# Patient Record
Sex: MALE | Race: WHITE | Employment: OTHER
[De-identification: names, ages, dates, MRNs, and addresses within clinical notes are randomized per-mention and may not be internally consistent; named-entity substitution may affect disease eponyms.]

---

## 2017-01-03 DIAGNOSIS — M10.9 GOUT, UNSPECIFIED CAUSE, UNSPECIFIED CHRONICITY, UNSPECIFIED SITE: ICD-10-CM

## 2017-01-03 RX ORDER — ALLOPURINOL 100 MG/1
100 TABLET ORAL DAILY
Qty: 30 TABLET | Refills: 11 | Status: SHIPPED | OUTPATIENT
Start: 2017-01-03 | End: 2018-01-02 | Stop reason: SDUPTHER

## 2017-01-13 DIAGNOSIS — E13.8 OTHER SPECIFIED DIABETES MELLITUS WITH UNSPECIFIED COMPLICATIONS (HCC): ICD-10-CM

## 2017-01-13 RX ORDER — GLIMEPIRIDE 4 MG/1
TABLET ORAL
Qty: 30 TABLET | Refills: 11 | Status: SHIPPED | OUTPATIENT
Start: 2017-01-13 | End: 2017-07-19 | Stop reason: SDUPTHER

## 2017-01-30 ENCOUNTER — TELEPHONE (OUTPATIENT)
Dept: INTERNAL MEDICINE | Facility: CLINIC | Age: 52
End: 2017-01-30

## 2017-02-25 DIAGNOSIS — E11.65 TYPE 2 DIABETES MELLITUS WITH HYPERGLYCEMIA, WITHOUT LONG-TERM CURRENT USE OF INSULIN (HCC): ICD-10-CM

## 2017-02-27 RX ORDER — SITAGLIPTIN 100 MG/1
TABLET, FILM COATED ORAL
Qty: 30 TABLET | Refills: 5 | Status: SHIPPED | OUTPATIENT
Start: 2017-02-27 | End: 2017-10-18 | Stop reason: SDUPTHER

## 2017-06-27 ENCOUNTER — TELEPHONE (OUTPATIENT)
Dept: INTERNAL MEDICINE CLINIC | Age: 52
End: 2017-06-27

## 2017-07-19 ENCOUNTER — OFFICE VISIT (OUTPATIENT)
Dept: INTERNAL MEDICINE CLINIC | Age: 52
End: 2017-07-19
Payer: MEDICARE

## 2017-07-19 VITALS
WEIGHT: 242 LBS | BODY MASS INDEX: 32.78 KG/M2 | HEIGHT: 72 IN | SYSTOLIC BLOOD PRESSURE: 126 MMHG | DIASTOLIC BLOOD PRESSURE: 80 MMHG

## 2017-07-19 DIAGNOSIS — G47.9 SLEEP DISORDER: ICD-10-CM

## 2017-07-19 DIAGNOSIS — E11.65 TYPE 2 DIABETES MELLITUS WITH HYPERGLYCEMIA, WITHOUT LONG-TERM CURRENT USE OF INSULIN (HCC): Primary | ICD-10-CM

## 2017-07-19 DIAGNOSIS — E13.8 OTHER SPECIFIED DIABETES MELLITUS WITH UNSPECIFIED COMPLICATIONS (HCC): ICD-10-CM

## 2017-07-19 DIAGNOSIS — E78.3 HYPERCHYLOMICRONEMIA: ICD-10-CM

## 2017-07-19 LAB — HBA1C MFR BLD: 10.1 %

## 2017-07-19 PROCEDURE — 99214 OFFICE O/P EST MOD 30 MIN: CPT | Performed by: INTERNAL MEDICINE

## 2017-07-19 PROCEDURE — 83036 HEMOGLOBIN GLYCOSYLATED A1C: CPT | Performed by: INTERNAL MEDICINE

## 2017-07-19 RX ORDER — GLIMEPIRIDE 4 MG/1
TABLET ORAL
Qty: 60 TABLET | Refills: 11 | Status: SHIPPED | OUTPATIENT
Start: 2017-07-19 | End: 2018-08-21 | Stop reason: SDUPTHER

## 2017-07-19 ASSESSMENT — PATIENT HEALTH QUESTIONNAIRE - PHQ9
2. FEELING DOWN, DEPRESSED OR HOPELESS: 0
SUM OF ALL RESPONSES TO PHQ QUESTIONS 1-9: 0
SUM OF ALL RESPONSES TO PHQ9 QUESTIONS 1 & 2: 0
1. LITTLE INTEREST OR PLEASURE IN DOING THINGS: 0

## 2017-07-21 ENCOUNTER — HOSPITAL ENCOUNTER (OUTPATIENT)
Dept: SLEEP CENTER | Age: 52
Discharge: HOME | End: 2017-07-21
Payer: MEDICARE

## 2017-07-21 DIAGNOSIS — G47.33 OSA (OBSTRUCTIVE SLEEP APNEA): Primary | ICD-10-CM

## 2017-07-21 LAB
ALBUMIN SERPL-MCNC: 3.8 G/DL
ALP BLD-CCNC: 84 U/L
ALT SERPL-CCNC: 36 U/L
ANION GAP SERPL CALCULATED.3IONS-SCNC: 8 MMOL/L
AST SERPL-CCNC: 30 U/L
BASOPHILS ABSOLUTE: NORMAL /ΜL
BASOPHILS RELATIVE PERCENT: NORMAL %
BILIRUB SERPL-MCNC: 2.2 MG/DL (ref 0.1–1.4)
BUN BLDV-MCNC: 14 MG/DL
CALCIUM SERPL-MCNC: 9.4 MG/DL
CHLORIDE BLD-SCNC: 104 MMOL/L
CHOLESTEROL, TOTAL: 105 MG/DL
CHOLESTEROL/HDL RATIO: 2.9
CO2: 27 MMOL/L
CREAT SERPL-MCNC: 0.78 MG/DL
CREATININE URINE: 367.63 MG/DL
EOSINOPHILS ABSOLUTE: NORMAL /ΜL
EOSINOPHILS RELATIVE PERCENT: NORMAL %
GFR CALCULATED: ABNORMAL
GLUCOSE BLD-MCNC: 183 MG/DL
HCT VFR BLD CALC: 44.5 % (ref 41–53)
HDLC SERPL-MCNC: 36 MG/DL (ref 35–70)
HEMOGLOBIN: 15 G/DL (ref 13.5–17.5)
LDL CHOLESTEROL CALCULATED: 46 MG/DL (ref 0–160)
LYMPHOCYTES ABSOLUTE: NORMAL /ΜL
LYMPHOCYTES RELATIVE PERCENT: NORMAL %
MCH RBC QN AUTO: NORMAL PG
MCHC RBC AUTO-ENTMCNC: NORMAL G/DL
MCV RBC AUTO: NORMAL FL
MICROALBUMIN/CREAT 24H UR: 5.8 MG/G{CREAT}
MONOCYTES ABSOLUTE: NORMAL /ΜL
MONOCYTES RELATIVE PERCENT: NORMAL %
NEUTROPHILS ABSOLUTE: NORMAL /ΜL
NEUTROPHILS RELATIVE PERCENT: NORMAL %
PDW BLD-RTO: NORMAL %
PLATELET # BLD: 107 K/ΜL
PMV BLD AUTO: NORMAL FL
POTASSIUM SERPL-SCNC: 4.3 MMOL/L
RBC # BLD: 4.96 10^6/ΜL
SODIUM BLD-SCNC: 139 MMOL/L
TOTAL PROTEIN: 7.3
TRIGL SERPL-MCNC: 116 MG/DL
VLDLC SERPL CALC-MCNC: 23 MG/DL
WBC # BLD: 6.1 10^3/ML

## 2017-07-21 PROCEDURE — 95810 POLYSOM 6/> YRS 4/> PARAM: CPT

## 2017-07-22 VITALS
DIASTOLIC BLOOD PRESSURE: 80 MMHG | RESPIRATION RATE: 18 BRPM | HEART RATE: 75 BPM | SYSTOLIC BLOOD PRESSURE: 126 MMHG | BODY MASS INDEX: 32.78 KG/M2 | WEIGHT: 242 LBS | HEIGHT: 72 IN

## 2017-07-22 ASSESSMENT — SLEEP AND FATIGUE QUESTIONNAIRES
HOW LIKELY ARE YOU TO NOD OFF OR FALL ASLEEP IN A CAR, WHILE STOPPED FOR A FEW MINUTES IN TRAFFIC: 2
HOW LIKELY ARE YOU TO NOD OFF OR FALL ASLEEP WHILE WATCHING TV: 3
HOW LIKELY ARE YOU TO NOD OFF OR FALL ASLEEP WHILE SITTING QUIETLY AFTER LUNCH WITHOUT ALCOHOL: 3
ESS TOTAL SCORE: 21
HOW LIKELY ARE YOU TO NOD OFF OR FALL ASLEEP WHILE LYING DOWN TO REST IN THE AFTERNOON WHEN CIRCUMSTANCES PERMIT: 3
HOW LIKELY ARE YOU TO NOD OFF OR FALL ASLEEP WHEN YOU ARE A PASSENGER IN A CAR FOR AN HOUR WITHOUT A BREAK: 3
HOW LIKELY ARE YOU TO NOD OFF OR FALL ASLEEP WHILE SITTING AND TALKING TO SOMEONE: 2
HOW LIKELY ARE YOU TO NOD OFF OR FALL ASLEEP WHILE SITTING AND READING: 3
NECK CIRCUMFERENCE (INCHES): 47
HOW LIKELY ARE YOU TO NOD OFF OR FALL ASLEEP WHILE SITTING INACTIVE IN A PUBLIC PLACE: 2

## 2017-07-25 DIAGNOSIS — E11.65 TYPE 2 DIABETES MELLITUS WITH HYPERGLYCEMIA, WITHOUT LONG-TERM CURRENT USE OF INSULIN (HCC): ICD-10-CM

## 2017-07-25 DIAGNOSIS — E78.3 HYPERCHYLOMICRONEMIA: ICD-10-CM

## 2017-08-09 ENCOUNTER — HOSPITAL ENCOUNTER (OUTPATIENT)
Dept: SLEEP CENTER | Age: 52
Discharge: HOME | End: 2017-08-09
Payer: MEDICARE

## 2017-08-09 VITALS
SYSTOLIC BLOOD PRESSURE: 126 MMHG | WEIGHT: 242 LBS | BODY MASS INDEX: 32.78 KG/M2 | HEIGHT: 72 IN | DIASTOLIC BLOOD PRESSURE: 80 MMHG

## 2017-08-09 DIAGNOSIS — G47.33 OSA (OBSTRUCTIVE SLEEP APNEA): Primary | ICD-10-CM

## 2017-08-09 PROCEDURE — 95811 POLYSOM 6/>YRS CPAP 4/> PARM: CPT

## 2017-08-18 ENCOUNTER — TELEPHONE (OUTPATIENT)
Dept: INTERNAL MEDICINE CLINIC | Age: 52
End: 2017-08-18

## 2017-08-20 DIAGNOSIS — N40.0 BENIGN NON-NODULAR PROSTATIC HYPERPLASIA WITHOUT LOWER URINARY TRACT SYMPTOMS: ICD-10-CM

## 2017-08-21 RX ORDER — TAMSULOSIN HYDROCHLORIDE 0.4 MG/1
CAPSULE ORAL
Qty: 30 CAPSULE | Refills: 6 | Status: SHIPPED | OUTPATIENT
Start: 2017-08-21 | End: 2018-08-21 | Stop reason: SDUPTHER

## 2017-08-23 ENCOUNTER — OFFICE VISIT (OUTPATIENT)
Dept: INTERNAL MEDICINE CLINIC | Age: 52
End: 2017-08-23
Payer: MEDICARE

## 2017-08-23 VITALS
WEIGHT: 232 LBS | SYSTOLIC BLOOD PRESSURE: 130 MMHG | BODY MASS INDEX: 31.42 KG/M2 | HEIGHT: 72 IN | DIASTOLIC BLOOD PRESSURE: 70 MMHG

## 2017-08-23 DIAGNOSIS — E11.69 HYPERLIPIDEMIA ASSOCIATED WITH TYPE 2 DIABETES MELLITUS (HCC): Primary | ICD-10-CM

## 2017-08-23 DIAGNOSIS — E11.65 TYPE 2 DIABETES MELLITUS WITH HYPERGLYCEMIA, WITHOUT LONG-TERM CURRENT USE OF INSULIN (HCC): ICD-10-CM

## 2017-08-23 DIAGNOSIS — E78.5 HYPERLIPIDEMIA ASSOCIATED WITH TYPE 2 DIABETES MELLITUS (HCC): Primary | ICD-10-CM

## 2017-08-23 DIAGNOSIS — I10 ESSENTIAL HYPERTENSION, BENIGN: ICD-10-CM

## 2017-08-23 PROCEDURE — 99213 OFFICE O/P EST LOW 20 MIN: CPT | Performed by: INTERNAL MEDICINE

## 2017-08-23 RX ORDER — TRAZODONE HYDROCHLORIDE 50 MG/1
50 TABLET ORAL NIGHTLY
Qty: 30 TABLET | Refills: 2 | Status: SHIPPED | OUTPATIENT
Start: 2017-08-23 | End: 2017-11-14 | Stop reason: SDUPTHER

## 2017-08-23 RX ORDER — FLUCONAZOLE 100 MG/1
100 TABLET ORAL DAILY
Qty: 3 TABLET | Refills: 0 | Status: SHIPPED | OUTPATIENT
Start: 2017-08-23 | End: 2017-08-26

## 2017-08-30 DIAGNOSIS — G47.9 SLEEP DISORDER: ICD-10-CM

## 2017-09-04 ENCOUNTER — APPOINTMENT (OUTPATIENT)
Dept: GENERAL RADIOLOGY | Age: 52
End: 2017-09-04
Payer: MEDICARE

## 2017-09-04 ENCOUNTER — HOSPITAL ENCOUNTER (EMERGENCY)
Age: 52
Discharge: HOME | End: 2017-09-05
Attending: EMERGENCY MEDICINE
Payer: MEDICARE

## 2017-09-04 ENCOUNTER — APPOINTMENT (OUTPATIENT)
Dept: CT IMAGING | Age: 52
End: 2017-09-04
Payer: MEDICARE

## 2017-09-04 DIAGNOSIS — J40 BRONCHITIS: Primary | ICD-10-CM

## 2017-09-04 DIAGNOSIS — R04.2 HEMOPTYSIS: ICD-10-CM

## 2017-09-04 LAB
ABSOLUTE EOS #: 0.1 K/UL (ref 0–0.4)
ABSOLUTE LYMPH #: 2.3 K/UL (ref 1–4.8)
ABSOLUTE MONO #: 0.5 K/UL (ref 0.1–1.3)
ANION GAP SERPL CALCULATED.3IONS-SCNC: 13 MMOL/L (ref 9–17)
BASOPHILS # BLD: 0 %
BASOPHILS ABSOLUTE: 0 K/UL (ref 0–0.2)
BUN BLDV-MCNC: 16 MG/DL (ref 6–20)
BUN/CREAT BLD: ABNORMAL (ref 9–20)
CALCIUM SERPL-MCNC: 9.3 MG/DL (ref 8.6–10.4)
CHLORIDE BLD-SCNC: 105 MMOL/L (ref 98–107)
CO2: 24 MMOL/L (ref 20–31)
CREAT SERPL-MCNC: 0.67 MG/DL (ref 0.7–1.2)
DIFFERENTIAL TYPE: ABNORMAL
EOSINOPHILS RELATIVE PERCENT: 2 %
GFR AFRICAN AMERICAN: >60 ML/MIN
GFR NON-AFRICAN AMERICAN: >60 ML/MIN
GFR SERPL CREATININE-BSD FRML MDRD: ABNORMAL ML/MIN/{1.73_M2}
GFR SERPL CREATININE-BSD FRML MDRD: ABNORMAL ML/MIN/{1.73_M2}
GLUCOSE BLD-MCNC: 182 MG/DL (ref 70–99)
HCT VFR BLD CALC: 42.3 % (ref 41–53)
HEMOGLOBIN: 14.4 G/DL (ref 13.5–17.5)
INR BLD: 1
LYMPHOCYTES # BLD: 43 %
MCH RBC QN AUTO: 30.9 PG (ref 26–34)
MCHC RBC AUTO-ENTMCNC: 34.1 G/DL (ref 31–37)
MCV RBC AUTO: 90.5 FL (ref 80–100)
MONOCYTES # BLD: 9 %
PDW BLD-RTO: 14 % (ref 11.5–14.9)
PLATELET # BLD: 107 K/UL (ref 150–450)
PLATELET ESTIMATE: ABNORMAL
PMV BLD AUTO: 8.2 FL (ref 6–12)
POTASSIUM SERPL-SCNC: 3.9 MMOL/L (ref 3.7–5.3)
PROTHROMBIN TIME: 10.7 SEC (ref 9.7–12)
RBC # BLD: 4.68 M/UL (ref 4.5–5.9)
RBC # BLD: ABNORMAL 10*6/UL
SEG NEUTROPHILS: 46 %
SEGMENTED NEUTROPHILS ABSOLUTE COUNT: 2.4 K/UL (ref 1.3–9.1)
SODIUM BLD-SCNC: 142 MMOL/L (ref 135–144)
TROPONIN INTERP: NORMAL
TROPONIN T: <0.03 NG/ML
WBC # BLD: 5.3 K/UL (ref 3.5–11)
WBC # BLD: ABNORMAL 10*3/UL

## 2017-09-04 PROCEDURE — 6370000000 HC RX 637 (ALT 250 FOR IP): Performed by: EMERGENCY MEDICINE

## 2017-09-04 PROCEDURE — 2580000003 HC RX 258: Performed by: EMERGENCY MEDICINE

## 2017-09-04 PROCEDURE — 94640 AIRWAY INHALATION TREATMENT: CPT

## 2017-09-04 PROCEDURE — 6360000004 HC RX CONTRAST MEDICATION: Performed by: EMERGENCY MEDICINE

## 2017-09-04 PROCEDURE — 93005 ELECTROCARDIOGRAM TRACING: CPT

## 2017-09-04 PROCEDURE — 99284 EMERGENCY DEPT VISIT MOD MDM: CPT

## 2017-09-04 PROCEDURE — 71260 CT THORAX DX C+: CPT

## 2017-09-04 PROCEDURE — 85610 PROTHROMBIN TIME: CPT

## 2017-09-04 PROCEDURE — 85025 COMPLETE CBC W/AUTO DIFF WBC: CPT

## 2017-09-04 PROCEDURE — 36415 COLL VENOUS BLD VENIPUNCTURE: CPT

## 2017-09-04 PROCEDURE — 71010 XR CHEST PORTABLE: CPT

## 2017-09-04 PROCEDURE — 87040 BLOOD CULTURE FOR BACTERIA: CPT

## 2017-09-04 PROCEDURE — 94664 DEMO&/EVAL PT USE INHALER: CPT

## 2017-09-04 PROCEDURE — 94760 N-INVAS EAR/PLS OXIMETRY 1: CPT

## 2017-09-04 PROCEDURE — 80048 BASIC METABOLIC PNL TOTAL CA: CPT

## 2017-09-04 PROCEDURE — 84484 ASSAY OF TROPONIN QUANT: CPT

## 2017-09-04 PROCEDURE — 87205 SMEAR GRAM STAIN: CPT

## 2017-09-04 RX ORDER — IPRATROPIUM BROMIDE AND ALBUTEROL SULFATE 2.5; .5 MG/3ML; MG/3ML
1 SOLUTION RESPIRATORY (INHALATION)
Status: DISCONTINUED | OUTPATIENT
Start: 2017-09-04 | End: 2017-09-05 | Stop reason: HOSPADM

## 2017-09-04 RX ORDER — AZITHROMYCIN 250 MG/1
TABLET, FILM COATED ORAL
Qty: 1 PACKET | Refills: 0 | Status: SHIPPED | OUTPATIENT
Start: 2017-09-04 | End: 2017-09-14

## 2017-09-04 RX ORDER — ASPIRIN 81 MG/1
324 TABLET, CHEWABLE ORAL ONCE
Status: COMPLETED | OUTPATIENT
Start: 2017-09-04 | End: 2017-09-04

## 2017-09-04 RX ORDER — ALBUTEROL SULFATE 90 UG/1
2 AEROSOL, METERED RESPIRATORY (INHALATION)
Status: DISCONTINUED | OUTPATIENT
Start: 2017-09-04 | End: 2017-09-04

## 2017-09-04 RX ORDER — ALBUTEROL SULFATE 2.5 MG/3ML
5 SOLUTION RESPIRATORY (INHALATION)
Status: DISCONTINUED | OUTPATIENT
Start: 2017-09-04 | End: 2017-09-04

## 2017-09-04 RX ORDER — PREDNISONE 20 MG/1
60 TABLET ORAL ONCE
Status: COMPLETED | OUTPATIENT
Start: 2017-09-05 | End: 2017-09-05

## 2017-09-04 RX ORDER — SODIUM CHLORIDE 0.9 % (FLUSH) 0.9 %
10 SYRINGE (ML) INJECTION PRN
Status: DISCONTINUED | OUTPATIENT
Start: 2017-09-04 | End: 2017-09-05 | Stop reason: HOSPADM

## 2017-09-04 RX ORDER — ALBUTEROL SULFATE 90 UG/1
2 AEROSOL, METERED RESPIRATORY (INHALATION) EVERY 4 HOURS PRN
Qty: 1 INHALER | Refills: 0 | Status: SHIPPED | OUTPATIENT
Start: 2017-09-04 | End: 2018-01-31 | Stop reason: SDUPTHER

## 2017-09-04 RX ORDER — PREDNISONE 50 MG/1
50 TABLET ORAL DAILY
Qty: 5 TABLET | Refills: 0 | Status: SHIPPED | OUTPATIENT
Start: 2017-09-04 | End: 2017-09-09

## 2017-09-04 RX ORDER — BENZONATATE 100 MG/1
100 CAPSULE ORAL 3 TIMES DAILY PRN
Qty: 30 CAPSULE | Refills: 0 | Status: SHIPPED | OUTPATIENT
Start: 2017-09-04 | End: 2017-09-11

## 2017-09-04 RX ORDER — 0.9 % SODIUM CHLORIDE 0.9 %
100 INTRAVENOUS SOLUTION INTRAVENOUS ONCE
Status: COMPLETED | OUTPATIENT
Start: 2017-09-04 | End: 2017-09-04

## 2017-09-04 RX ADMIN — Medication 10 ML: at 22:47

## 2017-09-04 RX ADMIN — IOVERSOL 100 ML: 741 INJECTION INTRA-ARTERIAL; INTRAVENOUS at 22:47

## 2017-09-04 RX ADMIN — IPRATROPIUM BROMIDE AND ALBUTEROL SULFATE 1 AMPULE: .5; 3 SOLUTION RESPIRATORY (INHALATION) at 22:51

## 2017-09-04 RX ADMIN — SODIUM CHLORIDE 100 ML: 9 INJECTION, SOLUTION INTRAVENOUS at 22:47

## 2017-09-04 RX ADMIN — ASPIRIN 81 MG 324 MG: 81 TABLET ORAL at 21:53

## 2017-09-04 ASSESSMENT — PAIN DESCRIPTION - PROGRESSION: CLINICAL_PROGRESSION: NOT CHANGED

## 2017-09-04 ASSESSMENT — PAIN DESCRIPTION - PAIN TYPE: TYPE: ACUTE PAIN

## 2017-09-04 ASSESSMENT — ENCOUNTER SYMPTOMS
EYE REDNESS: 0
VOMITING: 0
SORE THROAT: 0
COUGH: 1
SHORTNESS OF BREATH: 0
EYE DISCHARGE: 0
NAUSEA: 0
COLOR CHANGE: 0
DIARRHEA: 0
RHINORRHEA: 0
CHEST TIGHTNESS: 1

## 2017-09-04 ASSESSMENT — PAIN DESCRIPTION - DESCRIPTORS: DESCRIPTORS: DULL

## 2017-09-04 ASSESSMENT — PAIN SCALES - GENERAL: PAINLEVEL_OUTOF10: 4

## 2017-09-04 ASSESSMENT — PAIN DESCRIPTION - FREQUENCY: FREQUENCY: INTERMITTENT

## 2017-09-04 ASSESSMENT — PAIN DESCRIPTION - ONSET: ONSET: ON-GOING

## 2017-09-05 VITALS
WEIGHT: 234 LBS | SYSTOLIC BLOOD PRESSURE: 123 MMHG | DIASTOLIC BLOOD PRESSURE: 68 MMHG | RESPIRATION RATE: 16 BRPM | HEIGHT: 72 IN | HEART RATE: 92 BPM | OXYGEN SATURATION: 96 % | TEMPERATURE: 98.1 F | BODY MASS INDEX: 31.69 KG/M2

## 2017-09-05 PROCEDURE — 6370000000 HC RX 637 (ALT 250 FOR IP): Performed by: EMERGENCY MEDICINE

## 2017-09-05 RX ADMIN — PREDNISONE 60 MG: 20 TABLET ORAL at 00:30

## 2017-09-08 LAB
CULTURE: ABNORMAL
Lab: ABNORMAL
SPECIMEN DESCRIPTION: ABNORMAL
SPECIMEN DESCRIPTION: ABNORMAL
STATUS: ABNORMAL

## 2017-09-09 DIAGNOSIS — I10 ESSENTIAL HYPERTENSION: ICD-10-CM

## 2017-09-11 LAB
CULTURE: NORMAL
CULTURE: NORMAL
EKG ATRIAL RATE: 80 BPM
EKG P AXIS: 38 DEGREES
EKG P-R INTERVAL: 152 MS
EKG Q-T INTERVAL: 382 MS
EKG QRS DURATION: 102 MS
EKG QTC CALCULATION (BAZETT): 440 MS
EKG R AXIS: -19 DEGREES
EKG T AXIS: 6 DEGREES
EKG VENTRICULAR RATE: 80 BPM
Lab: NORMAL
SPECIMEN DESCRIPTION: NORMAL
SPECIMEN DESCRIPTION: NORMAL
STATUS: NORMAL

## 2017-09-11 RX ORDER — LISINOPRIL 10 MG/1
TABLET ORAL
Qty: 30 TABLET | Refills: 11 | Status: SHIPPED | OUTPATIENT
Start: 2017-09-11 | End: 2018-09-12 | Stop reason: SDUPTHER

## 2017-10-03 ENCOUNTER — TELEPHONE (OUTPATIENT)
Dept: INTERNAL MEDICINE CLINIC | Age: 52
End: 2017-10-03

## 2017-10-03 RX ORDER — AZITHROMYCIN 250 MG/1
TABLET, FILM COATED ORAL
Qty: 1 PACKET | Refills: 0 | Status: SHIPPED | OUTPATIENT
Start: 2017-10-03 | End: 2017-10-13

## 2017-10-09 ENCOUNTER — TELEPHONE (OUTPATIENT)
Dept: INTERNAL MEDICINE CLINIC | Age: 52
End: 2017-10-09

## 2017-10-09 NOTE — TELEPHONE ENCOUNTER
LM on pts phone to janet appt however writer discovered pt had already janet'd no show appt for 10/10/17 w/ Dr. Laina Hatch.     No Show ltr mailed

## 2017-10-10 ENCOUNTER — OFFICE VISIT (OUTPATIENT)
Dept: INTERNAL MEDICINE CLINIC | Age: 52
End: 2017-10-10
Payer: MEDICARE

## 2017-10-10 VITALS
SYSTOLIC BLOOD PRESSURE: 122 MMHG | DIASTOLIC BLOOD PRESSURE: 76 MMHG | HEIGHT: 72 IN | BODY MASS INDEX: 31.56 KG/M2 | WEIGHT: 233 LBS

## 2017-10-10 DIAGNOSIS — Z23 NEED FOR VACCINATION: ICD-10-CM

## 2017-10-10 DIAGNOSIS — I10 ESSENTIAL HYPERTENSION, BENIGN: ICD-10-CM

## 2017-10-10 DIAGNOSIS — E13.8 OTHER SPECIFIED DIABETES MELLITUS WITH UNSPECIFIED COMPLICATIONS (HCC): ICD-10-CM

## 2017-10-10 DIAGNOSIS — R05.9 COUGH: Primary | ICD-10-CM

## 2017-10-10 LAB — HBA1C MFR BLD: 9.4 %

## 2017-10-10 PROCEDURE — 99213 OFFICE O/P EST LOW 20 MIN: CPT | Performed by: INTERNAL MEDICINE

## 2017-10-10 PROCEDURE — 1036F TOBACCO NON-USER: CPT | Performed by: INTERNAL MEDICINE

## 2017-10-10 PROCEDURE — 90686 IIV4 VACC NO PRSV 0.5 ML IM: CPT | Performed by: INTERNAL MEDICINE

## 2017-10-10 PROCEDURE — G8417 CALC BMI ABV UP PARAM F/U: HCPCS | Performed by: INTERNAL MEDICINE

## 2017-10-10 PROCEDURE — G8484 FLU IMMUNIZE NO ADMIN: HCPCS | Performed by: INTERNAL MEDICINE

## 2017-10-10 PROCEDURE — 3046F HEMOGLOBIN A1C LEVEL >9.0%: CPT | Performed by: INTERNAL MEDICINE

## 2017-10-10 PROCEDURE — 90471 IMMUNIZATION ADMIN: CPT | Performed by: INTERNAL MEDICINE

## 2017-10-10 PROCEDURE — 83036 HEMOGLOBIN GLYCOSYLATED A1C: CPT | Performed by: INTERNAL MEDICINE

## 2017-10-10 PROCEDURE — G8427 DOCREV CUR MEDS BY ELIG CLIN: HCPCS | Performed by: INTERNAL MEDICINE

## 2017-10-10 PROCEDURE — 3017F COLORECTAL CA SCREEN DOC REV: CPT | Performed by: INTERNAL MEDICINE

## 2017-10-11 DIAGNOSIS — E13.8 OTHER SPECIFIED DIABETES MELLITUS WITH UNSPECIFIED COMPLICATIONS (HCC): ICD-10-CM

## 2017-10-11 NOTE — TELEPHONE ENCOUNTER
When patient was here for appt yesterday, he asked to have his test strips refilled but nothing was sent in.     Needs one touch ultra test strips, testing QD

## 2017-10-18 DIAGNOSIS — E11.65 TYPE 2 DIABETES MELLITUS WITH HYPERGLYCEMIA, WITHOUT LONG-TERM CURRENT USE OF INSULIN (HCC): ICD-10-CM

## 2017-10-18 RX ORDER — SITAGLIPTIN 100 MG/1
TABLET, FILM COATED ORAL
Qty: 30 TABLET | Refills: 11 | Status: SHIPPED | OUTPATIENT
Start: 2017-10-18 | End: 2018-10-15

## 2017-11-07 DIAGNOSIS — I25.119 CORONARY ARTERY DISEASE WITH ANGINA PECTORIS, UNSPECIFIED VESSEL OR LESION TYPE, UNSPECIFIED WHETHER NATIVE OR TRANSPLANTED HEART (HCC): ICD-10-CM

## 2017-11-07 RX ORDER — CLOPIDOGREL BISULFATE 75 MG/1
TABLET ORAL
Qty: 30 TABLET | Refills: 6 | Status: ON HOLD | OUTPATIENT
Start: 2017-11-07 | End: 2018-05-24 | Stop reason: SDUPTHER

## 2017-11-14 ENCOUNTER — OFFICE VISIT (OUTPATIENT)
Dept: INTERNAL MEDICINE CLINIC | Age: 52
End: 2017-11-14
Payer: MEDICARE

## 2017-11-14 VITALS
DIASTOLIC BLOOD PRESSURE: 80 MMHG | BODY MASS INDEX: 31.56 KG/M2 | SYSTOLIC BLOOD PRESSURE: 126 MMHG | HEIGHT: 72 IN | WEIGHT: 233.03 LBS

## 2017-11-14 DIAGNOSIS — I10 ESSENTIAL HYPERTENSION, BENIGN: Primary | ICD-10-CM

## 2017-11-14 DIAGNOSIS — E11.65 TYPE 2 DIABETES MELLITUS WITH HYPERGLYCEMIA, WITHOUT LONG-TERM CURRENT USE OF INSULIN (HCC): ICD-10-CM

## 2017-11-14 DIAGNOSIS — G47.00 INSOMNIA, UNSPECIFIED TYPE: ICD-10-CM

## 2017-11-14 LAB — HBA1C MFR BLD: 8 %

## 2017-11-14 PROCEDURE — 1036F TOBACCO NON-USER: CPT | Performed by: INTERNAL MEDICINE

## 2017-11-14 PROCEDURE — 3045F PR MOST RECENT HEMOGLOBIN A1C LEVEL 7.0-9.0%: CPT | Performed by: INTERNAL MEDICINE

## 2017-11-14 PROCEDURE — G8598 ASA/ANTIPLAT THER USED: HCPCS | Performed by: INTERNAL MEDICINE

## 2017-11-14 PROCEDURE — 3017F COLORECTAL CA SCREEN DOC REV: CPT | Performed by: INTERNAL MEDICINE

## 2017-11-14 PROCEDURE — G8484 FLU IMMUNIZE NO ADMIN: HCPCS | Performed by: INTERNAL MEDICINE

## 2017-11-14 PROCEDURE — 99213 OFFICE O/P EST LOW 20 MIN: CPT | Performed by: INTERNAL MEDICINE

## 2017-11-14 PROCEDURE — G8417 CALC BMI ABV UP PARAM F/U: HCPCS | Performed by: INTERNAL MEDICINE

## 2017-11-14 PROCEDURE — 83036 HEMOGLOBIN GLYCOSYLATED A1C: CPT | Performed by: INTERNAL MEDICINE

## 2017-11-14 PROCEDURE — G8427 DOCREV CUR MEDS BY ELIG CLIN: HCPCS | Performed by: INTERNAL MEDICINE

## 2017-11-14 RX ORDER — TRAZODONE HYDROCHLORIDE 100 MG/1
100 TABLET ORAL NIGHTLY
Qty: 30 TABLET | Refills: 3 | Status: SHIPPED | OUTPATIENT
Start: 2017-11-14 | End: 2018-03-07 | Stop reason: SDUPTHER

## 2017-11-14 NOTE — PROGRESS NOTES
Subjective:      Patient ID: Phu Person is a 47 y.o. male. Chronic Disease Visit Information    BP Readings from Last 3 Encounters:   11/14/17 126/80   10/10/17 122/76   09/05/17 123/68          Hemoglobin A1C (%)   Date Value   11/14/2017 8.0   10/10/2017 9.4   07/19/2017 10.1     Microalb/Crt. Ratio (mcg/mg creat)   Date Value   04/11/2016 8     LDL Cholesterol (mg/dL)   Date Value   10/23/2014 47     LDL Calculated (mg/dL)   Date Value   07/21/2017 46     HDL (mg/dL)   Date Value   07/21/2017 36     BUN (mg/dL)   Date Value   09/04/2017 16     CREATININE (mg/dL)   Date Value   09/04/2017 0.67 (L)     Glucose (mg/dL)   Date Value   09/04/2017 182 (H)            Have you changed or started any medications since your last visit including any over-the-counter medicines, vitamins, or herbal medicines? no   Are you having any side effects from any of your medications? -  no  Have you stopped taking any of your medications? Is so, why? -  no    Have you seen any other physician or provider since your last visit? No  Have you had any other diagnostic tests since your last visit? No  Have you been seen in the emergency room and/or had an admission to a hospital since we last saw you? No  Have you had your annual diabetic retinal (eye) exam? No  Have you had your routine dental cleaning in the past 6 months? no    Have you activated your [employer] account? If not, what are your barriers?  No:      Patient Care Team:  Tierney Alexis MD as PCP - General         Medical History Review  Past Medical, Family, and Social History reviewed and does contribute to the patient presenting condition    Health Maintenance   Topic Date Due    HIV screen  03/03/1981    Pneumococcal med risk (1 of 1 - PPSV23) 03/03/1985    Diabetic retinal exam  08/15/2016    Diabetic foot exam  04/11/2017    DTaP/Tdap/Td vaccine (1 - Tdap) 07/12/2018 (Originally 3/3/1985)    Diabetic microalbuminuria test  07/21/2018    Lipid screen 07/21/2018    Diabetic hemoglobin A1C test  11/14/2018    Colon cancer screen colonoscopy  02/28/2026    Flu vaccine  Completed       HPI    Chief Complaint   Patient presents with    Diabetes     DM   Duration more than 3 years   Medications oral agents januvia metformin amaryl   Monitoring does not check FS   Modifying factors good physical activity Compliance good   Associated sympts no chest pain no leg pain no new visual complaints  HBA1c 8  LDL 46  microalbumin neg   Not smoker  bp controlled on lisinopril metoprolol     Review of Systems     Past Medical History:   Diagnosis Date    Calculus of kidney     Essential hypertension, benign     Gout, unspecified     H/O colonoscopy 4/11/2016 2016    Mitral valve disorders(424.0)     Other and unspecified hyperlipidemia     Type II or unspecified type diabetes mellitus without mention of complication, not stated as uncontrolled     Unspecified chronic liver disease without mention of alcohol      Social History   Substance Use Topics    Smoking status: Former Smoker     Quit date: 3/26/2012    Smokeless tobacco: Former User    Alcohol use 0.0 oz/week      Comment: very little/ special events       ROS  CVS no chest pain no sob no orthopnea  Resp no cough   General no weight loss no fatigue no fever        Objective:   Physical Exam    Blood pressure 126/80, height 6' 0.01\" (1.829 m), weight 233 lb 0.4 oz (105.7 kg). General Appearance NAD conversant  Neck FROM supple no JVD  Lungs normal effort Clear to auscultation  Cardio regular rhythm no murmur  Abdomen Soft nontender no HSM  Ext no edema no cyanosis no clubbing   Skin no rashes no ulcers  Neuro no focal deficits   Musculoskeletal no spinal tenderness no kyphosis     Assessment:      1. Essential hypertension, benign     2. Type 2 diabetes mellitus with hyperglycemia, without long-term current use of insulin (HCC)  POCT glycosylated hemoglobin (Hb A1C)   3.  Insomnia, unspecified type traZODone (DESYREL) 100 MG tablet           Plan:    HTN well controlled DM improved control insomnia use trazadone

## 2017-11-24 PROBLEM — G47.00 INSOMNIA: Status: ACTIVE | Noted: 2017-11-24

## 2017-11-28 ENCOUNTER — TELEPHONE (OUTPATIENT)
Dept: INTERNAL MEDICINE CLINIC | Age: 52
End: 2017-11-28

## 2017-11-28 RX ORDER — AZITHROMYCIN 250 MG/1
TABLET, FILM COATED ORAL
Qty: 1 PACKET | Refills: 0 | Status: SHIPPED | OUTPATIENT
Start: 2017-11-28 | End: 2017-12-08

## 2017-12-03 ENCOUNTER — HOSPITAL ENCOUNTER (EMERGENCY)
Age: 52
Discharge: HOME | End: 2017-12-03
Attending: EMERGENCY MEDICINE
Payer: MEDICARE

## 2017-12-03 ENCOUNTER — APPOINTMENT (OUTPATIENT)
Dept: GENERAL RADIOLOGY | Age: 52
End: 2017-12-03
Payer: MEDICARE

## 2017-12-03 VITALS
BODY MASS INDEX: 31.15 KG/M2 | RESPIRATION RATE: 16 BRPM | DIASTOLIC BLOOD PRESSURE: 64 MMHG | SYSTOLIC BLOOD PRESSURE: 105 MMHG | OXYGEN SATURATION: 95 % | WEIGHT: 230 LBS | HEIGHT: 72 IN | HEART RATE: 94 BPM | TEMPERATURE: 98.4 F

## 2017-12-03 DIAGNOSIS — J06.9 UPPER RESPIRATORY TRACT INFECTION, UNSPECIFIED TYPE: Primary | ICD-10-CM

## 2017-12-03 DIAGNOSIS — R55 SYNCOPE AND COLLAPSE: ICD-10-CM

## 2017-12-03 LAB
ABSOLUTE EOS #: 0.2 K/UL (ref 0–0.4)
ABSOLUTE IMMATURE GRANULOCYTE: ABNORMAL K/UL (ref 0–0.3)
ABSOLUTE LYMPH #: 2.7 K/UL (ref 1–4.8)
ABSOLUTE MONO #: 0.9 K/UL (ref 0.1–1.3)
ANION GAP SERPL CALCULATED.3IONS-SCNC: 15 MMOL/L (ref 9–17)
BASOPHILS # BLD: 0 % (ref 0–2)
BASOPHILS ABSOLUTE: 0 K/UL (ref 0–0.2)
BNP INTERPRETATION: NORMAL
BUN BLDV-MCNC: 16 MG/DL (ref 6–20)
BUN/CREAT BLD: ABNORMAL (ref 9–20)
CALCIUM SERPL-MCNC: 9.7 MG/DL (ref 8.6–10.4)
CHLORIDE BLD-SCNC: 103 MMOL/L (ref 98–107)
CO2: 23 MMOL/L (ref 20–31)
CREAT SERPL-MCNC: 0.83 MG/DL (ref 0.7–1.2)
DIFFERENTIAL TYPE: ABNORMAL
EKG ATRIAL RATE: 86 BPM
EKG P AXIS: 43 DEGREES
EKG P-R INTERVAL: 160 MS
EKG Q-T INTERVAL: 380 MS
EKG QRS DURATION: 92 MS
EKG QTC CALCULATION (BAZETT): 454 MS
EKG R AXIS: -21 DEGREES
EKG T AXIS: 17 DEGREES
EKG VENTRICULAR RATE: 86 BPM
EOSINOPHILS RELATIVE PERCENT: 2 % (ref 0–4)
GFR AFRICAN AMERICAN: >60 ML/MIN
GFR NON-AFRICAN AMERICAN: >60 ML/MIN
GFR SERPL CREATININE-BSD FRML MDRD: ABNORMAL ML/MIN/{1.73_M2}
GFR SERPL CREATININE-BSD FRML MDRD: ABNORMAL ML/MIN/{1.73_M2}
GLUCOSE BLD-MCNC: 270 MG/DL (ref 70–99)
HCT VFR BLD CALC: 43.7 % (ref 41–53)
HEMOGLOBIN: 14.8 G/DL (ref 13.5–17.5)
IMMATURE GRANULOCYTES: ABNORMAL %
LYMPHOCYTES # BLD: 28 % (ref 24–44)
MCH RBC QN AUTO: 30.7 PG (ref 26–34)
MCHC RBC AUTO-ENTMCNC: 33.8 G/DL (ref 31–37)
MCV RBC AUTO: 90.9 FL (ref 80–100)
MONOCYTES # BLD: 9 % (ref 1–7)
PDW BLD-RTO: 13.4 % (ref 11.5–14.9)
PLATELET # BLD: 131 K/UL (ref 150–450)
PLATELET ESTIMATE: ABNORMAL
PMV BLD AUTO: 7.9 FL (ref 6–12)
POTASSIUM SERPL-SCNC: 4 MMOL/L (ref 3.7–5.3)
PRO-BNP: 41 PG/ML
RBC # BLD: 4.81 M/UL (ref 4.5–5.9)
RBC # BLD: ABNORMAL 10*6/UL
SEG NEUTROPHILS: 61 % (ref 36–66)
SEGMENTED NEUTROPHILS ABSOLUTE COUNT: 5.9 K/UL (ref 1.3–9.1)
SODIUM BLD-SCNC: 141 MMOL/L (ref 135–144)
TROPONIN INTERP: NORMAL
TROPONIN T: <0.03 NG/ML
WBC # BLD: 9.6 K/UL (ref 3.5–11)
WBC # BLD: ABNORMAL 10*3/UL

## 2017-12-03 PROCEDURE — 94640 AIRWAY INHALATION TREATMENT: CPT

## 2017-12-03 PROCEDURE — 99284 EMERGENCY DEPT VISIT MOD MDM: CPT

## 2017-12-03 PROCEDURE — 84484 ASSAY OF TROPONIN QUANT: CPT

## 2017-12-03 PROCEDURE — 36415 COLL VENOUS BLD VENIPUNCTURE: CPT

## 2017-12-03 PROCEDURE — 71010 XR CHEST LIMITED: CPT

## 2017-12-03 PROCEDURE — 85025 COMPLETE CBC W/AUTO DIFF WBC: CPT

## 2017-12-03 PROCEDURE — 83880 ASSAY OF NATRIURETIC PEPTIDE: CPT

## 2017-12-03 PROCEDURE — 93005 ELECTROCARDIOGRAM TRACING: CPT

## 2017-12-03 PROCEDURE — 2580000003 HC RX 258: Performed by: EMERGENCY MEDICINE

## 2017-12-03 PROCEDURE — 94664 DEMO&/EVAL PT USE INHALER: CPT

## 2017-12-03 PROCEDURE — 6370000000 HC RX 637 (ALT 250 FOR IP): Performed by: EMERGENCY MEDICINE

## 2017-12-03 PROCEDURE — 80048 BASIC METABOLIC PNL TOTAL CA: CPT

## 2017-12-03 RX ORDER — 0.9 % SODIUM CHLORIDE 0.9 %
1000 INTRAVENOUS SOLUTION INTRAVENOUS ONCE
Status: COMPLETED | OUTPATIENT
Start: 2017-12-03 | End: 2017-12-03

## 2017-12-03 RX ORDER — IPRATROPIUM BROMIDE AND ALBUTEROL SULFATE 2.5; .5 MG/3ML; MG/3ML
1 SOLUTION RESPIRATORY (INHALATION) ONCE
Status: COMPLETED | OUTPATIENT
Start: 2017-12-03 | End: 2017-12-03

## 2017-12-03 RX ADMIN — SODIUM CHLORIDE 1000 ML: 9 INJECTION, SOLUTION INTRAVENOUS at 02:54

## 2017-12-03 RX ADMIN — IPRATROPIUM BROMIDE AND ALBUTEROL SULFATE 1 AMPULE: .5; 3 SOLUTION RESPIRATORY (INHALATION) at 01:23

## 2017-12-03 ASSESSMENT — PAIN SCALES - GENERAL: PAINLEVEL_OUTOF10: 8

## 2017-12-03 ASSESSMENT — PAIN DESCRIPTION - LOCATION: LOCATION: CHEST

## 2017-12-03 NOTE — ED PROVIDER NOTES
[address]  eMERGENCY dEPARTMENT eNCOUnter    Pt Name: Tiarra Garces  MRN: [id number]  [place] 1965  Date of evaluation: 12/3/17  CHIEF COMPLAINT       Chief Complaint   Patient presents with    Fever    Fall     tonight @2330    Chest Congestion    Emesis     earlier yesterday    Cough     HISTORY OF PRESENT ILLNESS   HPI  60-year-old male With a past history as listed below presents to the ER status post syncopal event. Per patient and family who were present, the patient passed out, fell to the ground, awoke and had a coughing spell. Patient denies shortness of breath, chest pain, preceding lightheadedness prior to event. Patient states the last week he has had subjective fevers, productive cough and 1 episode of emesis. Patient offers no complaints at this time. REVIEW OF SYSTEMS     Review of Systems   All other systems reviewed and are negative. PAST MEDICAL HISTORY     Past Medical History:   Diagnosis Date    Calculus of kidney     Essential hypertension, benign     Gout, unspecified     H/O colonoscopy 4/11/2016 2016    Mitral valve disorders(424.0)     Other and unspecified hyperlipidemia     Type II or unspecified type diabetes mellitus without mention of complication, not stated as uncontrolled     Unspecified chronic liver disease without mention of alcohol      SURGICAL HISTORY     History reviewed. No pertinent surgical history. CURRENT MEDICATIONS       Previous Medications    ADVAIR DISKUS 100-50 MCG/DOSE DISKUS INHALER    INHALE 1 PUFF BY MOUTH EVERY 12 HOURS    ALBUTEROL SULFATE HFA (PROVENTIL HFA) 108 (90 BASE) MCG/ACT INHALER    Inhale 2 puffs into the lungs every 4 hours as needed for Wheezing or Shortness of Breath (Space out to every 6 hours as symptoms improve) Space out to every 6 hours as symptoms improve.     ALLOPURINOL (ZYLOPRIM) 100 MG TABLET    Take 1 tablet by mouth daily    ASPIRIN 81 MG TABLET    Take 81 mg by mouth    ATORVASTATIN (LIPITOR) 10 MG TABLET    Take 1 tablet by mouth daily    AZITHROMYCIN (ZITHROMAX) 250 MG TABLET    2 tablets now then 1 daily until gone. BLOOD GLUCOSE MONITORING SUPPL (TRUETRACK BLOOD GLUCOSE) W/DEVICE KIT    1 Device by Does not apply route daily    CLOPIDOGREL (PLAVIX) 75 MG TABLET    TAKE 1 TABLET BY MOUTH DAILY    GLIMEPIRIDE (AMARYL) 4 MG TABLET    bid    GLUCOSE BLOOD VI TEST STRIPS (ASCENSIA AUTODISC VI;ONE TOUCH ULTRA TEST VI) STRIP    Use with associated glucose meter. Testing once daily Dx: E11.9    JANUVIA 100 MG TABLET    TAKE 1 TABLET BY MOUTH DAILY    [employer] LANCETS ULTRATHIN 30G MISC    USE AS DIRECTED TP TEST BLOOD SUGAR THREE TIMES A DAY    LISINOPRIL (PRINIVIL;ZESTRIL) 10 MG TABLET    TAKE 1 TABLET BY MOUTH DAILY    METFORMIN (GLUCOPHAGE) 500 MG TABLET    TAKE 1 TABLET BY MOUTH TWICE DAILY    METOPROLOL TARTRATE (LOPRESSOR) 25 MG TABLET    TAKE 1 TABLET BY MOUTH TWICE DAILY    SERTRALINE (ZOLOFT) 50 MG TABLET    TAKE 1 TABLET BY MOUTH DAILY    TAMSULOSIN (FLOMAX) 0.4 MG CAPSULE    TAKE 1 CAPSULE BY MOUTH DAILY    TRAZODONE (DESYREL) 100 MG TABLET    Take 1 tablet by mouth nightly     ALLERGIES     is allergic to codeine and simvastatin. FAMILY HISTORY     indicated that the status of his other is unknown. SOCIAL HISTORY      reports that he quit smoking about 5 years ago. He has quit using smokeless tobacco. He reports that he drinks alcohol. He reports that he does not use drugs. PHYSICAL EXAM     INITIAL VITALS: /64   Pulse 94   Temp 98.4 °F (36.9 °C) (Oral)   Resp 16   Ht 6' (1.829 m)   Wt 230 lb (104.3 kg)   SpO2 95%   BMI 31.19 kg/m²    Physical Exam   Constitutional: He is oriented to person, place, and time. He appears well-developed and well-nourished. No distress. Region noted on nose   HENT:   Head: Normocephalic and atraumatic.    Nose: Nose normal.   Mouth/Throat: Oropharynx is clear and moist.   Eyes: Conjunctivae and EOM are normal. Pupils are equal, round, and reactive within normal limits   BASIC METABOLIC PANEL - Abnormal; Notable for the following:     Glucose 270 (*)     All other components within normal limits   TROPONIN   BRAIN NATRIURETIC PEPTIDE     EMERGENCY DEPARTMENT COURSE:     Vitals:    Vitals:    12/03/17 0055 12/03/17 0256   BP: 105/64    Pulse: 94    Resp: 16    Temp: 98.4 °F (36.9 °C)    TempSrc: Oral    SpO2: 96% 95%   Weight: 230 lb (104.3 kg)    Height: 6' (1.829 m)      The patient was given the following medications while in the emergency department:  Orders Placed This Encounter   Medications    ipratropium-albuterol (DUONEB) nebulizer solution 1 ampule    0.9 % sodium chloride bolus     CONSULTS:  None    FINAL IMPRESSION      1. Upper respiratory tract infection, unspecified type    2.  Syncope and collapse          DISPOSITION/PLAN   DISPOSITION Decision to Discharge    PATIENT REFERRED TO:  Catracho Silva MD  [facility] [address]  [address] [facility] [address]  [phone]    Schedule an appointment as soon as possible for a visit       DISCHARGE MEDICATIONS:  New Prescriptions    No medications on file     Jose Martin Muñoz MD  Attending Emergency Physician                      Jose Martin Muñoz MD  12/03/17 [id number]

## 2017-12-05 RX ORDER — ATORVASTATIN CALCIUM 10 MG/1
10 TABLET, FILM COATED ORAL DAILY
Qty: 30 TABLET | Refills: 6 | Status: SHIPPED | OUTPATIENT
Start: 2017-12-05 | End: 2018-06-23 | Stop reason: SDUPTHER

## 2017-12-19 ENCOUNTER — OFFICE VISIT (OUTPATIENT)
Dept: INTERNAL MEDICINE CLINIC | Age: 52
End: 2017-12-19
Payer: MEDICARE

## 2017-12-19 VITALS
BODY MASS INDEX: 32.91 KG/M2 | WEIGHT: 243 LBS | DIASTOLIC BLOOD PRESSURE: 72 MMHG | SYSTOLIC BLOOD PRESSURE: 124 MMHG | HEIGHT: 72 IN

## 2017-12-19 DIAGNOSIS — N20.0 RENAL CALCULI: ICD-10-CM

## 2017-12-19 DIAGNOSIS — J01.00 ACUTE NON-RECURRENT MAXILLARY SINUSITIS: Primary | ICD-10-CM

## 2017-12-19 PROCEDURE — G8598 ASA/ANTIPLAT THER USED: HCPCS | Performed by: INTERNAL MEDICINE

## 2017-12-19 PROCEDURE — G8417 CALC BMI ABV UP PARAM F/U: HCPCS | Performed by: INTERNAL MEDICINE

## 2017-12-19 PROCEDURE — G8427 DOCREV CUR MEDS BY ELIG CLIN: HCPCS | Performed by: INTERNAL MEDICINE

## 2017-12-19 PROCEDURE — G8484 FLU IMMUNIZE NO ADMIN: HCPCS | Performed by: INTERNAL MEDICINE

## 2017-12-19 PROCEDURE — 1036F TOBACCO NON-USER: CPT | Performed by: INTERNAL MEDICINE

## 2017-12-19 PROCEDURE — 3017F COLORECTAL CA SCREEN DOC REV: CPT | Performed by: INTERNAL MEDICINE

## 2017-12-19 PROCEDURE — 99213 OFFICE O/P EST LOW 20 MIN: CPT | Performed by: INTERNAL MEDICINE

## 2017-12-19 RX ORDER — DOXYCYCLINE HYCLATE 100 MG
100 TABLET ORAL 2 TIMES DAILY
Qty: 10 TABLET | Refills: 0 | Status: SHIPPED | OUTPATIENT
Start: 2017-12-19 | End: 2017-12-24

## 2017-12-27 ENCOUNTER — TELEPHONE (OUTPATIENT)
Dept: INTERNAL MEDICINE CLINIC | Age: 52
End: 2017-12-27

## 2017-12-27 RX ORDER — FLUCONAZOLE 100 MG/1
100 TABLET ORAL DAILY
Qty: 3 TABLET | Refills: 0 | Status: ON HOLD | OUTPATIENT
Start: 2017-12-27 | End: 2018-05-27 | Stop reason: HOSPADM

## 2017-12-27 NOTE — TELEPHONE ENCOUNTER
Sick Call    Niru Payne   1965   [id number]   William Castillo MD   Allergies   Allergen Reactions    Codeine Nausea Only and Other (See Comments)    Simvastatin Other (See Comments)     Leg cramping        Last Visit: 12/19/17  Reason for No Same Day Appt: none avail    Complaint:per patients daughter, they are wanting to know if another antibiotic can be prescribed for his yeast infection. She said he was just here and seen for this, but the office not has not been completed yet.       Pharmacy: [facility]

## 2018-01-02 DIAGNOSIS — M10.9 GOUT, UNSPECIFIED CAUSE, UNSPECIFIED CHRONICITY, UNSPECIFIED SITE: ICD-10-CM

## 2018-01-04 RX ORDER — ALLOPURINOL 100 MG/1
100 TABLET ORAL DAILY
Qty: 30 TABLET | Refills: 0 | Status: SHIPPED | OUTPATIENT
Start: 2018-01-04 | End: 2018-01-28 | Stop reason: SDUPTHER

## 2018-01-28 DIAGNOSIS — M10.9 GOUT, UNSPECIFIED CAUSE, UNSPECIFIED CHRONICITY, UNSPECIFIED SITE: ICD-10-CM

## 2018-01-29 RX ORDER — ALLOPURINOL 100 MG/1
100 TABLET ORAL DAILY
Qty: 30 TABLET | Refills: 11 | Status: SHIPPED | OUTPATIENT
Start: 2018-01-29 | End: 2019-02-19 | Stop reason: SDUPTHER

## 2018-02-08 ENCOUNTER — TELEPHONE (OUTPATIENT)
Dept: INTERNAL MEDICINE CLINIC | Age: 53
End: 2018-02-08

## 2018-02-08 NOTE — TELEPHONE ENCOUNTER
Sick Call    Bijan Greenwood   1965   [id number]   Nahomy Wheeler MD   Allergies   Allergen Reactions    Codeine Nausea Only and Other (See Comments)    Simvastatin Other (See Comments)     Leg cramping        Last Visit: 12/19/17  Reason for No Same Day Appt: no reason    Complaint: rash and \"white stuff\" in groin. Daughter couldn't really describe it but stated we gave him diflucan the end of December for this.       Pharmacy: [address]

## 2018-02-23 ENCOUNTER — TELEPHONE (OUTPATIENT)
Dept: INTERNAL MEDICINE CLINIC | Age: 53
End: 2018-02-23

## 2018-02-26 ENCOUNTER — OFFICE VISIT (OUTPATIENT)
Dept: INTERNAL MEDICINE CLINIC | Age: 53
End: 2018-02-26
Payer: MEDICARE

## 2018-02-26 VITALS
SYSTOLIC BLOOD PRESSURE: 124 MMHG | DIASTOLIC BLOOD PRESSURE: 78 MMHG | BODY MASS INDEX: 32.91 KG/M2 | HEIGHT: 72 IN | WEIGHT: 242.95 LBS

## 2018-02-26 DIAGNOSIS — I10 ESSENTIAL HYPERTENSION, BENIGN: ICD-10-CM

## 2018-02-26 DIAGNOSIS — E11.69 HYPERLIPIDEMIA ASSOCIATED WITH TYPE 2 DIABETES MELLITUS (HCC): Primary | ICD-10-CM

## 2018-02-26 DIAGNOSIS — E78.5 HYPERLIPIDEMIA ASSOCIATED WITH TYPE 2 DIABETES MELLITUS (HCC): Primary | ICD-10-CM

## 2018-02-26 PROCEDURE — 3046F HEMOGLOBIN A1C LEVEL >9.0%: CPT | Performed by: INTERNAL MEDICINE

## 2018-02-26 PROCEDURE — 99213 OFFICE O/P EST LOW 20 MIN: CPT | Performed by: INTERNAL MEDICINE

## 2018-02-26 PROCEDURE — G8427 DOCREV CUR MEDS BY ELIG CLIN: HCPCS | Performed by: INTERNAL MEDICINE

## 2018-02-26 PROCEDURE — G8482 FLU IMMUNIZE ORDER/ADMIN: HCPCS | Performed by: INTERNAL MEDICINE

## 2018-02-26 PROCEDURE — G8417 CALC BMI ABV UP PARAM F/U: HCPCS | Performed by: INTERNAL MEDICINE

## 2018-02-26 PROCEDURE — 1036F TOBACCO NON-USER: CPT | Performed by: INTERNAL MEDICINE

## 2018-02-26 PROCEDURE — 3017F COLORECTAL CA SCREEN DOC REV: CPT | Performed by: INTERNAL MEDICINE

## 2018-03-07 DIAGNOSIS — G47.00 INSOMNIA, UNSPECIFIED TYPE: ICD-10-CM

## 2018-03-08 RX ORDER — TRAZODONE HYDROCHLORIDE 100 MG/1
TABLET ORAL
Qty: 30 TABLET | Refills: 1 | Status: SHIPPED | OUTPATIENT
Start: 2018-03-08 | End: 2018-05-06 | Stop reason: SDUPTHER

## 2018-05-06 DIAGNOSIS — G47.00 INSOMNIA, UNSPECIFIED TYPE: ICD-10-CM

## 2018-05-09 RX ORDER — TRAZODONE HYDROCHLORIDE 100 MG/1
TABLET ORAL
Qty: 30 TABLET | Refills: 1 | Status: SHIPPED | OUTPATIENT
Start: 2018-05-09 | End: 2018-10-14

## 2018-05-23 ENCOUNTER — HOSPITAL ENCOUNTER (INPATIENT)
Age: 53
LOS: 3 days | Discharge: HOME | DRG: 241 | End: 2018-05-27
Attending: EMERGENCY MEDICINE | Admitting: INTERNAL MEDICINE
Payer: MEDICARE

## 2018-05-23 ENCOUNTER — APPOINTMENT (OUTPATIENT)
Dept: CT IMAGING | Age: 53
DRG: 241 | End: 2018-05-23
Payer: MEDICARE

## 2018-05-23 DIAGNOSIS — K92.2 GASTROINTESTINAL HEMORRHAGE, UNSPECIFIED GASTROINTESTINAL HEMORRHAGE TYPE: Primary | ICD-10-CM

## 2018-05-23 LAB
ABSOLUTE EOS #: 0 K/UL (ref 0–0.4)
ABSOLUTE IMMATURE GRANULOCYTE: ABNORMAL K/UL (ref 0–0.3)
ABSOLUTE LYMPH #: 2.3 K/UL (ref 1–4.8)
ABSOLUTE MONO #: 0.7 K/UL (ref 0.1–1.3)
ALBUMIN SERPL-MCNC: 3.6 G/DL (ref 3.5–5.2)
ALBUMIN/GLOBULIN RATIO: ABNORMAL (ref 1–2.5)
ALP BLD-CCNC: 71 U/L (ref 40–129)
ALT SERPL-CCNC: 33 U/L (ref 5–41)
ANION GAP SERPL CALCULATED.3IONS-SCNC: 14 MMOL/L (ref 9–17)
AST SERPL-CCNC: 32 U/L
BASOPHILS # BLD: 0 % (ref 0–2)
BASOPHILS ABSOLUTE: 0 K/UL (ref 0–0.2)
BILIRUB SERPL-MCNC: 2.53 MG/DL (ref 0.3–1.2)
BUN BLDV-MCNC: 23 MG/DL (ref 6–20)
BUN/CREAT BLD: ABNORMAL (ref 9–20)
CALCIUM SERPL-MCNC: 9.3 MG/DL (ref 8.6–10.4)
CHLORIDE BLD-SCNC: 102 MMOL/L (ref 98–107)
CO2: 23 MMOL/L (ref 20–31)
CREAT SERPL-MCNC: 0.67 MG/DL (ref 0.7–1.2)
DIFFERENTIAL TYPE: ABNORMAL
EOSINOPHILS RELATIVE PERCENT: 0 % (ref 0–4)
GFR AFRICAN AMERICAN: >60 ML/MIN
GFR NON-AFRICAN AMERICAN: >60 ML/MIN
GFR SERPL CREATININE-BSD FRML MDRD: ABNORMAL ML/MIN/{1.73_M2}
GFR SERPL CREATININE-BSD FRML MDRD: ABNORMAL ML/MIN/{1.73_M2}
GLUCOSE BLD-MCNC: 237 MG/DL (ref 70–99)
HCT VFR BLD CALC: 41.8 % (ref 41–53)
HEMOGLOBIN: 14.6 G/DL (ref 13.5–17.5)
IMMATURE GRANULOCYTES: ABNORMAL %
LACTIC ACID: 2 MMOL/L (ref 0.5–2.2)
LIPASE: 61 U/L (ref 13–60)
LYMPHOCYTES # BLD: 23 % (ref 24–44)
MCH RBC QN AUTO: 31.8 PG (ref 26–34)
MCHC RBC AUTO-ENTMCNC: 34.9 G/DL (ref 31–37)
MCV RBC AUTO: 90.9 FL (ref 80–100)
MONOCYTES # BLD: 7 % (ref 1–7)
NRBC AUTOMATED: ABNORMAL PER 100 WBC
PDW BLD-RTO: 13.5 % (ref 11.5–14.9)
PLATELET # BLD: 136 K/UL (ref 150–450)
PLATELET ESTIMATE: ABNORMAL
PMV BLD AUTO: 8.4 FL (ref 6–12)
POTASSIUM SERPL-SCNC: 4.6 MMOL/L (ref 3.7–5.3)
RBC # BLD: 4.59 M/UL (ref 4.5–5.9)
RBC # BLD: ABNORMAL 10*6/UL
SEG NEUTROPHILS: 70 % (ref 36–66)
SEGMENTED NEUTROPHILS ABSOLUTE COUNT: 6.9 K/UL (ref 1.3–9.1)
SODIUM BLD-SCNC: 139 MMOL/L (ref 135–144)
TOTAL PROTEIN: 6.9 G/DL (ref 6.4–8.3)
WBC # BLD: 9.9 K/UL (ref 3.5–11)
WBC # BLD: ABNORMAL 10*3/UL

## 2018-05-23 PROCEDURE — 99285 EMERGENCY DEPT VISIT HI MDM: CPT

## 2018-05-23 PROCEDURE — 83605 ASSAY OF LACTIC ACID: CPT

## 2018-05-23 PROCEDURE — 96374 THER/PROPH/DIAG INJ IV PUSH: CPT

## 2018-05-23 PROCEDURE — 6360000002 HC RX W HCPCS: Performed by: EMERGENCY MEDICINE

## 2018-05-23 PROCEDURE — 85025 COMPLETE CBC W/AUTO DIFF WBC: CPT

## 2018-05-23 PROCEDURE — 85730 THROMBOPLASTIN TIME PARTIAL: CPT

## 2018-05-23 PROCEDURE — 36415 COLL VENOUS BLD VENIPUNCTURE: CPT

## 2018-05-23 PROCEDURE — 74177 CT ABD & PELVIS W/CONTRAST: CPT

## 2018-05-23 PROCEDURE — 86900 BLOOD TYPING SEROLOGIC ABO: CPT

## 2018-05-23 PROCEDURE — 83690 ASSAY OF LIPASE: CPT

## 2018-05-23 PROCEDURE — 86850 RBC ANTIBODY SCREEN: CPT

## 2018-05-23 PROCEDURE — 86901 BLOOD TYPING SEROLOGIC RH(D): CPT

## 2018-05-23 PROCEDURE — 2580000003 HC RX 258: Performed by: EMERGENCY MEDICINE

## 2018-05-23 PROCEDURE — 85610 PROTHROMBIN TIME: CPT

## 2018-05-23 PROCEDURE — 6360000004 HC RX CONTRAST MEDICATION: Performed by: EMERGENCY MEDICINE

## 2018-05-23 PROCEDURE — 80053 COMPREHEN METABOLIC PANEL: CPT

## 2018-05-23 RX ORDER — 0.9 % SODIUM CHLORIDE 0.9 %
100 INTRAVENOUS SOLUTION INTRAVENOUS ONCE
Status: COMPLETED | OUTPATIENT
Start: 2018-05-23 | End: 2018-05-23

## 2018-05-23 RX ORDER — FENTANYL CITRATE 50 UG/ML
25 INJECTION, SOLUTION INTRAMUSCULAR; INTRAVENOUS ONCE
Status: COMPLETED | OUTPATIENT
Start: 2018-05-23 | End: 2018-05-23

## 2018-05-23 RX ORDER — 0.9 % SODIUM CHLORIDE 0.9 %
1000 INTRAVENOUS SOLUTION INTRAVENOUS ONCE
Status: COMPLETED | OUTPATIENT
Start: 2018-05-23 | End: 2018-05-23

## 2018-05-23 RX ORDER — SODIUM CHLORIDE 0.9 % (FLUSH) 0.9 %
10 SYRINGE (ML) INJECTION PRN
Status: DISCONTINUED | OUTPATIENT
Start: 2018-05-23 | End: 2018-05-27

## 2018-05-23 RX ADMIN — Medication 10 ML: at 23:10

## 2018-05-23 RX ADMIN — IOPAMIDOL 75 ML: 755 INJECTION, SOLUTION INTRAVENOUS at 23:10

## 2018-05-23 RX ADMIN — FENTANYL CITRATE 25 MCG: 50 INJECTION, SOLUTION INTRAMUSCULAR; INTRAVENOUS at 21:50

## 2018-05-23 RX ADMIN — SODIUM CHLORIDE 100 ML: 9 INJECTION, SOLUTION INTRAVENOUS at 23:10

## 2018-05-23 RX ADMIN — SODIUM CHLORIDE 1000 ML: 9 INJECTION, SOLUTION INTRAVENOUS at 21:50

## 2018-05-23 ASSESSMENT — PAIN DESCRIPTION - PAIN TYPE: TYPE: ACUTE PAIN

## 2018-05-23 ASSESSMENT — PAIN SCALES - GENERAL
PAINLEVEL_OUTOF10: 9
PAINLEVEL_OUTOF10: 9

## 2018-05-23 ASSESSMENT — PAIN DESCRIPTION - LOCATION: LOCATION: ABDOMEN

## 2018-05-23 ASSESSMENT — PAIN DESCRIPTION - FREQUENCY: FREQUENCY: CONTINUOUS

## 2018-05-24 ENCOUNTER — ANESTHESIA EVENT (OUTPATIENT)
Dept: OPERATING ROOM | Age: 53
DRG: 241 | End: 2018-05-24
Payer: MEDICARE

## 2018-05-24 DIAGNOSIS — I25.119 CORONARY ARTERY DISEASE WITH ANGINA PECTORIS, UNSPECIFIED VESSEL OR LESION TYPE, UNSPECIFIED WHETHER NATIVE OR TRANSPLANTED HEART (HCC): ICD-10-CM

## 2018-05-24 PROBLEM — E86.0 DEHYDRATION: Status: ACTIVE | Noted: 2018-05-24

## 2018-05-24 PROBLEM — N39.0 UTI (URINARY TRACT INFECTION): Status: ACTIVE | Noted: 2018-05-24

## 2018-05-24 PROBLEM — K92.2 GI BLEED: Status: ACTIVE | Noted: 2018-05-24

## 2018-05-24 LAB
-: ABNORMAL
AFP: 4.1 UG/L
AMORPHOUS: ABNORMAL
ANION GAP SERPL CALCULATED.3IONS-SCNC: 10 MMOL/L (ref 9–17)
BACTERIA: ABNORMAL
BILIRUBIN URINE: NEGATIVE
BUN BLDV-MCNC: 22 MG/DL (ref 6–20)
BUN/CREAT BLD: ABNORMAL (ref 9–20)
C DIFFICILE TOXINS, PCR: NORMAL
CALCIUM SERPL-MCNC: 8.3 MG/DL (ref 8.6–10.4)
CASTS UA: ABNORMAL /LPF
CERULOPLASMIN: 19 MG/DL (ref 15–30)
CHLORIDE BLD-SCNC: 106 MMOL/L (ref 98–107)
CO2: 23 MMOL/L (ref 20–31)
COLOR: ABNORMAL
COMMENT UA: ABNORMAL
CREAT SERPL-MCNC: 0.52 MG/DL (ref 0.7–1.2)
CRYSTALS, UA: ABNORMAL /HPF
EKG ATRIAL RATE: 92 BPM
EKG P AXIS: 39 DEGREES
EKG P-R INTERVAL: 158 MS
EKG Q-T INTERVAL: 382 MS
EKG QRS DURATION: 98 MS
EKG QTC CALCULATION (BAZETT): 472 MS
EKG R AXIS: -27 DEGREES
EKG T AXIS: 24 DEGREES
EKG VENTRICULAR RATE: 92 BPM
EPITHELIAL CELLS UA: ABNORMAL /HPF
GFR AFRICAN AMERICAN: >60 ML/MIN
GFR NON-AFRICAN AMERICAN: >60 ML/MIN
GFR SERPL CREATININE-BSD FRML MDRD: ABNORMAL ML/MIN/{1.73_M2}
GFR SERPL CREATININE-BSD FRML MDRD: ABNORMAL ML/MIN/{1.73_M2}
GGT: 117 U/L (ref 8–61)
GLUCOSE BLD-MCNC: 119 MG/DL (ref 75–110)
GLUCOSE BLD-MCNC: 136 MG/DL (ref 75–110)
GLUCOSE BLD-MCNC: 140 MG/DL (ref 75–110)
GLUCOSE BLD-MCNC: 149 MG/DL (ref 75–110)
GLUCOSE BLD-MCNC: 170 MG/DL (ref 70–99)
GLUCOSE BLD-MCNC: 224 MG/DL (ref 75–110)
GLUCOSE URINE: ABNORMAL
HAV IGM SER IA-ACNC: NONREACTIVE
HCT VFR BLD CALC: 35.6 % (ref 41–53)
HEMOGLOBIN: 12.3 G/DL (ref 13.5–17.5)
HEMOGLOBIN: 12.5 G/DL (ref 13.5–17.5)
HEMOGLOBIN: 12.6 G/DL (ref 13.5–17.5)
HEPATITIS B CORE IGM ANTIBODY: NONREACTIVE
HEPATITIS B SURFACE ANTIGEN: NONREACTIVE
HEPATITIS C ANTIBODY: NONREACTIVE
INR BLD: 1.1
KETONES, URINE: ABNORMAL
LACTOFERRIN, QUAL: ABNORMAL
LEUKOCYTE ESTERASE, URINE: ABNORMAL
MCH RBC QN AUTO: 31.4 PG (ref 26–34)
MCHC RBC AUTO-ENTMCNC: 34.7 G/DL (ref 31–37)
MCV RBC AUTO: 90.6 FL (ref 80–100)
MUCUS: ABNORMAL
NITRITE, URINE: POSITIVE
NRBC AUTOMATED: ABNORMAL PER 100 WBC
OTHER OBSERVATIONS UA: ABNORMAL
PARTIAL THROMBOPLASTIN TIME: 25.4 SEC (ref 23–31)
PDW BLD-RTO: 13.5 % (ref 11.5–14.9)
PH UA: 6 (ref 5–8)
PLATELET # BLD: 100 K/UL (ref 150–450)
PMV BLD AUTO: 8.1 FL (ref 6–12)
POTASSIUM SERPL-SCNC: 3.7 MMOL/L (ref 3.7–5.3)
PROTEIN UA: ABNORMAL
PROTHROMBIN TIME: 11.7 SEC (ref 9.7–12)
RBC # BLD: 3.92 M/UL (ref 4.5–5.9)
RBC UA: ABNORMAL /HPF
RENAL EPITHELIAL, UA: ABNORMAL /HPF
SODIUM BLD-SCNC: 139 MMOL/L (ref 135–144)
SPECIFIC GRAVITY UA: 1.04 (ref 1–1.03)
SPECIMEN DESCRIPTION: NORMAL
TRICHOMONAS: ABNORMAL
TURBIDITY: CLEAR
URINE HGB: ABNORMAL
UROBILINOGEN, URINE: NORMAL
WBC # BLD: 6.4 K/UL (ref 3.5–11)
WBC UA: ABNORMAL /HPF
YEAST: ABNORMAL

## 2018-05-24 PROCEDURE — 6360000002 HC RX W HCPCS: Performed by: NURSE PRACTITIONER

## 2018-05-24 PROCEDURE — 86665 EPSTEIN-BARR CAPSID VCA: CPT

## 2018-05-24 PROCEDURE — 2580000003 HC RX 258: Performed by: EMERGENCY MEDICINE

## 2018-05-24 PROCEDURE — 80048 BASIC METABOLIC PNL TOTAL CA: CPT

## 2018-05-24 PROCEDURE — 86645 CMV ANTIBODY IGM: CPT

## 2018-05-24 PROCEDURE — 83630 LACTOFERRIN FECAL (QUAL): CPT

## 2018-05-24 PROCEDURE — 87328 CRYPTOSPORIDIUM AG IA: CPT

## 2018-05-24 PROCEDURE — 87505 NFCT AGENT DETECTION GI: CPT

## 2018-05-24 PROCEDURE — 82105 ALPHA-FETOPROTEIN SERUM: CPT

## 2018-05-24 PROCEDURE — 99223 1ST HOSP IP/OBS HIGH 75: CPT | Performed by: INTERNAL MEDICINE

## 2018-05-24 PROCEDURE — 82947 ASSAY GLUCOSE BLOOD QUANT: CPT

## 2018-05-24 PROCEDURE — 87086 URINE CULTURE/COLONY COUNT: CPT

## 2018-05-24 PROCEDURE — 83516 IMMUNOASSAY NONANTIBODY: CPT

## 2018-05-24 PROCEDURE — 6360000002 HC RX W HCPCS: Performed by: EMERGENCY MEDICINE

## 2018-05-24 PROCEDURE — 82977 ASSAY OF GGT: CPT

## 2018-05-24 PROCEDURE — 1200000000 HC SEMI PRIVATE

## 2018-05-24 PROCEDURE — 99255 IP/OBS CONSLTJ NEW/EST HI 80: CPT | Performed by: INTERNAL MEDICINE

## 2018-05-24 PROCEDURE — 82390 ASSAY OF CERULOPLASMIN: CPT

## 2018-05-24 PROCEDURE — C9113 INJ PANTOPRAZOLE SODIUM, VIA: HCPCS | Performed by: EMERGENCY MEDICINE

## 2018-05-24 PROCEDURE — 6370000000 HC RX 637 (ALT 250 FOR IP): Performed by: NURSE PRACTITIONER

## 2018-05-24 PROCEDURE — 85018 HEMOGLOBIN: CPT

## 2018-05-24 PROCEDURE — 87493 C DIFF AMPLIFIED PROBE: CPT

## 2018-05-24 PROCEDURE — 86663 EPSTEIN-BARR ANTIBODY: CPT

## 2018-05-24 PROCEDURE — 87329 GIARDIA AG IA: CPT

## 2018-05-24 PROCEDURE — 86038 ANTINUCLEAR ANTIBODIES: CPT

## 2018-05-24 PROCEDURE — 2580000003 HC RX 258: Performed by: NURSE PRACTITIONER

## 2018-05-24 PROCEDURE — 81001 URINALYSIS AUTO W/SCOPE: CPT

## 2018-05-24 PROCEDURE — 36415 COLL VENOUS BLD VENIPUNCTURE: CPT

## 2018-05-24 PROCEDURE — 86644 CMV ANTIBODY: CPT

## 2018-05-24 PROCEDURE — 80074 ACUTE HEPATITIS PANEL: CPT

## 2018-05-24 PROCEDURE — 85027 COMPLETE CBC AUTOMATED: CPT

## 2018-05-24 PROCEDURE — 86664 EPSTEIN-BARR NUCLEAR ANTIGEN: CPT

## 2018-05-24 RX ORDER — DEXTROSE MONOHYDRATE 50 MG/ML
100 INJECTION, SOLUTION INTRAVENOUS PRN
Status: DISCONTINUED | OUTPATIENT
Start: 2018-05-24 | End: 2018-05-27 | Stop reason: HOSPADM

## 2018-05-24 RX ORDER — NICOTINE POLACRILEX 4 MG
15 LOZENGE BUCCAL PRN
Status: DISCONTINUED | OUTPATIENT
Start: 2018-05-24 | End: 2018-05-27 | Stop reason: HOSPADM

## 2018-05-24 RX ORDER — SODIUM CHLORIDE 9 MG/ML
INJECTION, SOLUTION INTRAVENOUS CONTINUOUS
Status: DISCONTINUED | OUTPATIENT
Start: 2018-05-24 | End: 2018-05-27 | Stop reason: HOSPADM

## 2018-05-24 RX ORDER — ALBUTEROL SULFATE 90 UG/1
2 AEROSOL, METERED RESPIRATORY (INHALATION) EVERY 4 HOURS PRN
Status: DISCONTINUED | OUTPATIENT
Start: 2018-05-24 | End: 2018-05-27 | Stop reason: HOSPADM

## 2018-05-24 RX ORDER — SODIUM CHLORIDE 0.9 % (FLUSH) 0.9 %
10 SYRINGE (ML) INJECTION EVERY 12 HOURS SCHEDULED
Status: DISCONTINUED | OUTPATIENT
Start: 2018-05-24 | End: 2018-05-27 | Stop reason: HOSPADM

## 2018-05-24 RX ORDER — DEXTROSE MONOHYDRATE 25 G/50ML
12.5 INJECTION, SOLUTION INTRAVENOUS PRN
Status: DISCONTINUED | OUTPATIENT
Start: 2018-05-24 | End: 2018-05-27 | Stop reason: HOSPADM

## 2018-05-24 RX ORDER — SODIUM CHLORIDE 0.9 % (FLUSH) 0.9 %
10 SYRINGE (ML) INJECTION PRN
Status: DISCONTINUED | OUTPATIENT
Start: 2018-05-24 | End: 2018-05-27 | Stop reason: HOSPADM

## 2018-05-24 RX ORDER — ONDANSETRON 2 MG/ML
4 INJECTION INTRAMUSCULAR; INTRAVENOUS EVERY 6 HOURS PRN
Status: DISCONTINUED | OUTPATIENT
Start: 2018-05-24 | End: 2018-05-27 | Stop reason: HOSPADM

## 2018-05-24 RX ORDER — ACETAMINOPHEN 325 MG/1
650 TABLET ORAL EVERY 4 HOURS PRN
Status: DISCONTINUED | OUTPATIENT
Start: 2018-05-24 | End: 2018-05-27 | Stop reason: HOSPADM

## 2018-05-24 RX ADMIN — SODIUM CHLORIDE 8 MG/HR: 9 INJECTION, SOLUTION INTRAVENOUS at 01:16

## 2018-05-24 RX ADMIN — MOMETASONE FUROATE AND FORMOTEROL FUMARATE DIHYDRATE 2 PUFF: 100; 5 AEROSOL RESPIRATORY (INHALATION) at 22:54

## 2018-05-24 RX ADMIN — MOMETASONE FUROATE AND FORMOTEROL FUMARATE DIHYDRATE 2 PUFF: 100; 5 AEROSOL RESPIRATORY (INHALATION) at 09:58

## 2018-05-24 RX ADMIN — OCTREOTIDE ACETATE 50 MCG/HR: 500 INJECTION, SOLUTION INTRAVENOUS; SUBCUTANEOUS at 18:35

## 2018-05-24 RX ADMIN — SODIUM CHLORIDE 8 MG/HR: 9 INJECTION, SOLUTION INTRAVENOUS at 22:54

## 2018-05-24 RX ADMIN — SODIUM CHLORIDE 8 MG/HR: 9 INJECTION, SOLUTION INTRAVENOUS at 13:25

## 2018-05-24 RX ADMIN — SODIUM CHLORIDE: 9 INJECTION, SOLUTION INTRAVENOUS at 22:56

## 2018-05-24 RX ADMIN — SODIUM CHLORIDE: 9 INJECTION, SOLUTION INTRAVENOUS at 01:16

## 2018-05-24 RX ADMIN — CEFTRIAXONE SODIUM 1 G: 1 INJECTION, POWDER, FOR SOLUTION INTRAMUSCULAR; INTRAVENOUS at 02:28

## 2018-05-24 RX ADMIN — SODIUM CHLORIDE 80 MG: 9 INJECTION, SOLUTION INTRAVENOUS at 00:31

## 2018-05-24 ASSESSMENT — ENCOUNTER SYMPTOMS
BACK PAIN: 0
DIARRHEA: 1
SORE THROAT: 0
NAUSEA: 1
WHEEZING: 0
ORTHOPNEA: 0
SHORTNESS OF BREATH: 0
ABDOMINAL PAIN: 1
BLURRED VISION: 0
CONSTIPATION: 0
COUGH: 0
VOMITING: 1
DOUBLE VISION: 0
SPUTUM PRODUCTION: 0

## 2018-05-24 ASSESSMENT — PAIN DESCRIPTION - PROGRESSION

## 2018-05-24 ASSESSMENT — PAIN DESCRIPTION - LOCATION
LOCATION: ABDOMEN

## 2018-05-24 ASSESSMENT — PAIN SCALES - GENERAL
PAINLEVEL_OUTOF10: 7
PAINLEVEL_OUTOF10: 1
PAINLEVEL_OUTOF10: 2

## 2018-05-24 ASSESSMENT — PAIN DESCRIPTION - FREQUENCY: FREQUENCY: INTERMITTENT

## 2018-05-24 ASSESSMENT — PAIN DESCRIPTION - ONSET: ONSET: SUDDEN

## 2018-05-24 ASSESSMENT — PAIN DESCRIPTION - PAIN TYPE
TYPE: ACUTE PAIN

## 2018-05-24 ASSESSMENT — PAIN DESCRIPTION - DESCRIPTORS: DESCRIPTORS: CRAMPING

## 2018-05-25 ENCOUNTER — ANESTHESIA (OUTPATIENT)
Dept: OPERATING ROOM | Age: 53
DRG: 241 | End: 2018-05-25
Payer: MEDICARE

## 2018-05-25 VITALS
SYSTOLIC BLOOD PRESSURE: 127 MMHG | DIASTOLIC BLOOD PRESSURE: 72 MMHG | OXYGEN SATURATION: 98 % | RESPIRATION RATE: 18 BRPM

## 2018-05-25 LAB
ABO/RH: NORMAL
ANION GAP SERPL CALCULATED.3IONS-SCNC: 12 MMOL/L (ref 9–17)
ANTI-NUCLEAR ANTIBODY (ANA): NEGATIVE
ANTIBODY SCREEN: NEGATIVE
ARM BAND NUMBER: NORMAL
BUN BLDV-MCNC: 14 MG/DL (ref 6–20)
BUN/CREAT BLD: ABNORMAL (ref 9–20)
CALCIUM SERPL-MCNC: 8.6 MG/DL (ref 8.6–10.4)
CAMPYLOBACTER PCR: NORMAL
CHLORIDE BLD-SCNC: 103 MMOL/L (ref 98–107)
CO2: 23 MMOL/L (ref 20–31)
CREAT SERPL-MCNC: 0.58 MG/DL (ref 0.7–1.2)
CULTURE: NORMAL
CULTURE: NORMAL
DIRECT EXAM: NORMAL
EXPIRATION DATE: NORMAL
GFR AFRICAN AMERICAN: >60 ML/MIN
GFR NON-AFRICAN AMERICAN: >60 ML/MIN
GFR SERPL CREATININE-BSD FRML MDRD: ABNORMAL ML/MIN/{1.73_M2}
GFR SERPL CREATININE-BSD FRML MDRD: ABNORMAL ML/MIN/{1.73_M2}
GLUCOSE BLD-MCNC: 146 MG/DL (ref 75–110)
GLUCOSE BLD-MCNC: 165 MG/DL (ref 70–99)
GLUCOSE BLD-MCNC: 191 MG/DL (ref 75–110)
GLUCOSE BLD-MCNC: 201 MG/DL (ref 75–110)
GLUCOSE BLD-MCNC: 259 MG/DL (ref 75–110)
HCT VFR BLD CALC: 34.9 % (ref 41–53)
HEMOGLOBIN: 11.6 G/DL (ref 13.5–17.5)
HEMOGLOBIN: 12 G/DL (ref 13.5–17.5)
HEMOGLOBIN: 12 G/DL (ref 13.5–17.5)
HEMOGLOBIN: 14.4 G/DL (ref 13.5–17.5)
INR BLD: 1.1
Lab: NORMAL
Lab: NORMAL
MCH RBC QN AUTO: 31.7 PG (ref 26–34)
MCHC RBC AUTO-ENTMCNC: 34.4 G/DL (ref 31–37)
MCV RBC AUTO: 92 FL (ref 80–100)
NRBC AUTOMATED: ABNORMAL PER 100 WBC
PDW BLD-RTO: 13.6 % (ref 11.5–14.9)
PLATELET # BLD: 82 K/UL (ref 150–450)
PMV BLD AUTO: 8.3 FL (ref 6–12)
POTASSIUM SERPL-SCNC: 4.2 MMOL/L (ref 3.7–5.3)
PROTHROMBIN TIME: 11.5 SEC (ref 9.7–12)
RBC # BLD: 3.79 M/UL (ref 4.5–5.9)
SALMONELLA PCR: NORMAL
SHIGATOXIN GENE PCR: NORMAL
SHIGELLA SP PCR: NORMAL
SODIUM BLD-SCNC: 138 MMOL/L (ref 135–144)
SPECIMEN DESCRIPTION: NORMAL
SPECIMEN: NORMAL
STATUS: NORMAL
STATUS: NORMAL
WBC # BLD: 3.9 K/UL (ref 3.5–11)

## 2018-05-25 PROCEDURE — 36415 COLL VENOUS BLD VENIPUNCTURE: CPT

## 2018-05-25 PROCEDURE — 7100000000 HC PACU RECOVERY - FIRST 15 MIN: Performed by: INTERNAL MEDICINE

## 2018-05-25 PROCEDURE — 1200000000 HC SEMI PRIVATE

## 2018-05-25 PROCEDURE — 6370000000 HC RX 637 (ALT 250 FOR IP): Performed by: NURSE PRACTITIONER

## 2018-05-25 PROCEDURE — 80048 BASIC METABOLIC PNL TOTAL CA: CPT

## 2018-05-25 PROCEDURE — 6360000002 HC RX W HCPCS: Performed by: EMERGENCY MEDICINE

## 2018-05-25 PROCEDURE — 99232 SBSQ HOSP IP/OBS MODERATE 35: CPT | Performed by: INTERNAL MEDICINE

## 2018-05-25 PROCEDURE — 2500000003 HC RX 250 WO HCPCS: Performed by: ANESTHESIOLOGY

## 2018-05-25 PROCEDURE — 85018 HEMOGLOBIN: CPT

## 2018-05-25 PROCEDURE — 7100000001 HC PACU RECOVERY - ADDTL 15 MIN: Performed by: INTERNAL MEDICINE

## 2018-05-25 PROCEDURE — 43239 EGD BIOPSY SINGLE/MULTIPLE: CPT | Performed by: INTERNAL MEDICINE

## 2018-05-25 PROCEDURE — 85027 COMPLETE CBC AUTOMATED: CPT

## 2018-05-25 PROCEDURE — 6360000002 HC RX W HCPCS: Performed by: NURSE PRACTITIONER

## 2018-05-25 PROCEDURE — 93005 ELECTROCARDIOGRAM TRACING: CPT

## 2018-05-25 PROCEDURE — C9113 INJ PANTOPRAZOLE SODIUM, VIA: HCPCS | Performed by: EMERGENCY MEDICINE

## 2018-05-25 PROCEDURE — 2580000003 HC RX 258: Performed by: NURSE PRACTITIONER

## 2018-05-25 PROCEDURE — 43255 EGD CONTROL BLEEDING ANY: CPT | Performed by: INTERNAL MEDICINE

## 2018-05-25 PROCEDURE — 3700000000 HC ANESTHESIA ATTENDED CARE: Performed by: INTERNAL MEDICINE

## 2018-05-25 PROCEDURE — 2580000003 HC RX 258: Performed by: EMERGENCY MEDICINE

## 2018-05-25 PROCEDURE — 82947 ASSAY GLUCOSE BLOOD QUANT: CPT

## 2018-05-25 PROCEDURE — 0W3P8ZZ CONTROL BLEEDING IN GASTROINTESTINAL TRACT, VIA NATURAL OR ARTIFICIAL OPENING ENDOSCOPIC: ICD-10-PCS | Performed by: INTERNAL MEDICINE

## 2018-05-25 PROCEDURE — 6360000002 HC RX W HCPCS: Performed by: ANESTHESIOLOGY

## 2018-05-25 PROCEDURE — 2580000003 HC RX 258: Performed by: ANESTHESIOLOGY

## 2018-05-25 PROCEDURE — 88305 TISSUE EXAM BY PATHOLOGIST: CPT

## 2018-05-25 PROCEDURE — 3609012400 HC EGD TRANSORAL BIOPSY SINGLE/MULTIPLE: Performed by: INTERNAL MEDICINE

## 2018-05-25 PROCEDURE — 3700000001 HC ADD 15 MINUTES (ANESTHESIA): Performed by: INTERNAL MEDICINE

## 2018-05-25 PROCEDURE — 85610 PROTHROMBIN TIME: CPT

## 2018-05-25 RX ORDER — CLOPIDOGREL BISULFATE 75 MG/1
TABLET ORAL
Qty: 30 TABLET | Refills: 5 | Status: SHIPPED | OUTPATIENT
Start: 2018-05-25 | End: 2018-05-27 | Stop reason: HOSPADM

## 2018-05-25 RX ORDER — LIDOCAINE HYDROCHLORIDE 20 MG/ML
INJECTION, SOLUTION INFILTRATION; PERINEURAL PRN
Status: DISCONTINUED | OUTPATIENT
Start: 2018-05-25 | End: 2018-05-25 | Stop reason: SDUPTHER

## 2018-05-25 RX ORDER — ONDANSETRON 2 MG/ML
4 INJECTION INTRAMUSCULAR; INTRAVENOUS ONCE
Status: COMPLETED | OUTPATIENT
Start: 2018-05-25 | End: 2018-05-25

## 2018-05-25 RX ORDER — SODIUM CHLORIDE 9 MG/ML
INJECTION, SOLUTION INTRAVENOUS CONTINUOUS PRN
Status: DISCONTINUED | OUTPATIENT
Start: 2018-05-25 | End: 2018-05-25 | Stop reason: SDUPTHER

## 2018-05-25 RX ORDER — PROPOFOL 10 MG/ML
INJECTION, EMULSION INTRAVENOUS PRN
Status: DISCONTINUED | OUTPATIENT
Start: 2018-05-25 | End: 2018-05-25 | Stop reason: SDUPTHER

## 2018-05-25 RX ADMIN — SODIUM CHLORIDE: 9 INJECTION, SOLUTION INTRAVENOUS at 18:09

## 2018-05-25 RX ADMIN — MOMETASONE FUROATE AND FORMOTEROL FUMARATE DIHYDRATE 2 PUFF: 100; 5 AEROSOL RESPIRATORY (INHALATION) at 08:52

## 2018-05-25 RX ADMIN — OCTREOTIDE ACETATE 50 MCG/HR: 500 INJECTION, SOLUTION INTRAVENOUS; SUBCUTANEOUS at 06:51

## 2018-05-25 RX ADMIN — SODIUM CHLORIDE 8 MG/HR: 9 INJECTION, SOLUTION INTRAVENOUS at 19:29

## 2018-05-25 RX ADMIN — LIDOCAINE HYDROCHLORIDE 50 MG: 20 INJECTION, SOLUTION INFILTRATION; PERINEURAL at 11:26

## 2018-05-25 RX ADMIN — ONDANSETRON 4 MG: 2 INJECTION INTRAMUSCULAR; INTRAVENOUS at 09:33

## 2018-05-25 RX ADMIN — INSULIN LISPRO 3 UNITS: 100 INJECTION, SOLUTION INTRAVENOUS; SUBCUTANEOUS at 21:55

## 2018-05-25 RX ADMIN — PROPOFOL 100 MG: 10 INJECTION, EMULSION INTRAVENOUS at 11:26

## 2018-05-25 RX ADMIN — SODIUM CHLORIDE 8 MG/HR: 9 INJECTION, SOLUTION INTRAVENOUS at 06:51

## 2018-05-25 RX ADMIN — ACETAMINOPHEN 650 MG: 325 TABLET, FILM COATED ORAL at 19:37

## 2018-05-25 RX ADMIN — MOMETASONE FUROATE AND FORMOTEROL FUMARATE DIHYDRATE 2 PUFF: 100; 5 AEROSOL RESPIRATORY (INHALATION) at 19:36

## 2018-05-25 RX ADMIN — SODIUM CHLORIDE: 9 INJECTION, SOLUTION INTRAVENOUS at 11:24

## 2018-05-25 RX ADMIN — PROPOFOL 50 MG: 10 INJECTION, EMULSION INTRAVENOUS at 11:35

## 2018-05-25 RX ADMIN — CEFTRIAXONE SODIUM 1 G: 1 INJECTION, POWDER, FOR SOLUTION INTRAMUSCULAR; INTRAVENOUS at 02:13

## 2018-05-25 RX ADMIN — SODIUM CHLORIDE: 9 INJECTION, SOLUTION INTRAVENOUS at 11:26

## 2018-05-25 RX ADMIN — INSULIN LISPRO 2 UNITS: 100 INJECTION, SOLUTION INTRAVENOUS; SUBCUTANEOUS at 18:10

## 2018-05-25 RX ADMIN — PROPOFOL 50 MG: 10 INJECTION, EMULSION INTRAVENOUS at 11:30

## 2018-05-25 RX ADMIN — SODIUM CHLORIDE: 9 INJECTION, SOLUTION INTRAVENOUS at 04:40

## 2018-05-25 ASSESSMENT — PAIN DESCRIPTION - LOCATION
LOCATION: HEAD
LOCATION: ABDOMEN

## 2018-05-25 ASSESSMENT — PAIN DESCRIPTION - DESCRIPTORS
DESCRIPTORS: HEADACHE
DESCRIPTORS: CRAMPING

## 2018-05-25 ASSESSMENT — PAIN - FUNCTIONAL ASSESSMENT: PAIN_FUNCTIONAL_ASSESSMENT: 0-10

## 2018-05-25 ASSESSMENT — PAIN DESCRIPTION - PROGRESSION

## 2018-05-25 ASSESSMENT — PULMONARY FUNCTION TESTS
PIF_VALUE: 0

## 2018-05-25 ASSESSMENT — PAIN DESCRIPTION - PAIN TYPE
TYPE: ACUTE PAIN
TYPE: ACUTE PAIN

## 2018-05-25 ASSESSMENT — PAIN SCALES - GENERAL
PAINLEVEL_OUTOF10: 9
PAINLEVEL_OUTOF10: 3
PAINLEVEL_OUTOF10: 0
PAINLEVEL_OUTOF10: 0

## 2018-05-26 LAB
ANION GAP SERPL CALCULATED.3IONS-SCNC: 11 MMOL/L (ref 9–17)
BUN BLDV-MCNC: 8 MG/DL (ref 6–20)
BUN/CREAT BLD: ABNORMAL (ref 9–20)
CALCIUM SERPL-MCNC: 8.6 MG/DL (ref 8.6–10.4)
CHLORIDE BLD-SCNC: 105 MMOL/L (ref 98–107)
CO2: 22 MMOL/L (ref 20–31)
CREAT SERPL-MCNC: 0.66 MG/DL (ref 0.7–1.2)
GFR AFRICAN AMERICAN: >60 ML/MIN
GFR NON-AFRICAN AMERICAN: >60 ML/MIN
GFR SERPL CREATININE-BSD FRML MDRD: ABNORMAL ML/MIN/{1.73_M2}
GFR SERPL CREATININE-BSD FRML MDRD: ABNORMAL ML/MIN/{1.73_M2}
GLUCOSE BLD-MCNC: 111 MG/DL (ref 75–110)
GLUCOSE BLD-MCNC: 133 MG/DL (ref 75–110)
GLUCOSE BLD-MCNC: 145 MG/DL (ref 75–110)
GLUCOSE BLD-MCNC: 165 MG/DL (ref 75–110)
GLUCOSE BLD-MCNC: 247 MG/DL (ref 70–99)
HCT VFR BLD CALC: 33.7 % (ref 41–53)
HEMOGLOBIN: 11.5 G/DL (ref 13.5–17.5)
HEMOGLOBIN: 11.5 G/DL (ref 13.5–17.5)
HEMOGLOBIN: 11.6 G/DL (ref 13.5–17.5)
HEMOGLOBIN: 11.8 G/DL (ref 13.5–17.5)
MCH RBC QN AUTO: 31.3 PG (ref 26–34)
MCHC RBC AUTO-ENTMCNC: 34.2 G/DL (ref 31–37)
MCV RBC AUTO: 91.4 FL (ref 80–100)
NRBC AUTOMATED: ABNORMAL PER 100 WBC
PDW BLD-RTO: 13.6 % (ref 11.5–14.9)
PLATELET # BLD: 83 K/UL (ref 150–450)
PMV BLD AUTO: 7.8 FL (ref 6–12)
POTASSIUM SERPL-SCNC: 3.7 MMOL/L (ref 3.7–5.3)
RBC # BLD: 3.68 M/UL (ref 4.5–5.9)
SODIUM BLD-SCNC: 138 MMOL/L (ref 135–144)
WBC # BLD: 3.6 K/UL (ref 3.5–11)

## 2018-05-26 PROCEDURE — 6370000000 HC RX 637 (ALT 250 FOR IP): Performed by: NURSE PRACTITIONER

## 2018-05-26 PROCEDURE — 85018 HEMOGLOBIN: CPT

## 2018-05-26 PROCEDURE — 82947 ASSAY GLUCOSE BLOOD QUANT: CPT

## 2018-05-26 PROCEDURE — 2580000003 HC RX 258: Performed by: INTERNAL MEDICINE

## 2018-05-26 PROCEDURE — C9113 INJ PANTOPRAZOLE SODIUM, VIA: HCPCS | Performed by: EMERGENCY MEDICINE

## 2018-05-26 PROCEDURE — 80048 BASIC METABOLIC PNL TOTAL CA: CPT

## 2018-05-26 PROCEDURE — 2580000003 HC RX 258: Performed by: NURSE PRACTITIONER

## 2018-05-26 PROCEDURE — 85027 COMPLETE CBC AUTOMATED: CPT

## 2018-05-26 PROCEDURE — 6370000000 HC RX 637 (ALT 250 FOR IP): Performed by: INTERNAL MEDICINE

## 2018-05-26 PROCEDURE — 6360000002 HC RX W HCPCS: Performed by: NURSE PRACTITIONER

## 2018-05-26 PROCEDURE — 2580000003 HC RX 258: Performed by: EMERGENCY MEDICINE

## 2018-05-26 PROCEDURE — 99232 SBSQ HOSP IP/OBS MODERATE 35: CPT | Performed by: INTERNAL MEDICINE

## 2018-05-26 PROCEDURE — 1200000000 HC SEMI PRIVATE

## 2018-05-26 PROCEDURE — 99233 SBSQ HOSP IP/OBS HIGH 50: CPT | Performed by: INTERNAL MEDICINE

## 2018-05-26 PROCEDURE — 6360000002 HC RX W HCPCS: Performed by: EMERGENCY MEDICINE

## 2018-05-26 PROCEDURE — 36415 COLL VENOUS BLD VENIPUNCTURE: CPT

## 2018-05-26 RX ORDER — PANTOPRAZOLE SODIUM 40 MG/1
40 TABLET, DELAYED RELEASE ORAL 2 TIMES DAILY
Status: DISCONTINUED | OUTPATIENT
Start: 2018-05-26 | End: 2018-05-27 | Stop reason: HOSPADM

## 2018-05-26 RX ORDER — SUCRALFATE 1 G/1
1 TABLET ORAL
Status: DISCONTINUED | OUTPATIENT
Start: 2018-05-26 | End: 2018-05-27 | Stop reason: HOSPADM

## 2018-05-26 RX ADMIN — MOMETASONE FUROATE AND FORMOTEROL FUMARATE DIHYDRATE 2 PUFF: 100; 5 AEROSOL RESPIRATORY (INHALATION) at 21:08

## 2018-05-26 RX ADMIN — INSULIN LISPRO 2 UNITS: 100 INJECTION, SOLUTION INTRAVENOUS; SUBCUTANEOUS at 12:15

## 2018-05-26 RX ADMIN — SUCRALFATE 1 G: 1 TABLET ORAL at 12:14

## 2018-05-26 RX ADMIN — Medication 10 ML: at 21:11

## 2018-05-26 RX ADMIN — SUCRALFATE 1 G: 1 TABLET ORAL at 21:08

## 2018-05-26 RX ADMIN — MOMETASONE FUROATE AND FORMOTEROL FUMARATE DIHYDRATE 2 PUFF: 100; 5 AEROSOL RESPIRATORY (INHALATION) at 08:32

## 2018-05-26 RX ADMIN — SODIUM CHLORIDE: 9 INJECTION, SOLUTION INTRAVENOUS at 00:36

## 2018-05-26 RX ADMIN — ACETAMINOPHEN 650 MG: 325 TABLET, FILM COATED ORAL at 02:08

## 2018-05-26 RX ADMIN — SODIUM CHLORIDE: 9 INJECTION, SOLUTION INTRAVENOUS at 17:13

## 2018-05-26 RX ADMIN — PANTOPRAZOLE SODIUM 40 MG: 40 TABLET, DELAYED RELEASE ORAL at 12:14

## 2018-05-26 RX ADMIN — SUCRALFATE 1 G: 1 TABLET ORAL at 17:12

## 2018-05-26 RX ADMIN — PANTOPRAZOLE SODIUM 40 MG: 40 TABLET, DELAYED RELEASE ORAL at 21:08

## 2018-05-26 RX ADMIN — CEFTRIAXONE SODIUM 1 G: 1 INJECTION, POWDER, FOR SOLUTION INTRAMUSCULAR; INTRAVENOUS at 01:58

## 2018-05-26 RX ADMIN — SODIUM CHLORIDE: 9 INJECTION, SOLUTION INTRAVENOUS at 08:31

## 2018-05-26 RX ADMIN — SODIUM CHLORIDE 8 MG/HR: 9 INJECTION, SOLUTION INTRAVENOUS at 04:52

## 2018-05-26 RX ADMIN — ACETAMINOPHEN 650 MG: 325 TABLET, FILM COATED ORAL at 08:40

## 2018-05-26 ASSESSMENT — PAIN SCALES - GENERAL
PAINLEVEL_OUTOF10: 7
PAINLEVEL_OUTOF10: 0
PAINLEVEL_OUTOF10: 6

## 2018-05-27 VITALS
SYSTOLIC BLOOD PRESSURE: 156 MMHG | RESPIRATION RATE: 12 BRPM | DIASTOLIC BLOOD PRESSURE: 83 MMHG | TEMPERATURE: 98.4 F | HEART RATE: 83 BPM | BODY MASS INDEX: 32.55 KG/M2 | WEIGHT: 240.3 LBS | HEIGHT: 72 IN | OXYGEN SATURATION: 94 %

## 2018-05-27 LAB
ABSOLUTE EOS #: 0 K/UL (ref 0–0.4)
ABSOLUTE IMMATURE GRANULOCYTE: ABNORMAL K/UL (ref 0–0.3)
ABSOLUTE LYMPH #: 1.3 K/UL (ref 1–4.8)
ABSOLUTE MONO #: 0.4 K/UL (ref 0.1–1.3)
ANION GAP SERPL CALCULATED.3IONS-SCNC: 13 MMOL/L (ref 9–17)
BASOPHILS # BLD: 1 % (ref 0–2)
BASOPHILS ABSOLUTE: 0 K/UL (ref 0–0.2)
BUN BLDV-MCNC: 6 MG/DL (ref 6–20)
BUN/CREAT BLD: ABNORMAL (ref 9–20)
CALCIUM SERPL-MCNC: 8.9 MG/DL (ref 8.6–10.4)
CHLORIDE BLD-SCNC: 105 MMOL/L (ref 98–107)
CO2: 22 MMOL/L (ref 20–31)
CREAT SERPL-MCNC: 0.63 MG/DL (ref 0.7–1.2)
DIFFERENTIAL TYPE: ABNORMAL
EOSINOPHILS RELATIVE PERCENT: 1 % (ref 0–4)
GFR AFRICAN AMERICAN: >60 ML/MIN
GFR NON-AFRICAN AMERICAN: >60 ML/MIN
GFR SERPL CREATININE-BSD FRML MDRD: ABNORMAL ML/MIN/{1.73_M2}
GFR SERPL CREATININE-BSD FRML MDRD: ABNORMAL ML/MIN/{1.73_M2}
GLUCOSE BLD-MCNC: 130 MG/DL (ref 75–110)
GLUCOSE BLD-MCNC: 140 MG/DL (ref 70–99)
GLUCOSE BLD-MCNC: 149 MG/DL (ref 75–110)
GLUCOSE BLD-MCNC: 161 MG/DL (ref 75–110)
HCT VFR BLD CALC: 32.9 % (ref 41–53)
HEMOGLOBIN: 11.2 G/DL (ref 13.5–17.5)
HEMOGLOBIN: 11.4 G/DL (ref 13.5–17.5)
IMMATURE GRANULOCYTES: ABNORMAL %
LYMPHOCYTES # BLD: 23 % (ref 24–44)
MAGNESIUM: 1.7 MG/DL (ref 1.6–2.6)
MCH RBC QN AUTO: 31.7 PG (ref 26–34)
MCHC RBC AUTO-ENTMCNC: 34.6 G/DL (ref 31–37)
MCV RBC AUTO: 91.7 FL (ref 80–100)
MITOCHONDRIAL ANTIBODY: 2.6 UNITS (ref 0–20)
MONOCYTES # BLD: 8 % (ref 1–7)
NRBC AUTOMATED: ABNORMAL PER 100 WBC
PDW BLD-RTO: 13.5 % (ref 11.5–14.9)
PLATELET # BLD: 85 K/UL (ref 150–450)
PLATELET ESTIMATE: ABNORMAL
PMV BLD AUTO: 8 FL (ref 6–12)
POTASSIUM SERPL-SCNC: 3.4 MMOL/L (ref 3.7–5.3)
RBC # BLD: 3.59 M/UL (ref 4.5–5.9)
RBC # BLD: ABNORMAL 10*6/UL
SEG NEUTROPHILS: 67 % (ref 36–66)
SEGMENTED NEUTROPHILS ABSOLUTE COUNT: 3.7 K/UL (ref 1.3–9.1)
SMOOTH MUSCLE ANTIBODY: 8 UNITS (ref 0–19)
SODIUM BLD-SCNC: 140 MMOL/L (ref 135–144)
WBC # BLD: 5.5 K/UL (ref 3.5–11)
WBC # BLD: ABNORMAL 10*3/UL

## 2018-05-27 PROCEDURE — 80048 BASIC METABOLIC PNL TOTAL CA: CPT

## 2018-05-27 PROCEDURE — 85025 COMPLETE CBC W/AUTO DIFF WBC: CPT

## 2018-05-27 PROCEDURE — 6370000000 HC RX 637 (ALT 250 FOR IP): Performed by: NURSE PRACTITIONER

## 2018-05-27 PROCEDURE — 2580000003 HC RX 258: Performed by: NURSE PRACTITIONER

## 2018-05-27 PROCEDURE — 6370000000 HC RX 637 (ALT 250 FOR IP): Performed by: INTERNAL MEDICINE

## 2018-05-27 PROCEDURE — 83735 ASSAY OF MAGNESIUM: CPT

## 2018-05-27 PROCEDURE — 2580000003 HC RX 258: Performed by: INTERNAL MEDICINE

## 2018-05-27 PROCEDURE — 6360000002 HC RX W HCPCS: Performed by: NURSE PRACTITIONER

## 2018-05-27 PROCEDURE — 82947 ASSAY GLUCOSE BLOOD QUANT: CPT

## 2018-05-27 PROCEDURE — 85018 HEMOGLOBIN: CPT

## 2018-05-27 PROCEDURE — 36415 COLL VENOUS BLD VENIPUNCTURE: CPT

## 2018-05-27 PROCEDURE — 99239 HOSP IP/OBS DSCHRG MGMT >30: CPT | Performed by: INTERNAL MEDICINE

## 2018-05-27 PROCEDURE — 99232 SBSQ HOSP IP/OBS MODERATE 35: CPT | Performed by: INTERNAL MEDICINE

## 2018-05-27 RX ORDER — SUCRALFATE 1 G/1
1 TABLET ORAL 4 TIMES DAILY
Qty: 120 TABLET | Refills: 3 | Status: SHIPPED | OUTPATIENT
Start: 2018-05-27 | End: 2018-10-14

## 2018-05-27 RX ORDER — OMEPRAZOLE 20 MG/1
20 CAPSULE, DELAYED RELEASE ORAL 2 TIMES DAILY
Qty: 30 CAPSULE | Refills: 3 | Status: SHIPPED | OUTPATIENT
Start: 2018-05-27 | End: 2018-09-12 | Stop reason: SDUPTHER

## 2018-05-27 RX ADMIN — PANTOPRAZOLE SODIUM 40 MG: 40 TABLET, DELAYED RELEASE ORAL at 07:44

## 2018-05-27 RX ADMIN — SUCRALFATE 1 G: 1 TABLET ORAL at 11:46

## 2018-05-27 RX ADMIN — SUCRALFATE 1 G: 1 TABLET ORAL at 06:16

## 2018-05-27 RX ADMIN — MOMETASONE FUROATE AND FORMOTEROL FUMARATE DIHYDRATE 2 PUFF: 100; 5 AEROSOL RESPIRATORY (INHALATION) at 07:44

## 2018-05-27 RX ADMIN — Medication 10 ML: at 07:45

## 2018-05-27 RX ADMIN — CEFTRIAXONE SODIUM 1 G: 1 INJECTION, POWDER, FOR SOLUTION INTRAMUSCULAR; INTRAVENOUS at 01:24

## 2018-05-27 RX ADMIN — SODIUM CHLORIDE: 9 INJECTION, SOLUTION INTRAVENOUS at 01:35

## 2018-05-27 RX ADMIN — ACETAMINOPHEN 650 MG: 325 TABLET, FILM COATED ORAL at 06:16

## 2018-05-27 ASSESSMENT — PAIN SCALES - GENERAL
PAINLEVEL_OUTOF10: 6
PAINLEVEL_OUTOF10: 4

## 2018-05-29 ENCOUNTER — CARE COORDINATION (OUTPATIENT)
Dept: CASE MANAGEMENT | Age: 53
End: 2018-05-29

## 2018-05-29 DIAGNOSIS — K92.2 GASTROINTESTINAL HEMORRHAGE, UNSPECIFIED GASTROINTESTINAL HEMORRHAGE TYPE: Primary | ICD-10-CM

## 2018-05-29 LAB
EBV EARLY ANTIGEN IGG: 19 U/ML
EBV INTERPRETATION: ABNORMAL
EBV NUCLEAR AG AB: 464 U/ML
EPSTEIN-BARR VCA IGG: 1227 U/ML
EPSTEIN-BARR VCA IGM: 271 U/ML
SURGICAL PATHOLOGY REPORT: NORMAL

## 2018-05-29 PROCEDURE — 1111F DSCHRG MED/CURRENT MED MERGE: CPT

## 2018-05-30 ENCOUNTER — OFFICE VISIT (OUTPATIENT)
Dept: INTERNAL MEDICINE CLINIC | Age: 53
End: 2018-05-30
Payer: MEDICARE

## 2018-05-30 VITALS
DIASTOLIC BLOOD PRESSURE: 72 MMHG | SYSTOLIC BLOOD PRESSURE: 118 MMHG | BODY MASS INDEX: 31.15 KG/M2 | WEIGHT: 230 LBS | HEIGHT: 72 IN

## 2018-05-30 DIAGNOSIS — I10 ESSENTIAL HYPERTENSION, BENIGN: ICD-10-CM

## 2018-05-30 DIAGNOSIS — K70.30 ALCOHOLIC CIRRHOSIS OF LIVER WITHOUT ASCITES (HCC): ICD-10-CM

## 2018-05-30 DIAGNOSIS — E78.5 HYPERLIPIDEMIA ASSOCIATED WITH TYPE 2 DIABETES MELLITUS (HCC): ICD-10-CM

## 2018-05-30 DIAGNOSIS — Z23 NEED FOR PROPHYLACTIC VACCINATION AGAINST STREPTOCOCCUS PNEUMONIAE (PNEUMOCOCCUS): Primary | ICD-10-CM

## 2018-05-30 DIAGNOSIS — G45.9 TRANSIENT CEREBRAL ISCHEMIA, UNSPECIFIED TYPE: ICD-10-CM

## 2018-05-30 DIAGNOSIS — E11.69 HYPERLIPIDEMIA ASSOCIATED WITH TYPE 2 DIABETES MELLITUS (HCC): ICD-10-CM

## 2018-05-30 DIAGNOSIS — K76.9 CHRONIC LIVER DISEASE: ICD-10-CM

## 2018-05-30 LAB
CMV IGM: 0.4
CYTOMEGALOVIRUS IGG ANTIBODY: 13.4
HBA1C MFR BLD: 8.4 %

## 2018-05-30 PROCEDURE — G8417 CALC BMI ABV UP PARAM F/U: HCPCS | Performed by: INTERNAL MEDICINE

## 2018-05-30 PROCEDURE — G8427 DOCREV CUR MEDS BY ELIG CLIN: HCPCS | Performed by: INTERNAL MEDICINE

## 2018-05-30 PROCEDURE — 1036F TOBACCO NON-USER: CPT | Performed by: INTERNAL MEDICINE

## 2018-05-30 PROCEDURE — 99214 OFFICE O/P EST MOD 30 MIN: CPT | Performed by: INTERNAL MEDICINE

## 2018-05-30 PROCEDURE — 1111F DSCHRG MED/CURRENT MED MERGE: CPT | Performed by: INTERNAL MEDICINE

## 2018-05-30 PROCEDURE — 3017F COLORECTAL CA SCREEN DOC REV: CPT | Performed by: INTERNAL MEDICINE

## 2018-05-30 PROCEDURE — 3045F PR MOST RECENT HEMOGLOBIN A1C LEVEL 7.0-9.0%: CPT | Performed by: INTERNAL MEDICINE

## 2018-05-30 PROCEDURE — 83036 HEMOGLOBIN GLYCOSYLATED A1C: CPT | Performed by: INTERNAL MEDICINE

## 2018-05-30 PROCEDURE — 2022F DILAT RTA XM EVC RTNOPTHY: CPT | Performed by: INTERNAL MEDICINE

## 2018-05-30 PROCEDURE — G8598 ASA/ANTIPLAT THER USED: HCPCS | Performed by: INTERNAL MEDICINE

## 2018-05-30 ASSESSMENT — ENCOUNTER SYMPTOMS
ABDOMINAL DISTENTION: 0
BACK PAIN: 0
DIARRHEA: 0
CONSTIPATION: 0
WHEEZING: 0
SHORTNESS OF BREATH: 0
ABDOMINAL PAIN: 1
CHEST TIGHTNESS: 0
FACIAL SWELLING: 0
COLOR CHANGE: 0
COUGH: 0
APNEA: 0

## 2018-06-23 PROBLEM — N39.0 UTI (URINARY TRACT INFECTION): Status: RESOLVED | Noted: 2018-05-24 | Resolved: 2018-06-23

## 2018-06-23 PROBLEM — E86.0 DEHYDRATION: Status: RESOLVED | Noted: 2018-05-24 | Resolved: 2018-06-23

## 2018-06-25 RX ORDER — ATORVASTATIN CALCIUM 10 MG/1
10 TABLET, FILM COATED ORAL DAILY
Qty: 30 TABLET | Refills: 0 | Status: SHIPPED | OUTPATIENT
Start: 2018-06-25 | End: 2018-09-20 | Stop reason: SDUPTHER

## 2018-06-30 ENCOUNTER — HOSPITAL ENCOUNTER (EMERGENCY)
Age: 53
Discharge: HOME | End: 2018-06-30
Attending: EMERGENCY MEDICINE

## 2018-06-30 ENCOUNTER — APPOINTMENT (OUTPATIENT)
Dept: CT IMAGING | Age: 53
End: 2018-06-30

## 2018-06-30 VITALS
TEMPERATURE: 98 F | HEIGHT: 72 IN | BODY MASS INDEX: 31.15 KG/M2 | WEIGHT: 230 LBS | DIASTOLIC BLOOD PRESSURE: 57 MMHG | HEART RATE: 78 BPM | SYSTOLIC BLOOD PRESSURE: 103 MMHG | OXYGEN SATURATION: 91 % | RESPIRATION RATE: 20 BRPM

## 2018-06-30 DIAGNOSIS — N39.0 URINARY TRACT INFECTION IN MALE: ICD-10-CM

## 2018-06-30 DIAGNOSIS — K85.00 IDIOPATHIC ACUTE PANCREATITIS WITHOUT INFECTION OR NECROSIS: Primary | ICD-10-CM

## 2018-06-30 LAB
-: ABNORMAL
ABSOLUTE EOS #: 0.1 K/UL (ref 0–0.4)
ABSOLUTE IMMATURE GRANULOCYTE: ABNORMAL K/UL (ref 0–0.3)
ABSOLUTE LYMPH #: 1.4 K/UL (ref 1–4.8)
ABSOLUTE MONO #: 0.6 K/UL (ref 0.1–1.3)
ALBUMIN SERPL-MCNC: 4.1 G/DL (ref 3.5–5.2)
ALBUMIN/GLOBULIN RATIO: ABNORMAL (ref 1–2.5)
ALP BLD-CCNC: 91 U/L (ref 40–129)
ALT SERPL-CCNC: 25 U/L (ref 5–41)
AMORPHOUS: ABNORMAL
ANION GAP SERPL CALCULATED.3IONS-SCNC: 11 MMOL/L (ref 9–17)
AST SERPL-CCNC: 26 U/L
BACTERIA: ABNORMAL
BASOPHILS # BLD: 0 % (ref 0–2)
BASOPHILS ABSOLUTE: 0 K/UL (ref 0–0.2)
BILIRUB SERPL-MCNC: 1.26 MG/DL (ref 0.3–1.2)
BILIRUBIN URINE: NEGATIVE
BUN BLDV-MCNC: 14 MG/DL (ref 6–20)
BUN/CREAT BLD: ABNORMAL (ref 9–20)
CALCIUM SERPL-MCNC: 9.8 MG/DL (ref 8.6–10.4)
CASTS UA: ABNORMAL /LPF
CHLORIDE BLD-SCNC: 104 MMOL/L (ref 98–107)
CO2: 24 MMOL/L (ref 20–31)
COLOR: YELLOW
COMMENT UA: ABNORMAL
CREAT SERPL-MCNC: 0.63 MG/DL (ref 0.7–1.2)
CRYSTALS, UA: ABNORMAL /HPF
DIFFERENTIAL TYPE: ABNORMAL
EOSINOPHILS RELATIVE PERCENT: 1 % (ref 0–4)
EPITHELIAL CELLS UA: ABNORMAL /HPF
GFR AFRICAN AMERICAN: >60 ML/MIN
GFR NON-AFRICAN AMERICAN: >60 ML/MIN
GFR SERPL CREATININE-BSD FRML MDRD: ABNORMAL ML/MIN/{1.73_M2}
GFR SERPL CREATININE-BSD FRML MDRD: ABNORMAL ML/MIN/{1.73_M2}
GLUCOSE BLD-MCNC: 350 MG/DL (ref 70–99)
GLUCOSE URINE: ABNORMAL
HCT VFR BLD CALC: 42.6 % (ref 41–53)
HEMOGLOBIN: 14.3 G/DL (ref 13.5–17.5)
IMMATURE GRANULOCYTES: ABNORMAL %
KETONES, URINE: NEGATIVE
LEUKOCYTE ESTERASE, URINE: NEGATIVE
LIPASE: 280 U/L (ref 13–60)
LYMPHOCYTES # BLD: 27 % (ref 24–44)
MCH RBC QN AUTO: 30.8 PG (ref 26–34)
MCHC RBC AUTO-ENTMCNC: 33.5 G/DL (ref 31–37)
MCV RBC AUTO: 91.9 FL (ref 80–100)
MONOCYTES # BLD: 11 % (ref 1–7)
MUCUS: ABNORMAL
NITRITE, URINE: POSITIVE
NRBC AUTOMATED: ABNORMAL PER 100 WBC
OTHER OBSERVATIONS UA: ABNORMAL
PDW BLD-RTO: 14.1 % (ref 11.5–14.9)
PH UA: 5.5 (ref 5–8)
PLATELET # BLD: 112 K/UL (ref 150–450)
PLATELET ESTIMATE: ABNORMAL
PMV BLD AUTO: 8.6 FL (ref 6–12)
POTASSIUM SERPL-SCNC: 4.3 MMOL/L (ref 3.7–5.3)
PROTEIN UA: NEGATIVE
RBC # BLD: 4.64 M/UL (ref 4.5–5.9)
RBC # BLD: ABNORMAL 10*6/UL
RBC UA: ABNORMAL /HPF
RENAL EPITHELIAL, UA: ABNORMAL /HPF
SEG NEUTROPHILS: 61 % (ref 36–66)
SEGMENTED NEUTROPHILS ABSOLUTE COUNT: 3 K/UL (ref 1.3–9.1)
SODIUM BLD-SCNC: 139 MMOL/L (ref 135–144)
SPECIFIC GRAVITY UA: 1.04 (ref 1–1.03)
TOTAL PROTEIN: 7.4 G/DL (ref 6.4–8.3)
TRICHOMONAS: ABNORMAL
TURBIDITY: CLEAR
URINE HGB: ABNORMAL
UROBILINOGEN, URINE: NORMAL
WBC # BLD: 5 K/UL (ref 3.5–11)
WBC # BLD: ABNORMAL 10*3/UL
WBC UA: ABNORMAL /HPF
YEAST: ABNORMAL

## 2018-06-30 PROCEDURE — 96374 THER/PROPH/DIAG INJ IV PUSH: CPT

## 2018-06-30 PROCEDURE — 2580000003 HC RX 258: Performed by: EMERGENCY MEDICINE

## 2018-06-30 PROCEDURE — 6370000000 HC RX 637 (ALT 250 FOR IP): Performed by: EMERGENCY MEDICINE

## 2018-06-30 PROCEDURE — 99284 EMERGENCY DEPT VISIT MOD MDM: CPT

## 2018-06-30 PROCEDURE — 85025 COMPLETE CBC W/AUTO DIFF WBC: CPT

## 2018-06-30 PROCEDURE — 74176 CT ABD & PELVIS W/O CONTRAST: CPT

## 2018-06-30 PROCEDURE — 81001 URINALYSIS AUTO W/SCOPE: CPT

## 2018-06-30 PROCEDURE — 87086 URINE CULTURE/COLONY COUNT: CPT

## 2018-06-30 PROCEDURE — 80053 COMPREHEN METABOLIC PANEL: CPT

## 2018-06-30 PROCEDURE — 36415 COLL VENOUS BLD VENIPUNCTURE: CPT

## 2018-06-30 PROCEDURE — 83690 ASSAY OF LIPASE: CPT

## 2018-06-30 PROCEDURE — 6360000002 HC RX W HCPCS: Performed by: EMERGENCY MEDICINE

## 2018-06-30 PROCEDURE — 96375 TX/PRO/DX INJ NEW DRUG ADDON: CPT

## 2018-06-30 RX ORDER — CIPROFLOXACIN 500 MG/1
500 TABLET, FILM COATED ORAL 2 TIMES DAILY
Qty: 20 TABLET | Refills: 0 | Status: SHIPPED | OUTPATIENT
Start: 2018-06-30 | End: 2018-07-10

## 2018-06-30 RX ORDER — HYDROCODONE BITARTRATE AND ACETAMINOPHEN 5; 325 MG/1; MG/1
1 TABLET ORAL EVERY 6 HOURS PRN
Qty: 10 TABLET | Refills: 0 | Status: SHIPPED | OUTPATIENT
Start: 2018-06-30 | End: 2018-07-07

## 2018-06-30 RX ORDER — CIPROFLOXACIN 500 MG/1
500 TABLET, FILM COATED ORAL ONCE
Status: COMPLETED | OUTPATIENT
Start: 2018-06-30 | End: 2018-06-30

## 2018-06-30 RX ORDER — ONDANSETRON 2 MG/ML
4 INJECTION INTRAMUSCULAR; INTRAVENOUS ONCE
Status: COMPLETED | OUTPATIENT
Start: 2018-06-30 | End: 2018-06-30

## 2018-06-30 RX ORDER — 0.9 % SODIUM CHLORIDE 0.9 %
1000 INTRAVENOUS SOLUTION INTRAVENOUS ONCE
Status: COMPLETED | OUTPATIENT
Start: 2018-06-30 | End: 2018-06-30

## 2018-06-30 RX ORDER — FENTANYL CITRATE 50 UG/ML
100 INJECTION, SOLUTION INTRAMUSCULAR; INTRAVENOUS ONCE
Status: COMPLETED | OUTPATIENT
Start: 2018-06-30 | End: 2018-06-30

## 2018-06-30 RX ADMIN — SODIUM CHLORIDE 1000 ML: 9 INJECTION, SOLUTION INTRAVENOUS at 19:38

## 2018-06-30 RX ADMIN — CIPROFLOXACIN 500 MG: 500 TABLET, FILM COATED ORAL at 22:36

## 2018-06-30 RX ADMIN — ONDANSETRON 4 MG: 2 INJECTION, SOLUTION INTRAMUSCULAR; INTRAVENOUS at 19:38

## 2018-06-30 RX ADMIN — FENTANYL CITRATE 100 MCG: 50 INJECTION, SOLUTION INTRAMUSCULAR; INTRAVENOUS at 19:38

## 2018-06-30 ASSESSMENT — PAIN DESCRIPTION - PAIN TYPE
TYPE: ACUTE PAIN
TYPE: ACUTE PAIN

## 2018-06-30 ASSESSMENT — ENCOUNTER SYMPTOMS
EYE DISCHARGE: 0
DIARRHEA: 0
WHEEZING: 0
SHORTNESS OF BREATH: 0
SORE THROAT: 0
VOMITING: 0
NAUSEA: 0
CONSTIPATION: 0
ABDOMINAL PAIN: 0
BLURRED VISION: 0
COUGH: 0

## 2018-06-30 ASSESSMENT — PAIN SCALES - GENERAL
PAINLEVEL_OUTOF10: 6
PAINLEVEL_OUTOF10: 5
PAINLEVEL_OUTOF10: 7

## 2018-06-30 ASSESSMENT — PAIN DESCRIPTION - LOCATION: LOCATION: FLANK

## 2018-06-30 NOTE — ED PROVIDER NOTES
hyperlipidemia; Type II or unspecified type diabetes mellitus without mention of complication, not stated as uncontrolled; and Unspecified chronic liver disease without mention of alcohol. SURGICAL HISTORY      has a past surgical history that includes Lithotripsy and Upper gastrointestinal endoscopy (N/A, 5/25/2018). CURRENT MEDICATIONS       Previous Medications    ADVAIR DISKUS 100-50 MCG/DOSE DISKUS INHALER    INHALE 1 PUFF BY MOUTH EVERY 12 HOURS    ALLOPURINOL (ZYLOPRIM) 100 MG TABLET    TAKE 1 TABLET BY MOUTH DAILY    ASPIRIN 81 MG TABLET    Take 81 mg by mouth    ATORVASTATIN (LIPITOR) 10 MG TABLET    TAKE 1 TABLET BY MOUTH DAILY    GLIMEPIRIDE (AMARYL) 4 MG TABLET    bid    JANUVIA 100 MG TABLET    TAKE 1 TABLET BY MOUTH DAILY    [employer] LANCETS ULTRATHIN 30G MISC    USE AS DIRECTED TP TEST BLOOD SUGAR THREE TIMES A DAY    LISINOPRIL (PRINIVIL;ZESTRIL) 10 MG TABLET    TAKE 1 TABLET BY MOUTH DAILY    METFORMIN (GLUCOPHAGE) 500 MG TABLET    TAKE 1 TABLET BY MOUTH TWICE DAILY    METOPROLOL TARTRATE (LOPRESSOR) 25 MG TABLET    TAKE 1 TABLET BY MOUTH TWICE DAILY    OMEPRAZOLE (PRILOSEC) 20 MG DELAYED RELEASE CAPSULE    Take 1 capsule by mouth 2 times daily    SUCRALFATE (CARAFATE) 1 GM TABLET    Take 1 tablet by mouth 4 times daily    TAMSULOSIN (FLOMAX) 0.4 MG CAPSULE    TAKE 1 CAPSULE BY MOUTH DAILY    TRAZODONE (DESYREL) 100 MG TABLET    TAKE 1 TABLET BY MOUTH EVERY NIGHT    VENTOLIN  (90 BASE) MCG/ACT INHALER    INHALE 2 PUFFS BY MOUTH INTO THE LUNGS EVERY 4 HOURS AS NEEDED FOR WHEEZING OR SHORTNESS OF BREATH       ALLERGIES     is allergic to codeine and simvastatin. FAMILY HISTORY     indicated that the status of his other is unknown.      family history includes Cancer in an other family member; Diabetes in an other family member; Heart Disease in an other family member; High Blood Pressure in an other family member. SOCIAL HISTORY      reports that he quit smoking about 6 years ago. He has quit using smokeless tobacco. He reports that he drinks alcohol. He reports that he does not use drugs. PHYSICAL EXAM     INITIAL VITALS:  height is 6' (1.829 m) and weight is 230 lb (104.3 kg). His oral temperature is 98 °F (36.7 °C). His blood pressure is 114/78 and his pulse is 78. His respiration is 20 and oxygen saturation is 94%. Physical Exam   Constitutional: He is oriented to person, place, and time and well-developed, well-nourished, and in no distress. No distress. HENT:   Head: Normocephalic and atraumatic. Mouth/Throat: Oropharynx is clear and moist.   Eyes: Conjunctivae are normal. Pupils are equal, round, and reactive to light. Neck: Neck supple. Cardiovascular: Normal rate, regular rhythm, normal heart sounds and intact distal pulses. No murmur heard. Pulmonary/Chest: Effort normal and breath sounds normal. No respiratory distress. Abdominal: Soft. Bowel sounds are normal. He exhibits no distension. There is no tenderness. There is CVA tenderness (Right). There is no rigidity, no rebound and no guarding. Musculoskeletal: He exhibits no edema or tenderness. Lymphadenopathy:     He has no cervical adenopathy. Neurological: He is alert and oriented to person, place, and time. GCS score is 15. Skin: Skin is warm and dry. No rash noted. Psychiatric: Affect and judgment normal.   Nursing note and vitals reviewed. DIFFERENTIAL DIAGNOSIS/MDM:   Nephrolithiasis  Urinary tract infection  Unlikely appendicitis    61-year-old male presents with right flank pain for the past several days. He is afebrile and nontoxic in appearance. Vital signs within normal limits. He appears very well. He doesn't some right CVA tenderness on exam.  No abdominal tenderness on my exam.  He appears very well. We'll get lab work, urinalysis, CT abdomen and pelvis. Strongly suspect kidney stone.     DIAGNOSTIC RESULTS     EKG: All EKG's are interpreted by the Emergency Department He is not an alcohol drinker. Patient has received 1 L of IV fluids at this time. He also has evidence of urinary tract infection. I offered the patient admission for IV fluids and antibiotics however he is refusing this. I believe this would be appropriate. He has no evidence of pancreatitis on CT scan. He is not even actually having any abdominal pain. His pain is in his right lower flank which is not consistent with pancreatitis. I did strongly advise him to keep himself well-hydrated over the next several days. Encouraged him to drink plenty of water. We'll give first dose of antibiotics for urinary tract infection in the department. Advised follow-up with his PCP and to return if any symptoms worsen. Patient is agreeable to this plan and will be discharged home today. CRITICAL CARE:      CONSULTS:  None      PROCEDURES:      FINAL IMPRESSION      1. Idiopathic acute pancreatitis without infection or necrosis    2. Urinary tract infection in male            DISPOSITION/PLAN   DISPOSITION Decision To Discharge 06/30/2018 10:01:45 PM          PATIENT REFERRED TO:  Ej Menchaca, [address] [place] [id number]    Schedule an appointment as soon as possible for a visit       [facility]  [facility] [address]  [phone]    As needed, If symptoms worsen      DISCHARGE MEDICATIONS:  New Prescriptions    CIPROFLOXACIN (CIPRO) 500 MG TABLET    Take 1 tablet by mouth 2 times daily for 10 days    HYDROCODONE-ACETAMINOPHEN (NORCO) 5-325 MG PER TABLET    Take 1 tablet by mouth every 6 hours as needed for Pain for up to 7 days. .       (Please note that portions of this note were completed with a voice recognition program.  Efforts were made to edit the dictations but occasionally words are mis-transcribed.)    Joan Brooks DO  Attending Emergency Physician          Joan Brooks DO  06/30/18 [id number]

## 2018-07-02 LAB
CULTURE: NORMAL
Lab: NORMAL
SPECIMEN DESCRIPTION: NORMAL
STATUS: NORMAL

## 2018-07-30 ENCOUNTER — TELEPHONE (OUTPATIENT)
Dept: INTERNAL MEDICINE CLINIC | Age: 53
End: 2018-07-30

## 2018-07-30 NOTE — LETTER
FRANDY GLEZ [facility]  [address] [place] [address]  Phone: [phone]  Fax: [phone]      July 30, 2018     [address] [place] [address] [address]      Dear Chuyita Villagran:    I am writing you because I have been informed of your missed appointment(s). We ask that you please call 24 hours in advance if you are unable to make your appointment so that we can give that time to another patient in need. We care about you and the management of your healthcare and want to make sure that you follow up as recommended. I have to also mention, that in an effort to provide quality care and timely appointments to all my patients, it is our policy that patients be discharged from the practice if they do not show for three scheduled appointments. You would be informed of this termination by mail, and would have 30 days from the date of discharge to locate another physician. We're sorry you were unable to keep your appointment and hope that you are doing well. We would like to continue treating your healthcare needs. Please call the office to let us know your plans for treatment and to reschedule your appointment.      Sincerely,        Paco Argueta MD

## 2018-08-21 DIAGNOSIS — N40.0 BENIGN NON-NODULAR PROSTATIC HYPERPLASIA WITHOUT LOWER URINARY TRACT SYMPTOMS: ICD-10-CM

## 2018-08-21 DIAGNOSIS — E13.8 OTHER SPECIFIED DIABETES MELLITUS WITH UNSPECIFIED COMPLICATIONS (HCC): ICD-10-CM

## 2018-08-22 RX ORDER — GLIMEPIRIDE 4 MG/1
TABLET ORAL
Qty: 60 TABLET | Refills: 11 | Status: SHIPPED | OUTPATIENT
Start: 2018-08-22 | End: 2019-10-01 | Stop reason: SDUPTHER

## 2018-08-22 RX ORDER — TAMSULOSIN HYDROCHLORIDE 0.4 MG/1
0.4 CAPSULE ORAL DAILY
Qty: 30 CAPSULE | Refills: 6 | Status: SHIPPED | OUTPATIENT
Start: 2018-08-22 | End: 2019-04-01 | Stop reason: SDUPTHER

## 2018-09-12 DIAGNOSIS — I10 ESSENTIAL HYPERTENSION: ICD-10-CM

## 2018-09-13 RX ORDER — OMEPRAZOLE 20 MG/1
CAPSULE, DELAYED RELEASE ORAL
Qty: 30 CAPSULE | Refills: 5 | Status: SHIPPED | OUTPATIENT
Start: 2018-09-13 | End: 2019-01-24 | Stop reason: SDUPTHER

## 2018-09-13 RX ORDER — LISINOPRIL 10 MG/1
TABLET ORAL
Qty: 30 TABLET | Refills: 5 | Status: SHIPPED | OUTPATIENT
Start: 2018-09-13 | End: 2018-10-19 | Stop reason: SDUPTHER

## 2018-09-16 ENCOUNTER — APPOINTMENT (OUTPATIENT)
Dept: CT IMAGING | Age: 53
End: 2018-09-16

## 2018-09-16 ENCOUNTER — HOSPITAL ENCOUNTER (EMERGENCY)
Age: 53
Discharge: HOME | End: 2018-09-16
Attending: EMERGENCY MEDICINE

## 2018-09-16 VITALS
WEIGHT: 235 LBS | BODY MASS INDEX: 31.83 KG/M2 | OXYGEN SATURATION: 95 % | SYSTOLIC BLOOD PRESSURE: 131 MMHG | HEART RATE: 90 BPM | HEIGHT: 72 IN | TEMPERATURE: 97.8 F | RESPIRATION RATE: 16 BRPM | DIASTOLIC BLOOD PRESSURE: 85 MMHG

## 2018-09-16 DIAGNOSIS — N30.01 ACUTE CYSTITIS WITH HEMATURIA: Primary | ICD-10-CM

## 2018-09-16 LAB
-: ABNORMAL
ABSOLUTE EOS #: 0.1 K/UL (ref 0–0.4)
ABSOLUTE IMMATURE GRANULOCYTE: ABNORMAL K/UL (ref 0–0.3)
ABSOLUTE LYMPH #: 1.6 K/UL (ref 1–4.8)
ABSOLUTE MONO #: 0.5 K/UL (ref 0.1–1.3)
ALBUMIN SERPL-MCNC: 4 G/DL (ref 3.5–5.2)
ALBUMIN/GLOBULIN RATIO: ABNORMAL (ref 1–2.5)
ALP BLD-CCNC: 93 U/L (ref 40–129)
ALT SERPL-CCNC: 43 U/L (ref 5–41)
AMORPHOUS: ABNORMAL
ANION GAP SERPL CALCULATED.3IONS-SCNC: 13 MMOL/L (ref 9–17)
AST SERPL-CCNC: 34 U/L
BACTERIA: ABNORMAL
BASOPHILS # BLD: 1 % (ref 0–2)
BASOPHILS ABSOLUTE: 0 K/UL (ref 0–0.2)
BILIRUB SERPL-MCNC: 2.5 MG/DL (ref 0.3–1.2)
BILIRUBIN DIRECT: 0.42 MG/DL
BILIRUBIN URINE: ABNORMAL
BILIRUBIN, INDIRECT: 2.08 MG/DL (ref 0–1)
BUN BLDV-MCNC: 18 MG/DL (ref 6–20)
BUN/CREAT BLD: ABNORMAL (ref 9–20)
CALCIUM SERPL-MCNC: 9.8 MG/DL (ref 8.6–10.4)
CASTS UA: ABNORMAL /LPF
CHLORIDE BLD-SCNC: 102 MMOL/L (ref 98–107)
CO2: 22 MMOL/L (ref 20–31)
COLOR: ABNORMAL
COMMENT UA: ABNORMAL
CREAT SERPL-MCNC: 0.57 MG/DL (ref 0.7–1.2)
CRYSTALS, UA: ABNORMAL /HPF
DIFFERENTIAL TYPE: ABNORMAL
EKG ATRIAL RATE: 98 BPM
EKG P AXIS: 44 DEGREES
EKG P-R INTERVAL: 154 MS
EKG Q-T INTERVAL: 338 MS
EKG QRS DURATION: 90 MS
EKG QTC CALCULATION (BAZETT): 431 MS
EKG R AXIS: -14 DEGREES
EKG T AXIS: 20 DEGREES
EKG VENTRICULAR RATE: 98 BPM
EOSINOPHILS RELATIVE PERCENT: 2 % (ref 0–4)
EPITHELIAL CELLS UA: ABNORMAL /HPF
GFR AFRICAN AMERICAN: >60 ML/MIN
GFR NON-AFRICAN AMERICAN: >60 ML/MIN
GFR SERPL CREATININE-BSD FRML MDRD: ABNORMAL ML/MIN/{1.73_M2}
GFR SERPL CREATININE-BSD FRML MDRD: ABNORMAL ML/MIN/{1.73_M2}
GLOBULIN: ABNORMAL G/DL (ref 1.5–3.8)
GLUCOSE BLD-MCNC: 296 MG/DL (ref 70–99)
GLUCOSE URINE: ABNORMAL
HCT VFR BLD CALC: 43.4 % (ref 41–53)
HEMOGLOBIN: 14.6 G/DL (ref 13.5–17.5)
IMMATURE GRANULOCYTES: ABNORMAL %
INR BLD: 1.1
KETONES, URINE: ABNORMAL
LEUKOCYTE ESTERASE, URINE: ABNORMAL
LIPASE: 45 U/L (ref 13–60)
LYMPHOCYTES # BLD: 31 % (ref 24–44)
MCH RBC QN AUTO: 29.7 PG (ref 26–34)
MCHC RBC AUTO-ENTMCNC: 33.7 G/DL (ref 31–37)
MCV RBC AUTO: 88.2 FL (ref 80–100)
MONOCYTES # BLD: 10 % (ref 1–7)
MUCUS: ABNORMAL
NITRITE, URINE: POSITIVE
NRBC AUTOMATED: ABNORMAL PER 100 WBC
OTHER OBSERVATIONS UA: ABNORMAL
PARTIAL THROMBOPLASTIN TIME: 26.8 SEC (ref 23–31)
PDW BLD-RTO: 14.6 % (ref 11.5–14.9)
PH UA: 5 (ref 5–8)
PLATELET # BLD: 104 K/UL (ref 150–450)
PLATELET ESTIMATE: ABNORMAL
PMV BLD AUTO: 8.1 FL (ref 6–12)
POTASSIUM SERPL-SCNC: 3.9 MMOL/L (ref 3.7–5.3)
PROTEIN UA: NEGATIVE
PROTHROMBIN TIME: 11.2 SEC (ref 9.7–12)
RBC # BLD: 4.92 M/UL (ref 4.5–5.9)
RBC # BLD: ABNORMAL 10*6/UL
RBC UA: ABNORMAL /HPF
RENAL EPITHELIAL, UA: ABNORMAL /HPF
SEG NEUTROPHILS: 56 % (ref 36–66)
SEGMENTED NEUTROPHILS ABSOLUTE COUNT: 2.9 K/UL (ref 1.3–9.1)
SODIUM BLD-SCNC: 137 MMOL/L (ref 135–144)
SPECIFIC GRAVITY UA: 1.04 (ref 1–1.03)
TOTAL PROTEIN: 7.5 G/DL (ref 6.4–8.3)
TRICHOMONAS: ABNORMAL
TURBIDITY: CLEAR
URINE HGB: NEGATIVE
UROBILINOGEN, URINE: NORMAL
WBC # BLD: 5.2 K/UL (ref 3.5–11)
WBC # BLD: ABNORMAL 10*3/UL
WBC UA: ABNORMAL /HPF
YEAST: ABNORMAL

## 2018-09-16 PROCEDURE — 36415 COLL VENOUS BLD VENIPUNCTURE: CPT

## 2018-09-16 PROCEDURE — 80076 HEPATIC FUNCTION PANEL: CPT

## 2018-09-16 PROCEDURE — 85025 COMPLETE CBC W/AUTO DIFF WBC: CPT

## 2018-09-16 PROCEDURE — 74177 CT ABD & PELVIS W/CONTRAST: CPT

## 2018-09-16 PROCEDURE — 85730 THROMBOPLASTIN TIME PARTIAL: CPT

## 2018-09-16 PROCEDURE — 6360000002 HC RX W HCPCS: Performed by: STUDENT IN AN ORGANIZED HEALTH CARE EDUCATION/TRAINING PROGRAM

## 2018-09-16 PROCEDURE — 87086 URINE CULTURE/COLONY COUNT: CPT

## 2018-09-16 PROCEDURE — C9113 INJ PANTOPRAZOLE SODIUM, VIA: HCPCS | Performed by: STUDENT IN AN ORGANIZED HEALTH CARE EDUCATION/TRAINING PROGRAM

## 2018-09-16 PROCEDURE — 6360000004 HC RX CONTRAST MEDICATION: Performed by: STUDENT IN AN ORGANIZED HEALTH CARE EDUCATION/TRAINING PROGRAM

## 2018-09-16 PROCEDURE — 93005 ELECTROCARDIOGRAM TRACING: CPT

## 2018-09-16 PROCEDURE — 85610 PROTHROMBIN TIME: CPT

## 2018-09-16 PROCEDURE — 96375 TX/PRO/DX INJ NEW DRUG ADDON: CPT

## 2018-09-16 PROCEDURE — 2580000003 HC RX 258: Performed by: STUDENT IN AN ORGANIZED HEALTH CARE EDUCATION/TRAINING PROGRAM

## 2018-09-16 PROCEDURE — 96365 THER/PROPH/DIAG IV INF INIT: CPT

## 2018-09-16 PROCEDURE — 99284 EMERGENCY DEPT VISIT MOD MDM: CPT

## 2018-09-16 PROCEDURE — 81001 URINALYSIS AUTO W/SCOPE: CPT

## 2018-09-16 PROCEDURE — 83690 ASSAY OF LIPASE: CPT

## 2018-09-16 PROCEDURE — 80048 BASIC METABOLIC PNL TOTAL CA: CPT

## 2018-09-16 RX ORDER — MAGNESIUM HYDROXIDE/ALUMINUM HYDROXICE/SIMETHICONE 120; 1200; 1200 MG/30ML; MG/30ML; MG/30ML
30 SUSPENSION ORAL
Status: DISCONTINUED | OUTPATIENT
Start: 2018-09-16 | End: 2018-09-16 | Stop reason: HOSPADM

## 2018-09-16 RX ORDER — ONDANSETRON 2 MG/ML
4 INJECTION INTRAMUSCULAR; INTRAVENOUS ONCE
Status: COMPLETED | OUTPATIENT
Start: 2018-09-16 | End: 2018-09-16

## 2018-09-16 RX ORDER — 0.9 % SODIUM CHLORIDE 0.9 %
1000 INTRAVENOUS SOLUTION INTRAVENOUS ONCE
Status: COMPLETED | OUTPATIENT
Start: 2018-09-16 | End: 2018-09-16

## 2018-09-16 RX ORDER — SULFAMETHOXAZOLE AND TRIMETHOPRIM 800; 160 MG/1; MG/1
1 TABLET ORAL 2 TIMES DAILY
Qty: 14 TABLET | Refills: 0 | Status: SHIPPED | OUTPATIENT
Start: 2018-09-16 | End: 2018-09-23

## 2018-09-16 RX ORDER — 0.9 % SODIUM CHLORIDE 0.9 %
80 INTRAVENOUS SOLUTION INTRAVENOUS ONCE
Status: COMPLETED | OUTPATIENT
Start: 2018-09-16 | End: 2018-09-16

## 2018-09-16 RX ORDER — SODIUM CHLORIDE 0.9 % (FLUSH) 0.9 %
10 SYRINGE (ML) INJECTION PRN
Status: DISCONTINUED | OUTPATIENT
Start: 2018-09-16 | End: 2018-09-16 | Stop reason: HOSPADM

## 2018-09-16 RX ADMIN — ONDANSETRON 4 MG: 2 INJECTION INTRAMUSCULAR; INTRAVENOUS at 16:46

## 2018-09-16 RX ADMIN — Medication 10 ML: at 17:17

## 2018-09-16 RX ADMIN — SODIUM CHLORIDE 80 ML: 9 INJECTION, SOLUTION INTRAVENOUS at 17:17

## 2018-09-16 RX ADMIN — SODIUM CHLORIDE 80 MG: 9 INJECTION, SOLUTION INTRAVENOUS at 16:51

## 2018-09-16 RX ADMIN — SODIUM CHLORIDE 1000 ML: 9 INJECTION, SOLUTION INTRAVENOUS at 16:45

## 2018-09-16 RX ADMIN — IOPAMIDOL 75 ML: 755 INJECTION, SOLUTION INTRAVENOUS at 17:17

## 2018-09-16 ASSESSMENT — PAIN SCALES - GENERAL
PAINLEVEL_OUTOF10: 8
PAINLEVEL_OUTOF10: 4

## 2018-09-16 ASSESSMENT — ENCOUNTER SYMPTOMS
ABDOMINAL PAIN: 1
VOMITING: 0
BLOOD IN STOOL: 1
SHORTNESS OF BREATH: 0
DIARRHEA: 1
RHINORRHEA: 0
NAUSEA: 1
RECTAL PAIN: 0
BACK PAIN: 0

## 2018-09-16 ASSESSMENT — PAIN DESCRIPTION - ORIENTATION: ORIENTATION: LOWER

## 2018-09-16 ASSESSMENT — PAIN DESCRIPTION - PAIN TYPE: TYPE: ACUTE PAIN

## 2018-09-16 ASSESSMENT — PAIN DESCRIPTION - LOCATION: LOCATION: ABDOMEN

## 2018-09-16 NOTE — ED NOTES

## 2018-09-16 NOTE — ED PROVIDER NOTES
[address]  eMERGENCY dEPARTMENT eNCOUnter   Attending Attestation     Pt Name: Lotus Wynne  MRN: [id number]  [place] 1965  Date of evaluation: 9/16/18       Lotus Wynne is a 47 y.o. male who presents with Abdominal Pain (lower )  PMH of renal stones and recent admission for GI bleed 2/2 duodenal and gastric ulcers on omeprazole. Presenting with several days of constant lower abdominal pain. Also noting intermittent bloody stools \"for awhile\" - sees blood on toilet tissue with wiping. No fevers or vomiting. Very well appearing with a benign abdominal exam. Negative guiac on rectal.     MDM:   Labs unremarkable, bilirubin elevated close to recent baseline. Hemoglobin stable. CT redemonstrates known cirrhosis but no acute findings. Patient feeling well and asking to eat. Plan to DC home with PCP f/up to recheck labs. Given strict return precautions for more rectal bleeding, fever, inability to tolerate PO. Vitals:   Vitals:    09/16/18 1549   BP: 132/80   Pulse: 109   Resp: 16   Temp: 97.8 °F (36.6 °C)   TempSrc: Oral   SpO2: 98%   Weight: 235 lb (106.6 kg)   Height: 6' (1.829 m)         I personally evaluated and examined the patient in conjunction with the resident and agree with the assessment, treatment plan, and disposition of the patient as recorded by the resident. I performed a history and physical examination of the patient and discussed management with the resident. I reviewed the residents note and agree with the documented findings and plan of care. Any areas of disagreement are noted on the chart. I was personally present for the key portions of any procedures. I have documented in the chart those procedures where I was not present during the key portions. I have personally reviewed all images and agree with the resident's interpretation. I have reviewed the emergency nurses triage note.  I agree with the chief complaint, past medical history, past surgical history,

## 2018-09-16 NOTE — ED NOTES
Pt c/o ongoing abdominal pain since June, but worse the last couple of weeks. Pt c/o diffuse abdominal pain but worse in his rt abdomen. Pt also c/o nausea without vomiting, diarrhea x 6 episodes over the last 2 days, and rt flank pain. Pt arrives A+O x 4, GCS = 15, PMS x 4 intact, eupneic, and PWD. Abdomen is soft et nondistended. Pt denies penile discharge, dysuria, or hematuria. Pt also denies rectal bleeding. Pt denies fever, chills, or sweats.      Roma Adame RN  09/16/18 4718

## 2018-09-16 NOTE — ED PROVIDER NOTES
on file. Social History Main Topics    Smoking status: Former Smoker     Quit date: 3/26/2012    Smokeless tobacco: Former User    Alcohol use 0.0 oz/week      Comment: very little/ special events    Drug use: No    Sexual activity: Not on file     Other Topics Concern    Not on file     Social History Narrative    No narrative on file       Family History   Problem Relation Age of Onset    Cancer Other     Diabetes Other     High Blood Pressure Other     Heart Disease Other        Allergies:  Codeine and Simvastatin    Home Medications:  Prior to Admission medications    Medication Sig Start Date End Date Taking?  Authorizing Provider   sulfamethoxazole-trimethoprim (BACTRIM DS) 800-160 MG per tablet Take 1 tablet by mouth 2 times daily for 7 days 9/16/18 9/23/18 Yes Dolores Squires MD   omeprazole (PRILOSEC) 20 MG delayed release capsule TAKE 1 CAPSULE BY MOUTH TWICE DAILY 9/13/18  Yes Ej Menchaca MD   lisinopril (PRINIVIL;ZESTRIL) 10 MG tablet TAKE 1 TABLET BY MOUTH ONCE DAILY 9/13/18  Yes Ej Menchaca MD   glimepiride (AMARYL) 4 MG tablet bid 8/22/18  Yes Ej Menchaca MD   tamsulosin (FLOMAX) 0.4 MG capsule Take 1 capsule by mouth daily 8/22/18  Yes Ej Menchaca MD   metoprolol tartrate (LOPRESSOR) 25 MG tablet TAKE 1 TABLET BY MOUTH TWICE DAILY 8/20/18  Yes Ej Menchaca MD   atorvastatin (LIPITOR) 10 MG tablet TAKE 1 TABLET BY MOUTH DAILY 6/25/18  Yes Ej Menchaca MD   sucralfate (CARAFATE) 1 GM tablet Take 1 tablet by mouth 4 times daily 5/27/18  Yes Chava Levine MD   metFORMIN (GLUCOPHAGE) 500 MG tablet TAKE 1 TABLET BY MOUTH TWICE DAILY 5/23/18  Yes Chava Levine MD   traZODone (DESYREL) 100 MG tablet TAKE 1 TABLET BY MOUTH EVERY NIGHT 5/9/18  Yes Chava Levine MD   VENTOLIN  (90 Base) MCG/ACT inhaler INHALE 2 PUFFS BY MOUTH INTO THE LUNGS EVERY 4 HOURS AS NEEDED FOR WHEEZING OR SHORTNESS OF BREATH 4/3/18  Yes Chava Levine MD   allopurinol (Lashawn Jose) 33.7 31 - 37 g/dL    RDW 14.6 11.5 - 14.9 %    Platelets 283 (L) 521 - 450 k/uL    MPV 8.1 6.0 - 12.0 fL    NRBC Automated NOT REPORTED per 100 WBC    Differential Type NOT REPORTED     Seg Neutrophils 56 36 - 66 %    Lymphocytes 31 24 - 44 %    Monocytes 10 (H) 1 - 7 %    Eosinophils % 2 0 - 4 %    Basophils 1 0 - 2 %    Immature Granulocytes NOT REPORTED 0 %    Segs Absolute 2.90 1.3 - 9.1 k/uL    Absolute Lymph # 1.60 1.0 - 4.8 k/uL    Absolute Mono # 0.50 0.1 - 1.3 k/uL    Absolute Eos # 0.10 0.0 - 0.4 k/uL    Basophils # 0.00 0.0 - 0.2 k/uL    Absolute Immature Granulocyte NOT REPORTED 0.00 - 0.30 k/uL    WBC Morphology NOT REPORTED     RBC Morphology NOT REPORTED     Platelet Estimate NOT REPORTED    Basic Metabolic Panel   Result Value Ref Range    Glucose 296 (H) 70 - 99 mg/dL    BUN 18 6 - 20 mg/dL    CREATININE 0.57 (L) 0.70 - 1.20 mg/dL    Bun/Cre Ratio NOT REPORTED 9 - 20    Calcium 9.8 8.6 - 10.4 mg/dL    Sodium 137 135 - 144 mmol/L    Potassium 3.9 3.7 - 5.3 mmol/L    Chloride 102 98 - 107 mmol/L    CO2 22 20 - 31 mmol/L    Anion Gap 13 9 - 17 mmol/L    GFR Non-African American >60 >60 mL/min    GFR African American >60 >60 mL/min    GFR Comment          GFR Staging NOT REPORTED    Lipase   Result Value Ref Range    Lipase 45 13 - 60 U/L   Hepatic Function Panel   Result Value Ref Range    Alb 4.0 3.5 - 5.2 g/dL    Alkaline Phosphatase 93 40 - 129 U/L    ALT 43 (H) 5 - 41 U/L    AST 34 <40 U/L    Total Bilirubin 2.50 (H) 0.3 - 1.2 mg/dL    Bilirubin, Direct 0.42 (H) <0.31 mg/dL    Bilirubin, Indirect 2.08 (H) 0.00 - 1.00 mg/dL    Total Protein 7.5 6.4 - 8.3 g/dL    Globulin NOT REPORTED 1.5 - 3.8 g/dL    Albumin/Globulin Ratio NOT REPORTED 1.0 - 2.5   APTT   Result Value Ref Range    PTT 26.8 23.0 - 31.0 sec   Protime-INR   Result Value Ref Range    Protime 11.2 9.7 - 12.0 sec    INR 1.1    Urinalysis Reflex to Culture   Result Value Ref Range    Color, UA DARK YELLOW (A) YEL    Turbidity UA CLEAR CLEAR    Glucose, Ur 3+ (A) NEG    Bilirubin Urine (A) NEG     Presumptive positive. Unable to confirm due to unavailability of reagent. Ketones, Urine TRACE (A) NEG    Specific Gravity, UA 1.038 (H) 1.000 - 1.030    Urine Hgb NEGATIVE NEG    pH, UA 5.0 5.0 - 8.0    Protein, UA NEGATIVE NEG    Urobilinogen, Urine Normal NORM    Nitrite, Urine POSITIVE (A) NEG    Leukocyte Esterase, Urine TRACE (A) NEG    Urinalysis Comments NOT REPORTED    Microscopic Urinalysis   Result Value Ref Range    -          WBC, UA 10 TO 20 /HPF    RBC, UA 2 TO 5 /HPF    Casts UA NOT REPORTED /LPF    Crystals UA NOT REPORTED NONE /HPF    Epithelial Cells UA 0 TO 2 /HPF    Renal Epithelial, Urine NOT REPORTED 0 /HPF    Bacteria, UA FEW (A) NONE    Mucus, UA NOT REPORTED NONE    Trichomonas, UA NOT REPORTED NONE    Amorphous, UA 1+ (A) NONE    Other Observations UA NOT REPORTED NREQ    Yeast, UA NOT REPORTED NONE   EKG 12 Lead   Result Value Ref Range    Ventricular Rate 98 BPM    Atrial Rate 98 BPM    P-R Interval 154 ms    QRS Duration 90 ms    Q-T Interval 338 ms    QTc Calculation (Bazett) 431 ms    P Axis 44 degrees    R Axis -14 degrees    T Axis 20 degrees       RADIOLOGY:  No results found. EKG    EKG Interpretation    Interpreted by me    Rhythm: normal sinus   Rate: normal  Axis: normal  Ectopy: none  Conduction: normal  ST Segments: no acute change  T Waves: no acute change  Q Waves: none    Clinical Impression: Nonspecific EKG, no acute changes   All EKG's are interpreted by the Emergency Department Physician who either signs or Co-signs this chart in the absence of a cardiologist.    [facility]/EMERGENCY DEPARTMENT COURSE:  150 PM: 79-year-old male presenting with lower abdominal pain. Nontoxic-appearing, vitals within normal limits except for mild elevation in heart rate which I suspect is due to decreased by mouth intake. Patient is overall well-appearing.   We'll get abdominal labs, screening EKG, CT abdomen, and given a history of UTI we'll also obtain urinalysis. ED Course as of Sep 16 1817   Newell Dakins Sep 16, 2018   1632 UA suggestive of UTI  [AF]   1756 CT abdomen pelvis negative. Blood sugar is elevated with patient history of diabetes, DKA at this time. We will discharge patient home as his symptoms have improved, his vitals have also improved with fluids. Discussed antibiotic treatment and return precautions. They are agreeable to this time.  [AF]      ED Course User Index  [AF] Lee Ackerman MD               PROCEDURES:  None    CONSULTS:  None    CRITICAL CARE:  None    FINAL IMPRESSION      1.  Acute cystitis with hematuria        DISPOSITION / PLAN     DISPOSITION     PATIENT REFERRED TO:  Ward Hess, [address] [place] [address] [id number] [phone]    Schedule an appointment as soon as possible for a visit in 2 days        DISCHARGE MEDICATIONS:  New Prescriptions    SULFAMETHOXAZOLE-TRIMETHOPRIM (BACTRIM DS) 800-160 MG PER TABLET    Take 1 tablet by mouth 2 times daily for 7 days       Lee Ackerman MD  Emergency Medicine Resident    (Please note that portions of this note were completed with a voice recognition program.  Efforts were made to edit the dictations but occasionally words are mis-transcribed.)       Lee Ackerman MD  09/16/18 3647

## 2018-09-16 NOTE — ED NOTES
Rectal exam by MD with assistance from this RN. Negative hemocult results noted.      Lanora Gaucher, RN  09/16/18 1659

## 2018-09-17 ENCOUNTER — CARE COORDINATION (OUTPATIENT)
Dept: CARE COORDINATION | Age: 53
End: 2018-09-17

## 2018-09-17 LAB
CULTURE: NORMAL
Lab: NORMAL
SPECIMEN DESCRIPTION: NORMAL
STATUS: NORMAL

## 2018-09-17 NOTE — CARE COORDINATION
Ambulatory Care Coordination ED Follow up Call       Reason for ED Visit:  Acute cystitis with hematuria  Care Management Risk Score: CMRS 9  How are you feeling? :     improved  Patient Reports the following:  Pt states he is doing better. He states there is no hematuria and no dysuria. Pt states he will  bactrim DS today. Pt was advised to drink plenty of water and complete antibiotic. Pt voiced understanding. He will follow up with PCP if not better after taking bactrim DS             Contact [facility] regarding any worsening symptoms from above. Did you call your PCP prior to going to the ED? No          Post Discharge Status:  What health concerns since you left the Emergency Room?  none    Do you have wounds that you are caring for at home? No    Do you have a follow up appt scheduled?  no - he will call to make an appt    Review of Instructions:                                 Do you have any questions regarding your discharge instructions?:  No  Medications:    What questions do you have about your medications? No  Are you taking your medications as directed? If not - why? Yes   Can you afford your medications? yes  ADLS:  Do you need assistance of any kind at home? No   What assistance is needed?  none      FU appts/Provider:    No future appointments.     Health Maintenance Due   Topic Date Due    HIV screen  03/03/1981    DTaP/Tdap/Td vaccine (1 - Tdap) 03/03/1985    Pneumococcal med risk (1 of 1 - PPSV23) 03/03/1985    Diabetic retinal exam  08/15/2016    Diabetic microalbuminuria test  07/21/2018    Lipid screen  07/21/2018    Flu vaccine (1) 09/01/2018     Patient advised to contact PCP office to have HM items/records faxed to PCP Office directly?  n/a

## 2018-09-20 RX ORDER — ATORVASTATIN CALCIUM 10 MG/1
10 TABLET, FILM COATED ORAL DAILY
Qty: 30 TABLET | Refills: 5 | Status: SHIPPED | OUTPATIENT
Start: 2018-09-20 | End: 2019-03-23 | Stop reason: SDUPTHER

## 2018-09-20 NOTE — TELEPHONE ENCOUNTER
Medication: atorvastatin, metoprolol  Last visit: 5/2018  Next visit: Visit date not found  Last refill:   Pharm[facility]: [facility]

## 2018-09-27 ENCOUNTER — TELEPHONE (OUTPATIENT)
Dept: INTERNAL MEDICINE CLINIC | Age: 53
End: 2018-09-27

## 2018-10-14 ENCOUNTER — HOSPITAL ENCOUNTER (EMERGENCY)
Age: 53
Discharge: HOME | End: 2018-10-15
Attending: EMERGENCY MEDICINE

## 2018-10-14 DIAGNOSIS — R73.9 HYPERGLYCEMIA: ICD-10-CM

## 2018-10-14 DIAGNOSIS — R10.30 LOWER ABDOMINAL PAIN: Primary | ICD-10-CM

## 2018-10-14 DIAGNOSIS — K74.60 CIRRHOSIS OF LIVER WITHOUT ASCITES, UNSPECIFIED HEPATIC CIRRHOSIS TYPE (HCC): ICD-10-CM

## 2018-10-14 DIAGNOSIS — D69.6 THROMBOCYTOPENIA (HCC): ICD-10-CM

## 2018-10-14 PROCEDURE — 99285 EMERGENCY DEPT VISIT HI MDM: CPT

## 2018-10-14 ASSESSMENT — PAIN DESCRIPTION - FREQUENCY: FREQUENCY: INTERMITTENT

## 2018-10-14 ASSESSMENT — PAIN DESCRIPTION - ORIENTATION: ORIENTATION: LOWER

## 2018-10-14 ASSESSMENT — PAIN DESCRIPTION - DESCRIPTORS: DESCRIPTORS: SHARP

## 2018-10-14 ASSESSMENT — PAIN SCALES - GENERAL: PAINLEVEL_OUTOF10: 7

## 2018-10-14 ASSESSMENT — PAIN DESCRIPTION - PAIN TYPE: TYPE: ACUTE PAIN

## 2018-10-14 ASSESSMENT — PAIN DESCRIPTION - LOCATION: LOCATION: ABDOMEN

## 2018-10-15 ENCOUNTER — APPOINTMENT (OUTPATIENT)
Dept: CT IMAGING | Age: 53
End: 2018-10-15

## 2018-10-15 VITALS
DIASTOLIC BLOOD PRESSURE: 73 MMHG | BODY MASS INDEX: 32.91 KG/M2 | HEART RATE: 95 BPM | OXYGEN SATURATION: 94 % | SYSTOLIC BLOOD PRESSURE: 121 MMHG | HEIGHT: 72 IN | TEMPERATURE: 97.8 F | RESPIRATION RATE: 16 BRPM | WEIGHT: 243 LBS

## 2018-10-15 LAB
ABSOLUTE EOS #: 0.1 K/UL (ref 0–0.4)
ABSOLUTE IMMATURE GRANULOCYTE: ABNORMAL K/UL (ref 0–0.3)
ABSOLUTE LYMPH #: 1.5 K/UL (ref 1–4.8)
ABSOLUTE MONO #: 0.5 K/UL (ref 0.1–1.3)
ALBUMIN SERPL-MCNC: 3.8 G/DL (ref 3.5–5.2)
ALBUMIN/GLOBULIN RATIO: ABNORMAL (ref 1–2.5)
ALP BLD-CCNC: 98 U/L (ref 40–129)
ALT SERPL-CCNC: 57 U/L (ref 5–41)
ANION GAP SERPL CALCULATED.3IONS-SCNC: 12 MMOL/L (ref 9–17)
AST SERPL-CCNC: 54 U/L
BASOPHILS # BLD: 1 % (ref 0–2)
BASOPHILS ABSOLUTE: 0 K/UL (ref 0–0.2)
BILIRUB SERPL-MCNC: 1.95 MG/DL (ref 0.3–1.2)
BILIRUBIN DIRECT: 0.54 MG/DL
BILIRUBIN URINE: NEGATIVE
BILIRUBIN, INDIRECT: 1.41 MG/DL (ref 0–1)
BUN BLDV-MCNC: 13 MG/DL (ref 6–20)
BUN/CREAT BLD: ABNORMAL (ref 9–20)
CALCIUM SERPL-MCNC: 9.2 MG/DL (ref 8.6–10.4)
CHLORIDE BLD-SCNC: 102 MMOL/L (ref 98–107)
CHP ED QC CHECK: NORMAL
CO2: 23 MMOL/L (ref 20–31)
COLOR: YELLOW
COMMENT UA: ABNORMAL
CREAT SERPL-MCNC: 0.77 MG/DL (ref 0.7–1.2)
DIFFERENTIAL TYPE: ABNORMAL
EOSINOPHILS RELATIVE PERCENT: 2 % (ref 0–4)
GFR AFRICAN AMERICAN: >60 ML/MIN
GFR NON-AFRICAN AMERICAN: >60 ML/MIN
GFR SERPL CREATININE-BSD FRML MDRD: ABNORMAL ML/MIN/{1.73_M2}
GFR SERPL CREATININE-BSD FRML MDRD: ABNORMAL ML/MIN/{1.73_M2}
GLOBULIN: ABNORMAL G/DL (ref 1.5–3.8)
GLUCOSE BLD-MCNC: 205 MG/DL (ref 75–110)
GLUCOSE BLD-MCNC: 360 MG/DL (ref 70–99)
GLUCOSE BLD-MCNC: 366 MG/DL
GLUCOSE BLD-MCNC: 366 MG/DL (ref 75–110)
GLUCOSE URINE: ABNORMAL
HCT VFR BLD CALC: 40.8 % (ref 41–53)
HEMOGLOBIN: 13.5 G/DL (ref 13.5–17.5)
IMMATURE GRANULOCYTES: ABNORMAL %
KETONES, URINE: NEGATIVE
LEUKOCYTE ESTERASE, URINE: NEGATIVE
LIPASE: 64 U/L (ref 13–60)
LYMPHOCYTES # BLD: 36 % (ref 24–44)
MCH RBC QN AUTO: 29.7 PG (ref 26–34)
MCHC RBC AUTO-ENTMCNC: 33.1 G/DL (ref 31–37)
MCV RBC AUTO: 89.6 FL (ref 80–100)
MONOCYTES # BLD: 11 % (ref 1–7)
NITRITE, URINE: NEGATIVE
NRBC AUTOMATED: ABNORMAL PER 100 WBC
PDW BLD-RTO: 14.7 % (ref 11.5–14.9)
PH UA: 5.5 (ref 5–8)
PLATELET # BLD: 96 K/UL (ref 150–450)
PLATELET ESTIMATE: ABNORMAL
PMV BLD AUTO: 8 FL (ref 6–12)
POTASSIUM SERPL-SCNC: 3.7 MMOL/L (ref 3.7–5.3)
PROTEIN UA: NEGATIVE
RBC # BLD: 4.55 M/UL (ref 4.5–5.9)
RBC # BLD: ABNORMAL 10*6/UL
SEG NEUTROPHILS: 50 % (ref 36–66)
SEGMENTED NEUTROPHILS ABSOLUTE COUNT: 2.2 K/UL (ref 1.3–9.1)
SODIUM BLD-SCNC: 137 MMOL/L (ref 135–144)
SPECIFIC GRAVITY UA: 1.04 (ref 1–1.03)
TOTAL PROTEIN: 7.1 G/DL (ref 6.4–8.3)
TROPONIN INTERP: NORMAL
TROPONIN T: <0.03 NG/ML
TURBIDITY: CLEAR
URINE HGB: NEGATIVE
UROBILINOGEN, URINE: NORMAL
WBC # BLD: 4.2 K/UL (ref 3.5–11)
WBC # BLD: ABNORMAL 10*3/UL

## 2018-10-15 PROCEDURE — 96375 TX/PRO/DX INJ NEW DRUG ADDON: CPT

## 2018-10-15 PROCEDURE — 84484 ASSAY OF TROPONIN QUANT: CPT

## 2018-10-15 PROCEDURE — 83690 ASSAY OF LIPASE: CPT

## 2018-10-15 PROCEDURE — 82947 ASSAY GLUCOSE BLOOD QUANT: CPT

## 2018-10-15 PROCEDURE — 74176 CT ABD & PELVIS W/O CONTRAST: CPT

## 2018-10-15 PROCEDURE — 36415 COLL VENOUS BLD VENIPUNCTURE: CPT

## 2018-10-15 PROCEDURE — 96374 THER/PROPH/DIAG INJ IV PUSH: CPT

## 2018-10-15 PROCEDURE — 85025 COMPLETE CBC W/AUTO DIFF WBC: CPT

## 2018-10-15 PROCEDURE — 6370000000 HC RX 637 (ALT 250 FOR IP): Performed by: EMERGENCY MEDICINE

## 2018-10-15 PROCEDURE — 80048 BASIC METABOLIC PNL TOTAL CA: CPT

## 2018-10-15 PROCEDURE — 80076 HEPATIC FUNCTION PANEL: CPT

## 2018-10-15 PROCEDURE — 6360000002 HC RX W HCPCS: Performed by: EMERGENCY MEDICINE

## 2018-10-15 PROCEDURE — 81003 URINALYSIS AUTO W/O SCOPE: CPT

## 2018-10-15 RX ORDER — ACETAMINOPHEN 325 MG/1
650 TABLET ORAL EVERY 6 HOURS PRN
Qty: 50 TABLET | Refills: 0 | Status: ON HOLD | OUTPATIENT
Start: 2018-10-15 | End: 2021-12-11 | Stop reason: HOSPADM

## 2018-10-15 RX ORDER — MORPHINE SULFATE 4 MG/ML
4 INJECTION, SOLUTION INTRAMUSCULAR; INTRAVENOUS ONCE
Status: COMPLETED | OUTPATIENT
Start: 2018-10-15 | End: 2018-10-15

## 2018-10-15 RX ADMIN — INSULIN HUMAN 5 UNITS: 100 INJECTION, SOLUTION PARENTERAL at 01:56

## 2018-10-15 RX ADMIN — SITAGLIPTIN 25 MG: 50 TABLET, FILM COATED ORAL at 01:57

## 2018-10-15 RX ADMIN — MORPHINE SULFATE 4 MG: 4 INJECTION INTRAVENOUS at 00:44

## 2018-10-15 ASSESSMENT — PAIN SCALES - GENERAL
PAINLEVEL_OUTOF10: 8
PAINLEVEL_OUTOF10: 4

## 2018-10-15 ASSESSMENT — ENCOUNTER SYMPTOMS
BACK PAIN: 0
ABDOMINAL PAIN: 1
TROUBLE SWALLOWING: 0
RHINORRHEA: 0
VOMITING: 0
DIARRHEA: 0
WHEEZING: 0
VOICE CHANGE: 0
NAUSEA: 0
EYE DISCHARGE: 0
COUGH: 0
ABDOMINAL DISTENTION: 0
SHORTNESS OF BREATH: 0
SORE THROAT: 0
EYE PAIN: 0
EYE ITCHING: 0
CONSTIPATION: 0
COLOR CHANGE: 0

## 2018-10-15 ASSESSMENT — PAIN DESCRIPTION - LOCATION: LOCATION: ABDOMEN

## 2018-10-15 ASSESSMENT — PAIN DESCRIPTION - ORIENTATION: ORIENTATION: LOWER

## 2018-10-15 ASSESSMENT — PAIN DESCRIPTION - PAIN TYPE: TYPE: ACUTE PAIN

## 2018-10-15 NOTE — ED PROVIDER NOTES
Disp-1 Inhaler, R-11Normal      aspirin 81 MG tablet Take 81 mg by mouthHistorical Med      [employer] LANCETS ULTRATHIN 30G MISC Disp-100 each, R-11, Normal           ALLERGIES     is allergic to codeine and simvastatin. FAMILY HISTORY     indicated that the status of his other is unknown. SOCIAL HISTORY     [unfilled]  PHYSICAL EXAM     INITIAL VITALS: /73   Pulse 95   Temp 97.8 °F (36.6 °C) (Oral)   Resp 16   Ht 6' (1.829 m)   Wt 243 lb (110.2 kg)   SpO2 94%   BMI 32.96 kg/m²    Physical Exam   Constitutional: He is oriented to person, place, and time. He appears well-developed and well-nourished. No distress. HENT:   Head: Normocephalic and atraumatic. Nose: Nose normal.   Mouth/Throat: Oropharynx is clear and moist.   Eyes: Pupils are equal, round, and reactive to light. Conjunctivae and EOM are normal.   Neck: Normal range of motion. Neck supple. Cardiovascular: Normal rate, regular rhythm, normal heart sounds and intact distal pulses. Exam reveals no gallop and no friction rub. No murmur heard. Pulmonary/Chest: Effort normal and breath sounds normal. No stridor. No respiratory distress. He has no wheezes. He has no rales. He exhibits no tenderness. Abdominal: Soft. Bowel sounds are normal. He exhibits no distension. There is tenderness. There is no rebound and no guarding. Musculoskeletal: Normal range of motion. He exhibits no edema, tenderness or deformity. Neurological: He is alert and oriented to person, place, and time. No cranial nerve deficit. Skin: Skin is warm and dry. No rash noted. He is not diaphoretic. No erythema. Psychiatric: He has a normal mood and affect. His behavior is normal. Judgment and thought content normal.   Nursing note and vitals reviewed. MEDICAL DECISION MAKING:       Patient with improvement of symptoms times discharge.     Procedures    DIAGNOSTIC RESULTS   EKG:All EKG's are interpreted by the Emergency Department Physician who either lb (110.2 kg)    Height: 6' (1.829 m)        The patient was given the following medications while in the emergency department:  Orders Placed This Encounter   Medications    morphine (PF) injection 4 mg    insulin regular (HUMULIN R;NOVOLIN R) injection 5 Units    SITagliptin (JANUVIA) tablet 25 mg     Order Specific Question:   Please select a reason the therapeutic interchange was not accepted: Answer: Other (Please Comment)    SITagliptin (JANUVIA) 100 MG tablet     Sig: Take 1 tablet by mouth daily     Dispense:  30 tablet     Refill:  3    acetaminophen (AMINOFEN) 325 MG tablet     Sig: Take 2 tablets by mouth every 6 hours as needed for Pain     Dispense:  50 tablet     Refill:  0     CONSULTS:  None    FINAL IMPRESSION      1. Lower abdominal pain    2. Hyperglycemia    3. Thrombocytopenia ([address]    4. Cirrhosis of liver without ascites, unspecified hepatic cirrhosis type [facility])          DISPOSITION/PLAN   DISPOSITION Decision To Discharge 10/15/2018 01:46:11 AM      PATIENT REFERRED TO:  Jahaira Friend, [address] [place], [place] [id number]    Schedule an appointment as soon as possible for a visit       DISCHARGE MEDICATIONS:  Discharge Medication List as of 10/15/2018  1:50 AM      START taking these medications    Details   acetaminophen (AMINOFEN) 325 MG tablet Take 2 tablets by mouth every 6 hours as needed for Pain, Disp-50 tablet, R-0Print           Julita Moody MD  Attending Emergency Physician  [employer] voice recognition software used in portions of this document.                     Julita Moody MD  10/15/18 2172

## 2018-10-16 ENCOUNTER — CARE COORDINATION (OUTPATIENT)
Dept: CARE COORDINATION | Age: 53
End: 2018-10-16

## 2018-10-18 ENCOUNTER — TELEPHONE (OUTPATIENT)
Dept: INTERNAL MEDICINE CLINIC | Age: 53
End: 2018-10-18

## 2018-10-19 ENCOUNTER — OFFICE VISIT (OUTPATIENT)
Dept: INTERNAL MEDICINE CLINIC | Age: 53
End: 2018-10-19

## 2018-10-19 VITALS
BODY MASS INDEX: 32.37 KG/M2 | HEART RATE: 88 BPM | SYSTOLIC BLOOD PRESSURE: 91 MMHG | HEIGHT: 72 IN | DIASTOLIC BLOOD PRESSURE: 59 MMHG | WEIGHT: 239 LBS

## 2018-10-19 DIAGNOSIS — E11.65 TYPE 2 DIABETES MELLITUS WITH HYPERGLYCEMIA, WITHOUT LONG-TERM CURRENT USE OF INSULIN (HCC): Primary | ICD-10-CM

## 2018-10-19 DIAGNOSIS — I10 ESSENTIAL HYPERTENSION: ICD-10-CM

## 2018-10-19 DIAGNOSIS — J44.9 CHRONIC OBSTRUCTIVE PULMONARY DISEASE, UNSPECIFIED COPD TYPE (HCC): ICD-10-CM

## 2018-10-19 PROCEDURE — 99214 OFFICE O/P EST MOD 30 MIN: CPT | Performed by: INTERNAL MEDICINE

## 2018-10-19 RX ORDER — ALBUTEROL SULFATE 90 UG/1
1 AEROSOL, METERED RESPIRATORY (INHALATION) EVERY 4 HOURS PRN
Qty: 18 G | Refills: 6 | Status: SHIPPED | OUTPATIENT
Start: 2018-10-19 | End: 2020-05-20 | Stop reason: SDUPTHER

## 2018-10-19 RX ORDER — LANCETS
1 EACH MISCELLANEOUS 3 TIMES DAILY
Qty: 100 EACH | Refills: 11 | Status: SHIPPED | OUTPATIENT
Start: 2018-10-19 | End: 2022-08-18

## 2018-10-19 RX ORDER — LISINOPRIL 5 MG/1
5 TABLET ORAL DAILY
Qty: 30 TABLET | Refills: 0 | Status: SHIPPED | OUTPATIENT
Start: 2018-10-19 | End: 2018-12-26 | Stop reason: SDUPTHER

## 2018-10-19 ASSESSMENT — PATIENT HEALTH QUESTIONNAIRE - PHQ9
SUM OF ALL RESPONSES TO PHQ QUESTIONS 1-9: 0
SUM OF ALL RESPONSES TO PHQ QUESTIONS 1-9: 0
1. LITTLE INTEREST OR PLEASURE IN DOING THINGS: 0
SUM OF ALL RESPONSES TO PHQ9 QUESTIONS 1 & 2: 0
2. FEELING DOWN, DEPRESSED OR HOPELESS: 0

## 2018-10-19 ASSESSMENT — ENCOUNTER SYMPTOMS
APNEA: 0
FACIAL SWELLING: 0
DIARRHEA: 0
BACK PAIN: 0
CONSTIPATION: 0
COLOR CHANGE: 0
COUGH: 0
WHEEZING: 0
CHEST TIGHTNESS: 0
ABDOMINAL DISTENTION: 0
SHORTNESS OF BREATH: 1
ABDOMINAL PAIN: 0

## 2018-10-19 NOTE — PROGRESS NOTES
([address]  - albuterol sulfate HFA (VENTOLIN HFA) 108 (90 Base) MCG/ACT inhaler; Inhale 1 puff into the lungs every 4 hours as needed for Wheezing  Dispense: 18 g; Refill: 6  - mometasone-formoterol (DULERA) 200-5 MCG/ACT inhaler; Inhale 2 puffs into the lungs every 12 hours  Dispense: 1 Inhaler; Refill: 2      · Return in about 4 weeks (around 11/16/2018). · Reviewed prior labs and health maintenance. · Discussed use, benefit, and side effects of prescribed medications. Barriers to medication compliance addressed. All patient questions answered. Pt voiced understanding. Aurora Munoz MD  [id number]  10/31/2018, 9:49 PM    Please note that this chart was generated using voice recognition Dragon dictation software. Although every effort was made to ensure the accuracy of this automated transcription, some errors in transcription may have occurred.

## 2018-12-12 ENCOUNTER — TELEPHONE (OUTPATIENT)
Dept: INTERNAL MEDICINE CLINIC | Age: 53
End: 2018-12-12

## 2018-12-13 NOTE — TELEPHONE ENCOUNTER
Medication: [place] [employer] [place]    Quantity: 4 boxes    Directions: daily    Lot Number: [id number]    Expiration: 05/21    Prescriber: Deisy Delgado

## 2018-12-26 ENCOUNTER — APPOINTMENT (OUTPATIENT)
Dept: GENERAL RADIOLOGY | Age: 53
End: 2018-12-26

## 2018-12-26 ENCOUNTER — HOSPITAL ENCOUNTER (EMERGENCY)
Age: 53
Discharge: HOME | End: 2018-12-27
Attending: EMERGENCY MEDICINE

## 2018-12-26 DIAGNOSIS — R73.9 HYPERGLYCEMIA: ICD-10-CM

## 2018-12-26 DIAGNOSIS — I10 ESSENTIAL HYPERTENSION: ICD-10-CM

## 2018-12-26 DIAGNOSIS — J40 BRONCHITIS: Primary | ICD-10-CM

## 2018-12-26 LAB
ABSOLUTE EOS #: 0 K/UL (ref 0–0.4)
ABSOLUTE IMMATURE GRANULOCYTE: ABNORMAL K/UL (ref 0–0.3)
ABSOLUTE LYMPH #: 1.6 K/UL (ref 1–4.8)
ABSOLUTE MONO #: 0.4 K/UL (ref 0.1–1.3)
ANION GAP SERPL CALCULATED.3IONS-SCNC: 14 MMOL/L (ref 9–17)
BASOPHILS # BLD: 1 % (ref 0–2)
BASOPHILS ABSOLUTE: 0 K/UL (ref 0–0.2)
BUN BLDV-MCNC: 9 MG/DL (ref 6–20)
BUN/CREAT BLD: ABNORMAL (ref 9–20)
CALCIUM SERPL-MCNC: 9.6 MG/DL (ref 8.6–10.4)
CHLORIDE BLD-SCNC: 103 MMOL/L (ref 98–107)
CO2: 21 MMOL/L (ref 20–31)
CREAT SERPL-MCNC: 0.56 MG/DL (ref 0.7–1.2)
D-DIMER QUANTITATIVE: 0.45 MG/L FEU
DIFFERENTIAL TYPE: ABNORMAL
EOSINOPHILS RELATIVE PERCENT: 1 % (ref 0–4)
GFR AFRICAN AMERICAN: >60 ML/MIN
GFR NON-AFRICAN AMERICAN: >60 ML/MIN
GFR SERPL CREATININE-BSD FRML MDRD: ABNORMAL ML/MIN/{1.73_M2}
GFR SERPL CREATININE-BSD FRML MDRD: ABNORMAL ML/MIN/{1.73_M2}
GLUCOSE BLD-MCNC: 378 MG/DL (ref 70–99)
HCT VFR BLD CALC: 44.8 % (ref 41–53)
HEMOGLOBIN: 15 G/DL (ref 13.5–17.5)
IMMATURE GRANULOCYTES: ABNORMAL %
LYMPHOCYTES # BLD: 33 % (ref 24–44)
MCH RBC QN AUTO: 29.7 PG (ref 26–34)
MCHC RBC AUTO-ENTMCNC: 33.5 G/DL (ref 31–37)
MCV RBC AUTO: 88.7 FL (ref 80–100)
MONOCYTES # BLD: 8 % (ref 1–7)
NRBC AUTOMATED: ABNORMAL PER 100 WBC
PDW BLD-RTO: 14.8 % (ref 11.5–14.9)
PLATELET # BLD: 93 K/UL (ref 150–450)
PLATELET ESTIMATE: ABNORMAL
PMV BLD AUTO: 8.3 FL (ref 6–12)
POTASSIUM SERPL-SCNC: 4.4 MMOL/L (ref 3.7–5.3)
RBC # BLD: 5.05 M/UL (ref 4.5–5.9)
RBC # BLD: ABNORMAL 10*6/UL
SEG NEUTROPHILS: 57 % (ref 36–66)
SEGMENTED NEUTROPHILS ABSOLUTE COUNT: 2.7 K/UL (ref 1.3–9.1)
SODIUM BLD-SCNC: 138 MMOL/L (ref 135–144)
WBC # BLD: 4.7 K/UL (ref 3.5–11)
WBC # BLD: ABNORMAL 10*3/UL

## 2018-12-26 PROCEDURE — 85379 FIBRIN DEGRADATION QUANT: CPT

## 2018-12-26 PROCEDURE — 71046 X-RAY EXAM CHEST 2 VIEWS: CPT

## 2018-12-26 PROCEDURE — 36415 COLL VENOUS BLD VENIPUNCTURE: CPT

## 2018-12-26 PROCEDURE — 80048 BASIC METABOLIC PNL TOTAL CA: CPT

## 2018-12-26 PROCEDURE — 99283 EMERGENCY DEPT VISIT LOW MDM: CPT

## 2018-12-26 PROCEDURE — 85025 COMPLETE CBC W/AUTO DIFF WBC: CPT

## 2018-12-26 RX ORDER — LISINOPRIL 5 MG/1
TABLET ORAL
Qty: 30 TABLET | Refills: 3 | Status: SHIPPED | OUTPATIENT
Start: 2018-12-26 | End: 2019-05-06 | Stop reason: SDUPTHER

## 2018-12-27 VITALS
TEMPERATURE: 98.3 F | OXYGEN SATURATION: 97 % | DIASTOLIC BLOOD PRESSURE: 82 MMHG | RESPIRATION RATE: 16 BRPM | HEART RATE: 116 BPM | SYSTOLIC BLOOD PRESSURE: 115 MMHG

## 2018-12-27 PROCEDURE — 96372 THER/PROPH/DIAG INJ SC/IM: CPT

## 2018-12-27 PROCEDURE — 6370000000 HC RX 637 (ALT 250 FOR IP): Performed by: PHYSICIAN ASSISTANT

## 2018-12-27 RX ORDER — GUAIFENESIN/DEXTROMETHORPHAN 100-10MG/5
5 SYRUP ORAL 3 TIMES DAILY PRN
Qty: 120 ML | Refills: 0 | Status: SHIPPED | OUTPATIENT
Start: 2018-12-27 | End: 2019-01-08 | Stop reason: HOSPADM

## 2018-12-27 RX ADMIN — INSULIN HUMAN 6 UNITS: 100 INJECTION, SOLUTION PARENTERAL at 00:16

## 2018-12-27 NOTE — ED PROVIDER NOTES
reports that he does not use drugs. lives at home with others     PHYSICAL EXAM    (up to 7 for level 4, 8 or more for level 5)     ED Triage Vitals [12/26/18 2130]   BP Temp Temp Source Pulse Resp SpO2 Height Weight   115/82 97.8 °F (36.6 °C) Oral 117 16 96 % -- --       Physical Exam   Nursing note and vitals reviewed. Constitutional: Oriented to person, place, and time and well-developed, well-nourished. Head: Normocephalic and atraumatic. Ear: External ears normal. TM non-erythematous with no canal swelling or erythema. Nose: Nose normal and midline. Eyes: Conjunctivae and EOM are normal. Pupils are equal, round, and reactive to light. Neck: Normal range of motion. Neck supple. Throat: Posterior pharynx is without erythema or exudates, airway is patent, no swelling  Cardiovascular: Normal rate, regular rhythm, normal heart sounds and intact distal pulses. Pulmonary/Chest: Effort normal and breath sounds normal. No respiratory distress. No wheezes. No rales. No chest tenderness. Abdominal: Soft. Bowel sounds are normal. No distension and no mass. There is no tenderness. There is no rebound and no guarding. Musculoskeletal: Normal range of motion. no leg swelling. Neurological: Alert and oriented to person, place, and time. GCS score is 15. Skin: Skin is warm and dry. No rash noted. No erythema. No pallor. Psychiatric: Mood, memory, affect and judgment normal.           DIAGNOSTIC RESULTS     EKG: All EKG's are interpreted by the Emergency Department Physician who either signs or Co-signs this chart in the absence of a cardiologist.        RADIOLOGY:   All plain film, CT, MRI, and formal ultrasound images (except ED bedside ultrasound) are read by the radiologist, see reports below, unless otherwise noted in MDM or here. XR CHEST STANDARD (2 VW)   Final Result   No radiographic evidence of acute cardiopulmonary disease.                LABS:  Labs Reviewed   BASIC METABOLIC PANEL -

## 2018-12-27 NOTE — ED NOTES
Pt discharged in stable condition. Pt advised to follow up with PCP and return to ED if symptoms worsen. Pt is AOx4 and answering questions appropriately. Skin is PWD. Pt has steady gait upon discharge.       Padmini Rawls RN  12/27/18 0021

## 2018-12-28 ENCOUNTER — CARE COORDINATION (OUTPATIENT)
Dept: CARE COORDINATION | Age: 53
End: 2018-12-28

## 2019-01-06 ENCOUNTER — HOSPITAL ENCOUNTER (OUTPATIENT)
Age: 54
Setting detail: OBSERVATION
Discharge: HOME | End: 2019-01-08
Attending: EMERGENCY MEDICINE | Admitting: INTERNAL MEDICINE
Payer: MEDICAID

## 2019-01-06 ENCOUNTER — APPOINTMENT (OUTPATIENT)
Dept: CT IMAGING | Age: 54
End: 2019-01-06

## 2019-01-06 DIAGNOSIS — R20.0 NUMBNESS: Primary | ICD-10-CM

## 2019-01-06 DIAGNOSIS — R51.9 ACUTE NONINTRACTABLE HEADACHE, UNSPECIFIED HEADACHE TYPE: ICD-10-CM

## 2019-01-06 PROBLEM — R20.2 NUMBNESS AND TINGLING OF LEFT SIDE OF FACE: Status: ACTIVE | Noted: 2019-01-06

## 2019-01-06 LAB
% CKMB: 1.8 % (ref 0–3.5)
ABSOLUTE EOS #: 0.1 K/UL (ref 0–0.4)
ABSOLUTE IMMATURE GRANULOCYTE: ABNORMAL K/UL (ref 0–0.3)
ABSOLUTE LYMPH #: 1.3 K/UL (ref 1–4.8)
ABSOLUTE MONO #: 0.6 K/UL (ref 0.1–1.3)
ANION GAP SERPL CALCULATED.3IONS-SCNC: 10 MMOL/L (ref 9–17)
BASOPHILS # BLD: 0 % (ref 0–2)
BASOPHILS ABSOLUTE: 0 K/UL (ref 0–0.2)
BUN BLDV-MCNC: 11 MG/DL (ref 6–20)
BUN/CREAT BLD: ABNORMAL (ref 9–20)
CALCIUM SERPL-MCNC: 9 MG/DL (ref 8.6–10.4)
CHLORIDE BLD-SCNC: 103 MMOL/L (ref 98–107)
CK MB: 1.5 NG/ML
CKMB INTERPRETATION: ABNORMAL
CO2: 25 MMOL/L (ref 20–31)
CREAT SERPL-MCNC: 0.9 MG/DL (ref 0.7–1.2)
DIFFERENTIAL TYPE: ABNORMAL
EKG ATRIAL RATE: 93 BPM
EKG P AXIS: 34 DEGREES
EKG P-R INTERVAL: 160 MS
EKG Q-T INTERVAL: 382 MS
EKG QRS DURATION: 98 MS
EKG QTC CALCULATION (BAZETT): 474 MS
EKG R AXIS: -23 DEGREES
EKG T AXIS: 18 DEGREES
EKG VENTRICULAR RATE: 93 BPM
EOSINOPHILS RELATIVE PERCENT: 2 % (ref 0–4)
GFR AFRICAN AMERICAN: >60 ML/MIN
GFR NON-AFRICAN AMERICAN: >60 ML/MIN
GFR SERPL CREATININE-BSD FRML MDRD: ABNORMAL ML/MIN/{1.73_M2}
GFR SERPL CREATININE-BSD FRML MDRD: ABNORMAL ML/MIN/{1.73_M2}
GLUCOSE BLD-MCNC: 340 MG/DL (ref 70–99)
HCT VFR BLD CALC: 39.6 % (ref 41–53)
HEMOGLOBIN: 13.6 G/DL (ref 13.5–17.5)
IMMATURE GRANULOCYTES: ABNORMAL %
INR BLD: 1.1
LYMPHOCYTES # BLD: 32 % (ref 24–44)
MCH RBC QN AUTO: 30.5 PG (ref 26–34)
MCHC RBC AUTO-ENTMCNC: 34.4 G/DL (ref 31–37)
MCV RBC AUTO: 88.7 FL (ref 80–100)
MONOCYTES # BLD: 14 % (ref 1–7)
MYOGLOBIN: 27 NG/ML (ref 28–72)
NRBC AUTOMATED: ABNORMAL PER 100 WBC
PARTIAL THROMBOPLASTIN TIME: 26 SEC (ref 23–31)
PDW BLD-RTO: 15.2 % (ref 11.5–14.9)
PLATELET # BLD: 86 K/UL (ref 150–450)
PLATELET ESTIMATE: ABNORMAL
PMV BLD AUTO: 8.7 FL (ref 6–12)
POTASSIUM SERPL-SCNC: 3.8 MMOL/L (ref 3.7–5.3)
PROTHROMBIN TIME: 11 SEC (ref 9.7–12)
RBC # BLD: 4.46 M/UL (ref 4.5–5.9)
RBC # BLD: ABNORMAL 10*6/UL
SEG NEUTROPHILS: 52 % (ref 36–66)
SEGMENTED NEUTROPHILS ABSOLUTE COUNT: 2.2 K/UL (ref 1.3–9.1)
SODIUM BLD-SCNC: 138 MMOL/L (ref 135–144)
TOTAL CK: 85 U/L (ref 39–308)
TROPONIN INTERP: ABNORMAL
TROPONIN T: ABNORMAL NG/ML
TROPONIN, HIGH SENSITIVITY: 8 NG/L (ref 0–22)
WBC # BLD: 4.3 K/UL (ref 3.5–11)
WBC # BLD: ABNORMAL 10*3/UL

## 2019-01-06 PROCEDURE — 99285 EMERGENCY DEPT VISIT HI MDM: CPT

## 2019-01-06 PROCEDURE — 82550 ASSAY OF CK (CPK): CPT

## 2019-01-06 PROCEDURE — 82553 CREATINE MB FRACTION: CPT

## 2019-01-06 PROCEDURE — 36415 COLL VENOUS BLD VENIPUNCTURE: CPT

## 2019-01-06 PROCEDURE — 70496 CT ANGIOGRAPHY HEAD: CPT

## 2019-01-06 PROCEDURE — 6360000004 HC RX CONTRAST MEDICATION: Performed by: EMERGENCY MEDICINE

## 2019-01-06 PROCEDURE — 85025 COMPLETE CBC W/AUTO DIFF WBC: CPT

## 2019-01-06 PROCEDURE — 83874 ASSAY OF MYOGLOBIN: CPT

## 2019-01-06 PROCEDURE — 85730 THROMBOPLASTIN TIME PARTIAL: CPT

## 2019-01-06 PROCEDURE — G0426 INPT/ED TELECONSULT50: HCPCS | Performed by: PSYCHIATRY & NEUROLOGY

## 2019-01-06 PROCEDURE — 70498 CT ANGIOGRAPHY NECK: CPT

## 2019-01-06 PROCEDURE — 85610 PROTHROMBIN TIME: CPT

## 2019-01-06 PROCEDURE — G0378 HOSPITAL OBSERVATION PER HR: HCPCS

## 2019-01-06 PROCEDURE — 70450 CT HEAD/BRAIN W/O DYE: CPT

## 2019-01-06 PROCEDURE — 2580000003 HC RX 258: Performed by: EMERGENCY MEDICINE

## 2019-01-06 PROCEDURE — 84484 ASSAY OF TROPONIN QUANT: CPT

## 2019-01-06 PROCEDURE — 80048 BASIC METABOLIC PNL TOTAL CA: CPT

## 2019-01-06 PROCEDURE — 6370000000 HC RX 637 (ALT 250 FOR IP): Performed by: EMERGENCY MEDICINE

## 2019-01-06 PROCEDURE — 96372 THER/PROPH/DIAG INJ SC/IM: CPT

## 2019-01-06 RX ORDER — 0.9 % SODIUM CHLORIDE 0.9 %
80 INTRAVENOUS SOLUTION INTRAVENOUS ONCE
Status: COMPLETED | OUTPATIENT
Start: 2019-01-06 | End: 2019-01-06

## 2019-01-06 RX ORDER — ASPIRIN 81 MG/1
324 TABLET, CHEWABLE ORAL ONCE
Status: COMPLETED | OUTPATIENT
Start: 2019-01-06 | End: 2019-01-06

## 2019-01-06 RX ORDER — SODIUM CHLORIDE 0.9 % (FLUSH) 0.9 %
10 SYRINGE (ML) INJECTION PRN
Status: DISCONTINUED | OUTPATIENT
Start: 2019-01-06 | End: 2019-01-08 | Stop reason: HOSPADM

## 2019-01-06 RX ADMIN — ASPIRIN 81 MG 324 MG: 81 TABLET ORAL at 22:12

## 2019-01-06 RX ADMIN — IOVERSOL 75 ML: 741 INJECTION INTRA-ARTERIAL; INTRAVENOUS at 22:40

## 2019-01-06 RX ADMIN — SODIUM CHLORIDE 80 ML: 9 INJECTION, SOLUTION INTRAVENOUS at 22:40

## 2019-01-06 RX ADMIN — Medication 10 ML: at 22:40

## 2019-01-06 ASSESSMENT — ENCOUNTER SYMPTOMS
DIARRHEA: 0
VOMITING: 0
RHINORRHEA: 1
SINUS PRESSURE: 1
BACK PAIN: 0
SORE THROAT: 0
SHORTNESS OF BREATH: 0
ABDOMINAL PAIN: 0

## 2019-01-06 ASSESSMENT — PAIN DESCRIPTION - LOCATION: LOCATION: HEAD

## 2019-01-06 ASSESSMENT — PAIN SCALES - GENERAL: PAINLEVEL_OUTOF10: 8

## 2019-01-07 ENCOUNTER — APPOINTMENT (OUTPATIENT)
Dept: MRI IMAGING | Age: 54
End: 2019-01-07

## 2019-01-07 PROBLEM — R20.0 NUMBNESS AND TINGLING: Status: ACTIVE | Noted: 2019-01-07

## 2019-01-07 PROBLEM — R20.2 NUMBNESS AND TINGLING: Status: ACTIVE | Noted: 2019-01-07

## 2019-01-07 LAB
CALCIUM SERPL-MCNC: 8.7 MG/DL (ref 8.6–10.4)
CHOLESTEROL/HDL RATIO: 2.9
CHOLESTEROL: 92 MG/DL
GLUCOSE BLD-MCNC: 192 MG/DL (ref 75–110)
GLUCOSE BLD-MCNC: 206 MG/DL (ref 75–110)
GLUCOSE BLD-MCNC: 238 MG/DL (ref 75–110)
GLUCOSE BLD-MCNC: 314 MG/DL (ref 75–110)
GLUCOSE BLD-MCNC: 318 MG/DL (ref 75–110)
HDLC SERPL-MCNC: 32 MG/DL
LDL CHOLESTEROL: 41 MG/DL (ref 0–130)
MAGNESIUM: 1.7 MG/DL (ref 1.6–2.6)
TRIGL SERPL-MCNC: 94 MG/DL
VLDLC SERPL CALC-MCNC: ABNORMAL MG/DL (ref 1–30)

## 2019-01-07 PROCEDURE — 82947 ASSAY GLUCOSE BLOOD QUANT: CPT

## 2019-01-07 PROCEDURE — 6370000000 HC RX 637 (ALT 250 FOR IP): Performed by: INTERNAL MEDICINE

## 2019-01-07 PROCEDURE — 80061 LIPID PANEL: CPT

## 2019-01-07 PROCEDURE — 82310 ASSAY OF CALCIUM: CPT

## 2019-01-07 PROCEDURE — 99223 1ST HOSP IP/OBS HIGH 75: CPT | Performed by: INTERNAL MEDICINE

## 2019-01-07 PROCEDURE — G0378 HOSPITAL OBSERVATION PER HR: HCPCS

## 2019-01-07 PROCEDURE — 2580000003 HC RX 258: Performed by: INTERNAL MEDICINE

## 2019-01-07 PROCEDURE — 93005 ELECTROCARDIOGRAM TRACING: CPT

## 2019-01-07 PROCEDURE — 70551 MRI BRAIN STEM W/O DYE: CPT

## 2019-01-07 PROCEDURE — 6370000000 HC RX 637 (ALT 250 FOR IP): Performed by: EMERGENCY MEDICINE

## 2019-01-07 PROCEDURE — 36415 COLL VENOUS BLD VENIPUNCTURE: CPT

## 2019-01-07 PROCEDURE — 83735 ASSAY OF MAGNESIUM: CPT

## 2019-01-07 RX ORDER — SODIUM CHLORIDE 0.9 % (FLUSH) 0.9 %
10 SYRINGE (ML) INJECTION EVERY 12 HOURS SCHEDULED
Status: DISCONTINUED | OUTPATIENT
Start: 2019-01-07 | End: 2019-01-08 | Stop reason: HOSPADM

## 2019-01-07 RX ORDER — SODIUM CHLORIDE 9 MG/ML
INJECTION, SOLUTION INTRAVENOUS CONTINUOUS
Status: DISCONTINUED | OUTPATIENT
Start: 2019-01-07 | End: 2019-01-08 | Stop reason: HOSPADM

## 2019-01-07 RX ORDER — DEXTROSE MONOHYDRATE 50 MG/ML
100 INJECTION, SOLUTION INTRAVENOUS PRN
Status: DISCONTINUED | OUTPATIENT
Start: 2019-01-07 | End: 2019-01-08 | Stop reason: HOSPADM

## 2019-01-07 RX ORDER — ALBUTEROL SULFATE 90 UG/1
1 AEROSOL, METERED RESPIRATORY (INHALATION) EVERY 4 HOURS PRN
Status: DISCONTINUED | OUTPATIENT
Start: 2019-01-07 | End: 2019-01-08 | Stop reason: HOSPADM

## 2019-01-07 RX ORDER — ACETAMINOPHEN 325 MG/1
650 TABLET ORAL EVERY 6 HOURS PRN
Status: DISCONTINUED | OUTPATIENT
Start: 2019-01-07 | End: 2019-01-08 | Stop reason: HOSPADM

## 2019-01-07 RX ORDER — LISINOPRIL 5 MG/1
5 TABLET ORAL DAILY
Status: DISCONTINUED | OUTPATIENT
Start: 2019-01-07 | End: 2019-01-08 | Stop reason: HOSPADM

## 2019-01-07 RX ORDER — ALLOPURINOL 100 MG/1
100 TABLET ORAL DAILY
Status: DISCONTINUED | OUTPATIENT
Start: 2019-01-07 | End: 2019-01-08 | Stop reason: HOSPADM

## 2019-01-07 RX ORDER — ONDANSETRON 2 MG/ML
4 INJECTION INTRAMUSCULAR; INTRAVENOUS EVERY 8 HOURS PRN
Status: DISCONTINUED | OUTPATIENT
Start: 2019-01-07 | End: 2019-01-08 | Stop reason: HOSPADM

## 2019-01-07 RX ORDER — DEXTROSE MONOHYDRATE 25 G/50ML
12.5 INJECTION, SOLUTION INTRAVENOUS PRN
Status: DISCONTINUED | OUTPATIENT
Start: 2019-01-07 | End: 2019-01-08 | Stop reason: HOSPADM

## 2019-01-07 RX ORDER — GLIMEPIRIDE 4 MG/1
4 TABLET ORAL
Status: DISCONTINUED | OUTPATIENT
Start: 2019-01-07 | End: 2019-01-08 | Stop reason: HOSPADM

## 2019-01-07 RX ORDER — ASPIRIN 81 MG/1
81 TABLET ORAL DAILY
Status: DISCONTINUED | OUTPATIENT
Start: 2019-01-07 | End: 2019-01-07

## 2019-01-07 RX ORDER — TAMSULOSIN HYDROCHLORIDE 0.4 MG/1
0.4 CAPSULE ORAL DAILY
Status: DISCONTINUED | OUTPATIENT
Start: 2019-01-07 | End: 2019-01-08 | Stop reason: HOSPADM

## 2019-01-07 RX ORDER — ACETAMINOPHEN 325 MG/1
650 TABLET ORAL ONCE
Status: COMPLETED | OUTPATIENT
Start: 2019-01-07 | End: 2019-01-07

## 2019-01-07 RX ORDER — SODIUM CHLORIDE 0.9 % (FLUSH) 0.9 %
10 SYRINGE (ML) INJECTION PRN
Status: DISCONTINUED | OUTPATIENT
Start: 2019-01-07 | End: 2019-01-08 | Stop reason: HOSPADM

## 2019-01-07 RX ORDER — ATORVASTATIN CALCIUM 10 MG/1
10 TABLET, FILM COATED ORAL DAILY
Status: DISCONTINUED | OUTPATIENT
Start: 2019-01-07 | End: 2019-01-08 | Stop reason: HOSPADM

## 2019-01-07 RX ORDER — NICOTINE POLACRILEX 4 MG
15 LOZENGE BUCCAL PRN
Status: DISCONTINUED | OUTPATIENT
Start: 2019-01-07 | End: 2019-01-08 | Stop reason: HOSPADM

## 2019-01-07 RX ORDER — ACETAMINOPHEN 325 MG/1
650 TABLET ORAL EVERY 4 HOURS PRN
Status: DISCONTINUED | OUTPATIENT
Start: 2019-01-07 | End: 2019-01-08 | Stop reason: HOSPADM

## 2019-01-07 RX ORDER — OMEPRAZOLE 20 MG/1
40 CAPSULE, DELAYED RELEASE ORAL EVERY MORNING
Status: DISCONTINUED | OUTPATIENT
Start: 2019-01-07 | End: 2019-01-08 | Stop reason: HOSPADM

## 2019-01-07 RX ADMIN — ACETAMINOPHEN 650 MG: 325 TABLET, FILM COATED ORAL at 00:10

## 2019-01-07 RX ADMIN — LISINOPRIL 5 MG: 5 TABLET ORAL at 08:57

## 2019-01-07 RX ADMIN — METOPROLOL TARTRATE 25 MG: 25 TABLET ORAL at 08:57

## 2019-01-07 RX ADMIN — ALLOPURINOL 100 MG: 100 TABLET ORAL at 08:57

## 2019-01-07 RX ADMIN — SODIUM CHLORIDE: 9 INJECTION, SOLUTION INTRAVENOUS at 15:14

## 2019-01-07 RX ADMIN — INSULIN LISPRO 4 UNITS: 100 INJECTION, SOLUTION INTRAVENOUS; SUBCUTANEOUS at 17:46

## 2019-01-07 RX ADMIN — Medication 10 ML: at 01:42

## 2019-01-07 RX ADMIN — MOMETASONE FUROATE AND FORMOTEROL FUMARATE DIHYDRATE 2 PUFF: 200; 5 AEROSOL RESPIRATORY (INHALATION) at 08:58

## 2019-01-07 RX ADMIN — TAMSULOSIN HYDROCHLORIDE 0.4 MG: 0.4 CAPSULE ORAL at 08:57

## 2019-01-07 RX ADMIN — GLIMEPIRIDE 4 MG: 4 TABLET ORAL at 08:56

## 2019-01-07 RX ADMIN — INSULIN LISPRO 8 UNITS: 100 INJECTION, SOLUTION INTRAVENOUS; SUBCUTANEOUS at 12:18

## 2019-01-07 RX ADMIN — INSULIN LISPRO 2 UNITS: 100 INJECTION, SOLUTION INTRAVENOUS; SUBCUTANEOUS at 23:37

## 2019-01-07 RX ADMIN — ASPIRIN 81 MG: 81 TABLET, COATED ORAL at 08:57

## 2019-01-07 RX ADMIN — INSULIN LISPRO 2 UNITS: 100 INJECTION, SOLUTION INTRAVENOUS; SUBCUTANEOUS at 08:58

## 2019-01-07 RX ADMIN — ATORVASTATIN CALCIUM 10 MG: 10 TABLET, FILM COATED ORAL at 08:57

## 2019-01-07 RX ADMIN — SODIUM CHLORIDE: 9 INJECTION, SOLUTION INTRAVENOUS at 01:41

## 2019-01-07 RX ADMIN — SITAGLIPTIN 100 MG: 100 TABLET, FILM COATED ORAL at 12:18

## 2019-01-07 RX ADMIN — OMEPRAZOLE 40 MG: 20 CAPSULE, DELAYED RELEASE ORAL at 08:57

## 2019-01-07 RX ADMIN — METOPROLOL TARTRATE 25 MG: 25 TABLET ORAL at 23:37

## 2019-01-07 ASSESSMENT — PAIN SCALES - GENERAL: PAINLEVEL_OUTOF10: 8

## 2019-01-08 VITALS
WEIGHT: 236.77 LBS | HEIGHT: 72 IN | DIASTOLIC BLOOD PRESSURE: 87 MMHG | TEMPERATURE: 97.9 F | BODY MASS INDEX: 32.07 KG/M2 | SYSTOLIC BLOOD PRESSURE: 132 MMHG | OXYGEN SATURATION: 98 % | RESPIRATION RATE: 18 BRPM | HEART RATE: 82 BPM

## 2019-01-08 LAB
ESTIMATED AVERAGE GLUCOSE: 209 MG/DL
GLUCOSE BLD-MCNC: 185 MG/DL (ref 75–110)
GLUCOSE BLD-MCNC: 287 MG/DL (ref 75–110)
GLUCOSE BLD-MCNC: 349 MG/DL (ref 75–110)
HBA1C MFR BLD: 8.9 % (ref 4–6)

## 2019-01-08 PROCEDURE — G0378 HOSPITAL OBSERVATION PER HR: HCPCS

## 2019-01-08 PROCEDURE — 95819 EEG AWAKE AND ASLEEP: CPT

## 2019-01-08 PROCEDURE — 36415 COLL VENOUS BLD VENIPUNCTURE: CPT

## 2019-01-08 PROCEDURE — 6360000002 HC RX W HCPCS: Performed by: INTERNAL MEDICINE

## 2019-01-08 PROCEDURE — 6370000000 HC RX 637 (ALT 250 FOR IP): Performed by: PSYCHIATRY & NEUROLOGY

## 2019-01-08 PROCEDURE — 6370000000 HC RX 637 (ALT 250 FOR IP): Performed by: INTERNAL MEDICINE

## 2019-01-08 PROCEDURE — 99239 HOSP IP/OBS DSCHRG MGMT >30: CPT | Performed by: INTERNAL MEDICINE

## 2019-01-08 PROCEDURE — 83036 HEMOGLOBIN GLYCOSYLATED A1C: CPT

## 2019-01-08 PROCEDURE — 82947 ASSAY GLUCOSE BLOOD QUANT: CPT

## 2019-01-08 RX ORDER — NYSTATIN 100000 U/G
OINTMENT TOPICAL 2 TIMES DAILY
Status: DISCONTINUED | OUTPATIENT
Start: 2019-01-08 | End: 2019-01-08 | Stop reason: HOSPADM

## 2019-01-08 RX ADMIN — ENOXAPARIN SODIUM 30 MG: 30 INJECTION SUBCUTANEOUS at 08:02

## 2019-01-08 RX ADMIN — LISINOPRIL 5 MG: 5 TABLET ORAL at 08:03

## 2019-01-08 RX ADMIN — ASPIRIN 325 MG: 325 TABLET, DELAYED RELEASE ORAL at 08:03

## 2019-01-08 RX ADMIN — METOPROLOL TARTRATE 25 MG: 25 TABLET ORAL at 08:04

## 2019-01-08 RX ADMIN — ATORVASTATIN CALCIUM 10 MG: 10 TABLET, FILM COATED ORAL at 08:03

## 2019-01-08 RX ADMIN — TAMSULOSIN HYDROCHLORIDE 0.4 MG: 0.4 CAPSULE ORAL at 08:04

## 2019-01-08 RX ADMIN — INSULIN LISPRO 1 UNITS: 100 INJECTION, SOLUTION INTRAVENOUS; SUBCUTANEOUS at 08:02

## 2019-01-08 RX ADMIN — OMEPRAZOLE 40 MG: 20 CAPSULE, DELAYED RELEASE ORAL at 08:04

## 2019-01-08 RX ADMIN — GLIMEPIRIDE 4 MG: 4 TABLET ORAL at 08:03

## 2019-01-08 RX ADMIN — INSULIN LISPRO 8 UNITS: 100 INJECTION, SOLUTION INTRAVENOUS; SUBCUTANEOUS at 16:48

## 2019-01-08 RX ADMIN — ALLOPURINOL 100 MG: 100 TABLET ORAL at 08:04

## 2019-01-08 RX ADMIN — SITAGLIPTIN 100 MG: 100 TABLET, FILM COATED ORAL at 08:09

## 2019-01-08 RX ADMIN — MOMETASONE FUROATE AND FORMOTEROL FUMARATE DIHYDRATE 2 PUFF: 200; 5 AEROSOL RESPIRATORY (INHALATION) at 08:03

## 2019-01-08 RX ADMIN — INSULIN LISPRO 6 UNITS: 100 INJECTION, SOLUTION INTRAVENOUS; SUBCUTANEOUS at 11:36

## 2019-01-09 ENCOUNTER — APPOINTMENT (OUTPATIENT)
Dept: CT IMAGING | Age: 54
End: 2019-01-09

## 2019-01-09 ENCOUNTER — HOSPITAL ENCOUNTER (EMERGENCY)
Age: 54
Discharge: HOME | End: 2019-01-09
Attending: EMERGENCY MEDICINE

## 2019-01-09 ENCOUNTER — TELEPHONE (OUTPATIENT)
Dept: INTERNAL MEDICINE CLINIC | Age: 54
End: 2019-01-09

## 2019-01-09 VITALS
BODY MASS INDEX: 30.34 KG/M2 | SYSTOLIC BLOOD PRESSURE: 126 MMHG | WEIGHT: 224 LBS | DIASTOLIC BLOOD PRESSURE: 83 MMHG | TEMPERATURE: 98 F | HEIGHT: 72 IN | RESPIRATION RATE: 16 BRPM | OXYGEN SATURATION: 96 % | HEART RATE: 72 BPM

## 2019-01-09 DIAGNOSIS — R73.9 HYPERGLYCEMIA: ICD-10-CM

## 2019-01-09 DIAGNOSIS — D69.6 THROMBOCYTOPENIA (HCC): ICD-10-CM

## 2019-01-09 DIAGNOSIS — N39.0 COMPLICATED UTI (URINARY TRACT INFECTION): Primary | ICD-10-CM

## 2019-01-09 LAB
-: ABNORMAL
ABSOLUTE EOS #: 0.1 K/UL (ref 0–0.4)
ABSOLUTE IMMATURE GRANULOCYTE: ABNORMAL K/UL (ref 0–0.3)
ABSOLUTE LYMPH #: 1.5 K/UL (ref 1–4.8)
ABSOLUTE MONO #: 0.5 K/UL (ref 0.1–1.3)
AMORPHOUS: ABNORMAL
ANION GAP SERPL CALCULATED.3IONS-SCNC: 10 MMOL/L (ref 9–17)
BACTERIA: ABNORMAL
BASOPHILS # BLD: 1 % (ref 0–2)
BASOPHILS ABSOLUTE: 0 K/UL (ref 0–0.2)
BILIRUBIN URINE: ABNORMAL
BUN BLDV-MCNC: 9 MG/DL (ref 6–20)
BUN/CREAT BLD: ABNORMAL (ref 9–20)
CALCIUM SERPL-MCNC: 9.2 MG/DL (ref 8.6–10.4)
CASTS UA: ABNORMAL /LPF
CHLORIDE BLD-SCNC: 103 MMOL/L (ref 98–107)
CO2: 26 MMOL/L (ref 20–31)
COLOR: ABNORMAL
COMMENT UA: ABNORMAL
CREAT SERPL-MCNC: 0.63 MG/DL (ref 0.7–1.2)
CRYSTALS, UA: ABNORMAL /HPF
DIFFERENTIAL TYPE: ABNORMAL
EOSINOPHILS RELATIVE PERCENT: 2 % (ref 0–4)
EPITHELIAL CELLS UA: ABNORMAL /HPF
GFR AFRICAN AMERICAN: >60 ML/MIN
GFR NON-AFRICAN AMERICAN: >60 ML/MIN
GFR SERPL CREATININE-BSD FRML MDRD: ABNORMAL ML/MIN/{1.73_M2}
GFR SERPL CREATININE-BSD FRML MDRD: ABNORMAL ML/MIN/{1.73_M2}
GLUCOSE BLD-MCNC: 207 MG/DL (ref 70–99)
GLUCOSE URINE: NEGATIVE
HCT VFR BLD CALC: 41.3 % (ref 41–53)
HEMOGLOBIN: 13.9 G/DL (ref 13.5–17.5)
IMMATURE GRANULOCYTES: ABNORMAL %
KETONES, URINE: NEGATIVE
LEUKOCYTE ESTERASE, URINE: ABNORMAL
LYMPHOCYTES # BLD: 32 % (ref 24–44)
MCH RBC QN AUTO: 29.7 PG (ref 26–34)
MCHC RBC AUTO-ENTMCNC: 33.6 G/DL (ref 31–37)
MCV RBC AUTO: 88.5 FL (ref 80–100)
MONOCYTES # BLD: 11 % (ref 1–7)
MUCUS: ABNORMAL
NITRITE, URINE: POSITIVE
NRBC AUTOMATED: ABNORMAL PER 100 WBC
OTHER OBSERVATIONS UA: ABNORMAL
PDW BLD-RTO: 14.7 % (ref 11.5–14.9)
PH UA: 5.5 (ref 5–8)
PLATELET # BLD: 96 K/UL (ref 150–450)
PLATELET ESTIMATE: ABNORMAL
PMV BLD AUTO: 8.3 FL (ref 6–12)
POTASSIUM SERPL-SCNC: 3.7 MMOL/L (ref 3.7–5.3)
PROTEIN UA: NEGATIVE
RBC # BLD: 4.67 M/UL (ref 4.5–5.9)
RBC # BLD: ABNORMAL 10*6/UL
RBC UA: ABNORMAL /HPF
RENAL EPITHELIAL, UA: ABNORMAL /HPF
SEG NEUTROPHILS: 54 % (ref 36–66)
SEGMENTED NEUTROPHILS ABSOLUTE COUNT: 2.6 K/UL (ref 1.3–9.1)
SODIUM BLD-SCNC: 139 MMOL/L (ref 135–144)
SPECIFIC GRAVITY UA: 1.02 (ref 1–1.03)
TRICHOMONAS: ABNORMAL
TURBIDITY: ABNORMAL
URINE HGB: ABNORMAL
UROBILINOGEN, URINE: ABNORMAL
WBC # BLD: 4.6 K/UL (ref 3.5–11)
WBC # BLD: ABNORMAL 10*3/UL
WBC UA: ABNORMAL /HPF
YEAST: ABNORMAL

## 2019-01-09 PROCEDURE — 2580000003 HC RX 258: Performed by: EMERGENCY MEDICINE

## 2019-01-09 PROCEDURE — 6370000000 HC RX 637 (ALT 250 FOR IP): Performed by: EMERGENCY MEDICINE

## 2019-01-09 PROCEDURE — 6360000002 HC RX W HCPCS: Performed by: EMERGENCY MEDICINE

## 2019-01-09 PROCEDURE — 87591 N.GONORRHOEAE DNA AMP PROB: CPT

## 2019-01-09 PROCEDURE — 99284 EMERGENCY DEPT VISIT MOD MDM: CPT

## 2019-01-09 PROCEDURE — 96374 THER/PROPH/DIAG INJ IV PUSH: CPT

## 2019-01-09 PROCEDURE — 85025 COMPLETE CBC W/AUTO DIFF WBC: CPT

## 2019-01-09 PROCEDURE — 36415 COLL VENOUS BLD VENIPUNCTURE: CPT

## 2019-01-09 PROCEDURE — 80048 BASIC METABOLIC PNL TOTAL CA: CPT

## 2019-01-09 PROCEDURE — 74176 CT ABD & PELVIS W/O CONTRAST: CPT

## 2019-01-09 PROCEDURE — 81001 URINALYSIS AUTO W/SCOPE: CPT

## 2019-01-09 RX ORDER — CIPROFLOXACIN 500 MG/1
500 TABLET, FILM COATED ORAL 2 TIMES DAILY
Qty: 20 TABLET | Refills: 0 | Status: SHIPPED | OUTPATIENT
Start: 2019-01-09 | End: 2019-01-19

## 2019-01-09 RX ORDER — KETOROLAC TROMETHAMINE 30 MG/ML
30 INJECTION, SOLUTION INTRAMUSCULAR; INTRAVENOUS ONCE
Status: COMPLETED | OUTPATIENT
Start: 2019-01-09 | End: 2019-01-09

## 2019-01-09 RX ORDER — 0.9 % SODIUM CHLORIDE 0.9 %
1000 INTRAVENOUS SOLUTION INTRAVENOUS ONCE
Status: COMPLETED | OUTPATIENT
Start: 2019-01-09 | End: 2019-01-09

## 2019-01-09 RX ORDER — CIPROFLOXACIN 500 MG/1
500 TABLET, FILM COATED ORAL ONCE
Status: COMPLETED | OUTPATIENT
Start: 2019-01-09 | End: 2019-01-09

## 2019-01-09 RX ADMIN — KETOROLAC TROMETHAMINE 30 MG: 30 INJECTION, SOLUTION INTRAMUSCULAR at 15:15

## 2019-01-09 RX ADMIN — SODIUM CHLORIDE 1000 ML: 9 INJECTION, SOLUTION INTRAVENOUS at 15:16

## 2019-01-09 RX ADMIN — CIPROFLOXACIN HYDROCHLORIDE 500 MG: 500 TABLET, FILM COATED ORAL at 15:15

## 2019-01-09 ASSESSMENT — ENCOUNTER SYMPTOMS
COLOR CHANGE: 0
SORE THROAT: 0
RHINORRHEA: 0
SHORTNESS OF BREATH: 0
CONSTIPATION: 0
WHEEZING: 0
ABDOMINAL DISTENTION: 0
BACK PAIN: 0
DIARRHEA: 0
EYE ITCHING: 0
EYE DISCHARGE: 0
VOMITING: 0
VOICE CHANGE: 0
ABDOMINAL PAIN: 0
TROUBLE SWALLOWING: 0
EYE PAIN: 0
NAUSEA: 0
COUGH: 0

## 2019-01-09 ASSESSMENT — PAIN SCALES - GENERAL: PAINLEVEL_OUTOF10: 4

## 2019-01-09 ASSESSMENT — PAIN DESCRIPTION - LOCATION: LOCATION: ABDOMEN;PENIS

## 2019-01-09 ASSESSMENT — PAIN DESCRIPTION - PAIN TYPE: TYPE: ACUTE PAIN

## 2019-01-09 ASSESSMENT — PAIN DESCRIPTION - DESCRIPTORS: DESCRIPTORS: BURNING

## 2019-01-10 ENCOUNTER — TELEPHONE (OUTPATIENT)
Dept: INTERNAL MEDICINE CLINIC | Age: 54
End: 2019-01-10

## 2019-01-11 LAB — N. GONORRHOEAE DNA, URINE: NEGATIVE

## 2019-01-14 ENCOUNTER — OFFICE VISIT (OUTPATIENT)
Dept: INTERNAL MEDICINE CLINIC | Age: 54
End: 2019-01-14
Payer: MEDICAID

## 2019-01-14 ENCOUNTER — HOSPITAL ENCOUNTER (OUTPATIENT)
Age: 54
Setting detail: SPECIMEN
Discharge: HOME | End: 2019-01-14

## 2019-01-14 VITALS
SYSTOLIC BLOOD PRESSURE: 120 MMHG | WEIGHT: 233 LBS | HEIGHT: 72 IN | DIASTOLIC BLOOD PRESSURE: 70 MMHG | BODY MASS INDEX: 31.56 KG/M2

## 2019-01-14 DIAGNOSIS — N39.0 URINARY TRACT INFECTION WITHOUT HEMATURIA, SITE UNSPECIFIED: ICD-10-CM

## 2019-01-14 DIAGNOSIS — I10 ESSENTIAL HYPERTENSION, BENIGN: ICD-10-CM

## 2019-01-14 DIAGNOSIS — E11.65 TYPE 2 DIABETES MELLITUS WITH HYPERGLYCEMIA, WITHOUT LONG-TERM CURRENT USE OF INSULIN (HCC): Primary | ICD-10-CM

## 2019-01-14 DIAGNOSIS — J32.9 CHRONIC SINUSITIS, UNSPECIFIED LOCATION: ICD-10-CM

## 2019-01-14 PROCEDURE — 1111F DSCHRG MED/CURRENT MED MERGE: CPT | Performed by: INTERNAL MEDICINE

## 2019-01-14 PROCEDURE — 99496 TRANSJ CARE MGMT HIGH F2F 7D: CPT | Performed by: INTERNAL MEDICINE

## 2019-01-14 RX ORDER — FLUTICASONE PROPIONATE 50 MCG
1 SPRAY, SUSPENSION (ML) NASAL DAILY
Qty: 2 BOTTLE | Refills: 1 | Status: SHIPPED | OUTPATIENT
Start: 2019-01-14 | End: 2019-07-30 | Stop reason: ALTCHOICE

## 2019-01-15 DIAGNOSIS — E11.65 TYPE 2 DIABETES MELLITUS WITH HYPERGLYCEMIA, WITHOUT LONG-TERM CURRENT USE OF INSULIN (HCC): ICD-10-CM

## 2019-01-15 LAB
CREATININE URINE: 251 MG/DL (ref 39–259)
MICROALBUMIN/CREAT 24H UR: 62 MG/L
MICROALBUMIN/CREAT UR-RTO: 25 MCG/MG CREAT

## 2019-01-20 ASSESSMENT — ENCOUNTER SYMPTOMS
WHEEZING: 0
COUGH: 0
DIARRHEA: 0
BACK PAIN: 0
ABDOMINAL DISTENTION: 0
APNEA: 0
FACIAL SWELLING: 0
CHEST TIGHTNESS: 0
CONSTIPATION: 0
COLOR CHANGE: 0
RHINORRHEA: 1
ABDOMINAL PAIN: 0
SHORTNESS OF BREATH: 0

## 2019-01-24 RX ORDER — OMEPRAZOLE 20 MG/1
CAPSULE, DELAYED RELEASE ORAL
Qty: 30 CAPSULE | Refills: 5 | Status: SHIPPED | OUTPATIENT
Start: 2019-01-24 | End: 2019-08-09 | Stop reason: SDUPTHER

## 2019-01-29 ENCOUNTER — TELEPHONE (OUTPATIENT)
Dept: INTERNAL MEDICINE CLINIC | Age: 54
End: 2019-01-29

## 2019-01-29 DIAGNOSIS — J06.9 UPPER RESPIRATORY TRACT INFECTION, UNSPECIFIED TYPE: Primary | ICD-10-CM

## 2019-01-29 RX ORDER — AZITHROMYCIN 250 MG/1
250 TABLET, FILM COATED ORAL SEE ADMIN INSTRUCTIONS
Qty: 6 TABLET | Refills: 0 | Status: SHIPPED | OUTPATIENT
Start: 2019-01-29 | End: 2019-02-03

## 2019-02-19 DIAGNOSIS — M10.9 GOUT, UNSPECIFIED CAUSE, UNSPECIFIED CHRONICITY, UNSPECIFIED SITE: ICD-10-CM

## 2019-02-20 RX ORDER — ALLOPURINOL 100 MG/1
TABLET ORAL
Qty: 90 TABLET | Refills: 2 | Status: SHIPPED | OUTPATIENT
Start: 2019-02-20 | End: 2020-03-18

## 2019-03-03 RX ORDER — KETOROLAC TROMETHAMINE 30 MG/ML
30 INJECTION, SOLUTION INTRAMUSCULAR; INTRAVENOUS ONCE
Status: DISCONTINUED | OUTPATIENT
Start: 2019-03-04 | End: 2019-03-04

## 2019-03-04 ENCOUNTER — HOSPITAL ENCOUNTER (EMERGENCY)
Age: 54
Discharge: HOME | End: 2019-03-04
Attending: EMERGENCY MEDICINE

## 2019-03-04 ENCOUNTER — APPOINTMENT (OUTPATIENT)
Dept: CT IMAGING | Age: 54
End: 2019-03-04

## 2019-03-04 VITALS
OXYGEN SATURATION: 97 % | RESPIRATION RATE: 16 BRPM | TEMPERATURE: 98.3 F | HEIGHT: 72 IN | WEIGHT: 230 LBS | DIASTOLIC BLOOD PRESSURE: 84 MMHG | HEART RATE: 82 BPM | BODY MASS INDEX: 31.15 KG/M2 | SYSTOLIC BLOOD PRESSURE: 152 MMHG

## 2019-03-04 DIAGNOSIS — N30.00 ACUTE CYSTITIS WITHOUT HEMATURIA: Primary | ICD-10-CM

## 2019-03-04 LAB
-: ABNORMAL
ABSOLUTE EOS #: 0.1 K/UL (ref 0–0.4)
ABSOLUTE IMMATURE GRANULOCYTE: ABNORMAL K/UL (ref 0–0.3)
ABSOLUTE LYMPH #: 1.5 K/UL (ref 1–4.8)
ABSOLUTE MONO #: 0.5 K/UL (ref 0.1–1.3)
ALBUMIN SERPL-MCNC: 3.9 G/DL (ref 3.5–5.2)
ALBUMIN/GLOBULIN RATIO: ABNORMAL (ref 1–2.5)
ALP BLD-CCNC: 128 U/L (ref 40–129)
ALT SERPL-CCNC: 43 U/L (ref 5–41)
AMORPHOUS: ABNORMAL
ANION GAP SERPL CALCULATED.3IONS-SCNC: 11 MMOL/L (ref 9–17)
AST SERPL-CCNC: 43 U/L
BACTERIA: ABNORMAL
BASOPHILS # BLD: 0 % (ref 0–2)
BASOPHILS ABSOLUTE: 0 K/UL (ref 0–0.2)
BILIRUB SERPL-MCNC: 2.08 MG/DL (ref 0.3–1.2)
BILIRUBIN URINE: NEGATIVE
BUN BLDV-MCNC: 12 MG/DL (ref 6–20)
BUN/CREAT BLD: ABNORMAL (ref 9–20)
CALCIUM SERPL-MCNC: 9.7 MG/DL (ref 8.6–10.4)
CASTS UA: ABNORMAL /LPF
CHLORIDE BLD-SCNC: 105 MMOL/L (ref 98–107)
CO2: 26 MMOL/L (ref 20–31)
COLOR: YELLOW
COMMENT UA: ABNORMAL
CREAT SERPL-MCNC: 0.71 MG/DL (ref 0.7–1.2)
CRYSTALS, UA: ABNORMAL /HPF
DIFFERENTIAL TYPE: ABNORMAL
EOSINOPHILS RELATIVE PERCENT: 1 % (ref 0–4)
EPITHELIAL CELLS UA: ABNORMAL /HPF
GFR AFRICAN AMERICAN: >60 ML/MIN
GFR NON-AFRICAN AMERICAN: >60 ML/MIN
GFR SERPL CREATININE-BSD FRML MDRD: ABNORMAL ML/MIN/{1.73_M2}
GFR SERPL CREATININE-BSD FRML MDRD: ABNORMAL ML/MIN/{1.73_M2}
GLUCOSE BLD-MCNC: 273 MG/DL (ref 70–99)
GLUCOSE URINE: ABNORMAL
HCT VFR BLD CALC: 44.8 % (ref 41–53)
HEMOGLOBIN: 15.2 G/DL (ref 13.5–17.5)
IMMATURE GRANULOCYTES: ABNORMAL %
KETONES, URINE: ABNORMAL
LEUKOCYTE ESTERASE, URINE: NEGATIVE
LIPASE: 107 U/L (ref 13–60)
LYMPHOCYTES # BLD: 30 % (ref 24–44)
MCH RBC QN AUTO: 30.2 PG (ref 26–34)
MCHC RBC AUTO-ENTMCNC: 33.9 G/DL (ref 31–37)
MCV RBC AUTO: 89.1 FL (ref 80–100)
MONOCYTES # BLD: 10 % (ref 1–7)
MUCUS: ABNORMAL
NITRITE, URINE: POSITIVE
NRBC AUTOMATED: ABNORMAL PER 100 WBC
OTHER OBSERVATIONS UA: ABNORMAL
PDW BLD-RTO: 14.2 % (ref 11.5–14.9)
PH UA: 5 (ref 5–8)
PLATELET # BLD: 90 K/UL (ref 150–450)
PLATELET ESTIMATE: ABNORMAL
PMV BLD AUTO: 8.5 FL (ref 6–12)
POTASSIUM SERPL-SCNC: 4.1 MMOL/L (ref 3.7–5.3)
PROTEIN UA: NEGATIVE
RBC # BLD: 5.02 M/UL (ref 4.5–5.9)
RBC # BLD: ABNORMAL 10*6/UL
RBC UA: ABNORMAL /HPF
RENAL EPITHELIAL, UA: ABNORMAL /HPF
SEG NEUTROPHILS: 59 % (ref 36–66)
SEGMENTED NEUTROPHILS ABSOLUTE COUNT: 2.9 K/UL (ref 1.3–9.1)
SODIUM BLD-SCNC: 142 MMOL/L (ref 135–144)
SPECIFIC GRAVITY UA: 1.04 (ref 1–1.03)
TOTAL PROTEIN: 8 G/DL (ref 6.4–8.3)
TRICHOMONAS: ABNORMAL
TURBIDITY: CLEAR
URINE HGB: ABNORMAL
UROBILINOGEN, URINE: NORMAL
WBC # BLD: 5 K/UL (ref 3.5–11)
WBC # BLD: ABNORMAL 10*3/UL
WBC UA: ABNORMAL /HPF
YEAST: ABNORMAL

## 2019-03-04 PROCEDURE — 74176 CT ABD & PELVIS W/O CONTRAST: CPT

## 2019-03-04 PROCEDURE — 6370000000 HC RX 637 (ALT 250 FOR IP): Performed by: EMERGENCY MEDICINE

## 2019-03-04 PROCEDURE — 96372 THER/PROPH/DIAG INJ SC/IM: CPT

## 2019-03-04 PROCEDURE — 87186 SC STD MICRODIL/AGAR DIL: CPT

## 2019-03-04 PROCEDURE — 80053 COMPREHEN METABOLIC PANEL: CPT

## 2019-03-04 PROCEDURE — 96375 TX/PRO/DX INJ NEW DRUG ADDON: CPT

## 2019-03-04 PROCEDURE — 83690 ASSAY OF LIPASE: CPT

## 2019-03-04 PROCEDURE — 87086 URINE CULTURE/COLONY COUNT: CPT

## 2019-03-04 PROCEDURE — 36415 COLL VENOUS BLD VENIPUNCTURE: CPT

## 2019-03-04 PROCEDURE — 81001 URINALYSIS AUTO W/SCOPE: CPT

## 2019-03-04 PROCEDURE — 86403 PARTICLE AGGLUT ANTBDY SCRN: CPT

## 2019-03-04 PROCEDURE — 2580000003 HC RX 258: Performed by: EMERGENCY MEDICINE

## 2019-03-04 PROCEDURE — 85025 COMPLETE CBC W/AUTO DIFF WBC: CPT

## 2019-03-04 PROCEDURE — 6360000002 HC RX W HCPCS: Performed by: EMERGENCY MEDICINE

## 2019-03-04 PROCEDURE — 99284 EMERGENCY DEPT VISIT MOD MDM: CPT

## 2019-03-04 PROCEDURE — 96374 THER/PROPH/DIAG INJ IV PUSH: CPT

## 2019-03-04 RX ORDER — CEPHALEXIN 250 MG/1
500 CAPSULE ORAL ONCE
Status: COMPLETED | OUTPATIENT
Start: 2019-03-04 | End: 2019-03-04

## 2019-03-04 RX ORDER — MORPHINE SULFATE 4 MG/ML
4 INJECTION, SOLUTION INTRAMUSCULAR; INTRAVENOUS ONCE
Status: COMPLETED | OUTPATIENT
Start: 2019-03-04 | End: 2019-03-04

## 2019-03-04 RX ORDER — KETOROLAC TROMETHAMINE 30 MG/ML
30 INJECTION, SOLUTION INTRAMUSCULAR; INTRAVENOUS ONCE
Status: COMPLETED | OUTPATIENT
Start: 2019-03-04 | End: 2019-03-04

## 2019-03-04 RX ORDER — ONDANSETRON 2 MG/ML
4 INJECTION INTRAMUSCULAR; INTRAVENOUS ONCE
Status: COMPLETED | OUTPATIENT
Start: 2019-03-04 | End: 2019-03-04

## 2019-03-04 RX ORDER — 0.9 % SODIUM CHLORIDE 0.9 %
1000 INTRAVENOUS SOLUTION INTRAVENOUS ONCE
Status: COMPLETED | OUTPATIENT
Start: 2019-03-04 | End: 2019-03-04

## 2019-03-04 RX ORDER — CEPHALEXIN 500 MG/1
500 CAPSULE ORAL 2 TIMES DAILY
Qty: 20 CAPSULE | Refills: 0 | Status: SHIPPED | OUTPATIENT
Start: 2019-03-04 | End: 2019-03-14

## 2019-03-04 RX ADMIN — MORPHINE SULFATE 4 MG: 4 INJECTION INTRAVENOUS at 01:09

## 2019-03-04 RX ADMIN — KETOROLAC TROMETHAMINE 30 MG: 30 INJECTION, SOLUTION INTRAMUSCULAR at 01:09

## 2019-03-04 RX ADMIN — CEPHALEXIN 500 MG: 250 CAPSULE ORAL at 02:08

## 2019-03-04 RX ADMIN — SODIUM CHLORIDE 1000 ML: 9 INJECTION, SOLUTION INTRAVENOUS at 01:09

## 2019-03-04 RX ADMIN — ONDANSETRON 4 MG: 2 INJECTION INTRAMUSCULAR; INTRAVENOUS at 01:09

## 2019-03-04 ASSESSMENT — PAIN DESCRIPTION - ORIENTATION: ORIENTATION: LOWER

## 2019-03-04 ASSESSMENT — PAIN SCALES - GENERAL
PAINLEVEL_OUTOF10: 8
PAINLEVEL_OUTOF10: 9

## 2019-03-04 ASSESSMENT — PAIN DESCRIPTION - LOCATION: LOCATION: BACK

## 2019-03-04 ASSESSMENT — PAIN DESCRIPTION - FREQUENCY: FREQUENCY: CONTINUOUS

## 2019-03-04 ASSESSMENT — PAIN DESCRIPTION - DESCRIPTORS: DESCRIPTORS: ACHING

## 2019-03-05 ENCOUNTER — TELEPHONE (OUTPATIENT)
Dept: INTERNAL MEDICINE CLINIC | Age: 54
End: 2019-03-05

## 2019-03-07 LAB
CULTURE: ABNORMAL
Lab: ABNORMAL
SPECIMEN DESCRIPTION: ABNORMAL

## 2019-03-25 RX ORDER — ATORVASTATIN CALCIUM 10 MG/1
TABLET, FILM COATED ORAL
Qty: 30 TABLET | Refills: 11 | Status: ON HOLD | OUTPATIENT
Start: 2019-03-25 | End: 2020-03-31 | Stop reason: HOSPADM

## 2019-04-01 DIAGNOSIS — N40.0 BENIGN NON-NODULAR PROSTATIC HYPERPLASIA WITHOUT LOWER URINARY TRACT SYMPTOMS: ICD-10-CM

## 2019-04-02 RX ORDER — TAMSULOSIN HYDROCHLORIDE 0.4 MG/1
CAPSULE ORAL
Qty: 30 CAPSULE | Refills: 6 | Status: SHIPPED | OUTPATIENT
Start: 2019-04-02 | End: 2020-03-30

## 2019-04-12 ENCOUNTER — HOSPITAL ENCOUNTER (EMERGENCY)
Age: 54
Discharge: HOME | End: 2019-04-12
Attending: EMERGENCY MEDICINE

## 2019-04-12 ENCOUNTER — APPOINTMENT (OUTPATIENT)
Dept: CT IMAGING | Age: 54
End: 2019-04-12

## 2019-04-12 VITALS
DIASTOLIC BLOOD PRESSURE: 67 MMHG | TEMPERATURE: 101 F | HEART RATE: 95 BPM | SYSTOLIC BLOOD PRESSURE: 123 MMHG | HEIGHT: 72 IN | OXYGEN SATURATION: 92 % | BODY MASS INDEX: 33.86 KG/M2 | RESPIRATION RATE: 17 BRPM | WEIGHT: 250 LBS

## 2019-04-12 DIAGNOSIS — N12 PYELONEPHRITIS: Primary | ICD-10-CM

## 2019-04-12 LAB
-: ABNORMAL
ABSOLUTE EOS #: 0 K/UL (ref 0–0.4)
ABSOLUTE IMMATURE GRANULOCYTE: ABNORMAL K/UL (ref 0–0.3)
ABSOLUTE LYMPH #: 0.7 K/UL (ref 1–4.8)
ABSOLUTE MONO #: 0.4 K/UL (ref 0.1–1.3)
ALBUMIN SERPL-MCNC: 3.6 G/DL (ref 3.5–5.2)
ALBUMIN/GLOBULIN RATIO: ABNORMAL (ref 1–2.5)
ALP BLD-CCNC: 103 U/L (ref 40–129)
ALT SERPL-CCNC: 48 U/L (ref 5–41)
AMORPHOUS: ABNORMAL
ANION GAP SERPL CALCULATED.3IONS-SCNC: 13 MMOL/L (ref 9–17)
AST SERPL-CCNC: 40 U/L
BACTERIA: ABNORMAL
BASOPHILS # BLD: 0 % (ref 0–2)
BASOPHILS ABSOLUTE: 0 K/UL (ref 0–0.2)
BILIRUB SERPL-MCNC: 3.98 MG/DL (ref 0.3–1.2)
BILIRUBIN URINE: ABNORMAL
BUN BLDV-MCNC: 15 MG/DL (ref 6–20)
BUN/CREAT BLD: ABNORMAL (ref 9–20)
CALCIUM SERPL-MCNC: 9.1 MG/DL (ref 8.6–10.4)
CASTS UA: ABNORMAL /LPF
CHLORIDE BLD-SCNC: 104 MMOL/L (ref 98–107)
CO2: 21 MMOL/L (ref 20–31)
COLOR: ABNORMAL
COMMENT UA: ABNORMAL
CREAT SERPL-MCNC: 0.61 MG/DL (ref 0.7–1.2)
CRYSTALS, UA: ABNORMAL /HPF
DIFFERENTIAL TYPE: ABNORMAL
EOSINOPHILS RELATIVE PERCENT: 1 % (ref 0–4)
EPITHELIAL CELLS UA: ABNORMAL /HPF
GFR AFRICAN AMERICAN: >60 ML/MIN
GFR NON-AFRICAN AMERICAN: >60 ML/MIN
GFR SERPL CREATININE-BSD FRML MDRD: ABNORMAL ML/MIN/{1.73_M2}
GFR SERPL CREATININE-BSD FRML MDRD: ABNORMAL ML/MIN/{1.73_M2}
GLUCOSE BLD-MCNC: 263 MG/DL (ref 70–99)
GLUCOSE URINE: ABNORMAL
HCT VFR BLD CALC: 46 % (ref 41–53)
HEMOGLOBIN: 15.7 G/DL (ref 13.5–17.5)
IMMATURE GRANULOCYTES: ABNORMAL %
KETONES, URINE: ABNORMAL
LACTIC ACID, WHOLE BLOOD: NORMAL MMOL/L (ref 0.7–2.1)
LACTIC ACID: 1.8 MMOL/L (ref 0.5–2.2)
LEUKOCYTE ESTERASE, URINE: ABNORMAL
LIPASE: 67 U/L (ref 13–60)
LYMPHOCYTES # BLD: 10 % (ref 24–44)
MCH RBC QN AUTO: 30.2 PG (ref 26–34)
MCHC RBC AUTO-ENTMCNC: 34.1 G/DL (ref 31–37)
MCV RBC AUTO: 88.4 FL (ref 80–100)
MONOCYTES # BLD: 6 % (ref 1–7)
MUCUS: ABNORMAL
NITRITE, URINE: POSITIVE
NRBC AUTOMATED: ABNORMAL PER 100 WBC
OTHER OBSERVATIONS UA: ABNORMAL
PDW BLD-RTO: 13.7 % (ref 11.5–14.9)
PH UA: 5 (ref 5–8)
PLATELET # BLD: 94 K/UL (ref 150–450)
PLATELET ESTIMATE: ABNORMAL
PMV BLD AUTO: 8 FL (ref 6–12)
POTASSIUM SERPL-SCNC: 3.8 MMOL/L (ref 3.7–5.3)
PROTEIN UA: ABNORMAL
RBC # BLD: 5.2 M/UL (ref 4.5–5.9)
RBC # BLD: ABNORMAL 10*6/UL
RBC UA: ABNORMAL /HPF
RENAL EPITHELIAL, UA: ABNORMAL /HPF
SEG NEUTROPHILS: 83 % (ref 36–66)
SEGMENTED NEUTROPHILS ABSOLUTE COUNT: 6 K/UL (ref 1.3–9.1)
SODIUM BLD-SCNC: 138 MMOL/L (ref 135–144)
SPECIFIC GRAVITY UA: 1.03 (ref 1–1.03)
TOTAL PROTEIN: 7.4 G/DL (ref 6.4–8.3)
TRICHOMONAS: ABNORMAL
TURBIDITY: ABNORMAL
URINE HGB: ABNORMAL
UROBILINOGEN, URINE: NORMAL
WBC # BLD: 7.2 K/UL (ref 3.5–11)
WBC # BLD: ABNORMAL 10*3/UL
WBC UA: ABNORMAL /HPF
YEAST: ABNORMAL

## 2019-04-12 PROCEDURE — 85025 COMPLETE CBC W/AUTO DIFF WBC: CPT

## 2019-04-12 PROCEDURE — 83605 ASSAY OF LACTIC ACID: CPT

## 2019-04-12 PROCEDURE — 6370000000 HC RX 637 (ALT 250 FOR IP): Performed by: EMERGENCY MEDICINE

## 2019-04-12 PROCEDURE — 87591 N.GONORRHOEAE DNA AMP PROB: CPT

## 2019-04-12 PROCEDURE — 2580000003 HC RX 258: Performed by: EMERGENCY MEDICINE

## 2019-04-12 PROCEDURE — 81001 URINALYSIS AUTO W/SCOPE: CPT

## 2019-04-12 PROCEDURE — 36415 COLL VENOUS BLD VENIPUNCTURE: CPT

## 2019-04-12 PROCEDURE — 96374 THER/PROPH/DIAG INJ IV PUSH: CPT

## 2019-04-12 PROCEDURE — 74176 CT ABD & PELVIS W/O CONTRAST: CPT

## 2019-04-12 PROCEDURE — 6360000002 HC RX W HCPCS: Performed by: EMERGENCY MEDICINE

## 2019-04-12 PROCEDURE — 83690 ASSAY OF LIPASE: CPT

## 2019-04-12 PROCEDURE — 96375 TX/PRO/DX INJ NEW DRUG ADDON: CPT

## 2019-04-12 PROCEDURE — 99284 EMERGENCY DEPT VISIT MOD MDM: CPT

## 2019-04-12 PROCEDURE — 87040 BLOOD CULTURE FOR BACTERIA: CPT

## 2019-04-12 PROCEDURE — 87491 CHLMYD TRACH DNA AMP PROBE: CPT

## 2019-04-12 PROCEDURE — 80053 COMPREHEN METABOLIC PANEL: CPT

## 2019-04-12 PROCEDURE — 87086 URINE CULTURE/COLONY COUNT: CPT

## 2019-04-12 RX ORDER — 0.9 % SODIUM CHLORIDE 0.9 %
1000 INTRAVENOUS SOLUTION INTRAVENOUS ONCE
Status: COMPLETED | OUTPATIENT
Start: 2019-04-12 | End: 2019-04-12

## 2019-04-12 RX ORDER — ONDANSETRON 4 MG/1
4 TABLET, ORALLY DISINTEGRATING ORAL EVERY 8 HOURS PRN
Qty: 12 TABLET | Refills: 0 | Status: SHIPPED | OUTPATIENT
Start: 2019-04-12 | End: 2020-03-30

## 2019-04-12 RX ORDER — ONDANSETRON 2 MG/ML
4 INJECTION INTRAMUSCULAR; INTRAVENOUS ONCE
Status: COMPLETED | OUTPATIENT
Start: 2019-04-12 | End: 2019-04-12

## 2019-04-12 RX ORDER — DOXYCYCLINE 100 MG/1
100 CAPSULE ORAL ONCE
Status: COMPLETED | OUTPATIENT
Start: 2019-04-12 | End: 2019-04-12

## 2019-04-12 RX ORDER — HYDROCODONE BITARTRATE AND ACETAMINOPHEN 5; 325 MG/1; MG/1
1 TABLET ORAL EVERY 6 HOURS PRN
Qty: 10 TABLET | Refills: 0 | Status: SHIPPED | OUTPATIENT
Start: 2019-04-12 | End: 2019-04-15

## 2019-04-12 RX ORDER — DOXYCYCLINE 100 MG/1
100 TABLET ORAL 2 TIMES DAILY
Qty: 20 TABLET | Refills: 0 | Status: SHIPPED | OUTPATIENT
Start: 2019-04-12 | End: 2019-04-22

## 2019-04-12 RX ORDER — KETOROLAC TROMETHAMINE 30 MG/ML
30 INJECTION, SOLUTION INTRAMUSCULAR; INTRAVENOUS ONCE
Status: COMPLETED | OUTPATIENT
Start: 2019-04-12 | End: 2019-04-12

## 2019-04-12 RX ADMIN — SODIUM CHLORIDE 1000 ML: 9 INJECTION, SOLUTION INTRAVENOUS at 00:44

## 2019-04-12 RX ADMIN — KETOROLAC TROMETHAMINE 30 MG: 30 INJECTION, SOLUTION INTRAMUSCULAR at 00:44

## 2019-04-12 RX ADMIN — ONDANSETRON 4 MG: 2 INJECTION INTRAMUSCULAR; INTRAVENOUS at 00:44

## 2019-04-12 RX ADMIN — DOXYCYCLINE 100 MG: 100 CAPSULE ORAL at 02:25

## 2019-04-12 ASSESSMENT — ENCOUNTER SYMPTOMS
COUGH: 0
CONSTIPATION: 0
SORE THROAT: 0
BACK PAIN: 0
BLOOD IN STOOL: 0
TROUBLE SWALLOWING: 0
DIARRHEA: 0
SHORTNESS OF BREATH: 0
NAUSEA: 1
COLOR CHANGE: 0
VOMITING: 1
ABDOMINAL PAIN: 1

## 2019-04-12 ASSESSMENT — PAIN SCALES - GENERAL
PAINLEVEL_OUTOF10: 10
PAINLEVEL_OUTOF10: 9

## 2019-04-12 NOTE — ED PROVIDER NOTES
Gout, unspecified     H/O colonoscopy 4/11/2016    2016    Mitral valve disorders(424.0)     Other and unspecified hyperlipidemia     Type II or unspecified type diabetes mellitus without mention of complication, not stated as uncontrolled     Unspecified chronic liver disease without mention of alcohol          SURGICAL HISTORY      has a past surgical history that includes Lithotripsy and Upper gastrointestinal endoscopy (N/A, 5/25/2018). CURRENT MEDICATIONS       Previous Medications    ACETAMINOPHEN (AMINOFEN) 325 MG TABLET    Take 2 tablets by mouth every 6 hours as needed for Pain    ALBUTEROL SULFATE HFA (VENTOLIN HFA) 108 (90 BASE) MCG/ACT INHALER    Inhale 1 puff into the lungs every 4 hours as needed for Wheezing    ALLOPURINOL (ZYLOPRIM) 100 MG TABLET    TAKE 1 TABLET BY MOUTH ONCE DAILY    ASPIRIN 81 MG TABLET    Take 81 mg by mouth    ATORVASTATIN (LIPITOR) 10 MG TABLET    TAKE 1 TABLET BY MOUTH ONE TIME A DAY     BLOOD GLUCOSE TEST STRIPS (ASCENSIA AUTODISC VI;ONE TOUCH ULTRA TEST VI) STRIP    Use with associated glucose meter.     FLUTICASONE (FLONASE) 50 MCG/ACT NASAL SPRAY    1 spray by Each Nare route daily 1 Spray in each nostril    GLIMEPIRIDE (AMARYL) 4 MG TABLET    bid    [employer] LANCETS ULTRATHIN 30G MISC    1 each by Other route 3 times daily    LISINOPRIL (PRINIVIL;ZESTRIL) 5 MG TABLET    TAKE 1 TABLET BY MOUTH ONCE DAILY    METFORMIN (GLUCOPHAGE) 1000 MG TABLET    Take 1 tablet by mouth 2 times daily (with meals)    METOPROLOL TARTRATE (LOPRESSOR) 25 MG TABLET    TAKE 1 TABLET BY MOUTH TWICE DAILY    MOMETASONE-FORMOTEROL (DULERA) 200-5 MCG/ACT INHALER    Inhale 2 puffs into the lungs every 12 hours    OMEPRAZOLE (PRILOSEC) 20 MG DELAYED RELEASE CAPSULE    TAKE 1 CAPSULE BY MOUTH TWICE DAILY    SITAGLIPTIN (JANUVIA) 100 MG TABLET    Take 1 tablet by mouth daily    TAMSULOSIN (FLOMAX) 0.4 MG CAPSULE    TAKE 1 CAPSULE BY MOUTH ONCE DAILY       ALLERGIES     is allergic to codeine and simvastatin. FAMILY HISTORY     indicated that the status of his other is unknown.     family history includes Cancer in an other family member; Diabetes in an other family member; Heart Disease in an other family member; High Blood Pressure in an other family member. SOCIAL HISTORY      reports that he quit smoking about 7 years ago. He has quit using smokeless tobacco. He reports that he drinks alcohol. He reports that he does not use drugs. PHYSICAL EXAM     INITIAL VITALS:  height is 6' (1.829 m) and weight is 250 lb (113.4 kg). His oral temperature is 101 °F (38.3 °C). His blood pressure is 120/75 and his pulse is 102. His respiration is 20 and oxygen saturation is 90%. Physical Exam   Constitutional: He is oriented to person, place, and time. No distress. HENT:   Head: Normocephalic and atraumatic. Eyes: Pupils are equal, round, and reactive to light. Conjunctivae are normal.   Neck: Neck supple. Cardiovascular: Normal rate, regular rhythm, normal heart sounds and intact distal pulses. Exam reveals no gallop. No murmur heard. Pulmonary/Chest: Effort normal and breath sounds normal. No respiratory distress. Abdominal: Soft. Bowel sounds are normal. He exhibits no distension and no mass. There is tenderness in the epigastric area, left upper quadrant and left lower quadrant. There is no guarding. Musculoskeletal: He exhibits no edema or tenderness. Lymphadenopathy:     He has no cervical adenopathy. Neurological: He is alert and oriented to person, place, and time. Skin: Skin is warm and dry. No rash noted. Psychiatric: Judgment normal.   Nursing note and vitals reviewed. DIFFERENTIAL DIAGNOSIS/MDM:   Gastroenteritis, kidney stone, UTI, pancreatitis, diverticulitis, unlikely obstruction, STD    15-year-old male presents with abdominal pain, nausea, vomiting and dysuria. He is febrile 101 Fahrenheit and mildly tachycardic. He is nontoxic in appearance. .  Not in any Acute DISINTEGRATING TABLET    Take 1 tablet by mouth every 8 hours as needed for Nausea or Vomiting       (Please note that portions ofthis note were completed with a voice recognition program.  Efforts were made to edit the dictations but occasionally words are mis-transcribed.)    Vicki Roberson DO  Attending Emergency Physician          Vicki Roberson DO  04/12/19 0213

## 2019-04-12 NOTE — ED NOTES
Pt discharged in stable condition with prescriptions and dc instructions. Pt ambulates to door with steady gait and without assistance.         Romina Espinoza RN  04/12/19 [id number]

## 2019-04-14 LAB
CULTURE: NORMAL
Lab: NORMAL
SPECIMEN DESCRIPTION: NORMAL

## 2019-04-15 LAB
C. TRACHOMATIS DNA ,URINE: NEGATIVE
N. GONORRHOEAE DNA, URINE: NEGATIVE
SPECIMEN DESCRIPTION: NORMAL

## 2019-04-16 ENCOUNTER — TELEPHONE (OUTPATIENT)
Dept: INTERNAL MEDICINE CLINIC | Age: 54
End: 2019-04-16

## 2019-04-17 ENCOUNTER — HOSPITAL ENCOUNTER (EMERGENCY)
Age: 54
Discharge: HOME | End: 2019-04-18
Attending: EMERGENCY MEDICINE

## 2019-04-17 DIAGNOSIS — M54.9 ACUTE BACK PAIN, UNSPECIFIED BACK LOCATION, UNSPECIFIED BACK PAIN LATERALITY: Primary | ICD-10-CM

## 2019-04-17 PROCEDURE — 99284 EMERGENCY DEPT VISIT MOD MDM: CPT

## 2019-04-18 ENCOUNTER — APPOINTMENT (OUTPATIENT)
Dept: CT IMAGING | Age: 54
End: 2019-04-18

## 2019-04-18 ENCOUNTER — TELEPHONE (OUTPATIENT)
Dept: INTERNAL MEDICINE CLINIC | Age: 54
End: 2019-04-18

## 2019-04-18 VITALS
HEART RATE: 78 BPM | TEMPERATURE: 98 F | BODY MASS INDEX: 33.86 KG/M2 | WEIGHT: 250 LBS | RESPIRATION RATE: 16 BRPM | OXYGEN SATURATION: 96 % | SYSTOLIC BLOOD PRESSURE: 118 MMHG | HEIGHT: 72 IN | DIASTOLIC BLOOD PRESSURE: 74 MMHG

## 2019-04-18 LAB
-: ABNORMAL
ABSOLUTE EOS #: 0.08 K/UL (ref 0–0.4)
ABSOLUTE IMMATURE GRANULOCYTE: ABNORMAL K/UL (ref 0–0.3)
ABSOLUTE LYMPH #: 1.91 K/UL (ref 1–4.8)
ABSOLUTE MONO #: 0.35 K/UL (ref 0.1–1.3)
ALBUMIN SERPL-MCNC: 3.7 G/DL (ref 3.5–5.2)
ALBUMIN/GLOBULIN RATIO: ABNORMAL (ref 1–2.5)
ALP BLD-CCNC: 176 U/L (ref 40–129)
ALT SERPL-CCNC: 49 U/L (ref 5–41)
AMORPHOUS: ABNORMAL
ANION GAP SERPL CALCULATED.3IONS-SCNC: 11 MMOL/L (ref 9–17)
AST SERPL-CCNC: 45 U/L
BACTERIA: ABNORMAL
BASOPHILS # BLD: 0 % (ref 0–2)
BASOPHILS ABSOLUTE: 0 K/UL (ref 0–0.2)
BILIRUB SERPL-MCNC: 1.3 MG/DL (ref 0.3–1.2)
BILIRUBIN URINE: ABNORMAL
BUN BLDV-MCNC: 7 MG/DL (ref 6–20)
BUN/CREAT BLD: ABNORMAL (ref 9–20)
CALCIUM SERPL-MCNC: 8.9 MG/DL (ref 8.6–10.4)
CASTS UA: ABNORMAL /LPF
CHLORIDE BLD-SCNC: 101 MMOL/L (ref 98–107)
CO2: 25 MMOL/L (ref 20–31)
COLOR: YELLOW
COMMENT UA: ABNORMAL
CREAT SERPL-MCNC: 0.63 MG/DL (ref 0.7–1.2)
CRYSTALS, UA: ABNORMAL /HPF
CULTURE: NORMAL
CULTURE: NORMAL
DIFFERENTIAL TYPE: ABNORMAL
EOSINOPHILS RELATIVE PERCENT: 2 % (ref 0–4)
EPITHELIAL CELLS UA: ABNORMAL /HPF
GFR AFRICAN AMERICAN: >60 ML/MIN
GFR NON-AFRICAN AMERICAN: >60 ML/MIN
GFR SERPL CREATININE-BSD FRML MDRD: ABNORMAL ML/MIN/{1.73_M2}
GFR SERPL CREATININE-BSD FRML MDRD: ABNORMAL ML/MIN/{1.73_M2}
GLUCOSE BLD-MCNC: 261 MG/DL (ref 70–99)
GLUCOSE URINE: ABNORMAL
HCT VFR BLD CALC: 43.1 % (ref 41–53)
HEMOGLOBIN: 14.4 G/DL (ref 13.5–17.5)
IMMATURE GRANULOCYTES: ABNORMAL %
KETONES, URINE: ABNORMAL
LEUKOCYTE ESTERASE, URINE: NEGATIVE
LYMPHOCYTES # BLD: 49 % (ref 24–44)
Lab: NORMAL
Lab: NORMAL
MCH RBC QN AUTO: 29.5 PG (ref 26–34)
MCHC RBC AUTO-ENTMCNC: 33.4 G/DL (ref 31–37)
MCV RBC AUTO: 88.5 FL (ref 80–100)
MONOCYTES # BLD: 9 % (ref 1–7)
MORPHOLOGY: ABNORMAL
MUCUS: ABNORMAL
NITRITE, URINE: NEGATIVE
NRBC AUTOMATED: ABNORMAL PER 100 WBC
OTHER OBSERVATIONS UA: ABNORMAL
PDW BLD-RTO: 14.1 % (ref 11.5–14.9)
PH UA: 5.5 (ref 5–8)
PLATELET # BLD: 85 K/UL (ref 150–450)
PLATELET ESTIMATE: ABNORMAL
PMV BLD AUTO: 8.4 FL (ref 6–12)
POTASSIUM SERPL-SCNC: 4.5 MMOL/L (ref 3.7–5.3)
PROTEIN UA: NEGATIVE
RBC # BLD: 4.87 M/UL (ref 4.5–5.9)
RBC # BLD: ABNORMAL 10*6/UL
RBC UA: ABNORMAL /HPF
RENAL EPITHELIAL, UA: ABNORMAL /HPF
SEG NEUTROPHILS: 40 % (ref 36–66)
SEGMENTED NEUTROPHILS ABSOLUTE COUNT: 1.56 K/UL (ref 1.3–9.1)
SODIUM BLD-SCNC: 137 MMOL/L (ref 135–144)
SPECIFIC GRAVITY UA: 1.03 (ref 1–1.03)
SPECIMEN DESCRIPTION: NORMAL
SPECIMEN DESCRIPTION: NORMAL
TOTAL PROTEIN: 7 G/DL (ref 6.4–8.3)
TRICHOMONAS: ABNORMAL
TURBIDITY: CLEAR
URINE HGB: ABNORMAL
UROBILINOGEN, URINE: NORMAL
WBC # BLD: 3.9 K/UL (ref 3.5–11)
WBC # BLD: ABNORMAL 10*3/UL
WBC UA: ABNORMAL /HPF
YEAST: ABNORMAL

## 2019-04-18 PROCEDURE — 80053 COMPREHEN METABOLIC PANEL: CPT

## 2019-04-18 PROCEDURE — 36415 COLL VENOUS BLD VENIPUNCTURE: CPT

## 2019-04-18 PROCEDURE — 81001 URINALYSIS AUTO W/SCOPE: CPT

## 2019-04-18 PROCEDURE — 2580000003 HC RX 258: Performed by: EMERGENCY MEDICINE

## 2019-04-18 PROCEDURE — 74176 CT ABD & PELVIS W/O CONTRAST: CPT

## 2019-04-18 PROCEDURE — 96374 THER/PROPH/DIAG INJ IV PUSH: CPT

## 2019-04-18 PROCEDURE — 87086 URINE CULTURE/COLONY COUNT: CPT

## 2019-04-18 PROCEDURE — 6360000002 HC RX W HCPCS: Performed by: EMERGENCY MEDICINE

## 2019-04-18 PROCEDURE — 85025 COMPLETE CBC W/AUTO DIFF WBC: CPT

## 2019-04-18 RX ORDER — 0.9 % SODIUM CHLORIDE 0.9 %
1000 INTRAVENOUS SOLUTION INTRAVENOUS ONCE
Status: COMPLETED | OUTPATIENT
Start: 2019-04-18 | End: 2019-04-18

## 2019-04-18 RX ORDER — IBUPROFEN 800 MG/1
800 TABLET ORAL EVERY 8 HOURS PRN
Qty: 30 TABLET | Refills: 0 | Status: SHIPPED | OUTPATIENT
Start: 2019-04-18 | End: 2019-07-30

## 2019-04-18 RX ORDER — KETOROLAC TROMETHAMINE 30 MG/ML
15 INJECTION, SOLUTION INTRAMUSCULAR; INTRAVENOUS ONCE
Status: COMPLETED | OUTPATIENT
Start: 2019-04-18 | End: 2019-04-18

## 2019-04-18 RX ADMIN — SODIUM CHLORIDE 1000 ML: 9 INJECTION, SOLUTION INTRAVENOUS at 01:02

## 2019-04-18 RX ADMIN — KETOROLAC TROMETHAMINE 15 MG: 30 INJECTION, SOLUTION INTRAMUSCULAR at 00:57

## 2019-04-18 ASSESSMENT — PAIN SCALES - GENERAL
PAINLEVEL_OUTOF10: 10
PAINLEVEL_OUTOF10: 10

## 2019-04-18 ASSESSMENT — ENCOUNTER SYMPTOMS
ABDOMINAL PAIN: 0
BOWEL INCONTINENCE: 0
BACK PAIN: 1

## 2019-04-18 ASSESSMENT — PAIN DESCRIPTION - LOCATION: LOCATION: BACK

## 2019-04-18 ASSESSMENT — PAIN DESCRIPTION - PAIN TYPE: TYPE: ACUTE PAIN

## 2019-04-18 ASSESSMENT — PAIN DESCRIPTION - FREQUENCY: FREQUENCY: CONTINUOUS

## 2019-04-18 NOTE — ED PROVIDER NOTES
[address]  eMERGENCY dEPARTMENT eNCOUnter    Pt Name: Gina Arevalo  MRN: [id number]  [place] 1965  Date of evaluation: 4/18/19  CHIEF COMPLAINT       Chief Complaint   Patient presents with    Back Pain     HISTORY OF PRESENT ILLNESS     Back Pain   Location:  Lumbar spine and thoracic spine  Quality:  Aching and cramping  Radiates to: thoracic spine to lumbar area. Pain severity:  Moderate  Pain is: Worse during the night  Onset quality:  Gradual  Duration:  1 week  Progression:  Worsening  Chronicity:  New  Context: recent illness (pyelonephritis, on doxycycline)    Context: not falling, not lifting heavy objects, not MVA, not occupational injury and not recent injury    Relieved by:  Nothing  Ineffective treatments: Bowel activity, bed rest and narcotics  Associated symptoms: no abdominal pain, no bladder incontinence, no bowel incontinence, no chest pain, no dysuria, no fever, no tingling and no weakness        REVIEW OF SYSTEMS     Review of Systems   Constitutional: Negative for fever. Cardiovascular: Negative for chest pain. Gastrointestinal: Negative for abdominal pain and bowel incontinence. Genitourinary: Negative for bladder incontinence and dysuria. Musculoskeletal: Positive for back pain. Neurological: Negative for tingling and weakness. PASTMEDICAL HISTORY     Past Medical History:   Diagnosis Date    Calculus of kidney     Essential hypertension, benign     Gout, unspecified     H/O colonoscopy 4/11/2016    2016    Mitral valve disorders(424.0)     Other and unspecified hyperlipidemia     Type II or unspecified type diabetes mellitus without mention of complication, not stated as uncontrolled     Unspecified chronic liver disease without mention of alcohol      SURGICAL HISTORY       Past Surgical History:   Procedure Laterality Date    LITHOTRIPSY      UPPER GASTROINTESTINAL ENDOSCOPY N/A 5/25/2018    The esophagus was inspected to the Z-line.  The mometasone-formoterol (DULERA) 200-5 MCG/ACT inhaler Inhale 2 puffs into the lungs every 12 hours, Disp-1 Inhaler, R-2Normal      SITagliptin (JANUVIA) 100 MG tablet Take 1 tablet by mouth daily, Disp-30 tablet, R-3Print      acetaminophen (AMINOFEN) 325 MG tablet Take 2 tablets by mouth every 6 hours as needed for Pain, Disp-50 tablet, R-0Print      metoprolol tartrate (LOPRESSOR) 25 MG tablet TAKE 1 TABLET BY MOUTH TWICE DAILY, Disp-60 tablet, R-5Normal      glimepiride (AMARYL) 4 MG tablet bid, Disp-60 tablet, R-11Normal      aspirin 81 MG tablet Take 81 mg by mouthHistorical Med           ALLERGIES     is allergic to codeine and simvastatin. FAMILY HISTORY     indicated that the status of his other is unknown. SOCIAL HISTORY       Social History     Tobacco Use    Smoking status: Former Smoker     Last attempt to quit: 3/26/2012     Years since quittin.0    Smokeless tobacco: Former User   Substance Use Topics    Alcohol use: Yes     Alcohol/week: 0.0 oz     Comment: very little/ special events    Drug use: No     PHYSICAL EXAM     INITIAL VITALS: /74   Pulse 78   Temp 98 °F (36.7 °C) (Oral)   Resp 16   Ht 6' (1.829 m)   Wt 250 lb (113.4 kg)   SpO2 96%   BMI 33.91 kg/m²    Physical Exam   Constitutional: He is oriented to person, place, and time. He appears well-developed and well-nourished. No distress. HENT:   Head: Normocephalic and atraumatic. Nose: Nose normal.   Mouth/Throat: Oropharynx is clear and moist.   Eyes: Pupils are equal, round, and reactive to light. Conjunctivae and EOM are normal.   Cardiovascular: Normal rate, regular rhythm and normal heart sounds. Exam reveals no gallop and no friction rub. No murmur heard. Pulmonary/Chest: Effort normal and breath sounds normal. No respiratory distress. He has no wheezes. Abdominal: Soft. Bowel sounds are normal. He exhibits no distension. There is tenderness (right CVA tenderness to percussion).    Musculoskeletal: mesenteric panniculitis or potentially pancreatitis. However, reactive   change with possible slight volume overload is more likely. 3. Cirrhotic liver with no visualized focal lesion. 4. Findings of portal hypertension include dilation of the main portal vein   and moderate splenomegaly. LABS: All lab results were reviewed by myself, and all abnormals are listed below. Labs Reviewed   CBC WITH AUTO DIFFERENTIAL - Abnormal; Notable for the following components:       Result Value    Platelets 85 (*)     Lymphocytes 49 (*)     Monocytes 9 (*)     All other components within normal limits   COMPREHENSIVE METABOLIC PANEL W/ REFLEX TO MG FOR LOW K - Abnormal; Notable for the following components:    Glucose 261 (*)     CREATININE 0.63 (*)     Alkaline Phosphatase 176 (*)     ALT 49 (*)     AST 45 (*)     Total Bilirubin 1.30 (*)     All other components within normal limits   URINALYSIS - Abnormal; Notable for the following components:    Glucose, Ur 3+ (*)     Bilirubin Urine   (*)     Value: Presumptive positive. Unable to confirm due to unavailability of reagent.     Ketones, Urine TRACE (*)     Specific Gravity, UA 1.031 (*)     Urine Hgb TRACE (*)     All other components within normal limits   MICROSCOPIC URINALYSIS - Abnormal; Notable for the following components:    Bacteria, UA FEW (*)     All other components within normal limits   URINE CULTURE   ICTOTEST, URINE     EMERGENCY DEPARTMENTCOURSE:   Vitals:    Vitals:    04/18/19 0002 04/18/19 0330   BP: 136/72 118/74   Pulse: 95 78   Resp: 16 16   Temp: 98.2 °F (36.8 °C) 98 °F (36.7 °C)   TempSrc: Oral Oral   SpO2: 96% 96%   Weight: 250 lb (113.4 kg)    Height: 6' (1.829 m)        The patient was given the following medications while in the emergency department:  Orders Placed This Encounter   Medications    0.9 % sodium chloride bolus    ketorolac (TORADOL) injection 15 mg    ibuprofen (ADVIL;MOTRIN) 800 MG tablet     Sig: Take 1 tablet by mouth every 8 hours as needed for Pain     Dispense:  30 tablet     Refill:  0     CONSULTS:  None    FINAL IMPRESSION      1. Acute back pain, unspecified back location, unspecified back pain laterality          DISPOSITION/PLAN   DISPOSITION Decision To Discharge 04/18/2019 03:07:49 AM      PATIENT REFERRED TO:  Elias Pete, [address]  [phone]      To re-evaluate your symptoms    Blu Kat MD  [address]  [phone]    Schedule an appointment as soon as possible for a visit   If symptoms worsen, To re-evaluate your symptoms    DISCHARGE MEDICATIONS:  Discharge Medication List as of 4/18/2019  3:09 AM      START taking these medications    Details   ibuprofen (ADVIL;MOTRIN) 800 MG tablet Take 1 tablet by mouth every 8 hours as needed for Pain, Disp-30 tablet, R-0Print           oKri Mazariegos MD  Attending Emergency Physician  [employer] voice recognition software used in portions of this document.                     Kori Mazariegos MD  04/18/19 5629

## 2019-04-19 LAB
CULTURE: NO GROWTH
Lab: NORMAL
SPECIMEN DESCRIPTION: NORMAL

## 2019-05-03 ENCOUNTER — OFFICE VISIT (OUTPATIENT)
Dept: INTERNAL MEDICINE CLINIC | Age: 54
End: 2019-05-03

## 2019-05-03 VITALS
BODY MASS INDEX: 31.69 KG/M2 | DIASTOLIC BLOOD PRESSURE: 80 MMHG | OXYGEN SATURATION: 95 % | SYSTOLIC BLOOD PRESSURE: 122 MMHG | HEART RATE: 83 BPM | HEIGHT: 72 IN | WEIGHT: 234 LBS

## 2019-05-03 DIAGNOSIS — E11.65 TYPE 2 DIABETES MELLITUS WITH HYPERGLYCEMIA, WITHOUT LONG-TERM CURRENT USE OF INSULIN (HCC): Primary | ICD-10-CM

## 2019-05-03 DIAGNOSIS — K70.30 ALCOHOLIC CIRRHOSIS OF LIVER WITHOUT ASCITES (HCC): ICD-10-CM

## 2019-05-03 DIAGNOSIS — G89.29 CHRONIC LOW BACK PAIN WITHOUT SCIATICA, UNSPECIFIED BACK PAIN LATERALITY: ICD-10-CM

## 2019-05-03 DIAGNOSIS — G47.33 OSA (OBSTRUCTIVE SLEEP APNEA): ICD-10-CM

## 2019-05-03 DIAGNOSIS — M54.50 CHRONIC LOW BACK PAIN WITHOUT SCIATICA, UNSPECIFIED BACK PAIN LATERALITY: ICD-10-CM

## 2019-05-03 LAB — HBA1C MFR BLD: 9.7 %

## 2019-05-03 PROCEDURE — 99214 OFFICE O/P EST MOD 30 MIN: CPT | Performed by: INTERNAL MEDICINE

## 2019-05-03 PROCEDURE — 83036 HEMOGLOBIN GLYCOSYLATED A1C: CPT | Performed by: INTERNAL MEDICINE

## 2019-05-03 RX ORDER — GLUCOSAMINE HCL/CHONDROITIN SU 500-400 MG
1 CAPSULE ORAL 4 TIMES DAILY
Qty: 100 STRIP | Refills: 11 | Status: SHIPPED | OUTPATIENT
Start: 2019-05-03

## 2019-05-03 ASSESSMENT — PATIENT HEALTH QUESTIONNAIRE - PHQ9
SUM OF ALL RESPONSES TO PHQ9 QUESTIONS 1 & 2: 0
SUM OF ALL RESPONSES TO PHQ QUESTIONS 1-9: 0
SUM OF ALL RESPONSES TO PHQ QUESTIONS 1-9: 0
2. FEELING DOWN, DEPRESSED OR HOPELESS: 0
1. LITTLE INTEREST OR PLEASURE IN DOING THINGS: 0

## 2019-05-03 ASSESSMENT — ENCOUNTER SYMPTOMS
FACIAL SWELLING: 0
BACK PAIN: 0
DIARRHEA: 0
CHEST TIGHTNESS: 0
APNEA: 0
COLOR CHANGE: 0
WHEEZING: 0
ABDOMINAL PAIN: 0
CONSTIPATION: 0
SHORTNESS OF BREATH: 0
COUGH: 0
ABDOMINAL DISTENTION: 0

## 2019-05-03 NOTE — PROGRESS NOTES
Subjective:     Chief Complaint   Patient presents with    Follow-Up from Hospital     F/u from [facility] ER for back pain, pt was dx with bladder infection, pt states sx are better     Diabetes     Pt due for a1c and microalbumin check orders pending        Patient ID: Vladimir Joyner is a 49 y.o. male. Visit Information    Have you changed or started any medications since your last visit including any over-the-counter medicines, vitamins, or herbal medicines? no   Are you having any side effects from any of your medications? -  no  Have you stopped taking any of your medications? Is so, why? -  no    Have you seen any other physician or provider since your last visit? No  Have you had any other diagnostic tests since your last visit? Yes - Records Obtained  Have you been seen in the emergency room and/or had an admission to a hospital since we last saw you? Yes - Records Obtained  Have you had your routine dental cleaning in the past 6 months? no    Have you activated your [employer] account? If not, what are your barriers?  Yes     Patient Care Team:  Niru Segovia MD as PCP - General (Internal Medicine)    Medical History Review  Past Medical, Family, and Social History reviewed and does not contribute to the patient presenting condition    Health Maintenance   Topic Date Due    Hepatitis A vaccine (1 of 2 - Risk 2-dose series) 03/03/1967    Pneumococcal 0-64 years Vaccine (1 of 1 - PPSV23) 03/03/1972    HIV screen  03/03/1981    Hepatitis B Vaccine (1 of 3 - Risk 3-dose series) 03/03/1985    DTaP/Tdap/Td vaccine (1 - Tdap) 03/03/1985    Shingles Vaccine (1 of 2) 03/03/2016    Diabetic retinal exam  08/15/2016    Diabetic microalbuminuria test  01/14/2019    Diabetic foot exam  05/30/2019    Flu vaccine (Season Ended) 09/01/2019    Lipid screen  01/07/2020    A1C test (Diabetic or Prediabetic)  01/08/2020    Potassium monitoring  04/18/2020    Creatinine monitoring  04/18/2020    Colon cancer heard.  Pulmonary/Chest: Effort normal and breath sounds normal. No stridor. No respiratory distress. He has no wheezes. He has no rales. He exhibits no tenderness. Abdominal: Soft. Bowel sounds are normal. He exhibits no distension. There is no tenderness. There is no rebound and no guarding. Musculoskeletal: Normal range of motion. He exhibits no edema. Neurological: He is alert and oriented to person, place, and time. Skin: He is not diaphoretic. Assessment / Plan:   1. Type 2 diabetes mellitus with hyperglycemia, without long-term current use of insulin ([facility] [address]  Did not see Ophthalmologist   - POCT glycosylated hemoglobin (Hb A1C)  - Microalbumin, Ur; Future  - blood glucose monitor strips; 1 strip by Other route 4 times daily Test 4  times a day & as needed for symptoms of irregular blood glucose. Dispense: 100 strip; Refill: 11  - insulin glargine (LANTUS SOLOSTAR) 100 UNIT/ML injection pen; Inject 20 Units into the skin every morning  Dispense: 5 pen; Refill: 3  - Lipid Panel; Future    2. Alcoholic cirrhosis of liver without ascites (HCC)  Stop using Liquor     3. Chronic low back pain without sciatica, unspecified back pain laterality  Controlled     4. СЕРГЕЙ (obstructive sleep apnea)  Not using CPAP       · Return in about 1 month (around 5/31/2019). · Reviewed prior labs and health maintenance. · Discussed use, benefit, and side effects of prescribed medications. Barriers to medication compliance addressed. All patient questions answered. Pt voiced understanding. MD FRANDY Villavicencio [facility]  5/3/2019, 9:52 AM    Please note that this chart was generated using voice recognition Dragon dictation software. Although every effort was made to ensure the accuracy of this automated transcription, some errors in transcription may have occurred.

## 2019-05-06 DIAGNOSIS — I10 ESSENTIAL HYPERTENSION: ICD-10-CM

## 2019-05-06 RX ORDER — LISINOPRIL 5 MG/1
TABLET ORAL
Qty: 30 TABLET | Refills: 11 | Status: SHIPPED | OUTPATIENT
Start: 2019-05-06 | End: 2020-05-20 | Stop reason: SDUPTHER

## 2019-06-10 ENCOUNTER — TELEPHONE (OUTPATIENT)
Dept: INTERNAL MEDICINE CLINIC | Age: 54
End: 2019-06-10

## 2019-07-30 ENCOUNTER — HOSPITAL ENCOUNTER (EMERGENCY)
Age: 54
Discharge: HOME | End: 2019-07-30
Attending: EMERGENCY MEDICINE
Payer: COMMERCIAL

## 2019-07-30 VITALS
DIASTOLIC BLOOD PRESSURE: 62 MMHG | SYSTOLIC BLOOD PRESSURE: 95 MMHG | BODY MASS INDEX: 32.23 KG/M2 | HEIGHT: 72 IN | RESPIRATION RATE: 16 BRPM | WEIGHT: 238 LBS | TEMPERATURE: 97.7 F | OXYGEN SATURATION: 93 % | HEART RATE: 60 BPM

## 2019-07-30 DIAGNOSIS — N39.0 ACUTE UTI (URINARY TRACT INFECTION): Primary | ICD-10-CM

## 2019-07-30 LAB
-: ABNORMAL
ABSOLUTE EOS #: 0.04 K/UL (ref 0–0.4)
ABSOLUTE IMMATURE GRANULOCYTE: ABNORMAL K/UL (ref 0–0.3)
ABSOLUTE LYMPH #: 1.48 K/UL (ref 1–4.8)
ABSOLUTE MONO #: 0.15 K/UL (ref 0.1–1.3)
ALBUMIN SERPL-MCNC: 3.5 G/DL (ref 3.5–5.2)
ALBUMIN/GLOBULIN RATIO: ABNORMAL (ref 1–2.5)
ALP BLD-CCNC: 81 U/L (ref 40–129)
ALT SERPL-CCNC: 34 U/L (ref 5–41)
AMORPHOUS: ABNORMAL
ANION GAP SERPL CALCULATED.3IONS-SCNC: 10 MMOL/L (ref 9–17)
AST SERPL-CCNC: 42 U/L
ATYPICAL LYMPHOCYTE ABSOLUTE COUNT: 0.11 K/UL
ATYPICAL LYMPHOCYTES: 3 %
BACTERIA: ABNORMAL
BASOPHILS # BLD: 0 % (ref 0–2)
BASOPHILS ABSOLUTE: 0 K/UL (ref 0–0.2)
BILIRUB SERPL-MCNC: 2.21 MG/DL (ref 0.3–1.2)
BILIRUBIN URINE: ABNORMAL
BUN BLDV-MCNC: 9 MG/DL (ref 6–20)
BUN/CREAT BLD: ABNORMAL (ref 9–20)
CALCIUM SERPL-MCNC: 8.9 MG/DL (ref 8.6–10.4)
CASTS UA: ABNORMAL /LPF
CHLORIDE BLD-SCNC: 105 MMOL/L (ref 98–107)
CO2: 24 MMOL/L (ref 20–31)
COLOR: ABNORMAL
COMMENT UA: ABNORMAL
CREAT SERPL-MCNC: 0.67 MG/DL (ref 0.7–1.2)
CRYSTALS, UA: ABNORMAL /HPF
DIFFERENTIAL TYPE: ABNORMAL
EOSINOPHILS RELATIVE PERCENT: 1 % (ref 0–4)
EPITHELIAL CELLS UA: ABNORMAL /HPF
GFR AFRICAN AMERICAN: >60 ML/MIN
GFR NON-AFRICAN AMERICAN: >60 ML/MIN
GFR SERPL CREATININE-BSD FRML MDRD: ABNORMAL ML/MIN/{1.73_M2}
GFR SERPL CREATININE-BSD FRML MDRD: ABNORMAL ML/MIN/{1.73_M2}
GLUCOSE BLD-MCNC: 222 MG/DL (ref 70–99)
GLUCOSE URINE: ABNORMAL
HCT VFR BLD CALC: 40.2 % (ref 41–53)
HEMOGLOBIN: 13.5 G/DL (ref 13.5–17.5)
IMMATURE GRANULOCYTES: ABNORMAL %
KETONES, URINE: NEGATIVE
LEUKOCYTE ESTERASE, URINE: ABNORMAL
LYMPHOCYTES # BLD: 39 % (ref 24–44)
MCH RBC QN AUTO: 30.5 PG (ref 26–34)
MCHC RBC AUTO-ENTMCNC: 33.7 G/DL (ref 31–37)
MCV RBC AUTO: 90.4 FL (ref 80–100)
MONOCYTES # BLD: 4 % (ref 1–7)
MORPHOLOGY: NORMAL
MUCUS: ABNORMAL
NITRITE, URINE: POSITIVE
NRBC AUTOMATED: ABNORMAL PER 100 WBC
OTHER OBSERVATIONS UA: ABNORMAL
PDW BLD-RTO: 14.2 % (ref 11.5–14.9)
PH UA: 6.5 (ref 5–8)
PLATELET # BLD: 68 K/UL (ref 150–450)
PLATELET ESTIMATE: ABNORMAL
PMV BLD AUTO: 8.3 FL (ref 6–12)
POTASSIUM SERPL-SCNC: 4.1 MMOL/L (ref 3.7–5.3)
PROTEIN UA: NEGATIVE
RBC # BLD: 4.45 M/UL (ref 4.5–5.9)
RBC # BLD: ABNORMAL 10*6/UL
RBC UA: ABNORMAL /HPF
RENAL EPITHELIAL, UA: ABNORMAL /HPF
SEG NEUTROPHILS: 53 % (ref 36–66)
SEGMENTED NEUTROPHILS ABSOLUTE COUNT: 2.02 K/UL (ref 1.3–9.1)
SODIUM BLD-SCNC: 139 MMOL/L (ref 135–144)
SPECIFIC GRAVITY UA: 1.03 (ref 1–1.03)
TOTAL PROTEIN: 6.6 G/DL (ref 6.4–8.3)
TRICHOMONAS: ABNORMAL
TURBIDITY: CLEAR
URINE HGB: NEGATIVE
UROBILINOGEN, URINE: ABNORMAL
WBC # BLD: 3.8 K/UL (ref 3.5–11)
WBC # BLD: ABNORMAL 10*3/UL
WBC UA: ABNORMAL /HPF
YEAST: ABNORMAL

## 2019-07-30 PROCEDURE — 81001 URINALYSIS AUTO W/SCOPE: CPT

## 2019-07-30 PROCEDURE — 85025 COMPLETE CBC W/AUTO DIFF WBC: CPT

## 2019-07-30 PROCEDURE — 6360000002 HC RX W HCPCS: Performed by: EMERGENCY MEDICINE

## 2019-07-30 PROCEDURE — 96365 THER/PROPH/DIAG IV INF INIT: CPT

## 2019-07-30 PROCEDURE — 99283 EMERGENCY DEPT VISIT LOW MDM: CPT

## 2019-07-30 PROCEDURE — 96375 TX/PRO/DX INJ NEW DRUG ADDON: CPT

## 2019-07-30 PROCEDURE — 36415 COLL VENOUS BLD VENIPUNCTURE: CPT

## 2019-07-30 PROCEDURE — 80053 COMPREHEN METABOLIC PANEL: CPT

## 2019-07-30 PROCEDURE — 2580000003 HC RX 258: Performed by: EMERGENCY MEDICINE

## 2019-07-30 PROCEDURE — 87086 URINE CULTURE/COLONY COUNT: CPT

## 2019-07-30 RX ORDER — SULFAMETHOXAZOLE AND TRIMETHOPRIM 800; 160 MG/1; MG/1
1 TABLET ORAL 2 TIMES DAILY
Qty: 14 TABLET | Refills: 0 | Status: SHIPPED | OUTPATIENT
Start: 2019-07-30 | End: 2019-08-06

## 2019-07-30 RX ORDER — 0.9 % SODIUM CHLORIDE 0.9 %
1000 INTRAVENOUS SOLUTION INTRAVENOUS ONCE
Status: COMPLETED | OUTPATIENT
Start: 2019-07-30 | End: 2019-07-30

## 2019-07-30 RX ORDER — KETOROLAC TROMETHAMINE 30 MG/ML
15 INJECTION, SOLUTION INTRAMUSCULAR; INTRAVENOUS ONCE
Status: COMPLETED | OUTPATIENT
Start: 2019-07-30 | End: 2019-07-30

## 2019-07-30 RX ADMIN — CEFTRIAXONE SODIUM 1 G: 1 INJECTION, POWDER, FOR SOLUTION INTRAMUSCULAR; INTRAVENOUS at 02:46

## 2019-07-30 RX ADMIN — KETOROLAC TROMETHAMINE 15 MG: 30 INJECTION, SOLUTION INTRAMUSCULAR at 01:57

## 2019-07-30 RX ADMIN — SODIUM CHLORIDE 1000 ML: 9 INJECTION, SOLUTION INTRAVENOUS at 01:56

## 2019-07-30 ASSESSMENT — PAIN SCALES - GENERAL
PAINLEVEL_OUTOF10: 5
PAINLEVEL_OUTOF10: 8

## 2019-07-30 ASSESSMENT — PAIN DESCRIPTION - ORIENTATION: ORIENTATION: LOWER

## 2019-07-30 ASSESSMENT — PAIN DESCRIPTION - DESCRIPTORS: DESCRIPTORS: PRESSURE

## 2019-07-30 ASSESSMENT — PAIN DESCRIPTION - LOCATION: LOCATION: PELVIS

## 2019-07-30 NOTE — ED PROVIDER NOTES
[address]  eMERGENCY dEPARTMENT eNCOUnter    Pt Name: Sanjana Terrazas  MRN: [id number]  [place] 1965  Date of evaluation: 7/30/19  CHIEF COMPLAINT       Chief Complaint   Patient presents with    Urinary Tract Infection     HISTORY OF PRESENT ILLNESS     Dysuria    This is a new problem. The current episode started more than 1 week ago. The problem occurs every urination. The problem has been gradually worsening. The quality of the pain is described as burning and aching. The pain is moderate. There has been no fever. There is no history of pyelonephritis. Associated symptoms include frequency and flank pain. Pertinent negatives include no chills, no nausea, no vomiting and no hematuria. He has tried nothing for the symptoms. His past medical history does not include kidney stones, urological procedure or catheterization. REVIEW OF SYSTEMS     Review of Systems   Constitutional: Negative for chills and fever. Respiratory: Negative for cough. Cardiovascular: Negative for chest pain. Gastrointestinal: Negative for nausea and vomiting. Genitourinary: Positive for dysuria, flank pain and frequency. Negative for hematuria. Musculoskeletal: Negative for back pain and myalgias. Skin: Negative for rash and wound. Neurological: Negative for dizziness and headaches. All other systems reviewed and are negative.     PASTMEDICAL HISTORY     Past Medical History:   Diagnosis Date    Calculus of kidney     Essential hypertension, benign     Gout, unspecified     H/O colonoscopy 4/11/2016    2016    Mitral valve disorders(424.0)     Other and unspecified hyperlipidemia     Type II or unspecified type diabetes mellitus without mention of complication, not stated as uncontrolled     Unspecified chronic liver disease without mention of alcohol      SURGICAL HISTORY       Past Surgical History:   Procedure Laterality Date    LITHOTRIPSY      UPPER GASTROINTESTINAL ENDOSCOPY N/A 5/25/2018 The esophagus was inspected to the Z-line. The endoscopic exam showed impression of varices (F1) in distal esophagus.       CURRENT MEDICATIONS       Discharge Medication List as of 7/30/2019  2:35 AM      CONTINUE these medications which have NOT CHANGED    Details   !! metFORMIN (GLUCOPHAGE) 500 MG tablet TAKE 1 TABLET BY MOUTH TWO TIMES A DAY , Disp-60 tablet, R-9Normal      metoprolol tartrate (LOPRESSOR) 25 MG tablet TAKE 1 TABLET BY MOUTH TWICE DAILY, Disp-60 tablet, R-5Please consider 90 day supplies to promote better adherenceNormal      lisinopril (PRINIVIL;ZESTRIL) 5 MG tablet TAKE 1 TABLET BY MOUTH ONCE DAILY, Disp-30 tablet, R-11Please consider 90 day supplies to promote better adherenceNormal      !! blood glucose monitor strips 1 strip by Other route 4 times daily Test 4  times a day & as needed for symptoms of irregular blood glucose., Other, 4 TIMES DAILY Starting Fri 5/3/2019, Disp-100 strip, R-11, Normal      insulin glargine (LANTUS SOLOSTAR) 100 UNIT/ML injection pen Inject 20 Units into the skin every morning, Disp-5 pen, R-3Normal      ondansetron (ZOFRAN ODT) 4 MG disintegrating tablet Take 1 tablet by mouth every 8 hours as needed for Nausea or Vomiting, Disp-12 tablet, R-0Print      tamsulosin (FLOMAX) 0.4 MG capsule TAKE 1 CAPSULE BY MOUTH ONCE DAILY, Disp-30 capsule, R-6Please consider 90 day supplies to promote better adherenceNormal      atorvastatin (LIPITOR) 10 MG tablet TAKE 1 TABLET BY MOUTH ONE TIME A DAY , Disp-30 tablet, R-11Normal      allopurinol (ZYLOPRIM) 100 MG tablet TAKE 1 TABLET BY MOUTH ONCE DAILY, Disp-90 tablet, R-2Please consider 90 day supplies to promote better adherenceNormal      omeprazole (PRILOSEC) 20 MG delayed release capsule TAKE 1 CAPSULE BY MOUTH TWICE DAILY, Disp-30 capsule, R-5Please consider 90 day supplies to promote better adherenceNormal      [employer] LANCETS ULTRATHIN 30G MISC 3 TIMES DAILY Starting Fri 10/19/2018, Disp-100 each, R-11, Normal tablet Take 1 tablet by mouth 2 times daily for 7 days, Disp-14 tablet, R-0Print           Suzanne Jones MD  Attending Emergency Physician  [employer] voice recognition software used in portions of this document.                     Suzanne Jones MD  07/31/19 1083

## 2019-07-31 LAB
CULTURE: NORMAL
Lab: NORMAL
SPECIMEN DESCRIPTION: NORMAL

## 2019-07-31 ASSESSMENT — ENCOUNTER SYMPTOMS
VOMITING: 0
BACK PAIN: 0
NAUSEA: 0
COUGH: 0

## 2019-08-01 ENCOUNTER — TELEPHONE (OUTPATIENT)
Dept: INTERNAL MEDICINE CLINIC | Age: 54
End: 2019-08-01

## 2019-08-12 RX ORDER — OMEPRAZOLE 20 MG/1
CAPSULE, DELAYED RELEASE ORAL
Qty: 30 CAPSULE | Refills: 5 | Status: SHIPPED | OUTPATIENT
Start: 2019-08-12 | End: 2020-01-06

## 2019-08-23 ENCOUNTER — OFFICE VISIT (OUTPATIENT)
Dept: INTERNAL MEDICINE CLINIC | Age: 54
End: 2019-08-23

## 2019-08-23 VITALS
SYSTOLIC BLOOD PRESSURE: 124 MMHG | HEART RATE: 81 BPM | WEIGHT: 232 LBS | DIASTOLIC BLOOD PRESSURE: 82 MMHG | HEIGHT: 72 IN | BODY MASS INDEX: 31.42 KG/M2 | OXYGEN SATURATION: 94 %

## 2019-08-23 DIAGNOSIS — J44.9 CHRONIC OBSTRUCTIVE PULMONARY DISEASE, UNSPECIFIED COPD TYPE (HCC): ICD-10-CM

## 2019-08-23 DIAGNOSIS — K70.30 ALCOHOLIC CIRRHOSIS OF LIVER WITHOUT ASCITES (HCC): ICD-10-CM

## 2019-08-23 DIAGNOSIS — E11.65 TYPE 2 DIABETES MELLITUS WITH HYPERGLYCEMIA, WITHOUT LONG-TERM CURRENT USE OF INSULIN (HCC): ICD-10-CM

## 2019-08-23 DIAGNOSIS — I10 ESSENTIAL HYPERTENSION: Primary | ICD-10-CM

## 2019-08-23 DIAGNOSIS — G47.33 OSA (OBSTRUCTIVE SLEEP APNEA): ICD-10-CM

## 2019-08-23 LAB — HBA1C MFR BLD: 7.9 %

## 2019-08-23 PROCEDURE — 83036 HEMOGLOBIN GLYCOSYLATED A1C: CPT | Performed by: INTERNAL MEDICINE

## 2019-08-23 PROCEDURE — 99214 OFFICE O/P EST MOD 30 MIN: CPT | Performed by: INTERNAL MEDICINE

## 2019-08-23 ASSESSMENT — ENCOUNTER SYMPTOMS
COLOR CHANGE: 0
BACK PAIN: 0
COUGH: 0
DIARRHEA: 0
ABDOMINAL DISTENTION: 0
ABDOMINAL PAIN: 1
CONSTIPATION: 0
CHEST TIGHTNESS: 0
APNEA: 0
SHORTNESS OF BREATH: 0
WHEEZING: 0
FACIAL SWELLING: 0

## 2019-08-23 NOTE — PROGRESS NOTES
Subjective:      Chief Complaint   Patient presents with    Follow-Up from Hospital     F/u from [facility] for UTI, Pt states he still has some burning when he urinates, Pt was started on BACTRIM     Diabetes     Pt due for A1C,BS has been running around 200        Patient ID: Grant Sun is a 49 y.o. male. Visit Information    Have you changed or started any medications since your last visit including any over-the-counter medicines, vitamins, or herbal medicines? no   Are you having any side effects from any of your medications? -  no  Have you stopped taking any of your medications? Is so, why? -  no    Have you seen any other physician or provider since your last visit? No  Have you had any other diagnostic tests since your last visit? No  Have you been seen in the emergency room and/or had an admission to a hospital since we last saw you? Yes - Records Obtained  Have you had your routine dental cleaning in the past 6 months? no    Have you activated your [employer] account? If not, what are your barriers?  Yes     Patient Care Team:  Jaqueline Rodriguez MD as PCP - General (Internal Medicine)  Jaqueline Rodriguez MD as PCP - [facility]    Medical History Review  Past Medical, Family, and Social History reviewed and does not contribute to the patient presenting condition    Health Maintenance   Topic Date Due    Hepatitis A vaccine (1 of 2 - Risk 2-dose series) 03/03/1967    Pneumococcal 0-64 years Vaccine (1 of 1 - PPSV23) 03/03/1972    HIV screen  03/03/1981    Hepatitis B Vaccine (1 of 3 - Risk 3-dose series) 03/03/1985    DTaP/Tdap/Td vaccine (1 - Tdap) 03/03/1985    Shingles Vaccine (1 of 2) 03/03/2016    Diabetic retinal exam  08/15/2016    Diabetic microalbuminuria test  01/14/2019    Diabetic foot exam  05/30/2019    A1C test (Diabetic or Prediabetic)  08/03/2019    Flu vaccine (1) 09/01/2019    Lipid screen  01/07/2020    Potassium monitoring  07/30/2020    Creatinine monitoring

## 2019-08-26 ENCOUNTER — TELEPHONE (OUTPATIENT)
Dept: INTERNAL MEDICINE CLINIC | Age: 54
End: 2019-08-26

## 2019-08-26 DIAGNOSIS — J06.9 UPPER RESPIRATORY TRACT INFECTION, UNSPECIFIED TYPE: Primary | ICD-10-CM

## 2019-08-26 RX ORDER — AZITHROMYCIN 250 MG/1
250 TABLET, FILM COATED ORAL SEE ADMIN INSTRUCTIONS
Qty: 6 TABLET | Refills: 0 | Status: SHIPPED | OUTPATIENT
Start: 2019-08-26 | End: 2019-08-31

## 2019-08-26 NOTE — TELEPHONE ENCOUNTER
Pts spouse called & stated pt has uri symptoms & is requesting zpak sent to his pharmacy.     Sick Call    Agata Marino   1965   [id number]   Brenda Tam MD   Allergies   Allergen Reactions    Codeine Nausea Only and Other (See Comments)    Simvastatin Other (See Comments)     Leg cramping        Last Visit: 8/23/19  Reason for No Same Day Appt: Pt was just in office last Friday    Complaint: Earle Felt, head & sinus congestion, productive cough, body fever / chills, otc meds are not helping, started yesterday       Pharmacy: [facility]

## 2019-10-01 DIAGNOSIS — E13.8 OTHER SPECIFIED DIABETES MELLITUS WITH UNSPECIFIED COMPLICATIONS (HCC): ICD-10-CM

## 2019-10-02 RX ORDER — GLIMEPIRIDE 4 MG/1
TABLET ORAL
Qty: 60 TABLET | Refills: 11 | Status: SHIPPED | OUTPATIENT
Start: 2019-10-02 | End: 2020-04-06 | Stop reason: SDUPTHER

## 2019-11-02 ENCOUNTER — HOSPITAL ENCOUNTER (EMERGENCY)
Age: 54
Discharge: HOME | End: 2019-11-02
Attending: EMERGENCY MEDICINE

## 2019-11-02 VITALS
HEIGHT: 72 IN | HEART RATE: 83 BPM | OXYGEN SATURATION: 97 % | BODY MASS INDEX: 31.69 KG/M2 | WEIGHT: 234 LBS | DIASTOLIC BLOOD PRESSURE: 55 MMHG | SYSTOLIC BLOOD PRESSURE: 102 MMHG | TEMPERATURE: 99.3 F | RESPIRATION RATE: 16 BRPM

## 2019-11-02 DIAGNOSIS — R73.9 HYPERGLYCEMIA: ICD-10-CM

## 2019-11-02 DIAGNOSIS — N39.0 URINARY TRACT INFECTION WITHOUT HEMATURIA, SITE UNSPECIFIED: Primary | ICD-10-CM

## 2019-11-02 LAB
-: ABNORMAL
ABSOLUTE EOS #: 0.1 K/UL (ref 0–0.4)
ABSOLUTE IMMATURE GRANULOCYTE: ABNORMAL K/UL (ref 0–0.3)
ABSOLUTE LYMPH #: 1.3 K/UL (ref 1–4.8)
ABSOLUTE MONO #: 0.4 K/UL (ref 0.1–1.3)
AMORPHOUS: ABNORMAL
ANION GAP SERPL CALCULATED.3IONS-SCNC: 10 MMOL/L (ref 9–17)
BACTERIA: ABNORMAL
BASOPHILS # BLD: 0 % (ref 0–2)
BASOPHILS ABSOLUTE: 0 K/UL (ref 0–0.2)
BILIRUBIN URINE: ABNORMAL
BUN BLDV-MCNC: 15 MG/DL (ref 6–20)
BUN/CREAT BLD: ABNORMAL (ref 9–20)
CALCIUM SERPL-MCNC: 9 MG/DL (ref 8.6–10.4)
CASTS UA: ABNORMAL /LPF
CHLORIDE BLD-SCNC: 106 MMOL/L (ref 98–107)
CO2: 22 MMOL/L (ref 20–31)
COLOR: ABNORMAL
COMMENT UA: ABNORMAL
CREAT SERPL-MCNC: 0.67 MG/DL (ref 0.7–1.2)
CRYSTALS, UA: ABNORMAL /HPF
DIFFERENTIAL TYPE: ABNORMAL
EOSINOPHILS RELATIVE PERCENT: 2 % (ref 0–4)
EPITHELIAL CELLS UA: ABNORMAL /HPF
GFR AFRICAN AMERICAN: >60 ML/MIN
GFR NON-AFRICAN AMERICAN: >60 ML/MIN
GFR SERPL CREATININE-BSD FRML MDRD: ABNORMAL ML/MIN/{1.73_M2}
GFR SERPL CREATININE-BSD FRML MDRD: ABNORMAL ML/MIN/{1.73_M2}
GLUCOSE BLD-MCNC: 285 MG/DL (ref 75–110)
GLUCOSE BLD-MCNC: 300 MG/DL (ref 75–110)
GLUCOSE BLD-MCNC: 312 MG/DL (ref 75–110)
GLUCOSE BLD-MCNC: 354 MG/DL (ref 70–99)
GLUCOSE URINE: ABNORMAL
HCT VFR BLD CALC: 41.3 % (ref 41–53)
HEMOGLOBIN: 14.1 G/DL (ref 13.5–17.5)
IMMATURE GRANULOCYTES: ABNORMAL %
KETONES, URINE: ABNORMAL
LEUKOCYTE ESTERASE, URINE: ABNORMAL
LYMPHOCYTES # BLD: 32 % (ref 24–44)
MCH RBC QN AUTO: 30.9 PG (ref 26–34)
MCHC RBC AUTO-ENTMCNC: 34.1 G/DL (ref 31–37)
MCV RBC AUTO: 90.8 FL (ref 80–100)
MONOCYTES # BLD: 10 % (ref 1–7)
MUCUS: ABNORMAL
NITRITE, URINE: POSITIVE
NRBC AUTOMATED: ABNORMAL PER 100 WBC
OTHER OBSERVATIONS UA: ABNORMAL
PDW BLD-RTO: 13.8 % (ref 11.5–14.9)
PH UA: 5 (ref 5–8)
PLATELET # BLD: 70 K/UL (ref 150–450)
PLATELET ESTIMATE: ABNORMAL
PMV BLD AUTO: 8.9 FL (ref 6–12)
POTASSIUM SERPL-SCNC: 3.7 MMOL/L (ref 3.7–5.3)
PROTEIN UA: ABNORMAL
RBC # BLD: 4.55 M/UL (ref 4.5–5.9)
RBC # BLD: ABNORMAL 10*6/UL
RBC UA: ABNORMAL /HPF
RENAL EPITHELIAL, UA: ABNORMAL /HPF
SEG NEUTROPHILS: 56 % (ref 36–66)
SEGMENTED NEUTROPHILS ABSOLUTE COUNT: 2.2 K/UL (ref 1.3–9.1)
SODIUM BLD-SCNC: 138 MMOL/L (ref 135–144)
SPECIFIC GRAVITY UA: 1.04 (ref 1–1.03)
TRICHOMONAS: ABNORMAL
TURBIDITY: ABNORMAL
URINE HGB: ABNORMAL
UROBILINOGEN, URINE: NORMAL
WBC # BLD: 3.9 K/UL (ref 3.5–11)
WBC # BLD: ABNORMAL 10*3/UL
WBC UA: ABNORMAL /HPF
YEAST: ABNORMAL

## 2019-11-02 PROCEDURE — 99284 EMERGENCY DEPT VISIT MOD MDM: CPT

## 2019-11-02 PROCEDURE — 36415 COLL VENOUS BLD VENIPUNCTURE: CPT

## 2019-11-02 PROCEDURE — 2580000003 HC RX 258: Performed by: EMERGENCY MEDICINE

## 2019-11-02 PROCEDURE — 85025 COMPLETE CBC W/AUTO DIFF WBC: CPT

## 2019-11-02 PROCEDURE — 87086 URINE CULTURE/COLONY COUNT: CPT

## 2019-11-02 PROCEDURE — 96365 THER/PROPH/DIAG IV INF INIT: CPT

## 2019-11-02 PROCEDURE — 82947 ASSAY GLUCOSE BLOOD QUANT: CPT

## 2019-11-02 PROCEDURE — 96375 TX/PRO/DX INJ NEW DRUG ADDON: CPT

## 2019-11-02 PROCEDURE — 6360000002 HC RX W HCPCS: Performed by: EMERGENCY MEDICINE

## 2019-11-02 PROCEDURE — 6370000000 HC RX 637 (ALT 250 FOR IP): Performed by: EMERGENCY MEDICINE

## 2019-11-02 PROCEDURE — 81001 URINALYSIS AUTO W/SCOPE: CPT

## 2019-11-02 PROCEDURE — 80048 BASIC METABOLIC PNL TOTAL CA: CPT

## 2019-11-02 RX ORDER — CEPHALEXIN 500 MG/1
500 CAPSULE ORAL 2 TIMES DAILY
Qty: 14 CAPSULE | Refills: 0 | Status: SHIPPED | OUTPATIENT
Start: 2019-11-02 | End: 2020-02-25 | Stop reason: ALTCHOICE

## 2019-11-02 RX ORDER — 0.9 % SODIUM CHLORIDE 0.9 %
1000 INTRAVENOUS SOLUTION INTRAVENOUS ONCE
Status: COMPLETED | OUTPATIENT
Start: 2019-11-02 | End: 2019-11-02

## 2019-11-02 RX ORDER — 0.9 % SODIUM CHLORIDE 0.9 %
500 INTRAVENOUS SOLUTION INTRAVENOUS ONCE
Status: COMPLETED | OUTPATIENT
Start: 2019-11-02 | End: 2019-11-02

## 2019-11-02 RX ADMIN — INSULIN HUMAN 4 UNITS: 100 INJECTION, SOLUTION PARENTERAL at 20:48

## 2019-11-02 RX ADMIN — CEFTRIAXONE SODIUM 1 G: 1 INJECTION, POWDER, FOR SOLUTION INTRAMUSCULAR; INTRAVENOUS at 19:47

## 2019-11-02 RX ADMIN — SODIUM CHLORIDE 500 ML: 9 INJECTION, SOLUTION INTRAVENOUS at 21:17

## 2019-11-02 RX ADMIN — SODIUM CHLORIDE 1000 ML: 9 INJECTION, SOLUTION INTRAVENOUS at 19:47

## 2019-11-02 ASSESSMENT — ENCOUNTER SYMPTOMS
PHOTOPHOBIA: 0
COUGH: 0
SHORTNESS OF BREATH: 0
ABDOMINAL PAIN: 0
BACK PAIN: 0
COLOR CHANGE: 0
RHINORRHEA: 0
EYE REDNESS: 0
NAUSEA: 0
SORE THROAT: 0

## 2019-11-02 ASSESSMENT — PAIN SCALES - GENERAL: PAINLEVEL_OUTOF10: 8

## 2019-11-03 LAB
CULTURE: ABNORMAL
Lab: ABNORMAL
SPECIMEN DESCRIPTION: ABNORMAL

## 2019-11-04 ENCOUNTER — TELEPHONE (OUTPATIENT)
Dept: INTERNAL MEDICINE CLINIC | Age: 54
End: 2019-11-04

## 2020-01-06 RX ORDER — OMEPRAZOLE 20 MG/1
CAPSULE, DELAYED RELEASE ORAL
Qty: 30 CAPSULE | Refills: 11 | Status: SHIPPED | OUTPATIENT
Start: 2020-01-06 | End: 2020-09-21

## 2020-02-25 ENCOUNTER — HOSPITAL ENCOUNTER (EMERGENCY)
Age: 55
Discharge: HOME | End: 2020-02-25
Attending: EMERGENCY MEDICINE
Payer: MEDICAID

## 2020-02-25 ENCOUNTER — APPOINTMENT (OUTPATIENT)
Dept: CT IMAGING | Age: 55
End: 2020-02-25
Payer: MEDICAID

## 2020-02-25 VITALS
RESPIRATION RATE: 20 BRPM | BODY MASS INDEX: 31.74 KG/M2 | WEIGHT: 234 LBS | DIASTOLIC BLOOD PRESSURE: 66 MMHG | OXYGEN SATURATION: 96 % | HEART RATE: 95 BPM | SYSTOLIC BLOOD PRESSURE: 142 MMHG | TEMPERATURE: 97.8 F

## 2020-02-25 LAB
-: NORMAL
ABSOLUTE EOS #: 0.1 K/UL (ref 0–0.4)
ABSOLUTE IMMATURE GRANULOCYTE: ABNORMAL K/UL (ref 0–0.3)
ABSOLUTE LYMPH #: 1.4 K/UL (ref 1–4.8)
ABSOLUTE MONO #: 0.7 K/UL (ref 0.1–1.3)
ALBUMIN SERPL-MCNC: 4 G/DL (ref 3.5–5.2)
ALBUMIN/GLOBULIN RATIO: ABNORMAL (ref 1–2.5)
ALP BLD-CCNC: 122 U/L (ref 40–129)
ALT SERPL-CCNC: 36 U/L (ref 5–41)
AMORPHOUS: NORMAL
ANION GAP SERPL CALCULATED.3IONS-SCNC: 10 MMOL/L (ref 9–17)
AST SERPL-CCNC: 37 U/L
BACTERIA: NORMAL
BASOPHILS # BLD: 0 % (ref 0–2)
BASOPHILS ABSOLUTE: 0 K/UL (ref 0–0.2)
BILIRUB SERPL-MCNC: 2.84 MG/DL (ref 0.3–1.2)
BILIRUBIN URINE: ABNORMAL
BUN BLDV-MCNC: 20 MG/DL (ref 6–20)
BUN/CREAT BLD: ABNORMAL (ref 9–20)
CALCIUM SERPL-MCNC: 9.6 MG/DL (ref 8.6–10.4)
CASTS UA: NORMAL /LPF
CHLORIDE BLD-SCNC: 106 MMOL/L (ref 98–107)
CO2: 23 MMOL/L (ref 20–31)
COLOR: ABNORMAL
COMMENT UA: ABNORMAL
CREAT SERPL-MCNC: 0.61 MG/DL (ref 0.7–1.2)
CRYSTALS, UA: NORMAL /HPF
DIFFERENTIAL TYPE: ABNORMAL
EOSINOPHILS RELATIVE PERCENT: 1 % (ref 0–4)
EPITHELIAL CELLS UA: NORMAL /HPF
GFR AFRICAN AMERICAN: >60 ML/MIN
GFR NON-AFRICAN AMERICAN: >60 ML/MIN
GFR SERPL CREATININE-BSD FRML MDRD: ABNORMAL ML/MIN/{1.73_M2}
GFR SERPL CREATININE-BSD FRML MDRD: ABNORMAL ML/MIN/{1.73_M2}
GLUCOSE BLD-MCNC: 249 MG/DL (ref 70–99)
GLUCOSE URINE: ABNORMAL
HCT VFR BLD CALC: 45.3 % (ref 41–53)
HEMOGLOBIN: 15.7 G/DL (ref 13.5–17.5)
IMMATURE GRANULOCYTES: ABNORMAL %
KETONES, URINE: ABNORMAL
LEUKOCYTE ESTERASE, URINE: ABNORMAL
LIPASE: 45 U/L (ref 13–60)
LYMPHOCYTES # BLD: 20 % (ref 24–44)
MCH RBC QN AUTO: 31.7 PG (ref 26–34)
MCHC RBC AUTO-ENTMCNC: 34.7 G/DL (ref 31–37)
MCV RBC AUTO: 91.3 FL (ref 80–100)
MONOCYTES # BLD: 10 % (ref 1–7)
MUCUS: NORMAL
NITRITE, URINE: POSITIVE
NRBC AUTOMATED: ABNORMAL PER 100 WBC
OTHER OBSERVATIONS UA: NORMAL
PDW BLD-RTO: 14.2 % (ref 11.5–14.9)
PH UA: 5 (ref 5–8)
PLATELET # BLD: 79 K/UL (ref 150–450)
PLATELET ESTIMATE: ABNORMAL
PMV BLD AUTO: 8.2 FL (ref 6–12)
POTASSIUM SERPL-SCNC: 3.9 MMOL/L (ref 3.7–5.3)
PROTEIN UA: ABNORMAL
RBC # BLD: 4.96 M/UL (ref 4.5–5.9)
RBC # BLD: ABNORMAL 10*6/UL
RBC UA: NORMAL /HPF
RENAL EPITHELIAL, UA: NORMAL /HPF
SEG NEUTROPHILS: 69 % (ref 36–66)
SEGMENTED NEUTROPHILS ABSOLUTE COUNT: 5.1 K/UL (ref 1.3–9.1)
SODIUM BLD-SCNC: 139 MMOL/L (ref 135–144)
SPECIFIC GRAVITY UA: 1.04 (ref 1–1.03)
TOTAL PROTEIN: 7.8 G/DL (ref 6.4–8.3)
TRICHOMONAS: NORMAL
TURBIDITY: CLEAR
URINE HGB: ABNORMAL
UROBILINOGEN, URINE: NORMAL
WBC # BLD: 7.3 K/UL (ref 3.5–11)
WBC # BLD: ABNORMAL 10*3/UL
WBC UA: NORMAL /HPF
YEAST: NORMAL

## 2020-02-25 PROCEDURE — 36415 COLL VENOUS BLD VENIPUNCTURE: CPT

## 2020-02-25 PROCEDURE — 81001 URINALYSIS AUTO W/SCOPE: CPT

## 2020-02-25 PROCEDURE — 99284 EMERGENCY DEPT VISIT MOD MDM: CPT

## 2020-02-25 PROCEDURE — 96374 THER/PROPH/DIAG INJ IV PUSH: CPT

## 2020-02-25 PROCEDURE — 87491 CHLMYD TRACH DNA AMP PROBE: CPT

## 2020-02-25 PROCEDURE — 83690 ASSAY OF LIPASE: CPT

## 2020-02-25 PROCEDURE — 74176 CT ABD & PELVIS W/O CONTRAST: CPT

## 2020-02-25 PROCEDURE — 2580000003 HC RX 258: Performed by: STUDENT IN AN ORGANIZED HEALTH CARE EDUCATION/TRAINING PROGRAM

## 2020-02-25 PROCEDURE — 87086 URINE CULTURE/COLONY COUNT: CPT

## 2020-02-25 PROCEDURE — 87591 N.GONORRHOEAE DNA AMP PROB: CPT

## 2020-02-25 PROCEDURE — 6360000002 HC RX W HCPCS: Performed by: STUDENT IN AN ORGANIZED HEALTH CARE EDUCATION/TRAINING PROGRAM

## 2020-02-25 PROCEDURE — 96375 TX/PRO/DX INJ NEW DRUG ADDON: CPT

## 2020-02-25 PROCEDURE — 85025 COMPLETE CBC W/AUTO DIFF WBC: CPT

## 2020-02-25 PROCEDURE — 80053 COMPREHEN METABOLIC PANEL: CPT

## 2020-02-25 RX ORDER — CEPHALEXIN 500 MG/1
500 CAPSULE ORAL 2 TIMES DAILY
Qty: 14 CAPSULE | Refills: 0 | Status: SHIPPED | OUTPATIENT
Start: 2020-02-25 | End: 2020-03-03

## 2020-02-25 RX ORDER — MORPHINE SULFATE 4 MG/ML
4 INJECTION, SOLUTION INTRAMUSCULAR; INTRAVENOUS ONCE
Status: COMPLETED | OUTPATIENT
Start: 2020-02-25 | End: 2020-02-25

## 2020-02-25 RX ORDER — 0.9 % SODIUM CHLORIDE 0.9 %
1000 INTRAVENOUS SOLUTION INTRAVENOUS ONCE
Status: COMPLETED | OUTPATIENT
Start: 2020-02-25 | End: 2020-02-25

## 2020-02-25 RX ORDER — IBUPROFEN 600 MG/1
600 TABLET ORAL EVERY 6 HOURS PRN
Qty: 15 TABLET | Refills: 0 | Status: SHIPPED | OUTPATIENT
Start: 2020-02-25 | End: 2020-03-30

## 2020-02-25 RX ORDER — ONDANSETRON 2 MG/ML
4 INJECTION INTRAMUSCULAR; INTRAVENOUS ONCE
Status: COMPLETED | OUTPATIENT
Start: 2020-02-25 | End: 2020-02-25

## 2020-02-25 RX ADMIN — SODIUM CHLORIDE 1000 ML: 9 INJECTION, SOLUTION INTRAVENOUS at 20:10

## 2020-02-25 RX ADMIN — MORPHINE SULFATE 4 MG: 4 INJECTION, SOLUTION INTRAMUSCULAR; INTRAVENOUS at 20:10

## 2020-02-25 RX ADMIN — ONDANSETRON 4 MG: 2 INJECTION INTRAMUSCULAR; INTRAVENOUS at 20:10

## 2020-02-25 ASSESSMENT — ENCOUNTER SYMPTOMS
DIARRHEA: 1
ABDOMINAL PAIN: 1
NAUSEA: 1
EYE DISCHARGE: 0
EYE REDNESS: 0
SHORTNESS OF BREATH: 0
VOMITING: 1
CHEST TIGHTNESS: 0

## 2020-02-25 ASSESSMENT — PAIN DESCRIPTION - LOCATION: LOCATION: ABDOMEN

## 2020-02-25 ASSESSMENT — PAIN SCALES - GENERAL: PAINLEVEL_OUTOF10: 10

## 2020-02-25 ASSESSMENT — PAIN DESCRIPTION - PAIN TYPE: TYPE: ACUTE PAIN

## 2020-02-26 LAB
C. TRACHOMATIS DNA ,URINE: NEGATIVE
CULTURE: NORMAL
Lab: NORMAL
N. GONORRHOEAE DNA, URINE: NEGATIVE
SPECIMEN DESCRIPTION: NORMAL
SPECIMEN DESCRIPTION: NORMAL

## 2020-02-26 NOTE — ED PROVIDER NOTES
Non-medical: Not on file   Tobacco Use    Smoking status: Former Smoker     Last attempt to quit: 3/26/2012     Years since quittin.9    Smokeless tobacco: Former User   Substance and Sexual Activity    Alcohol use: Yes     Alcohol/week: 0.0 standard drinks     Comment: very little/ special events    Drug use: Yes     Types: Marijuana    Sexual activity: Not on file   Lifestyle    Physical activity:     Days per week: Not on file     Minutes per session: Not on file    Stress: Not on file   Relationships    Social connections:     Talks on phone: Not on file     Gets together: Not on file     Attends Mu-ism service: Not on file     Active member of club or organization: Not on file     Attends meetings of clubs or organizations: Not on file     Relationship status: Not on file    Intimate partner violence:     Fear of current or ex partner: Not on file     Emotionally abused: Not on file     Physically abused: Not on file     Forced sexual activity: Not on file   Other Topics Concern    Not on file   Social History Narrative    Not on file       Family History   Problem Relation Age of Onset    Cancer Other     Diabetes Other     High Blood Pressure Other     Heart Disease Other        Allergies:  Codeine and Simvastatin    Home Medications:  Prior to Admission medications    Medication Sig Start Date End Date Taking?  Authorizing Provider   cephALEXin (KEFLEX) 500 MG capsule Take 1 capsule by mouth 2 times daily for 7 days 2/25/20 3/3/20 Yes Kimberly Bass DO   ibuprofen (ADVIL;MOTRIN) 600 MG tablet Take 1 tablet by mouth every 6 hours as needed for Pain 20  Yes Kimberly Bass DO   omeprazole (PRILOSEC) 20 MG delayed release capsule TAKE 1 CAPSULE BY MOUTH TWICE DAILY 20   Mario Stephens MD   metoprolol tartrate (LOPRESSOR) 25 MG tablet TAKE 1 TABLET BY MOUTH TWICE DAILY 19   Mario Stephens MD   glimepiride (AMARYL) 4 MG tablet TAKE 1 TABLET BY MOUTH TWICE DAILY 10/2/19 Rodrick Joshua MD   metFORMIN (GLUCOPHAGE) 850 MG tablet Take 1 tablet by mouth 2 times daily (with meals) 8/23/19   Rodrick Joshua MD   lisinopril (PRINIVIL;ZESTRIL) 5 MG tablet TAKE 1 TABLET BY MOUTH ONCE DAILY 5/6/19   Rodrick Joshua MD   blood glucose monitor strips 1 strip by Other route 4 times daily Test 4  times a day & as needed for symptoms of irregular blood glucose. 5/3/19   Rodrick Joshua MD   ondansetron (ZOFRAN ODT) 4 MG disintegrating tablet Take 1 tablet by mouth every 8 hours as needed for Nausea or Vomiting 4/12/19   Emanuel Templeton DO   tamsulosin (FLOMAX) 0.4 MG capsule TAKE 1 CAPSULE BY MOUTH ONCE DAILY 4/2/19   Rodrick Joshua MD   atorvastatin (LIPITOR) 10 MG tablet TAKE 1 TABLET BY MOUTH ONE TIME A DAY  3/25/19   Rodrick Joshua MD   allopurinol (ZYLOPRIM) 100 MG tablet TAKE 1 TABLET BY MOUTH ONCE DAILY 2/20/19   Rodrick Joshua MD   [employer] LANCETS ULTRATHIN 30G MISC 1 each by Other route 3 times daily 10/19/18   Rodrick Joshua MD   albuterol sulfate HFA (VENTOLIN HFA) 108 (90 Base) MCG/ACT inhaler Inhale 1 puff into the lungs every 4 hours as needed for Wheezing 10/19/18   Rodrick Joshua MD   blood glucose test strips (ASCENSIA AUTODISC VI;ONE TOUCH ULTRA TEST VI) strip Use with associated glucose meter. 10/19/18   Rodrick Joshua MD   mometasone-formoterol (DULERA) 200-5 MCG/ACT inhaler Inhale 2 puffs into the lungs every 12 hours 10/19/18   Rordick Joshua MD   SITagliptin (JANUVIA) 100 MG tablet Take 1 tablet by mouth daily 10/15/18   Amanda Solitario MD   acetaminophen (AMINOFEN) 325 MG tablet Take 2 tablets by mouth every 6 hours as needed for Pain 10/15/18   Amanda Solitario MD   aspirin 81 MG tablet Take 81 mg by mouth    Historical Provider, MD       REVIEW OF SYSTEMS    (2-9 systems for level 4, 10 or more for level 5)      Review of Systems   Constitutional: Negative for chills and fever. Eyes: Negative for discharge and redness.    Respiratory: Negative for chest tightness and Neurological:      Mental Status: He is alert and oriented to person, place, and time. Psychiatric:         Behavior: Behavior normal.         DIFFERENTIAL  DIAGNOSIS     PLAN (LABS / IMAGING / EKG):  Orders Placed This Encounter   Procedures    C.trachomatis N.gonorrhoeae DNA, Urine    Culture, Urine    CT ABDOMEN PELVIS WO CONTRAST    CBC Auto Differential    Comprehensive Metabolic Panel    Lipase    Urinalysis Reflex to Culture    Microscopic Urinalysis       MEDICATIONS ORDERED:  Orders Placed This Encounter   Medications    morphine sulfate (PF) injection 4 mg    ondansetron (ZOFRAN) injection 4 mg    0.9 % sodium chloride bolus    cephALEXin (KEFLEX) 500 MG capsule     Sig: Take 1 capsule by mouth 2 times daily for 7 days     Dispense:  14 capsule     Refill:  0    ibuprofen (ADVIL;MOTRIN) 600 MG tablet     Sig: Take 1 tablet by mouth every 6 hours as needed for Pain     Dispense:  15 tablet     Refill:  0       DDX: Urinary tract infection versus GC chlamydia versus diverticulitis versus obstruction versus gastroenteritis    Initial MDM/Plan: 49 y.o. male who presents with abdominal pain, nausea, vomiting. Will get urine. CT abdomen pelvis without contrast due to concern for nephrolithiasis. Basic labs. Pain medicine. Anticipate discharge.     DIAGNOSTIC RESULTS / EMERGENCY DEPARTMENT COURSE / MDM     LABS:  Labs Reviewed   CBC WITH AUTO DIFFERENTIAL - Abnormal; Notable for the following components:       Result Value    Platelets 79 (*)     Seg Neutrophils 69 (*)     Lymphocytes 20 (*)     Monocytes 10 (*)     All other components within normal limits   COMPREHENSIVE METABOLIC PANEL - Abnormal; Notable for the following components:    Glucose 249 (*)     CREATININE 0.61 (*)     Total Bilirubin 2.84 (*)     All other components within normal limits   URINE RT REFLEX TO CULTURE - Abnormal; Notable for the following components:    Color, UA ORANGE (*)     Glucose, Ur 3+ (*)     Bilirubin a visit       Debra Lopez MD  [facility] [address] [facility] [address] [place] [address]  [phone]    Schedule an appointment as soon as possible for a visit         DISCHARGE MEDICATIONS:  Discharge Medication List as of 2/25/2020 10:05 PM      START taking these medications    Details   cephALEXin (KEFLEX) 500 MG capsule Take 1 capsule by mouth 2 times daily for 7 days, Disp-14 capsule, R-0Print      ibuprofen (ADVIL;MOTRIN) 600 MG tablet Take 1 tablet by mouth every 6 hours as needed for Pain, Disp-15 tablet, R-0Print             Philippe Soto DO  EmergencyMedicine Resident    (Please note that portions of this note were completed with a voice recognition program.  Efforts were made to edit the dictations but occasionally words are mis-transcribed.)     Philippe Soto DO  Resident  02/26/20 4262

## 2020-02-26 NOTE — ED NOTES
Mode of arrival (squad #, walk in, police, etc) : walk in        Chief complaint(s): c/o abd pain, nausea, vomiting and dysuria        Arrival Note (brief scenario, treatment PTA, etc). : Pt presents to ED from home w/ multiple complaints. Pt stated he has had abd pain ongoing x a few days along w/ nausea and vomiting. Pt stated \"I have an infection in my bladder I think\". Pt stated he has had bilateral flank pain ongoing x weeks. Pt stated \"I dont like to go to the dr angela glasgow coming in here with all this going on\". Pt is A&Ox4, eupneic, PWD. GCS=15. Call light in reach. C= \"Have you ever felt that you should Cut down on your drinking? \"  No  A= \"Have people Annoyed you by criticizing your drinking? \"  No  G= \"Have you ever felt bad or Guilty about your drinking? \"  No  E= \"Have you ever had a drink as an Eye-opener first thing in the morning to steady your nerves or to help a hangover? \"  No      Deferred []      Reason for deferring: N/A    *If yes to two or more: probable alcohol abuse. Mann Beltran RN  02/25/20 8024

## 2020-03-13 ENCOUNTER — TELEPHONE (OUTPATIENT)
Dept: INTERNAL MEDICINE CLINIC | Age: 55
End: 2020-03-13

## 2020-03-14 ENCOUNTER — HOSPITAL ENCOUNTER (EMERGENCY)
Age: 55
Discharge: HOME | End: 2020-03-14
Attending: EMERGENCY MEDICINE
Payer: MEDICAID

## 2020-03-14 VITALS
BODY MASS INDEX: 27.09 KG/M2 | HEART RATE: 87 BPM | DIASTOLIC BLOOD PRESSURE: 70 MMHG | RESPIRATION RATE: 16 BRPM | HEIGHT: 72 IN | SYSTOLIC BLOOD PRESSURE: 122 MMHG | OXYGEN SATURATION: 96 % | TEMPERATURE: 98.3 F | WEIGHT: 200 LBS

## 2020-03-14 LAB
-: ABNORMAL
AMORPHOUS: ABNORMAL
BACTERIA: ABNORMAL
BILIRUBIN URINE: ABNORMAL
CASTS UA: ABNORMAL /LPF
COLOR: ABNORMAL
COMMENT UA: ABNORMAL
CRYSTALS, UA: ABNORMAL /HPF
EPITHELIAL CELLS UA: ABNORMAL /HPF
GLUCOSE URINE: ABNORMAL
KETONES, URINE: ABNORMAL
LEUKOCYTE ESTERASE, URINE: ABNORMAL
MUCUS: ABNORMAL
NITRITE, URINE: POSITIVE
OTHER OBSERVATIONS UA: ABNORMAL
PH UA: 5.5 (ref 5–8)
PROTEIN UA: NEGATIVE
RBC UA: ABNORMAL /HPF
RENAL EPITHELIAL, UA: ABNORMAL /HPF
SPECIFIC GRAVITY UA: 1.04 (ref 1–1.03)
TRICHOMONAS: ABNORMAL
TURBIDITY: CLEAR
URINE HGB: ABNORMAL
UROBILINOGEN, URINE: NORMAL
WBC UA: ABNORMAL /HPF
YEAST: ABNORMAL

## 2020-03-14 PROCEDURE — 99283 EMERGENCY DEPT VISIT LOW MDM: CPT

## 2020-03-14 PROCEDURE — 87086 URINE CULTURE/COLONY COUNT: CPT

## 2020-03-14 PROCEDURE — 6370000000 HC RX 637 (ALT 250 FOR IP): Performed by: STUDENT IN AN ORGANIZED HEALTH CARE EDUCATION/TRAINING PROGRAM

## 2020-03-14 PROCEDURE — 81001 URINALYSIS AUTO W/SCOPE: CPT

## 2020-03-14 PROCEDURE — 87186 SC STD MICRODIL/AGAR DIL: CPT

## 2020-03-14 PROCEDURE — 86403 PARTICLE AGGLUT ANTBDY SCRN: CPT

## 2020-03-14 RX ORDER — SULFAMETHOXAZOLE AND TRIMETHOPRIM 800; 160 MG/1; MG/1
1 TABLET ORAL ONCE
Status: COMPLETED | OUTPATIENT
Start: 2020-03-14 | End: 2020-03-14

## 2020-03-14 RX ORDER — SULFAMETHOXAZOLE AND TRIMETHOPRIM 800; 160 MG/1; MG/1
1 TABLET ORAL 2 TIMES DAILY
Qty: 20 TABLET | Refills: 0 | Status: SHIPPED | OUTPATIENT
Start: 2020-03-14 | End: 2020-03-24

## 2020-03-14 RX ADMIN — SULFAMETHOXAZOLE AND TRIMETHOPRIM 1 TABLET: 800; 160 TABLET ORAL at 23:48

## 2020-03-15 ASSESSMENT — ENCOUNTER SYMPTOMS
VOMITING: 0
SORE THROAT: 0
WHEEZING: 0
SHORTNESS OF BREATH: 0
SINUS PRESSURE: 0
ABDOMINAL PAIN: 0
COUGH: 0
BACK PAIN: 0
ABDOMINAL DISTENTION: 0
NAUSEA: 0
SINUS PAIN: 0
CHEST TIGHTNESS: 0
DIARRHEA: 0
COLOR CHANGE: 0

## 2020-03-15 NOTE — ED PROVIDER NOTES
Tobacco Use    Smoking status: Former Smoker     Last attempt to quit: 3/26/2012     Years since quittin.9    Smokeless tobacco: Former User   Substance and Sexual Activity    Alcohol use: Not Currently     Alcohol/week: 0.0 standard drinks     Comment: very little/ special events    Drug use: Yes     Types: Marijuana    Sexual activity: Not on file   Lifestyle    Physical activity     Days per week: Not on file     Minutes per session: Not on file    Stress: Not on file   Relationships    Social connections     Talks on phone: Not on file     Gets together: Not on file     Attends Latter-day service: Not on file     Active member of club or organization: Not on file     Attends meetings of clubs or organizations: Not on file     Relationship status: Not on file    Intimate partner violence     Fear of current or ex partner: Not on file     Emotionally abused: Not on file     Physically abused: Not on file     Forced sexual activity: Not on file   Other Topics Concern    Not on file   Social History Narrative    Not on file       Family History   Problem Relation Age of Onset    Cancer Other     Diabetes Other     High Blood Pressure Other     Heart Disease Other        Allergies:    Codeine and Simvastatin    Home Medications:  Prior to Admission medications    Medication Sig Start Date End Date Taking?  Authorizing Provider   sulfamethoxazole-trimethoprim (BACTRIM DS) 800-160 MG per tablet Take 1 tablet by mouth 2 times daily for 10 days 3/14/20 3/24/20 Yes Gonzalez Bojorquez MD   ibuprofen (ADVIL;MOTRIN) 600 MG tablet Take 1 tablet by mouth every 6 hours as needed for Pain 20   Brenda Fulton DO   omeprazole (PRILOSEC) 20 MG delayed release capsule TAKE 1 CAPSULE BY MOUTH TWICE DAILY 20   Carlos Crain MD   metoprolol tartrate (LOPRESSOR) 25 MG tablet TAKE 1 TABLET BY MOUTH TWICE DAILY 19   Carlos Crain MD   glimepiride (AMARYL) 4 MG tablet TAKE 1 TABLET BY MOUTH TWICE DAILY for complicated cystitis as patient is a male and has had recent antibiotics. Culture obtained. Patient to follow-up with his primary care physician. Prescribed Bactrim twice daily for the next 10 days. MDM  Number of Diagnoses or Management Options  Acute cystitis without hematuria: new, needed workup     Amount and/or Complexity of Data Reviewed  Clinical lab tests: ordered and reviewed  Review and summarize past medical records: yes  Discuss the patient with other providers: yes    Risk of Complications, Morbidity, and/or Mortality  Presenting problems: low  Diagnostic procedures: low  Management options: low    Patient Progress  Patient progress: stable      PROCEDURES:  none    CONSULTS:  None    CRITICAL CARE:  Please see attending note    FINAL IMPRESSION     1. Acute cystitis without hematuria          DISPOSITION / PLAN   DISPOSITION Decision To Discharge 03/14/2020 11:39:16 PM      Evaluation and treatment course in the ED, and plan of care upon discharge was discussed in length with the patient. Patient had no further questions prior to being discharged and was instructed to return to the ED for new or worsening symptoms. Any changes to existing medications or new prescriptions were reviewed with patient and they expressed understanding of how to correctly take their medications and the possible side effects.     PATIENT REFERRED TO:  Sivan Zambrano, [address] [place] [phone]    In 3 days        DISCHARGE MEDICATIONS:  Discharge Medication List as of 3/14/2020 11:47 PM      START taking these medications    Details   sulfamethoxazole-trimethoprim (BACTRIM DS) 800-160 MG per tablet Take 1 tablet by mouth 2 times daily for 10 days, Disp-20 tablet, R-0Print             Krys Figueroa DO  Emergency Medicine Resident Physician, PGY-2    (Please note that portions of this note were completed with a voice recognition program.  Efforts were made to edit the dictations but occasionally words are mis-transcribed.)          Liz Fair MD  03/15/20 1799

## 2020-03-17 ENCOUNTER — TELEPHONE (OUTPATIENT)
Dept: INTERNAL MEDICINE CLINIC | Age: 55
End: 2020-03-17

## 2020-03-18 ENCOUNTER — TELEPHONE (OUTPATIENT)
Dept: INTERNAL MEDICINE CLINIC | Age: 55
End: 2020-03-18

## 2020-03-18 LAB
CULTURE: ABNORMAL
CULTURE: ABNORMAL
Lab: ABNORMAL
SPECIMEN DESCRIPTION: ABNORMAL

## 2020-03-18 RX ORDER — ALLOPURINOL 100 MG/1
TABLET ORAL
Qty: 30 TABLET | Refills: 3 | Status: SHIPPED | OUTPATIENT
Start: 2020-03-18 | End: 2020-08-05

## 2020-03-18 RX ORDER — NITROFURANTOIN 25; 75 MG/1; MG/1
100 CAPSULE ORAL 2 TIMES DAILY
Qty: 10 CAPSULE | Refills: 0 | Status: SHIPPED | OUTPATIENT
Start: 2020-03-18 | End: 2020-03-23

## 2020-03-18 NOTE — TELEPHONE ENCOUNTER
Final urine culture sensitivities available, urine is sensitive to Macrobid, discontinue Bactrim, start Macrobid for 5 days.   Please call the patient

## 2020-03-30 ENCOUNTER — APPOINTMENT (OUTPATIENT)
Dept: GENERAL RADIOLOGY | Age: 55
DRG: 045 | End: 2020-03-30
Payer: MEDICAID

## 2020-03-30 ENCOUNTER — HOSPITAL ENCOUNTER (INPATIENT)
Age: 55
LOS: 1 days | Discharge: HOME | DRG: 045 | End: 2020-03-31
Attending: EMERGENCY MEDICINE | Admitting: INTERNAL MEDICINE
Payer: MEDICAID

## 2020-03-30 ENCOUNTER — APPOINTMENT (OUTPATIENT)
Dept: CT IMAGING | Age: 55
DRG: 045 | End: 2020-03-30
Payer: MEDICAID

## 2020-03-30 PROBLEM — R29.90 STROKE-LIKE SYMPTOMS: Status: ACTIVE | Noted: 2020-03-30

## 2020-03-30 LAB
ABSOLUTE EOS #: 0.08 K/UL (ref 0–0.4)
ABSOLUTE IMMATURE GRANULOCYTE: ABNORMAL K/UL (ref 0–0.3)
ABSOLUTE LYMPH #: 1.33 K/UL (ref 1–4.8)
ABSOLUTE MONO #: 0.39 K/UL (ref 0.1–1.3)
ALBUMIN SERPL-MCNC: 3.8 G/DL (ref 3.5–5.2)
ALBUMIN/GLOBULIN RATIO: ABNORMAL (ref 1–2.5)
ALP BLD-CCNC: 130 U/L (ref 40–129)
ALT SERPL-CCNC: 39 U/L (ref 5–41)
ANION GAP SERPL CALCULATED.3IONS-SCNC: 12 MMOL/L (ref 9–17)
AST SERPL-CCNC: 39 U/L
BASOPHILS # BLD: 0 % (ref 0–2)
BASOPHILS ABSOLUTE: 0 K/UL (ref 0–0.2)
BILIRUB SERPL-MCNC: 1.95 MG/DL (ref 0.3–1.2)
BUN BLDV-MCNC: 11 MG/DL (ref 6–20)
BUN/CREAT BLD: ABNORMAL (ref 9–20)
CALCIUM SERPL-MCNC: 9.4 MG/DL (ref 8.6–10.4)
CHLORIDE BLD-SCNC: 104 MMOL/L (ref 98–107)
CO2: 24 MMOL/L (ref 20–31)
CREAT SERPL-MCNC: 0.58 MG/DL (ref 0.7–1.2)
DIFFERENTIAL TYPE: ABNORMAL
EOSINOPHILS RELATIVE PERCENT: 2 % (ref 0–4)
GFR AFRICAN AMERICAN: >60 ML/MIN
GFR NON-AFRICAN AMERICAN: >60 ML/MIN
GFR NON-AFRICAN AMERICAN: >60 ML/MIN
GFR SERPL CREATININE-BSD FRML MDRD: >60 ML/MIN
GFR SERPL CREATININE-BSD FRML MDRD: ABNORMAL ML/MIN/{1.73_M2}
GFR SERPL CREATININE-BSD FRML MDRD: ABNORMAL ML/MIN/{1.73_M2}
GFR SERPL CREATININE-BSD FRML MDRD: NORMAL ML/MIN/{1.73_M2}
GLUCOSE BLD-MCNC: 229 MG/DL (ref 75–110)
GLUCOSE BLD-MCNC: 265 MG/DL (ref 75–110)
GLUCOSE BLD-MCNC: 329 MG/DL (ref 70–99)
HCT VFR BLD CALC: 44.4 % (ref 41–53)
HEMOGLOBIN: 15 G/DL (ref 13.5–17.5)
IMMATURE GRANULOCYTES: ABNORMAL %
INR BLD: 1
LYMPHOCYTES # BLD: 34 % (ref 24–44)
MCH RBC QN AUTO: 31.1 PG (ref 26–34)
MCHC RBC AUTO-ENTMCNC: 33.8 G/DL (ref 31–37)
MCV RBC AUTO: 92.1 FL (ref 80–100)
MONOCYTES # BLD: 10 % (ref 1–7)
MORPHOLOGY: NORMAL
NRBC AUTOMATED: ABNORMAL PER 100 WBC
PARTIAL THROMBOPLASTIN TIME: 30.5 SEC (ref 24–36)
PDW BLD-RTO: 14 % (ref 11.5–14.9)
PLATELET # BLD: 79 K/UL (ref 150–450)
PLATELET ESTIMATE: ABNORMAL
PMV BLD AUTO: 8.4 FL (ref 6–12)
POC CREATININE: 0.62 MG/DL (ref 0.51–1.19)
POTASSIUM SERPL-SCNC: 4 MMOL/L (ref 3.7–5.3)
PROTHROMBIN TIME: 12.9 SEC (ref 11.8–14.6)
RBC # BLD: 4.82 M/UL (ref 4.5–5.9)
RBC # BLD: ABNORMAL 10*6/UL
SEG NEUTROPHILS: 54 % (ref 36–66)
SEGMENTED NEUTROPHILS ABSOLUTE COUNT: 2.1 K/UL (ref 1.3–9.1)
SODIUM BLD-SCNC: 140 MMOL/L (ref 135–144)
TOTAL PROTEIN: 7.1 G/DL (ref 6.4–8.3)
TROPONIN INTERP: NORMAL
TROPONIN INTERP: NORMAL
TROPONIN T: NORMAL NG/ML
TROPONIN T: NORMAL NG/ML
TROPONIN, HIGH SENSITIVITY: 10 NG/L (ref 0–22)
TROPONIN, HIGH SENSITIVITY: 11 NG/L (ref 0–22)
TSH SERPL DL<=0.05 MIU/L-ACNC: 1.16 MIU/L (ref 0.3–5)
WBC # BLD: 3.9 K/UL (ref 3.5–11)
WBC # BLD: ABNORMAL 10*3/UL

## 2020-03-30 PROCEDURE — 82947 ASSAY GLUCOSE BLOOD QUANT: CPT

## 2020-03-30 PROCEDURE — 2580000003 HC RX 258: Performed by: EMERGENCY MEDICINE

## 2020-03-30 PROCEDURE — 99285 EMERGENCY DEPT VISIT HI MDM: CPT

## 2020-03-30 PROCEDURE — 70496 CT ANGIOGRAPHY HEAD: CPT

## 2020-03-30 PROCEDURE — 93005 ELECTROCARDIOGRAM TRACING: CPT | Performed by: EMERGENCY MEDICINE

## 2020-03-30 PROCEDURE — 85025 COMPLETE CBC W/AUTO DIFF WBC: CPT

## 2020-03-30 PROCEDURE — 85610 PROTHROMBIN TIME: CPT

## 2020-03-30 PROCEDURE — 84484 ASSAY OF TROPONIN QUANT: CPT

## 2020-03-30 PROCEDURE — 82565 ASSAY OF CREATININE: CPT

## 2020-03-30 PROCEDURE — 99223 1ST HOSP IP/OBS HIGH 75: CPT | Performed by: INTERNAL MEDICINE

## 2020-03-30 PROCEDURE — 6370000000 HC RX 637 (ALT 250 FOR IP): Performed by: STUDENT IN AN ORGANIZED HEALTH CARE EDUCATION/TRAINING PROGRAM

## 2020-03-30 PROCEDURE — 99497 ADVNCD CARE PLAN 30 MIN: CPT | Performed by: PSYCHIATRY & NEUROLOGY

## 2020-03-30 PROCEDURE — 71045 X-RAY EXAM CHEST 1 VIEW: CPT

## 2020-03-30 PROCEDURE — 84443 ASSAY THYROID STIM HORMONE: CPT

## 2020-03-30 PROCEDURE — 6370000000 HC RX 637 (ALT 250 FOR IP): Performed by: EMERGENCY MEDICINE

## 2020-03-30 PROCEDURE — 6360000004 HC RX CONTRAST MEDICATION: Performed by: EMERGENCY MEDICINE

## 2020-03-30 PROCEDURE — 2060000000 HC ICU INTERMEDIATE R&B

## 2020-03-30 PROCEDURE — 70450 CT HEAD/BRAIN W/O DYE: CPT

## 2020-03-30 PROCEDURE — 2580000003 HC RX 258: Performed by: STUDENT IN AN ORGANIZED HEALTH CARE EDUCATION/TRAINING PROGRAM

## 2020-03-30 PROCEDURE — 83036 HEMOGLOBIN GLYCOSYLATED A1C: CPT

## 2020-03-30 PROCEDURE — 80053 COMPREHEN METABOLIC PANEL: CPT

## 2020-03-30 PROCEDURE — 85730 THROMBOPLASTIN TIME PARTIAL: CPT

## 2020-03-30 PROCEDURE — 36415 COLL VENOUS BLD VENIPUNCTURE: CPT

## 2020-03-30 RX ORDER — POTASSIUM CHLORIDE 7.45 MG/ML
10 INJECTION INTRAVENOUS PRN
Status: DISCONTINUED | OUTPATIENT
Start: 2020-03-30 | End: 2020-03-31 | Stop reason: HOSPADM

## 2020-03-30 RX ORDER — ASPIRIN 81 MG/1
81 TABLET, CHEWABLE ORAL DAILY
Status: DISCONTINUED | OUTPATIENT
Start: 2020-03-31 | End: 2020-03-31 | Stop reason: HOSPADM

## 2020-03-30 RX ORDER — DEXTROSE MONOHYDRATE 50 MG/ML
100 INJECTION, SOLUTION INTRAVENOUS PRN
Status: DISCONTINUED | OUTPATIENT
Start: 2020-03-30 | End: 2020-03-31 | Stop reason: HOSPADM

## 2020-03-30 RX ORDER — SODIUM CHLORIDE 0.9 % (FLUSH) 0.9 %
10 SYRINGE (ML) INJECTION PRN
Status: DISCONTINUED | OUTPATIENT
Start: 2020-03-30 | End: 2020-03-31 | Stop reason: HOSPADM

## 2020-03-30 RX ORDER — ACETAMINOPHEN 650 MG/1
650 SUPPOSITORY RECTAL EVERY 6 HOURS PRN
Status: DISCONTINUED | OUTPATIENT
Start: 2020-03-30 | End: 2020-03-31 | Stop reason: HOSPADM

## 2020-03-30 RX ORDER — CLOPIDOGREL BISULFATE 75 MG/1
300 TABLET ORAL ONCE
Status: COMPLETED | OUTPATIENT
Start: 2020-03-30 | End: 2020-03-30

## 2020-03-30 RX ORDER — ONDANSETRON 2 MG/ML
4 INJECTION INTRAMUSCULAR; INTRAVENOUS EVERY 6 HOURS PRN
Status: DISCONTINUED | OUTPATIENT
Start: 2020-03-30 | End: 2020-03-31 | Stop reason: HOSPADM

## 2020-03-30 RX ORDER — 0.9 % SODIUM CHLORIDE 0.9 %
80 INTRAVENOUS SOLUTION INTRAVENOUS ONCE
Status: COMPLETED | OUTPATIENT
Start: 2020-03-30 | End: 2020-03-30

## 2020-03-30 RX ORDER — POLYETHYLENE GLYCOL 3350 17 G/17G
17 POWDER, FOR SOLUTION ORAL DAILY PRN
Status: DISCONTINUED | OUTPATIENT
Start: 2020-03-30 | End: 2020-03-31 | Stop reason: HOSPADM

## 2020-03-30 RX ORDER — PANTOPRAZOLE SODIUM 40 MG/1
40 TABLET, DELAYED RELEASE ORAL
Status: DISCONTINUED | OUTPATIENT
Start: 2020-03-31 | End: 2020-03-31 | Stop reason: HOSPADM

## 2020-03-30 RX ORDER — NICOTINE POLACRILEX 4 MG
15 LOZENGE BUCCAL PRN
Status: DISCONTINUED | OUTPATIENT
Start: 2020-03-30 | End: 2020-03-31 | Stop reason: HOSPADM

## 2020-03-30 RX ORDER — ASPIRIN 81 MG/1
324 TABLET, CHEWABLE ORAL ONCE
Status: COMPLETED | OUTPATIENT
Start: 2020-03-30 | End: 2020-03-30

## 2020-03-30 RX ORDER — ATORVASTATIN CALCIUM 10 MG/1
10 TABLET, FILM COATED ORAL DAILY
Status: DISCONTINUED | OUTPATIENT
Start: 2020-03-30 | End: 2020-03-31

## 2020-03-30 RX ORDER — LISINOPRIL 5 MG/1
5 TABLET ORAL DAILY
Status: DISCONTINUED | OUTPATIENT
Start: 2020-03-31 | End: 2020-03-31 | Stop reason: HOSPADM

## 2020-03-30 RX ORDER — BUDESONIDE AND FORMOTEROL FUMARATE DIHYDRATE 160; 4.5 UG/1; UG/1
2 AEROSOL RESPIRATORY (INHALATION) 2 TIMES DAILY
Status: DISCONTINUED | OUTPATIENT
Start: 2020-03-30 | End: 2020-03-31 | Stop reason: HOSPADM

## 2020-03-30 RX ORDER — SODIUM CHLORIDE 0.9 % (FLUSH) 0.9 %
10 SYRINGE (ML) INJECTION EVERY 12 HOURS SCHEDULED
Status: DISCONTINUED | OUTPATIENT
Start: 2020-03-30 | End: 2020-03-31 | Stop reason: HOSPADM

## 2020-03-30 RX ORDER — POTASSIUM CHLORIDE 20 MEQ/1
40 TABLET, EXTENDED RELEASE ORAL PRN
Status: DISCONTINUED | OUTPATIENT
Start: 2020-03-30 | End: 2020-03-31 | Stop reason: HOSPADM

## 2020-03-30 RX ORDER — TAMSULOSIN HYDROCHLORIDE 0.4 MG/1
0.4 CAPSULE ORAL DAILY
Status: DISCONTINUED | OUTPATIENT
Start: 2020-03-30 | End: 2020-03-31 | Stop reason: HOSPADM

## 2020-03-30 RX ORDER — CLOPIDOGREL BISULFATE 75 MG/1
75 TABLET ORAL DAILY
Status: DISCONTINUED | OUTPATIENT
Start: 2020-03-30 | End: 2020-03-31 | Stop reason: HOSPADM

## 2020-03-30 RX ORDER — ACETAMINOPHEN 325 MG/1
650 TABLET ORAL EVERY 6 HOURS PRN
Status: DISCONTINUED | OUTPATIENT
Start: 2020-03-30 | End: 2020-03-31 | Stop reason: HOSPADM

## 2020-03-30 RX ORDER — DEXTROSE MONOHYDRATE 25 G/50ML
12.5 INJECTION, SOLUTION INTRAVENOUS PRN
Status: DISCONTINUED | OUTPATIENT
Start: 2020-03-30 | End: 2020-03-31 | Stop reason: HOSPADM

## 2020-03-30 RX ADMIN — Medication 10 ML: at 17:27

## 2020-03-30 RX ADMIN — ATORVASTATIN CALCIUM 10 MG: 10 TABLET, FILM COATED ORAL at 17:26

## 2020-03-30 RX ADMIN — ASPIRIN 81 MG 324 MG: 81 TABLET ORAL at 13:14

## 2020-03-30 RX ADMIN — INSULIN LISPRO 1 UNITS: 100 INJECTION, SOLUTION INTRAVENOUS; SUBCUTANEOUS at 22:55

## 2020-03-30 RX ADMIN — INSULIN LISPRO 3 UNITS: 100 INJECTION, SOLUTION INTRAVENOUS; SUBCUTANEOUS at 17:25

## 2020-03-30 RX ADMIN — IOPAMIDOL 75 ML: 755 INJECTION, SOLUTION INTRAVENOUS at 13:00

## 2020-03-30 RX ADMIN — Medication 10 ML: at 13:00

## 2020-03-30 RX ADMIN — Medication 10 ML: at 22:50

## 2020-03-30 RX ADMIN — TAMSULOSIN HYDROCHLORIDE 0.4 MG: 0.4 CAPSULE ORAL at 17:26

## 2020-03-30 RX ADMIN — SODIUM CHLORIDE 80 ML: 9 INJECTION, SOLUTION INTRAVENOUS at 12:59

## 2020-03-30 RX ADMIN — CLOPIDOGREL BISULFATE 300 MG: 75 TABLET, FILM COATED ORAL at 13:14

## 2020-03-30 ASSESSMENT — ENCOUNTER SYMPTOMS
COLOR CHANGE: 0
ABDOMINAL DISTENTION: 0
DIARRHEA: 0
SHORTNESS OF BREATH: 0
BLOOD IN STOOL: 0
TROUBLE SWALLOWING: 0
ABDOMINAL PAIN: 0
SORE THROAT: 0
COUGH: 0
EYE REDNESS: 0
BACK PAIN: 0
SINUS PAIN: 0
CONSTIPATION: 0
CHEST TIGHTNESS: 0
SINUS PRESSURE: 0
NAUSEA: 0
VOMITING: 0
VOICE CHANGE: 0
EYE DISCHARGE: 0

## 2020-03-30 ASSESSMENT — PAIN SCALES - GENERAL: PAINLEVEL_OUTOF10: 0

## 2020-03-30 NOTE — ED PROVIDER NOTES
[address] ED  eMERGENCY dEPARTMENT eNCOUnter    Pt Name: Kuldeep Coronel  MRN: [id number]  YOB: 1965  Date of evaluation:3/30/20  PCP: Liz Lord MD    [address] [place] [address]       Chief Complaint   Patient presents with    Numbness    Facial Droop       HISTORY OF PRESENT ILLNESS    Kuldeep Coronel is a 48 y.o. male who presents with a chief complaint of right-sided facial droop and right-sided facial numbness. Patient states his symptoms started around 10 PM last night. Symptoms have not changed at all since then. States he got concerned because this morning he went to take a drink and is basically fell out of his mouth. Denies any headache, dizziness or lightheadedness. No numbness, tingling, weakness of his extremities. No falls or trauma. He denies any neck pain or stiffness. No recent fevers, chills or other illnesses. No blurred or double vision. He does state that his right eye was watering a little bit. Symptoms are acute. Symptoms are moderate. Nothing makes symptoms better or worse. Patient has no other complaints at this time. REVIEW OF SYSTEMS       Review of Systems   Constitutional: Negative for chills, fatigue and fever. HENT: Negative for congestion, ear pain, sore throat and trouble swallowing. Eyes: Negative for visual disturbance. Respiratory: Negative for cough and shortness of breath. Cardiovascular: Negative for chest pain, palpitations and leg swelling. Gastrointestinal: Negative for abdominal pain, blood in stool, constipation, diarrhea, nausea and vomiting. Genitourinary: Negative for dysuria and flank pain. Musculoskeletal: Negative for arthralgias, back pain, myalgias and neck pain. Skin: Negative for color change, rash and wound. Neurological: Positive for facial asymmetry and numbness (Right-sided face). Negative for dizziness, weakness, light-headedness and headaches. Psychiatric/Behavioral: Negative for confusion.    All other TABLET BY MOUTH TWICE DAILY    MOMETASONE-FORMOTEROL (DULERA) 200-5 MCG/ACT INHALER    Inhale 2 puffs into the lungs every 12 hours    OMEPRAZOLE (PRILOSEC) 20 MG DELAYED RELEASE CAPSULE    TAKE 1 CAPSULE BY MOUTH TWICE DAILY    ONDANSETRON (ZOFRAN ODT) 4 MG DISINTEGRATING TABLET    Take 1 tablet by mouth every 8 hours as needed for Nausea or Vomiting    SITAGLIPTIN (JANUVIA) 100 MG TABLET    Take 1 tablet by mouth daily    TAMSULOSIN (FLOMAX) 0.4 MG CAPSULE    TAKE 1 CAPSULE BY MOUTH ONCE DAILY       ALLERGIES     is allergic to codeine and simvastatin. FAMILY HISTORY     He indicated that the status of his other is unknown.     family history includes Cancer in an other family member; Diabetes in an other family member; Heart Disease in an other family member; High Blood Pressure in an other family member. SOCIAL HISTORY      reports that he quit smoking about 8 years ago. He has quit using smokeless tobacco. He reports previous alcohol use. He reports current drug use. Drug: Marijuana. PHYSICAL EXAM     INITIAL VITALS:  height is 6' (1.829 m) and weight is 200 lb (90.7 kg). His oral temperature is 98.1 °F (36.7 °C). His blood pressure is 131/75 and his pulse is 70. His respiration is 18 and oxygen saturation is 96%. Physical Exam  Vitals signs and nursing note reviewed. Constitutional:       General: He is not in acute distress. HENT:      Head: Normocephalic and atraumatic. Eyes:      Conjunctiva/sclera: Conjunctivae normal.      Pupils: Pupils are equal, round, and reactive to light. Neck:      Musculoskeletal: Neck supple. Cardiovascular:      Rate and Rhythm: Normal rate and regular rhythm. Heart sounds: Normal heart sounds. No murmur. Pulmonary:      Effort: Pulmonary effort is normal. No respiratory distress. Breath sounds: Normal breath sounds. Abdominal:      General: Bowel sounds are normal. There is no distension. Palpations: Abdomen is soft.       Tenderness: loss of sensation on his right cheek, right jawline, very mild facial droop on the right side with smiling. I do not appreciate any involvement of the left forehead. Patient was called as a stroke alert. Lower suspicion of Bell's palsy due to sparing of the forehead. We will get stat CT head, CTA head and neck. Will speak with neurology. DIAGNOSTIC RESULTS     EKG: All EKG's are interpreted by the Emergency Department Physician who either signs or Co-signs this chart in the absence of a cardiologist.        RADIOLOGY:   I directly visualized the following  images and reviewed the radiologist interpretations:  XR CHEST PORTABLE   Final Result   No acute airspace disease identified. CTA HEAD NECK W CONTRAST   Final Result   No acute abnormality or flow-limiting stenosis of the major arteries of the   head and neck. CT Head WO Contrast   Final Result   1. No acute intracranial abnormality. Results were discussed with Dr. Frankey Parent at 1:02 p.m. on 03/30/2020. ED BEDSIDE ULTRASOUND:      LABS:  Labs Reviewed   CBC WITH AUTO DIFFERENTIAL - Abnormal; Notable for the following components:       Result Value    Platelets 79 (*)     Monocytes 10 (*)     All other components within normal limits   COMPREHENSIVE METABOLIC PANEL - Abnormal; Notable for the following components:    Glucose 329 (*)     CREATININE 0.58 (*)     Alkaline Phosphatase 130 (*)     Total Bilirubin 1.95 (*)     All other components within normal limits   PROTIME-INR   APTT   TROPONIN   TROPONIN   CREATININE W/GFR POINT OF CARE         EMERGENCY DEPARTMENT COURSE:   Vitals:    Vitals:    03/30/20 1240 03/30/20 1258   BP:  131/75   Pulse:  70   Resp:  18   Temp:  98.1 °F (36.7 °C)   TempSrc:  Oral   SpO2:  96%   Weight: 200 lb (90.7 kg)    Height: 6' (1.829 m)        12:57 PM EDT  I spoke with neurologist on-call for stroke Dr. Frankey Parent and discussed case. We agreed he is not a TPA candidate based on time of onset.   He is

## 2020-03-30 NOTE — ED NOTES
Neurointerventionist from [facility], Krystin Lentz, returned call to [address] [place] [address] at [phone]. Consult completed.      Amisha Hillman  03/30/20 8014

## 2020-03-30 NOTE — PROGRESS NOTES
Pt admitted to PCU from ED. Transported via wheelchair. Pt able to ambulate per self without difficulty. Telemetry applied. Vitals taken. No s/s of distress. Call light within reach.

## 2020-03-30 NOTE — VIRTUAL HEALTH
Consults  Patient Location:  [address] [place] [address] ED    Provider Location ([facility]): [place], [place]  This virtual visit was conducted via interactive/real-time audio/video. [facility] Stroke and Vascular Neurology Consult for  [facility] Stroke Alert through [address] @ 12:52  3/30/2020 1:07 PM  Pt Name: Laura Matute  MRN: [id number]  YOB: 1965  Date of evaluation: 3/30/2020  Primary Care Physician: Carylon Skiff, MD  Reason for Evaluation: Stroke Evaluation with Discussion with Ed or primary team with Telemedicine and stroke evaluation with Review of imaging and labs    Laura Matute is a 48 y.o. male man with of DM, HTN, COPD, c/o left facial droop and left side numbness since yesterday night 10pm. Patient wanted to wait it out but it didn't get better so he came to the ED. This never happened to him before. Allergies  is allergic to codeine and simvastatin. Medications  Prior to Admission medications    Medication Sig Start Date End Date Taking?  Authorizing Provider   allopurinol (ZYLOPRIM) 100 MG tablet Take 1 tablet by mouth once daily 3/18/20   Carylon Skiff, MD   ibuprofen (ADVIL;MOTRIN) 600 MG tablet Take 1 tablet by mouth every 6 hours as needed for Pain 2/25/20   Enrico Restrepo DO   omeprazole (PRILOSEC) 20 MG delayed release capsule TAKE 1 CAPSULE BY MOUTH TWICE DAILY 1/6/20   Carylon Skiff, MD   metoprolol tartrate (LOPRESSOR) 25 MG tablet TAKE 1 TABLET BY MOUTH TWICE DAILY 11/27/19   Carylon Skiff, MD   glimepiride (AMARYL) 4 MG tablet TAKE 1 TABLET BY MOUTH TWICE DAILY 10/2/19   Carylon Skiff, MD   metFORMIN (GLUCOPHAGE) 850 MG tablet Take 1 tablet by mouth 2 times daily (with meals) 8/23/19   Carylon Skiff, MD   lisinopril (PRINIVIL;ZESTRIL) 5 MG tablet TAKE 1 TABLET BY MOUTH ONCE DAILY 5/6/19   Carylon Skiff, MD   blood glucose monitor strips 1 strip by Other route 4 times daily Test 4  times a day & as needed for symptoms of irregular blood glucose. 5/3/19   Xuan Loera MD   ondansetron (ZOFRAN ODT) 4 MG disintegrating tablet Take 1 tablet by mouth every 8 hours as needed for Nausea or Vomiting 4/12/19   oZltan Da Silva DO   tamsulosin (FLOMAX) 0.4 MG capsule TAKE 1 CAPSULE BY MOUTH ONCE DAILY 4/2/19   Xuan Loera MD   atorvastatin (LIPITOR) 10 MG tablet TAKE 1 TABLET BY MOUTH ONE TIME A DAY  3/25/19   Xuan Loera MD   [employer] LANCETS ULTRATHIN 30G MISC 1 each by Other route 3 times daily 10/19/18   Xuan Loera MD   albuterol sulfate HFA (VENTOLIN HFA) 108 (90 Base) MCG/ACT inhaler Inhale 1 puff into the lungs every 4 hours as needed for Wheezing 10/19/18   Xuan Loera MD   blood glucose test strips (ASCENSIA AUTODISC VI;ONE TOUCH ULTRA TEST VI) strip Use with associated glucose meter. 10/19/18   Xuan Loera MD   mometasone-formoterol (DULERA) 200-5 MCG/ACT inhaler Inhale 2 puffs into the lungs every 12 hours 10/19/18   Xuan Loera MD   SITagliptin (JANUVIA) 100 MG tablet Take 1 tablet by mouth daily 10/15/18   Kaylah Willis MD   acetaminophen (AMINOFEN) 325 MG tablet Take 2 tablets by mouth every 6 hours as needed for Pain 10/15/18   Kaylah Willis MD   aspirin 81 MG tablet Take 81 mg by mouth    Historical Provider, MD    Scheduled Meds:   aspirin  324 mg Oral Once    clopidogrel  300 mg Oral Once     Continuous Infusions:  PRN Meds:.sodium chloride flush  Past Medical History   has a past medical history of Calculus of kidney, Essential hypertension, benign, Gout, unspecified, H/O colonoscopy, Mitral valve disorders(424.0), Other and unspecified hyperlipidemia, Type II or unspecified type diabetes mellitus without mention of complication, not stated as uncontrolled, and Unspecified chronic liver disease without mention of alcohol.   Social History  Social History     Socioeconomic History    Marital status:      Spouse name: Not on file    Number of children: Not on file    Years of education: Not on file    Highest education level: Not on file   Occupational History    Not on file   Social Needs    Financial resource strain: Not on file    Food insecurity     Worry: Not on file     Inability: Not on file    Transportation needs     Medical: Not on file     Non-medical: Not on file   Tobacco Use    Smoking status: Former Smoker     Last attempt to quit: 3/26/2012     Years since quittin.0    Smokeless tobacco: Former User   Substance and Sexual Activity    Alcohol use: Not Currently     Alcohol/week: 0.0 standard drinks     Comment: very little/ special events    Drug use: Yes     Types: Marijuana    Sexual activity: Not on file   Lifestyle    Physical activity     Days per week: Not on file     Minutes per session: Not on file    Stress: Not on file   Relationships    Social connections     Talks on phone: Not on file     Gets together: Not on file     Attends Christian service: Not on file     Active member of club or organization: Not on file     Attends meetings of clubs or organizations: Not on file     Relationship status: Not on file    Intimate partner violence     Fear of current or ex partner: Not on file     Emotionally abused: Not on file     Physically abused: Not on file     Forced sexual activity: Not on file   Other Topics Concern    Not on file   Social History Narrative    Not on file     Family History      Problem Relation Age of Onset    Cancer Other     Diabetes Other     High Blood Pressure Other     Heart Disease Other        OBJECTIVE  /75   Pulse 70   Temp 98.1 °F (36.7 °C) (Oral)   Resp 18   SpO2 96%        Pre-Morbid mRS: 0    Imaging:  Images were personally reviewed including:  CT brain without contrast: normal scan  CTA imaging: no LVO    Assessment    48year old man with HTN, DM, COPD, now c/o left shital-sensory deficit and left face droop 15 hours ago. Recommendations:  1. NIH 2  2.  Recommend Inpatient Neurology Consult for further assessment and evaluation   3.  consider . [id number]  4. This is not a tPA candidate due to being out of window and not a thrombectomy due to no LVO  5. Recommend admitting for stroke workup, with MRI brain w/o contrast  6. con't aspirin 81mg daily, pt is already taking it  7. Load with plavix 75mg qdaily and con't with it for 21 days. 8. con't statin  9. Consult neurology        Discussed with ED Physician DR. Blevins    At least 35 min of Telemedicine and time in conversation directly with ED staff and physician for the patient who is in imminent and life threatening deterioration without further treatment and evaluation.   Time spent examining patient, reviewing the images personally, reviewing the chart, perform high complexity decision making and speaking with the nursing staff regarding recommendations        Dc Crawford MD   Stroke, Neurocritical Care And/or [address] [place] [address] Stroke [address]  Electronically signed 3/30/2020 at 1:07 PM

## 2020-03-30 NOTE — LETTER
[facility] ED  [address] [facility] [address]  Phone: [phone]             March 30, 2020    Patient: Leopold Bearded   YOB: 1965   Date of Visit: 3/30/2020       To Whom It May Concern:    Yen Rodriguez was seen and treated in our emergency department on 3/30/2020. Ismael Walters was with patient while in ED.        Sincerely,             Signature:__________________________________

## 2020-03-30 NOTE — ED NOTES
Mode of arrival (squad #, walk in, police, etc) : walk in         Chief complaint(s): possible stroke        Arrival Note (brief scenario, treatment PTA, etc). : pt states he attempted to drink a glass of water last night a little after 2200 when the water fell out of his mouth. Pt states he then noticed a slight droop on his right side of his face along with numbness. Pt states this morning when he woke up the right-sided facial droop and numbness were still present. Pt states he also noticed some pian that he rates 6/10 located in his right cheek area. Pt denies any other symptoms at this time. Pt is alert and oriented x4.         C= \"Have you ever felt that you should Cut down on your drinking? \"  No  A= \"Have people Annoyed you by criticizing your drinking? \"  No  G= \"Have you ever felt bad or Guilty about your drinking? \"  No  E= \"Have you ever had a drink as an Eye-opener first thing in the morning to steady your nerves or to help a hangover? \"  No      Deferred []      Reason for deferring: N/A    *If yes to two or more: probable alcohol abuse. Michaela Rice RN  03/30/20 [id number]

## 2020-03-30 NOTE — H&P
intake or output data in the 24 hours ending 03/30/20 1531    Physical Exam  Constitutional:       General: He is not in acute distress. Appearance: Normal appearance. He is normal weight. HENT:      Head: Normocephalic and atraumatic. Right Ear: External ear normal.      Left Ear: External ear normal.      Nose: No congestion or rhinorrhea. Mouth/Throat:      Mouth: Mucous membranes are moist.      Pharynx: Oropharynx is clear. Eyes:      General: No visual field deficit. Right eye: Discharge (right eye tearing) present. Extraocular Movements: Extraocular movements intact. Pupils: Pupils are equal, round, and reactive to light. Cardiovascular:      Rate and Rhythm: Normal rate and regular rhythm. Pulses: Normal pulses. Heart sounds: Normal heart sounds. Pulmonary:      Effort: Pulmonary effort is normal.      Breath sounds: Normal breath sounds. Abdominal:      General: Abdomen is flat. Bowel sounds are normal. There is no distension. Tenderness: There is no abdominal tenderness. Musculoskeletal:      Right lower leg: No edema. Left lower leg: No edema. Skin:     General: Skin is warm and dry. Neurological:      Mental Status: He is alert and oriented to person, place, and time. Cranial Nerves: Facial asymmetry present. No dysarthria. Sensory: Sensory deficit (right face numbness) present. Motor: Motor function is intact. No weakness or tremor.    Psychiatric:         Mood and Affect: Mood normal.         Investigations:     Laboratory Testing:  Recent Results (from the past 24 hour(s))   CBC Auto Differential    Collection Time: 03/30/20 12:45 PM   Result Value Ref Range    WBC 3.9 3.5 - 11.0 k/uL    RBC 4.82 4.5 - 5.9 m/uL    Hemoglobin 15.0 13.5 - 17.5 g/dL    Hematocrit 44.4 41 - 53 %    MCV 92.1 80 - 100 fL    MCH 31.1 26 - 34 pg    MCHC 33.8 31 - 37 g/dL    RDW 14.0 11.5 - 14.9 %    Platelets 79 (L) 085 - 450 k/uL    MPV 8.4 6.0 Creatinine 0.62 0.51 - 1.19 mg/dL    GFR Comment >60 >60 mL/min    GFR Non-African American >60 >60 mL/min    GFR Comment         EKG 12 Lead    Collection Time: 03/30/20  1:15 PM   Result Value Ref Range    Ventricular Rate 74 BPM    Atrial Rate 74 BPM    P-R Interval 154 ms    QRS Duration 102 ms    Q-T Interval 400 ms    QTc Calculation (Bazett) 444 ms    P Axis 35 degrees    R Axis -18 degrees    T Axis 4 degrees       Imaging/Diagnostics:  Ct Head Wo Contrast    Result Date: 3/30/2020  EXAMINATION: CT OF THE HEAD WITHOUT CONTRAST  3/30/2020 12:49 pm TECHNIQUE: CT of the head was performed without the administration of intravenous contrast. Dose modulation, iterative reconstruction, and/or weight based adjustment of the mA/kV was utilized to reduce the radiation dose to as low as reasonably achievable. COMPARISON: MRI on 01/07/2019. HISTORY: ORDERING SYSTEM PROVIDED HISTORY: R side facial droop TECHNOLOGIST PROVIDED HISTORY: R side facial droop Reason for Exam: stroke Additional signs and symptoms: right sided weakness and facial numbness Initial exam FINDINGS: BRAIN/VENTRICLES: The cerebral and cerebellar parenchyma demonstrate normal volume without areas of hemorrhage, mass, or midline shift. No abnormal extra-axial fluid collections. The ventricles are normal in size. Gray-white differentiation is maintained without evidence of acute infarct. ORBITS: The visualized portion of the orbits demonstrate no acute abnormality. SINUSES: The visualized paranasal sinuses and mastoid air cells demonstrate no acute abnormality. SOFT TISSUES/SKULL:  No acute abnormality of the visualized skull or soft tissues. 1. No acute intracranial abnormality. Results were discussed with Dr. Reagan Deal at 1:02 p.m. on 03/30/2020.      Xr Chest Portable    Result Date: 3/30/2020  EXAMINATION: ONE XRAY VIEW OF THE CHEST 3/30/2020 1:09 pm COMPARISON: December 26, 2018 HISTORY: ORDERING SYSTEM PROVIDED HISTORY: stroke alert TECHNOLOGIST PROVIDED HISTORY: stroke alert Reason for Exam: Face sheild and mask and gloves Acuity: Unknown Type of Exam: Unknown FINDINGS: The cardiomediastinal silhouette is within normal limits. There is no consolidation, pneumothorax or evidence for edema. No evidence for effusion. No acute osseous abnormality is identified. No acute airspace disease identified. Cta Head Neck W Contrast    Result Date: 3/30/2020  EXAMINATION: CTA OF THE HEAD AND NECK WITH CONTRAST 3/30/2020 12:53 pm: TECHNIQUE: CTA of the head and neck was performed with the administration of intravenous contrast. Multiplanar reformatted images are provided for review. MIP images are provided for review. Stenosis of the internal carotid arteries measured using NASCET criteria. Dose modulation, iterative reconstruction, and/or weight based adjustment of the mA/kV was utilized to reduce the radiation dose to as low as reasonably achievable. COMPARISON: Noncontrast CT head from earlier today HISTORY: ORDERING SYSTEM PROVIDED HISTORY: R side facial droop TECHNOLOGIST PROVIDED HISTORY: R side facial droop Reason for Exam: stroke Additional signs and symptoms: right sided numbness and weakness since last night FINDINGS: CTA NECK: AORTIC ARCH/ARCH VESSELS: No dissection or arterial injury. No significant stenosis of the brachiocephalic or subclavian arteries. CAROTID ARTERIES: No dissection, arterial injury, or hemodynamically significant stenosis by NASCET criteria. VERTEBRAL ARTERIES: No dissection, arterial injury, or significant stenosis. SOFT TISSUES: There is mild ground-glass attenuation in the bilateral lungs, likely related to underdistention. No cervical or superior mediastinal lymphadenopathy. There is mild prominence of the bilateral palatine and lingual tonsils, likely reactive. No acute abnormality of the salivary glands. There is an 8 mm right thyroid nodule, does not require follow-up due to small size.  BONES: No acute osseous abnormality. CTA HEAD: ANTERIOR CIRCULATION: No significant stenosis of the intracranial internal carotid, anterior cerebral, or middle cerebral arteries. No aneurysm. POSTERIOR CIRCULATION: There is fetal origin of the left PCA, a normal variant. No significant stenosis of the vertebral, basilar, or posterior cerebral arteries. No aneurysm. OTHER: No dural venous sinus thrombosis on this non-dedicated study. BRAIN: No mass effect or midline shift. No extra-axial fluid collection. The gray-white differentiation is maintained. No acute abnormality or flow-limiting stenosis of the major arteries of the head and neck.        Assessment :      Primary Problem  Acute ischemic stroke [facility])    Active Hospital Problems    Diagnosis Date Noted    Stroke-like symptoms [R29.90] 03/30/2020    Acute ischemic stroke ([facility] [I63.9]     [facility] liver without ascites [facility] [K74.60]     Type 2 diabetes mellitus with hyperglycemia, without long-term current use of insulin ([facility] [E11.65] 06/23/2016    Chronic liver disease [K76.9]     Essential hypertension, benign [I10]     Hyperlipidemia associated with type 2 diabetes mellitus ([facility] [E11.69, E78.5]        Plan:     Patient status Admit as inpatient in the  Med/Surge    Acute Ischemic Stroke   -LK 10 pm on 3/29  -CT Head/CTA neck negative for acute event  -No TPA b/c outside window  -ASA and Plavix  -MRI Brain  -Neurology consult; appreciate recs    Type II Diabetes Mellitus  -Hypoglycemia protocol  -Low Dose Insulin Sliding Scale  -Repeat HbA1C    Essential Hypertension  -Continue home meds  -Lopressor 25 mg  -Lisinopril 5 mg    Hyperlipidemia  -Continue home meds  -Lipitor 10 mg     History of Liver Cirrhosis 2/2 Alcoholism  -No longer drinks  -Check liver enzymes    Diet: Carb Control  DVT Prophylaxis: Lovenox 40 mg  PT/OT  Dispo: social work for DC planning    Consultations:   IP CONSULT TO INTERNAL MEDICINE  IP CONSULT TO NEUROLOGY  IP CONSULT TO SOCIAL WORK

## 2020-03-31 ENCOUNTER — APPOINTMENT (OUTPATIENT)
Dept: MRI IMAGING | Age: 55
DRG: 045 | End: 2020-03-31
Payer: MEDICAID

## 2020-03-31 VITALS
RESPIRATION RATE: 16 BRPM | BODY MASS INDEX: 31.62 KG/M2 | SYSTOLIC BLOOD PRESSURE: 104 MMHG | WEIGHT: 233.47 LBS | DIASTOLIC BLOOD PRESSURE: 59 MMHG | HEART RATE: 77 BPM | OXYGEN SATURATION: 95 % | HEIGHT: 72 IN | TEMPERATURE: 98.1 F

## 2020-03-31 LAB
ABSOLUTE EOS #: 0 K/UL (ref 0–0.4)
ABSOLUTE IMMATURE GRANULOCYTE: ABNORMAL K/UL (ref 0–0.3)
ABSOLUTE LYMPH #: 0.83 K/UL (ref 1–4.8)
ABSOLUTE MONO #: 0.45 K/UL (ref 0.1–1.3)
ALBUMIN SERPL-MCNC: 3.3 G/DL (ref 3.5–5.2)
ALBUMIN/GLOBULIN RATIO: ABNORMAL (ref 1–2.5)
ALP BLD-CCNC: 88 U/L (ref 40–129)
ALT SERPL-CCNC: 33 U/L (ref 5–41)
ANGIOTENSIN-CONVERTING ENZYME: 26 U/L (ref 8–52)
ANION GAP SERPL CALCULATED.3IONS-SCNC: 11 MMOL/L (ref 9–17)
AST SERPL-CCNC: 31 U/L
BASOPHILS # BLD: 0 % (ref 0–2)
BASOPHILS ABSOLUTE: 0 K/UL (ref 0–0.2)
BILIRUB SERPL-MCNC: 2.86 MG/DL (ref 0.3–1.2)
BUN BLDV-MCNC: 13 MG/DL (ref 6–20)
BUN/CREAT BLD: ABNORMAL (ref 9–20)
CALCIUM SERPL-MCNC: 9 MG/DL (ref 8.6–10.4)
CHLORIDE BLD-SCNC: 107 MMOL/L (ref 98–107)
CO2: 24 MMOL/L (ref 20–31)
CREAT SERPL-MCNC: 0.52 MG/DL (ref 0.7–1.2)
DIFFERENTIAL TYPE: ABNORMAL
EKG ATRIAL RATE: 74 BPM
EKG P AXIS: 35 DEGREES
EKG P-R INTERVAL: 154 MS
EKG Q-T INTERVAL: 400 MS
EKG QRS DURATION: 102 MS
EKG QTC CALCULATION (BAZETT): 444 MS
EKG R AXIS: -18 DEGREES
EKG T AXIS: 4 DEGREES
EKG VENTRICULAR RATE: 74 BPM
EOSINOPHILS RELATIVE PERCENT: 0 % (ref 0–4)
ESTIMATED AVERAGE GLUCOSE: 220 MG/DL
GFR AFRICAN AMERICAN: >60 ML/MIN
GFR NON-AFRICAN AMERICAN: >60 ML/MIN
GFR SERPL CREATININE-BSD FRML MDRD: ABNORMAL ML/MIN/{1.73_M2}
GFR SERPL CREATININE-BSD FRML MDRD: ABNORMAL ML/MIN/{1.73_M2}
GLUCOSE BLD-MCNC: 130 MG/DL (ref 75–110)
GLUCOSE BLD-MCNC: 154 MG/DL (ref 70–99)
GLUCOSE BLD-MCNC: 255 MG/DL (ref 75–110)
GLUCOSE BLD-MCNC: 290 MG/DL (ref 75–110)
HBA1C MFR BLD: 9.3 % (ref 4–6)
HCT VFR BLD CALC: 39.6 % (ref 41–53)
HEMOGLOBIN: 13.5 G/DL (ref 13.5–17.5)
IMMATURE GRANULOCYTES: ABNORMAL %
LV EF: 55 %
LVEF MODALITY: NORMAL
LYMPHOCYTES # BLD: 26 % (ref 24–44)
MAGNESIUM: 1.5 MG/DL (ref 1.6–2.6)
MCH RBC QN AUTO: 31.1 PG (ref 26–34)
MCHC RBC AUTO-ENTMCNC: 34.2 G/DL (ref 31–37)
MCV RBC AUTO: 91 FL (ref 80–100)
MONOCYTES # BLD: 14 % (ref 1–7)
MORPHOLOGY: NORMAL
NRBC AUTOMATED: ABNORMAL PER 100 WBC
PDW BLD-RTO: 14.2 % (ref 11.5–14.9)
PLATELET # BLD: 71 K/UL (ref 150–450)
PLATELET ESTIMATE: ABNORMAL
PMV BLD AUTO: 8.5 FL (ref 6–12)
POTASSIUM SERPL-SCNC: 3.5 MMOL/L (ref 3.7–5.3)
RBC # BLD: 4.35 M/UL (ref 4.5–5.9)
RBC # BLD: ABNORMAL 10*6/UL
SEDIMENTATION RATE, ERYTHROCYTE: 20 MM (ref 0–15)
SEG NEUTROPHILS: 60 % (ref 36–66)
SEGMENTED NEUTROPHILS ABSOLUTE COUNT: 1.92 K/UL (ref 1.3–9.1)
SODIUM BLD-SCNC: 142 MMOL/L (ref 135–144)
TOTAL PROTEIN: 6.5 G/DL (ref 6.4–8.3)
WBC # BLD: 3.2 K/UL (ref 3.5–11)
WBC # BLD: ABNORMAL 10*3/UL

## 2020-03-31 PROCEDURE — 6370000000 HC RX 637 (ALT 250 FOR IP): Performed by: STUDENT IN AN ORGANIZED HEALTH CARE EDUCATION/TRAINING PROGRAM

## 2020-03-31 PROCEDURE — 82947 ASSAY GLUCOSE BLOOD QUANT: CPT

## 2020-03-31 PROCEDURE — 70551 MRI BRAIN STEM W/O DYE: CPT

## 2020-03-31 PROCEDURE — 93306 TTE W/DOPPLER COMPLETE: CPT

## 2020-03-31 PROCEDURE — 80053 COMPREHEN METABOLIC PANEL: CPT

## 2020-03-31 PROCEDURE — 99239 HOSP IP/OBS DSCHRG MGMT >30: CPT | Performed by: INTERNAL MEDICINE

## 2020-03-31 PROCEDURE — 6360000002 HC RX W HCPCS: Performed by: STUDENT IN AN ORGANIZED HEALTH CARE EDUCATION/TRAINING PROGRAM

## 2020-03-31 PROCEDURE — 85025 COMPLETE CBC W/AUTO DIFF WBC: CPT

## 2020-03-31 PROCEDURE — B246ZZZ ULTRASONOGRAPHY OF RIGHT AND LEFT HEART: ICD-10-PCS | Performed by: INTERNAL MEDICINE

## 2020-03-31 PROCEDURE — 2580000003 HC RX 258: Performed by: STUDENT IN AN ORGANIZED HEALTH CARE EDUCATION/TRAINING PROGRAM

## 2020-03-31 PROCEDURE — 99255 IP/OBS CONSLTJ NEW/EST HI 80: CPT | Performed by: PSYCHIATRY & NEUROLOGY

## 2020-03-31 PROCEDURE — 93010 ELECTROCARDIOGRAM REPORT: CPT | Performed by: INTERNAL MEDICINE

## 2020-03-31 PROCEDURE — 82164 ANGIOTENSIN I ENZYME TEST: CPT

## 2020-03-31 PROCEDURE — 83735 ASSAY OF MAGNESIUM: CPT

## 2020-03-31 PROCEDURE — 97161 PT EVAL LOW COMPLEX 20 MIN: CPT

## 2020-03-31 PROCEDURE — 6370000000 HC RX 637 (ALT 250 FOR IP): Performed by: PSYCHIATRY & NEUROLOGY

## 2020-03-31 PROCEDURE — 97165 OT EVAL LOW COMPLEX 30 MIN: CPT

## 2020-03-31 PROCEDURE — 86618 LYME DISEASE ANTIBODY: CPT

## 2020-03-31 PROCEDURE — 86038 ANTINUCLEAR ANTIBODIES: CPT

## 2020-03-31 PROCEDURE — 36415 COLL VENOUS BLD VENIPUNCTURE: CPT

## 2020-03-31 PROCEDURE — 85651 RBC SED RATE NONAUTOMATED: CPT

## 2020-03-31 RX ORDER — PREDNISONE 20 MG/1
60 TABLET ORAL DAILY
Status: DISCONTINUED | OUTPATIENT
Start: 2020-03-31 | End: 2020-03-31 | Stop reason: HOSPADM

## 2020-03-31 RX ORDER — PREDNISONE 20 MG/1
60 TABLET ORAL DAILY
Qty: 21 TABLET | Refills: 0 | Status: SHIPPED | OUTPATIENT
Start: 2020-03-31 | End: 2020-04-07

## 2020-03-31 RX ORDER — ATORVASTATIN CALCIUM 40 MG/1
40 TABLET, FILM COATED ORAL DAILY
Status: DISCONTINUED | OUTPATIENT
Start: 2020-04-01 | End: 2020-03-31 | Stop reason: HOSPADM

## 2020-03-31 RX ORDER — ATORVASTATIN CALCIUM 40 MG/1
40 TABLET, FILM COATED ORAL DAILY
Qty: 30 TABLET | Refills: 0 | Status: SHIPPED | OUTPATIENT
Start: 2020-04-01 | End: 2020-05-08

## 2020-03-31 RX ORDER — ACYCLOVIR 800 MG/1
800 TABLET ORAL
Status: DISCONTINUED | OUTPATIENT
Start: 2020-03-31 | End: 2020-03-31 | Stop reason: CLARIF

## 2020-03-31 RX ORDER — VALACYCLOVIR HYDROCHLORIDE 500 MG/1
1000 TABLET, FILM COATED ORAL 3 TIMES DAILY
Status: DISCONTINUED | OUTPATIENT
Start: 2020-03-31 | End: 2020-03-31 | Stop reason: HOSPADM

## 2020-03-31 RX ORDER — VALACYCLOVIR HYDROCHLORIDE 1 G/1
1000 TABLET, FILM COATED ORAL 3 TIMES DAILY
Qty: 21 TABLET | Refills: 0 | Status: SHIPPED | OUTPATIENT
Start: 2020-03-31 | End: 2020-04-07

## 2020-03-31 RX ORDER — CLOPIDOGREL BISULFATE 75 MG/1
75 TABLET ORAL DAILY
Qty: 30 TABLET | Refills: 0 | Status: SHIPPED | OUTPATIENT
Start: 2020-04-01 | End: 2020-05-08

## 2020-03-31 RX ORDER — LORAZEPAM 1 MG/1
1 TABLET ORAL ONCE
Status: COMPLETED | OUTPATIENT
Start: 2020-03-31 | End: 2020-03-31

## 2020-03-31 RX ADMIN — ASPIRIN 81 MG 81 MG: 81 TABLET ORAL at 07:51

## 2020-03-31 RX ADMIN — CLOPIDOGREL BISULFATE 75 MG: 75 TABLET ORAL at 07:51

## 2020-03-31 RX ADMIN — POTASSIUM CHLORIDE 40 MEQ: 1500 TABLET, EXTENDED RELEASE ORAL at 07:51

## 2020-03-31 RX ADMIN — METOPROLOL TARTRATE 25 MG: 25 TABLET, FILM COATED ORAL at 07:51

## 2020-03-31 RX ADMIN — BUDESONIDE AND FORMOTEROL FUMARATE DIHYDRATE 2 PUFF: 160; 4.5 AEROSOL RESPIRATORY (INHALATION) at 07:50

## 2020-03-31 RX ADMIN — INSULIN LISPRO 3 UNITS: 100 INJECTION, SOLUTION INTRAVENOUS; SUBCUTANEOUS at 14:11

## 2020-03-31 RX ADMIN — ATORVASTATIN CALCIUM 10 MG: 10 TABLET, FILM COATED ORAL at 07:51

## 2020-03-31 RX ADMIN — PREDNISONE 60 MG: 20 TABLET ORAL at 17:04

## 2020-03-31 RX ADMIN — Medication 10 ML: at 07:50

## 2020-03-31 RX ADMIN — LISINOPRIL 5 MG: 5 TABLET ORAL at 07:51

## 2020-03-31 RX ADMIN — Medication 400 MG: at 08:09

## 2020-03-31 RX ADMIN — PANTOPRAZOLE SODIUM 40 MG: 40 TABLET, DELAYED RELEASE ORAL at 05:34

## 2020-03-31 RX ADMIN — TAMSULOSIN HYDROCHLORIDE 0.4 MG: 0.4 CAPSULE ORAL at 07:51

## 2020-03-31 RX ADMIN — ENOXAPARIN SODIUM 30 MG: 30 INJECTION SUBCUTANEOUS at 07:50

## 2020-03-31 RX ADMIN — VALACYCLOVIR 1000 MG: 500 TABLET, FILM COATED ORAL at 17:03

## 2020-03-31 RX ADMIN — LORAZEPAM 1 MG: 1 TABLET ORAL at 10:39

## 2020-03-31 ASSESSMENT — ENCOUNTER SYMPTOMS
CHEST TIGHTNESS: 0
CONSTIPATION: 0
SHORTNESS OF BREATH: 0
EYE REDNESS: 0
DIARRHEA: 0
SINUS PAIN: 0
VOMITING: 0
SINUS PRESSURE: 0
ABDOMINAL DISTENTION: 0
ABDOMINAL PAIN: 0
COUGH: 0
TROUBLE SWALLOWING: 0
EYE DISCHARGE: 0
NAUSEA: 0
VOICE CHANGE: 0

## 2020-03-31 ASSESSMENT — PAIN SCALES - GENERAL: PAINLEVEL_OUTOF10: 0

## 2020-03-31 NOTE — PROGRESS NOTES
[address] [facility].    PROGRESS NOTE             3/31/2020    8:02 AM    Name:   Kuldeep Coronel  MRN:     [id number]     Acct:      [de-identified]   Room:   2092/2092-01   Day:  1  Admit Date:  3/30/2020 12:41 PM    PCP:  Liz Lord MD  Code Status:  Full Code    Subjective:     C/C:   Chief Complaint   Patient presents with    Numbness    Facial Droop     Interval History Status: improved. Patient was seen and examined at bedside. He is resting comfortably in bed. No acute events overnight. He states that he feels better today. He does still note some numbness on the right side of his face as well as some right-sided facial droop. He states that he feels his speech has improved as well. He notes no difficulties with swallowing. He notes no new motor deficits or sensory deficits. He denies any chest pain, shortness of breath, abdominal pain, dysuria, diarrhea, nausea, vomiting, headaches, change in vision. Brief History:   Patient is a 80-year-old male with past medical history of hypertension, type 2 diabetes mellitus, liver cirrhosis secondary to alcoholism, hyperlipidemia, past TIAs who presented with chief complaint of right-sided facial droop and right-sided facial numbness. CT head and CT head and neck were negative for acute events. Awaiting MRI and neurology consult. Patient is on aspirin Plavix. Review of Systems:     Review of Systems   Constitutional: Negative for chills, fatigue and fever. HENT: Positive for hearing loss (Notes R ear muffled). Negative for congestion, dental problem, ear pain, sinus pressure, sinus pain, tinnitus, trouble swallowing and voice change. Eyes: Negative for discharge, redness and visual disturbance. Respiratory: Negative for cough, chest tightness and shortness of breath. Cardiovascular: Negative for chest pain, palpitations and leg swelling.    Gastrointestinal: Negative Pulmonary:      Effort: Pulmonary effort is normal.      Breath sounds: Normal breath sounds. Abdominal:      General: Abdomen is flat. Bowel sounds are normal. There is no distension. Tenderness: There is no abdominal tenderness. Musculoskeletal:      Right lower leg: No edema. Left lower leg: No edema. Skin:     General: Skin is warm and dry. Neurological:      Mental Status: He is alert and oriented to person, place, and time. Cranial Nerves: Facial asymmetry present. No dysarthria. Sensory: Sensory deficit (right face numbness) present. Motor: Motor function is intact. No weakness or tremor.    Psychiatric:         Mood and Affect: Mood normal.           Assessment:        Primary Problem  Cerebrovascular accident (CVA) [facility])    Active Hospital Problems    Diagnosis Date Noted    Stroke-like symptoms [R29.90] 03/30/2020    Cerebrovascular accident (CVA) ([facility] [I63.9]     Cirrhosis of liver without ascites ([facility] [K74.60]     Type 2 diabetes mellitus with hyperglycemia, without long-term current use of insulin ([facility] [E11.65] 06/23/2016    Chronic liver disease [K76.9]     Essential hypertension, benign [I10]     Hyperlipidemia associated with type 2 diabetes mellitus ([facility] [E11.69, E78.5]        Plan:      Acute Ischemic Stroke   -LKW 10 pm on 3/29  -CT Head/CTA neck negative for acute event  -No TPA b/c outside window  -ASA and Plavix  -MRI Brain  -ECHO  -Neurology consult; appreciate recs     Type II Diabetes Mellitus  -Hypoglycemia protocol  -Low Dose Insulin Sliding Scale  -HbA1C:     Essential Hypertension  -Continue home meds  -Lopressor 25 mg  -Lisinopril 5 mg     Hyperlipidemia  -Continue home meds  -Lipitor 10 mg      History of Liver Cirrhosis 2/2 Alcoholism  -No longer drinks  -Check liver enzymes    Hx of Thrombocytopenia 2/2 Liver Cirrhosis  -Platelets 71  -Continue antiplatelet meds  -Monitor     Diet: Carb Control  DVT Prophylaxis: Lovenox 40

## 2020-03-31 NOTE — PROGRESS NOTES
[address]    Occupational Therapy Evaluation  Date: 3/31/20  Patient Name: Ancelmo Rueda       Room:   MRN: [id number]  Account: [de-identified]   : 1965  (48 y.o.) Gender: male     Discharge Recommendations: The patient may need non-skilled assistance after discharge. 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 Past Surgical History:   has a past surgical history that includes Lithotripsy and Upper gastrointestinal endoscopy (N/A, 2018).     Restrictions  Restrictions/Precautions: General Precautions  Implants present? : (pt denies)  Other position/activity restrictions: up as tolerated  Required Braces or Orthoses?: No     Vitals  Temp: 98.3 °F (36.8 °C)  Pulse: 82  Resp: 16  BP: 114/70  Height: 6' (182.9 cm)  Weight: 233 lb 7.5 oz (105.9 kg)  BMI (Calculated): 31.7  Oxygen Therapy  SpO2: 95 %  O2 Device: None (Room air)  Level of Consciousness: Alert    Subjective  Subjective: Alert and cooperative   Overall Orientation Status: Within Normal Limits  Vision  Vision: Impaired  Vision Exceptions: Wears glasses at all times  Hearing  Hearing: Within functional limits  Social/Functional History  Lives With: Family, Spouse(spouse and mother)  Type of Home: Mobile home  Home Layout: One level  Home Access: Ramped entrance  Entrance Stairs - Number of Steps: 4-5  Entrance Stairs - Rails: Both  Bathroom Shower/Tub: Tub/Shower unit, Curtain, Shower chair with back  Bathroom Toilet: Standard  Bathroom Equipment: Toilet raiser, Shower chair, Hand-held shower  Bathroom Accessibility: Accessible  Home Equipment: Darinel Pal, [address] [facility] [address], 4 wheeled walker  ADL Assistance: [address]: (wife is primary)  Homemaking Responsibilities: Yes(wife is primary)  Ambulation Assistance: Independent  Transfer Assistance: Independent  Active : Yes  Mode of Transportation: Truck, Motorcycle  Occupation: Retired  Type of occupation:   Leisure & Hobbies: Motorcycle   Additional Comments: Wife works full time as . Mother is good health and very independent. Objective      Cognition  Overall Cognitive Status: WFL       ADL  Feeding: Independent  Grooming: Independent  UE Bathing: Modified independent   LE Bathing: Modified independent   UE Dressing: Modified independent   LE Dressing: Modified independent   Toileting: Supervision    UE Function  Hand Dominance  Hand Dominance: Right        LUE Strength  Gross LUE Strength: WFL  L Hand General: 5/5  Left Hand Strength -  (lbs)  Handle Setting 2: 85# (norms # )  LUE Tone: Normotonic     LUE AROM (degrees)  LUE AROM : WFL     Left Hand AROM (degrees)  Left Hand AROM: WFL  RUE Strength  Gross RUE Strength: WFL  R Hand General: 5/5   Right Hand Strength -  (lbs)  Handle Setting 2: 82# (norms # )   RUE Tone: Normotonic     RUE AROM (degrees)  RUE AROM : WFL     Right Hand AROM (degrees)  Right Hand AROM: WFL    Fine Motor Skills  Coordination  Movements Are Fluid And Coordinated:  Yes                           Mobility          Balance  Sitting Balance: Independent                     Assessment  Performance deficits / Impairments: Decreased safe awareness  Treatment Diagnosis: Altered tolerance to care tasks   Prognosis: Good  Decision Making: Medium Complexity  History: [facility] [place]   REQUIRES OT FOLLOW UP: No  No Skilled OT: At baseline function, Independent with ADL's, No OT goals identified  Discharge Recommendations: Home with assist PRN  Activity Tolerance: Patient Tolerated treatment well         Functional Outcome Measures  AM-PAC Daily Activity Inpatient   How much help for putting on and taking off regular lower body clothing?: None  How much help for Bathing?: None  How much help for Toileting?: None  How much help for putting on and taking off regular upper body clothing?: None  How much help for taking care of personal grooming?: None  How much help for eating meals?: None  AM-PAC Inpatient Daily Activity Raw Score: 24  AM-PAC Inpatient ADL T-Scale Score : 57.54  ADL Inpatient CMS 0-100% Score: 0  ADL Inpatient CMS G-Code Modifier : [address]       Goals  Patient Goals   Patient goals : Return Home - feels he is near his baseline functional level     Plan  Safety Devices  Safety Devices in place: Yes  Type of devices: Left in chair, Call light within reach        OT Education  OT Education: OT Role, ADL Adaptive Strategies, Orientation, Home Exercise Program, Energy Conservation, IADL Safety, Precautions       OT Individual Minutes  Time In: 0930  Time Out: [address]  Minutes: 18    Electronically signed by Jeana Hurtado OT on 3/31/20 at 11:22 AM EDT

## 2020-03-31 NOTE — PROGRESS NOTES
Physical Therapy    Facility/Department: [address] [place] [address] CARE  Initial Assessment    NAME: Silvino Rodriguez  : 1965  MRN: [id number]    Date of Service: 3/31/2020    Discharge Recommendations: The patient's needs are being met with no further therapy recommended at discharge. PT Equipment Recommendations  Equipment Needed: No    Assessment   Assessment: Pt is IND with mobility, has been ambulating in room, no apparent deficits from L/R and UE/LE. Pt is safe to return home without PT. Decision Making: Low Complexity  History: Silvino Rodriguez is a 48 y.o. male who presents with a chief complaint of right-sided facial droop and right-sided facial numbness. Patient states his symptoms started around 10 PM last night. Symptoms have not changed at all since then. States he got concerned because this morning he went to take a drink and is basically fell out of his mouth. Denies any headache, dizziness or lightheadedness. No numbness, tingling, weakness of his extremities. No falls or trauma. He denies any neck pain or stiffness. No recent fevers, chills or other illnesses. No blurred or double vision. He does state that his right eye was watering a little bit. Symptoms are acute. Exam: ROM, MMT, transfers, amb, balance  Clinical Presentation: Pt alert, pleasant, agreeable to PT. Barriers to Learning: none  No Skilled PT: Independent with functional mobility   REQUIRES PT FOLLOW UP: No  Activity Tolerance  Activity Tolerance: Patient Tolerated treatment well       Patient Diagnosis(es): The encounter diagnosis was Cerebrovascular accident (CVA), unspecified mechanism ([facility] [address]      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   has a past surgical history that includes Lithotripsy and Upper gastrointestinal endoscopy (N/A, 5/25/2018). Restrictions  Restrictions/Precautions  Restrictions/Precautions: General Precautions  Required Braces or Orthoses?: No  Implants present? : (pt denies)  Position Activity Restriction  Other position/activity restrictions: up as tolerated  Vision/Hearing  Vision: Impaired  Vision Exceptions: Wears glasses at all times  Hearing: Within functional limits     Subjective  General  Chart Reviewed: Yes  Patient assessed for rehabilitation services?: Yes  Additional Pertinent Hx: -CT/CTA of head, T2DM, HTN  Family / Caregiver Present: No  Referring Practitioner: Nickolas Yang MD  Referral Date : 03/30/20  Diagnosis: stroke-like symptoms  Follows Commands: Within Functional Limits  Subjective  Subjective: Pt up in chair, agreeable to PT. BASIA Walker.   Pain Screening  Patient Currently in Pain: Denies  Vital Signs  Patient Currently in Pain: Denies  Oxygen Therapy  O2 Device: None (Room air)  Patient Observation  Observations: slight facial droop       Orientation  Orientation  Overall Orientation Status: Within Normal Limits  Social/Functional History  Social/Functional History  Lives With: Family, Spouse(spouse and mother)  Type of Home: Mobile home  Home Layout: One level  Home Access: Ramped entrance  Entrance Stairs - Number of Steps: 4-5  Entrance Stairs - Rails: Both  Bathroom Shower/Tub: Tub/Shower unit, Curtain, Shower chair with back  Bathroom Toilet: Standard  Bathroom Equipment: Toilet raiser, Shower chair, Hand-held shower  Bathroom Accessibility: Accessible  Home Equipment: [address], [address] [facility], 4 wheeled walker  ADL Assistance: [address]: (wife is primary)  Homemaking Responsibilities: Yes(wife is primary)  Ambulation Assistance: Independent  Transfer Assistance: Independent  Active : Yes  Mode of Transportation: Truck, Motorcycle  Occupation: Retired  Type of occupation:   Leisure & Hobbies: Motorcycle   Additional Comments: Wife works full time as . Mother is good health and very independent. Cognition        Objective          AROM RLE (degrees)  RLE AROM: WNL  AROM LLE (degrees)  LLE AROM : WNL  AROM RUE (degrees)  RUE General AROM: See OT  AROM LUE (degrees)  LUE General AROM: See OT  Strength RLE  Strength RLE: WNL  Comment: Grossly 4/5  Strength LLE  Strength LLE: WNL  Comment: Grossly 4/5  Strength RUE  Comment: See OT  Strength LUE  Comment: See OT  Coordination  Coordination and Movement description: WNL for B finger to nose and VIVIANE of feet  Sensation  Overall Sensation Status: Impaired(mild numbness R lower side of face)  Bed mobility  Comment: Bed mobility not observed by therapist as pt remains up in chair, pt states IND gets in/out of bed without difficulty. Transfers  Sit to Stand: Independent  Stand to sit: Independent  Comment: no device, no safety concerns or LOB. Ambulation  Ambulation?: Yes  Ambulation 1  Surface: level tile  Device: No Device  Assistance: Independent  Gait Deviations: None  Distance: 30'  Comments: No deficits or LOB, pt steady, denies dizziness or double/blurry vision.   Stairs/Curb  Stairs?: No(has ramp to enter home)     Balance  Posture: Good  Sitting - Static: Good  Sitting - Dynamic: Good  Standing - Static: Good  Standing - Dynamic: Good  Comments: no LOB, pt steady, no concerns for fall risk at this time        Plan   Plan  Times per week: D/C PT - safe to return home, IND with mobility  Safety Devices  Type of devices: Call light within reach, Gait belt, Left in chair, Nurse notified(BASIA Baugh)    AM-PAC Score     AM-PAC Inpatient Mobility without Stair Climbing Raw Score : 17 (03/31/20 0918)  AM-PAC Inpatient without Stair Climbing T-Scale Score : 48.47 (03/31/20 0918)  Mobility Inpatient CMS 0-100% Score: 32.72 (03/31/20 3028)  Mobility

## 2020-03-31 NOTE — PLAN OF CARE
Problem: Falls - Risk of:  Goal: Will remain free from falls  Description: Will remain free from falls  Outcome: Ongoing  Goal: Absence of physical injury  Description: Absence of physical injury  Outcome: Ongoing     Problem: HEMODYNAMIC STATUS  Goal: Patient has stable vital signs and fluid balance  Outcome: Ongoing     Problem: ACTIVITY INTOLERANCE/IMPAIRED MOBILITY  Goal: Mobility/activity is maintained at optimum level for patient  Outcome: Ongoing     Problem: COMMUNICATION IMPAIRMENT  Goal: Ability to express needs and understand communication  Outcome: Ongoing

## 2020-04-01 ENCOUNTER — CARE COORDINATION (OUTPATIENT)
Dept: CASE MANAGEMENT | Age: 55
End: 2020-04-01

## 2020-04-01 LAB — ANTI-NUCLEAR ANTIBODY (ANA): NEGATIVE

## 2020-04-01 NOTE — CARE COORDINATION
[facility] Transitions Initial Follow Up Call    Call within 2 business days of discharge: Yes    Patient: Fer Brown Patient : 1965   MRN: [id number]  Reason for Admission: numbness  Discharge Date: 3/31/20 RARS: Readmission Risk Score: 16      Last Discharge [facility]       Complaint Diagnosis Description Type Department Provider    3/30/20 Numbness; Facial Droop Cerebrovascular accident (CVA), unspecified mechanism ([address] . .. ED to Hosp-Admission (Discharged) (ADMITTED) Aishwarya Hawkins MD; Hussain Foreman. .. Spoke with: Susi 30: [facility]    Non-face-to-face services provided:  COVID-19   COVID-19 Screening Initial Follow-up Note    Patient contacted regarding Trini Smith. Care Transition Nurse/ Ambulatory Care Manager contacted the patient by telephone to perform post discharge assessment. Verified name and  with patient as identifiers. Provided introduction to self, and explanation of the CTN/ACM role, and reason for call due to risk factors for infection and/or exposure to COVID-19. Symptoms reviewed with patient who verbalized the following symptoms: no new/worsening symptoms       Due to no new or worsening symptoms encounter was not routed to provider for escalation. Patient has following risk factors of: DM . CTN/ACM reviewed discharge instructions, medical action plan and red flags such as increased shortness of breath, increasing fever and signs of decompensation with patient who verbalized understanding. Discussed exposure protocols and quarantine with CDC Guidelines What to do if you are sick with coronavirus disease 2019 Patient who was given an opportunity for questions and concerns. The patient agrees to contact the [facility] exposure line [phone], local health department [place] [facility]: ([phone]) and PCP office for questions related to their healthcare.  CTN/ACM provided contact information for future

## 2020-04-01 NOTE — DISCHARGE SUMMARY
[address]    Discharge Summary     Patient ID: Karl Ogden  :  1965   MRN: [id number]     ACCOUNT:  [de-identified]   Patient's PCP: Daphne Wright MD  Admit Date: 3/30/2020   Discharge Date: 2020     Length of Stay: 1  Code Status:  Prior  Admitting Physician: Rafaela Dominguez MD  Discharge Physician: Michelle Alfaro MD     Active Discharge Diagnoses:       Primary Problem  Cerebrovascular accident (CVA) [facility])      [facility] Problems    Diagnosis Date Noted    Stroke-like symptoms [R29.90] 2020    Cerebrovascular accident (CVA) ([facility] [address] [I63.9]     Cirrhosis of liver without ascites ([facility] [address] [K74.60]     Type 2 diabetes mellitus with hyperglycemia, without long-term current use of insulin ([facility] [address] [E11.65] 2016    Chronic liver disease [K76.9]     Essential hypertension, benign [I10]     Hyperlipidemia associated with type 2 diabetes mellitus ([facility] [address] [E11.69, E78.5]        Admission Condition:  fair     Discharged Condition: good    Hospital Stay:       Hospital Course:  Karl Ogden is a 48 y.o. male who was admitted for the management of   Cerebrovascular accident (CVA) ([facility] [address] , presented to ER with Numbness and Facial Droop    This is a 47 y/o male with PMH HTN, TIID, Hyperlipidemia, Liver cirrhosis who presented to the ED with right sided facial droop and numbness. The problem began at 10 pm on 3/29 and the patient presented on 3/30. A stroke alert was called in the ED. Patient had an NIH score of 2. CT head and CTA head and neck were negative for acute events/ artery stenosis. Patient did not receive TPN. Neurology was consulted. Patient was started on Plavix and ASA was continued. MRI and ECHO were done and were negative for acute events. Patient was discharged in good condition. He was discharge on Prednisone and Valacyclovir for possible [place] Palsy.       Significant therapeutic interventions:   Possible Left Hemispheric TIA: ASA, Plavix. CT, CTA head and neck, MRI, and ECHO negative  Possible Rt Yip's Palsy: Prednisone, Valacyclovir     Significant Diagnostic Studies:   Labs / Micro:  CBC:   Lab Results   Component Value Date    WBC 3.2 03/31/2020    RBC 4.35 03/31/2020    HGB 13.5 03/31/2020    HCT 39.6 03/31/2020    MCV 91.0 03/31/2020    MCH 31.1 03/31/2020    MCHC 34.2 03/31/2020    RDW 14.2 03/31/2020    PLT 71 03/31/2020     BMP:    Lab Results   Component Value Date    GLUCOSE 154 03/31/2020     03/31/2020    K 3.5 03/31/2020     03/31/2020    CO2 24 03/31/2020    ANIONGAP 11 03/31/2020    BUN 13 03/31/2020    CREATININE 0.52 03/31/2020    BUNCRER NOT REPORTED 03/31/2020    CALCIUM 9.0 03/31/2020    LABGLOM >60 03/31/2020    GFRAA >60 03/31/2020    GFR      03/31/2020    GFR NOT REPORTED 03/31/2020       Radiology:    Ct Head Wo Contrast    Result Date: 3/30/2020  EXAMINATION: CT OF THE HEAD WITHOUT CONTRAST  3/30/2020 12:49 pm TECHNIQUE: CT of the head was performed without the administration of intravenous contrast. Dose modulation, iterative reconstruction, and/or weight based adjustment of the mA/kV was utilized to reduce the radiation dose to as low as reasonably achievable. COMPARISON: MRI on 01/07/2019. HISTORY: ORDERING SYSTEM PROVIDED HISTORY: R side facial droop TECHNOLOGIST PROVIDED HISTORY: R side facial droop Reason for Exam: stroke Additional signs and symptoms: right sided weakness and facial numbness Initial exam FINDINGS: BRAIN/VENTRICLES: The cerebral and cerebellar parenchyma demonstrate normal volume without areas of hemorrhage, mass, or midline shift. No abnormal extra-axial fluid collections. The ventricles are normal in size. Gray-white differentiation is maintained without evidence of acute infarct. ORBITS: The visualized portion of the orbits demonstrate no acute abnormality.  SINUSES: The visualized paranasal sinuses and mastoid air cells demonstrate no acute abnormality. SOFT TISSUES/SKULL:  No acute abnormality of the visualized skull or soft tissues. 1. No acute intracranial abnormality. Results were discussed with Dr. Evette Novak at 1:02 p.m. on 03/30/2020. Xr Chest Portable    Result Date: 3/30/2020  EXAMINATION: ONE XRAY VIEW OF THE CHEST 3/30/2020 1:09 pm COMPARISON: December 26, 2018 HISTORY: ORDERING SYSTEM PROVIDED HISTORY: stroke alert TECHNOLOGIST PROVIDED HISTORY: stroke alert Reason for Exam: Face sheild and mask and gloves Acuity: Unknown Type of Exam: Unknown FINDINGS: The cardiomediastinal silhouette is within normal limits. There is no consolidation, pneumothorax or evidence for edema. No evidence for effusion. No acute osseous abnormality is identified. No acute airspace disease identified. Cta Head Neck W Contrast    Result Date: 3/30/2020  EXAMINATION: CTA OF THE HEAD AND NECK WITH CONTRAST 3/30/2020 12:53 pm: TECHNIQUE: CTA of the head and neck was performed with the administration of intravenous contrast. Multiplanar reformatted images are provided for review. MIP images are provided for review. Stenosis of the internal carotid arteries measured using NASCET criteria. Dose modulation, iterative reconstruction, and/or weight based adjustment of the mA/kV was utilized to reduce the radiation dose to as low as reasonably achievable. COMPARISON: Noncontrast CT head from earlier today HISTORY: ORDERING SYSTEM PROVIDED HISTORY: R side facial droop TECHNOLOGIST PROVIDED HISTORY: R side facial droop Reason for Exam: stroke Additional signs and symptoms: right sided numbness and weakness since last night FINDINGS: CTA NECK: AORTIC ARCH/ARCH VESSELS: No dissection or arterial injury. No significant stenosis of the brachiocephalic or subclavian arteries. CAROTID ARTERIES: No dissection, arterial injury, or hemodynamically significant stenosis by NASCET criteria.  VERTEBRAL ARTERIES: No dissection, arterial injury, or significant

## 2020-04-02 ENCOUNTER — TELEPHONE (OUTPATIENT)
Dept: INTERNAL MEDICINE CLINIC | Age: 55
End: 2020-04-02

## 2020-04-02 LAB
GLUCOSE BLD-MCNC: 275 MG/DL (ref 75–110)
LYME ANTIBODY: 0.79

## 2020-04-06 ENCOUNTER — OFFICE VISIT (OUTPATIENT)
Dept: INTERNAL MEDICINE CLINIC | Age: 55
End: 2020-04-06
Payer: MEDICAID

## 2020-04-06 VITALS
SYSTOLIC BLOOD PRESSURE: 138 MMHG | OXYGEN SATURATION: 94 % | WEIGHT: 230 LBS | HEART RATE: 97 BPM | DIASTOLIC BLOOD PRESSURE: 64 MMHG | HEIGHT: 72 IN | BODY MASS INDEX: 31.15 KG/M2 | TEMPERATURE: 98.9 F

## 2020-04-06 PROBLEM — G45.9 TIA (TRANSIENT ISCHEMIC ATTACK): Status: ACTIVE | Noted: 2020-04-06

## 2020-04-06 PROCEDURE — 1111F DSCHRG MED/CURRENT MED MERGE: CPT | Performed by: PHYSICIAN ASSISTANT

## 2020-04-06 PROCEDURE — 99215 OFFICE O/P EST HI 40 MIN: CPT | Performed by: PHYSICIAN ASSISTANT

## 2020-04-06 RX ORDER — GLIMEPIRIDE 4 MG/1
TABLET ORAL
Qty: 60 TABLET | Refills: 11 | Status: SHIPPED | OUTPATIENT
Start: 2020-04-06 | End: 2021-05-11 | Stop reason: SDUPTHER

## 2020-04-06 NOTE — PROGRESS NOTES
Post-Discharge Transitional Care Management Services or Hospital Follow Up      [address] [facility]   YOB: 1965    Date of Office Visit:  4/6/2020  Date of Hospital Admission: 3/30/20  Date of Hospital Discharge: 3/31/20  Risk of hospital readmission (high >=14%.  Medium >=10%) :Readmission Risk Score: 16    Care management risk score Rising risk (score 2-5) and Complex Care (Scores >=6): 6     Non face to face  following discharge, date last encounter closed (first attempt may have been earlier): 4/1/2020 10:31 AM    Call initiated 2 business days of discharge: Yes    Patient Active Problem List   Diagnosis    Hyperlipidemia associated with type 2 diabetes mellitus ([facility] [address]    Essential hypertension, benign    Calculus of kidney    Mitral valve disorder    Hyperchylomicronemia    Chronic liver disease    Ex-smoker    H/O colonoscopy    Type 2 diabetes mellitus with hyperglycemia, without long-term current use of insulin ([address]    Insomnia    GI bleed    Cirrhosis of liver without ascites (HCC)    Numbness and tingling of left side of face    Numbness and tingling    Stroke-like symptoms    TIA (transient ischemic attack)     Allergies   Allergen Reactions    Codeine Nausea Only and Other (See Comments)    Simvastatin Other (See Comments)     Leg cramping     Medications listed as ordered at the time of discharge from hospital   Sondra Odom 58 Medication Instructions FABRICE:    Printed on:04/07/20 6091   Medication Information                      acetaminophen (AMINOFEN) 325 MG tablet  Take 2 tablets by mouth every 6 hours as needed for Pain             albuterol sulfate HFA (VENTOLIN HFA) 108 (90 Base) MCG/ACT inhaler  Inhale 1 puff into the lungs every 4 hours as needed for Wheezing             allopurinol (ZYLOPRIM) 100 MG tablet  Take 1 tablet by mouth once daily             aspirin 81 MG tablet  Take 81 mg by mouth daily              atorvastatin (LIPITOR) 40 MG tablet  Take 1 CTA head and neck were negative for acute events/ artery stenosis. Patient did not receive TPN. Neurology was consulted. Patient was started on Plavix and ASA was continued. MRI and ECHO were done and were negative for acute events. Patient was discharged in good condition. He was discharge on Prednisone and Valacyclovir for possible [place] Palsy.     Significant therapeutic interventions:   Possible Left Hemispheric TIA: ASA, Plavix. CT, CTA head and neck, MRI, and ECHO negative  Possible Rt Yip's Palsy: Prednisone, Valacyclovir\"     Interval history/Current status:     Patient presents for hospital discharge follow up. Patient admitted due to stroke like symptoms, facial numbness, right-sided facial droop. Patient was not a candidate for tPA. CT, CTA head and neck, MRI, and ECHO negative. Neurologist consulted, started on Lipitor, ASA, Plavix. MRI negative for CVA, diagnosed with possible left hemispheric TIA vs right [place]'s Palsy. Discharged with Valtrex and Prednisone. He feels well today, symptoms improved, mild right sided facial droop persists. No headache, weakness, numbness, changes in vision or speech. Hypertension well controlled, no chest pain, dyspnea on exertion. Diabetes mellitus uncontrolled, A1c 9.4, fbg elevated, non compliant with medications. No pain or swelling in lower extremities. No fever/chills. Narrative   EXAMINATION:   MRI OF THE BRAIN WITHOUT CONTRAST  3/31/2020 10:57 am       TECHNIQUE:   Multiplanar multisequence MRI of the brain was performed without the   administration of intravenous contrast.       COMPARISON:   01/07/2019.       HISTORY:   ORDERING SYSTEM PROVIDED HISTORY: R/o abnormalities. Presented with right   sided facial droop   TECHNOLOGIST PROVIDED HISTORY:   R/o abnormalities. Presented with right sided facial droop   Reason for Exam: R/o abnormalities.  Presented with right sided facial droop   Acuity: Acute   Type of Exam: Subsequent/Follow-up

## 2020-04-14 ENCOUNTER — TELEPHONE (OUTPATIENT)
Dept: INTERNAL MEDICINE CLINIC | Age: 55
End: 2020-04-14

## 2020-04-14 RX ORDER — NITROFURANTOIN 25; 75 MG/1; MG/1
100 CAPSULE ORAL 2 TIMES DAILY
Qty: 14 CAPSULE | Refills: 0 | Status: SHIPPED | OUTPATIENT
Start: 2020-04-14 | End: 2020-04-21

## 2020-04-14 NOTE — TELEPHONE ENCOUNTER
PATIENTS DAUGHTER CALLED AND STATED THAT HE STILL HAS A BLADDER INFECTION SHE WANTS TO KNOW IF YOU CAN GIVE HIM ANOTHER ROUND OF ANTIBIOTIC.

## 2020-04-15 ENCOUNTER — CARE COORDINATION (OUTPATIENT)
Dept: CASE MANAGEMENT | Age: 55
End: 2020-04-15

## 2020-04-16 ENCOUNTER — CARE COORDINATION (OUTPATIENT)
Dept: CASE MANAGEMENT | Age: 55
End: 2020-04-16

## 2020-05-08 RX ORDER — ATORVASTATIN CALCIUM 40 MG/1
TABLET, FILM COATED ORAL
Qty: 30 TABLET | Refills: 11 | Status: ON HOLD | OUTPATIENT
Start: 2020-05-08 | End: 2020-10-16 | Stop reason: HOSPADM

## 2020-05-08 RX ORDER — CLOPIDOGREL BISULFATE 75 MG/1
TABLET ORAL
Qty: 30 TABLET | Refills: 11 | Status: SHIPPED | OUTPATIENT
Start: 2020-05-08 | End: 2020-05-21 | Stop reason: ALTCHOICE

## 2020-05-20 ENCOUNTER — OFFICE VISIT (OUTPATIENT)
Dept: INTERNAL MEDICINE CLINIC | Age: 55
End: 2020-05-20
Payer: MEDICAID

## 2020-05-20 VITALS
HEART RATE: 74 BPM | SYSTOLIC BLOOD PRESSURE: 118 MMHG | DIASTOLIC BLOOD PRESSURE: 72 MMHG | TEMPERATURE: 98.5 F | BODY MASS INDEX: 31.48 KG/M2 | HEIGHT: 72 IN | WEIGHT: 232.4 LBS | OXYGEN SATURATION: 96 %

## 2020-05-20 PROCEDURE — 1036F TOBACCO NON-USER: CPT | Performed by: PHYSICIAN ASSISTANT

## 2020-05-20 PROCEDURE — G8417 CALC BMI ABV UP PARAM F/U: HCPCS | Performed by: PHYSICIAN ASSISTANT

## 2020-05-20 PROCEDURE — 3017F COLORECTAL CA SCREEN DOC REV: CPT | Performed by: PHYSICIAN ASSISTANT

## 2020-05-20 PROCEDURE — G8427 DOCREV CUR MEDS BY ELIG CLIN: HCPCS | Performed by: PHYSICIAN ASSISTANT

## 2020-05-20 PROCEDURE — 3046F HEMOGLOBIN A1C LEVEL >9.0%: CPT | Performed by: PHYSICIAN ASSISTANT

## 2020-05-20 PROCEDURE — 99214 OFFICE O/P EST MOD 30 MIN: CPT | Performed by: PHYSICIAN ASSISTANT

## 2020-05-20 PROCEDURE — 2022F DILAT RTA XM EVC RTNOPTHY: CPT | Performed by: PHYSICIAN ASSISTANT

## 2020-05-20 RX ORDER — LISINOPRIL 5 MG/1
5 TABLET ORAL DAILY
Qty: 30 TABLET | Refills: 0 | Status: SHIPPED | OUTPATIENT
Start: 2020-05-20 | End: 2020-07-16

## 2020-05-20 RX ORDER — ALBUTEROL SULFATE 90 UG/1
1 AEROSOL, METERED RESPIRATORY (INHALATION) EVERY 4 HOURS PRN
Qty: 18 G | Refills: 6 | Status: SHIPPED | OUTPATIENT
Start: 2020-05-20 | End: 2021-04-22 | Stop reason: SDUPTHER

## 2020-05-20 ASSESSMENT — PATIENT HEALTH QUESTIONNAIRE - PHQ9
2. FEELING DOWN, DEPRESSED OR HOPELESS: 1
SUM OF ALL RESPONSES TO PHQ QUESTIONS 1-9: 2
1. LITTLE INTEREST OR PLEASURE IN DOING THINGS: 1
SUM OF ALL RESPONSES TO PHQ QUESTIONS 1-9: 2
SUM OF ALL RESPONSES TO PHQ9 QUESTIONS 1 & 2: 2

## 2020-05-20 NOTE — PROGRESS NOTES
(1 of 2 - Risk 2-dose series) [birth year]/1967    HIV screen  03/03/1981    Hepatitis B vaccine (1 of 3 - Risk 3-dose series) 03/03/1985    DTaP/Tdap/Td vaccine (1 - Tdap) 03/03/1985    Shingles Vaccine (1 of 2) 03/03/2016    Diabetic retinal exam  08/15/2016    Diabetic microalbuminuria test  01/14/2019    Diabetic foot exam  05/30/2019    Lipid screen  01/07/2020     MEDICATIONS:      Current Outpatient Medications   Medication Sig Dispense Refill    metFORMIN (GLUCOPHAGE) 1000 MG tablet Take 1 tablet by mouth 2 times daily (with meals) 180 tablet 1    atorvastatin (LIPITOR) 40 MG tablet Take 1 tablet by mouth once daily 30 tablet 11    glimepiride (AMARYL) 4 MG tablet TAKE 1 TABLET BY MOUTH TWICE DAILY 60 tablet 11    allopurinol (ZYLOPRIM) 100 MG tablet Take 1 tablet by mouth once daily 30 tablet 3    omeprazole (PRILOSEC) 20 MG delayed release capsule TAKE 1 CAPSULE BY MOUTH TWICE DAILY 30 capsule 11    blood glucose monitor strips 1 strip by Other route 4 times daily Test 4  times a day & as needed for symptoms of irregular blood glucose. 100 strip 11    KROGER LANCETS ULTRATHIN 30G MISC 1 each by Other route 3 times daily 100 each 11    blood glucose test strips (ASCENSIA AUTODISC VI;ONE TOUCH ULTRA TEST VI) strip Use with associated glucose meter. 100 strip 11    acetaminophen (AMINOFEN) 325 MG tablet Take 2 tablets by mouth every 6 hours as needed for Pain 50 tablet 0    aspirin 81 MG tablet Take 81 mg by mouth daily       metoprolol tartrate (LOPRESSOR) 25 MG tablet Take 1 tablet by mouth 2 times daily 60 tablet 5    lisinopril (PRINIVIL;ZESTRIL) 5 MG tablet Take 1 tablet by mouth daily 30 tablet 0    albuterol sulfate HFA (VENTOLIN HFA) 108 (90 Base) MCG/ACT inhaler Inhale 1 puff into the lungs every 4 hours as needed for Wheezing 18 g 6     No current facility-administered medications for this visit.       ALLERGIES:      Allergies   Allergen Reactions    Codeine Nausea Only and Other (See (around 7/1/2020) for diabetes mellitus. Sherrell pineda received counseling on the following healthy behaviors: nutrition, exercise and medication adherence    Discussed use, benefit, and side effects of prescribed medications. Barriers to medication compliance addressed. All patient questions answered. Patient voiced understanding. Patient given educational materials - see patient instructions    Pollo GLEZ [facility]  5/21/2020, 4:28 PM    Please note that this chart was generated using voice recognition Dragon dictation software. Although everyeffort was made to ensure the accuracy of this automated transcription, some errors in transcription may have occurred.

## 2020-05-21 ENCOUNTER — TELEPHONE (OUTPATIENT)
Dept: INTERNAL MEDICINE CLINIC | Age: 55
End: 2020-05-21

## 2020-05-21 ASSESSMENT — ENCOUNTER SYMPTOMS
EYE DISCHARGE: 0
ABDOMINAL PAIN: 0
EYE ITCHING: 0
COLOR CHANGE: 0
SINUS PAIN: 0
CONSTIPATION: 0
SHORTNESS OF BREATH: 0
BLOOD IN STOOL: 0
CHEST TIGHTNESS: 0
DIARRHEA: 0
SORE THROAT: 0
BACK PAIN: 0
EYE PAIN: 0
VOMITING: 0
COUGH: 0
NAUSEA: 0
RHINORRHEA: 0

## 2020-06-02 ENCOUNTER — HOSPITAL ENCOUNTER (OUTPATIENT)
Age: 55
Setting detail: SPECIMEN
Discharge: HOME | End: 2020-06-02
Payer: MEDICAID

## 2020-06-02 LAB
-: ABNORMAL
AMORPHOUS: ABNORMAL
BACTERIA: ABNORMAL
BILIRUBIN URINE: ABNORMAL
CASTS UA: ABNORMAL /LPF (ref 0–8)
CHOLESTEROL/HDL RATIO: 1.9
CHOLESTEROL: 78 MG/DL
COLOR: ABNORMAL
COMMENT UA: ABNORMAL
CRYSTALS, UA: ABNORMAL /HPF
EPITHELIAL CELLS UA: ABNORMAL /HPF (ref 0–5)
GLUCOSE URINE: NEGATIVE
HDLC SERPL-MCNC: 41 MG/DL
KETONES, URINE: ABNORMAL
LDL CHOLESTEROL: 22 MG/DL (ref 0–130)
LEUKOCYTE ESTERASE, URINE: ABNORMAL
MUCUS: ABNORMAL
NITRITE, URINE: POSITIVE
OTHER OBSERVATIONS UA: ABNORMAL
PH UA: 5 (ref 5–8)
PROTEIN UA: ABNORMAL
RBC UA: ABNORMAL /HPF (ref 0–4)
RENAL EPITHELIAL, UA: ABNORMAL /HPF
SPECIFIC GRAVITY UA: 1.02 (ref 1–1.03)
TRICHOMONAS: ABNORMAL
TRIGL SERPL-MCNC: 73 MG/DL
TURBIDITY: ABNORMAL
URINE HGB: ABNORMAL
UROBILINOGEN, URINE: NORMAL
VLDLC SERPL CALC-MCNC: NORMAL MG/DL (ref 1–30)
WBC UA: ABNORMAL /HPF (ref 0–5)
YEAST: ABNORMAL

## 2020-06-03 LAB
CREATININE URINE: 345.3 MG/DL (ref 39–259)
MICROALBUMIN/CREAT 24H UR: 77 MG/L
MICROALBUMIN/CREAT UR-RTO: 22 MCG/MG CREAT

## 2020-06-05 NOTE — TELEPHONE ENCOUNTER
Patients wife called stating that patient was not getting his Januvia Rx due to no insurance coverage at the time. Now patient does have insurance coverage and would like to be prescribed again.  Please advise      Lynn Olivia / April

## 2020-07-01 ENCOUNTER — OFFICE VISIT (OUTPATIENT)
Dept: INTERNAL MEDICINE CLINIC | Age: 55
End: 2020-07-01
Payer: MEDICAID

## 2020-07-01 VITALS
WEIGHT: 223 LBS | BODY MASS INDEX: 30.2 KG/M2 | OXYGEN SATURATION: 97 % | HEIGHT: 72 IN | SYSTOLIC BLOOD PRESSURE: 102 MMHG | TEMPERATURE: 97 F | DIASTOLIC BLOOD PRESSURE: 60 MMHG | HEART RATE: 76 BPM

## 2020-07-01 LAB — HBA1C MFR BLD: 7.8 %

## 2020-07-01 PROCEDURE — 3051F HG A1C>EQUAL 7.0%<8.0%: CPT | Performed by: PHYSICIAN ASSISTANT

## 2020-07-01 PROCEDURE — G8427 DOCREV CUR MEDS BY ELIG CLIN: HCPCS | Performed by: PHYSICIAN ASSISTANT

## 2020-07-01 PROCEDURE — 83036 HEMOGLOBIN GLYCOSYLATED A1C: CPT | Performed by: PHYSICIAN ASSISTANT

## 2020-07-01 PROCEDURE — 2022F DILAT RTA XM EVC RTNOPTHY: CPT | Performed by: PHYSICIAN ASSISTANT

## 2020-07-01 PROCEDURE — 3017F COLORECTAL CA SCREEN DOC REV: CPT | Performed by: PHYSICIAN ASSISTANT

## 2020-07-01 PROCEDURE — 1036F TOBACCO NON-USER: CPT | Performed by: PHYSICIAN ASSISTANT

## 2020-07-01 PROCEDURE — 99214 OFFICE O/P EST MOD 30 MIN: CPT | Performed by: PHYSICIAN ASSISTANT

## 2020-07-01 PROCEDURE — G8417 CALC BMI ABV UP PARAM F/U: HCPCS | Performed by: PHYSICIAN ASSISTANT

## 2020-07-01 RX ORDER — CLOPIDOGREL BISULFATE 75 MG/1
TABLET ORAL
COMMUNITY
Start: 2020-06-05 | End: 2020-07-01

## 2020-07-01 RX ORDER — CIPROFLOXACIN 500 MG/1
TABLET, FILM COATED ORAL
COMMUNITY
Start: 2020-06-03 | End: 2020-07-01

## 2020-07-01 ASSESSMENT — ENCOUNTER SYMPTOMS
TROUBLE SWALLOWING: 0
SHORTNESS OF BREATH: 0
SINUS PAIN: 0
DIARRHEA: 0
WHEEZING: 0
NAUSEA: 0
COUGH: 0
ABDOMINAL PAIN: 0
EYE ITCHING: 0
BLOOD IN STOOL: 0
BACK PAIN: 0
SORE THROAT: 0
CHEST TIGHTNESS: 0
EYE DISCHARGE: 0
CONSTIPATION: 0
VOICE CHANGE: 0
VOMITING: 0
COLOR CHANGE: 0
EYE PAIN: 0
RHINORRHEA: 0

## 2020-07-01 NOTE — PROGRESS NOTES
tablet 1    metoprolol tartrate (LOPRESSOR) 25 MG tablet Take 1 tablet by mouth 2 times daily 60 tablet 5    lisinopril (PRINIVIL;ZESTRIL) 5 MG tablet Take 1 tablet by mouth daily 30 tablet 0    albuterol sulfate HFA (VENTOLIN HFA) 108 (90 Base) MCG/ACT inhaler Inhale 1 puff into the lungs every 4 hours as needed for Wheezing 18 g 6    atorvastatin (LIPITOR) 40 MG tablet Take 1 tablet by mouth once daily 30 tablet 11    glimepiride (AMARYL) 4 MG tablet TAKE 1 TABLET BY MOUTH TWICE DAILY 60 tablet 11    allopurinol (ZYLOPRIM) 100 MG tablet Take 1 tablet by mouth once daily 30 tablet 3    omeprazole (PRILOSEC) 20 MG delayed release capsule TAKE 1 CAPSULE BY MOUTH TWICE DAILY 30 capsule 11    blood glucose monitor strips 1 strip by Other route 4 times daily Test 4  times a day & as needed for symptoms of irregular blood glucose. 100 strip 11    KROGER LANCETS ULTRATHIN 30G MISC 1 each by Other route 3 times daily 100 each 11    blood glucose test strips (ASCENSIA AUTODISC VI;ONE TOUCH ULTRA TEST VI) strip Use with associated glucose meter. 100 strip 11    acetaminophen (AMINOFEN) 325 MG tablet Take 2 tablets by mouth every 6 hours as needed for Pain 50 tablet 0    aspirin 81 MG tablet Take 81 mg by mouth daily        No current facility-administered medications for this visit. ALLERGIES:      Allergies   Allergen Reactions    Codeine Nausea Only and Other (See Comments)    Simvastatin Other (See Comments)     Leg cramping       SOCIAL HISTORY    Reviewed and no change from previous record. Milli Kirkland  reports that he quit smoking about 8 years ago. He has quit using smokeless tobacco.    FAMILY HISTORY:    Reviewed and no change from previous visit    REVIEW OF SYSTEMS:    Review of Systems   Constitutional: Negative for chills, fatigue and fever. HENT: Positive for hearing loss (bilatearlly).  Negative for congestion, ear discharge, ear pain, rhinorrhea, sinus pain, sore throat, trouble swallowing and voice change. Eyes: Negative for pain, discharge, itching and visual disturbance. Respiratory: Negative for cough, chest tightness, shortness of breath and wheezing. Cardiovascular: Negative for chest pain, palpitations and leg swelling. Gastrointestinal: Negative for abdominal pain, blood in stool, constipation, diarrhea, nausea and vomiting. Endocrine: Negative for cold intolerance and heat intolerance. Genitourinary: Negative for difficulty urinating, dysuria, flank pain, frequency, hematuria and urgency. Musculoskeletal: Negative for arthralgias, back pain, neck pain and neck stiffness. Skin: Negative for color change, pallor and rash. Neurological: Negative for dizziness, weakness, light-headedness, numbness and headaches. Hematological: Negative for adenopathy. Does not bruise/bleed easily. Psychiatric/Behavioral: Negative for dysphoric mood. The patient is not nervous/anxious.       PHYSICAL EXAM:      Vitals:    07/01/20 1023   BP: 102/60   Pulse: 76   Temp: 97 °F (36.1 °C)   SpO2: 97%   Weight: 223 lb (101.2 kg)   Height: 6' 0.01\" (1.829 m)     BP Readings from Last 3 Encounters:   07/01/20 102/60   05/20/20 118/72   04/06/20 138/64      Wt Readings from Last 3 Encounters:   07/01/20 223 lb (101.2 kg)   05/20/20 232 lb 6.4 oz (105.4 kg)   04/06/20 230 lb (104.3 kg)     General - alert, well appearing, and in no distress  Skin - normal coloration and turgor, no rashes  Eyes - pupils equal and reactive, extraocular eye movements intact  Mouth - mucous membranes moist  Neck - supple, no significant adenopathy, no palpable masses  Chest - clear to auscultation, symmetric air entry  Heart - normal rate, regular rhythm, no murmur  Abdomen - non distended, soft, no tenderness to palpation   Back - no flank tenderness bilaterally   Neurological - alert, oriented x 3, normal speech, no facial droop, no focal sensory or motor deficit, cn ll-Xll intact, no cerebellar signs, normal gait, no tremor   Extremities - peripheral pulses normal, no pedal edema    LABORATORY FINDINGS:    CBC:  Lab Results   Component Value Date    WBC 3.2 03/31/2020    HGB 13.5 03/31/2020    PLT 71 03/31/2020     BMP:    Lab Results   Component Value Date     03/31/2020    K 3.5 03/31/2020     03/31/2020    CO2 24 03/31/2020    BUN 13 03/31/2020    CREATININE 0.52 03/31/2020    GLUCOSE 154 03/31/2020     HEMOGLOBIN A1C:   Lab Results   Component Value Date    LABA1C 7.8 07/01/2020     FASTING LIPID PANEL:  Lab Results   Component Value Date    CHOL 78 06/02/2020    HDL 41 06/02/2020    TRIG 73 06/02/2020     ASSESSMENT AND PLAN:      1. Essential hypertension  - Controlled, continue present management    2. Type 2 diabetes mellitus with hyperglycemia, without long-term current use of insulin (HCC)  - Fair control, improving, A1c 9.3 --> 7.8, encouragement provided  - Continue present management    3. Hyperlipidemia LDL goal <70  - Controlled, LDL chol 22, on Lipitor 40 mg QHS  - Continue present management    4. Recurrent UTI (urinary tract infection)  - Status post Ciprofloxacin, symptoms resolved, follows with urologist     5. Sensorineural hearing loss (SNHL) of both ears  - Chronic, has follow up with ENT next month     FOLLOW UP AND INSTRUCTIONS:   Return in about 3 months (around 10/1/2020). Advised to follow up with Dr. Chapincito Tol[place]. Jose Alfredo Sotomayorerson received counseling on the following healthy behaviors: nutrition, exercise, medication adherence and tobacco cessation    Discussed use, benefit, and side effects of prescribed medications. Barriers to medication compliance addressed. All patient questions answered. Patient voiced understanding. Patient given educational materials - see patient instructions    [place] GLEZ [facility]  7/1/2020, 11:30 AM    Please note that this chart was generated using voice recognition Dragon dictation software.   Although everyeffort was made to ensure the accuracy of this automated transcription, some errors in transcription may have occurred.

## 2020-07-16 RX ORDER — LISINOPRIL 5 MG/1
TABLET ORAL
Qty: 30 TABLET | Refills: 0 | Status: SHIPPED | OUTPATIENT
Start: 2020-07-16 | End: 2020-08-17

## 2020-07-22 ENCOUNTER — TELEMEDICINE (OUTPATIENT)
Dept: INTERNAL MEDICINE CLINIC | Age: 55
End: 2020-07-22
Payer: MEDICAID

## 2020-07-22 PROCEDURE — 3017F COLORECTAL CA SCREEN DOC REV: CPT | Performed by: PHYSICIAN ASSISTANT

## 2020-07-22 PROCEDURE — 99213 OFFICE O/P EST LOW 20 MIN: CPT | Performed by: PHYSICIAN ASSISTANT

## 2020-07-22 PROCEDURE — G8427 DOCREV CUR MEDS BY ELIG CLIN: HCPCS | Performed by: PHYSICIAN ASSISTANT

## 2020-07-22 RX ORDER — GUAIFENESIN 600 MG/1
600 TABLET, EXTENDED RELEASE ORAL 2 TIMES DAILY
Qty: 30 TABLET | Refills: 0 | Status: SHIPPED | OUTPATIENT
Start: 2020-07-22 | End: 2020-08-06

## 2020-07-22 RX ORDER — AZITHROMYCIN 250 MG/1
250 TABLET, FILM COATED ORAL SEE ADMIN INSTRUCTIONS
Qty: 6 TABLET | Refills: 0 | Status: SHIPPED | OUTPATIENT
Start: 2020-07-22 | End: 2020-07-27

## 2020-07-22 ASSESSMENT — PATIENT HEALTH QUESTIONNAIRE - PHQ9
SUM OF ALL RESPONSES TO PHQ QUESTIONS 1-9: 0
2. FEELING DOWN, DEPRESSED OR HOPELESS: 0
1. LITTLE INTEREST OR PLEASURE IN DOING THINGS: 0
SUM OF ALL RESPONSES TO PHQ QUESTIONS 1-9: 0
SUM OF ALL RESPONSES TO PHQ9 QUESTIONS 1 & 2: 0

## 2020-07-22 NOTE — PROGRESS NOTES
[address] [facility] [address]  [place] [address]  Dept: [phone]  Dept Fax: [phone]    Virtual Visit Progress Note  Date of patient's visit: 7/23/2020  Patient's Name:  Ofelia Lloyd YOB: 1965            Patient Care Team:  Bernardo Jhaveri MD as PCP - General (Internal Medicine)  Bernardo Jhaveri MD as PCP - [facility]    REASON FOR VISIT:  Same day visit    HISTORY OF PRESENT ILLNESS:      Chief Complaint   Patient presents with    Congestion     was using mucinex, cough, runny nose, no head achs      History was obtained from the patient. Ofelia Lloyd is a 48 y.o. male here today virtually for above. Sinus pressure, congestion. Symptoms present for the past week. Reports mild cough, productive yellow/green sputum. He denies associated chest pain or shortness of breath. No fever/chills, sore throat, myalgias. No known sick contacts. MEDICATIONS:      Current Outpatient Medications   Medication Sig Dispense Refill    blood glucose test strips (ASCENSIA AUTODISC VI;ONE TOUCH ULTRA TEST VI) strip Use with associated glucose meter.  100 strip 11    guaiFENesin (MUCINEX) 600 MG extended release tablet Take 1 tablet by mouth 2 times daily for 15 days 30 tablet 0    azithromycin (ZITHROMAX) 250 MG tablet Take 1 tablet by mouth See Admin Instructions for 5 days 500mg on day 1 followed by 250mg on days 2 - 5 6 tablet 0    lisinopril (PRINIVIL;ZESTRIL) 5 MG tablet Take 1 tablet by mouth once daily 30 tablet 0    fluticasone-salmeterol (ADVAIR DISKUS) 100-50 MCG/DOSE diskus inhaler INHALE 1 PUFF BY MOUTH EVERY 12 HOURS 1 Inhaler 11    SITagliptin (JANUVIA) 100 MG tablet TAKE 1 TABLET BY MOUTH DAILY 30 tablet 11    metFORMIN (GLUCOPHAGE) 1000 MG tablet Take 1 tablet by mouth 2 times daily (with meals) 180 tablet 1    metoprolol tartrate (LOPRESSOR) 25 MG tablet Take 1 tablet by mouth 2 times daily 60 tablet 5    albuterol Negative for myalgias, neck pain and neck stiffness. Skin: Negative for color change and rash. Neurological: Negative for dizziness, light-headedness and headaches. Hematological: Negative for adenopathy. PHYSICAL EXAM:      Exam was limited due to virtual encounter. General - alert, well appearing, non toxic, in no distress  Skin - normal coloration and turgor, no rash  Eyes - sclera appear clear, no conjunctival injection, no discharge  Ears - no pain with manipulation of tragus or auricle, no drainage, no mastoid tenderness, hearing normal   Nose - normal and patent, no erythema, discharge  Mouth - mucous membranes are moist  Neck - supple, no masses appreciated  Chest - respirations are unlabored, no tachypnea or signs or respiratory distress  Abdomen - soft, nontender, nondistended  Neurological - alert, oriented x 3, normal speech  Extremities - no pedal edema    ASSESSMENT AND PLAN:      1. Upper respiratory infection with cough and congestion  - Low suspicion for Covid-19 infection, advised to isolate until symptoms resolve, Mucinex, zpak  -- guaiFENesin (MUCINEX) 600 MG extended release tablet; Take 1 tablet by mouth 2 times daily for 15 days  Dispense: 30 tablet; Refill: 0  -- azithromycin (ZITHROMAX) 250 MG tablet; Take 1 tablet by mouth See Admin Instructions for 5 days 500mg on day 1 followed by 250mg on days 2 - 5  Dispense: 6 tablet; Refill: 0  - Advised to call or prompt return to clinic for new or worsening symptoms, patient understands/agrees     FOLLOW UP AND INSTRUCTIONS:   Return if symptoms worsen or fail to improve. Discussed use, benefit, and side effects of prescribed medications. All patient questions answered. Patient voiced understanding. Patient given educational materials - see patient instructions    Patient being evaluated by a Virtual Visit (video visit) encounter to address concerns as mentioned above. A caregiver was present when appropriate.  Due to this being a TeleHealth encounter (During NDJZW-91 public health emergency), evaluation of the following organ systems was limited: Vitals/Constitutional/EENT/Resp/CV/GI//MS/Neuro/Skin/Heme-Lymph-Imm. Pursuant to the emergency declaration under the [facility], [address] [place] [address] and the [employer] Act, this Virtual Visit was conducted with patient's (and/or legal guardian's) consent, to reduce the patient's risk of exposure to COVID-19 and provide necessary medical care. The patient (and/or legal guardian) has also been advised to contact this office for worsening conditions or problems, and seek emergency medical treatment and/or call 911 if deemed necessary. Services were provided through a video synchronous discussion virtually to substitute for in-person clinic visit. Patient and provider were located at their individual homes. NIESHA Valencia [facility]  7/23/2020, 4:32 PM    Please note that this chart wasgenerated using voice recognition Dragon dictation software. Although every effort was made to ensure the accuracy of this automated transcription, some errors in transcription may have occurred.

## 2020-07-23 ASSESSMENT — ENCOUNTER SYMPTOMS
TROUBLE SWALLOWING: 0
VOICE CHANGE: 0
NAUSEA: 0
SHORTNESS OF BREATH: 0
VOMITING: 0
COLOR CHANGE: 0
WHEEZING: 0
COUGH: 1
CHEST TIGHTNESS: 0
ABDOMINAL PAIN: 0
SINUS PRESSURE: 1
EYE REDNESS: 0
SORE THROAT: 0
RHINORRHEA: 0
EYE PAIN: 0

## 2020-08-05 RX ORDER — ALLOPURINOL 100 MG/1
TABLET ORAL
Qty: 30 TABLET | Refills: 0 | Status: SHIPPED | OUTPATIENT
Start: 2020-08-05 | End: 2021-04-06 | Stop reason: SDUPTHER

## 2020-08-17 RX ORDER — LISINOPRIL 5 MG/1
TABLET ORAL
Qty: 30 TABLET | Refills: 0 | Status: SHIPPED | OUTPATIENT
Start: 2020-08-17 | End: 2020-11-23

## 2020-08-21 ENCOUNTER — HOSPITAL ENCOUNTER (OUTPATIENT)
Age: 55
Setting detail: SPECIMEN
Discharge: HOME | End: 2020-08-21
Payer: MEDICAID

## 2020-08-21 ENCOUNTER — TELEPHONE (OUTPATIENT)
Dept: INTERNAL MEDICINE CLINIC | Age: 55
End: 2020-08-21

## 2020-08-21 LAB
-: NORMAL
AMORPHOUS: NORMAL
BACTERIA: NORMAL
BILIRUBIN URINE: NEGATIVE
CASTS UA: NORMAL /LPF (ref 0–8)
COLOR: ABNORMAL
COMMENT UA: ABNORMAL
CRYSTALS, UA: NORMAL /HPF
EPITHELIAL CELLS UA: NORMAL /HPF (ref 0–5)
GLUCOSE URINE: NEGATIVE
KETONES, URINE: NEGATIVE
LEUKOCYTE ESTERASE, URINE: ABNORMAL
MUCUS: NORMAL
NITRITE, URINE: NEGATIVE
OTHER OBSERVATIONS UA: NORMAL
PH UA: 5 (ref 5–8)
PROTEIN UA: ABNORMAL
RBC UA: NORMAL /HPF (ref 0–4)
RENAL EPITHELIAL, UA: NORMAL /HPF
SPECIFIC GRAVITY UA: 1.02 (ref 1–1.03)
TRICHOMONAS: NORMAL
TURBIDITY: ABNORMAL
URINE HGB: ABNORMAL
UROBILINOGEN, URINE: NORMAL
WBC UA: NORMAL /HPF (ref 0–5)
YEAST: NORMAL

## 2020-08-21 NOTE — TELEPHONE ENCOUNTER
Sick Call    Kashif Bosch   1965   [id number]   Carlitos Garcia MD   Allergies   Allergen Reactions    Codeine Nausea Only and Other (See Comments)    Simvastatin Other (See Comments)     Leg cramping        Last Visit: 7/1/2020  Reason for No Same Day Appt:     Complaint: Patient feels that he has reoccurred UTI, symptoms of lower back pain,frequency and burning.  Will order UA      Pharmacy: [facility]

## 2020-08-22 LAB
CULTURE: NORMAL
Lab: NORMAL
SPECIMEN DESCRIPTION: NORMAL

## 2020-08-24 ENCOUNTER — TELEPHONE (OUTPATIENT)
Dept: INTERNAL MEDICINE CLINIC | Age: 55
End: 2020-08-24

## 2020-08-24 NOTE — TELEPHONE ENCOUNTER
Pts dtr called to ck & see if UA results are back. Results are in epic.     Please send rx for atbx to [facility] [place]    Allergies   Allergen Reactions    Codeine Nausea Only and Other (See Comments)    Simvastatin Other (See Comments)     Leg cramping

## 2020-08-24 NOTE — TELEPHONE ENCOUNTER
Spoke w/ pts dtr & informed her that per Dr. Keyon Gomez results were negative. Appt was uzma'd for pt to see Shakila Mooney on 8/25/20 for pain when urinating.

## 2020-08-26 ENCOUNTER — OFFICE VISIT (OUTPATIENT)
Dept: INTERNAL MEDICINE CLINIC | Age: 55
End: 2020-08-26
Payer: MEDICAID

## 2020-08-26 VITALS
HEART RATE: 77 BPM | SYSTOLIC BLOOD PRESSURE: 108 MMHG | DIASTOLIC BLOOD PRESSURE: 62 MMHG | WEIGHT: 226 LBS | BODY MASS INDEX: 30.61 KG/M2 | OXYGEN SATURATION: 96 % | HEIGHT: 72 IN | TEMPERATURE: 98.1 F

## 2020-08-26 PROCEDURE — 3017F COLORECTAL CA SCREEN DOC REV: CPT | Performed by: PHYSICIAN ASSISTANT

## 2020-08-26 PROCEDURE — 1036F TOBACCO NON-USER: CPT | Performed by: PHYSICIAN ASSISTANT

## 2020-08-26 PROCEDURE — 99214 OFFICE O/P EST MOD 30 MIN: CPT | Performed by: PHYSICIAN ASSISTANT

## 2020-08-26 PROCEDURE — G8427 DOCREV CUR MEDS BY ELIG CLIN: HCPCS | Performed by: PHYSICIAN ASSISTANT

## 2020-08-26 PROCEDURE — G8417 CALC BMI ABV UP PARAM F/U: HCPCS | Performed by: PHYSICIAN ASSISTANT

## 2020-08-26 RX ORDER — CLOPIDOGREL BISULFATE 75 MG/1
TABLET ORAL
COMMUNITY
Start: 2020-08-07 | End: 2020-11-02 | Stop reason: ALTCHOICE

## 2020-08-26 RX ORDER — TAMSULOSIN HYDROCHLORIDE 0.4 MG/1
0.4 CAPSULE ORAL DAILY
Qty: 30 CAPSULE | Refills: 0 | Status: SHIPPED | OUTPATIENT
Start: 2020-08-26 | End: 2020-10-02

## 2020-08-26 RX ORDER — CIPROFLOXACIN 500 MG/1
500 TABLET, FILM COATED ORAL 2 TIMES DAILY
Qty: 14 TABLET | Refills: 0 | Status: CANCELLED | OUTPATIENT
Start: 2020-08-26 | End: 2020-09-02

## 2020-08-26 ASSESSMENT — ENCOUNTER SYMPTOMS
SHORTNESS OF BREATH: 0
EYE PAIN: 0
CHEST TIGHTNESS: 0
VOMITING: 0
ABDOMINAL PAIN: 0
SORE THROAT: 0
EYE ITCHING: 0
BACK PAIN: 0
NAUSEA: 0
EYE DISCHARGE: 0
COLOR CHANGE: 0
SINUS PAIN: 0
COUGH: 0
RHINORRHEA: 0

## 2020-08-26 NOTE — PROGRESS NOTES
[address] [id number]  Dept: [phone]  Dept Fax: [phone]    OfficeProgress/Follow Up Note  Date of patient's visit: 8/26/2020  Patient's Name:  Debbie Martínez YOB: 1965            Patient Care Team:  Faustino Blackman MD as PCP - General (Internal Medicine)  Faustino Blackman [facility] PCP [facility]    REASON FOR VISIT:  Same day visit    HISTORY OF PRESENT ILLNESS:      Chief Complaint   Patient presents with    Other     pain when voiding, possibly dehyrated or UTI, sometimes has urgency, also may be from not taking the Tamsulosin, onset about 2-3 weeks, does have dark color urine and odor    Health Maintenance     Dtap     History was obtained from the patient. Debbie Marítnez is a 48 y.o. male here today for above. Patient complains of dysuria, symptoms present for the past few days. Associated frequency, urgency, hesitancy. He reports nocturia 2-3 x nightly. Called clinic and had order for urinalysis, revealed small urine hgb, moderate leukocytes, urine culture without growth. He reports dysuria improved since that time. No flank pain, suprapubic pain, nausea/voimting, diaphoresis, fever/chills. Chart review shows history of recurrent UTI, nephrolithiasis. CT AP 2/25/20 Bilateral nephrolithiasis without urinary obstruction or hydronephrosis.      Patient Active Problem List   Diagnosis    Hyperlipidemia associated with type 2 diabetes mellitus ([address]    Essential hypertension    Calculus of kidney    Mitral valve disorder    Hyperlipidemia LDL goal <70    Chronic liver disease    Ex-smoker    H/O colonoscopy    Type 2 diabetes mellitus with hyperglycemia, without long-term current use of insulin (HCC)    Insomnia    GI bleed    Cirrhosis of liver without ascites (HCC)    Numbness and tingling of left side of face    Numbness and tingling    Stroke-like symptoms    TIA (transient ischemic attack) MEDICATIONS:      Current Outpatient Medications   Medication Sig Dispense Refill    tamsulosin (FLOMAX) 0.4 MG capsule Take 1 capsule by mouth daily 30 capsule 0    lisinopril (PRINIVIL;ZESTRIL) 5 MG tablet Take 1 tablet by mouth once daily 30 tablet 0    allopurinol (ZYLOPRIM) 100 MG tablet Take 1 tablet by mouth once daily 30 tablet 0    blood glucose test strips (ASCENSIA AUTODISC VI;ONE TOUCH ULTRA TEST VI) strip Use with associated glucose meter. 100 strip 11    fluticasone-salmeterol (ADVAIR DISKUS) 100-50 MCG/DOSE diskus inhaler INHALE 1 PUFF BY MOUTH EVERY 12 HOURS 1 Inhaler 11    SITagliptin (JANUVIA) 100 MG tablet TAKE 1 TABLET BY MOUTH DAILY 30 tablet 11    metFORMIN (GLUCOPHAGE) 1000 MG tablet Take 1 tablet by mouth 2 times daily (with meals) 180 tablet 1    metoprolol tartrate (LOPRESSOR) 25 MG tablet Take 1 tablet by mouth 2 times daily 60 tablet 5    albuterol sulfate HFA (VENTOLIN HFA) 108 (90 Base) MCG/ACT inhaler Inhale 1 puff into the lungs every 4 hours as needed for Wheezing 18 g 6    atorvastatin (LIPITOR) 40 MG tablet Take 1 tablet by mouth once daily 30 tablet 11    glimepiride (AMARYL) 4 MG tablet TAKE 1 TABLET BY MOUTH TWICE DAILY 60 tablet 11    omeprazole (PRILOSEC) 20 MG delayed release capsule TAKE 1 CAPSULE BY MOUTH TWICE DAILY 30 capsule 11    blood glucose monitor strips 1 strip by Other route 4 times daily Test 4  times a day & as needed for symptoms of irregular blood glucose. 100 strip 11    KROGER LANCETS ULTRATHIN 30G MISC 1 each by Other route 3 times daily 100 each 11    acetaminophen (AMINOFEN) 325 MG tablet Take 2 tablets by mouth every 6 hours as needed for Pain 50 tablet 0    aspirin 81 MG tablet Take 81 mg by mouth daily        No current facility-administered medications for this visit.       ALLERGIES:      Allergies   Allergen Reactions    Codeine Nausea Only and Other (See Comments)    Simvastatin Other (See Comments)     Leg cramping     SOCIAL HISTORY   Reviewed and no change from previous record. Phuong Bustillos  reports that he quit smoking about 8 years ago. He has quit using smokeless tobacco.    FAMILY HISTORY:    Reviewed and no change from previous visit    REVIEW OF SYSTEMS:    Review of Systems   Constitutional: Negative for chills, diaphoresis, fatigue and fever. HENT: Negative for congestion, ear discharge, ear pain, hearing loss, rhinorrhea, sinus pain and sore throat. Eyes: Negative for pain, discharge, itching and visual disturbance. Respiratory: Negative for cough, chest tightness and shortness of breath. Cardiovascular: Negative for chest pain and leg swelling. Gastrointestinal: Negative for abdominal pain, nausea and vomiting. Genitourinary: Positive for difficulty urinating, frequency and urgency. Negative for discharge, dysuria (resolved), flank pain, genital sores, hematuria, penile pain, penile swelling, scrotal swelling and testicular pain. Musculoskeletal: Negative for arthralgias, back pain, neck pain and neck stiffness. Skin: Negative for color change, pallor and rash. Neurological: Negative for dizziness, weakness, light-headedness, numbness and headaches. Hematological: Negative for adenopathy.       PHYSICAL EXAM:      Vitals:    08/26/20 1139   BP: 108/62   Pulse: 77   Temp: 98.1 °F (36.7 °C)   SpO2: 96%   Weight: 226 lb (102.5 kg)   Height: 6' (1.829 m)     General -alert, well appearing, and in no distress  Skin - normal coloration and turgor, no rashes,no suspicious skin lesions noted  Eyes - pupils equal and reactive, extraocular eye movementsintact  Ears - bilateral TM's and external ear canals normal  Nose - normal and patent, no erythema, discharge or polyps  Mouth - mucous membranes are moist, pharynx normal without lesions  Neck - supple, no significant adenopathy  Lymphatics - no palpable lymphadenopathy, no hepatosplenomegaly  Chest - clear to auscultation, no wheezes, rales or rhonchi, symmetric air entry  Heart - normal rate, regular rhythm, no murmurs, rubs, clicks or gallops  Abdomen - soft, nontender, nondistended, no masses or organomegaly  Back - full range of motion, notenderness, palpable spasm or pain on motion  Neurological - alert, oriented, normal speech, no focal sensory or motor deficit  Musculoskeletal - no joint tenderness, deformity or swelling  Extremities - peripheral pulses normal, no pedal edema    ASSESSMENT AND PLAN:       Diagnosis Orders   1. Microscopic hematuria  CALOS Ross MD, Urology, [place]   2. Michelle Rice MD, Urology, [place]    CBC Auto Differential   3. Urgency of urination  tamsulosin (FLOMAX) 0.4 MG capsule    CALOS Ross MD, Urology, [place]    Comprehensive Metabolic Panel    CBC Auto Differential   4. Nephrolithiasis  tamsulosin (FLOMAX) 0.4 MG capsule    CALOS Ross MD, Urology, [place]    Comprehensive Metabolic Panel    CBC Auto Differential   5. Prostate cancer screening  Psa screening       Chronic urinary symptoms suggestive of benign prostatic hypertrophy, recurrent urinary tract infections, recent urine culture with no growth, resume Flomax, remain hydrated, referral to urologist for further evaluation chronic microscopic hematuria, nephrolithiasis. FOLLOW UP AND INSTRUCTIONS:   Return if symptoms worsen or fail to improve. Discussed use, benefit, and side effects of prescribed medications. All patient questions answered. Patient voiced understanding. Patient given educational materials - see patient instructions    NIESHA Valencia [facility]  8/26/2020, 2:23 PM    Please note that this chart wasgenerated using voice recognition Dragon dictation software. Although every effort was made to ensure the accuracy of this automated transcription, some errors in transcription may have occurred.

## 2020-09-21 RX ORDER — OMEPRAZOLE 20 MG/1
CAPSULE, DELAYED RELEASE ORAL
Qty: 30 CAPSULE | Refills: 0 | Status: SHIPPED | OUTPATIENT
Start: 2020-09-21 | End: 2020-10-23

## 2020-09-21 RX ORDER — TAMSULOSIN HYDROCHLORIDE 0.4 MG/1
CAPSULE ORAL
Qty: 30 CAPSULE | Refills: 0 | OUTPATIENT
Start: 2020-09-21

## 2020-10-02 RX ORDER — TAMSULOSIN HYDROCHLORIDE 0.4 MG/1
CAPSULE ORAL
Qty: 30 CAPSULE | Refills: 0 | Status: SHIPPED | OUTPATIENT
Start: 2020-10-02 | End: 2020-11-02 | Stop reason: SDUPTHER

## 2020-10-13 ENCOUNTER — HOSPITAL ENCOUNTER (EMERGENCY)
Age: 55
Discharge: HOME | DRG: 047 | End: 2020-10-13
Attending: EMERGENCY MEDICINE
Payer: MEDICAID

## 2020-10-13 VITALS
TEMPERATURE: 99 F | DIASTOLIC BLOOD PRESSURE: 69 MMHG | HEART RATE: 74 BPM | BODY MASS INDEX: 32.51 KG/M2 | RESPIRATION RATE: 16 BRPM | SYSTOLIC BLOOD PRESSURE: 141 MMHG | WEIGHT: 240 LBS | HEIGHT: 72 IN | OXYGEN SATURATION: 98 %

## 2020-10-13 LAB
-: ABNORMAL
ABSOLUTE EOS #: 0.18 K/UL (ref 0–0.4)
ABSOLUTE IMMATURE GRANULOCYTE: ABNORMAL K/UL (ref 0–0.3)
ABSOLUTE LYMPH #: 1.33 K/UL (ref 1–4.8)
ABSOLUTE MONO #: 0.43 K/UL (ref 0.1–1.3)
ALBUMIN SERPL-MCNC: 3.5 G/DL (ref 3.5–5.2)
ALBUMIN/GLOBULIN RATIO: ABNORMAL (ref 1–2.5)
ALP BLD-CCNC: 70 U/L (ref 40–129)
ALT SERPL-CCNC: 23 U/L (ref 5–41)
AMORPHOUS: ABNORMAL
ANION GAP SERPL CALCULATED.3IONS-SCNC: 9 MMOL/L (ref 9–17)
AST SERPL-CCNC: 31 U/L
BACTERIA: ABNORMAL
BASOPHILS # BLD: 0 % (ref 0–2)
BASOPHILS ABSOLUTE: 0 K/UL (ref 0–0.2)
BILIRUB SERPL-MCNC: 3.32 MG/DL (ref 0.3–1.2)
BILIRUBIN URINE: ABNORMAL
BUN BLDV-MCNC: 13 MG/DL (ref 6–20)
BUN/CREAT BLD: ABNORMAL (ref 9–20)
CALCIUM SERPL-MCNC: 9.5 MG/DL (ref 8.6–10.4)
CASTS UA: ABNORMAL /LPF
CHLORIDE BLD-SCNC: 105 MMOL/L (ref 98–107)
CO2: 26 MMOL/L (ref 20–31)
COLOR: ABNORMAL
COMMENT UA: ABNORMAL
CREAT SERPL-MCNC: 0.46 MG/DL (ref 0.7–1.2)
CRYSTALS, UA: ABNORMAL /HPF
DIFFERENTIAL TYPE: ABNORMAL
EOSINOPHILS RELATIVE PERCENT: 5 % (ref 0–4)
EPITHELIAL CELLS UA: ABNORMAL /HPF
GFR AFRICAN AMERICAN: >60 ML/MIN
GFR NON-AFRICAN AMERICAN: >60 ML/MIN
GFR SERPL CREATININE-BSD FRML MDRD: ABNORMAL ML/MIN/{1.73_M2}
GFR SERPL CREATININE-BSD FRML MDRD: ABNORMAL ML/MIN/{1.73_M2}
GLUCOSE BLD-MCNC: 73 MG/DL (ref 70–99)
GLUCOSE URINE: NEGATIVE
HCT VFR BLD CALC: 34.6 % (ref 41–53)
HEMOGLOBIN: 12 G/DL (ref 13.5–17.5)
IMMATURE GRANULOCYTES: ABNORMAL %
KETONES, URINE: NEGATIVE
LEUKOCYTE ESTERASE, URINE: ABNORMAL
LYMPHOCYTES # BLD: 37 % (ref 24–44)
MCH RBC QN AUTO: 31.6 PG (ref 26–34)
MCHC RBC AUTO-ENTMCNC: 34.5 G/DL (ref 31–37)
MCV RBC AUTO: 91.5 FL (ref 80–100)
MONOCYTES # BLD: 12 % (ref 1–7)
MORPHOLOGY: NORMAL
MUCUS: ABNORMAL
NITRITE, URINE: POSITIVE
NRBC AUTOMATED: ABNORMAL PER 100 WBC
OTHER OBSERVATIONS UA: ABNORMAL
PDW BLD-RTO: 14.8 % (ref 11.5–14.9)
PH UA: 6.5 (ref 5–8)
PLATELET # BLD: 72 K/UL (ref 150–450)
PLATELET ESTIMATE: ABNORMAL
PMV BLD AUTO: 7.9 FL (ref 6–12)
POTASSIUM SERPL-SCNC: 3.6 MMOL/L (ref 3.7–5.3)
PROTEIN UA: NEGATIVE
RBC # BLD: 3.78 M/UL (ref 4.5–5.9)
RBC # BLD: ABNORMAL 10*6/UL
RBC UA: ABNORMAL /HPF
RENAL EPITHELIAL, UA: ABNORMAL /HPF
SEG NEUTROPHILS: 46 % (ref 36–66)
SEGMENTED NEUTROPHILS ABSOLUTE COUNT: 1.66 K/UL (ref 1.3–9.1)
SODIUM BLD-SCNC: 140 MMOL/L (ref 135–144)
SPECIFIC GRAVITY UA: 1.02 (ref 1–1.03)
TOTAL PROTEIN: 6.5 G/DL (ref 6.4–8.3)
TRICHOMONAS: ABNORMAL
TURBIDITY: CLEAR
URINE HGB: NEGATIVE
UROBILINOGEN, URINE: ABNORMAL
WBC # BLD: 3.6 K/UL (ref 3.5–11)
WBC # BLD: ABNORMAL 10*3/UL
WBC UA: ABNORMAL /HPF
YEAST: ABNORMAL

## 2020-10-13 PROCEDURE — 36415 COLL VENOUS BLD VENIPUNCTURE: CPT

## 2020-10-13 PROCEDURE — 80053 COMPREHEN METABOLIC PANEL: CPT

## 2020-10-13 PROCEDURE — 6370000000 HC RX 637 (ALT 250 FOR IP): Performed by: STUDENT IN AN ORGANIZED HEALTH CARE EDUCATION/TRAINING PROGRAM

## 2020-10-13 PROCEDURE — 81001 URINALYSIS AUTO W/SCOPE: CPT

## 2020-10-13 PROCEDURE — 85025 COMPLETE CBC W/AUTO DIFF WBC: CPT

## 2020-10-13 PROCEDURE — 87491 CHLMYD TRACH DNA AMP PROBE: CPT

## 2020-10-13 PROCEDURE — 99282 EMERGENCY DEPT VISIT SF MDM: CPT

## 2020-10-13 PROCEDURE — 87591 N.GONORRHOEAE DNA AMP PROB: CPT

## 2020-10-13 PROCEDURE — 99283 EMERGENCY DEPT VISIT LOW MDM: CPT

## 2020-10-13 RX ORDER — CEPHALEXIN 500 MG/1
500 CAPSULE ORAL 2 TIMES DAILY
Qty: 20 CAPSULE | Refills: 0 | Status: ON HOLD | OUTPATIENT
Start: 2020-10-13 | End: 2020-10-16 | Stop reason: HOSPADM

## 2020-10-13 RX ORDER — CEPHALEXIN 250 MG/1
500 CAPSULE ORAL ONCE
Status: COMPLETED | OUTPATIENT
Start: 2020-10-13 | End: 2020-10-13

## 2020-10-13 RX ADMIN — CEPHALEXIN 500 MG: 250 CAPSULE ORAL at 23:13

## 2020-10-13 ASSESSMENT — ENCOUNTER SYMPTOMS
ABDOMINAL DISTENTION: 1
COUGH: 0
SORE THROAT: 0
ABDOMINAL PAIN: 1
SHORTNESS OF BREATH: 0
NAUSEA: 0
VOMITING: 0

## 2020-10-13 ASSESSMENT — PAIN SCALES - GENERAL
PAINLEVEL_OUTOF10: 9
PAINLEVEL_OUTOF10: 6

## 2020-10-14 NOTE — ED TRIAGE NOTES
Mode of arrival (squad #, walk in, police, etc) : walk in        Chief complaint(s): flank pain, burning with urination, blood in urine        Arrival Note (brief scenario, treatment PTA, etc). : Pt states he has been having flank pain, lower pelvic pain, burning with urination and blood in his urine. Pt ha a hx of kidney stones. C= \"Have you ever felt that you should Cut down on your drinking? \"  No  A= \"Have people Annoyed you by criticizing your drinking? \"  No  G= \"Have you ever felt bad or Guilty about your drinking? \"  No  E= \"Have you ever had a drink as an Eye-opener first thing in the morning to steady your nerves or to help a hangover? \"  No      Deferred []      Reason for deferring: N/A    *If yes to two or more: probable alcohol abuse. *

## 2020-10-14 NOTE — ED PROVIDER NOTES
[facility] ED     Emergency Department     Faculty Attestation        I performed a history and physical examination of the patient and discussed management with the resident. I reviewed the residents note and agree with the documented findings and plan of care. Any areas of disagreement are noted on the chart. I was personally present for the key portions of any procedures. I have documented in the chart those procedures where I was not present during the key portions. I have reviewed the emergency nurses triage note. I agree with the chief complaint, past medical history, past surgical history, allergies, medications, social and family history as documented unless otherwise noted below. Documentation of the HPI, Physical Exam and Medical Decision Making performed by medical students or scribes is based on my personal performance of the HPI, PE and MDM. For Physician Assistant/ Nurse Practitioner cases/documentation I have have had a face to face evaluation with this patient and have completed at least one if not all key elements of the E/M (history, physical exam, and MDM). Additional findings are as noted. Vital Signs: BP (!) 141/69   Pulse 74   Temp 99 °F (37.2 °C) (Oral)   Resp 16   Ht 6' (1.829 m)   Wt 240 lb (108.9 kg)   SpO2 98%   BMI 32.55 kg/m²   PCP:  Susan Rodriges MD    Pertinent Comments:     History: This is a 50-year-old male who presents today with a complaint of flank pain, hematuria and dysuria. Patient relates some lower pelvic pain with this as well. He has noticed blood and burning with urination. He has had a history of kidney stones and relates that it feels just like that. Exam: Patient does have some mild hypertension. The rest of his vitals are stable. He appears to be in mild discomfort. Otherwise his exam for me is negative.     Critical Care  None    (Please note that portions of this note were completed with a voice

## 2020-10-14 NOTE — ED PROVIDER NOTES
[address] [facility]  Emergency Department Encounter  EmergencyMedicine Resident     Pt Name:Jon Cedeno  MRN: [id number]  [place] 1965  Date of evaluation: 10/13/20  PCP:  Delia Park MD    [address] [place] [address]       Chief Complaint   Patient presents with    Flank Pain    Dysuria    Hematuria       HISTORY OF PRESENT ILLNESS  (Location/Symptom, Timing/Onset, Context/Setting, Quality, Duration, Modifying Factors, Severity.)      Sean Avila is a 48 y.o. male who presents with dysuria for approximately 1 week. Patient complains of some dysuria as well as some suprapubic pain for about 1 week. He denies any fevers or chills. No flank pain or tenderness. States he had a urinary tract infection a couple months ago that was treated with 14 days of antibiotics and improved. He denies being sexually active. No penile discharge. Patient has a past medical history of kidney stones, hyperlipidemia, type 2 diabetes, chronic liver disease, alcoholism (however has not drank in some time). Denies a previous history of prostate cancer, benign prostatic hyperplasia. PAST MEDICAL / SURGICAL / SOCIAL / FAMILY HISTORY      has a past medical history of Calculus of kidney, Essential hypertension, benign, Gout, unspecified, H/O colonoscopy, Mitral valve disorders(424.0), Other and unspecified hyperlipidemia, Type II or unspecified type diabetes mellitus without mention of complication, not stated as uncontrolled, and Unspecified chronic liver disease without mention of alcohol.     has a past surgical history that includes Lithotripsy and Upper gastrointestinal endoscopy (N/A, 5/25/2018).     Social History     Socioeconomic History    Marital status:      Spouse name: Not on file    Number of children: Not on file    Years of education: Not on file    Highest education level: Not on file   Occupational History    Not on file   Social Needs    Financial resource strain: Not on file   [employer] insecurity     Worry: Not on file     Inability: Not on file    Transportation needs     Medical: Not on file     Non-medical: Not on file   Tobacco Use    Smoking status: Former Smoker     Last attempt to quit: 3/26/2012     Years since quittin.5    Smokeless tobacco: Former User   Substance and Sexual Activity    Alcohol use: Not Currently     Alcohol/week: 0.0 standard drinks     Comment: very little/ special events    Drug use: Yes     Types: Marijuana    Sexual activity: Not on file   Lifestyle    Physical activity     Days per week: Not on file     Minutes per session: Not on file    Stress: Not on file   Relationships    Social connections     Talks on phone: Not on file     Gets together: Not on file     Attends Tenriism service: Not on file     Active member of club or organization: Not on file     Attends meetings of clubs or organizations: Not on file     Relationship status: Not on file    Intimate partner violence     Fear of current or ex partner: Not on file     Emotionally abused: Not on file     Physically abused: Not on file     Forced sexual activity: Not on file   Other Topics Concern    Not on file   Social History Narrative    Not on file       Family History   Problem Relation Age of Onset    Cancer Other     Diabetes Other     High Blood Pressure Other     Heart Disease Other        Allergies:  Codeine and Simvastatin    Home Medications:  Prior to Admission medications    Medication Sig Start Date End Date Taking?  Authorizing Provider   cephALEXin (KEFLEX) 500 MG capsule Take 1 capsule by mouth 2 times daily for 10 days 10/13/20 10/23/20 Yes Clementine Ray DO   tamsulosin (FLOMAX) 0.4 MG capsule Take 1 capsule by mouth once daily 10/2/20   Sindy Hicks MD   omeprazole (PRILOSEC) 20 MG delayed release capsule Take 1 capsule by mouth twice daily 20   Sindy Hicks MD   lisinopril (PRINIVIL;ZESTRIL) 5 MG tablet Take 1 tablet by mouth once daily 20 Kristy Christopher, MD   allopurinol (ZYLOPRIM) 100 MG tablet Take 1 tablet by mouth once daily 8/5/20   Kristy Christopher MD   blood glucose test strips (ASCENSIA AUTODISC VI;ONE TOUCH ULTRA TEST VI) strip Use with associated glucose meter. 7/22/20   Dean Woods PA-C   fluticasone-salmeterol (ADVAIR DISKUS) 100-50 MCG/DOSE diskus inhaler INHALE 1 PUFF BY MOUTH EVERY 12 HOURS 7/1/20   Dean Woods PA-C   SITagliptin (JANUVIA) 100 MG tablet TAKE 1 TABLET BY MOUTH DAILY 6/8/20   Kristy Candi, MD   metFORMIN (GLUCOPHAGE) 1000 MG tablet Take 1 tablet by mouth 2 times daily (with meals) 5/20/20   Rolley ChuckNIESHA lopez   metoprolol tartrate (LOPRESSOR) 25 MG tablet Take 1 tablet by mouth 2 times daily 5/20/20   Kristy Candi, MD   albuterol sulfate HFA (VENTOLIN HFA) 108 (90 Base) MCG/ACT inhaler Inhale 1 puff into the lungs every 4 hours as needed for Wheezing 5/20/20   Kristy Christopher, MD   atorvastatin (LIPITOR) 40 MG tablet Take 1 tablet by mouth once daily 5/8/20   Kristy MD Candi   glimepiride (AMARYL) 4 MG tablet TAKE 1 TABLET BY MOUTH TWICE DAILY 4/6/20   Rolley ChuckNIESHA lopez   blood glucose monitor strips 1 strip by Other route 4 times daily Test 4  times a day & as needed for symptoms of irregular blood glucose. 5/3/19   MD KIRSTIE Gilmore LANCETS ULTRATHIN 30G MISC 1 each by Other route 3 times daily 10/19/18   Kristy Christopher MD   acetaminophen (AMINOFEN) 325 MG tablet Take 2 tablets by mouth every 6 hours as needed for Pain 10/15/18   Vinny Don MD   aspirin 81 MG tablet Take 81 mg by mouth daily     Historical Provider, MD       REVIEW OF SYSTEMS    (2-9 systems for level 4, 10 or more for level 5)      Review of Systems   Constitutional: Negative for chills and fever. HENT: Negative for congestion and sore throat. Respiratory: Negative for cough and shortness of breath. Cardiovascular: Negative for chest pain.    Gastrointestinal: Positive for abdominal distention (Chronic Metabolic Panel w/ Reflex to MG    Urinalysis with Microscopic       MEDICATIONS ORDERED:  Orders Placed This Encounter   Medications    cephALEXin (KEFLEX) capsule 500 mg     Order Specific Question:   Antimicrobial Indications     Answer:   Urinary Tract Infection    cephALEXin (KEFLEX) 500 MG capsule     Sig: Take 1 capsule by mouth 2 times daily for 10 days     Dispense:  20 capsule     Refill:  0       DIAGNOSTIC RESULTS / EMERGENCY DEPARTMENT COURSE / MDM   LAB RESULTS:  Results for orders placed or performed during the hospital encounter of 10/13/20   CBC Auto Differential   Result Value Ref Range    WBC 3.6 3.5 - 11.0 k/uL    RBC 3.78 (L) 4.5 - 5.9 m/uL    Hemoglobin 12.0 (L) 13.5 - 17.5 g/dL    Hematocrit 34.6 (L) 41 - 53 %    MCV 91.5 80 - 100 fL    MCH 31.6 26 - 34 pg    MCHC 34.5 31 - 37 g/dL    RDW 14.8 11.5 - 14.9 %    Platelets 72 (L) 535 - 450 k/uL    MPV 7.9 6.0 - 12.0 fL    NRBC Automated NOT REPORTED per 100 WBC    Differential Type NOT REPORTED     Immature Granulocytes NOT REPORTED 0 %    Absolute Immature Granulocyte NOT REPORTED 0.00 - 0.30 k/uL    WBC Morphology NOT REPORTED     RBC Morphology NOT REPORTED     Platelet Estimate NOT REPORTED     Seg Neutrophils 46 36 - 66 %    Lymphocytes 37 24 - 44 %    Monocytes 12 (H) 1 - 7 %    Eosinophils % 5 (H) 0 - 4 %    Basophils 0 0 - 2 %    Segs Absolute 1.66 1.3 - 9.1 k/uL    Absolute Lymph # 1.33 1.0 - 4.8 k/uL    Absolute Mono # 0.43 0.1 - 1.3 k/uL    Absolute Eos # 0.18 0.0 - 0.4 k/uL    Basophils Absolute 0.00 0.0 - 0.2 k/uL    Morphology Normal    Comprehensive Metabolic Panel w/ Reflex to MG   Result Value Ref Range    Glucose 73 70 - 99 mg/dL    BUN 13 6 - 20 mg/dL    CREATININE 0.46 (L) 0.70 - 1.20 mg/dL    Bun/Cre Ratio NOT REPORTED 9 - 20    Calcium 9.5 8.6 - 10.4 mg/dL    Sodium 140 135 - 144 mmol/L    Potassium 3.6 (L) 3.7 - 5.3 mmol/L    Chloride 105 98 - 107 mmol/L    CO2 26 20 - 31 mmol/L    Anion Gap 9 9 - 17 mmol/L    Alkaline Phosphatase 70 40 - 129 U/L    ALT 23 5 - 41 U/L    AST 31 <40 U/L    Total Bilirubin 3.32 (H) 0.3 - 1.2 mg/dL    Total Protein 6.5 6.4 - 8.3 g/dL    Alb 3.5 3.5 - 5.2 g/dL    Albumin/Globulin Ratio NOT REPORTED 1.0 - 2.5    GFR Non-African American >60 >60 mL/min    GFR African American >60 >60 mL/min    GFR Comment          GFR Staging NOT REPORTED    Urinalysis with Microscopic   Result Value Ref Range    Color, UA DARK YELLOW (A) YELLOW    Turbidity UA CLEAR CLEAR    Glucose, Ur NEGATIVE NEGATIVE    Bilirubin Urine (A) NEGATIVE     Presumptive positive. Unable to confirm due to unavailability of reagent. Ketones, Urine NEGATIVE NEGATIVE    Specific Gravity, UA 1.025 1.000 - 1.030    Urine Hgb NEGATIVE NEGATIVE    pH, UA 6.5 5.0 - 8.0    Protein, UA NEGATIVE NEGATIVE    Urobilinogen, Urine ELEVATED (A) Normal    Nitrite, Urine POSITIVE (A) NEGATIVE    Leukocyte Esterase, Urine SMALL (A) NEGATIVE    Urinalysis Comments NOT REPORTED     -          WBC, UA 5 TO 10 /HPF    RBC, UA 5 TO 10 /HPF    Casts UA FINE GRANULAR /LPF    Crystals, UA NOT REPORTED None /HPF    Epithelial Cells UA 0 TO 2 /HPF    Renal Epithelial, UA NOT REPORTED 0 /HPF    Bacteria, UA FEW (A) None    Mucus, UA NOT REPORTED None    Trichomonas, UA NOT REPORTED None    Amorphous, UA NOT REPORTED None    Other Observations UA NOT REPORTED NOT REQ. Yeast, UA NOT REPORTED None       IMPRESSION: UTI    RADIOLOGY:  No orders to display        EKG    All EKG's are interpreted by the Emergency Department Physician who either signs or Co-signs this chart in the absence of a cardiologist.    [facility]:    Patient arrives for evaluation of dysuria. He appears well nontoxic vital signs are within normal limits. Prostate is not enlarged, nontender doubt prostatitis. Patient did test positive for UTI. Lab work was ordered as patient state he had some abdominal pain and he had mild diffusely tenderness.   Lab work was unremarkable, urinalysis was positive for UTI. Patient is treated with 10-day course of Keflex. ED Course as of Oct 13 2342   Tue Oct 13, 2020   2235 Bacteria, UA(!): FEW [CS]   2236 Nitrite, Urine(!): POSITIVE [CS]   2236 WBC: 3.6 [CS]   2300 Creatinine(!): 0.46 [CS]   2300 Prostate exam is negative for bogginess, nodules or tenderness. Doubt prostatitis. Additionally patient states he is not sexually active. Will like to treat with a 10-day course of Keflex and have him follow-up with a primary care physician.    [CS]      ED Course User Index  [CS] Sharad Cruz DO         PROCEDURES:    CONSULTS:  None    CRITICAL CARE:  Please see attending note    FINAL IMPRESSION      1. Urinary tract infection without hematuria, site unspecified          DISPOSITION / PLAN     DISPOSITION Decision To Discharge 10/13/2020 11:01:35 PM      PATIENT REFERRED TO:  No follow-up provider specified.     DISCHARGE MEDICATIONS:  Discharge Medication List as of 10/13/2020 11:05 PM      START taking these medications    Details   cephALEXin (KEFLEX) 500 MG capsule Take 1 capsule by mouth 2 times daily for 10 days, Disp-20 capsule,R-0Print             Sharad Cruz DO  Emergency Medicine Resident    (Please note that portions of thisnote were completed with a voice recognition program.  Efforts were made to edit the dictations but occasionally words are mis-transcribed.)       Sharad Cruz DO  Resident  10/13/20 1348

## 2020-10-15 ENCOUNTER — APPOINTMENT (OUTPATIENT)
Dept: CT IMAGING | Age: 55
DRG: 047 | End: 2020-10-15
Payer: MEDICAID

## 2020-10-15 ENCOUNTER — HOSPITAL ENCOUNTER (INPATIENT)
Age: 55
LOS: 1 days | Discharge: HOME HEALTH | DRG: 047 | End: 2020-10-16
Attending: EMERGENCY MEDICINE | Admitting: INTERNAL MEDICINE
Payer: MEDICAID

## 2020-10-15 LAB
C. TRACHOMATIS DNA ,URINE: NEGATIVE
GLUCOSE BLD-MCNC: 99 MG/DL (ref 75–110)
N. GONORRHOEAE DNA, URINE: NEGATIVE
SPECIMEN DESCRIPTION: NORMAL

## 2020-10-15 PROCEDURE — 2060000000 HC ICU INTERMEDIATE R&B

## 2020-10-15 PROCEDURE — G0378 HOSPITAL OBSERVATION PER HR: HCPCS

## 2020-10-15 PROCEDURE — 85610 PROTHROMBIN TIME: CPT

## 2020-10-15 PROCEDURE — 6360000004 HC RX CONTRAST MEDICATION: Performed by: EMERGENCY MEDICINE

## 2020-10-15 PROCEDURE — 99285 EMERGENCY DEPT VISIT HI MDM: CPT

## 2020-10-15 PROCEDURE — 82947 ASSAY GLUCOSE BLOOD QUANT: CPT

## 2020-10-15 PROCEDURE — 80048 BASIC METABOLIC PNL TOTAL CA: CPT

## 2020-10-15 PROCEDURE — 83874 ASSAY OF MYOGLOBIN: CPT

## 2020-10-15 PROCEDURE — 85025 COMPLETE CBC W/AUTO DIFF WBC: CPT

## 2020-10-15 PROCEDURE — 80061 LIPID PANEL: CPT

## 2020-10-15 PROCEDURE — 70450 CT HEAD/BRAIN W/O DYE: CPT

## 2020-10-15 PROCEDURE — 2580000003 HC RX 258: Performed by: EMERGENCY MEDICINE

## 2020-10-15 PROCEDURE — 70496 CT ANGIOGRAPHY HEAD: CPT

## 2020-10-15 PROCEDURE — 82550 ASSAY OF CK (CPK): CPT

## 2020-10-15 PROCEDURE — 85730 THROMBOPLASTIN TIME PARTIAL: CPT

## 2020-10-15 PROCEDURE — 93005 ELECTROCARDIOGRAM TRACING: CPT | Performed by: STUDENT IN AN ORGANIZED HEALTH CARE EDUCATION/TRAINING PROGRAM

## 2020-10-15 PROCEDURE — 84484 ASSAY OF TROPONIN QUANT: CPT

## 2020-10-15 PROCEDURE — 36415 COLL VENOUS BLD VENIPUNCTURE: CPT

## 2020-10-15 PROCEDURE — 82553 CREATINE MB FRACTION: CPT

## 2020-10-15 RX ORDER — SODIUM CHLORIDE 0.9 % (FLUSH) 0.9 %
10 SYRINGE (ML) INJECTION PRN
Status: DISCONTINUED | OUTPATIENT
Start: 2020-10-15 | End: 2020-10-16 | Stop reason: HOSPADM

## 2020-10-15 RX ORDER — ACETAMINOPHEN 325 MG/1
650 TABLET ORAL EVERY 4 HOURS PRN
Status: DISCONTINUED | OUTPATIENT
Start: 2020-10-15 | End: 2020-10-16 | Stop reason: HOSPADM

## 2020-10-15 RX ORDER — 0.9 % SODIUM CHLORIDE 0.9 %
80 INTRAVENOUS SOLUTION INTRAVENOUS ONCE
Status: COMPLETED | OUTPATIENT
Start: 2020-10-15 | End: 2020-10-15

## 2020-10-15 RX ORDER — SODIUM CHLORIDE 0.9 % (FLUSH) 0.9 %
10 SYRINGE (ML) INJECTION EVERY 12 HOURS SCHEDULED
Status: DISCONTINUED | OUTPATIENT
Start: 2020-10-16 | End: 2020-10-16 | Stop reason: HOSPADM

## 2020-10-15 RX ADMIN — SODIUM CHLORIDE 80 ML: 9 INJECTION, SOLUTION INTRAVENOUS at 21:28

## 2020-10-15 RX ADMIN — Medication 10 ML: at 21:28

## 2020-10-15 RX ADMIN — IOPAMIDOL 75 ML: 755 INJECTION, SOLUTION INTRAVENOUS at 21:28

## 2020-10-15 ASSESSMENT — ENCOUNTER SYMPTOMS
SORE THROAT: 0
NAUSEA: 0
SHORTNESS OF BREATH: 0
VOMITING: 0
COUGH: 0
ABDOMINAL PAIN: 0

## 2020-10-16 VITALS
OXYGEN SATURATION: 96 % | DIASTOLIC BLOOD PRESSURE: 90 MMHG | HEART RATE: 80 BPM | HEIGHT: 72 IN | RESPIRATION RATE: 16 BRPM | WEIGHT: 238 LBS | TEMPERATURE: 97.9 F | BODY MASS INDEX: 32.23 KG/M2 | SYSTOLIC BLOOD PRESSURE: 137 MMHG

## 2020-10-16 LAB
% CKMB: 2 % (ref 0–3.5)
ABSOLUTE EOS #: 0.06 K/UL (ref 0–0.4)
ABSOLUTE IMMATURE GRANULOCYTE: ABNORMAL K/UL (ref 0–0.3)
ABSOLUTE LYMPH #: 0.96 K/UL (ref 1–4.8)
ABSOLUTE MONO #: 0.22 K/UL (ref 0.1–1.3)
ANION GAP SERPL CALCULATED.3IONS-SCNC: 9 MMOL/L (ref 9–17)
ATYPICAL LYMPHOCYTE ABSOLUTE COUNT: 0.1 K/UL
ATYPICAL LYMPHOCYTES: 3 %
BASOPHILS # BLD: 1 % (ref 0–2)
BASOPHILS ABSOLUTE: 0.03 K/UL (ref 0–0.2)
BUN BLDV-MCNC: 11 MG/DL (ref 6–20)
BUN/CREAT BLD: ABNORMAL (ref 9–20)
CALCIUM SERPL-MCNC: 9.1 MG/DL (ref 8.6–10.4)
CHLORIDE BLD-SCNC: 107 MMOL/L (ref 98–107)
CHOLESTEROL/HDL RATIO: 1.9
CHOLESTEROL: 78 MG/DL
CK MB: 1.8 NG/ML
CKMB INTERPRETATION: ABNORMAL
CO2: 25 MMOL/L (ref 20–31)
CREAT SERPL-MCNC: 0.49 MG/DL (ref 0.7–1.2)
DIFFERENTIAL TYPE: ABNORMAL
EOSINOPHILS RELATIVE PERCENT: 2 % (ref 0–4)
GFR AFRICAN AMERICAN: >60 ML/MIN
GFR NON-AFRICAN AMERICAN: >60 ML/MIN
GFR SERPL CREATININE-BSD FRML MDRD: ABNORMAL ML/MIN/{1.73_M2}
GFR SERPL CREATININE-BSD FRML MDRD: ABNORMAL ML/MIN/{1.73_M2}
GLUCOSE BLD-MCNC: 120 MG/DL (ref 75–110)
GLUCOSE BLD-MCNC: 203 MG/DL (ref 75–110)
GLUCOSE BLD-MCNC: 97 MG/DL (ref 70–99)
HCT VFR BLD CALC: 35.7 % (ref 41–53)
HDLC SERPL-MCNC: 41 MG/DL
HEMOGLOBIN: 12.3 G/DL (ref 13.5–17.5)
IMMATURE GRANULOCYTES: ABNORMAL %
INR BLD: 1.2
LDL CHOLESTEROL: 27 MG/DL (ref 0–130)
LYMPHOCYTES # BLD: 30 % (ref 24–44)
MCH RBC QN AUTO: 31.5 PG (ref 26–34)
MCHC RBC AUTO-ENTMCNC: 34.5 G/DL (ref 31–37)
MCV RBC AUTO: 91.4 FL (ref 80–100)
MONOCYTES # BLD: 7 % (ref 1–7)
MORPHOLOGY: ABNORMAL
MORPHOLOGY: ABNORMAL
MYOGLOBIN: 21 NG/ML (ref 28–72)
NRBC AUTOMATED: ABNORMAL PER 100 WBC
PARTIAL THROMBOPLASTIN TIME: 32.1 SEC (ref 24–36)
PDW BLD-RTO: 14.6 % (ref 11.5–14.9)
PLATELET # BLD: 76 K/UL (ref 150–450)
PLATELET ESTIMATE: ABNORMAL
PMV BLD AUTO: 7.9 FL (ref 6–12)
POTASSIUM SERPL-SCNC: 3.7 MMOL/L (ref 3.7–5.3)
PROTHROMBIN TIME: 14.9 SEC (ref 11.8–14.6)
RBC # BLD: 3.91 M/UL (ref 4.5–5.9)
RBC # BLD: ABNORMAL 10*6/UL
SEG NEUTROPHILS: 57 % (ref 36–66)
SEGMENTED NEUTROPHILS ABSOLUTE COUNT: 1.83 K/UL (ref 1.3–9.1)
SODIUM BLD-SCNC: 141 MMOL/L (ref 135–144)
TOTAL CK: 89 U/L (ref 39–308)
TRIGL SERPL-MCNC: 52 MG/DL
TROPONIN INTERP: ABNORMAL
TROPONIN T: ABNORMAL NG/ML
TROPONIN, HIGH SENSITIVITY: 10 NG/L (ref 0–22)
VLDLC SERPL CALC-MCNC: NORMAL MG/DL (ref 1–30)
WBC # BLD: 3.2 K/UL (ref 3.5–11)
WBC # BLD: ABNORMAL 10*3/UL

## 2020-10-16 PROCEDURE — 99223 1ST HOSP IP/OBS HIGH 75: CPT | Performed by: INTERNAL MEDICINE

## 2020-10-16 PROCEDURE — 6370000000 HC RX 637 (ALT 250 FOR IP): Performed by: INTERNAL MEDICINE

## 2020-10-16 PROCEDURE — 82947 ASSAY GLUCOSE BLOOD QUANT: CPT

## 2020-10-16 PROCEDURE — G0378 HOSPITAL OBSERVATION PER HR: HCPCS

## 2020-10-16 RX ORDER — GLIMEPIRIDE 4 MG/1
4 TABLET ORAL
Status: DISCONTINUED | OUTPATIENT
Start: 2020-10-16 | End: 2020-10-16 | Stop reason: HOSPADM

## 2020-10-16 RX ORDER — CLOPIDOGREL BISULFATE 75 MG/1
75 TABLET ORAL DAILY
Status: DISCONTINUED | OUTPATIENT
Start: 2020-10-16 | End: 2020-10-16 | Stop reason: HOSPADM

## 2020-10-16 RX ORDER — PANTOPRAZOLE SODIUM 40 MG/1
40 TABLET, DELAYED RELEASE ORAL DAILY
Status: DISCONTINUED | OUTPATIENT
Start: 2020-10-17 | End: 2020-10-16 | Stop reason: HOSPADM

## 2020-10-16 RX ORDER — NICOTINE POLACRILEX 4 MG
15 LOZENGE BUCCAL PRN
Status: DISCONTINUED | OUTPATIENT
Start: 2020-10-16 | End: 2020-10-16 | Stop reason: HOSPADM

## 2020-10-16 RX ORDER — ATORVASTATIN CALCIUM 40 MG/1
40 TABLET, FILM COATED ORAL NIGHTLY
Status: DISCONTINUED | OUTPATIENT
Start: 2020-10-16 | End: 2020-10-16 | Stop reason: HOSPADM

## 2020-10-16 RX ORDER — LORAZEPAM 1 MG/1
1 TABLET ORAL ONCE
Status: COMPLETED | OUTPATIENT
Start: 2020-10-16 | End: 2020-10-16

## 2020-10-16 RX ORDER — CEPHALEXIN 500 MG/1
500 CAPSULE ORAL 2 TIMES DAILY
Status: DISCONTINUED | OUTPATIENT
Start: 2020-10-16 | End: 2020-10-16 | Stop reason: HOSPADM

## 2020-10-16 RX ORDER — DEXTROSE MONOHYDRATE 25 G/50ML
12.5 INJECTION, SOLUTION INTRAVENOUS PRN
Status: DISCONTINUED | OUTPATIENT
Start: 2020-10-16 | End: 2020-10-16 | Stop reason: HOSPADM

## 2020-10-16 RX ORDER — ASPIRIN 81 MG/1
81 TABLET, CHEWABLE ORAL DAILY
Status: DISCONTINUED | OUTPATIENT
Start: 2020-10-16 | End: 2020-10-16 | Stop reason: HOSPADM

## 2020-10-16 RX ORDER — ATORVASTATIN CALCIUM 40 MG/1
40 TABLET, FILM COATED ORAL NIGHTLY
Qty: 30 TABLET | Refills: 3 | Status: SHIPPED | OUTPATIENT
Start: 2020-10-16 | End: 2021-05-10

## 2020-10-16 RX ORDER — DEXTROSE MONOHYDRATE 50 MG/ML
100 INJECTION, SOLUTION INTRAVENOUS PRN
Status: DISCONTINUED | OUTPATIENT
Start: 2020-10-16 | End: 2020-10-16 | Stop reason: HOSPADM

## 2020-10-16 RX ADMIN — METOPROLOL TARTRATE 25 MG: 25 TABLET, FILM COATED ORAL at 08:30

## 2020-10-16 RX ADMIN — CEPHALEXIN 500 MG: 500 CAPSULE ORAL at 01:31

## 2020-10-16 RX ADMIN — GLIMEPIRIDE 4 MG: 4 TABLET ORAL at 05:52

## 2020-10-16 RX ADMIN — ASPIRIN 81 MG: 81 TABLET, CHEWABLE ORAL at 14:07

## 2020-10-16 RX ADMIN — CEPHALEXIN 500 MG: 500 CAPSULE ORAL at 08:30

## 2020-10-16 RX ADMIN — CLOPIDOGREL BISULFATE 75 MG: 75 TABLET ORAL at 14:07

## 2020-10-16 RX ADMIN — METOPROLOL TARTRATE 25 MG: 25 TABLET, FILM COATED ORAL at 01:31

## 2020-10-16 RX ADMIN — LORAZEPAM 1 MG: 1 TABLET ORAL at 13:55

## 2020-10-16 ASSESSMENT — PAIN SCALES - GENERAL
PAINLEVEL_OUTOF10: 0
PAINLEVEL_OUTOF10: 0

## 2020-10-16 NOTE — PROGRESS NOTES
Patient discharged @ 25 [phone] with all belongings, 2nd room check for any belongings initiated. Patient A&O x's4, VS WNL, writer took patient down via ANNIA Anne 23 with daughter.

## 2020-10-16 NOTE — ED NOTES
Mode of arrival (squad #, walk in, police, etc) : walk in        Chief complaint(s): c/o rt sided face numbness        Arrival Note (brief scenario, treatment PTA, etc). : Pt presents to ED from home c/o rt sided facial numbness. Pt stated this has resolved. Pt stated he has had this happen in the past. Pt has hx of bells palsy. Pt denies any loc. Pt is A&Ox4, eupneic, PWD. GCS=15. Call light in reach. C= \"Have you ever felt that you should Cut down on your drinking? \"  No  A= \"Have people Annoyed you by criticizing your drinking? \"  No  G= \"Have you ever felt bad or Guilty about your drinking? \"  No  E= \"Have you ever had a drink as an Eye-opener first thing in the morning to steady your nerves or to help a hangover? \"  No      Deferred []      Reason for deferring: N/A    *If yes to two or more: probable alcohol abuse. Joi Salazar RN  10/15/20 2051

## 2020-10-16 NOTE — FLOWSHEET NOTE
SC Initial Visit  Pt Sleeping  Writer spoke with PT daughter  Pt daughter shared with writers the challenges of only allowing one family member at a time in room. Pt daughter is keeping the rest of the family updated  Pt daughter explained that her father wanted info on making a \"Living Will\"  Writer gave AD documents and explained that a  can further answer questions or help PT to complete  Writer explained that a  can be reached by having the nurse page a   PT daughter declined prayer       10/16/20 1116   Encounter Summary   Services provided to: Family   Referral/Consult From: [address] [place] [address]; Children   Continue Visiting   (10-16-20)   Complexity of Encounter Moderate   Length of Encounter 15 minutes   Advance Care Planning Yes   Routine   Type Initial   Assessment Approachable   Intervention Active listening;Explored feelings, thoughts, concerns;Sustaining presence/ Ministry of presence   Outcome Expressed gratitude;Engaged in conversation;Expressed feelings/needs/concerns;Coping; Hopeful;Encouraged;Receptive

## 2020-10-16 NOTE — DISCHARGE SUMMARY
[place] [id number] Internal Medicine    Discharge Summary     Patient ID: Aga Cao  :  1965   MRN: [id number]     ACCOUNT:  [de-identified]   Patient's PCP: Maude Morales MD  Admit Date: 10/15/2020   Discharge Date: 10/16/2020    Length of Stay: 1  Code Status:  Full Code  Admitting Physician: Maude Morales MD  Discharge Physician: Lilliam Ojeda MD     Active Discharge Diagnoses:     Primary Problem  <principal problem not specified>      [place] Problems    Diagnosis Date Noted    Stroke-like symptoms [R29.90] 2020    Type 2 diabetes mellitus with hyperglycemia, without long-term current use of insulin ([facility] [address] [E11.65] 2016    Hyperlipidemia associated with type 2 diabetes mellitus ([facility] [address] [E11.69, E78.5]     Hyperlipidemia LDL goal <70 [E78.5]     Chronic liver disease [K76.9]        Admission Condition:  fair     Discharged Condition: fair    Hospital Stay:     Hospital Course:  Aga Cao is a 48 y.o. male who was admitted for the management of <principal problem not specified> , presented to ER with Numbness (R face)  62-year-old morbidly obese gentleman  History of uncontrolled diabetes mellitus history of liver cirrhosis  Smokes pot couple times a day no tobacco use history of hyperlipidemia and hypertension admitted with sudden onset of right facial and right upper extremity numbness lasted for over 20 minutes and resolved spontaneously  CTA head neck no stroke no stenosis noted  Diagnosis is transient ischemic attack secondary to above risk factors aspirin Plavix fasting lipid panels  Patient would like to go home no MRI needed as it will not change the diagnosis  We will discharge on aspirin Plavix and high-dose Lipitor advised lifestyle changes advised to follow-up with PCP within a week        Significant therapeutic interventions:     Significant Diagnostic Studies:   Labs / Micro:        ,     Radiology:    Ct Head Wo Contrast    Result Date: 10/15/2020  EXAMINATION: CT OF THE HEAD WITHOUT CONTRAST  10/15/2020 8:22 pm TECHNIQUE: CT of the head was performed without the administration of intravenous contrast. Dose modulation, iterative reconstruction, and/or weight based adjustment of the mA/kV was utilized to reduce the radiation dose to as low as reasonably achievable. COMPARISON: March 30, 2020 HISTORY: ORDERING SYSTEM PROVIDED HISTORY: R sided facial numbness, now resolved TECHNOLOGIST PROVIDED HISTORY: R sided facial numbness, now resolved Reason for Exam: right side facial numbness for a few hours. Slight headache Acuity: Acute Type of Exam: Initial Relevant Medical/Surgical History: htn, diabetes FINDINGS: BRAIN/VENTRICLES: There is no acute intracranial hemorrhage, mass effect or midline shift. No abnormal extra-axial fluid collection. The gray-white differentiation is maintained without evidence of an acute infarct. There is no evidence of hydrocephalus. ORBITS: The visualized portion of the orbits demonstrate no acute abnormality. SINUSES: The visualized paranasal sinuses and mastoid air cells demonstrate no acute abnormality. SOFT TISSUES/SKULL:  No acute abnormality of the visualized skull or soft tissues. No acute intracranial abnormality. Cta Head Neck W Contrast    Result Date: 10/15/2020  EXAMINATION: CTA OF THE HEAD AND NECK WITH CONTRAST 10/15/2020 9:22 pm: TECHNIQUE: CTA of the head and neck was performed with the administration of intravenous contrast. Multiplanar reformatted images are provided for review. MIP images are provided for review. Stenosis of the internal carotid arteries measured using NASCET criteria. Dose modulation, iterative reconstruction, and/or weight based adjustment of the mA/kV was utilized to reduce the radiation dose to as low as reasonably achievable. COMPARISON: None.  HISTORY: ORDERING SYSTEM PROVIDED HISTORY: R sided facial numbness; acute TECHNOLOGIST PROVIDED HISTORY: R sided facial numbness; acute Reason for Exam: right side facial numbness for a few hours. Slight headache Acuity: Acute Type of Exam: Initial Relevant Medical/Surgical History: htn diabetes FINDINGS: CTA NECK: AORTIC ARCH/ARCH VESSELS: No dissection or arterial injury. No significant stenosis of the brachiocephalic or subclavian arteries. CAROTID ARTERIES: No dissection, arterial injury, or hemodynamically significant stenosis by NASCET criteria. VERTEBRAL ARTERIES: No dissection, arterial injury, or significant stenosis. SOFT TISSUES: The lung apices are clear. No cervical or superior mediastinal lymphadenopathy. The larynx and pharynx are unremarkable. No acute abnormality of the salivary and thyroid glands. BONES: Degenerative changes of the spine are present. CTA HEAD: ANTERIOR CIRCULATION: No significant stenosis of the intracranial internal carotid, anterior cerebral, or middle cerebral arteries. No aneurysm. Bilateral posterior communicating arteries are present. POSTERIOR CIRCULATION: No significant stenosis of the vertebral, basilar, or posterior cerebral arteries. No aneurysm. OTHER: No dural venous sinus thrombosis on this non-dedicated study. BRAIN: See separately dictated noncontrast head CT report. No flow limiting stenosis or large vessel occlusion detected within the head or neck. Consultations:    Consults:     Final Specialist Recommendations/Findings:   IP CONSULT TO NEUROLOGY  IP CONSULT TO INTERNAL MEDICINE      The patient was seen and examined on day of discharge and this discharge summary is in conjunction with any daily progress note from day of discharge. Discharge plan:     Disposition: Home    Physician Follow Up:     No follow-up provider specified.      Requiring Further Evaluation/Follow Up POST HOSPITALIZATION/Incidental Findings:    Diet: diabetic     Activity: As tolerated    Instructions to Patient:     Discharge Medications:      Medication List doctor or other care provider to review them with you. STOP taking these medications    cephALEXin 500 MG capsule  Commonly known as:  Keflex           Where to Get Your Medications      These medications were sent to [facility], [address] [place] [address]  [facility], [address]    Phone:  [phone]   · atorvastatin 40 MG tablet         Time Spent on discharge is  35 mins in patient examination, evaluation, counseling as well as medication reconciliation, prescriptions for required medications, discharge plan and follow up. Electronically signed by   Celeste Monroe MD  10/16/2020  1:37 PM      Thank you Dr. Uche Ervin MD for the opportunity to be involved in this patient's care.

## 2020-10-16 NOTE — CARE COORDINATION
CASE MANAGEMENT NOTE:    Admission Date:  10/15/2020 Ambrosio Mcburney is a 48 y.o.  male    Admitted for : Stroke-like symptoms [R29.90]  Stroke-like symptoms [R29.90]    Met with:  Patient's Daughter, Spring Carvajal, for Pt. Is asleep    PCP:  Dr. Nicole Cam:  Raheem Paris      Current Residence/ Living Arrangements:  independently at home  Lives w/ Wife & Mom           Current Services PTA:  No    Is patient agreeable to VNS: No    Freedom of choice provided:  Yes    List of [address] provided: No, denies    VNS chosen:  No    DME:  straight cane, walker and wheelchair    Home Oxygen: No    Nebulizer: No    CPAP/BIPAP: Yes, Doesn't use    Supplier: Unsure    Potential Assistance Needed: will follow for any rec/needs    SNF needed: No    Freedom of choice and list provided: No    Pharmacy:  [facility]       Does Patient want to use MEDS to BEDS? No    Is patient currently receiving oral anticoagulation therapy? No    Is the Patient an [facility] with Readmission Risk Score greater than 14%? No  If yes, pt needs a follow up appointment made within 7 days. Family Members/Caregivers that pt would like involved in their care:    Yes    If yes, list name here: Wife Kendall Rodriguez    Transportation Provider:  Patient             Is patient in Isolation/One on One/Altered Mental Status? No  If yes, skip next question. If no, would they like an I-Pad to  use? No  If yes, call [phone]. Discharge Plan:  10/16/20 Raheem Paris Pt. Lives in a trailer, w/ Ramp w/ Wife & Mom. DME cane, walker, W/C. CPAP, doesn't wear. Unsure who provided. Denies VNS/ Needs at this time.  MRI Brain, Will follow for all needs/Rec//KB                 Electronically signed by: Americo Cornell RN on 10/16/2020 at 9:57 AM

## 2020-10-16 NOTE — H&P
History and Physical Service  [facility]    HISTORY AND PHYSICAL EXAMINATION            Date:   10/16/2020  Patient name:  Matty Palumbo  MRN:   [id number]  Account:  [de-identified]  YOB: 1965  PCP:    Dari Salinas MD  Code Status:    Full Code    Chief Complaint:     Right-sided numbness    History Obtained From:     patient    History of Present Illness: The patient is a 48 y.o. Non-/non  male who presents    61-year-old morbidly obese gentleman with uncontrolled diabetes mellitus hypertension hyperlipidemia smokes pot every day admitted with the sudden onset of numbness on the right face and right upper extremity with no motor deficit symptoms lasted 20 minutes and then resolved denies any history of MI or CVA  No associated headache nausea vomiting or any other focal deficit  Diabetes   Duration more than 7 years  Modifying factors on Glucophage and other med  Severity uncontrolled sever  Associated signs and symtoms neuropathy/ckd/ CAD. aggravated with sugar diet and better with low sugar diet            Past Medical History:     Past Medical History:   Diagnosis Date    Calculus of kidney     Essential hypertension, benign     Gout, unspecified     H/O colonoscopy 4/11/2016    2016    Mitral valve disorders(424.0)     Other and unspecified hyperlipidemia     Type II or unspecified type diabetes mellitus without mention of complication, not stated as uncontrolled     Unspecified chronic liver disease without mention of alcohol         Past Surgical History:     Past Surgical History:   Procedure Laterality Date    LITHOTRIPSY      UPPER GASTROINTESTINAL ENDOSCOPY N/A 5/25/2018    The esophagus was inspected to the Z-line. The endoscopic exam showed impression of varices (F1) in distal esophagus. Medications Prior to Admission:     Prior to Admission medications    Medication Sig Start Date End Date Taking?  Authorizing Provider   cephALEXin (KEFLEX) 500 MG capsule Take 1 capsule by mouth 2 times daily for 10 days 10/13/20 10/23/20 Yes Elizabeth Calvin,    tamsulosin (FLOMAX) 0.4 MG capsule Take 1 capsule by mouth once daily 10/2/20  Yes Nestor Moreno MD   omeprazole (PRILOSEC) 20 MG delayed release capsule Take 1 capsule by mouth twice daily 9/21/20  Yes Nestor Moreno MD   lisinopril (PRINIVIL;ZESTRIL) 5 MG tablet Take 1 tablet by mouth once daily 8/17/20  Yes Nestor Moreno MD   allopurinol (ZYLOPRIM) 100 MG tablet Take 1 tablet by mouth once daily 8/5/20  Yes Nestor Moreno MD   blood glucose test strips (ASCENSIA AUTODISC VI;ONE TOUCH ULTRA TEST VI) strip Use with associated glucose meter. 7/22/20  Yes Stormy Taveras PA-C   fluticasone-salmeterol (ADVAIR DISKUS) 100-50 MCG/DOSE diskus inhaler INHALE 1 PUFF BY MOUTH EVERY 12 HOURS 7/1/20  Yes Stormy Taveras PA-C   SITagliptin (JANUVIA) 100 MG tablet TAKE 1 TABLET BY MOUTH DAILY 6/8/20  Yes Nestor Moreno MD   metFORMIN (GLUCOPHAGE) 1000 MG tablet Take 1 tablet by mouth 2 times daily (with meals) 5/20/20  Yes Stormy Taveras PA-C   metoprolol tartrate (LOPRESSOR) 25 MG tablet Take 1 tablet by mouth 2 times daily 5/20/20  Yes Nestor Moreno MD   albuterol sulfate HFA (VENTOLIN HFA) 108 (90 Base) MCG/ACT inhaler Inhale 1 puff into the lungs every 4 hours as needed for Wheezing 5/20/20  Yes Nestor Moreno MD   atorvastatin (LIPITOR) 40 MG tablet Take 1 tablet by mouth once daily 5/8/20  Yes Nestor Moreno MD   glimepiride (AMARYL) 4 MG tablet TAKE 1 TABLET BY MOUTH TWICE DAILY 4/6/20  Yes Stormy Taveras PA-C   blood glucose monitor strips 1 strip by Other route 4 times daily Test 4  times a day & as needed for symptoms of irregular blood glucose.  5/3/19  Yes Orelia Knows, MD   [employer] LANCETS ULTRATHIN 30G MISC 1 each by Other route 3 times daily 10/19/18  Yes Nestor Moreno MD   acetaminophen (AMINOFEN) 325 MG tablet Take 2 tablets by mouth every 6 hours as needed for Pain 10/15/18  Yes Carol Willoughby MD   aspirin 81 MG tablet Take 81 mg by mouth daily    Yes Historical Provider, MD        Allergies:     Codeine and Simvastatin    Social History:     Tobacco:    reports that he quit smoking about 8 years ago. He has quit using smokeless tobacco.  Alcohol:      reports previous alcohol use. Drug Use:  reports current drug use. Drug: Marijuana. Family History:     Family History   Problem Relation Age of Onset    Cancer Other     Diabetes Other     High Blood Pressure Other     Heart Disease Other        Review of Systems:     Positive and Negative as described in HPI. CONSTITUTIONAL:  negative for fevers, chills, sweats, fatigue, weight loss  HEENT:  negative for vision, hearing changes, runny nose, throat pain  RESPIRATORY:  negative for shortness of breath, cough, congestion, wheezing. CARDIOVASCULAR:  negative for chest pain, palpitations.   GASTROINTESTINAL:  negative for nausea, vomiting, diarrhea, constipation, change in bowel habits, abdominal pain   GENITOURINARY:  negative for difficulty of urination, burning with urination, frequency   INTEGUMENT:  negative for rash, skin lesions, easy bruising   HEMATOLOGIC/LYMPHATIC:  negative for swelling/edema   ALLERGIC/IMMUNOLOGIC:  negative for urticaria , itching  ENDOCRINE:  negative increase in drinking, increase in urination, hot or cold intolerance  MUSCULOSKELETAL:  negative joint pains, muscle aches, swelling of joints  NEUROLOGICAL: Transient numbness on face and right upper extremity now resolved BEHAVIOR/PSYCH:  negative for depression, anxiety    Physical Exam:   BP (!) 137/90   Pulse 80   Temp 97.9 °F (36.6 °C)   Resp 16   Ht 6' (1.829 m)   Wt 238 lb (108 kg)   SpO2 96%   BMI 32.28 kg/m²   Recent Labs     10/15/20  2011 10/16/20  0107 10/16/20  0722   POCGLU 99 203* 120*     No focal deficit noted  General Appearance:  alert, well appearing, and in no acute distress  Mental status: oriented to person, place, and time with normal affect  Head:  normocephalic, atraumatic. Eye: no icterus, redness, pupils equal and reactive, extraocular eye movements intact, conjunctiva clear  Ear: normal external ear, no discharge, hearing intact  Nose:  no drainage noted  Mouth: mucous membranes moist  Neck: supple, no carotid bruits, thyroid not palpable  Lungs: Bilateral equal air entry, clear to ausculation, no wheezing, rales or rhonchi, normal effort  Cardiovascular: normal rate, regular rhythm, no murmur, gallop, rub.   Abdomen: Soft, nontender, nondistended, normal bowel sounds, no hepatomegaly or splenomegaly  Truncal obesity neurologic: There are no new focal motor or sensory deficits, normal muscle tone and bulk, no abnormal sensation, normal speech, cranial nerves II through XII grossly intact  Skin: No gross lesions, rashes, bruising or bleeding on exposed skin area  Extremities:  peripheral pulses palpable, no pedal edema or calf pain with palpation  Psych: normal affect     Investigations:      Laboratory Testing:  Recent Results (from the past 24 hour(s))   POC Glucose Fingerstick    Collection Time: 10/15/20  8:11 PM   Result Value Ref Range    POC Glucose 99 75 - 110 mg/dL   STROKE PANEL    Collection Time: 10/15/20  8:40 PM   Result Value Ref Range    Glucose 97 70 - 99 mg/dL    BUN 11 6 - 20 mg/dL    CREATININE 0.49 (L) 0.70 - 1.20 mg/dL    Bun/Cre Ratio NOT REPORTED 9 - 20    Calcium 9.1 8.6 - 10.4 mg/dL    Sodium 141 135 - 144 mmol/L    Potassium 3.7 3.7 - 5.3 mmol/L    Chloride 107 98 - 107 mmol/L    CO2 25 20 - 31 mmol/L    Anion Gap 9 9 - 17 mmol/L    GFR Non-African American >60 >60 mL/min    GFR African American >60 >60 mL/min    GFR Comment          GFR Staging NOT REPORTED     WBC 3.2 (L) 3.5 - 11.0 k/uL    RBC 3.91 (L) 4.5 - 5.9 m/uL    Hemoglobin 12.3 (L) 13.5 - 17.5 g/dL    Hematocrit 35.7 (L) 41 - 53 %    MCV 91.4 80 - 100 fL    MCH 31.5 26 - 34 pg    MCHC 34.5 31 - 37 g/dL    RDW 14.6 11.5 - 14.9 %    Platelets 76 (L) 683 - 450 k/uL    MPV 7.9 6.0 - 12.0 fL    NRBC Automated NOT REPORTED per 100 WBC    Total CK 89 39 - 308 U/L    CK-MB PENDING ng/mL    % CKMB PENDING %    CKMB Interpretation PENDING     Differential Type NOT REPORTED     Immature Granulocytes NOT REPORTED 0 %    Absolute Immature Granulocyte NOT REPORTED 0.00 - 0.30 k/uL    WBC Morphology NOT REPORTED     RBC Morphology NOT REPORTED     Platelet Estimate NOT REPORTED     Seg Neutrophils 57 36 - 66 %    Lymphocytes 30 24 - 44 %    Atypical Lymphocytes 3 %    Monocytes 7 1 - 7 %    Eosinophils % 2 0 - 4 %    Basophils 1 0 - 2 %    Segs Absolute 1.83 1.3 - 9.1 k/uL    Absolute Lymph # 0.96 (L) 1.0 - 4.8 k/uL    Atypical Lymphocytes Absolute 0.10 k/uL    Absolute Mono # 0.22 0.1 - 1.3 k/uL    Absolute Eos # 0.06 0.0 - 0.4 k/uL    Basophils Absolute 0.03 0.0 - 0.2 k/uL    Morphology ANISOCYTOSIS PRESENT     Morphology 1+ ELLIPTOCYTES     Myoglobin 21 (L) 28 - 72 ng/mL    Protime 14.9 (H) 11.8 - 14.6 sec    INR 1.2     PTT 32.1 24.0 - 36.0 sec    Troponin, High Sensitivity 10 0 - 22 ng/L    Troponin T NOT REPORTED <0.03 ng/mL    Troponin Interp NOT REPORTED    EKG 12 Lead    Collection Time: 10/15/20 10:19 PM   Result Value Ref Range    Ventricular Rate 72 BPM    Atrial Rate 72 BPM    P-R Interval 160 ms    QRS Duration 94 ms    Q-T Interval 408 ms    QTc Calculation (Bazett) 446 ms    P Axis 39 degrees    R Axis -25 degrees   POC Glucose Fingerstick    Collection Time: 10/16/20  1:07 AM   Result Value Ref Range    POC Glucose 203 (H) 75 - 110 mg/dL   POC Glucose Fingerstick    Collection Time: 10/16/20  7:22 AM   Result Value Ref Range    POC Glucose 120 (H) 75 - 110 mg/dL       Recent Labs     10/15/20  2040 10/13/20  2209   HGB 12.3* 12.0*   HCT 35.7* 34.6*   WBC 3.2* 3.6   MCV 91.4 91.5    140   K 3.7 3.6*    105   CO2 25 26   BUN 11 13   CREATININE 0.49* 0.46*   GLUCOSE 97 73   INR 1.2  --    PROTIME 14.9*  --    APTT 32.1 --    AST  --  31   ALT  --  23   LABALBU  --  3.5       Hematology:  Recent Labs     10/13/20  2209 10/15/20  2040   WBC 3.6 3.2*   RBC 3.78* 3.91*   HGB 12.0* 12.3*   HCT 34.6* 35.7*   MCV 91.5 91.4   MCH 31.6 31.5   MCHC 34.5 34.5   RDW 14.8 14.6   PLT 72* 76*   MPV 7.9 7.9   INR  --  1.2     Chemistry:  Recent Labs     10/13/20  2209 10/15/20  2040    141   K 3.6* 3.7    107   CO2 26 25   GLUCOSE 73 97   BUN 13 11   CREATININE 0.46* 0.49*   ANIONGAP 9 9   LABGLOM >60 >60   GFRAA >60 >60   CALCIUM 9.5 9.1   TROPONINT  --  NOT REPORTED   CKTOTAL  --  89   CKMB  --  PENDING   MYOGLOBIN  --  21*     Recent Labs     10/13/20  2209 10/15/20  2011 10/16/20  0107 10/16/20  0722   PROT 6.5  --   --   --    LABALBU 3.5  --   --   --    AST 31  --   --   --    ALT 23  --   --   --    ALKPHOS 70  --   --   --    BILITOT 3.32*  --   --   --    POCGLU  --  99 203* 120*       Imaging/Diagnostics:       Ct Head Wo Contrast    Result Date: 10/15/2020  EXAMINATION: CT OF THE HEAD WITHOUT CONTRAST  10/15/2020 8:22 pm TECHNIQUE: CT of the head was performed without the administration of intravenous contrast. Dose modulation, iterative reconstruction, and/or weight based adjustment of the mA/kV was utilized to reduce the radiation dose to as low as reasonably achievable. COMPARISON: March 30, 2020 HISTORY: ORDERING SYSTEM PROVIDED HISTORY: R sided facial numbness, now resolved TECHNOLOGIST PROVIDED HISTORY: R sided facial numbness, now resolved Reason for Exam: right side facial numbness for a few hours. Slight headache Acuity: Acute Type of Exam: Initial Relevant Medical/Surgical History: htn, diabetes FINDINGS: BRAIN/VENTRICLES: There is no acute intracranial hemorrhage, mass effect or midline shift. No abnormal extra-axial fluid collection. The gray-white differentiation is maintained without evidence of an acute infarct. There is no evidence of hydrocephalus.  ORBITS: The visualized portion of the orbits demonstrate no acute abnormality. SINUSES: The visualized paranasal sinuses and mastoid air cells demonstrate no acute abnormality. SOFT TISSUES/SKULL:  No acute abnormality of the visualized skull or soft tissues. No acute intracranial abnormality. Cta Head Neck W Contrast    Result Date: 10/15/2020  EXAMINATION: CTA OF THE HEAD AND NECK WITH CONTRAST 10/15/2020 9:22 pm: TECHNIQUE: CTA of the head and neck was performed with the administration of intravenous contrast. Multiplanar reformatted images are provided for review. MIP images are provided for review. Stenosis of the internal carotid arteries measured using NASCET criteria. Dose modulation, iterative reconstruction, and/or weight based adjustment of the mA/kV was utilized to reduce the radiation dose to as low as reasonably achievable. COMPARISON: None. HISTORY: ORDERING SYSTEM PROVIDED HISTORY: R sided facial numbness; acute TECHNOLOGIST PROVIDED HISTORY: R sided facial numbness; acute Reason for Exam: right side facial numbness for a few hours. Slight headache Acuity: Acute Type of Exam: Initial Relevant Medical/Surgical History: htn diabetes FINDINGS: CTA NECK: AORTIC ARCH/ARCH VESSELS: No dissection or arterial injury. No significant stenosis of the brachiocephalic or subclavian arteries. CAROTID ARTERIES: No dissection, arterial injury, or hemodynamically significant stenosis by NASCET criteria. VERTEBRAL ARTERIES: No dissection, arterial injury, or significant stenosis. SOFT TISSUES: The lung apices are clear. No cervical or superior mediastinal lymphadenopathy. The larynx and pharynx are unremarkable. No acute abnormality of the salivary and thyroid glands. BONES: Degenerative changes of the spine are present. CTA HEAD: ANTERIOR CIRCULATION: No significant stenosis of the intracranial internal carotid, anterior cerebral, or middle cerebral arteries. No aneurysm. Bilateral posterior communicating arteries are present.  POSTERIOR CIRCULATION: No significant stenosis of the vertebral, basilar, or posterior cerebral arteries. No aneurysm. OTHER: No dural venous sinus thrombosis on this non-dedicated study. BRAIN: See separately dictated noncontrast head CT report. No flow limiting stenosis or large vessel occlusion detected within the head or neck. Impressions :      1. Active Problems:    Stroke-like symptoms  Resolved Problems:    * No resolved hospital problems. *        2.  has a past medical history of Calculus of kidney, Essential hypertension, benign, Gout, unspecified, H/O colonoscopy (4/11/2016), Mitral valve disorders(424.0), Other and unspecified hyperlipidemia, Type II or unspecified type diabetes mellitus without mention of complication, not stated as uncontrolled, and Unspecified chronic liver disease without mention of alcohol.      Plans:     61-year-old morbidly obese gentleman  History of uncontrolled diabetes mellitus history of liver cirrhosis  Smokes pot couple times a day no tobacco use history of hyperlipidemia and hypertension admitted with sudden onset of right facial and right upper extremity numbness lasted for over 20 minutes and resolved spontaneously  CTA head neck no stroke no stenosis noted  Diagnosis is transient ischemic attack secondary to above risk factors aspirin Plavix fasting lipid panels  Patient would like to go home no MRI needed as it will not change the diagnosis  We will discharge on aspirin Plavix and high-dose Lipitor advised lifestyle changes advised to follow-up with PCP within a week    Current Facility-Administered Medications   Medication Dose Route Frequency Provider Last Rate Last Dose    cephALEXin (KEFLEX) capsule 500 mg  500 mg Oral BID Imer Martino MD   500 mg at 10/16/20 0830    glimepiride (AMARYL) tablet 4 mg  4 mg Oral BID AC Imer Martino MD   4 mg at 10/16/20 0552    metoprolol tartrate (LOPRESSOR) tablet 25 mg  25 mg Oral BID Imer Martino MD   25 mg at 10/16/20 0830    [START ON 10/17/2020] pantoprazole (PROTONIX) tablet 40 mg  40 mg Oral Daily Madisyn Riley MD        insulin lispro (HUMALOG) injection vial 0-6 Units  0-6 Units Subcutaneous TID WC Madisyn Riley MD        insulin lispro (HUMALOG) injection vial 0-3 Units  0-3 Units Subcutaneous Nightly Madisyn Riley MD        glucose (GLUTOSE) 40 % oral gel 15 g  15 g Oral PRN Madisyn Riley MD        dextrose 50 % IV solution  12.5 g Intravenous PRN Madisyn Riley MD        glucagon (rDNA) injection 1 mg  1 mg Intramuscular PRN Madisyn Riley MD        dextrose 5 % solution  100 mL/hr Intravenous PRN Madisyn Riley MD        LORazepam (ATIVAN) tablet 1 mg  1 mg Oral Once Madisyn Riley MD        sodium chloride flush 0.9 % injection 10 mL  10 mL Intravenous PRN Madisyn Riley MD   10 mL at 10/15/20 2127    sodium chloride flush 0.9 % injection 10 mL  10 mL Intravenous 2 times per day Madisyn Riley MD        sodium chloride flush 0.9 % injection 10 mL  10 mL Intravenous PRN Madisyn Riley MD        acetaminophen (TYLENOL) tablet 650 mg  650 mg Oral Q4H PRN MD Derek Sifuentes MD  10/16/2020  1:29 PM

## 2020-10-16 NOTE — ED PROVIDER NOTES
EMERGENCY DEPARTMENT ENCOUNTER   ATTENDING ATTESTATION     Pt Name: Rodrick Hope  MRN: [id number]  [place] 1965  Date of evaluation: 10/15/20       Rodrick Hope is a 48 y.o. male who presents with Numbness (R face)      MDM: 60-year-old male presents treatment of right-sided facial numbness. Patient states that the numbness started at 6:45 PM, since resolved upon his presentation to the ED.  NIH of 0 initial exam, will still obtain imaging and labs. Labs reviewed and unremarkable, CT head and CTA of head and neck were reviewed negative for acute process    Patient was reexamined, still without any symptoms, discussed with patient concern for possible TIA, discussed need for admission and further work-up, patient and daughter are both agreeable to admission at this time    Patient was also discussed with neurology who recommends admission for MRI and will see patient in the hospital    Spoke with Dr. Chris Leos who accepts admission with neurology consult. Patient demonstrates understanding and agreement with the plan, was given the opportunity to ask questions, and these questions were answered to the best of the provided information at this time. VS stable for transfer. Assessment: TIA      PCP: Marshall Torres MD  Admitting Physician: Dr. Chris Leos    This dictation was prepared using [employer] [place] [employer] voice recognition software. As a result, errors may have occurred. When identified, these errors have been corrected.  While every attempt is made to correct errors in dictation, errors may still exist.         Vitals:   Vitals:    10/15/20 2150 10/15/20 2200 10/15/20 2215 10/16/20 0004   BP:  (!) 150/75 (!) 151/91 (!) 140/87   Pulse:  87 79 91   Resp:  22 18 16   Temp: 98.4 °F (36.9 °C)   97.9 °F (36.6 °C)   TempSrc: Oral   Oral   SpO2:   96% 97%   Weight:    240 lb (108.9 kg)   Height:    6' (1.829 m)         I personally evaluated and examined the patient in conjunction with the resident and agree with the assessment, treatment plan, and disposition of the patient as recorded by the resident. I performed a history and physical examination of the patient and discussed management with the resident. I reviewed the residents note and agree with the documented findings and plan of care. Any areas of disagreement are noted on the chart. I was personally present for the key portions of any procedures. I have documented in the chart those procedures where I was not present during the key portions. I have personally reviewed all images and agree with the resident's interpretation. I have reviewed the emergency nurses triage note. I agree with the chief complaint, past medical history, past surgical history, allergies, medications, social and family history as documented unless otherwise noted.     Kael Temple DO  Attending Emergency Physician          Kael Temple DO  10/16/20 0216

## 2020-10-16 NOTE — PLAN OF CARE
Please fill out the MRI Screening form and fax to dept @ [phone]. Any questions. .please call [facility] @ [phone]. MRI exam will be scheduled after receiving the completed screening form.  Thank you!!

## 2020-10-16 NOTE — PROGRESS NOTES
Patient arrived to floor via wheelchair. Patient assessed, placed on telemetry, and vitals were taken. Patient's daughter at bedside. All admission paperwork completed. Patient resting comfortably in bed, no distress noted. Will continue to monitor.

## 2020-10-16 NOTE — PLAN OF CARE
Problem: Falls - Risk of:  Goal: Will remain free from falls  Description: Will remain free from falls  Outcome: Met This Shift  Note: Patient assessed as a medium fall risk at this time. Patient has steady gait and is up per self. Patient is provided adequate lighting and clear pathway to restroom. Bed remains locked and in lowest position with call light and bedside table within reach. 2/4 guardrails in use. Patient instructed to osmel for assistance if he requires it and does so appropriately. Goal: Absence of physical injury  Description: Absence of physical injury  Outcome: Met This Shift     Problem: Safety:  Goal: Free from accidental physical injury  Description: Free from accidental physical injury  Outcome: Met This Shift     Problem: Daily Care:  Goal: Daily care needs are met  Description: Daily care needs are met  Outcome: Met This Shift     Problem: Pain:  Goal: Patient's pain/discomfort is manageable  Description: Patient's pain/discomfort is manageable  Outcome: Met This Shift  Note: Patient stated no pain during this shift.

## 2020-10-16 NOTE — ED PROVIDER NOTES
[address]  Emergency Department Encounter  EmergencyMedicine Resident     Pt Name:Jon Benites  MRN: [id number]  [place] 1965  Date of evaluation: 10/15/20  PCP:  MD Jarret French       Chief Complaint   Patient presents with    Numbness     R face       HISTORY OF PRESENT ILLNESS  (Location/Symptom, Timing/Onset, Context/Setting, Quality, Duration, Modifying Factors, Severity.)      Ambrosio Mcburney is a 48 y.o. male who presents with acute right-sided facial numbness that started at 6:45 PM today. Patient states he ate dinner stood up had some right-sided facial numbness and then was staring off into space. His family reports that he looked like he was about to pass out and noticed some facial droop. .  They grabbed a chair and sat him down. Patient does not remember this. 6 months ago and March 2020 he had similar symptoms came to the emergency room with right-sided numbness and facial droop. He had a initial NIH of 2. CT head and CTA head and neck were negative. MRI was negative for stroke. Patient was started on aspirin and Plavix. He was discharged home with a diagnosis of Bell's palsy and put on prednisone and valacyclovir. PAST MEDICAL / SURGICAL / SOCIAL / FAMILY HISTORY      has a past medical history of Calculus of kidney, Essential hypertension, benign, Gout, unspecified, H/O colonoscopy, Mitral valve disorders(424.0), Other and unspecified hyperlipidemia, Type II or unspecified type diabetes mellitus without mention of complication, not stated as uncontrolled, and Unspecified chronic liver disease without mention of alcohol.     has a past surgical history that includes Lithotripsy and Upper gastrointestinal endoscopy (N/A, 5/25/2018).     Social History     Socioeconomic History    Marital status:      Spouse name: Not on file    Number of children: Not on file    Years of education: Not on file    Highest education level: Not on file Occupational History    Not on file   Social Needs    Financial resource strain: Not on file    Food insecurity     Worry: Not on file     Inability: Not on file    Transportation needs     Medical: Not on file     Non-medical: Not on file   Tobacco Use    Smoking status: Former Smoker     Last attempt to quit: 3/26/2012     Years since quittin.5    Smokeless tobacco: Former User   Substance and Sexual Activity    Alcohol use: Not Currently     Alcohol/week: 0.0 standard drinks     Comment: very little/ special events    Drug use: Yes     Types: Marijuana    Sexual activity: Not on file   Lifestyle    Physical activity     Days per week: Not on file     Minutes per session: Not on file    Stress: Not on file   Relationships    Social connections     Talks on phone: Not on file     Gets together: Not on file     Attends Shinto service: Not on file     Active member of club or organization: Not on file     Attends meetings of clubs or organizations: Not on file     Relationship status: Not on file    Intimate partner violence     Fear of current or ex partner: Not on file     Emotionally abused: Not on file     Physically abused: Not on file     Forced sexual activity: Not on file   Other Topics Concern    Not on file   Social History Narrative    Not on file       Family History   Problem Relation Age of Onset    Cancer Other     Diabetes Other     High Blood Pressure Other     Heart Disease Other        Allergies:  Codeine and Simvastatin    Home Medications:  Prior to Admission medications    Medication Sig Start Date End Date Taking?  Authorizing Provider   cephALEXin (KEFLEX) 500 MG capsule Take 1 capsule by mouth 2 times daily for 10 days 10/13/20 10/23/20 Yes Roland Dubois DO   tamsulosin (FLOMAX) 0.4 MG capsule Take 1 capsule by mouth once daily 10/2/20  Yes Selene Meza MD   omeprazole (PRILOSEC) 20 MG delayed release capsule Take 1 capsule by mouth twice daily 20 Yes Donny Vidal MD   lisinopril (PRINIVIL;ZESTRIL) 5 MG tablet Take 1 tablet by mouth once daily 8/17/20  Yes Donny Vidal MD   allopurinol (ZYLOPRIM) 100 MG tablet Take 1 tablet by mouth once daily 8/5/20  Yes Donny Vidal MD   blood glucose test strips (ASCENSIA AUTODISC VI;ONE TOUCH ULTRA TEST VI) strip Use with associated glucose meter. 7/22/20  Yes Eduardo Simental PA-C   fluticasone-salmeterol (ADVAIR DISKUS) 100-50 MCG/DOSE diskus inhaler INHALE 1 PUFF BY MOUTH EVERY 12 HOURS 7/1/20  Yes Eduardo Simental PA-C   SITagliptin (JANUVIA) 100 MG tablet TAKE 1 TABLET BY MOUTH DAILY 6/8/20  Yes Donny Vidal MD   metFORMIN (GLUCOPHAGE) 1000 MG tablet Take 1 tablet by mouth 2 times daily (with meals) 5/20/20  Yes Eduardo Simental PA-C   metoprolol tartrate (LOPRESSOR) 25 MG tablet Take 1 tablet by mouth 2 times daily 5/20/20  Yes Donny Vidal MD   albuterol sulfate HFA (VENTOLIN HFA) 108 (90 Base) MCG/ACT inhaler Inhale 1 puff into the lungs every 4 hours as needed for Wheezing 5/20/20  Yes Donny Vidal MD   atorvastatin (LIPITOR) 40 MG tablet Take 1 tablet by mouth once daily 5/8/20  Yes Donny Vidal MD   glimepiride (AMARYL) 4 MG tablet TAKE 1 TABLET BY MOUTH TWICE DAILY 4/6/20  Yes Eduardo Simental PA-C   blood glucose monitor strips 1 strip by Other route 4 times daily Test 4  times a day & as needed for symptoms of irregular blood glucose. 5/3/19  Yes MD KIRSTIE Oneil LANCETS ULTRATHIN 30G MISC 1 each by Other route 3 times daily 10/19/18  Yes Donny Vidal MD   acetaminophen (AMINOFEN) 325 MG tablet Take 2 tablets by mouth every 6 hours as needed for Pain 10/15/18  Yes Tor Soriano MD   aspirin 81 MG tablet Take 81 mg by mouth daily    Yes Historical Provider, MD       REVIEW OF SYSTEMS    (2-9 systems for level 4, 10 or more for level 5)      Review of Systems   Constitutional: Negative for chills and fever. HENT: Negative for congestion and sore throat.     Eyes: Negative for visual disturbance. Respiratory: Negative for cough and shortness of breath. Cardiovascular: Negative for chest pain. Gastrointestinal: Negative for abdominal pain, nausea and vomiting. Genitourinary: Negative for dysuria and frequency. Musculoskeletal: Negative for neck pain and neck stiffness. Skin: Negative for rash. Neurological: Positive for numbness. Negative for headaches. Psychiatric/Behavioral: Positive for confusion. PHYSICAL EXAM   (up to 7 for level 4, 8 or more for level 5)      INITIAL VITALS:   BP (!) 151/91   Pulse 79   Temp 98.4 °F (36.9 °C) (Oral)   Resp 18   SpO2 96%      Vitals:    10/15/20 2145 10/15/20 2150 10/15/20 2200 10/15/20 2215   BP: 134/80  (!) 150/75 (!) 151/91   Pulse: 80  87 79   Resp: 15  22 18   Temp:  98.4 °F (36.9 °C)     TempSrc:  Oral     SpO2: 95%   96%        Physical Exam  Vitals signs reviewed. Constitutional:       General: He is not in acute distress. Appearance: He is well-developed. HENT:      Head: Normocephalic and atraumatic. Eyes:      Extraocular Movements: Extraocular movements intact. Pupils: Pupils are equal, round, and reactive to light. Neck:      Musculoskeletal: Normal range of motion and neck supple. Cardiovascular:      Rate and Rhythm: Normal rate and regular rhythm. Pulmonary:      Effort: Pulmonary effort is normal.      Breath sounds: Normal breath sounds. Abdominal:      General: Bowel sounds are normal. There is no distension. Palpations: Abdomen is soft. Tenderness: There is no abdominal tenderness. Musculoskeletal: Normal range of motion. Skin:     General: Skin is warm and dry. Neurological:      General: No focal deficit present. Mental Status: He is alert and oriented to person, place, and time. Cranial Nerves: No cranial nerve deficit. Sensory: No sensory deficit. Motor: No weakness. Comments: Initial NIH score of 0.   No facial droop of cheeks or forehead. Sensation intact of face and body.        DIFFERENTIAL  DIAGNOSIS     PLAN (LABS / IMAGING / EKG):  Orders Placed This Encounter   Procedures    CT HEAD WO CONTRAST    CTA HEAD NECK W CONTRAST    STROKE PANEL    Inpatient consult to Neurology    Inpatient consult to Internal Medicine    POC Glucose Fingerstick    EKG 12 Lead    PATIENT STATUS (FROM ED OR OR/PROCEDURAL) Inpatient       MEDICATIONS ORDERED:  Orders Placed This Encounter   Medications    0.9 % sodium chloride bolus    sodium chloride flush 0.9 % injection 10 mL    iopamidol (ISOVUE-370) 76 % injection 75 mL       DIAGNOSTIC RESULTS / EMERGENCY DEPARTMENT COURSE / MDM   LAB RESULTS:  Results for orders placed or performed during the hospital encounter of 10/15/20   STROKE PANEL   Result Value Ref Range    Glucose 97 70 - 99 mg/dL    BUN 11 6 - 20 mg/dL    CREATININE 0.49 (L) 0.70 - 1.20 mg/dL    Bun/Cre Ratio NOT REPORTED 9 - 20    Calcium 9.1 8.6 - 10.4 mg/dL    Sodium 141 135 - 144 mmol/L    Potassium 3.7 3.7 - 5.3 mmol/L    Chloride 107 98 - 107 mmol/L    CO2 25 20 - 31 mmol/L    Anion Gap 9 9 - 17 mmol/L    GFR Non-African American >60 >60 mL/min    GFR African American >60 >60 mL/min    GFR Comment          GFR Staging NOT REPORTED     WBC 3.2 (L) 3.5 - 11.0 k/uL    RBC 3.91 (L) 4.5 - 5.9 m/uL    Hemoglobin 12.3 (L) 13.5 - 17.5 g/dL    Hematocrit 35.7 (L) 41 - 53 %    MCV 91.4 80 - 100 fL    MCH 31.5 26 - 34 pg    MCHC 34.5 31 - 37 g/dL    RDW 14.6 11.5 - 14.9 %    Platelets 76 (L) 358 - 450 k/uL    MPV 7.9 6.0 - 12.0 fL    NRBC Automated NOT REPORTED per 100 WBC    Total CK 89 39 - 308 U/L    CK-MB PENDING ng/mL    % CKMB PENDING %    CKMB Interpretation PENDING     Differential Type NOT REPORTED     Immature Granulocytes NOT REPORTED 0 %    Absolute Immature Granulocyte NOT REPORTED 0.00 - 0.30 k/uL    WBC Morphology NOT REPORTED     RBC Morphology NOT REPORTED     Platelet Estimate NOT REPORTED     Seg Neutrophils 57 36 - 66 stroke rule out. Likely neurology evaluation, possible admission. ED Course as of Oct 15 2231   Thu Oct 15, 2020   2146 WNL   CT HEAD WO CONTRAST [CS]   2200 IMPRESSION:  No flow limiting stenosis or large vessel occlusion detected within the head  or neck.      CTA HEAD NECK W CONTRAST [CS]   2208 Dr Parag Alvarado; neurology requesting admission, overnight EEG, MRI Brain    [CS]   2226 Dr. Desmond Torres accepting admission    [CS]      ED Course User Index  [CS] Elizabeth Calvin DO         PROCEDURES:    CONSULTS:  IP CONSULT TO NEUROLOGY  IP CONSULT TO INTERNAL MEDICINE    CRITICAL CARE:  Please see attending note    FINAL IMPRESSION      1. Stroke-like symptoms          DISPOSITION / PLAN     DISPOSITION        PATIENT REFERRED TO:  No follow-up provider specified.     DISCHARGE MEDICATIONS:  New Prescriptions    No medications on file       Elizabeth Calvin DO  Emergency Medicine Resident    (Please note that portions of thisnote were completed with a voice recognition program.  Efforts were made to edit the dictations but occasionally words are mis-transcribed.)        Elizabeth Calvin DO  Resident  10/15/20 [address] [place]  Resident  10/15/20 2232

## 2020-10-16 NOTE — DISCHARGE INSTR - COC
Continuity of Care Form    Patient Name: Matty Palumbo   :  1965  MRN:  [id number]    Admit date:  10/15/2020  Discharge date:  ***    Code Status Order: Prior   Advance Directives:     Admitting Physician:  No admitting provider for patient encounter. PCP: Dari Salinas MD    Discharging Nurse: [facility] Unit/Room#:   Discharging Unit Phone Number: ***    Emergency Contact:   Extended Emergency Contact Information  Primary Emergency Contact: Patti Odom  Address: [address] [place] [address] [address] [place] [address], [address] [employer] of [facility] Phone: [phone]  Mobile Phone: [phone]  Relation: Spouse   needed? No  Secondary Emergency Contact: Vickie Odom  Address: [facility], [facility] [address] [employer] of [address] [place] [address] Phone: [phone]  Relation: Parent    Past Surgical History:  Past Surgical History:   Procedure Laterality Date    LITHOTRIPSY      UPPER GASTROINTESTINAL ENDOSCOPY N/A 2018    The esophagus was inspected to the Z-line. The endoscopic exam showed impression of varices (F1) in distal esophagus.         Immunization History:   Immunization History   Administered Date(s) Administered    Influenza Virus Vaccine 10/15/2015    Influenza, Regla Almaraz, IM, PF (6 mo and older Fluzone, Flulaval, Fluarix, and 3 yrs and older Afluria) 10/10/2017    Pneumococcal Polysaccharide (Daxraddkw26) 2015, 2016       Active Problems:  Patient Active Problem List   Diagnosis Code    Hyperlipidemia associated with type 2 diabetes mellitus ([address] E11.69, E78.5    Essential hypertension I10    Calculus of kidney N20.0    Mitral valve disorder I05.9    Hyperlipidemia LDL goal <70 E78.5    Chronic liver disease K76.9    Ex-smoker Z87.891    H/O colonoscopy Z98.890    Type 2 diabetes mellitus with hyperglycemia, without long-term current use of insulin ([address] E11.65    Insomnia G47.00    GI bleed K92.2    Cirrhosis of liver without ascites (HCC) K74.60    Numbness and tingling of left side of face R20.0, R20.2    Numbness and tingling R20.0, R20.2    Stroke-like symptoms R29.90    TIA (transient ischemic attack) G45.9       Isolation/Infection:   Isolation          No Isolation        Patient Infection Status     None to display          Nurse Assessment:  Last Vital Signs: /80   Pulse 80   Temp 98.4 °F (36.9 °C) (Oral)   Resp 15   SpO2 95%     Last documented pain score (0-10 scale):    Last Weight:   Wt Readings from Last 1 Encounters:   10/13/20 240 lb (108.9 kg)     Mental Status:  {IP PT MENTAL STATUS:}    IV Access:  { GRIFFIN IV ACCESS:896066569}    Nursing Mobility/ADLs:  Walking   {CHP DME ORUV:044373983}  Transfer  {CHP DME INIJ:118907485}  Bathing  {CHP DME YWFZ:973132852}  Dressing  {CHP DME CNOD:066032543}  Toileting  {CHP DME QBEX:644583916}  Feeding  {CHP DME BHC}  Med Admin  {CHP DME ZUPV:082303520}  Med Delivery   { GRIFFIN MED Delivery:937383894}    Wound Care Documentation and Therapy:        Elimination:  Continence:   · Bowel: {YES / KP:87934}  · Bladder: {YES / QD:29537}  Urinary Catheter: {Urinary Catheter:188721591}   Colostomy/Ileostomy/Ileal Conduit: {YES / OF:02705}       Date of Last BM: ***  No intake or output data in the 24 hours ending 10/15/20 2207  No intake/output data recorded.     Safety Concerns:     [employer] Safety Concerns:528100234}    Impairments/Disabilities:      [address] [employer] Impairments/Disabilities:972220659}    Nutrition Therapy:  Current Nutrition Therapy:   [employer] Diet List:298411355}    Routes of Feeding: {CHP DME Other Feedings:696966813}  Liquids: {Slp liquid thickness:52218}  Daily Fluid Restriction: {CHP DME Yes amt example:879770829}  Last Modified Barium Swallow with Video (Video Swallowing Test): {Done Not Done BTYR:578789124}    Treatments at the Time of Hospital Discharge:   Respiratory Treatments: ***  Oxygen Therapy:  {Therapy; copd oxygen:34955}  Ventilator:    { CC Vent IZUU:906010649}    Rehab Therapies: {THERAPEUTIC INTERVENTION:3885785883}  Weight Bearing Status/Restrictions: { CC Weight Bearin}  Other Medical Equipment (for information only, NOT a DME order):  {EQUIPMENT:748935774}  Other Treatments: ***    Patient's personal belongings (please select all that are sent with patient):  {P DME Belongings:365805429}    RN SIGNATURE:  {Esignature:440604858}    CASE MANAGEMENT/SOCIAL WORK SECTION    Inpatient Status Date: ***    Readmission Risk Assessment Score:  Readmission Risk              Risk of Unplanned Readmission:        0           Discharging to Facility/ Agency   · Name:   · Address:  · Phone:  · Fax:    Dialysis Facility (if applicable)   · Name:  · Address:  · Dialysis Schedule:  · Phone:  · Fax:    / signature: {Esignature:559407752}    PHYSICIAN SECTION    Prognosis: {Prognosis:3250319260}    Condition at Discharge: [address] [place] [address] Patient Condition:864799025}    Rehab Potential (if transferring to Rehab): {Prognosis:2759473411}    Recommended Labs or Other Treatments After Discharge: ***    Physician Certification: I certify the above information and transfer of James Presumkaiden  is necessary for the continuing treatment of the diagnosis listed and that he requires {Admit to Appropriate Level of Care:74775} for {GREATER/LESS:204092129} 30 days.      Update Admission H&P: {CHP DME Changes in EXXJS:780413812}    PHYSICIAN SIGNATURE:  {Esignature:377415107}

## 2020-10-16 NOTE — ED NOTES
Transported by wheelchair to 2116.  Handoff to [facility]     Fani Yarbrough  10/15/20 5423

## 2020-10-17 LAB
EKG ATRIAL RATE: 72 BPM
EKG P AXIS: 39 DEGREES
EKG P-R INTERVAL: 160 MS
EKG Q-T INTERVAL: 408 MS
EKG QRS DURATION: 94 MS
EKG QTC CALCULATION (BAZETT): 446 MS
EKG R AXIS: -25 DEGREES
EKG VENTRICULAR RATE: 72 BPM

## 2020-10-17 PROCEDURE — 93010 ELECTROCARDIOGRAM REPORT: CPT | Performed by: INTERNAL MEDICINE

## 2020-10-23 RX ORDER — OMEPRAZOLE 20 MG/1
CAPSULE, DELAYED RELEASE ORAL
Qty: 30 CAPSULE | Refills: 0 | Status: SHIPPED | OUTPATIENT
Start: 2020-10-23 | End: 2020-11-02 | Stop reason: SDUPTHER

## 2020-10-30 RX ORDER — CLOPIDOGREL BISULFATE 75 MG/1
TABLET ORAL
COMMUNITY
Start: 2020-10-02 | End: 2021-01-06 | Stop reason: SDUPTHER

## 2020-11-02 ENCOUNTER — OFFICE VISIT (OUTPATIENT)
Dept: INTERNAL MEDICINE CLINIC | Age: 55
End: 2020-11-02
Payer: MEDICAID

## 2020-11-02 VITALS
OXYGEN SATURATION: 98 % | BODY MASS INDEX: 30.48 KG/M2 | SYSTOLIC BLOOD PRESSURE: 128 MMHG | DIASTOLIC BLOOD PRESSURE: 78 MMHG | WEIGHT: 225 LBS | HEIGHT: 72 IN | HEART RATE: 73 BPM | TEMPERATURE: 97.9 F

## 2020-11-02 LAB — HBA1C MFR BLD: 6.3 %

## 2020-11-02 PROCEDURE — 83036 HEMOGLOBIN GLYCOSYLATED A1C: CPT | Performed by: INTERNAL MEDICINE

## 2020-11-02 PROCEDURE — 90688 IIV4 VACCINE SPLT 0.5 ML IM: CPT | Performed by: INTERNAL MEDICINE

## 2020-11-02 PROCEDURE — 99214 OFFICE O/P EST MOD 30 MIN: CPT | Performed by: INTERNAL MEDICINE

## 2020-11-02 PROCEDURE — 1111F DSCHRG MED/CURRENT MED MERGE: CPT | Performed by: INTERNAL MEDICINE

## 2020-11-02 PROCEDURE — 90471 IMMUNIZATION ADMIN: CPT | Performed by: INTERNAL MEDICINE

## 2020-11-02 RX ORDER — CLOTRIMAZOLE 1 %
CREAM (GRAM) TOPICAL ONCE
Qty: 1 TUBE | Refills: 1 | Status: SHIPPED | OUTPATIENT
Start: 2020-11-02 | End: 2020-11-02

## 2020-11-02 RX ORDER — OMEPRAZOLE 20 MG/1
CAPSULE, DELAYED RELEASE ORAL
Qty: 30 CAPSULE | Refills: 0 | Status: SHIPPED | OUTPATIENT
Start: 2020-11-02 | End: 2020-11-23

## 2020-11-02 RX ORDER — TAMSULOSIN HYDROCHLORIDE 0.4 MG/1
CAPSULE ORAL
Qty: 30 CAPSULE | Refills: 0 | Status: SHIPPED | OUTPATIENT
Start: 2020-11-02 | End: 2020-12-10 | Stop reason: SDUPTHER

## 2020-11-02 NOTE — PROGRESS NOTES
Subjective:      Patient ID: Xavier Monique is a 48 y.o. male. HPI    Review of Systems    Objective:   Physical Exam    Assessment / Plan:      Visit Information    Have you changed or started any medications since your last visit including any over-the-counter medicines, vitamins, or herbal medicines? no   Are you having any side effects from any of your medications? -  no  Have you stopped taking any of your medications? Is so, why? -  no    Have you seen any other physician or provider since your last visit? Yes - Records Obtained  Have you had any other diagnostic tests since your last visit? Yes - Records Obtained  Have you been seen in the emergency room and/or had an admission to a hospital since we last saw you? Yes - Records Obtained  Have you had your routine dental cleaning in the past 6 months? yes -     Have you activated your [employer] account? If not, what are your barriers?  Yes     Patient Care Team:  Amelia Bee MD as PCP - General (Internal Medicine)  Amelia Bee MD as PCP - [facility] Provider    Medical History Review  Past Medical, Family, and Social History reviewed and does not contribute to the patient presenting condition    Health Maintenance   Topic Date Due    Hepatitis A vaccine (1 of 2 - Risk 2-dose series) [birth year]03/1967    HIV screen  03/03/1981    Hepatitis B vaccine (1 of 3 - Risk 3-dose series) 03/03/1985    DTaP/Tdap/Td vaccine (1 - Tdap) 03/03/1985    Shingles Vaccine (1 of 2) 03/03/2016    Diabetic retinal exam  08/15/2016    Diabetic foot exam  05/30/2019    Flu vaccine (1) 09/01/2020    Diabetic microalbuminuria test  06/02/2021    A1C test (Diabetic or Prediabetic)  07/01/2021    Potassium monitoring  10/13/2021    Creatinine monitoring  10/13/2021    Lipid screen  10/15/2021    Colon cancer screen colonoscopy  02/28/2026    Pneumococcal 0-64 years Vaccine  Completed    Hib vaccine  Aged Out    Meningococcal (ACWY) vaccine  Aged Out

## 2020-11-07 ASSESSMENT — ENCOUNTER SYMPTOMS
APNEA: 0
CHEST TIGHTNESS: 0
CONSTIPATION: 0
BACK PAIN: 0
COUGH: 0
ABDOMINAL PAIN: 0
SHORTNESS OF BREATH: 0
COLOR CHANGE: 0
DIARRHEA: 0
WHEEZING: 0
FACIAL SWELLING: 0
ABDOMINAL DISTENTION: 0

## 2020-11-08 NOTE — PROGRESS NOTES
Post-Discharge Transitional Care Management Services or Hospital Follow Up      [address] [facility]   YOB: 1965    Date of Office Visit:  11/2/2020  Date of Hospital Admission: 10/15/20  Date of Hospital Discharge: 10/16/20  Readmission Risk Score(high >=14%.  Medium >=10%):Readmission Risk Score: 13      Care management risk score Rising risk (score 2-5) and Complex Care (Scores >=6): 6     Non face to face  following discharge, date last encounter closed (first attempt may have been earlier): *No documented post hospital discharge outreach found in the last 14 days *No documented post hospital discharge outreach found in the last 14 days    Call initiated 2 business days of discharge: *No response recorded in the last 14 days     Patient Active Problem List   Diagnosis    Hyperlipidemia associated with type 2 diabetes mellitus ([facility] [address]    Essential hypertension    Calculus of kidney    Mitral valve disorder    Hyperlipidemia LDL goal <70    Chronic liver disease    Ex-smoker    H/O colonoscopy    Type 2 diabetes mellitus with hyperglycemia, without long-term current use of insulin ([facility] [address] [facility]    Insomnia    GI bleed    Cirrhosis of liver without ascites (HCC)    Numbness and tingling of left side of face    Numbness and tingling    Stroke-like symptoms    TIA (transient ischemic attack)       Allergies   Allergen Reactions    Codeine Nausea Only and Other (See Comments)    Simvastatin Other (See Comments)     Leg cramping       Medications listed as ordered at the time of discharge from hospital   [facility] Medication Instructions FABRICE:    Printed on:11/07/20 2155   Medication Information                      acetaminophen (AMINOFEN) 325 MG tablet  Take 2 tablets by mouth every 6 hours as needed for Pain             albuterol sulfate HFA (VENTOLIN HFA) 108 (90 Base) MCG/ACT inhaler  Inhale 1 puff into the lungs every 4 hours as needed for Wheezing             allopurinol (ZYLOPRIM) 100 MG tablet  Take 1 tablet by mouth once daily             aspirin 81 MG tablet  Take 81 mg by mouth daily              atorvastatin (LIPITOR) 40 MG tablet  Take 1 tablet by mouth nightly             blood glucose monitor strips  1 strip by Other route 4 times daily Test 4  times a day & as needed for symptoms of irregular blood glucose. blood glucose test strips (ASCENSIA AUTODISC VI;ONE TOUCH ULTRA TEST VI) strip  Use with associated glucose meter. clopidogrel (PLAVIX) 75 MG tablet  TAKE 1 TABLET BY MOUTH ONCE DAILY             fluticasone-salmeterol (ADVAIR DISKUS) 100-50 MCG/DOSE diskus inhaler  INHALE 1 PUFF BY MOUTH EVERY 12 HOURS             glimepiride (AMARYL) 4 MG tablet  TAKE 1 TABLET BY MOUTH TWICE DAILY             [employer] LANCETS ULTRATHIN 30G MISC  1 each by Other route 3 times daily             lisinopril (PRINIVIL;ZESTRIL) 5 MG tablet  Take 1 tablet by mouth once daily             metFORMIN (GLUCOPHAGE) 1000 MG tablet  Take 1 tablet by mouth 2 times daily (with meals)             metoprolol tartrate (LOPRESSOR) 25 MG tablet  Take 1 tablet by mouth 2 times daily             omeprazole (PRILOSEC) 20 MG delayed release capsule  Take 1 capsule by mouth twice daily             SITagliptin (JANUVIA) 100 MG tablet  TAKE 1 TABLET BY MOUTH DAILY             tamsulosin (FLOMAX) 0.4 MG capsule  Take 1 capsule by mouth once daily                   Medications marked \"taking\" at this time  Outpatient Medications Marked as Taking for the 20 encounter (Office Visit) with Tegan Sebastian MD   Medication Sig Dispense Refill    omeprazole (PRILOSEC) 20 MG delayed release capsule Take 1 capsule by mouth twice daily 30 capsule 0    tamsulosin (FLOMAX) 0.4 MG capsule Take 1 capsule by mouth once daily 30 capsule 0    [] clotrimazole (LOTRIMIN) 1 % cream Apply topically once for 1 dose Apply topically 2 times daily.  1 Tube 1    clopidogrel (PLAVIX) 75 MG tablet TAKE 1 difficulties, right upper extremity numbness, patient underwent CTA head and neck, no stenosis of the intracranial vessels. Patient mentioned that she has history of multiple TIAs in the past  Patient has history of diabetes, cirrhosis of liver. Her blood sugars are controlled    Her symptoms has completely improved  Patient is on dual antiplatelet, Lipitor    Inpatient course: Discharge summary reviewed- see chart. Interval history/Current status: Improved     Review of Systems   Constitutional: Negative for activity change, appetite change, chills and diaphoresis. HENT: Negative for congestion, dental problem, ear discharge, facial swelling and hearing loss. Respiratory: Negative for apnea, cough, chest tightness, shortness of breath and wheezing. Cardiovascular: Negative for chest pain and leg swelling. Gastrointestinal: Negative for abdominal distention, abdominal pain, constipation and diarrhea. Genitourinary: Negative for difficulty urinating, dysuria, enuresis, flank pain and frequency. Musculoskeletal: Negative for arthralgias, back pain, gait problem and joint swelling. Skin: Negative for color change, pallor and rash. Neurological: Negative for dizziness, seizures, facial asymmetry, light-headedness, numbness and headaches. Psychiatric/Behavioral: Negative for agitation, behavioral problems, confusion, decreased concentration and dysphoric mood. Vitals:    11/02/20 0920   BP: 128/78   Pulse: 73   Temp: 97.9 °F (36.6 °C)   SpO2: 98%   Weight: 225 lb (102.1 kg)   Height: 6' (1.829 m)     Body mass index is 30.52 kg/m². Wt Readings from Last 3 Encounters:   11/02/20 225 lb (102.1 kg)   10/16/20 238 lb (108 kg)   10/13/20 240 lb (108.9 kg)     BP Readings from Last 3 Encounters:   11/02/20 128/78   10/16/20 (!) 137/90   10/13/20 (!) 141/69       Physical Exam  Vitals signs and nursing note reviewed. Constitutional:       General: He is not in acute distress.      Appearance: He is well-developed. He is not diaphoretic. HENT:      Head: Normocephalic and atraumatic. Mouth/Throat:      Pharynx: No oropharyngeal exudate. Eyes:      General: No scleral icterus. Right eye: No discharge. Left eye: No discharge. Conjunctiva/sclera: Conjunctivae normal.      Pupils: Pupils are equal, round, and reactive to light. Neck:      Musculoskeletal: Normal range of motion and neck supple. Thyroid: No thyromegaly. Vascular: No JVD. Trachea: No tracheal deviation. Cardiovascular:      Rate and Rhythm: Normal rate. Heart sounds: Normal heart sounds. No murmur. No gallop. Pulmonary:      Effort: Pulmonary effort is normal. No respiratory distress. Breath sounds: Normal breath sounds. No stridor. No wheezing or rales. Abdominal:      General: Bowel sounds are normal. There is no distension. Palpations: Abdomen is soft. Tenderness: There is no abdominal tenderness. There is no guarding or rebound. Musculoskeletal: Normal range of motion. General: No tenderness. Skin:     General: Skin is warm and dry. Findings: No erythema or rash. Neurological:      Mental Status: He is alert and oriented to person, place, and time. Assessment/Plan:  1. Type 2 diabetes mellitus with hyperglycemia, without long-term current use of insulin (HCC)  Controlled , HBAIC is 6.3   - POCT glycosylated hemoglobin (Hb A1C)    2. Urgency of urination    - tamsulosin (FLOMAX) 0.4 MG capsule; Take 1 capsule by mouth once daily  Dispense: 30 capsule; Refill: 0    3. Nephrolithiasis  - tamsulosin (FLOMAX) 0.4 MG capsule; Take 1 capsule by mouth once daily  Dispense: 30 capsule; Refill: 0    4. Influenza vaccine needed  - INFLUENZA, QUADV, 3 YRS AND OLDER, IM, MDV, 0.5ML (AFLURIA QUADV)    5. Gastroesophageal reflux disease without esophagitis  - omeprazole (PRILOSEC) 20 MG delayed release capsule;  Take 1 capsule by mouth twice daily

## 2020-11-23 RX ORDER — OMEPRAZOLE 20 MG/1
CAPSULE, DELAYED RELEASE ORAL
Qty: 30 CAPSULE | Refills: 0 | Status: SHIPPED | OUTPATIENT
Start: 2020-11-23 | End: 2020-12-10 | Stop reason: SDUPTHER

## 2020-11-23 RX ORDER — LISINOPRIL 5 MG/1
TABLET ORAL
Qty: 30 TABLET | Refills: 0 | Status: SHIPPED | OUTPATIENT
Start: 2020-11-23 | End: 2020-12-10 | Stop reason: SDUPTHER

## 2020-12-10 ENCOUNTER — TELEMEDICINE (OUTPATIENT)
Dept: INTERNAL MEDICINE CLINIC | Age: 55
End: 2020-12-10
Payer: MEDICAID

## 2020-12-10 PROCEDURE — 99441 PR PHYS/QHP TELEPHONE EVALUATION 5-10 MIN: CPT | Performed by: NURSE PRACTITIONER

## 2020-12-10 RX ORDER — TAMSULOSIN HYDROCHLORIDE 0.4 MG/1
CAPSULE ORAL
Qty: 30 CAPSULE | Refills: 2 | Status: SHIPPED | OUTPATIENT
Start: 2020-12-10 | End: 2021-03-23 | Stop reason: SDUPTHER

## 2020-12-10 RX ORDER — OMEPRAZOLE 20 MG/1
20 CAPSULE, DELAYED RELEASE ORAL DAILY
Qty: 30 CAPSULE | Refills: 0 | Status: SHIPPED | OUTPATIENT
Start: 2020-12-10 | End: 2021-01-04 | Stop reason: SDUPTHER

## 2020-12-10 RX ORDER — LISINOPRIL 5 MG/1
TABLET ORAL
Qty: 30 TABLET | Refills: 2 | Status: SHIPPED | OUTPATIENT
Start: 2020-12-10 | End: 2021-04-06 | Stop reason: SDUPTHER

## 2020-12-10 ASSESSMENT — PATIENT HEALTH QUESTIONNAIRE - PHQ9
1. LITTLE INTEREST OR PLEASURE IN DOING THINGS: 0
SUM OF ALL RESPONSES TO PHQ9 QUESTIONS 1 & 2: 0
SUM OF ALL RESPONSES TO PHQ QUESTIONS 1-9: 0
2. FEELING DOWN, DEPRESSED OR HOPELESS: 0
SUM OF ALL RESPONSES TO PHQ QUESTIONS 1-9: 0
SUM OF ALL RESPONSES TO PHQ QUESTIONS 1-9: 0

## 2020-12-10 ASSESSMENT — ENCOUNTER SYMPTOMS
COUGH: 0
NAUSEA: 0
BACK PAIN: 0
ABDOMINAL PAIN: 1
VOMITING: 0
SHORTNESS OF BREATH: 0
WHEEZING: 0

## 2020-12-10 NOTE — PROGRESS NOTES
and has provided verbal consent to proceed: Yes    TELEHEALTH EVALUATION -- Audio/Visual (During VASPU-74 public health emergency)    HPI:    Linda Segura (:  1965) has requested an audio/video evaluation for the following concern(s):    Multiple adequate problems, including concern for recurrent urinary tract infection. Current symptoms include lower abdominal pain, urinary frequency, and dark urine; he has tried to drink more water, gatorade, cranberry juice. He has a history of UTIs and kidney stones, states that this does not feel like a kidney stone but does feel similar to previous UTIs. He denies fever, chills, flank pain. He is also requesting refills on medications for chronic conditions of diabetes, hypertension, GERD. Review of Systems   Constitutional: Negative for chills, fatigue and fever. Respiratory: Negative for cough, shortness of breath and wheezing. Gastrointestinal: Positive for abdominal pain (lower abd, pressure, sharp). Negative for nausea and vomiting. Genitourinary: Positive for frequency. Negative for difficulty urinating, dysuria, hematuria and urgency. Started 3 days ago   Musculoskeletal: Negative for arthralgias and back pain. Prior to Visit Medications    Medication Sig Taking?  Authorizing Provider   tamsulosin (FLOMAX) 0.4 MG capsule Take 1 capsule by mouth once daily Yes FAUSTO Lux CNP   metFORMIN (GLUCOPHAGE) 1000 MG tablet Take 1 tablet by mouth 2 times daily (with meals) Yes FAUSTO Lux CNP   omeprazole (PRILOSEC) 20 MG delayed release capsule Take 1 capsule by mouth Daily Yes FAUSTO Lux CNP   lisinopril (PRINIVIL;ZESTRIL) 5 MG tablet Take 1 tablet by mouth once daily Yes FAUSTO Lux CNP   metoprolol tartrate (LOPRESSOR) 25 MG tablet Take 1 tablet by mouth 2 times daily Yes FAUSTO Lux CNP   clopidogrel (PLAVIX) 75 MG tablet TAKE 1 TABLET BY MOUTH ONCE DAILY Yes Historical Provider, MD   atorvastatin (LIPITOR) 40 MG tablet Take 1 tablet by mouth nightly Yes Chela Hilliard MD   allopurinol (ZYLOPRIM) 100 MG tablet Take 1 tablet by mouth once daily Yes Marshall Torres MD   blood glucose test strips (ASCENSIA AUTODISC VI;ONE TOUCH ULTRA TEST VI) strip Use with associated glucose meter. Yes Clare Powell PA-C   fluticasone-salmeterol (ADVAIR DISKUS) 100-50 MCG/DOSE diskus inhaler INHALE 1 PUFF BY MOUTH EVERY 12 HOURS Yes Aris Maldonado PA-C   SITagliptin (JANUVIA) 100 MG tablet TAKE 1 TABLET BY MOUTH DAILY Yes Marshall Torres MD   albuterol sulfate HFA (VENTOLIN HFA) 108 (90 Base) MCG/ACT inhaler Inhale 1 puff into the lungs every 4 hours as needed for Wheezing Yes Marshall Torres MD   glimepiride (AMARYL) 4 MG tablet TAKE 1 TABLET BY MOUTH TWICE DAILY Yes Clare Powell PA-C   blood glucose monitor strips 1 strip by Other route 4 times daily Test 4  times a day & as needed for symptoms of irregular blood glucose.  Yes Marshall Torres MD   [employer] LANCETS ULTRATHIN 30G MISC 1 each by Other route 3 times daily Yes Marshall Torres MD   acetaminophen (AMINOFEN) 325 MG tablet Take 2 tablets by mouth every 6 hours as needed for Pain Yes Stephen Quiroga MD   aspirin 81 MG tablet Take 81 mg by mouth daily  Yes Historical Provider, MD   cephALEXin (KEFLEX) 500 MG capsule Take 1 capsule by mouth 4 times daily for 7 days  Socrates Mckinley MD       Social History     Tobacco Use    Smoking status: Former Smoker     Quit date: 3/26/2012     Years since quittin.7    Smokeless tobacco: Former User   Substance Use Topics    Alcohol use: Not Currently     Alcohol/week: 0.0 standard drinks     Comment: very little/ special events    Drug use: Yes     Types: Marijuana        Allergies   Allergen Reactions    Codeine Nausea Only and Other (See Comments)    Simvastatin Other (See Comments)     Leg cramping   ,   Past Medical History:   Diagnosis Date    Calculus of kidney     Essential hypertension, benign     Gout, unspecified     H/O colonoscopy 2016    Mitral valve disorders(424.0)     Other and unspecified hyperlipidemia     Type II or unspecified type diabetes mellitus without mention of complication, not stated as uncontrolled     Unspecified chronic liver disease without mention of alcohol    ,   Past Surgical History:   Procedure Laterality Date    LITHOTRIPSY      UPPER GASTROINTESTINAL ENDOSCOPY N/A 2018    The esophagus was inspected to the Z-line. The endoscopic exam showed impression of varices (F1) in distal esophagus. ,   Social History     Tobacco Use    Smoking status: Former Smoker     Quit date: 3/26/2012     Years since quittin.7    Smokeless tobacco: Former User   Substance Use Topics    Alcohol use: Not Currently     Alcohol/week: 0.0 standard drinks     Comment: very little/ special events    Drug use: Yes     Types: Marijuana       PHYSICAL EXAMINATION:  Patient was not physically examined as this is a virtual visit. Telephone visit only. Patient-Reported Vitals 12/10/2020   Patient-Reported Weight 230lb   Patient-Reported Height 6'0   Patient-Reported Systolic 119   Patient-Reported Diastolic 896   Patient-Reported Pulse 76   Patient-Reported Temperature 97.7        Visit Diagnoses and Associated Orders     Urgency of urination        Check UA, treat accordingly.     tamsulosin (FLOMAX) 0.4 MG capsule [[id number]]      Urinalysis Reflex to Culture [[id number] Custom]   - Future Order         Type 2 diabetes mellitus with hyperglycemia, without long-term current use of insulin (HCC)        Last A1c 6.3, continue metformin, Januvia, Amaryl    metFORMIN (GLUCOPHAGE) 1000 MG tablet [[id number]]           Gastroesophageal reflux disease without esophagitis        Stable, on PPI    omeprazole (PRILOSEC) 20 MG delayed release capsule [[id number]]           Essential hypertension        Stable, continue meds Toprol, lisinopril, DASH diet    lisinopril (PRINIVIL;ZESTRIL) 5 MG tablet [[id number]]      metoprolol tartrate (LOPRESSOR) 25 MG tablet [[id number]]                 Return if symptoms worsen or fail to improve, for routine follow up at next scheduled appointment. Documentation:  I communicated with the patient and/or health care decision maker about current symptoms, diagnoses, treatment plan, red flag symptoms requiring urgent eval..   Details of this discussion including any medical advice provided: Drink plenty of water, complete testing as soon as possible. I affirm this is a Patient Initiated Episode with a Patient who has not had a related appointment within my department in the past 7 days or scheduled within the next 24 hours.     Patient identification was verified at the start of the visit: Yes    Total Time: minutes: 5-10 minutes    Note: not billable if this call serves to triage the patient into an appointment for the relevant concern      Jamar Mejía, APRN - CNP

## 2020-12-14 ENCOUNTER — HOSPITAL ENCOUNTER (OUTPATIENT)
Age: 55
Setting detail: SPECIMEN
Discharge: HOME | End: 2020-12-14
Payer: MEDICAID

## 2020-12-14 LAB
-: ABNORMAL
AMORPHOUS: ABNORMAL
BACTERIA: ABNORMAL
BILIRUBIN URINE: ABNORMAL
CASTS UA: ABNORMAL /LPF (ref 0–8)
COLOR: ABNORMAL
COMMENT UA: ABNORMAL
CRYSTALS, UA: ABNORMAL /HPF
EPITHELIAL CELLS UA: ABNORMAL /HPF (ref 0–5)
GLUCOSE URINE: ABNORMAL
KETONES, URINE: NEGATIVE
LEUKOCYTE ESTERASE, URINE: ABNORMAL
MUCUS: ABNORMAL
NITRITE, URINE: POSITIVE
OTHER OBSERVATIONS UA: ABNORMAL
PH UA: 5 (ref 5–8)
PROTEIN UA: ABNORMAL
RBC UA: ABNORMAL /HPF (ref 0–4)
RENAL EPITHELIAL, UA: ABNORMAL /HPF
SPECIFIC GRAVITY UA: 1.03 (ref 1–1.03)
TRICHOMONAS: ABNORMAL
TURBIDITY: ABNORMAL
URINE HGB: ABNORMAL
UROBILINOGEN, URINE: NORMAL
WBC UA: ABNORMAL /HPF (ref 0–5)
YEAST: ABNORMAL

## 2020-12-15 ENCOUNTER — HOSPITAL ENCOUNTER (EMERGENCY)
Age: 55
Discharge: HOME | End: 2020-12-15
Attending: EMERGENCY MEDICINE
Payer: MEDICAID

## 2020-12-15 VITALS
TEMPERATURE: 98.5 F | WEIGHT: 230 LBS | SYSTOLIC BLOOD PRESSURE: 137 MMHG | DIASTOLIC BLOOD PRESSURE: 66 MMHG | RESPIRATION RATE: 18 BRPM | BODY MASS INDEX: 31.15 KG/M2 | HEIGHT: 72 IN | OXYGEN SATURATION: 94 % | HEART RATE: 91 BPM

## 2020-12-15 LAB
-: NORMAL
AMORPHOUS: NORMAL
ANION GAP SERPL CALCULATED.3IONS-SCNC: 11 MMOL/L (ref 9–17)
BACTERIA: NORMAL
BILIRUBIN URINE: ABNORMAL
BUN BLDV-MCNC: 10 MG/DL (ref 6–20)
BUN/CREAT BLD: ABNORMAL (ref 9–20)
CALCIUM SERPL-MCNC: 9.5 MG/DL (ref 8.6–10.4)
CASTS UA: NORMAL /LPF
CHLORIDE BLD-SCNC: 106 MMOL/L (ref 98–107)
CO2: 24 MMOL/L (ref 20–31)
COLOR: ABNORMAL
COMMENT UA: ABNORMAL
CREAT SERPL-MCNC: 0.58 MG/DL (ref 0.7–1.2)
CRYSTALS, UA: NORMAL /HPF
CULTURE: ABNORMAL
EPITHELIAL CELLS UA: NORMAL /HPF
GFR AFRICAN AMERICAN: >60 ML/MIN
GFR NON-AFRICAN AMERICAN: >60 ML/MIN
GFR SERPL CREATININE-BSD FRML MDRD: ABNORMAL ML/MIN/{1.73_M2}
GFR SERPL CREATININE-BSD FRML MDRD: ABNORMAL ML/MIN/{1.73_M2}
GLUCOSE BLD-MCNC: 296 MG/DL (ref 70–99)
GLUCOSE URINE: ABNORMAL
KETONES, URINE: ABNORMAL
LEUKOCYTE ESTERASE, URINE: ABNORMAL
Lab: ABNORMAL
MUCUS: NORMAL
NITRITE, URINE: POSITIVE
OTHER OBSERVATIONS UA: NORMAL
PH UA: 5.5 (ref 5–8)
POTASSIUM SERPL-SCNC: 3.8 MMOL/L (ref 3.7–5.3)
PROTEIN UA: ABNORMAL
RBC UA: NORMAL /HPF
RENAL EPITHELIAL, UA: NORMAL /HPF
SODIUM BLD-SCNC: 141 MMOL/L (ref 135–144)
SPECIFIC GRAVITY UA: 1.04 (ref 1–1.03)
SPECIMEN DESCRIPTION: ABNORMAL
TRICHOMONAS: NORMAL
TURBIDITY: ABNORMAL
URINE HGB: ABNORMAL
UROBILINOGEN, URINE: NORMAL
WBC UA: NORMAL /HPF
YEAST: NORMAL

## 2020-12-15 PROCEDURE — 99283 EMERGENCY DEPT VISIT LOW MDM: CPT

## 2020-12-15 PROCEDURE — 81001 URINALYSIS AUTO W/SCOPE: CPT

## 2020-12-15 PROCEDURE — 80048 BASIC METABOLIC PNL TOTAL CA: CPT

## 2020-12-15 PROCEDURE — 36415 COLL VENOUS BLD VENIPUNCTURE: CPT

## 2020-12-15 PROCEDURE — 6370000000 HC RX 637 (ALT 250 FOR IP): Performed by: EMERGENCY MEDICINE

## 2020-12-15 RX ORDER — CEPHALEXIN 500 MG/1
500 CAPSULE ORAL 4 TIMES DAILY
Qty: 28 CAPSULE | Refills: 0 | Status: SHIPPED | OUTPATIENT
Start: 2020-12-15 | End: 2020-12-22

## 2020-12-15 RX ORDER — CEPHALEXIN 250 MG/1
500 CAPSULE ORAL ONCE
Status: COMPLETED | OUTPATIENT
Start: 2020-12-15 | End: 2020-12-15

## 2020-12-15 RX ADMIN — CEPHALEXIN 500 MG: 250 CAPSULE ORAL at 20:14

## 2020-12-15 ASSESSMENT — ENCOUNTER SYMPTOMS
VOMITING: 0
COUGH: 0
NAUSEA: 0
ABDOMINAL PAIN: 0

## 2020-12-15 ASSESSMENT — PAIN SCALES - GENERAL: PAINLEVEL_OUTOF10: 6

## 2020-12-15 NOTE — ED PROVIDER NOTES
[address] [facility]  EMERGENCY DEPARTMENT ENCOUNTER      Pt Name: Elvi Miller  MRN: [id number]  [place] 1965  Date of evaluation: 12/15/20      CHIEF COMPLAINT       Chief Complaint   Patient presents with    Dysuria     HISTORY OF PRESENT ILLNESS   HPI 48 y.o. male presents with c/o dysuria. Symptoms started 1 week ago. Pain is described as burning in character, moderate in severity (rating it 6 / 10). The pain has been constant in course. The patient tried to drink more water and drink cranberry juice prior to arrival with some relief of symptoms. The patient reports a history of similar symptoms. Review of the electronic medical record shows he has had kidney stones but states this does not feel similar to that pain. REVIEW OF SYSTEMS       Review of Systems   Constitutional: Negative for fever. Respiratory: Negative for cough. Gastrointestinal: Negative for abdominal pain, nausea and vomiting. Genitourinary: Positive for dysuria. Musculoskeletal: Negative for gait problem. Skin: Negative for rash. PAST MEDICAL HISTORY     Past Medical History:   Diagnosis Date    Calculus of kidney     Essential hypertension, benign     Gout, unspecified     H/O colonoscopy 4/11/2016    2016    Mitral valve disorders(424.0)     Other and unspecified hyperlipidemia     Type II or unspecified type diabetes mellitus without mention of complication, not stated as uncontrolled     Unspecified chronic liver disease without mention of alcohol        SURGICAL HISTORY       Past Surgical History:   Procedure Laterality Date    LITHOTRIPSY      UPPER GASTROINTESTINAL ENDOSCOPY N/A 5/25/2018    The esophagus was inspected to the Z-line. The endoscopic exam showed impression of varices (F1) in distal esophagus.         CURRENT MEDICATIONS       Discharge Medication List as of 12/15/2020  8:14 PM      CONTINUE these medications which have NOT CHANGED    Details tamsulosin (FLOMAX) 0.4 MG capsule Take 1 capsule by mouth once daily, Disp-30 capsule, R-2Normal      metFORMIN (GLUCOPHAGE) 1000 MG tablet Take 1 tablet by mouth 2 times daily (with meals), Disp-180 tablet, R-1Normal      omeprazole (PRILOSEC) 20 MG delayed release capsule Take 1 capsule by mouth Daily, Disp-30 capsule, R-0Normal      lisinopril (PRINIVIL;ZESTRIL) 5 MG tablet Take 1 tablet by mouth once daily, Disp-30 tablet, R-2Normal      metoprolol tartrate (LOPRESSOR) 25 MG tablet Take 1 tablet by mouth 2 times daily, Disp-60 tablet, R-2Please consider 90 day supplies to promote better adherenceNormal      clopidogrel (PLAVIX) 75 MG tablet TAKE 1 TABLET BY MOUTH ONCE DAILYHistorical Med      atorvastatin (LIPITOR) 40 MG tablet Take 1 tablet by mouth nightly, Disp-30 tablet,R-3Normal      allopurinol (ZYLOPRIM) 100 MG tablet Take 1 tablet by mouth once daily, Disp-30 tablet,R-0Normal      !! blood glucose test strips (ASCENSIA AUTODISC VI;ONE TOUCH ULTRA TEST VI) strip Disp-100 strip,R-11, NormalUse with associated glucose meter.       fluticasone-salmeterol (ADVAIR DISKUS) 100-50 MCG/DOSE diskus inhaler INHALE 1 PUFF BY MOUTH EVERY 12 HOURS, Disp-1 Inhaler,R-11Normal      SITagliptin (JANUVIA) 100 MG tablet TAKE 1 TABLET BY MOUTH DAILY, Disp-30 tablet, R-11Normal      albuterol sulfate HFA (VENTOLIN HFA) 108 (90 Base) MCG/ACT inhaler Inhale 1 puff into the lungs every 4 hours as needed for Wheezing, Disp-18 g, R-6Normal      glimepiride (AMARYL) 4 MG tablet TAKE 1 TABLET BY MOUTH TWICE DAILY, Disp-60 tablet, R-11Please consider 90 day supplies to promote better adherenceNormal      !! blood glucose monitor strips 1 strip by Other route 4 times daily Test 4  times a day & as needed for symptoms of irregular blood glucose., Other, 4 TIMES DAILY Starting Fri 5/3/2019, Disp-100 strip, R-11, Normal      [employer] LANCETS ULTRATHIN 30G MISC 3 TIMES DAILY Starting Fri 10/19/2018, Disp-100 each, R-11, Normal acetaminophen (AMINOFEN) 325 MG tablet Take 2 tablets by mouth every 6 hours as needed for Pain, Disp-50 tablet, R-0Print      aspirin 81 MG tablet Take 81 mg by mouth daily Historical Med       !! - Potential duplicate medications found. Please discuss with provider. ALLERGIES     is allergic to codeine and simvastatin. FAMILY HISTORY     He indicated that the status of his other is unknown. SOCIAL HISTORY      reports that he quit smoking about 8 years ago. He has quit using smokeless tobacco. He reports previous alcohol use. He reports current drug use. Drug: Marijuana. PHYSICAL EXAM     INITIAL VITALS: /66   Pulse 91   Temp 98.5 °F (36.9 °C) (Oral)   Resp 18   Ht 6' (1.829 m)   Wt 230 lb (104.3 kg)   SpO2 94%   BMI 31.19 kg/m²   General: NAD  Eyes: Anicteric pupils are equal and round. Cardiovascular: RRR  Pulmonary: CTA  Abdomen: Nondistended no TTP  MSK: No CVA TTP  Neuro: ANO x3 fluent speech    MEDICAL DECISION MAKING:     MDM  48 y.o. male presenting with dysuria. Urinalysis is obtained and is positive for nitrates and leukocyte esterase. He is also having significant hematuria. He has glucosuria, but no there only trace ketones in his urine and so I doubt that he has any DKA. Ssymptoms of DKA. Encouraged glucose control, and encouraged hydration. He will need further evaluation for this hematuria. Will start him on antibiotics. We obtained a BMP to check his renal function which showed no sign of renal failure. D/w pt the results, treatment plan, warning precautions for prompt ED return and importance of close OP FU, he verbalizes understanding and agrees with the treatment plan. DIAGNOSTIC RESULTS     LABS: All lab results were reviewed by myself, and all abnormals are listed below.   Labs Reviewed   URINE RT REFLEX TO CULTURE - Abnormal; Notable for the following components:       Result Value    Color, UA RED (*)     Turbidity UA TURBID (*) Glucose, Ur 3+ (*)     Bilirubin Urine   (*)     Value: Presumptive positive. Unable to confirm due to unavailability of reagent. Ketones, Urine TRACE (*)     Specific Gravity, UA 1.040 (*)     Urine Hgb LARGE (*)     Protein, UA 1+ (*)     Nitrite, Urine POSITIVE (*)     Leukocyte Esterase, Urine SMALL (*)     All other components within normal limits   BASIC METABOLIC PANEL - Abnormal; Notable for the following components:    Glucose 296 (*)     CREATININE 0.58 (*)     All other components within normal limits   MICROSCOPIC URINALYSIS       EMERGENCY DEPARTMENT COURSE:   Vitals:    Vitals:    12/15/20 1816   BP: 137/66   Pulse: 91   Resp: 18   Temp: 98.5 °F (36.9 °C)   TempSrc: Oral   SpO2: 94%   Weight: 230 lb (104.3 kg)   Height: 6' (1.829 m)       The patient was given the following medications while in the emergency department:  Orders Placed This Encounter   Medications    cephALEXin (KEFLEX) capsule 500 mg     Order Specific Question:   Antimicrobial Indications     Answer:   Urinary Tract Infection    cephALEXin (KEFLEX) 500 MG capsule     Sig: Take 1 capsule by mouth 4 times daily for 7 days     Dispense:  28 capsule     Refill:  0     -------------------------  CRITICAL CARE:   CONSULTS: None  PROCEDURES: Procedures     FINAL IMPRESSION      1. Acute cystitis with hematuria    2.  Hematuria, unspecified type          DISPOSITION/PLAN   DISPOSITION Decision To Discharge 12/15/2020 08:00:30 PM      PATIENT REFERRED TO:  Sindy Hicks MD   [address] [facility] [address] [address] [place] [address]  [phone]          DISCHARGE MEDICATIONS:  Discharge Medication List as of 12/15/2020  8:14 PM      START taking these medications    Details   cephALEXin (KEFLEX) 500 MG capsule Take 1 capsule by mouth 4 times daily for 7 days, Disp-28 capsule, R-0Print               Brisa Lozano MD  Attending Emergency Physician Slade Quesada MD  12/15/20 2179

## 2020-12-23 ENCOUNTER — HOSPITAL ENCOUNTER (OUTPATIENT)
Age: 55
Setting detail: SPECIMEN
Discharge: HOME | End: 2020-12-23
Payer: MEDICAID

## 2020-12-23 ENCOUNTER — OFFICE VISIT (OUTPATIENT)
Dept: INTERNAL MEDICINE CLINIC | Age: 55
End: 2020-12-23
Payer: MEDICAID

## 2020-12-23 VITALS
DIASTOLIC BLOOD PRESSURE: 80 MMHG | SYSTOLIC BLOOD PRESSURE: 128 MMHG | TEMPERATURE: 98.1 F | RESPIRATION RATE: 16 BRPM | BODY MASS INDEX: 30.48 KG/M2 | HEIGHT: 72 IN | HEART RATE: 80 BPM | WEIGHT: 225 LBS

## 2020-12-23 LAB
-: ABNORMAL
AMORPHOUS: ABNORMAL
BACTERIA: ABNORMAL
BILIRUBIN URINE: NEGATIVE
CASTS UA: ABNORMAL /LPF (ref 0–8)
COLOR: ABNORMAL
CRYSTALS, UA: ABNORMAL /HPF
EPITHELIAL CELLS UA: ABNORMAL /HPF (ref 0–5)
GLUCOSE URINE: ABNORMAL
KETONES, URINE: NEGATIVE
LEUKOCYTE ESTERASE, URINE: ABNORMAL
MUCUS: ABNORMAL
NITRITE, URINE: NEGATIVE
OTHER OBSERVATIONS UA: ABNORMAL
PH UA: 5 (ref 5–8)
PROSTATE SPECIFIC ANTIGEN: 0.17 UG/L
PROTEIN UA: ABNORMAL
RBC UA: ABNORMAL /HPF (ref 0–4)
RENAL EPITHELIAL, UA: ABNORMAL /HPF
SPECIFIC GRAVITY UA: 1.04 (ref 1–1.03)
TRICHOMONAS: ABNORMAL
TURBIDITY: ABNORMAL
URINE HGB: ABNORMAL
UROBILINOGEN, URINE: NORMAL
WBC UA: ABNORMAL /HPF (ref 0–5)
YEAST: ABNORMAL

## 2020-12-23 PROCEDURE — G8427 DOCREV CUR MEDS BY ELIG CLIN: HCPCS | Performed by: INTERNAL MEDICINE

## 2020-12-23 PROCEDURE — 99213 OFFICE O/P EST LOW 20 MIN: CPT | Performed by: INTERNAL MEDICINE

## 2020-12-23 PROCEDURE — 3044F HG A1C LEVEL LT 7.0%: CPT | Performed by: INTERNAL MEDICINE

## 2020-12-23 PROCEDURE — G8417 CALC BMI ABV UP PARAM F/U: HCPCS | Performed by: INTERNAL MEDICINE

## 2020-12-23 PROCEDURE — G8482 FLU IMMUNIZE ORDER/ADMIN: HCPCS | Performed by: INTERNAL MEDICINE

## 2020-12-23 PROCEDURE — 3017F COLORECTAL CA SCREEN DOC REV: CPT | Performed by: INTERNAL MEDICINE

## 2020-12-23 PROCEDURE — 1036F TOBACCO NON-USER: CPT | Performed by: INTERNAL MEDICINE

## 2020-12-23 PROCEDURE — 2022F DILAT RTA XM EVC RTNOPTHY: CPT | Performed by: INTERNAL MEDICINE

## 2020-12-23 RX ORDER — SULFAMETHOXAZOLE AND TRIMETHOPRIM 400; 80 MG/1; MG/1
400 TABLET ORAL DAILY
COMMUNITY
End: 2021-02-22 | Stop reason: ALTCHOICE

## 2020-12-23 RX ORDER — CIPROFLOXACIN 500 MG/1
500 TABLET, FILM COATED ORAL 2 TIMES DAILY
Qty: 14 TABLET | Refills: 0 | Status: SHIPPED | OUTPATIENT
Start: 2020-12-23 | End: 2020-12-30

## 2020-12-23 ASSESSMENT — PATIENT HEALTH QUESTIONNAIRE - PHQ9
1. LITTLE INTEREST OR PLEASURE IN DOING THINGS: 0
SUM OF ALL RESPONSES TO PHQ9 QUESTIONS 1 & 2: 0
2. FEELING DOWN, DEPRESSED OR HOPELESS: 0
SUM OF ALL RESPONSES TO PHQ QUESTIONS 1-9: 0

## 2020-12-23 NOTE — PROGRESS NOTES
Visit Information    Have you changed or started any medications since your last visit including any over-the-counter medicines, vitamins, or herbal medicines? no   Are you having any side effects from any of your medications? -  no  Have you stopped taking any of your medications? Is so, why? -  no    Have you seen any other physician or provider since your last visit? Yes - Records Obtained  Have you had any other diagnostic tests since your last visit? Yes - Records Obtained  Have you been seen in the emergency room and/or had an admission to a hospital since we last saw you? Yes - Records Obtained  Have you had your routine dental cleaning in the past 6 months? yes     Have you activated your [employer] account? If not, what are your barriers? Yes     Patient Care Team:  Freddie Guardado MD as PCP - General (Internal Medicine)  Freddie Guardado MD as PCP - [facility] Provider    Medical History Review  Past Medical, Family, and Social History reviewed and does contribute to the patient presenting condition    Health Maintenance   Topic Date Due    Hepatitis A vaccine (1 of 2 - Risk 2-dose series) 03/03/1967    HIV screen  03/03/1981    Hepatitis B vaccine (1 of 3 - Risk 3-dose series) 03/03/1985    DTaP/Tdap/Td vaccine (1 - Tdap) 03/03/1985    Shingles Vaccine (1 of 2) 03/03/2016    Diabetic retinal exam  08/15/2016    Diabetic foot exam  05/30/2019    Diabetic microalbuminuria test  06/02/2021    Lipid screen  10/15/2021    A1C test (Diabetic or Prediabetic)  11/02/2021    Potassium monitoring  12/15/2021    Creatinine monitoring  12/15/2021    Colon cancer screen colonoscopy  02/28/2026    Flu vaccine  Completed    Pneumococcal 0-64 years Vaccine  Completed    Hepatitis C screen  Completed    Hib vaccine  Aged Out    Meningococcal (ACWY) vaccine  Aged Out     Chief Complaint   Patient presents with    Hematuria     Pt is here after being seen at [facility] for hematuria.  Pt is unsure of diagnosis and is now taking bactrim. Pt denies any other concerns at this time. SUBJECTIVE:  Landy Clement is a 48 y.o. male patient who  comes for complaints of   Chief Complaint   Patient presents with    Hematuria     Pt is here after being seen at [facility] for hematuria. Pt is unsure of diagnosis and is now taking bactrim. Pt denies any other concerns at this time. Urinary frequency, dysuria  Duration-2wk  Severity- moderate  Context- has controlled type 2 diabetes, well controlled. Associated signs and symptoms- lower abdominal pain,   UA was done 12/10- showed nitrite, protein, blood  Urine culture showed B-hemolytic strep  Pt was seen in ER on 12/15- started on Keflex - completed 1 week    Feels antibiotic did not work, Esvin Liriano has suprapubic pain, dysuria, frequency, blood in urine. Has h/o b/l nephrolithiasis per CT 2/2020  Has h/o recurrent UTI- once a year or so. No feers/chills/d/v currently    REVIEW OF SYSTEMS (except Subjective (HPI))  GENERAL: No fevers / chills  RESPIRATORY: Negative for cough, wheezing or shortness of breath  CARDIOVASCULAR: Negative for chest pain or palpitations.   GI: no nausea, vomiting, or diarrhea  NEURO: No history of headaches    Past Medical History:   Diagnosis Date    Calculus of kidney     Essential hypertension, benign     Gout, unspecified     H/O colonoscopy 4/11/2016    2016    Mitral valve disorders(424.0)     Other and unspecified hyperlipidemia     Type II or unspecified type diabetes mellitus without mention of complication, not stated as uncontrolled     Unspecified chronic liver disease without mention of alcohol        SOCIAL HISTORY:  Social History     Socioeconomic History    Marital status:      Spouse name: Not on file    Number of children: Not on file    Years of education: Not on file    Highest education level: Not on file   Occupational History    Not on file   Social Needs    Financial resource strain: Not on file    Food insecurity     Worry: Not on file     Inability: Not on file    Transportation needs     Medical: Not on file     Non-medical: Not on file   Tobacco Use    Smoking status: Former Smoker     Quit date: 3/26/2012     Years since quittin.7    Smokeless tobacco: Former User   Substance and Sexual Activity    Alcohol use: Not Currently     Alcohol/week: 0.0 standard drinks     Comment: very little/ special events    Drug use: Yes     Types: Marijuana    Sexual activity: Not on file   Lifestyle    Physical activity     Days per week: Not on file     Minutes per session: Not on file    Stress: Not on file   Relationships    Social connections     Talks on phone: Not on file     Gets together: Not on file     Attends Faith service: Not on file     Active member of club or organization: Not on file     Attends meetings of clubs or organizations: Not on file     Relationship status: Not on file    Intimate partner violence     Fear of current or ex partner: Not on file     Emotionally abused: Not on file     Physically abused: Not on file     Forced sexual activity: Not on file   Other Topics Concern    Not on file   Social History Narrative    Not on file           CURRENT MEDICATIONS:  Current Outpatient Medications   Medication Sig Dispense Refill    tamsulosin (FLOMAX) 0.4 MG capsule Take 1 capsule by mouth once daily 30 capsule 2    metFORMIN (GLUCOPHAGE) 1000 MG tablet Take 1 tablet by mouth 2 times daily (with meals) 180 tablet 1    omeprazole (PRILOSEC) 20 MG delayed release capsule Take 1 capsule by mouth Daily 30 capsule 0    lisinopril (PRINIVIL;ZESTRIL) 5 MG tablet Take 1 tablet by mouth once daily 30 tablet 2    metoprolol tartrate (LOPRESSOR) 25 MG tablet Take 1 tablet by mouth 2 times daily 60 tablet 2    clopidogrel (PLAVIX) 75 MG tablet TAKE 1 TABLET BY MOUTH ONCE DAILY      atorvastatin (LIPITOR) 40 MG tablet Take 1 tablet by mouth nightly 30 tablet 3  allopurinol (ZYLOPRIM) 100 MG tablet Take 1 tablet by mouth once daily 30 tablet 0    blood glucose test strips (ASCENSIA AUTODISC VI;ONE TOUCH ULTRA TEST VI) strip Use with associated glucose meter. 100 strip 11    fluticasone-salmeterol (ADVAIR DISKUS) 100-50 MCG/DOSE diskus inhaler INHALE 1 PUFF BY MOUTH EVERY 12 HOURS 1 Inhaler 11    SITagliptin (JANUVIA) 100 MG tablet TAKE 1 TABLET BY MOUTH DAILY 30 tablet 11    albuterol sulfate HFA (VENTOLIN HFA) 108 (90 Base) MCG/ACT inhaler Inhale 1 puff into the lungs every 4 hours as needed for Wheezing 18 g 6    glimepiride (AMARYL) 4 MG tablet TAKE 1 TABLET BY MOUTH TWICE DAILY 60 tablet 11    blood glucose monitor strips 1 strip by Other route 4 times daily Test 4  times a day & as needed for symptoms of irregular blood glucose. 100 strip 11    [employer] LANCETS ULTRATHIN 30G MISC 1 each by Other route 3 times daily 100 each 11    acetaminophen (AMINOFEN) 325 MG tablet Take 2 tablets by mouth every 6 hours as needed for Pain 50 tablet 0    aspirin 81 MG tablet Take 81 mg by mouth daily       sulfamethoxazole-trimethoprim (BACTRIM) 400-80 MG per tablet Take 400 mg by mouth daily Take 400 mg by mouth daily       No current facility-administered medications for this visit. OBJECTIVE:  Vitals:    12/23/20 1439   BP: 128/80   Pulse: 80   Resp: 16   Temp: 98.1 °F (36.7 °C)     Body mass index is 30.52 kg/m². General exam (except above):  General appearance - well appearing, alert, in no acute distress  Head - Atraumatic, normocephalic  Eyes - EOMI, no jaundice or pallor  Lungs - Lungs clear to auscultation. No wheezing, rhonchi, rales  Heart - RRR without murmur, gallop, or rubs. No ectopy  Abdomen - Abdomen soft, non-tender. Bowel sounds normal. No masses, organomegaly, no flank tenderness  Extremities -No significant edema, or skin discoloration. Good capillary refill. Neuro - Pt Alert, awake and oriented x 3.  No gross focal neurological deficits    ASSESSMENT AND PLAN (MEDICAL DECISION MAKING):   Fernandez Villar was seen today for hematuria. Diagnoses and all orders for this visit:    Hematuria, unspecified type  Comments:  [facility] ED follow up. Orders:  -     Culture, Urine; Future  -     Urinalysis With Microscopic; Future  -     PSA Screening; Future    Cirrhosis of liver without ascites, unspecified hepatic cirrhosis type ([facility] [address]    Hyperlipidemia associated with type 2 diabetes mellitus ([facility]  Comments:  Last A1C 6.3 november 2020  Orders:  -      DIABETES FOOT EXAM    Urinary tract infection with hematuria, site unspecified  -     ciprofloxacin (CIPRO) 500 MG tablet;  Take 1 tablet by mouth 2 times daily for 7 days           Follow up in: 1week      Joi Her MD

## 2020-12-24 ENCOUNTER — TELEPHONE (OUTPATIENT)
Dept: INTERNAL MEDICINE CLINIC | Age: 55
End: 2020-12-24

## 2020-12-24 DIAGNOSIS — N20.0 NEPHROLITHIASIS: Primary | ICD-10-CM

## 2020-12-24 DIAGNOSIS — R31.0 GROSS HEMATURIA: ICD-10-CM

## 2020-12-24 DIAGNOSIS — R10.2 SUPRAPUBIC PAIN: ICD-10-CM

## 2020-12-24 LAB
CULTURE: NO GROWTH
Lab: NORMAL
SPECIMEN DESCRIPTION: NORMAL

## 2020-12-24 NOTE — TELEPHONE ENCOUNTER
Continues to have blood in the urine. Recommend CT Abdo pelvis. Ordered. Also recommend follow-up with urology. Referral made.   Mariel Roman MD

## 2020-12-28 ENCOUNTER — HOSPITAL ENCOUNTER (EMERGENCY)
Age: 55
Discharge: HOME | End: 2020-12-28
Attending: EMERGENCY MEDICINE
Payer: MEDICAID

## 2020-12-28 VITALS
SYSTOLIC BLOOD PRESSURE: 111 MMHG | RESPIRATION RATE: 15 BRPM | HEIGHT: 72 IN | HEART RATE: 87 BPM | WEIGHT: 225 LBS | TEMPERATURE: 98.5 F | DIASTOLIC BLOOD PRESSURE: 60 MMHG | BODY MASS INDEX: 30.48 KG/M2 | OXYGEN SATURATION: 96 %

## 2020-12-28 LAB
-: ABNORMAL
ABSOLUTE EOS #: 0.07 K/UL (ref 0–0.4)
ABSOLUTE IMMATURE GRANULOCYTE: ABNORMAL K/UL (ref 0–0.3)
ABSOLUTE LYMPH #: 1.23 K/UL (ref 1–4.8)
ABSOLUTE MONO #: 0.49 K/UL (ref 0.1–1.3)
ALBUMIN SERPL-MCNC: 3.5 G/DL (ref 3.5–5.2)
ALBUMIN/GLOBULIN RATIO: ABNORMAL (ref 1–2.5)
ALLEN TEST: ABNORMAL
ALP BLD-CCNC: 107 U/L (ref 40–129)
ALT SERPL-CCNC: 32 U/L (ref 5–41)
AMORPHOUS: ABNORMAL
ANION GAP SERPL CALCULATED.3IONS-SCNC: 9 MMOL/L (ref 9–17)
AST SERPL-CCNC: 33 U/L
BACTERIA: ABNORMAL
BASOPHILS # BLD: 0 % (ref 0–2)
BASOPHILS ABSOLUTE: 0 K/UL (ref 0–0.2)
BETA-HYDROXYBUTYRATE: 0.14 MMOL/L (ref 0.02–0.27)
BILIRUB SERPL-MCNC: 2.7 MG/DL (ref 0.3–1.2)
BILIRUBIN URINE: NEGATIVE
BUN BLDV-MCNC: 8 MG/DL (ref 6–20)
BUN/CREAT BLD: ABNORMAL (ref 9–20)
CALCIUM SERPL-MCNC: 9.4 MG/DL (ref 8.6–10.4)
CARBOXYHEMOGLOBIN: 2.2 % (ref 0–5)
CASTS UA: ABNORMAL /LPF
CHLORIDE BLD-SCNC: 102 MMOL/L (ref 98–107)
CO2: 23 MMOL/L (ref 20–31)
COLOR: ABNORMAL
COMMENT UA: ABNORMAL
CREAT SERPL-MCNC: 0.5 MG/DL (ref 0.7–1.2)
CRYSTALS, UA: ABNORMAL /HPF
DIFFERENTIAL TYPE: ABNORMAL
EOSINOPHILS RELATIVE PERCENT: 2 % (ref 0–4)
EPITHELIAL CELLS UA: ABNORMAL /HPF
FIO2: ABNORMAL
GFR AFRICAN AMERICAN: >60 ML/MIN
GFR NON-AFRICAN AMERICAN: >60 ML/MIN
GFR SERPL CREATININE-BSD FRML MDRD: ABNORMAL ML/MIN/{1.73_M2}
GFR SERPL CREATININE-BSD FRML MDRD: ABNORMAL ML/MIN/{1.73_M2}
GLUCOSE BLD-MCNC: 311 MG/DL (ref 75–110)
GLUCOSE BLD-MCNC: 387 MG/DL (ref 70–99)
GLUCOSE URINE: ABNORMAL
HCO3 VENOUS: 24.3 MMOL/L (ref 24–30)
HCT VFR BLD CALC: 35.9 % (ref 41–53)
HEMOGLOBIN: 12 G/DL (ref 13.5–17.5)
IMMATURE GRANULOCYTES: ABNORMAL %
KETONES, URINE: NEGATIVE
LEUKOCYTE ESTERASE, URINE: NEGATIVE
LIPASE: 64 U/L (ref 13–60)
LYMPHOCYTES # BLD: 35 % (ref 24–44)
MAGNESIUM: 1.6 MG/DL (ref 1.6–2.6)
MCH RBC QN AUTO: 29.9 PG (ref 26–34)
MCHC RBC AUTO-ENTMCNC: 33.4 G/DL (ref 31–37)
MCV RBC AUTO: 89.5 FL (ref 80–100)
METHEMOGLOBIN: 0.4 % (ref 0–1.9)
MODE: ABNORMAL
MONOCYTES # BLD: 14 % (ref 1–7)
MORPHOLOGY: NORMAL
MUCUS: ABNORMAL
NEGATIVE BASE EXCESS, VEN: 0.3 MMOL/L (ref 0–2)
NITRITE, URINE: NEGATIVE
NOTIFICATION TIME: ABNORMAL
NOTIFICATION: ABNORMAL
NRBC AUTOMATED: ABNORMAL PER 100 WBC
O2 DEVICE/FLOW/%: ABNORMAL
O2 SAT, VEN: 97.5 % (ref 60–85)
OTHER OBSERVATIONS UA: ABNORMAL
OXYHEMOGLOBIN: ABNORMAL % (ref 95–98)
PATIENT TEMP: 37
PCO2, VEN, TEMP ADJ: ABNORMAL MMHG (ref 39–55)
PCO2, VEN: 38.5 (ref 39–55)
PDW BLD-RTO: 14.2 % (ref 11.5–14.9)
PEEP/CPAP: ABNORMAL
PH UA: 5.5 (ref 5–8)
PH VENOUS: 7.41 (ref 7.32–7.42)
PH, VEN, TEMP ADJ: ABNORMAL (ref 7.32–7.42)
PLATELET # BLD: 80 K/UL (ref 150–450)
PLATELET ESTIMATE: ABNORMAL
PMV BLD AUTO: 8.3 FL (ref 6–12)
PO2, VEN, TEMP ADJ: ABNORMAL MMHG (ref 30–50)
PO2, VEN: 189 (ref 30–50)
POSITIVE BASE EXCESS, VEN: ABNORMAL MMOL/L (ref 0–2)
POTASSIUM SERPL-SCNC: 3.9 MMOL/L (ref 3.7–5.3)
PROTEIN UA: NEGATIVE
PSV: ABNORMAL
PT. POSITION: ABNORMAL
RBC # BLD: 4.01 M/UL (ref 4.5–5.9)
RBC # BLD: ABNORMAL 10*6/UL
RBC UA: ABNORMAL /HPF
RENAL EPITHELIAL, UA: ABNORMAL /HPF
RESPIRATORY RATE: ABNORMAL
SAMPLE SITE: ABNORMAL
SEG NEUTROPHILS: 49 % (ref 36–66)
SEGMENTED NEUTROPHILS ABSOLUTE COUNT: 1.71 K/UL (ref 1.3–9.1)
SET RATE: ABNORMAL
SODIUM BLD-SCNC: 134 MMOL/L (ref 135–144)
SPECIFIC GRAVITY UA: 1.04 (ref 1–1.03)
TEXT FOR RESPIRATORY: ABNORMAL
TOTAL HB: ABNORMAL G/DL (ref 12–16)
TOTAL PROTEIN: 6.7 G/DL (ref 6.4–8.3)
TOTAL RATE: ABNORMAL
TRICHOMONAS: ABNORMAL
TROPONIN INTERP: NORMAL
TROPONIN T: NORMAL NG/ML
TROPONIN, HIGH SENSITIVITY: 10 NG/L (ref 0–22)
TURBIDITY: CLEAR
URINE HGB: ABNORMAL
UROBILINOGEN, URINE: NORMAL
VT: ABNORMAL
WBC # BLD: 3.5 K/UL (ref 3.5–11)
WBC # BLD: ABNORMAL 10*3/UL
WBC UA: ABNORMAL /HPF
YEAST: ABNORMAL

## 2020-12-28 PROCEDURE — 83690 ASSAY OF LIPASE: CPT

## 2020-12-28 PROCEDURE — 82805 BLOOD GASES W/O2 SATURATION: CPT

## 2020-12-28 PROCEDURE — 82010 KETONE BODYS QUAN: CPT

## 2020-12-28 PROCEDURE — 36415 COLL VENOUS BLD VENIPUNCTURE: CPT

## 2020-12-28 PROCEDURE — 93005 ELECTROCARDIOGRAM TRACING: CPT

## 2020-12-28 PROCEDURE — 82947 ASSAY GLUCOSE BLOOD QUANT: CPT

## 2020-12-28 PROCEDURE — 81001 URINALYSIS AUTO W/SCOPE: CPT

## 2020-12-28 PROCEDURE — 82800 BLOOD PH: CPT

## 2020-12-28 PROCEDURE — 83735 ASSAY OF MAGNESIUM: CPT

## 2020-12-28 PROCEDURE — 2580000003 HC RX 258: Performed by: EMERGENCY MEDICINE

## 2020-12-28 PROCEDURE — 84484 ASSAY OF TROPONIN QUANT: CPT

## 2020-12-28 PROCEDURE — 85025 COMPLETE CBC W/AUTO DIFF WBC: CPT

## 2020-12-28 PROCEDURE — 80053 COMPREHEN METABOLIC PANEL: CPT

## 2020-12-28 PROCEDURE — 99285 EMERGENCY DEPT VISIT HI MDM: CPT

## 2020-12-28 RX ORDER — 0.9 % SODIUM CHLORIDE 0.9 %
1000 INTRAVENOUS SOLUTION INTRAVENOUS ONCE
Status: COMPLETED | OUTPATIENT
Start: 2020-12-28 | End: 2020-12-28

## 2020-12-28 RX ADMIN — SODIUM CHLORIDE 1000 ML: 9 INJECTION, SOLUTION INTRAVENOUS at 00:59

## 2020-12-28 ASSESSMENT — ENCOUNTER SYMPTOMS
COLOR CHANGE: 0
COUGH: 0
TROUBLE SWALLOWING: 0
SHORTNESS OF BREATH: 0
SORE THROAT: 0
BLOOD IN STOOL: 0
VOMITING: 0
NAUSEA: 0
ABDOMINAL PAIN: 0
CONSTIPATION: 0
DIARRHEA: 0
BACK PAIN: 0

## 2020-12-28 NOTE — ED PROVIDER NOTES
[address]  EMERGENCY DEPARTMENT ENCOUNTER    Pt Name: Shruthi Navarro  MRN: [id number]  YOB: 1965  Date of evaluation:12/28/20  PCP: Autumn Delaney MD    [address] [place] [address]       Chief Complaint   Patient presents with    Hyperglycemia     Patient comes to the ER with elevated blood suger (595 by his glucometer just before he came in.)  Type II diabetic. Family said he was \"weaving\" but currently asymptomatic. Normally pretty well regulated. Encouraged by family to come to the ER.,  Recent infection, on an antibiotic. HISTORY OF PRESENT ILLNESS    Shruthi Navarro is a 48 y.o. male who presents with a chief complaint of hyperglycemia. Patient states today since he has been standing up and ambulating he feels like he is \"drunk. \"  He denies any alcohol or drug use. Denies any dizziness, lightheadedness but states that he feels very \"off. \"  States that he is being treated for urinary tract infection with oral Bactrim. He is a type II diabetic. He took his blood sugar and it was over 500. No fevers at home. No chest pain, difficulty breathing. No nausea, vomiting, diarrhea. He was having dysuria before he was started on antibiotics however that has improved. He is not on insulin but he has been taking his Metformin and Januvia. Symptoms are acute. Symptoms are moderate. Nothing makes symptoms better or worse. Patient has no other complaints at this time. REVIEW OF SYSTEMS       Review of Systems   Constitutional: Positive for activity change. Negative for chills, fatigue and fever. HENT: Negative for congestion, ear pain, sore throat and trouble swallowing. Eyes: Negative for visual disturbance. Respiratory: Negative for cough and shortness of breath. Cardiovascular: Negative for chest pain, palpitations and leg swelling. Gastrointestinal: Negative for abdominal pain, blood in stool, constipation, diarrhea, nausea and vomiting. Genitourinary: Negative for dysuria and flank pain. Musculoskeletal: Negative for arthralgias, back pain, myalgias and neck pain. Skin: Negative for color change, rash and wound. Neurological: Negative for dizziness, weakness, light-headedness, numbness and headaches. Psychiatric/Behavioral: Negative for confusion. All other systems reviewed and are negative. Negative in 10 essential Systems except as mentioned above and in the HPI. PAST MEDICAL HISTORY     Past Medical History:   Diagnosis Date    Calculus of kidney     Essential hypertension, benign     Gout, unspecified     H/O colonoscopy 4/11/2016    2016    Mitral valve disorders(424.0)     Other and unspecified hyperlipidemia     Type II or unspecified type diabetes mellitus without mention of complication, not stated as uncontrolled     Unspecified chronic liver disease without mention of alcohol          SURGICAL HISTORY      has a past surgical history that includes Lithotripsy and Upper gastrointestinal endoscopy (N/A, 5/25/2018).       CURRENT MEDICATIONS       Current Discharge Medication List      CONTINUE these medications which have NOT CHANGED    Details   sulfamethoxazole-trimethoprim (BACTRIM) 400-80 MG per tablet Take 400 mg by mouth daily Take 400 mg by mouth daily      ciprofloxacin (CIPRO) 500 MG tablet Take 1 tablet by mouth 2 times daily for 7 days  Qty: 14 tablet, Refills: 0    Associated Diagnoses: Urinary tract infection with hematuria, site unspecified      tamsulosin (FLOMAX) 0.4 MG capsule Take 1 capsule by mouth once daily  Qty: 30 capsule, Refills: 2    Associated Diagnoses: Urgency of urination      metFORMIN (GLUCOPHAGE) 1000 MG tablet Take 1 tablet by mouth 2 times daily (with meals)  Qty: 180 tablet, Refills: 1    Associated Diagnoses: Type 2 diabetes mellitus with hyperglycemia, without long-term current use of insulin (HCC) Comments: Please consider 90 day supplies to promote better adherence      !! blood glucose monitor strips 1 strip by Other route 4 times daily Test 4  times a day & as needed for symptoms of irregular blood glucose. Qty: 100 strip, Refills: 11    Associated Diagnoses: Type 2 diabetes mellitus with hyperglycemia, without long-term current use of insulin ([facility])      KR[employer] LANCETS ULTRATHIN 30G MISC 1 each by Other route 3 times daily  Qty: 100 each, Refills: 11    Associated Diagnoses: Type 2 diabetes mellitus with hyperglycemia, without long-term current use of insulin ([facility])      acetaminophen (AMINOFEN) 325 MG tablet Take 2 tablets by mouth every 6 hours as needed for Pain  Qty: 50 tablet, Refills: 0      aspirin 81 MG tablet Take 81 mg by mouth daily        ! ! - Potential duplicate medications found. Please discuss with provider. ALLERGIES     is allergic to codeine and simvastatin. FAMILY HISTORY     He indicated that the status of his other is unknown.     family history includes Cancer in an other family member; Diabetes in an other family member; Heart Disease in an other family member; High Blood Pressure in an other family member. SOCIAL HISTORY      reports that he quit smoking about 8 years ago. He has quit using smokeless tobacco. He reports previous alcohol use. He reports current drug use. Drug: Marijuana. PHYSICAL EXAM     INITIAL VITALS:  height is 6' (1.829 m) and weight is 225 lb (102.1 kg). His oral temperature is 98.5 °F (36.9 °C). His blood pressure is 135/78 and his pulse is 90. His respiration is 17 and oxygen saturation is 96%. Physical Exam  Vitals signs and nursing note reviewed. Constitutional:       General: He is not in acute distress. HENT:      Head: Normocephalic and atraumatic. Eyes:      Conjunctiva/sclera: Conjunctivae normal.      Pupils: Pupils are equal, round, and reactive to light. Neck:      Musculoskeletal: Neck supple.    Cardiovascular: Rate and Rhythm: Normal rate and regular rhythm. Heart sounds: Normal heart sounds. No murmur. Pulmonary:      Effort: Pulmonary effort is normal. No respiratory distress. Breath sounds: Normal breath sounds. Abdominal:      General: Bowel sounds are normal. There is no distension. Palpations: Abdomen is soft. Tenderness: There is no abdominal tenderness. Musculoskeletal:         General: No tenderness. Lymphadenopathy:      Cervical: No cervical adenopathy. Skin:     General: Skin is warm and dry. Findings: No rash. Neurological:      Mental Status: He is alert and oriented to person, place, and time. Psychiatric:         Judgment: Judgment normal.           DIFFERENTIAL DIAGNOSIS/MDM:   41-year-old male type II diabetic on oral medications presents with hyperglycemia. He is afebrile here, nontoxic, normal vital signs. No acute distress. Grossly normal neurologic exam.    Suspect hyperglycemia, metabolic abnormality, dehydration, possibly DKA. Lower suspicion for sepsis. We will give IV fluids, check blood work.     DIAGNOSTIC RESULTS     EKG: All EKG's are interpreted by the Emergency Department Physician who either signs or Co-signs this chart in the absence of a cardiologist.        RADIOLOGY:   I directly visualized the following  images and reviewed the radiologist interpretations:  No orders to display           ED BEDSIDE ULTRASOUND:      LABS:  Labs Reviewed   CBC WITH AUTO DIFFERENTIAL - Abnormal; Notable for the following components:       Result Value    RBC 4.01 (*)     Hemoglobin 12.0 (*)     Hematocrit 35.9 (*)     Platelets 80 (*)     Monocytes 14 (*)     All other components within normal limits   COMPREHENSIVE METABOLIC PANEL - Abnormal; Notable for the following components:    Glucose 387 (*)     CREATININE 0.50 (*)     Sodium 134 (*)     Total Bilirubin 2.70 (*)     All other components within normal limits LIPASE - Abnormal; Notable for the following components:    Lipase 64 (*)     All other components within normal limits   URINE RT REFLEX TO CULTURE - Abnormal; Notable for the following components:    Color, UA DARK YELLOW (*)     Glucose, Ur 3+ (*)     Specific Gravity, UA 1.041 (*)     Urine Hgb LARGE (*)     All other components within normal limits   BLOOD GAS, VENOUS - Abnormal; Notable for the following components:    pCO2, Trent 38.5 (*)     pO2, Trent 189.0 (*)     O2 Sat, Trent 97.5 (*)     All other components within normal limits   MICROSCOPIC URINALYSIS - Abnormal; Notable for the following components:    Bacteria, UA FEW (*)     All other components within normal limits   POC GLUCOSE FINGERSTICK - Abnormal; Notable for the following components:    POC Glucose 311 (*)     All other components within normal limits   MAGNESIUM   BETA-HYDROXYBUTYRATE   TROPONIN   TROPONIN         EMERGENCY DEPARTMENT COURSE:   Vitals:    Vitals:    12/28/20 0023 12/28/20 0105   BP: (!) 153/99 135/78   Pulse: 93 90   Resp: 18 17   Temp: 98.5 °F (36.9 °C)    TempSrc: Oral    SpO2: 97% 96%   Weight: 225 lb (102.1 kg)    Height: 6' (1.829 m)      1:39 AM EST  Blood glucose is elevated at 387. He is not in DKA. pH is 7.4. He is not acidotic, bicarb is 23. We will recheck blood sugar after IV fluids. 2:16 AM EST  Blood sugars approximately 300. He feels better after IV fluids. Advised to continue keeping himself well-hydrated at home. Advised to call PCP tomorrow for a follow-up appointment. Advised to finish his course of antibiotics and return if any symptoms worsen. Patient agreeable with plan will be discharged home today. CRITICALCARE:      CONSULTS:  None      PROCEDURES:      FINAL IMPRESSION      1.  Hyperglycemia            DISPOSITION/PLAN   DISPOSITION Decision To Discharge 12/28/2020 02:12:39 AM          PATIENT REFERRED TO:  Nestor Moreno MD   [address] [employer] [address] [address]  [phone] Schedule an appointment as soon as possible for a visit       [facility] ED  [facility] [address] [phone]    As needed, If symptoms worsen      DISCHARGE MEDICATIONS:  Current Discharge Medication List          (Please note that portions ofthis note were completed with a voice recognition program.  Efforts were made to edit the dictations but occasionally words are mis-transcribed.)    Karri Desai DO  Attending Emergency Physician          Karri Desai DO  12/28/20 [id number]

## 2021-01-04 DIAGNOSIS — K21.9 GASTROESOPHAGEAL REFLUX DISEASE WITHOUT ESOPHAGITIS: ICD-10-CM

## 2021-01-04 RX ORDER — OMEPRAZOLE 20 MG/1
20 CAPSULE, DELAYED RELEASE ORAL DAILY
Qty: 30 CAPSULE | Refills: 3 | Status: SHIPPED | OUTPATIENT
Start: 2021-01-04 | End: 2021-01-27 | Stop reason: SDUPTHER

## 2021-01-04 NOTE — TELEPHONE ENCOUNTER
Medication: Omeprazole   Last visit: 12/23/2020  Next visit: 2/3/2021  Last refill: 12/10/2020  Pharmacy: [facility] / Chandra Yang

## 2021-01-06 ENCOUNTER — OFFICE VISIT (OUTPATIENT)
Dept: NEUROLOGY | Age: 56
End: 2021-01-06
Payer: MEDICAID

## 2021-01-06 VITALS
HEIGHT: 72 IN | DIASTOLIC BLOOD PRESSURE: 73 MMHG | SYSTOLIC BLOOD PRESSURE: 129 MMHG | WEIGHT: 221 LBS | BODY MASS INDEX: 29.93 KG/M2 | HEART RATE: 72 BPM | TEMPERATURE: 98.7 F

## 2021-01-06 DIAGNOSIS — G45.9 TIA (TRANSIENT ISCHEMIC ATTACK): Primary | ICD-10-CM

## 2021-01-06 DIAGNOSIS — D68.59 HYPERCOAGULOPATHY (HCC): ICD-10-CM

## 2021-01-06 DIAGNOSIS — E11.65 TYPE 2 DIABETES MELLITUS WITH HYPERGLYCEMIA, WITHOUT LONG-TERM CURRENT USE OF INSULIN (HCC): ICD-10-CM

## 2021-01-06 DIAGNOSIS — R53.1 RIGHT SIDED WEAKNESS: ICD-10-CM

## 2021-01-06 PROCEDURE — G8427 DOCREV CUR MEDS BY ELIG CLIN: HCPCS | Performed by: PSYCHIATRY & NEUROLOGY

## 2021-01-06 PROCEDURE — G8417 CALC BMI ABV UP PARAM F/U: HCPCS | Performed by: PSYCHIATRY & NEUROLOGY

## 2021-01-06 PROCEDURE — 3017F COLORECTAL CA SCREEN DOC REV: CPT | Performed by: PSYCHIATRY & NEUROLOGY

## 2021-01-06 PROCEDURE — 1036F TOBACCO NON-USER: CPT | Performed by: PSYCHIATRY & NEUROLOGY

## 2021-01-06 PROCEDURE — 2022F DILAT RTA XM EVC RTNOPTHY: CPT | Performed by: PSYCHIATRY & NEUROLOGY

## 2021-01-06 PROCEDURE — G8482 FLU IMMUNIZE ORDER/ADMIN: HCPCS | Performed by: PSYCHIATRY & NEUROLOGY

## 2021-01-06 PROCEDURE — 3046F HEMOGLOBIN A1C LEVEL >9.0%: CPT | Performed by: PSYCHIATRY & NEUROLOGY

## 2021-01-06 PROCEDURE — 99214 OFFICE O/P EST MOD 30 MIN: CPT | Performed by: PSYCHIATRY & NEUROLOGY

## 2021-01-06 RX ORDER — CLOPIDOGREL BISULFATE 75 MG/1
TABLET ORAL
Qty: 30 TABLET | Refills: 4 | Status: ON HOLD | OUTPATIENT
Start: 2021-01-06 | End: 2021-04-02 | Stop reason: ALTCHOICE

## 2021-01-06 NOTE — PROGRESS NOTES
NEUROLOGY FOLLOW-UP  Patient Name:  Clarke Aceves  :   1965  Clinic Visit Date: 2021    I saw . Clarke Aceves in follow-up in the office today in continuation of neurologic care. He is a 48 y.o. right-handed  male with hospitalization in 2020 for new onset right facial droop and right-sided facial numbness along with right-sided weakness. His working diagnosis during that time was Bell's palsy versus transient ischemic attack. He was initiated on steroids along with dual antiplatelet therapy. He has multiple risk factors for CVA. He has taken both aspirin and Plavix for 3 weeks and stopped aspirin and continued on Plavix along with a statin medication. He has been doing well on those medications and denied any medication related adverse effects. He also stated that her facial weakness has significantly improved. Facial numbness also resolved. Presently he denies any weakness or numbness involving right upper and right lower extremities. Of note; he admits to having right upper and right lower extremity weakness during the hospitalization in 2020. His history significant for hypertension, hyperlipidemia, diabetes. During the hospitalization in 2020; he has had CT head and it was unremarkable. He also has had MRI brain and it was unremarkable without any acute intracranial abnormalities. CTA head and neck was unremarkable. He has had KITA, Lyme titers, etc. and those were unremarkable. He did not have hypercoag panel during that hospitalization. Presently he admits to having some memory difficulties but also stated that he does have anxiety and depression. Presently he denies headaches, dizziness and vision changes. Denies speech and swallowing difficulties. Denies dysphagia, diplopia, vertigo. Review of systems done by staff reviewed and pertinent positives include occasional memory difficulties, anxiety and depression.   Also has occasional headaches and those are tolerable. He does not want to be on any additional medications at this point of time.       Current Outpatient Medications on File Prior to Visit   Medication Sig Dispense Refill    omeprazole (PRILOSEC) 20 MG delayed release capsule Take 1 capsule by mouth Daily 30 capsule 3    tamsulosin (FLOMAX) 0.4 MG capsule Take 1 capsule by mouth once daily 30 capsule 2    metFORMIN (GLUCOPHAGE) 1000 MG tablet Take 1 tablet by mouth 2 times daily (with meals) 180 tablet 1    lisinopril (PRINIVIL;ZESTRIL) 5 MG tablet Take 1 tablet by mouth once daily 30 tablet 2    metoprolol tartrate (LOPRESSOR) 25 MG tablet Take 1 tablet by mouth 2 times daily 60 tablet 2    clopidogrel (PLAVIX) 75 MG tablet TAKE 1 TABLET BY MOUTH ONCE DAILY      atorvastatin (LIPITOR) 40 MG tablet Take 1 tablet by mouth nightly 30 tablet 3    allopurinol (ZYLOPRIM) 100 MG tablet Take 1 tablet by mouth once daily 30 tablet 0    blood glucose test strips (ASCENSIA AUTODISC VI;ONE TOUCH ULTRA TEST VI) strip Use with associated glucose meter. 100 strip 11    fluticasone-salmeterol (ADVAIR DISKUS) 100-50 MCG/DOSE diskus inhaler INHALE 1 PUFF BY MOUTH EVERY 12 HOURS 1 Inhaler 11    SITagliptin (JANUVIA) 100 MG tablet TAKE 1 TABLET BY MOUTH DAILY 30 tablet 11    albuterol sulfate HFA (VENTOLIN HFA) 108 (90 Base) MCG/ACT inhaler Inhale 1 puff into the lungs every 4 hours as needed for Wheezing 18 g 6    glimepiride (AMARYL) 4 MG tablet TAKE 1 TABLET BY MOUTH TWICE DAILY 60 tablet 11    blood glucose monitor strips 1 strip by Other route 4 times daily Test 4  times a day & as needed for symptoms of irregular blood glucose.  100 strip 11    [employer] LANCETS ULTRATHIN 30G MISC 1 each by Other route 3 times daily 100 each 11    acetaminophen (AMINOFEN) 325 MG tablet Take 2 tablets by mouth every 6 hours as needed for Pain 50 tablet 0    aspirin 81 MG tablet Take 81 mg by mouth daily       sulfamethoxazole-trimethoprim (BACTRIM) 400-80 MG per tablet Take 400 mg by mouth daily Take 400 mg by mouth daily       No current facility-administered medications on file prior to visit. Allergies: Carroll Ceballos is allergic to codeine and simvastatin. Past Medical History:   Diagnosis Date    Calculus of kidney     Essential hypertension, benign     Gout, unspecified     H/O colonoscopy 4/11/2016    2016    Mitral valve disorders(424.0)     Other and unspecified hyperlipidemia     Type II or unspecified type diabetes mellitus without mention of complication, not stated as uncontrolled     Unspecified chronic liver disease without mention of alcohol        Past Surgical History:   Procedure Laterality Date    LITHOTRIPSY      UPPER GASTROINTESTINAL ENDOSCOPY N/A 5/25/2018    The esophagus was inspected to the Z-line. The endoscopic exam showed impression of varices (F1) in distal esophagus. Social History: Carroll Ceballos  reports that he quit smoking about 8 years ago. He has quit using smokeless tobacco. He reports previous alcohol use. He reports current drug use. Drug: Marijuana. Family History   Problem Relation Age of Onset    Cancer Other     Diabetes Other     High Blood Pressure Other     Heart Disease Other        On exam: Blood pressure 129/73, pulse 72, temperature 98.7 °F (37.1 °C), temperature source Temporal, height 6' (1.829 m), weight 221 lb (100.2 kg). NEUROLOGIC EXAMINATION  GENERAL  Appears comfortable and in no distress   HEENT  NC/ AT   NECK  Supple and no bruits heard   MENTAL STATUS:  Alert, oriented, intact memory, no confusion, normal speech, normal language, no hallucination or delusion   CRANIAL NERVES: II     -      PERRLA, Fundi reveal intact venous pulsations;  Visual fields intact to confrontation  III,IV,VI -  EOMs full, no afferent defect, no                      CAIO, no ptosis  V     -     Normal facial sensation  VII    -     Normal facial symmetry  VIII   -     Intact hearing  IX,X - Symmetrical palate  XI    -     Symmetrical shoulder shrug  XII   -     Midline tongue, no atrophy    MOTOR FUNCTION:  significant for good strength of grade 5/5 in bilateral proximal and distal muscle groups of both upper and lower extremities with normal bulk, normal tone and no involuntary movements, no tremor   SENSORY FUNCTION:  Normal touch, normal pin, normal vibration, normal proprioception   CEREBELLAR FUNCTION:  Intact fine motor control over upper limbs   REFLEX FUNCTION:  Symmetric, no perverted reflex, no Babinski sign   STATION and GAIT  Normal station, normal gait     CT head 3/30/2020: No acute intracranial abnormality.     CTA head and neck 3/30/2020: No acute abnormality and no flow-limiting stenosis of major arteries of head and neck.     Echocardiogram 3/30/2020: EF > 55%. No segmental wall motion abnormalities seen. Negative bubble study; no suggestion of interatrial shunt.     Chest x-ray 3/30/2020: No acute airspace disease identified.     EKG 3/30/2020: Normal sinus rhythm.     MRI brain 3/31/2020: No acute intracranial abnormality. No acute infarct. Lab Results   Component Value Date    SEDRATE 20 (H) 03/31/2020     Lab Results   Component Value Date    MFSUEOQT55 410 10/23/2014      No results found for: FOLATE  Lab Results   Component Value Date    KITA NEGATIVE 03/31/2020     Lab Results   Component Value Date    TSH 1.16 03/30/2020       No results found for: RPR   No components found for: [facility]  Lab Results   Component Value Date    LABA1C 6.3 11/02/2020     Lyme Ab 3/31/20: 0.79 (<0.9)    ACE level 3/31/20: 26 (852)    Hyper coag panel (1/6/2021):   Activated protein C resistance, Antithrombin III level, factor V Leiden, lupus anticoagulant, prothrombin gene mutation, MTHFR mutation, Protein C and protein S levels were sent            Impression and Plan: Mr. Savannah Leung is a 48 y.o. male with   hosp (March 2020) for possible left hemispheric TIA vs right Bell's palsy; right facial weakness and right-sided weakness significantly improved within few hours right facial weakness lasted for longer duration and improved with therapy. Because of multiple risk factors for CVA; he was advised to continue Plavix 75 mg daily along with atorvastatin. Given the history of allergy to simvastatin; he was advised to be watchful for any intolerance symptoms such as muscle cramps, etc.  AST, ALT, CPK done 10/15/2020 were unremarkable. Will get hypercoag panel including Activated protein C resistance, Antithrombin III level, factor V Leiden, lupus anticoagulant, prothrombin gene mutation, MTHFR mutation, Protein C and protein S levels were sent  Comorbid conditions include hypertension, hyperlipidemia and diabetes. Follow-up in 3 months or sooner for further questions and concerns. Please note that portions of this note were completed with a voice recognition program.  Although every effort was made to ensure the accuracy of this automated transcription, some errors in transcription may have occurred; occasionally words are mis-transcribed. Thank you for understanding.

## 2021-01-08 ENCOUNTER — HOSPITAL ENCOUNTER (EMERGENCY)
Age: 56
Discharge: HOME | End: 2021-01-08
Attending: STUDENT IN AN ORGANIZED HEALTH CARE EDUCATION/TRAINING PROGRAM
Payer: MEDICAID

## 2021-01-08 ENCOUNTER — APPOINTMENT (OUTPATIENT)
Dept: CT IMAGING | Age: 56
End: 2021-01-08
Payer: MEDICAID

## 2021-01-08 VITALS
OXYGEN SATURATION: 99 % | BODY MASS INDEX: 29.93 KG/M2 | HEART RATE: 91 BPM | TEMPERATURE: 98.4 F | WEIGHT: 221 LBS | RESPIRATION RATE: 18 BRPM | SYSTOLIC BLOOD PRESSURE: 139 MMHG | HEIGHT: 72 IN | DIASTOLIC BLOOD PRESSURE: 87 MMHG

## 2021-01-08 DIAGNOSIS — N20.0 NEPHROLITHIASIS: ICD-10-CM

## 2021-01-08 DIAGNOSIS — N30.01 ACUTE CYSTITIS WITH HEMATURIA: ICD-10-CM

## 2021-01-08 DIAGNOSIS — R10.9 RIGHT FLANK PAIN: Primary | ICD-10-CM

## 2021-01-08 LAB
-: ABNORMAL
ABSOLUTE EOS #: 0.1 K/UL (ref 0–0.4)
ABSOLUTE IMMATURE GRANULOCYTE: ABNORMAL K/UL (ref 0–0.3)
ABSOLUTE LYMPH #: 1.4 K/UL (ref 1–4.8)
ABSOLUTE MONO #: 0.5 K/UL (ref 0.1–1.3)
ALBUMIN SERPL-MCNC: 3.9 G/DL (ref 3.5–5.2)
ALBUMIN/GLOBULIN RATIO: ABNORMAL (ref 1–2.5)
ALP BLD-CCNC: 104 U/L (ref 40–129)
ALT SERPL-CCNC: 31 U/L (ref 5–41)
AMORPHOUS: ABNORMAL
ANION GAP SERPL CALCULATED.3IONS-SCNC: 10 MMOL/L (ref 9–17)
AST SERPL-CCNC: 35 U/L
BACTERIA: ABNORMAL
BASOPHILS # BLD: 1 % (ref 0–2)
BASOPHILS ABSOLUTE: 0 K/UL (ref 0–0.2)
BILIRUB SERPL-MCNC: 3.19 MG/DL (ref 0.3–1.2)
BILIRUBIN URINE: ABNORMAL
BUN BLDV-MCNC: 14 MG/DL (ref 6–20)
BUN/CREAT BLD: ABNORMAL (ref 9–20)
CALCIUM SERPL-MCNC: 9.7 MG/DL (ref 8.6–10.4)
CASTS UA: ABNORMAL /LPF
CHLORIDE BLD-SCNC: 106 MMOL/L (ref 98–107)
CO2: 24 MMOL/L (ref 20–31)
COLOR: ABNORMAL
COMMENT UA: ABNORMAL
CREAT SERPL-MCNC: 0.62 MG/DL (ref 0.7–1.2)
CRYSTALS, UA: ABNORMAL /HPF
DIFFERENTIAL TYPE: ABNORMAL
EOSINOPHILS RELATIVE PERCENT: 2 % (ref 0–4)
EPITHELIAL CELLS UA: ABNORMAL /HPF
GFR AFRICAN AMERICAN: >60 ML/MIN
GFR NON-AFRICAN AMERICAN: >60 ML/MIN
GFR SERPL CREATININE-BSD FRML MDRD: ABNORMAL ML/MIN/{1.73_M2}
GFR SERPL CREATININE-BSD FRML MDRD: ABNORMAL ML/MIN/{1.73_M2}
GLUCOSE BLD-MCNC: 76 MG/DL (ref 70–99)
GLUCOSE URINE: ABNORMAL
HCT VFR BLD CALC: 36.1 % (ref 41–53)
HEMOGLOBIN: 12.1 G/DL (ref 13.5–17.5)
IMMATURE GRANULOCYTES: ABNORMAL %
KETONES, URINE: ABNORMAL
LEUKOCYTE ESTERASE, URINE: ABNORMAL
LYMPHOCYTES # BLD: 32 % (ref 24–44)
MCH RBC QN AUTO: 29.5 PG (ref 26–34)
MCHC RBC AUTO-ENTMCNC: 33.4 G/DL (ref 31–37)
MCV RBC AUTO: 88.2 FL (ref 80–100)
MONOCYTES # BLD: 13 % (ref 1–7)
MUCUS: ABNORMAL
NITRITE, URINE: POSITIVE
NRBC AUTOMATED: ABNORMAL PER 100 WBC
OTHER OBSERVATIONS UA: ABNORMAL
PDW BLD-RTO: 14.8 % (ref 11.5–14.9)
PH UA: 6 (ref 5–8)
PLATELET # BLD: 75 K/UL (ref 150–450)
PLATELET ESTIMATE: ABNORMAL
PMV BLD AUTO: 8.3 FL (ref 6–12)
POTASSIUM SERPL-SCNC: 3.6 MMOL/L (ref 3.7–5.3)
PROTEIN UA: ABNORMAL
RBC # BLD: 4.09 M/UL (ref 4.5–5.9)
RBC # BLD: ABNORMAL 10*6/UL
RBC UA: ABNORMAL /HPF
RENAL EPITHELIAL, UA: ABNORMAL /HPF
SEG NEUTROPHILS: 52 % (ref 36–66)
SEGMENTED NEUTROPHILS ABSOLUTE COUNT: 2.2 K/UL (ref 1.3–9.1)
SODIUM BLD-SCNC: 140 MMOL/L (ref 135–144)
SPECIFIC GRAVITY UA: 1.03 (ref 1–1.03)
TOTAL PROTEIN: 7.2 G/DL (ref 6.4–8.3)
TRICHOMONAS: ABNORMAL
TURBIDITY: ABNORMAL
URINE HGB: ABNORMAL
UROBILINOGEN, URINE: NORMAL
WBC # BLD: 4.3 K/UL (ref 3.5–11)
WBC # BLD: ABNORMAL 10*3/UL
WBC UA: ABNORMAL /HPF
YEAST: ABNORMAL

## 2021-01-08 PROCEDURE — 81001 URINALYSIS AUTO W/SCOPE: CPT

## 2021-01-08 PROCEDURE — 36415 COLL VENOUS BLD VENIPUNCTURE: CPT

## 2021-01-08 PROCEDURE — 6370000000 HC RX 637 (ALT 250 FOR IP): Performed by: STUDENT IN AN ORGANIZED HEALTH CARE EDUCATION/TRAINING PROGRAM

## 2021-01-08 PROCEDURE — 74176 CT ABD & PELVIS W/O CONTRAST: CPT

## 2021-01-08 PROCEDURE — 2580000003 HC RX 258: Performed by: STUDENT IN AN ORGANIZED HEALTH CARE EDUCATION/TRAINING PROGRAM

## 2021-01-08 PROCEDURE — 80053 COMPREHEN METABOLIC PANEL: CPT

## 2021-01-08 PROCEDURE — 99283 EMERGENCY DEPT VISIT LOW MDM: CPT

## 2021-01-08 PROCEDURE — 87086 URINE CULTURE/COLONY COUNT: CPT

## 2021-01-08 PROCEDURE — 85025 COMPLETE CBC W/AUTO DIFF WBC: CPT

## 2021-01-08 RX ORDER — IBUPROFEN 800 MG/1
800 TABLET ORAL EVERY 8 HOURS PRN
Qty: 30 TABLET | Refills: 0 | Status: SHIPPED | OUTPATIENT
Start: 2021-01-08 | End: 2021-03-18 | Stop reason: ALTCHOICE

## 2021-01-08 RX ORDER — CEPHALEXIN 500 MG/1
500 CAPSULE ORAL 2 TIMES DAILY
Qty: 14 CAPSULE | Refills: 0 | Status: SHIPPED | OUTPATIENT
Start: 2021-01-08 | End: 2021-01-15

## 2021-01-08 RX ORDER — 0.9 % SODIUM CHLORIDE 0.9 %
1000 INTRAVENOUS SOLUTION INTRAVENOUS ONCE
Status: COMPLETED | OUTPATIENT
Start: 2021-01-08 | End: 2021-01-08

## 2021-01-08 RX ORDER — CEPHALEXIN 250 MG/1
500 CAPSULE ORAL ONCE
Status: COMPLETED | OUTPATIENT
Start: 2021-01-08 | End: 2021-01-08

## 2021-01-08 RX ADMIN — SODIUM CHLORIDE 1000 ML: 9 INJECTION, SOLUTION INTRAVENOUS at 19:32

## 2021-01-08 RX ADMIN — CEPHALEXIN 500 MG: 250 CAPSULE ORAL at 21:34

## 2021-01-08 ASSESSMENT — PAIN SCALES - GENERAL: PAINLEVEL_OUTOF10: 5

## 2021-01-08 NOTE — ED NOTES
Pt comes to this ER with c/o rt flank pain and hematuria x 2 weeks. Pt also states, \"It's tender down in the bladder area. \"    Pt arrives A+O x 4, GCS = 15, PMS x 4 intact, eupneic, and PWD. Pt denies penile discharge or dysuria but states it does burn while urinating. Abdomen is soft et nondistended. Pt denies nausea, vomiting, or diarrhea. Pt denies fever, chills, or sweats.      Kacey Sandoval RN  01/08/21 1915

## 2021-01-09 LAB
CULTURE: NORMAL
Lab: NORMAL
SPECIMEN DESCRIPTION: NORMAL

## 2021-01-09 NOTE — ED PROVIDER NOTES
[address]  Emergency Department Encounter  Emergency Medicine Resident     Pt Name: Jaylin Johnson  MRN: [id number]  [place] 1965  Date of evaluation: 1/8/21  PCP:  Eleno Lu MD    Chief Complaint     Chief Complaint   Patient presents with    Flank Pain     Rt flank pain    Hematuria       HISTORY OF PRESENT ILLNESS     Jaylin Johnson is a 48 y.o. male with a history of calculus of the kidney, hyperlipidemia, chronic liver disease, type 2 diabetes, cirrhosis of liver without ascites, TIA who presents with massive flank pain as well as hematuria. Denies Nausea, vomiting, or diarrhea. . No rectal bleeding. Abdomen is soft, non-tender, and non-distended. The patient is in no acute distress with even and unlabored respirations. Patient states that she has a history of similar pain in the past, states that he has had previous stenting and kidney stones, states that he has been bleeding from his urethra approximately once a month. Patient has had stenting in the past, states this pain feels similar to his kidney stone pain, denies nausea vomiting fever chills lightheadedness dizziness, chest pain, shortness of breath, diarrhea constipation, does endorse suprapubic pain and pain with urination as well as gross red blood in his urine. REVIEW OF SYSTEMS       Constitutional: Denies recent fever, chills. Eyes: No visual changes. Neck: No midline neck pain  Respiratory: Denies recent shortness of breath. Cardiac:  Denies recent chest pain. GI: + abdominal pain but denies nausea or vomiting. Denies Blood in the stool or black tarry stools. : Endorses dysuria as well as bloody urine. Musculoskeletal: Denies focal weakness. Neurologic: denies headache or focal weakness. Skin:  Denies any rash.       PAST MEDICAL/SURGICAL/FAMILY HISTORY PMH:  has a past medical history of Calculus of kidney, Essential hypertension, benign, Gout, unspecified, H/O colonoscopy, Mitral valve disorders(424.0), Other and unspecified hyperlipidemia, Type II or unspecified type diabetes mellitus without mention of complication, not stated as uncontrolled, and Unspecified chronic liver disease without mention of alcohol. Surgical History:  has a past surgical history that includes Lithotripsy and Upper gastrointestinal endoscopy (N/A, 5/25/2018). Social History:  reports that he quit smoking about 8 years ago. He has quit using smokeless tobacco. He reports previous alcohol use. He reports current drug use. Drug: Marijuana. Family History: Noncontributory at this time  Psychiatric History: Noncontributory at this time    Allergies:is allergic to codeine and simvastatin. PHYSICAL EXAM       INITIAL VITALS: /62   Pulse 74   Temp 98.4 °F (36.9 °C) (Oral)   Resp 16   Ht 6' (1.829 m)   Wt 221 lb (100.2 kg)   SpO2 97%   BMI 29.97 kg/m²     CONSTITUTIONAL: Vital signs reviewed, Alert and oriented X 3. HEAD: Atraumatic, Normocephalic. EYES: Eyes are normal to inspection, Pupils equal, round and reactive to light. NECK: Normal ROM, No jugular venous distention, No meningeal signs,  No midline bony tenderness to palpation of C/T/L/S-spines. RESPIRATORY CHEST: No respiratory distress. ABDOMEN: Tenderness appreciated to RUQ and costovertebral angle on the right. mild No distension. No pulsatile masses palpated. BACK:  No midline bony tenderness to palpation. No paraspinal muscle tenderness   UPPER EXTREMITY: Inspection normal, No cyanosis. LOWER EXTREMITY: Pulses are 2+ and equal bilaterally with cap refill < 2 seconds. NEURO: GCS is 15.  5/5 strength in bilateral lower extremities with sensation to light touch intact. DTR's are 2+ bilaterally with bilateral downgoing babinski's. DP/PT pulses are 2+, strong, and equal bilaterally. Seg Neutrophils 52 36 - 66 %    Lymphocytes 32 24 - 44 %    Monocytes 13 (H) 1 - 7 %    Eosinophils % 2 0 - 4 %    Basophils 1 0 - 2 %    Segs Absolute 2.20 1.3 - 9.1 k/uL    Absolute Lymph # 1.40 1.0 - 4.8 k/uL    Absolute Mono # 0.50 0.1 - 1.3 k/uL    Absolute Eos # 0.10 0.0 - 0.4 k/uL    Basophils Absolute 0.00 0.0 - 0.2 k/uL   Comprehensive Metabolic Panel w/ Reflex to MG   Result Value Ref Range    Glucose 76 70 - 99 mg/dL    BUN 14 6 - 20 mg/dL    CREATININE 0.62 (L) 0.70 - 1.20 mg/dL    Bun/Cre Ratio NOT REPORTED 9 - 20    Calcium 9.7 8.6 - 10.4 mg/dL    Sodium 140 135 - 144 mmol/L    Potassium 3.6 (L) 3.7 - 5.3 mmol/L    Chloride 106 98 - 107 mmol/L    CO2 24 20 - 31 mmol/L    Anion Gap 10 9 - 17 mmol/L    Alkaline Phosphatase 104 40 - 129 U/L    ALT 31 5 - 41 U/L    AST 35 <40 U/L    Total Bilirubin 3.19 (H) 0.3 - 1.2 mg/dL    Total Protein 7.2 6.4 - 8.3 g/dL    Alb 3.9 3.5 - 5.2 g/dL    Albumin/Globulin Ratio NOT REPORTED 1.0 - 2.5    GFR Non-African American >60 >60 mL/min    GFR African American >60 >60 mL/min    GFR Comment          GFR Staging NOT REPORTED        RADIOLOGY:  Not indicated  CT ABDOMEN PELVIS WO CONTRAST Additional Contrast? None   Final Result   1. Nonobstructing 12 mm calculus, lower pole collecting system right kidney. 2. Nonobstructing 2 mm calculus, lower pole collecting system left kidney   3. Cirrhotic appearance to the liver, with splenomegaly, consistent with   portal hypertension   4. Multiple varices present throughout the mesentery, with stranding   throughout the mesentery, likely due to passive congestion related to the   liver disease. 5. Circumferential wall thickening involving the mid to lower rectum,   suggesting proctitis             Medical decision making  (MDM) / ED Course       Infected Urolithiasis (Outpatient therapy)  48 y.o. male presents with flank pain consistent with previous kidney stone pain. Patient otherwise well-appearing with low suspicion for sepsis. Low suspicion for atypical appendicitis, aortic dissection, torsion, acute cholecystitis, or intraabdominal infection. Plan to get a CT scan as patient does have gross hematuria as well as signs of infection on CT scan with known history of obstructing colliculi requiring ureteral stent placement. ED Course as of Jan 09 0217 Fri Jan 08, 2021 2000 1. Nonobstructing 12 mm calculus, lower pole collecting system right kidney. 2. Nonobstructing 2 mm calculus, lower pole collecting system left kidney  3. Cirrhotic appearance to the liver, with splenomegaly, consistent with  portal hypertension  4. Multiple varices present throughout the mesentery, with stranding  throughout the mesentery, likely due to passive congestion related to the  liver disease. 5. Circumferential wall thickening involving the mid to lower rectum,  suggesting proctitis    [GP]      ED Course User Index  [GP] Lauri ePte MD   Spoke with urology who recommended that patient be discharged with follow-up on Monday. Patient given Keflex while in the emergency department, tolerating p.o., given return precautions and ibuprofen for management of pain. Patient updated on cirrhotic finding on CT scan as well as for concerns for wall thickening concerning for proctitis, told to follow-up with primary care provider. Plan: Keflex 500mg PO BID x 7 days. Patient tolerating PO and pain controlled prior to discharge. Strict return precautions for infected stone or PO intolerance discussed. Final impression      1. Right flank pain    2. Acute cystitis with hematuria    3.  Nephrolithiasis           Disposition with plan     Disposition: Decision To Discharge    PATIENT REFERRED TO:  Chasity Jackson MD  [address] [place] [address]  [phone]    In 3 days  [facility]  [facility]  [address] [place] [address] [address] [address]

## 2021-01-09 NOTE — ED NOTES
Rounded on pt, denies any needs at this time. Awaiting test results.  Watching TV     Dayana Harrell RN  01/08/21 2151

## 2021-01-26 DIAGNOSIS — K21.9 GASTROESOPHAGEAL REFLUX DISEASE WITHOUT ESOPHAGITIS: ICD-10-CM

## 2021-01-26 NOTE — TELEPHONE ENCOUNTER
Medication: Omeprazole   Last visit: 12/23/2020  Next visit: 2/3/2021  Last refill: 01/04/2021  Pharmacy: [facility] / [email or web address]

## 2021-01-27 RX ORDER — OMEPRAZOLE 20 MG/1
20 CAPSULE, DELAYED RELEASE ORAL DAILY
Qty: 30 CAPSULE | Refills: 3 | Status: SHIPPED | OUTPATIENT
Start: 2021-01-27 | End: 2021-05-11 | Stop reason: SDUPTHER

## 2021-02-02 RX ORDER — CLOTRIMAZOLE 1 %
CREAM (GRAM) TOPICAL
COMMUNITY
Start: 2020-11-02 | End: 2021-06-14

## 2021-02-03 ENCOUNTER — OFFICE VISIT (OUTPATIENT)
Dept: INTERNAL MEDICINE CLINIC | Age: 56
End: 2021-02-03
Payer: MEDICAID

## 2021-02-03 VITALS
DIASTOLIC BLOOD PRESSURE: 66 MMHG | HEIGHT: 72 IN | SYSTOLIC BLOOD PRESSURE: 120 MMHG | BODY MASS INDEX: 31.02 KG/M2 | TEMPERATURE: 97.1 F | WEIGHT: 229 LBS

## 2021-02-03 DIAGNOSIS — I10 ESSENTIAL HYPERTENSION: ICD-10-CM

## 2021-02-03 DIAGNOSIS — L02.92 BOIL: ICD-10-CM

## 2021-02-03 DIAGNOSIS — N20.0 NEPHROLITHIASIS: Primary | ICD-10-CM

## 2021-02-03 DIAGNOSIS — K74.60 CIRRHOSIS OF LIVER WITHOUT ASCITES, UNSPECIFIED HEPATIC CIRRHOSIS TYPE (HCC): ICD-10-CM

## 2021-02-03 DIAGNOSIS — G45.9 TIA (TRANSIENT ISCHEMIC ATTACK): ICD-10-CM

## 2021-02-03 DIAGNOSIS — E11.65 TYPE 2 DIABETES MELLITUS WITH HYPERGLYCEMIA, WITHOUT LONG-TERM CURRENT USE OF INSULIN (HCC): ICD-10-CM

## 2021-02-03 PROCEDURE — G8482 FLU IMMUNIZE ORDER/ADMIN: HCPCS | Performed by: INTERNAL MEDICINE

## 2021-02-03 PROCEDURE — 3017F COLORECTAL CA SCREEN DOC REV: CPT | Performed by: INTERNAL MEDICINE

## 2021-02-03 PROCEDURE — G8427 DOCREV CUR MEDS BY ELIG CLIN: HCPCS | Performed by: INTERNAL MEDICINE

## 2021-02-03 PROCEDURE — 99214 OFFICE O/P EST MOD 30 MIN: CPT | Performed by: INTERNAL MEDICINE

## 2021-02-03 PROCEDURE — G8417 CALC BMI ABV UP PARAM F/U: HCPCS | Performed by: INTERNAL MEDICINE

## 2021-02-03 PROCEDURE — 1036F TOBACCO NON-USER: CPT | Performed by: INTERNAL MEDICINE

## 2021-02-03 PROCEDURE — 2022F DILAT RTA XM EVC RTNOPTHY: CPT | Performed by: INTERNAL MEDICINE

## 2021-02-03 PROCEDURE — 3046F HEMOGLOBIN A1C LEVEL >9.0%: CPT | Performed by: INTERNAL MEDICINE

## 2021-02-03 RX ORDER — DOXYCYCLINE HYCLATE 100 MG
100 TABLET ORAL 2 TIMES DAILY
Qty: 20 TABLET | Refills: 0 | Status: SHIPPED | OUTPATIENT
Start: 2021-02-03 | End: 2021-02-13

## 2021-02-03 ASSESSMENT — ENCOUNTER SYMPTOMS
WHEEZING: 0
CONSTIPATION: 0
COLOR CHANGE: 0
CHEST TIGHTNESS: 0
BACK PAIN: 0
FACIAL SWELLING: 0
COUGH: 0
SHORTNESS OF BREATH: 0
APNEA: 0
ABDOMINAL PAIN: 0
DIARRHEA: 0
ABDOMINAL DISTENTION: 0

## 2021-02-03 ASSESSMENT — PATIENT HEALTH QUESTIONNAIRE - PHQ9
SUM OF ALL RESPONSES TO PHQ QUESTIONS 1-9: 0
SUM OF ALL RESPONSES TO PHQ QUESTIONS 1-9: 0
SUM OF ALL RESPONSES TO PHQ9 QUESTIONS 1 & 2: 0
SUM OF ALL RESPONSES TO PHQ QUESTIONS 1-9: 0

## 2021-02-03 NOTE — PROGRESS NOTES
Subjective:      Patient ID: Lucinda Bernardo is a 48 y.o. male. HPI Patient is here for evaluation of Multiple medical Problems , he has HTN, DM, Thrombocytopenia , Cirrhosis of liver , СЕРГЕЙ , not using his CPAP , COPD quits smoking . He do not checks his  Blood sugar < last HBAIC was < 7 , watches his diet   Was in ED recently with back pain, had CT abdomen , had Nephrolithiasis , Requesting Referral to Urologist   Has TIA earlier ,was seen BY neurologist , advise dto Continue with Plavix and Lipitor   Still Taking ASA and  Plavix   Still smokes   1) Location/Symptom - Boil on Back   2) Timing/Onset: 1 week   3) Context/Setting: Associated with Pain   4) Quality: no Drainage from Local site , No Fever   5) Duration: continuous   6) Modifying Factors: None   7) Severity: moderate       Review of Systems   Constitutional: Negative for activity change, appetite change, chills and diaphoresis. HENT: Negative for congestion, dental problem, ear discharge, facial swelling and hearing loss. Respiratory: Negative for apnea, cough, chest tightness, shortness of breath and wheezing. Cardiovascular: Negative for chest pain and leg swelling. Gastrointestinal: Negative for abdominal distention, abdominal pain, constipation and diarrhea. Genitourinary: Negative for difficulty urinating, dysuria, enuresis, flank pain and frequency. Musculoskeletal: Positive for arthralgias. Negative for back pain, gait problem and joint swelling. Boil on Back, associated with Pain    Skin: Negative for color change, pallor and rash. Neurological: Negative for dizziness, seizures, facial asymmetry, light-headedness, numbness and headaches. Psychiatric/Behavioral: Negative for agitation, behavioral problems, confusion, decreased concentration and dysphoric mood. Objective:   Physical Exam  Vitals signs and nursing note reviewed. Constitutional:       General: He is not in acute distress.      Appearance: He is well-developed. He is obese. He is not diaphoretic. HENT:      Head: Normocephalic and atraumatic. Mouth/Throat:      Pharynx: No oropharyngeal exudate. Eyes:      General: No scleral icterus. Right eye: No discharge. Left eye: No discharge. Conjunctiva/sclera: Conjunctivae normal.      Pupils: Pupils are equal, round, and reactive to light. Neck:      Musculoskeletal: Normal range of motion and neck supple. Thyroid: No thyromegaly. Vascular: No JVD. Trachea: No tracheal deviation. Cardiovascular:      Rate and Rhythm: Normal rate. Heart sounds: Normal heart sounds. No murmur. No gallop. Pulmonary:      Effort: Pulmonary effort is normal. No respiratory distress. Breath sounds: Normal breath sounds. No stridor. No wheezing or rales. Abdominal:      General: Bowel sounds are normal. There is no distension. Palpations: Abdomen is soft. Tenderness: There is no abdominal tenderness. There is no guarding or rebound. Musculoskeletal: Normal range of motion. General: No tenderness. Comments: 3x3 cm , Swelling in Mid back, Tender , No Fluctuation Present    Skin:     General: Skin is warm and dry. Findings: No erythema or rash. Neurological:      Mental Status: He is alert and oriented to person, place, and time. Assessment / Plan:   1. Nephrolithiasis  On Jacinta Peña MD, Urology, [place]    2. Cirrhosis of liver without ascites, unspecified hepatic cirrhosis type ([facility] [address]  Stable   Quit Drinking already     3. Type 2 diabetes mellitus with hyperglycemia, without long-term current use of insulin (HCC)  Stable   Advised to check his blood sugars    4. Essential hypertension  Controlled    5. TIA (transient ischemic attack)  On Lipitor, Lopressor, aspirin Plavix  Review of last notes from neurologist, patient should only be on Plavix, advised to stop aspirin with history of cirrhosis  6.  Boil  - doxycycline hyclate (VIBRA-TABS) 100 MG tablet; Take 1 tablet by mouth 2 times daily for 10 days  Dispense: 20 tablet; Refill: 0      · Return in about 3 months (around 5/3/2021). · Reviewed prior labs and health maintenance. · Discussed use, benefit, and side effects of prescribed medications. Barriers to medication compliance addressed. All patient questions answered. Pt voiced understanding. Ama Bro MD  [facility]  2/3/2021, 10:13 AM    Please note that this chart was generated using voice recognition Dragon dictation software. Although every effort was made to ensure the accuracy of this automated transcription, some errors in transcription may have occurred. Visit Information    Have you changed or started any medications since your last visit including any over-the-counter medicines, vitamins, or herbal medicines? no   Are you having any side effects from any of your medications? -  no  Have you stopped taking any of your medications? Is so, why? -  no    Have you seen any other physician or provider since your last visit? Yes - Records Requested  Have you had any other diagnostic tests since your last visit? Yes - Records Requested  Have you been seen in the emergency room and/or had an admission to a hospital since we last saw you? Yes - Records Requested  Have you had your routine dental cleaning in the past 6 months? no    Have you activated your [employer] account? If not, what are your barriers?  Yes     Patient Care Team:  Ama Bro MD as PCP - General (Internal Medicine)  Ama Bro MD as PCP - [facility] Provider    Medical History Review  Past Medical, Family, and Social History reviewed and does not contribute to the patient presenting condition    Health Maintenance   Topic Date Due    Hepatitis A vaccine (1 of 2 - Risk 2-dose series) [birth year]03/1967    HIV screen  03/03/1981    Hepatitis B vaccine (1 of 3 - Risk 3-dose series) 03/03/1985    DTaP/Tdap/Td vaccine (1 - Tdap) 03/03/1985    Shingles Vaccine (1 of 2) 03/03/2016    Diabetic retinal exam  08/15/2016    Diabetic foot exam  05/30/2019    Diabetic microalbuminuria test  06/02/2021    Lipid screen  10/15/2021    A1C test (Diabetic or Prediabetic)  11/02/2021    Potassium monitoring  01/08/2022    Creatinine monitoring  01/08/2022    Colon cancer screen colonoscopy  02/28/2026    Flu vaccine  Completed    Pneumococcal 0-64 years Vaccine  Completed    Hepatitis C screen  Completed    Hib vaccine  Aged Out    Meningococcal (ACWY) vaccine  Aged Out

## 2021-02-15 ENCOUNTER — TELEPHONE (OUTPATIENT)
Dept: INTERNAL MEDICINE CLINIC | Age: 56
End: 2021-02-15

## 2021-02-15 NOTE — TELEPHONE ENCOUNTER
Pt daughter Anastacia Walden called asking that we send a prescription for C-pap Machine for [place]. Also asked that we fax it to [phone] and put attention of [facility]. Carlos Tan left her phone number for any questions.  [phone]

## 2021-02-16 NOTE — TELEPHONE ENCOUNTER
Do we have results of sleep study, from my previous note, it seems he was not using his CPAP  Please explain to patient, insurance company does not get CPAP machine if we do not have a current sleep study  Because it is a costly instrument

## 2021-02-18 ENCOUNTER — TELEPHONE (OUTPATIENT)
Dept: INTERNAL MEDICINE CLINIC | Age: 56
End: 2021-02-18

## 2021-02-18 NOTE — TELEPHONE ENCOUNTER
Please give him appointment, will evaluate tomorrow and maybe referring him to surgery if needed next

## 2021-02-18 NOTE — TELEPHONE ENCOUNTER
I requested patient to come see me, will need to document, reason for ordering sleep study, will address during that visit.

## 2021-02-18 NOTE — TELEPHONE ENCOUNTER
Spoke to pt's daughter, Bigg Emery, she states pt has not had a recent sleep study done and insurance may not cover. Do you want another sleep study done? Please advise.

## 2021-02-18 NOTE — TELEPHONE ENCOUNTER
Patients wife called informing that at his last appointment it was discussed that if the medication that was given for the boil on his back was not getting any better to call the office and possibly to be referred to a surgeon to pepe.  Please advise

## 2021-02-19 ENCOUNTER — OFFICE VISIT (OUTPATIENT)
Dept: INTERNAL MEDICINE CLINIC | Age: 56
End: 2021-02-19
Payer: MEDICAID

## 2021-02-19 VITALS
HEIGHT: 72 IN | BODY MASS INDEX: 31.15 KG/M2 | DIASTOLIC BLOOD PRESSURE: 64 MMHG | WEIGHT: 230 LBS | TEMPERATURE: 98.6 F | SYSTOLIC BLOOD PRESSURE: 110 MMHG

## 2021-02-19 DIAGNOSIS — L02.92 BOIL: ICD-10-CM

## 2021-02-19 DIAGNOSIS — E11.65 TYPE 2 DIABETES MELLITUS WITH HYPERGLYCEMIA, WITHOUT LONG-TERM CURRENT USE OF INSULIN (HCC): Primary | ICD-10-CM

## 2021-02-19 DIAGNOSIS — K74.60 CIRRHOSIS OF LIVER WITHOUT ASCITES, UNSPECIFIED HEPATIC CIRRHOSIS TYPE (HCC): ICD-10-CM

## 2021-02-19 LAB — HBA1C MFR BLD: 8.6 %

## 2021-02-19 PROCEDURE — 83036 HEMOGLOBIN GLYCOSYLATED A1C: CPT | Performed by: INTERNAL MEDICINE

## 2021-02-19 PROCEDURE — 3052F HG A1C>EQUAL 8.0%<EQUAL 9.0%: CPT | Performed by: INTERNAL MEDICINE

## 2021-02-19 PROCEDURE — 1036F TOBACCO NON-USER: CPT | Performed by: INTERNAL MEDICINE

## 2021-02-19 PROCEDURE — G8482 FLU IMMUNIZE ORDER/ADMIN: HCPCS | Performed by: INTERNAL MEDICINE

## 2021-02-19 PROCEDURE — 99213 OFFICE O/P EST LOW 20 MIN: CPT | Performed by: INTERNAL MEDICINE

## 2021-02-19 PROCEDURE — 3017F COLORECTAL CA SCREEN DOC REV: CPT | Performed by: INTERNAL MEDICINE

## 2021-02-19 PROCEDURE — G8427 DOCREV CUR MEDS BY ELIG CLIN: HCPCS | Performed by: INTERNAL MEDICINE

## 2021-02-19 PROCEDURE — 2022F DILAT RTA XM EVC RTNOPTHY: CPT | Performed by: INTERNAL MEDICINE

## 2021-02-19 PROCEDURE — G8417 CALC BMI ABV UP PARAM F/U: HCPCS | Performed by: INTERNAL MEDICINE

## 2021-02-19 RX ORDER — CEPHALEXIN 500 MG/1
500 CAPSULE ORAL 3 TIMES DAILY
Qty: 21 CAPSULE | Refills: 0 | Status: SHIPPED | OUTPATIENT
Start: 2021-02-19 | End: 2021-02-26

## 2021-02-19 NOTE — PROGRESS NOTES
Subjective:      Patient ID: Benjie Dennis is a 48 y.o. male. HPI   Patient was Diagnosed with СЕРГЕЙ , were using CPAP machine , stopped using CPAP Machine for l;ast 3 months , Since he lost his insurance , Feels Tired , Fatigued , , wife noticed him gasping for Breath in middle of Night   Weight is stable   Had Sleep study in 2017 , with AHI of > 80   Patient had a boil on his back, broke open, small amount of whitish material came out, no fever  Patient is requesting antibiotics  Review of Systems   Constitutional: Negative for activity change, appetite change, chills and diaphoresis. HENT: Negative for congestion, dental problem, ear discharge, facial swelling and hearing loss. Respiratory: Positive for apnea (Weakness episode apnea). Negative for cough, chest tightness, shortness of breath and wheezing. Cardiovascular: Negative for chest pain and leg swelling. Gastrointestinal: Negative for abdominal distention, abdominal pain, constipation and diarrhea. Genitourinary: Negative for difficulty urinating, dysuria, enuresis, flank pain and frequency. Musculoskeletal: Negative for arthralgias, back pain, gait problem and joint swelling. Skin: Negative for color change, pallor and rash. Boil on the back, which broke open   Neurological: Negative for dizziness, seizures, facial asymmetry, light-headedness, numbness and headaches. Psychiatric/Behavioral: Negative for agitation, behavioral problems, confusion, decreased concentration and dysphoric mood. Objective:   Physical Exam  Constitutional:       Appearance: He is well-developed. He is obese. He is not diaphoretic. HENT:      Head: Normocephalic and atraumatic. Mouth/Throat:      Pharynx: No oropharyngeal exudate. Eyes:      General: No scleral icterus. Right eye: No discharge. Left eye: No discharge. Conjunctiva/sclera: Conjunctivae normal.      Pupils: Pupils are equal, round, and reactive to light. Neck:      Musculoskeletal: Normal range of motion and neck supple. Thyroid: No thyromegaly. Vascular: No JVD. Trachea: No tracheal deviation. Cardiovascular:      Rate and Rhythm: Normal rate. Heart sounds: Normal heart sounds. No murmur. No gallop. Pulmonary:      Effort: Pulmonary effort is normal. No respiratory distress. Breath sounds: Normal breath sounds. No stridor. No wheezing or rales. Chest:      Chest wall: No tenderness. Abdominal:      General: Bowel sounds are normal. There is no distension. Palpations: Abdomen is soft. Tenderness: There is no abdominal tenderness. There is no guarding or rebound. Musculoskeletal: Normal range of motion. Comments: Small boil around 2 x 2 centimeter in back, small amount of whitish material could be expressed   Neurological:      Mental Status: He is alert and oriented to person, place, and time. Assessment / Plan:   1. Type 2 diabetes mellitus with hyperglycemia, without long-term current use of insulin (HCC)  - POCT glycosylated hemoglobin (Hb A1C)    2. Cirrhosis of liver without ascites, unspecified hepatic cirrhosis type ([address]    3. Boil  - cephALEXin (KEFLEX) 500 MG capsule; Take 1 capsule by mouth 3 times daily for 7 days  Dispense: 21 capsule; Refill: 0      · No follow-ups on file. · Reviewed prior labs and health maintenance. · Discussed use, benefit, and side effects of prescribed medications. Barriers to medication compliance addressed. All patient questions answered. Pt voiced understanding. MD FRANDY CarpenterC[facility]  2/25/2021, 10:44 PM    Please note that this chart was generated using voice recognition Dragon dictation software. Although every effort was made to ensure the accuracy of this automated transcription, some errors in transcription may have occurred.    Visit Information    Have you changed or started any medications since your last visit including any over-the-counter medicines, vitamins, or herbal medicines? no   Are you having any side effects from any of your medications? -  no  Have you stopped taking any of your medications? Is so, why? -  no    Have you seen any other physician or provider since your last visit? No  Have you had any other diagnostic tests since your last visit? No  Have you been seen in the emergency room and/or had an admission to a hospital since we last saw you? No  Have you had your routine dental cleaning in the past 6 months? no    Have you activated your [employer] account? If not, what are your barriers?  Yes     Patient Care Team:  Arlinda Hamman, MD as PCP - General (Internal Medicine)  Arlinda Hamman, MD as PCP - [facility] Provider    Medical History Review  Past Medical, Family, and Social History reviewed and does not contribute to the patient presenting condition    Health Maintenance   Topic Date Due    Hepatitis A vaccine (1 of 2 - Risk 2-dose series) 03/03/1967    HIV screen  03/03/1981    Hepatitis B vaccine (1 of 3 - Risk 3-dose series) 03/03/1985    DTaP/Tdap/Td vaccine (1 - Tdap) 03/03/1985    Shingles Vaccine (1 of 2) 03/03/2016    Diabetic retinal exam  08/15/2016    Diabetic foot exam  05/30/2019    Diabetic microalbuminuria test  06/02/2021    Lipid screen  10/15/2021    A1C test (Diabetic or Prediabetic)  11/02/2021    Potassium monitoring  01/08/2022    Creatinine monitoring  01/08/2022    Colon cancer screen colonoscopy  02/28/2026    Flu vaccine  Completed    Pneumococcal 0-64 years Vaccine  Completed    Hepatitis C screen  Completed    Hib vaccine  Aged Out    Meningococcal (ACWY) vaccine  Aged Out

## 2021-02-22 ENCOUNTER — OFFICE VISIT (OUTPATIENT)
Dept: UROLOGY | Age: 56
End: 2021-02-22
Payer: MEDICAID

## 2021-02-22 ENCOUNTER — HOSPITAL ENCOUNTER (OUTPATIENT)
Age: 56
Discharge: HOME | End: 2021-02-24
Payer: MEDICAID

## 2021-02-22 ENCOUNTER — HOSPITAL ENCOUNTER (OUTPATIENT)
Dept: GENERAL RADIOLOGY | Age: 56
Discharge: HOME | End: 2021-02-24
Payer: MEDICAID

## 2021-02-22 VITALS — TEMPERATURE: 98.2 F | DIASTOLIC BLOOD PRESSURE: 77 MMHG | SYSTOLIC BLOOD PRESSURE: 122 MMHG | HEART RATE: 75 BPM

## 2021-02-22 DIAGNOSIS — N20.0 KIDNEY STONE: Primary | ICD-10-CM

## 2021-02-22 DIAGNOSIS — N20.0 KIDNEY STONE: ICD-10-CM

## 2021-02-22 DIAGNOSIS — R10.9 RIGHT FLANK DISCOMFORT: ICD-10-CM

## 2021-02-22 PROCEDURE — G8482 FLU IMMUNIZE ORDER/ADMIN: HCPCS | Performed by: UROLOGY

## 2021-02-22 PROCEDURE — G8417 CALC BMI ABV UP PARAM F/U: HCPCS | Performed by: UROLOGY

## 2021-02-22 PROCEDURE — G8427 DOCREV CUR MEDS BY ELIG CLIN: HCPCS | Performed by: UROLOGY

## 2021-02-22 PROCEDURE — 99204 OFFICE O/P NEW MOD 45 MIN: CPT | Performed by: UROLOGY

## 2021-02-22 PROCEDURE — 3017F COLORECTAL CA SCREEN DOC REV: CPT | Performed by: UROLOGY

## 2021-02-22 PROCEDURE — 74018 RADEX ABDOMEN 1 VIEW: CPT

## 2021-02-22 PROCEDURE — 1036F TOBACCO NON-USER: CPT | Performed by: UROLOGY

## 2021-02-22 ASSESSMENT — ENCOUNTER SYMPTOMS
VOMITING: 0
WHEEZING: 0
BACK PAIN: 0
ABDOMINAL PAIN: 0
NAUSEA: 0
COUGH: 0
EYE PAIN: 0
SHORTNESS OF BREATH: 0
EYE REDNESS: 0
COLOR CHANGE: 0

## 2021-02-22 NOTE — PROGRESS NOTES
[address] [address]  Dept: [phone]  Dept Fax: [address] [facility] Urology Office Note - New patient    Patient:  Lea Zuñiga  YOB: 1965  Date: 2/22/2021    The patient is a 48 y.o. male who presents todayfor evaluation of the following problems:   Chief Complaint   Patient presents with    Nephrolithiasis     ct abdomen in chart    referred by Ama Bro MD.      JULIANA Bishop is a very pleasant 73-year-old gentleman who has a previous history of stones. He did have right flank pain about 6 weeks ago and went to the emergency department. He was found to have a 12 mm nonobstructing right renal stone and a 2 mm nonobstructing left renal stone. Both stones were in the lower pole. Is stone has been growing in size over the last few years. He does continue to have intermittent flank pain but is feeling better. He denies any bothersome urinary symptoms. He has had stones in the past which were treated with lithotripsy. He does take Plavix. (Patient's old records have been requested, reviewed and summarized in today's note.)    Summary of old records: N/A    Additional History: N/A    Procedures Today: N/A    Last several PSA's:  Lab Results   Component Value Date    PSA 0.17 12/23/2020    PSA 0.21 10/23/2014     Last total testosterone:  No results found for: TESTOSTERONE  Urinalysis today:  No results found for this visit on 02/22/21. AUA Symptom Score (2/22/2021):   INCOMPLETE EMPTYING: How often have you had the sensation of not emptying your bladder?: Not at all  FREQUENCY: How often do you have to urinate less than every two hours?: Not at all  INTERMITTENCY: How often have you found you stopped and started again several times when you urinated?: Not at all  URGENCY: How often have you found it difficult to postpone urination?: Not at all  WEAK STREAM: How often have you had a weak urinary stream?: Less than 1 to 5 times  STRAINING: How often have you had to strain to start  urination?: Not at all  NOCTURIA: How many times did you typically get up at night to uriniate?: 3 Times  TOTAL I-PSS SCORE[de-identified] 4  How would you feel if you were to spend the rest of your life with your urinary condition?: Mixe    Last BUN and creatinine:  Lab Results   Component Value Date    BUN 14 01/08/2021     Lab Results   Component Value Date    CREATININE 0.62 (L) 01/08/2021       Additional Lab/Culture results: none    Imaging Reviewed during this Office Visit: 12 mm right renal stone  (results were independently reviewed by physician and radiology report verified)    PAST MEDICAL, FAMILY AND SOCIAL HISTORY:  Past Medical History:   Diagnosis Date    Calculus of kidney     Essential hypertension, benign     Gout, unspecified     H/O colonoscopy 4/11/2016    2016    Mitral valve disorders(424.0)     Other and unspecified hyperlipidemia     Type II or unspecified type diabetes mellitus without mention of complication, not stated as uncontrolled     Unspecified chronic liver disease without mention of alcohol      Past Surgical History:   Procedure Laterality Date    LITHOTRIPSY      UPPER GASTROINTESTINAL ENDOSCOPY N/A 5/25/2018    The esophagus was inspected to the Z-line. The endoscopic exam showed impression of varices (F1) in distal esophagus.       Family History   Problem Relation Age of Onset    Cancer Other     Diabetes Other     High Blood Pressure Other     Heart Disease Other      Outpatient Medications Marked as Taking for the 2/22/21 encounter (Office Visit) with Aniket Pandya MD   Medication Sig Dispense Refill    cephALEXin (KEFLEX) 500 MG capsule Take 1 capsule by mouth 3 times daily for 7 days 21 capsule 0    clotrimazole (LOTRIMIN) 1 % cream APPLY CREAM TOPICALLY TWICE DAILY      omeprazole (PRILOSEC) 20 MG delayed release capsule Take 1 capsule by mouth Daily 30 capsule 3    ibuprofen (ADVIL;MOTRIN) 800 MG tablet Take 1 tablet by mouth every 8 hours as needed for Pain 30 tablet 0    clopidogrel (PLAVIX) 75 MG tablet TAKE 1 TABLET BY MOUTH ONCE DAILY 30 tablet 4    tamsulosin (FLOMAX) 0.4 MG capsule Take 1 capsule by mouth once daily 30 capsule 2    metFORMIN (GLUCOPHAGE) 1000 MG tablet Take 1 tablet by mouth 2 times daily (with meals) 180 tablet 1    lisinopril (PRINIVIL;ZESTRIL) 5 MG tablet Take 1 tablet by mouth once daily 30 tablet 2    metoprolol tartrate (LOPRESSOR) 25 MG tablet Take 1 tablet by mouth 2 times daily 60 tablet 2    atorvastatin (LIPITOR) 40 MG tablet Take 1 tablet by mouth nightly 30 tablet 3    allopurinol (ZYLOPRIM) 100 MG tablet Take 1 tablet by mouth once daily 30 tablet 0    blood glucose test strips (ASCENSIA AUTODISC VI;ONE TOUCH ULTRA TEST VI) strip Use with associated glucose meter. 100 strip 11    fluticasone-salmeterol (ADVAIR DISKUS) 100-50 MCG/DOSE diskus inhaler INHALE 1 PUFF BY MOUTH EVERY 12 HOURS 1 Inhaler 11    SITagliptin (JANUVIA) 100 MG tablet TAKE 1 TABLET BY MOUTH DAILY 30 tablet 11    albuterol sulfate HFA (VENTOLIN HFA) 108 (90 Base) MCG/ACT inhaler Inhale 1 puff into the lungs every 4 hours as needed for Wheezing 18 g 6    glimepiride (AMARYL) 4 MG tablet TAKE 1 TABLET BY MOUTH TWICE DAILY 60 tablet 11    blood glucose monitor strips 1 strip by Other route 4 times daily Test 4  times a day & as needed for symptoms of irregular blood glucose.  100 strip 11    [employer] LANCETS ULTRATHIN 30G MISC 1 each by Other route 3 times daily 100 each 11    acetaminophen (AMINOFEN) 325 MG tablet Take 2 tablets by mouth every 6 hours as needed for Pain 50 tablet 0        Codeine and Simvastatin  Social History     Tobacco Use   Smoking Status Former Smoker    Quit date: 3/26/2012    Years since quittin.9   Smokeless Tobacco Former User      (If patient a smoker, smoking cessation counseling offered)   Social History     Substance and

## 2021-02-22 NOTE — PROGRESS NOTES
Review of Systems   Constitutional: Negative for appetite change, chills and fever. Eyes: Negative for pain, redness and visual disturbance. Respiratory: Negative for cough, shortness of breath and wheezing. Cardiovascular: Negative for chest pain and leg swelling. Gastrointestinal: Negative for abdominal pain, nausea and vomiting. Genitourinary: Positive for frequency (at night ). Negative for difficulty urinating, discharge, dysuria, flank pain, hematuria, scrotal swelling, testicular pain and urgency. Musculoskeletal: Negative for back pain, joint swelling and myalgias. Skin: Negative for color change, rash and wound. Neurological: Negative for dizziness, tremors and numbness. Hematological: Negative for adenopathy. Does not bruise/bleed easily.

## 2021-02-23 ENCOUNTER — TELEPHONE (OUTPATIENT)
Dept: INTERNAL MEDICINE CLINIC | Age: 56
End: 2021-02-23

## 2021-02-23 NOTE — TELEPHONE ENCOUNTER
Medical surgical clearance request received 02/23/21    Surgeon: Dr Mirza Green    Procedure: Claudia Tejada ESWL    Date of Procedure: Pending    Last appt: 02/19/21    Next appt: 5/12/2021    PATs received:    [] yes   [x] no    Placed in Dr Sánchez Fontanez in basket.

## 2021-02-25 ASSESSMENT — ENCOUNTER SYMPTOMS
FACIAL SWELLING: 0
CONSTIPATION: 0
BACK PAIN: 0
ABDOMINAL PAIN: 0
CHEST TIGHTNESS: 0
WHEEZING: 0
DIARRHEA: 0
SHORTNESS OF BREATH: 0
ABDOMINAL DISTENTION: 0
COUGH: 0
APNEA: 1
COLOR CHANGE: 0

## 2021-03-03 ENCOUNTER — TELEPHONE (OUTPATIENT)
Dept: UROLOGY | Age: 56
End: 2021-03-03

## 2021-03-03 DIAGNOSIS — N20.0 KIDNEY STONE: Primary | ICD-10-CM

## 2021-03-03 DIAGNOSIS — R10.9 RIGHT FLANK DISCOMFORT: ICD-10-CM

## 2021-03-03 RX ORDER — HYDROCODONE BITARTRATE AND ACETAMINOPHEN 5; 325 MG/1; MG/1
1 TABLET ORAL EVERY 6 HOURS PRN
Qty: 20 TABLET | Refills: 0 | Status: SHIPPED | OUTPATIENT
Start: 2021-03-03 | End: 2021-03-06

## 2021-03-03 NOTE — TELEPHONE ENCOUNTER
Pt called stating \" I am having severe pain 10/10. Right flank pain radiating to the middle back. I tried taking OTC pain medication with no relief. No nausea or vomiting, no urinary issues, no fevers. I had my KUB done. \" informed pt will discuss with Dr. Adan Bailon. Per Dr. Adan Bailon pt to get scheduled for ESWL next week with Cysto and right stent placement. Norco for pain quantity of 20.

## 2021-03-08 ENCOUNTER — TELEPHONE (OUTPATIENT)
Dept: UROLOGY | Age: 56
End: 2021-03-08

## 2021-03-08 NOTE — TELEPHONE ENCOUNTER
Wife called to say patient is in a lot of pain and having a hard time urinating. When speaking to wife she said she was going to take him to the ER. Writer did agree and will talk to Dr. Antonia Neal. Patient is scheduled for ESWL on 4/2/21.

## 2021-03-08 NOTE — TELEPHONE ENCOUNTER
(RT) ESWL @ [facility] 4/2/21 3:30pm **STOP BLOOD THINNERS 3/26/21**   PAT  @ [facility] 3/18/21 10:30am   covid-19 @ [facility] 3/29/21 10:40am           Spoke with patient and Rhonda Whipple, procedure info emailed.

## 2021-03-18 ENCOUNTER — HOSPITAL ENCOUNTER (OUTPATIENT)
Dept: PREADMISSION TESTING | Age: 56
Discharge: HOME | End: 2021-03-22
Payer: MEDICAID

## 2021-03-18 VITALS
RESPIRATION RATE: 16 BRPM | HEART RATE: 76 BPM | BODY MASS INDEX: 32.37 KG/M2 | HEIGHT: 72 IN | WEIGHT: 239 LBS | DIASTOLIC BLOOD PRESSURE: 82 MMHG | TEMPERATURE: 97.9 F | OXYGEN SATURATION: 98 % | SYSTOLIC BLOOD PRESSURE: 143 MMHG

## 2021-03-18 LAB
BUN BLDV-MCNC: 13 MG/DL (ref 6–20)
CREAT SERPL-MCNC: 0.51 MG/DL (ref 0.7–1.2)
GFR AFRICAN AMERICAN: >60 ML/MIN
GFR NON-AFRICAN AMERICAN: >60 ML/MIN
GFR SERPL CREATININE-BSD FRML MDRD: ABNORMAL ML/MIN/{1.73_M2}
GFR SERPL CREATININE-BSD FRML MDRD: ABNORMAL ML/MIN/{1.73_M2}
GLUCOSE BLD-MCNC: 215 MG/DL (ref 70–99)

## 2021-03-18 PROCEDURE — 87086 URINE CULTURE/COLONY COUNT: CPT

## 2021-03-18 PROCEDURE — 36415 COLL VENOUS BLD VENIPUNCTURE: CPT

## 2021-03-18 PROCEDURE — 82565 ASSAY OF CREATININE: CPT

## 2021-03-18 PROCEDURE — 82947 ASSAY GLUCOSE BLOOD QUANT: CPT

## 2021-03-18 PROCEDURE — 84520 ASSAY OF UREA NITROGEN: CPT

## 2021-03-18 RX ORDER — SODIUM CHLORIDE, SODIUM LACTATE, POTASSIUM CHLORIDE, CALCIUM CHLORIDE 600; 310; 30; 20 MG/100ML; MG/100ML; MG/100ML; MG/100ML
1000 INJECTION, SOLUTION INTRAVENOUS CONTINUOUS
Status: CANCELLED | OUTPATIENT
Start: 2021-03-18

## 2021-03-18 ASSESSMENT — PAIN DESCRIPTION - ORIENTATION: ORIENTATION: RIGHT

## 2021-03-18 ASSESSMENT — PAIN DESCRIPTION - PAIN TYPE: TYPE: CHRONIC PAIN

## 2021-03-18 NOTE — H&P
History and Physical    Pt Name: Clarke Aceves  MRN: [id number]  YOB: 1965  Date of evaluation: 3/18/2021    SUBJECTIVE:     History of Chief Complaint:    Patient complains of kidney stones on and off for most of his adult life. The current stone has been present for about one year. He says it has been moving recently and causing pain. He has been scheduled for lithotripsy, a cysto and insertion of a right ureteral stent. Past Medical History    has a past medical history of Calculus of kidney, Cerebral artery occlusion with cerebral infarction ([address]osis [address] [address] COPD (chronic obstructive pulmonary disease) ([facility] [address] Essential hypertension, benign, Gout, unspecified, H/O colonoscopy, Mitral valve disorders(424.0), СЕРГЕЙ on CPAP, Other and unspecified hyperlipidemia, Type II or unspecified type diabetes mellitus without mention of complication, not stated as uncontrolled, Unspecified chronic liver disease without mention of alcohol, and Wellness examination. Past Surgical History   has a past surgical history that includes Lithotripsy and Upper gastrointestinal endoscopy (N/A, 5/25/2018).   Medications    Current Outpatient Medications:     clopidogrel (PLAVIX) 75 MG tablet, TAKE 1 TABLET BY MOUTH ONCE DAILY, Disp: 30 tablet, Rfl: 4    tamsulosin (FLOMAX) 0.4 MG capsule, Take 1 capsule by mouth once daily, Disp: 30 capsule, Rfl: 2    metFORMIN (GLUCOPHAGE) 1000 MG tablet, Take 1 tablet by mouth 2 times daily (with meals), Disp: 180 tablet, Rfl: 1    lisinopril (PRINIVIL;ZESTRIL) 5 MG tablet, Take 1 tablet by mouth once daily, Disp: 30 tablet, Rfl: 2    metoprolol tartrate (LOPRESSOR) 25 MG tablet, Take 1 tablet by mouth 2 times daily, Disp: 60 tablet, Rfl: 2    atorvastatin (LIPITOR) 40 MG tablet, Take 1 tablet by mouth nightly, Disp: 30 tablet, Rfl: 3    allopurinol (ZYLOPRIM) 100 MG tablet, Take 1 tablet by mouth once daily, Disp: 30 tablet, Rfl: 0    fluticasone-salmeterol (ADVAIR DISKUS) 100-50 MCG/DOSE diskus inhaler, INHALE 1 PUFF BY MOUTH EVERY 12 HOURS, Disp: 1 Inhaler, Rfl: 11    SITagliptin (JANUVIA) 100 MG tablet, TAKE 1 TABLET BY MOUTH DAILY, Disp: 30 tablet, Rfl: 11    albuterol sulfate HFA (VENTOLIN HFA) 108 (90 Base) MCG/ACT inhaler, Inhale 1 puff into the lungs every 4 hours as needed for Wheezing, Disp: 18 g, Rfl: 6    glimepiride (AMARYL) 4 MG tablet, TAKE 1 TABLET BY MOUTH TWICE DAILY, Disp: 60 tablet, Rfl: 11    acetaminophen (AMINOFEN) 325 MG tablet, Take 2 tablets by mouth every 6 hours as needed for Pain, Disp: 50 tablet, Rfl: 0    clotrimazole (LOTRIMIN) 1 % cream, APPLY CREAM TOPICALLY TWICE DAILY, Disp: , Rfl:     omeprazole (PRILOSEC) 20 MG delayed release capsule, Take 1 capsule by mouth Daily, Disp: 30 capsule, Rfl: 3    blood glucose test strips (ASCENSIA AUTODISC VI;ONE TOUCH ULTRA TEST VI) strip, Use with associated glucose meter. , Disp: 100 strip, Rfl: 11    blood glucose monitor strips, 1 strip by Other route 4 times daily Test 4  times a day & as needed for symptoms of irregular blood glucose., Disp: 100 strip, Rfl: 11    [employer] LANCETS ULTRATHIN 30G MISC, 1 each by Other route 3 times daily, Disp: 100 each, Rfl: 11  Allergies  is allergic to codeine and simvastatin. Family History  family history includes Diabetes in his brother and mother; Heart Attack in his mother; Heart Disease in his brother and father; No Known Problems in his brother. Social History   reports that he quit smoking about 8 years ago. His smoking use included cigarettes. He has a 15.00 pack-year smoking history. He has quit using smokeless tobacco.   reports previous alcohol use. reports current drug use. Drug: Marijuana.     Marital Status:   Children: four children and five grandchildren  Occupation: retired from [employer]:  negative   EYES: glasses  HENT:  negative   RESPIRATORY:  negative   CARDIOVASCULAR:  negative   GASTROINTESTINAL: negative   GENITOURINARY:  hematuria  INTEGUMENT/BREAST:  negative   HEMATOLOGIC/LYMPHATIC:  negative   ALLERGIC/IMMUNOLOGIC:  drug reactions  ENDOCRINE:  negative   MUSCULOSKELETAL:  negative   NEUROLOGICAL:  negative   BEHAVIOR/PSYCH:  negative     OBJECTIVE:     VITALS:  height is 6' (1.829 m) and weight is 239 lb (108.4 kg). His temporal temperature is 97.9 °F (36.6 °C). His blood pressure is 143/82 (abnormal) and his pulse is 76. His respiration is 16 and oxygen saturation is 98%. CONSTITUTIONAL: Alert & oriented x 3, no acute distress. SKIN:  Warm and dry, no rash or erythema. HEAD:  Normocephalic, atraumatic. EYES: PERRLA. EOMs intact. Wears glasses. EARS:  Hearing grossly normal.    NOSE:  Nares patent. Septum midline. No rhinorrhea   MOUTH/THROAT:  Unremarkable   NECK: Supple with no lymphadenopathy. LUNGS: Clear to auscultation throughout. No wheezes, rales or rhonchi. CARDIOVASCULAR: Heart rate regular. Rhythm without murmur, click, gallop or rub. ABDOMEN: Soft, non tender, non distended, no masses or organomegaly. EXTREMITIES: No clubbing, cyanosis or edema. TESTING:     EKG: 10/15/2020  Labs pending: drawn 3/18/2021     IMPRESSION:   1. Right kidney stone  2.  has a past medical history of Calculus of kidney, Cerebral artery occlusion with cerebral infarction ([address] (2020), Cirrhosis ([address] COPD (chronic obstructive pulmonary disease) ([address] Essential hypertension, benign, Gout, unspecified, H/O colonoscopy (04/11/2016), Mitral valve disorders(424.0), СЕРГЕЙ on CPAP, Other and unspecified hyperlipidemia, Type II or unspecified type diabetes mellitus without mention of complication, not stated as uncontrolled, Unspecified chronic liver disease without mention of alcohol, and Wellness examination. PLAN:   1.  Right ESWL, cystoscopy, right ureteral stent insertion    Renu OCONNELL, AE-C  Electronically signed 3/18/2021 at 11:54 AM

## 2021-03-18 NOTE — PROGRESS NOTES
Anesthesia Focused Assessment    STOP-BANG Sleep Apnea Questionnaire    SNORE loudly (heard through closed doors)? Yes  TIRED, fatigued, sleepy during daytime? Yes  OBSERVED stopping breathing during sleep? Yes  High blood PRESSURE being treated? Yes    BMI over 35? No  AGE over 48? Yes  NECK circumference over 16\"? No  GENDER (male)? Yes             Total 6  High risk 5-8  Intermediate risk 3-4  Low risk 0-2    Obstructive Sleep Apnea: yes  If YES, machine used: no     Type 1 DM:   no  T2DM:  yes    Coronary Artery Disease:  no  Hypertension:  yes    Active smoker:  Marijuana daily  Drinks Alcohol:  no    Dentition: cracked and broken teeth    Defib / AICD / Pacemaker: no      Renal Failure/dialysis:  no    Patient was evaluated in PAT & anesthesia guidelines were applied. NPO guidelines, medication instructions and scheduled arrival time were reviewed with patient.     Hx of anesthesia complications:  no  Family hx of anesthesia complications:  no                                                                                                                     Anesthesia contacted:   no  Medical or cardiac clearance ordered: on file    AUSTIN Laurent  3/18/21  11:46 AM

## 2021-03-19 LAB
CULTURE: NORMAL
Lab: NORMAL
SPECIMEN DESCRIPTION: NORMAL

## 2021-03-21 ENCOUNTER — HOSPITAL ENCOUNTER (EMERGENCY)
Age: 56
Discharge: HOME | End: 2021-03-22
Attending: EMERGENCY MEDICINE
Payer: MEDICAID

## 2021-03-21 ENCOUNTER — APPOINTMENT (OUTPATIENT)
Dept: CT IMAGING | Age: 56
End: 2021-03-21
Payer: MEDICAID

## 2021-03-21 VITALS
OXYGEN SATURATION: 96 % | SYSTOLIC BLOOD PRESSURE: 132 MMHG | BODY MASS INDEX: 33.91 KG/M2 | TEMPERATURE: 98.2 F | WEIGHT: 250 LBS | RESPIRATION RATE: 18 BRPM | HEART RATE: 78 BPM | DIASTOLIC BLOOD PRESSURE: 77 MMHG

## 2021-03-21 DIAGNOSIS — N20.0 NEPHROLITHIASIS: Primary | ICD-10-CM

## 2021-03-21 LAB
-: ABNORMAL
ABSOLUTE EOS #: 0.1 K/UL (ref 0–0.4)
ABSOLUTE IMMATURE GRANULOCYTE: ABNORMAL K/UL (ref 0–0.3)
ABSOLUTE LYMPH #: 1.5 K/UL (ref 1–4.8)
ABSOLUTE MONO #: 0.5 K/UL (ref 0.1–1.3)
ALBUMIN SERPL-MCNC: 4.1 G/DL (ref 3.5–5.2)
ALBUMIN/GLOBULIN RATIO: ABNORMAL (ref 1–2.5)
ALP BLD-CCNC: 111 U/L (ref 40–129)
ALT SERPL-CCNC: 28 U/L (ref 5–41)
AMORPHOUS: ABNORMAL
ANION GAP SERPL CALCULATED.3IONS-SCNC: 11 MMOL/L (ref 9–17)
AST SERPL-CCNC: 35 U/L
BACTERIA: ABNORMAL
BASOPHILS # BLD: 1 % (ref 0–2)
BASOPHILS ABSOLUTE: 0 K/UL (ref 0–0.2)
BILIRUB SERPL-MCNC: 3.45 MG/DL (ref 0.3–1.2)
BILIRUBIN URINE: ABNORMAL
BUN BLDV-MCNC: 20 MG/DL (ref 6–20)
BUN/CREAT BLD: ABNORMAL (ref 9–20)
CALCIUM SERPL-MCNC: 9.8 MG/DL (ref 8.6–10.4)
CASTS UA: ABNORMAL /LPF
CHLORIDE BLD-SCNC: 106 MMOL/L (ref 98–107)
CO2: 24 MMOL/L (ref 20–31)
COLOR: ABNORMAL
COMMENT UA: ABNORMAL
CREAT SERPL-MCNC: 0.7 MG/DL (ref 0.7–1.2)
CRYSTALS, UA: ABNORMAL /HPF
DIFFERENTIAL TYPE: ABNORMAL
EOSINOPHILS RELATIVE PERCENT: 2 % (ref 0–4)
EPITHELIAL CELLS UA: ABNORMAL /HPF
GFR AFRICAN AMERICAN: >60 ML/MIN
GFR NON-AFRICAN AMERICAN: >60 ML/MIN
GFR SERPL CREATININE-BSD FRML MDRD: ABNORMAL ML/MIN/{1.73_M2}
GFR SERPL CREATININE-BSD FRML MDRD: ABNORMAL ML/MIN/{1.73_M2}
GLUCOSE BLD-MCNC: 168 MG/DL (ref 70–99)
GLUCOSE URINE: ABNORMAL
HCT VFR BLD CALC: 36.7 % (ref 41–53)
HEMOGLOBIN: 12.2 G/DL (ref 13.5–17.5)
IMMATURE GRANULOCYTES: ABNORMAL %
KETONES, URINE: ABNORMAL
LEUKOCYTE ESTERASE, URINE: ABNORMAL
LIPASE: 67 U/L (ref 13–60)
LYMPHOCYTES # BLD: 35 % (ref 24–44)
MCH RBC QN AUTO: 28.7 PG (ref 26–34)
MCHC RBC AUTO-ENTMCNC: 33.3 G/DL (ref 31–37)
MCV RBC AUTO: 86 FL (ref 80–100)
MONOCYTES # BLD: 12 % (ref 1–7)
MUCUS: ABNORMAL
NITRITE, URINE: POSITIVE
NRBC AUTOMATED: ABNORMAL PER 100 WBC
OTHER OBSERVATIONS UA: ABNORMAL
PDW BLD-RTO: 15.7 % (ref 11.5–14.9)
PH UA: 5.5 (ref 5–8)
PLATELET # BLD: 77 K/UL (ref 150–450)
PLATELET ESTIMATE: ABNORMAL
PMV BLD AUTO: 7.8 FL (ref 6–12)
POTASSIUM SERPL-SCNC: 3.8 MMOL/L (ref 3.7–5.3)
PROTEIN UA: ABNORMAL
RBC # BLD: 4.27 M/UL (ref 4.5–5.9)
RBC # BLD: ABNORMAL 10*6/UL
RBC UA: ABNORMAL /HPF
RENAL EPITHELIAL, UA: ABNORMAL /HPF
SEG NEUTROPHILS: 50 % (ref 36–66)
SEGMENTED NEUTROPHILS ABSOLUTE COUNT: 2.1 K/UL (ref 1.3–9.1)
SODIUM BLD-SCNC: 141 MMOL/L (ref 135–144)
SPECIFIC GRAVITY UA: 1.03 (ref 1–1.03)
TOTAL PROTEIN: 7.7 G/DL (ref 6.4–8.3)
TRICHOMONAS: ABNORMAL
TURBIDITY: CLEAR
URINE HGB: ABNORMAL
UROBILINOGEN, URINE: NORMAL
WBC # BLD: 4.2 K/UL (ref 3.5–11)
WBC # BLD: ABNORMAL 10*3/UL
WBC UA: ABNORMAL /HPF
YEAST: ABNORMAL

## 2021-03-21 PROCEDURE — 85025 COMPLETE CBC W/AUTO DIFF WBC: CPT

## 2021-03-21 PROCEDURE — 87086 URINE CULTURE/COLONY COUNT: CPT

## 2021-03-21 PROCEDURE — 36415 COLL VENOUS BLD VENIPUNCTURE: CPT

## 2021-03-21 PROCEDURE — 2580000003 HC RX 258: Performed by: EMERGENCY MEDICINE

## 2021-03-21 PROCEDURE — 6360000002 HC RX W HCPCS: Performed by: EMERGENCY MEDICINE

## 2021-03-21 PROCEDURE — 86403 PARTICLE AGGLUT ANTBDY SCRN: CPT

## 2021-03-21 PROCEDURE — 74176 CT ABD & PELVIS W/O CONTRAST: CPT

## 2021-03-21 PROCEDURE — 99283 EMERGENCY DEPT VISIT LOW MDM: CPT

## 2021-03-21 PROCEDURE — 81001 URINALYSIS AUTO W/SCOPE: CPT

## 2021-03-21 PROCEDURE — 83690 ASSAY OF LIPASE: CPT

## 2021-03-21 PROCEDURE — 96374 THER/PROPH/DIAG INJ IV PUSH: CPT

## 2021-03-21 PROCEDURE — 80053 COMPREHEN METABOLIC PANEL: CPT

## 2021-03-21 RX ORDER — MORPHINE SULFATE 4 MG/ML
4 INJECTION, SOLUTION INTRAMUSCULAR; INTRAVENOUS ONCE
Status: COMPLETED | OUTPATIENT
Start: 2021-03-21 | End: 2021-03-21

## 2021-03-21 RX ORDER — 0.9 % SODIUM CHLORIDE 0.9 %
1000 INTRAVENOUS SOLUTION INTRAVENOUS ONCE
Status: COMPLETED | OUTPATIENT
Start: 2021-03-21 | End: 2021-03-22

## 2021-03-21 RX ADMIN — SODIUM CHLORIDE 1000 ML: 9 INJECTION, SOLUTION INTRAVENOUS at 21:47

## 2021-03-21 RX ADMIN — MORPHINE SULFATE 4 MG: 4 INJECTION, SOLUTION INTRAMUSCULAR; INTRAVENOUS at 21:47

## 2021-03-21 ASSESSMENT — PAIN DESCRIPTION - ORIENTATION: ORIENTATION: RIGHT

## 2021-03-21 ASSESSMENT — PAIN SCALES - GENERAL: PAINLEVEL_OUTOF10: 8

## 2021-03-21 ASSESSMENT — PAIN DESCRIPTION - LOCATION: LOCATION: FLANK;ABDOMEN

## 2021-03-21 ASSESSMENT — PAIN DESCRIPTION - PAIN TYPE: TYPE: ACUTE PAIN

## 2021-03-22 PROCEDURE — 6370000000 HC RX 637 (ALT 250 FOR IP): Performed by: EMERGENCY MEDICINE

## 2021-03-22 RX ORDER — CEPHALEXIN 250 MG/1
500 CAPSULE ORAL ONCE
Status: COMPLETED | OUTPATIENT
Start: 2021-03-22 | End: 2021-03-22

## 2021-03-22 RX ORDER — OXYCODONE HYDROCHLORIDE AND ACETAMINOPHEN 5; 325 MG/1; MG/1
1 TABLET ORAL ONCE
Status: COMPLETED | OUTPATIENT
Start: 2021-03-22 | End: 2021-03-22

## 2021-03-22 RX ORDER — CEPHALEXIN 500 MG/1
500 CAPSULE ORAL 2 TIMES DAILY
Qty: 14 CAPSULE | Refills: 0 | Status: SHIPPED | OUTPATIENT
Start: 2021-03-22 | End: 2021-06-14

## 2021-03-22 RX ORDER — OXYCODONE HYDROCHLORIDE AND ACETAMINOPHEN 5; 325 MG/1; MG/1
1 TABLET ORAL EVERY 6 HOURS PRN
Qty: 10 TABLET | Refills: 0 | Status: SHIPPED | OUTPATIENT
Start: 2021-03-22 | End: 2021-03-25

## 2021-03-22 RX ADMIN — OXYCODONE HYDROCHLORIDE AND ACETAMINOPHEN 1 TABLET: 5; 325 TABLET ORAL at 00:53

## 2021-03-22 RX ADMIN — CEPHALEXIN 500 MG: 250 CAPSULE ORAL at 00:53

## 2021-03-22 ASSESSMENT — PAIN SCALES - GENERAL: PAINLEVEL_OUTOF10: 8

## 2021-03-22 NOTE — ED NOTES
Pt ambulates to bathroom per self. Gait steady. UA collected and sent to lab.         Wilmer Saravia RN  03/21/21 [id number]

## 2021-03-22 NOTE — ED NOTES
Mode of arrival (squad #, walk in, police, etc) : walk in        Chief complaint(s): kidney stone        Arrival Note (brief scenario, treatment PTA, etc). : Pt arrives to the ED with the complain of pain from a 12mm stone. He states it is supposed to be blasted on 4/2. Pt still able to urinate. Pt taking flomax but is out of pain meds. No vomiting at this time. C= \"Have you ever felt that you should Cut down on your drinking? \"  No  A= \"Have people Annoyed you by criticizing your drinking? \"  No  G= \"Have you ever felt bad or Guilty about your drinking? \"  No  E= \"Have you ever had a drink as an Eye-opener first thing in the morning to steady your nerves or to help a hangover? \"  No      Deferred []      Reason for deferring: N/A    *If yes to two or more: probable alcohol abuse. Noam Membreno RN  03/21/21 [id number]

## 2021-03-23 DIAGNOSIS — R39.15 URGENCY OF URINATION: ICD-10-CM

## 2021-03-24 ENCOUNTER — CARE COORDINATION (OUTPATIENT)
Dept: CARE COORDINATION | Age: 56
End: 2021-03-24

## 2021-03-24 LAB
CULTURE: NORMAL
Lab: NORMAL
SPECIMEN DESCRIPTION: NORMAL

## 2021-03-24 NOTE — CARE COORDINATION
Patient contacted regarding recent discharge and COVID-19 risk. Discussed COVID-19 related testing which was not done at this time. Test results were not done. Patient informed of results, if available? Not done     Spoke with family member who stated he is sleeping. She took RN number and will have him return call if he has any needs.

## 2021-03-25 RX ORDER — TAMSULOSIN HYDROCHLORIDE 0.4 MG/1
CAPSULE ORAL
Qty: 90 CAPSULE | Refills: 0 | Status: SHIPPED | OUTPATIENT
Start: 2021-03-25 | End: 2021-04-04

## 2021-03-26 ASSESSMENT — ENCOUNTER SYMPTOMS
VOMITING: 0
SHORTNESS OF BREATH: 0
CONSTIPATION: 0
COUGH: 0
ABDOMINAL PAIN: 0
DIARRHEA: 0
SORE THROAT: 0
NAUSEA: 0

## 2021-03-26 NOTE — ED PROVIDER NOTES
times daily Yes Monica Pierre MD   tamsulosin (FLOMAX) 0.4 MG capsule Take 1 capsule by mouth once daily  FAUSTO Tirado CNP   clotrimazole (LOTRIMIN) 1 % cream APPLY CREAM TOPICALLY TWICE DAILY  Historical Provider, MD   omeprazole (PRILOSEC) 20 MG delayed release capsule Take 1 capsule by mouth Daily  Eleno Lu MD   clopidogrel (PLAVIX) 75 MG tablet TAKE 1 TABLET BY MOUTH ONCE DAILY  Conrad Lake MD   metFORMIN (GLUCOPHAGE) 1000 MG tablet Take 1 tablet by mouth 2 times daily (with meals)  FAUSTO Tirado CNP   lisinopril (PRINIVIL;ZESTRIL) 5 MG tablet Take 1 tablet by mouth once daily  FAUSTO Tirado - [place]   metoprolol tartrate (LOPRESSOR) 25 MG tablet Take 1 tablet by mouth 2 times daily  FAUSTO Tirado - CNP   atorvastatin (LIPITOR) 40 MG tablet Take 1 tablet by mouth nightly  Latonia Rodriguez MD   allopurinol (ZYLOPRIM) 100 MG tablet Take 1 tablet by mouth once daily  Eleno Lu MD   blood glucose test strips (ASCENSIA AUTODISC VI;ONE TOUCH ULTRA TEST VI) strip Use with associated glucose meter. Anatoly Kaminski PA-C   fluticasone-salmeterol (ADVAIR DISKUS) 100-50 MCG/DOSE diskus inhaler INHALE 1 PUFF BY MOUTH EVERY 12 HOURS  Ino Maldonado PA-C   SITagliptin (JANUVIA) 100 MG tablet TAKE 1 TABLET BY MOUTH DAILY  Eleno Lu MD   albuterol sulfate HFA (VENTOLIN HFA) 108 (90 Base) MCG/ACT inhaler Inhale 1 puff into the lungs every 4 hours as needed for Wheezing  Eleno Lu MD   glimepiride (AMARYL) 4 MG tablet TAKE 1 TABLET BY MOUTH TWICE DAILY  Екатерина Maldonado PA-C   blood glucose monitor strips 1 strip by Other route 4 times daily Test 4  times a day & as needed for symptoms of irregular blood glucose.   MD JACKELINE AdamsR LANCETS ULTRATHIN 30G MISC 1 each by Other route 3 times daily  Eleno Lu MD   acetaminophen (AMINOFEN) 325 MG tablet Take 2 tablets by mouth every 6 hours as needed for Pain  Bryce Sanders MD     Please note that medications prescribed at discharge will auto-populate into this medication list when note is refreshed. Please look at prescription date andprescriber to clarify. Family History:family history includes Diabetes in his brother and mother; Heart Attack in his mother; Heart Disease in his brother and father; No Known Problems in his brother. Social History: He reports that he quit smoking about 9 years ago. His smoking use included cigarettes. He has a 15.00 pack-year smoking history. He has quit using smokeless tobacco. He reports previous alcohol use. He reports current drug use. Drug: Marijuana. He has no history on file for sexual activity. REVIEW OF SYSTEMS    (2-9 systems for level 4, 10 or more for level 5)      Review of Systems   Constitutional: Negative for chills and fever. HENT: Negative for congestion and sore throat. Eyes: Negative for visual disturbance. Respiratory: Negative for cough and shortness of breath. Cardiovascular: Negative for chest pain. Gastrointestinal: Negative for abdominal pain, constipation, diarrhea, nausea and vomiting. Genitourinary: Positive for flank pain. Negative for difficulty urinating, dysuria and frequency. Musculoskeletal: Negative for arthralgias and myalgias. Skin: Negative for rash and wound. Neurological: Negative for dizziness and headaches. Hematological: Does not bruise/bleed easily. PHYSICAL EXAM   (up to 7 for level 4, 8 or more for level 5)      Initial Vitals   ED Triage Vitals [03/21/21 2105]   BP Temp Temp Source Pulse Resp SpO2 Height Weight   110/76 98.2 °F (36.8 °C) Oral 78 18 97 % -- 250 lb (113.4 kg)       Physical Exam  Vitals signs and nursing note reviewed. Constitutional:       General: He is not in acute distress. Appearance: He is well-developed. He is not diaphoretic. Comments: Appears uncomfortable but nontoxic   HENT:      Head: Normocephalic and atraumatic.       Mouth/Throat:      Comments: Patient is currently wearing a facial mask. Given that patient is not complaining of sore throat or difficulty swallowing, mask was left in place to decrease risk of aersolization of particles in the setting of current CoVID-19 pandemic    Eyes:      General: No scleral icterus. Conjunctiva/sclera: Conjunctivae normal.   Neck:      Musculoskeletal: Normal range of motion and neck supple. Cardiovascular:      Rate and Rhythm: Normal rate and regular rhythm. Heart sounds: Normal heart sounds. No murmur. No friction rub. No gallop. Pulmonary:      Effort: Pulmonary effort is normal. No respiratory distress. Breath sounds: Normal breath sounds. No wheezing or rales. Abdominal:      General: There is no distension. Palpations: Abdomen is soft. Tenderness: There is no abdominal tenderness. There is no right CVA tenderness, left CVA tenderness, guarding or rebound. Musculoskeletal: Normal range of motion. General: No deformity. Skin:     General: Skin is warm. Coloration: Skin is not pale. Findings: No erythema or rash. Neurological:      Mental Status: He is alert and oriented to person, place, and time. Coordination: Coordination normal.         DIFFERENTIAL DIAGNOSIS/IMPRESSION     DDX: Nephrolithiasis, breakthrough pain, UTI, bowel inflammation    Impression: 55 y.o. male who presents with abdominal pain and right flank pain. Patient does report a history of a 12 mm kidney stone. Patient does appear to be uncomfortable, but the remainder of exam including vital signs unremarkable. Per chart review, prior CT scan done back in January showed a 12 mm kidney stone nonobstructing within the right kidney. However there was also inflammation and questionable proctitis. No history of inflammatory bowel disease.   Given that patient's pain is somewhat slightly different than his prior stone pain when given the first time, will get a repeat CT scan to make sure there is no other signs of inflammation and to make sure that no new stones have occurred. Will check basic labs and kidney function. Will check urine. Pain controll and reevaluate    DIAGNOSTIC RESULTS     EKG: All EKG's are interpreted by the Emergency Department Physician who either signs or Co-signs this chart in the absence of a cardiologist.    Not clinically indicated at this time. LABS: Labswere reviewed by me and abnormal results are displayed above     Labs Reviewed   CBC WITH AUTO DIFFERENTIAL - Abnormal; Notable for the following components:       Result Value    RBC 4.27 (*)     Hemoglobin 12.2 (*)     Hematocrit 36.7 (*)     RDW 15.7 (*)     Platelets 77 (*)     Monocytes 12 (*)     All other components within normal limits   COMPREHENSIVE METABOLIC PANEL W/ REFLEX TO MG FOR LOW K - Abnormal; Notable for the following components:    Glucose 168 (*)     Total Bilirubin 3.45 (*)     All other components within normal limits   LIPASE - Abnormal; Notable for the following components:    Lipase 67 (*)     All other components within normal limits   URINE RT REFLEX TO CULTURE - Abnormal; Notable for the following components:    Color, UA DARK YELLOW (*)     Glucose, Ur TRACE (*)     Bilirubin Urine   (*)     Value: Presumptive positive. Unable to confirm due to unavailability of reagent. Ketones, Urine TRACE (*)     Urine Hgb MOD (*)     Protein, UA TRACE (*)     Nitrite, Urine POSITIVE (*)     Leukocyte Esterase, Urine SMALL (*)     All other components within normal limits   MICROSCOPIC URINALYSIS - Abnormal; Notable for the following components:    Bacteria, UA MODERATE (*)     Mucus, UA 1+ (*)     All other components within normal limits   CULTURE, URINE       RADIOLOGY: All plain film, CT, MRI, and formal ultrasound images (except ED bedside ultrasound) are read by the radiologist, see reports below, unless otherwise noted in ED Course, MDM or here.     Ct Abdomen Pelvis Wo Contrast Additional Contrast? None    Result Date: 3/21/2021  EXAMINATION: STONE PROTOCOL CT OF THE ABDOMEN AND PELVIS 3/21/2021 10:14 pm TECHNIQUE: CT of the abdomen and pelvis was performed without the administration of intravenous contrast. Multiplanar reformatted images are provided for review. Dose modulation, iterative reconstruction, and/or weight based adjustment of the mA/kV was utilized to reduce the radiation dose to as low as reasonably achievable. COMPARISON: 01/08/2021 HISTORY: ORDERING SYSTEM PROVIDED HISTORY: h/o kidney stone and proctitis, worsening pain TECHNOLOGIST PROVIDED HISTORY: h/o kidney stone and proctitis, worsening pain Decision Support Exception->Emergency Medical Condition (MA) Reason for Exam: Right flank pain, pt states he has a hx of kidney stones Acuity: Acute Type of Exam: Initial FINDINGS: LOWER CHEST:  Visualized portion of the lower chest demonstrates no acute abnormality. KIDNEYS AND URINARY TRACT: 12 mm nonobstructing stone of the midpole of right kidney and 3 mm nonobstructing stone of the inferior pole of the left kidney. Findings are unchanged. There is no evidence for hydronephrosis. The ureters are of normal course and caliber. ORGANS: There is contracted liver with nodular contour with para splenic varices is suggestive of cirrhosis with portal hypertension. There is also splenomegaly with the spleen measuring 15 cm. Visualized portions of the pancreas, gallbladder, and adrenal glands demonstrate no acute abnormality. GI/BOWEL: No bowel obstruction. No evidence of acute appendicitis. Previously described proctitis is not as well visualized. Unchanged thickening of the wall of ascending colon likely related to portal hypertensive colopathy. PELVIS: The bladder and pelvic organs are unremarkable. PERITONEUM/RETROPERITONEUM: No lymphadenopathy is noted. BONES/SOFT TISSUES: The osseous structures demonstrate no acute abnormality.      No acute abnormality within the abdomen and pelvis. Liver cirrhosis and portal hypertension with splenomegaly. . 12 mm nonobstructing stone of the midpole of right kidney and 3 mm nonobstructing stone of the inferior pole of the left kidney. No hydronephrosis or hydroureter. Previously described proctitis is not as well visualized. Unchanged thickening of the wall of ascending colon likely related to portal hypertensive colopathy. BEDSIDE ULTRASOUND:  Not clinically indicated at this time. ED COURSE      ED Medication Orders (From admission, onward)    Start Ordered     Status Ordering Provider    03/22/21 0030 03/22/21 0023  oxyCODONE-acetaminophen (PERCOCET) 5-325 MG per tablet 1 tablet  ONCE      Last MAR action: Given - by Owen Person on 03/22/21 at 0053 Iven Bourdon E    03/22/21 0030 03/22/21 0023  cephALEXin (KEFLEX) capsule 500 mg  ONCE     Question:  Antimicrobial Indications  Answer:  Urinary Tract Infection    Last MAR action: Given - by Owen Person on 03/22/21 at 0053 Iven Bourdon E    03/21/21 2130 03/21/21 2119  morphine sulfate (PF) injection 4 mg  ONCE      Last MAR action: Given - by Owen Person on 03/21/21 at 2147 JB ROBLES E    03/21/21 2130 03/21/21 2119  0.9 % sodium chloride bolus  ONCE      Last MAR action: Stopped - by Owen Person on 03/22/21 at 0054 Iven Bourdon E          EMERGENCYDEPARTMENT COURSE:    CT scan showed unchanged findings, however proctitis no longer visualized. Kidney stone unchanged. UA concerning for UTI. Will start on Keflex. Patient was not well controlled on the morphine. We discussion, he states that the Ivonne Swain had not been working for him. He has not tried Percocet previously. I did review OARRS report, and his prescriptions lined up with current history being obtained. Therefore we will try a Percocet. Patient's pain is improved with the Percocet will then plan for discharge home, if not we will then speak to urology.     Patient states that he does feel better after the Percocet. We will discharge home at this time. Return if any concerns arise otherwise keep scheduled appointment follow-up with urology. Patient comfortable with this plan. PROCEDURES:  None     CONSULTS:  None    CRITICAL CARE  None     FINAL IMPRESSION      1. Nephrolithiasis          DISPOSITION / PLAN     DISPOSITION Decision To Discharge 03/22/2021 02:03:56 AM      PATIENT REFERRED TO:  Lyudmila Grover MD  [address] [facility]  [phone]    Schedule an appointment as soon as possible for a visit       [facility]  [facility]  [address] [place] [address] [address] [id number]  [phone]  Go to   As needed, If symptoms worsen      DISCHARGE MEDICATIONS:  Discharge Medication List as of 3/22/2021  2:09 AM      START taking these medications    Details   cephALEXin (KEFLEX) 500 MG capsule Take 1 capsule by mouth 2 times daily, Disp-14 capsule, R-0Print      oxyCODONE-acetaminophen (PERCOCET) 5-325 MG per tablet Take 1 tablet by mouth every 6 hours as needed for Pain for up to 3 days. , Disp-10 tablet, R-0Print             Shoaib Woodson MD  Emergency Medicine Attending      (Please note that portions of this note were completed with a voice recognition program.  Efforts were made to edit the dictations but occasionallywords are mis-transcribed.)          Shoaib Woodson MD  03/26/21 2042

## 2021-03-31 ENCOUNTER — TELEPHONE (OUTPATIENT)
Dept: UROLOGY | Age: 56
End: 2021-03-31

## 2021-03-31 ENCOUNTER — OFFICE VISIT (OUTPATIENT)
Dept: INTERNAL MEDICINE CLINIC | Age: 56
End: 2021-03-31
Payer: MEDICAID

## 2021-03-31 VITALS
SYSTOLIC BLOOD PRESSURE: 120 MMHG | HEIGHT: 72 IN | TEMPERATURE: 97.3 F | BODY MASS INDEX: 32.15 KG/M2 | DIASTOLIC BLOOD PRESSURE: 66 MMHG | WEIGHT: 237.4 LBS

## 2021-03-31 DIAGNOSIS — I10 ESSENTIAL HYPERTENSION: ICD-10-CM

## 2021-03-31 DIAGNOSIS — E78.5 HYPERLIPIDEMIA ASSOCIATED WITH TYPE 2 DIABETES MELLITUS (HCC): ICD-10-CM

## 2021-03-31 DIAGNOSIS — E11.69 HYPERLIPIDEMIA ASSOCIATED WITH TYPE 2 DIABETES MELLITUS (HCC): ICD-10-CM

## 2021-03-31 DIAGNOSIS — G45.9 TIA (TRANSIENT ISCHEMIC ATTACK): ICD-10-CM

## 2021-03-31 DIAGNOSIS — E11.65 TYPE 2 DIABETES MELLITUS WITH HYPERGLYCEMIA, WITHOUT LONG-TERM CURRENT USE OF INSULIN (HCC): Primary | ICD-10-CM

## 2021-03-31 PROCEDURE — 2022F DILAT RTA XM EVC RTNOPTHY: CPT | Performed by: INTERNAL MEDICINE

## 2021-03-31 PROCEDURE — 3052F HG A1C>EQUAL 8.0%<EQUAL 9.0%: CPT | Performed by: INTERNAL MEDICINE

## 2021-03-31 PROCEDURE — G8427 DOCREV CUR MEDS BY ELIG CLIN: HCPCS | Performed by: INTERNAL MEDICINE

## 2021-03-31 PROCEDURE — 3017F COLORECTAL CA SCREEN DOC REV: CPT | Performed by: INTERNAL MEDICINE

## 2021-03-31 PROCEDURE — G8482 FLU IMMUNIZE ORDER/ADMIN: HCPCS | Performed by: INTERNAL MEDICINE

## 2021-03-31 PROCEDURE — G8417 CALC BMI ABV UP PARAM F/U: HCPCS | Performed by: INTERNAL MEDICINE

## 2021-03-31 PROCEDURE — 1036F TOBACCO NON-USER: CPT | Performed by: INTERNAL MEDICINE

## 2021-03-31 PROCEDURE — 99214 OFFICE O/P EST MOD 30 MIN: CPT | Performed by: INTERNAL MEDICINE

## 2021-03-31 NOTE — TELEPHONE ENCOUNTER
Patient missed is pe-surg covid testing date, mobile testing requested for 4/1/21 or RAPID day of, patient understands all instruction, he must remain home tomorrow for possible testing.

## 2021-03-31 NOTE — PROGRESS NOTES
Subjective:      Patient ID: Familia Batista is a 55 y.o. male. HPI Patient is here for evaluation of Multiple medical Problems , he has HTN, DM, Thrombocytopenia , Cirrhosis of liver , СЕРГЕЙ , not using his CPAP , COPD quits smoking . He do not checks his  Blood sugar < last HBAIC was 8.6  , watches his diet   Has TIA last year ( 10/20)  ,was seen BY neurologist , advise dto Continue with Plavix and Lipitor   No Chest Pain, SOB , METS is > 4   Had CBC, CMP done recently   Has Low Platelets   Last ECHO last Year , EF was 55% , with Grade 2 HFPEF , NO h/o CAD   Patient has nonobstructive right renal stone, plan for ESWL and right stent placement  Review of Systems   Constitutional: Negative for activity change, appetite change, chills and diaphoresis. HENT: Negative for congestion, dental problem, ear discharge, facial swelling and hearing loss. Respiratory: Negative for apnea, cough, chest tightness, shortness of breath and wheezing. Cardiovascular: Negative for chest pain and leg swelling. Gastrointestinal: Negative for abdominal distention, abdominal pain, constipation and diarrhea. Genitourinary: Positive for flank pain (Right flank pain. ). Negative for difficulty urinating, dysuria, enuresis and frequency. Musculoskeletal: Negative for arthralgias, back pain, gait problem and joint swelling. Skin: Negative for color change, pallor and rash. Neurological: Negative for dizziness, seizures, facial asymmetry, light-headedness, numbness and headaches. Psychiatric/Behavioral: Negative for agitation, behavioral problems, confusion, decreased concentration and dysphoric mood. Objective:   Physical Exam  Constitutional:       Appearance: He is well-developed. He is obese. He is not diaphoretic. HENT:      Head: Normocephalic and atraumatic. Mouth/Throat:      Pharynx: No oropharyngeal exudate. Eyes:      General: No scleral icterus. Right eye: No discharge.          Left eye: No discharge. Conjunctiva/sclera: Conjunctivae normal.      Pupils: Pupils are equal, round, and reactive to light. Neck:      Musculoskeletal: Normal range of motion and neck supple. Thyroid: No thyromegaly. Vascular: No JVD. Trachea: No tracheal deviation. Cardiovascular:      Rate and Rhythm: Normal rate. Heart sounds: Normal heart sounds. No murmur. No gallop. Pulmonary:      Effort: Pulmonary effort is normal. No respiratory distress. Breath sounds: Normal breath sounds. No stridor. No wheezing or rales. Chest:      Chest wall: No tenderness. Abdominal:      General: Bowel sounds are normal. There is no distension. Palpations: Abdomen is soft. Tenderness: There is no abdominal tenderness. There is no guarding or rebound. Musculoskeletal: Normal range of motion. Neurological:      Mental Status: He is alert and oriented to person, place, and time. Assessment / Plan:   1. Type 2 diabetes mellitus with hyperglycemia, without long-term current use of insulin (HCC)  Uncontrolled last HbA1c was 8.6      2. Hyperlipidemia associated with type 2 diabetes mellitus (HCC)  Stable    3. TIA (transient ischemic attack)  On antiplatelet    4. Essential hypertension  Controlled    Multiple risk factor, history of stroke, cirrhosis of liver, obstructive sleep apnea not compliant with CPAP  Patient has intermittent risk for postoperative cardiac complication  Low risk procedure  Advised to use CPAP during perioperative. Patient voiced understanding    · No follow-ups on file. · Reviewed prior labs and health maintenance. · Discussed use, benefit, and side effects of prescribed medications. Barriers to medication compliance addressed. All patient questions answered. Pt voiced understanding. MD FRANDY GarciaP[facility]  4/5/2021, 10:09 PM    Please note that this chart was generated using voice recognition Dragon dictation software. Although every effort was made to ensure the accuracy of this automated transcription, some errors in transcription may have occurred. Visit Information    Have you changed or started any medications since your last visit including any over-the-counter medicines, vitamins, or herbal medicines? no   Are you having any side effects from any of your medications? -  no  Have you stopped taking any of your medications? Is so, why? -  no    Have you seen any other physician or provider since your last visit? Yes - Records Requested  Have you had any other diagnostic tests since your last visit? No  Have you been seen in the emergency room and/or had an admission to a hospital since we last saw you? Yes - Records Requested  Have you had your routine dental cleaning in the past 6 months? no    Have you activated your [employer] account? If not, what are your barriers?  Yes     Patient Care Team:  Issac Delgado MD as PCP - General (Internal Medicine)  Issac Delgado MD as PCP - [facility] Provider    Medical History Review  Past Medical, Family, and Social History reviewed and does not contribute to the patient presenting condition    Health Maintenance   Topic Date Due    Hepatitis A vaccine (1 of 2 - Risk 2-dose series) Never done    HIV screen  Never done    Hepatitis B vaccine (1 of 3 - Risk 3-dose series) Never done    DTaP/Tdap/Td vaccine (1 - Tdap) Never done    Shingles Vaccine (1 of 2) Never done    Diabetic retinal exam  08/15/2016    Diabetic foot exam  05/30/2019    COVID-19 Vaccine (2 - Moderna 2-dose series) 04/07/2021    Diabetic microalbuminuria test  06/02/2021    Lipid screen  10/15/2021    A1C test (Diabetic or Prediabetic)  02/19/2022    Potassium monitoring  03/21/2022    Creatinine monitoring  03/21/2022    Colon cancer screen colonoscopy  02/28/2026    Flu vaccine  Completed    Pneumococcal 0-64 years Vaccine  Completed    Hepatitis C screen  Completed    Hib vaccine  Aged C/ Juan J Rudolph 19 Meningococcal (ACWY) vaccine  Aged Out

## 2021-04-01 ENCOUNTER — HOSPITAL ENCOUNTER (OUTPATIENT)
Age: 56
Discharge: HOME | End: 2021-04-01
Payer: MEDICAID

## 2021-04-01 ENCOUNTER — TELEPHONE (OUTPATIENT)
Dept: UROLOGY | Age: 56
End: 2021-04-01

## 2021-04-01 PROCEDURE — U0003 INFECTIOUS AGENT DETECTION BY NUCLEIC ACID (DNA OR RNA); SEVERE ACUTE RESPIRATORY SYNDROME CORONAVIRUS 2 (SARS-COV-2) (CORONAVIRUS DISEASE [COVID-19]), AMPLIFIED PROBE TECHNIQUE, MAKING USE OF HIGH THROUGHPUT TECHNOLOGIES AS DESCRIBED BY CMS-2020-01-R: HCPCS

## 2021-04-01 PROCEDURE — U0005 INFEC AGEN DETEC AMPLI PROBE: HCPCS

## 2021-04-02 ENCOUNTER — ANESTHESIA (OUTPATIENT)
Dept: OPERATING ROOM | Age: 56
End: 2021-04-02
Payer: MEDICAID

## 2021-04-02 ENCOUNTER — ANESTHESIA EVENT (OUTPATIENT)
Dept: OPERATING ROOM | Age: 56
End: 2021-04-02
Payer: MEDICAID

## 2021-04-02 ENCOUNTER — HOSPITAL ENCOUNTER (OUTPATIENT)
Age: 56
Setting detail: OUTPATIENT SURGERY
Discharge: HOME | End: 2021-04-02
Attending: UROLOGY | Admitting: UROLOGY
Payer: MEDICAID

## 2021-04-02 ENCOUNTER — APPOINTMENT (OUTPATIENT)
Dept: GENERAL RADIOLOGY | Age: 56
End: 2021-04-02
Attending: UROLOGY
Payer: MEDICAID

## 2021-04-02 ENCOUNTER — HOSPITAL ENCOUNTER (OUTPATIENT)
Age: 56
Discharge: HOME | End: 2021-04-04
Attending: UROLOGY
Payer: MEDICAID

## 2021-04-02 VITALS — DIASTOLIC BLOOD PRESSURE: 73 MMHG | OXYGEN SATURATION: 94 % | TEMPERATURE: 97.3 F | SYSTOLIC BLOOD PRESSURE: 111 MMHG

## 2021-04-02 VITALS
SYSTOLIC BLOOD PRESSURE: 133 MMHG | HEART RATE: 89 BPM | BODY MASS INDEX: 31.76 KG/M2 | TEMPERATURE: 97.2 F | RESPIRATION RATE: 16 BRPM | OXYGEN SATURATION: 96 % | HEIGHT: 72 IN | DIASTOLIC BLOOD PRESSURE: 83 MMHG | WEIGHT: 234.5 LBS

## 2021-04-02 DIAGNOSIS — G89.18 POST-OP PAIN: Primary | ICD-10-CM

## 2021-04-02 LAB
GLUCOSE BLD-MCNC: 144 MG/DL (ref 75–110)
GLUCOSE BLD-MCNC: 171 MG/DL (ref 75–110)
SARS-COV-2, RAPID: NOT DETECTED
SARS-COV-2: NORMAL
SARS-COV-2: NOT DETECTED
SOURCE: NORMAL
SPECIMEN DESCRIPTION: NORMAL

## 2021-04-02 PROCEDURE — 7100000041 HC SPAR PHASE II RECOVERY - ADDTL 15 MIN: Performed by: UROLOGY

## 2021-04-02 PROCEDURE — 2580000003 HC RX 258: Performed by: ANESTHESIOLOGY

## 2021-04-02 PROCEDURE — 2500000003 HC RX 250 WO HCPCS

## 2021-04-02 PROCEDURE — 6360000002 HC RX W HCPCS: Performed by: ANESTHESIOLOGY

## 2021-04-02 PROCEDURE — 3700000000 HC ANESTHESIA ATTENDED CARE: Performed by: UROLOGY

## 2021-04-02 PROCEDURE — 2500000003 HC RX 250 WO HCPCS: Performed by: NURSE ANESTHETIST, CERTIFIED REGISTERED

## 2021-04-02 PROCEDURE — 2709999900 HC NON-CHARGEABLE SUPPLY: Performed by: UROLOGY

## 2021-04-02 PROCEDURE — 7100000000 HC PACU RECOVERY - FIRST 15 MIN: Performed by: UROLOGY

## 2021-04-02 PROCEDURE — 3600000002 HC SURGERY LEVEL 2 BASE: Performed by: UROLOGY

## 2021-04-02 PROCEDURE — 87635 SARS-COV-2 COVID-19 AMP PRB: CPT

## 2021-04-02 PROCEDURE — 6360000002 HC RX W HCPCS: Performed by: PHYSICIAN ASSISTANT

## 2021-04-02 PROCEDURE — 74018 RADEX ABDOMEN 1 VIEW: CPT

## 2021-04-02 PROCEDURE — 2580000003 HC RX 258

## 2021-04-02 PROCEDURE — 82947 ASSAY GLUCOSE BLOOD QUANT: CPT

## 2021-04-02 PROCEDURE — 7100000001 HC PACU RECOVERY - ADDTL 15 MIN: Performed by: UROLOGY

## 2021-04-02 PROCEDURE — 7100000040 HC SPAR PHASE II RECOVERY - FIRST 15 MIN: Performed by: UROLOGY

## 2021-04-02 PROCEDURE — 3600000012 HC SURGERY LEVEL 2 ADDTL 15MIN: Performed by: UROLOGY

## 2021-04-02 PROCEDURE — 6360000002 HC RX W HCPCS: Performed by: NURSE ANESTHETIST, CERTIFIED REGISTERED

## 2021-04-02 PROCEDURE — 3700000001 HC ADD 15 MINUTES (ANESTHESIA): Performed by: UROLOGY

## 2021-04-02 RX ORDER — OXYCODONE HYDROCHLORIDE AND ACETAMINOPHEN 5; 325 MG/1; MG/1
1 TABLET ORAL EVERY 8 HOURS PRN
Qty: 15 TABLET | Refills: 0 | Status: SHIPPED | OUTPATIENT
Start: 2021-04-02 | End: 2021-04-07

## 2021-04-02 RX ORDER — FENTANYL CITRATE 50 UG/ML
25 INJECTION, SOLUTION INTRAMUSCULAR; INTRAVENOUS EVERY 5 MIN PRN
Status: DISCONTINUED | OUTPATIENT
Start: 2021-04-02 | End: 2021-04-02 | Stop reason: HOSPADM

## 2021-04-02 RX ORDER — DEXAMETHASONE SODIUM PHOSPHATE 4 MG/ML
4 INJECTION, SOLUTION INTRA-ARTICULAR; INTRALESIONAL; INTRAMUSCULAR; INTRAVENOUS; SOFT TISSUE
Status: COMPLETED | OUTPATIENT
Start: 2021-04-02 | End: 2021-04-02

## 2021-04-02 RX ORDER — ONDANSETRON 2 MG/ML
INJECTION INTRAMUSCULAR; INTRAVENOUS PRN
Status: DISCONTINUED | OUTPATIENT
Start: 2021-04-02 | End: 2021-04-02 | Stop reason: SDUPTHER

## 2021-04-02 RX ORDER — PROPOFOL 10 MG/ML
INJECTION, EMULSION INTRAVENOUS PRN
Status: DISCONTINUED | OUTPATIENT
Start: 2021-04-02 | End: 2021-04-02 | Stop reason: SDUPTHER

## 2021-04-02 RX ORDER — FENTANYL CITRATE 50 UG/ML
INJECTION, SOLUTION INTRAMUSCULAR; INTRAVENOUS PRN
Status: DISCONTINUED | OUTPATIENT
Start: 2021-04-02 | End: 2021-04-02 | Stop reason: SDUPTHER

## 2021-04-02 RX ORDER — MEPERIDINE HYDROCHLORIDE 50 MG/ML
12.5 INJECTION INTRAMUSCULAR; INTRAVENOUS; SUBCUTANEOUS EVERY 5 MIN PRN
Status: DISCONTINUED | OUTPATIENT
Start: 2021-04-02 | End: 2021-04-02 | Stop reason: HOSPADM

## 2021-04-02 RX ORDER — TAMSULOSIN HYDROCHLORIDE 0.4 MG/1
0.4 CAPSULE ORAL DAILY
Qty: 30 CAPSULE | Refills: 0 | Status: SHIPPED | OUTPATIENT
Start: 2021-04-02 | End: 2021-04-04

## 2021-04-02 RX ORDER — PHENYLEPHRINE HCL IN 0.9% NACL 1 MG/10 ML
SYRINGE (ML) INTRAVENOUS PRN
Status: DISCONTINUED | OUTPATIENT
Start: 2021-04-02 | End: 2021-04-02 | Stop reason: SDUPTHER

## 2021-04-02 RX ORDER — SODIUM CHLORIDE, SODIUM LACTATE, POTASSIUM CHLORIDE, CALCIUM CHLORIDE 600; 310; 30; 20 MG/100ML; MG/100ML; MG/100ML; MG/100ML
INJECTION, SOLUTION INTRAVENOUS CONTINUOUS PRN
Status: DISCONTINUED | OUTPATIENT
Start: 2021-04-02 | End: 2021-04-02 | Stop reason: SDUPTHER

## 2021-04-02 RX ORDER — EPHEDRINE SULFATE/0.9% NACL/PF 50 MG/5 ML
SYRINGE (ML) INTRAVENOUS PRN
Status: DISCONTINUED | OUTPATIENT
Start: 2021-04-02 | End: 2021-04-02 | Stop reason: SDUPTHER

## 2021-04-02 RX ORDER — LIDOCAINE HYDROCHLORIDE 10 MG/ML
INJECTION, SOLUTION EPIDURAL; INFILTRATION; INTRACAUDAL; PERINEURAL PRN
Status: DISCONTINUED | OUTPATIENT
Start: 2021-04-02 | End: 2021-04-02 | Stop reason: SDUPTHER

## 2021-04-02 RX ORDER — SODIUM CHLORIDE, SODIUM LACTATE, POTASSIUM CHLORIDE, CALCIUM CHLORIDE 600; 310; 30; 20 MG/100ML; MG/100ML; MG/100ML; MG/100ML
1000 INJECTION, SOLUTION INTRAVENOUS CONTINUOUS
Status: DISCONTINUED | OUTPATIENT
Start: 2021-04-02 | End: 2021-04-02 | Stop reason: HOSPADM

## 2021-04-02 RX ADMIN — SODIUM CHLORIDE, POTASSIUM CHLORIDE, SODIUM LACTATE AND CALCIUM CHLORIDE: 600; 310; 30; 20 INJECTION, SOLUTION INTRAVENOUS at 13:26

## 2021-04-02 RX ADMIN — DEXAMETHASONE SODIUM PHOSPHATE 4 MG: 4 INJECTION, SOLUTION INTRA-ARTICULAR; INTRALESIONAL; INTRAMUSCULAR; INTRAVENOUS; SOFT TISSUE at 13:26

## 2021-04-02 RX ADMIN — SODIUM CHLORIDE, POTASSIUM CHLORIDE, SODIUM LACTATE AND CALCIUM CHLORIDE 1000 ML: 600; 310; 30; 20 INJECTION, SOLUTION INTRAVENOUS at 13:13

## 2021-04-02 RX ADMIN — LIDOCAINE HYDROCHLORIDE 50 MG: 10 INJECTION, SOLUTION EPIDURAL; INFILTRATION; INTRACAUDAL; PERINEURAL at 13:43

## 2021-04-02 RX ADMIN — CEFAZOLIN 2 G: 10 INJECTION, POWDER, FOR SOLUTION INTRAVENOUS at 13:54

## 2021-04-02 RX ADMIN — FENTANYL CITRATE 100 MCG: 50 INJECTION, SOLUTION INTRAMUSCULAR; INTRAVENOUS at 13:43

## 2021-04-02 RX ADMIN — Medication 10 MG: at 14:12

## 2021-04-02 RX ADMIN — Medication 200 MCG: at 13:58

## 2021-04-02 RX ADMIN — Medication 15 MG: at 14:05

## 2021-04-02 RX ADMIN — ONDANSETRON 4 MG: 2 INJECTION INTRAMUSCULAR; INTRAVENOUS at 14:26

## 2021-04-02 RX ADMIN — Medication 200 MCG: at 13:56

## 2021-04-02 RX ADMIN — Medication 200 MCG: at 13:50

## 2021-04-02 RX ADMIN — Medication 200 MCG: at 14:01

## 2021-04-02 RX ADMIN — Medication 10 MG: at 14:16

## 2021-04-02 RX ADMIN — Medication 15 MG: at 14:01

## 2021-04-02 RX ADMIN — Medication 200 MCG: at 13:52

## 2021-04-02 RX ADMIN — SODIUM CHLORIDE, POTASSIUM CHLORIDE, SODIUM LACTATE AND CALCIUM CHLORIDE: 600; 310; 30; 20 INJECTION, SOLUTION INTRAVENOUS at 14:26

## 2021-04-02 RX ADMIN — PROPOFOL 200 MG: 10 INJECTION, EMULSION INTRAVENOUS at 13:43

## 2021-04-02 RX ADMIN — Medication 200 MCG: at 14:06

## 2021-04-02 ASSESSMENT — PULMONARY FUNCTION TESTS
PIF_VALUE: 4
PIF_VALUE: 17
PIF_VALUE: 0
PIF_VALUE: 15
PIF_VALUE: 13
PIF_VALUE: 1
PIF_VALUE: 15
PIF_VALUE: 17
PIF_VALUE: 17
PIF_VALUE: 16
PIF_VALUE: 17
PIF_VALUE: 3
PIF_VALUE: 17
PIF_VALUE: 17
PIF_VALUE: 3
PIF_VALUE: 13
PIF_VALUE: 15
PIF_VALUE: 13
PIF_VALUE: 3
PIF_VALUE: 15
PIF_VALUE: 17
PIF_VALUE: 13
PIF_VALUE: 15
PIF_VALUE: 17
PIF_VALUE: 17

## 2021-04-02 ASSESSMENT — COPD QUESTIONNAIRES: CAT_SEVERITY: MILD

## 2021-04-02 ASSESSMENT — PAIN SCALES - GENERAL
PAINLEVEL_OUTOF10: 0
PAINLEVEL_OUTOF10: 0

## 2021-04-02 NOTE — ANESTHESIA PRE PROCEDURE
Department of Anesthesiology  Preprocedure Note       Name:  Elma Dolan   Age:  55 y.o.  :  1965                                          MRN:  [id number]         Date:  2021      Surgeon: Nicholas Crews):  Selvin Fermin MD    Procedure: Procedure(s):  ESWL EXTRACORPOREAL SHOCK WAVE LITHOTRIPSY  ([facility] CONF# [id number] - AUGUSTO)  CYSTOSCOPY,  URETERAL STENT INSERTION    Medications prior to admission:   Prior to Admission medications    Medication Sig Start Date End Date Taking?  Authorizing Provider   clotrimazole (LOTRIMIN) 1 % cream APPLY CREAM TOPICALLY TWICE DAILY 20  Yes Historical Provider, MD   omeprazole (PRILOSEC) 20 MG delayed release capsule Take 1 capsule by mouth Daily 21  Yes Bernardino Shine MD   metFORMIN (GLUCOPHAGE) 1000 MG tablet Take 1 tablet by mouth 2 times daily (with meals) 12/10/20  Yes FAUSTO López CNP   lisinopril (PRINIVIL;ZESTRIL) 5 MG tablet Take 1 tablet by mouth once daily 12/10/20  Yes FAUSTO López CNP   metoprolol tartrate (LOPRESSOR) 25 MG tablet Take 1 tablet by mouth 2 times daily 12/10/20  Yes FAUSTO López CNP   atorvastatin (LIPITOR) 40 MG tablet Take 1 tablet by mouth nightly 10/16/20  Yes Lobo Guzman MD   allopurinol (ZYLOPRIM) 100 MG tablet Take 1 tablet by mouth once daily 20  Yes Bernardino Shine MD   fluticasone-salmeterol (ADVAIR DISKUS) 100-50 MCG/DOSE diskus inhaler INHALE 1 PUFF BY MOUTH EVERY 12 HOURS 20  Yes Eloina Maldonado PA-C   SITagliptin (JANUVIA) 100 MG tablet TAKE 1 TABLET BY MOUTH DAILY 20  Yes Bernardino Shine MD   albuterol sulfate HFA (VENTOLIN HFA) 108 (90 Base) MCG/ACT inhaler Inhale 1 puff into the lungs every 4 hours as needed for Wheezing 20  Yes Bernardino Shine MD   glimepiride (AMARYL) 4 MG tablet TAKE 1 TABLET BY MOUTH TWICE DAILY 20  Yes Dwight Flynn PA-C   acetaminophen (AMINOFEN) 325 MG tablet Take 2 tablets by mouth every 6 hours as needed for Pain 10/15/18  Yes Michael Larose Jayshree Ivory MD   tamsulosin [facility]) 0.4 MG capsule Take 1 capsule by mouth once daily 3/25/21   Shahbaz Yuan, APRN - CNP   cephALEXin (KEFLEX) 500 MG capsule Take 1 capsule by mouth 2 times daily  Patient not taking: Reported on 3/31/2021 3/22/21   Holland Silva MD   blood glucose test strips (ASCENSIA AUTODISC VI;ONE TOUCH ULTRA TEST VI) strip Use with associated glucose meter. 7/22/20   Lane Iglesias PA-C   blood glucose monitor strips 1 strip by Other route 4 times daily Test 4  times a day & as needed for symptoms of irregular blood glucose. 5/3/19   Brittany Perdomo MD   [employer] LANCETS ULTRATHIN 30G MISC 1 each by Other route 3 times daily 10/19/18   Brittany Perdomo MD       Current medications:    Current Facility-Administered Medications   Medication Dose Route Frequency Provider Last Rate Last Admin    ceFAZolin (ANCEF) 2000 mg in dextrose 5 % 50 mL IVPB  2,000 mg Intravenous On Call to [address] [place] [address],NIESHA aDsh        lactated ringers infusion 1,000 mL  1,000 mL Intravenous Continuous Estella Mcarthur MD 50 mL/hr at 04/02/21 1313 1,000 mL at 04/02/21 1313       Allergies:     Allergies   Allergen Reactions    Codeine Nausea Only and Other (See Comments)     hallucinations    Simvastatin Other (See Comments)     Leg cramping       Problem List:    Patient Active Problem List   Diagnosis Code    Hyperlipidemia associated with type 2 diabetes mellitus (HCC) E11.69, E78.5    Essential hypertension I10    Calculus of kidney N20.0    Mitral valve disorder I05.9    Hyperlipidemia LDL goal <70 E78.5    Chronic liver disease K76.9    Ex-smoker Z87.891    H/O colonoscopy Z98.890    Type 2 diabetes mellitus with hyperglycemia, without long-term current use of insulin ([facility] [address] E11.65    Insomnia G47.00    GI bleed K92.2    Cirrhosis of liver without ascites (HCC) K74.60    Numbness and tingling of left side of face R20.0, R20.2    Numbness and tingling R20.0, R20.2    Stroke-like symptoms R29.90    TIA (transient ischemic attack) G45.9       Past Medical History:        Diagnosis Date    Calculus of kidney     Cerebral artery occlusion with cerebral infarction ([facility] [address]     slight memory problem (can't remember name of neurologist)    Cirrhosis ([facility] [address]     sees PCP for this    COPD (chronic obstructive pulmonary disease) ([facility] [address]     does not see pulmonology    Essential hypertension, benign     Gout, unspecified     H/O colonoscopy 2016    Mitral valve disorders(424.0)     СЕРГЕЙ on CPAP     not currently using due to malfunctioning    Other and unspecified hyperlipidemia     Type II or unspecified type diabetes mellitus without mention of complication, not stated as uncontrolled     Unspecified chronic liver disease without mention of alcohol     Wellness examination      [facility] (last visit approx 2021)       Past Surgical History:        Procedure Laterality Date    LITHOTRIPSY      UPPER GASTROINTESTINAL ENDOSCOPY N/A 2018    The esophagus was inspected to the Z-line. The endoscopic exam showed impression of varices (F1) in distal esophagus.         Social History:    Social History     Tobacco Use    Smoking status: Former Smoker     Packs/day: 0.50     Years: 30.00     Pack years: 15.00     Types: Cigarettes     Quit date: 3/26/2012     Years since quittin.0    Smokeless tobacco: Former User   Substance Use Topics    Alcohol use: Not Currently     Alcohol/week: 0.0 standard drinks     Comment: used to drink \"a lot\"  None since Dec 2020                                Counseling given: Not Answered      Vital Signs (Current):   Vitals:    21 1255   Weight: 234 lb 8 oz (106.4 kg)   Height: 6' (1.829 m)                                              BP Readings from Last 3 Encounters:   21 120/66   21 (!) 143/82   21 132/77       NPO Status: Time of last liquid consumption: 2300                        Time of last solid consumption: 2300 Date of last liquid consumption: 04/01/21                        Date of last solid food consumption: 04/01/21    BMI:   Wt Readings from Last 3 Encounters:   04/02/21 234 lb 8 oz (106.4 kg)   03/31/21 237 lb 6.4 oz (107.7 kg)   03/18/21 239 lb (108.4 kg)     Body mass index is 31.8 kg/m².     CBC:   Lab Results   Component Value Date    WBC 4.2 03/21/2021    RBC 4.27 03/21/2021    HGB 12.2 03/21/2021    HCT 36.7 03/21/2021    MCV 86.0 03/21/2021    RDW 15.7 03/21/2021    PLT 77 03/21/2021       CMP:   Lab Results   Component Value Date     03/21/2021    K 3.8 03/21/2021     03/21/2021    CO2 24 03/21/2021    BUN 20 03/21/2021    CREATININE 0.70 03/21/2021    GFRAA >60 03/21/2021    LABGLOM >60 03/21/2021    GLUCOSE 168 03/21/2021    PROT 7.7 03/21/2021    CALCIUM 9.8 03/21/2021    BILITOT 3.45 03/21/2021    ALKPHOS 111 03/21/2021    AST 35 03/21/2021    ALT 28 03/21/2021       POC Tests:   Recent Labs     04/02/21  1314   POCGLU 171*       Coags:   Lab Results   Component Value Date    PROTIME 14.9 10/15/2020    INR 1.2 10/15/2020    APTT 32.1 10/15/2020       HCG (If Applicable): No results found for: PREGTESTUR, PREGSERUM, HCG, HCGQUANT     ABGs: No results found for: PHART, PO2ART, KEA3KID, PYE6ZNX, BEART, K1BTETBY     Type & Screen (If Applicable):  No results found for: LABABO, LABRH    Drug/Infectious Status (If Applicable):  Lab Results   Component Value Date    HEPCAB NONREACTIVE 05/24/2018       COVID-19 Screening (If Applicable):   Lab Results   Component Value Date    COVID19 Not Detected 04/02/2021    COVID19 Not Detected 04/01/2021           Anesthesia Evaluation    Airway: Mallampati: II  TM distance: >3 FB   Neck ROM: full  Mouth opening: > = 3 FB Dental:      Comment: Missing some teeth    Pulmonary: breath sounds clear to auscultation  (+) COPD: mild,  sleep apnea: on noncompliant,                             Cardiovascular:    (+) hypertension:,         Rhythm:

## 2021-04-02 NOTE — H&P
Pre-op History and Physical  [address] Naylornasim Devine PA-C    Patient:  Adilson Guerrier  MRN: [id number]  YOB: 1965    HISTORY OF PRESENT ILLNESS:     The patient is a 55 y.o. male who presents with non-obstructing right renal stone over 1cm. Here for ESWL RIGHt with stent placement. Patient's old records, notes and chart reviewed and summarized above. [address] Ar[address] Devine PA-C independently reviewed the images and verified the radiology reports from:    No results found. Past Medical History:    Past Medical History:   Diagnosis Date    Calculus of kidney     Cerebral artery occlusion with cerebral infarction ([facility] [address] 2020    slight memory problem (can't remember name of neurologist)    Cirrhosis ([facility] [address]     sees PCP for this    COPD (chronic obstructive pulmonary disease) ([facility] [address]     does not see pulmonology    Essential hypertension, benign     Gout, unspecified     H/O colonoscopy 04/11/2016 2016    Mitral valve disorders(424.0)     СЕРГЕЙ on CPAP     not currently using due to malfunctioning    Other and unspecified hyperlipidemia     Type II or unspecified type diabetes mellitus without mention of complication, not stated as uncontrolled     Unspecified chronic liver disease without mention of alcohol     Wellness examination      [facility] (last visit approx Feb 2021)       Past Surgical History:    Past Surgical History:   Procedure Laterality Date    LITHOTRIPSY      UPPER GASTROINTESTINAL ENDOSCOPY N/A 5/25/2018    The esophagus was inspected to the Z-line. The endoscopic exam showed impression of varices (F1) in distal esophagus. Medications Prior to Admission:    Prior to Admission medications    Medication Sig Start Date End Date Taking?  Authorizing Provider   tamsulosin (FLOMAX) 0.4 MG capsule Take 1 capsule by mouth once daily 3/25/21   Ruthiangela Camara, APRN - CNP   cephALEXin (KEFLEX) 500 MG capsule Take 1 capsule by mouth 2 times daily  Patient not taking: Reported on 3/31/2021 3/22/21   Petra eVla MD   clotrimazole (LOTRIMIN) 1 % cream APPLY CREAM TOPICALLY TWICE DAILY 11/2/20   Historical Provider, MD   omeprazole (PRILOSEC) 20 MG delayed release capsule Take 1 capsule by mouth Daily 1/27/21   Danelle Man MD   clopidogrel (PLAVIX) 75 MG tablet TAKE 1 TABLET BY MOUTH ONCE DAILY  Patient not taking: Reported on 3/31/2021 1/6/21   Eboni Pabon MD   metFORMIN (GLUCOPHAGE) 1000 MG tablet Take 1 tablet by mouth 2 times daily (with meals) 12/10/20   FAUSTO Bernabe CNP   lisinopril (PRINIVIL;ZESTRIL) 5 MG tablet Take 1 tablet by mouth once daily 12/10/20   FAUSTO Bernabe CNP   metoprolol tartrate (LOPRESSOR) 25 MG tablet Take 1 tablet by mouth 2 times daily 12/10/20   FAUSTO Bernabe CNP   atorvastatin (LIPITOR) 40 MG tablet Take 1 tablet by mouth nightly 10/16/20   Maida Aviles MD   allopurinol (ZYLOPRIM) 100 MG tablet Take 1 tablet by mouth once daily 8/5/20   Danelle Man MD   blood glucose test strips (ASCENSIA AUTODISC VI;ONE TOUCH ULTRA TEST VI) strip Use with associated glucose meter. 7/22/20   Navneet Angel PA-C   fluticasone-salmeterol (ADVAIR DISKUS) 100-50 MCG/DOSE diskus inhaler INHALE 1 PUFF BY MOUTH EVERY 12 HOURS 7/1/20   Navneet Angel PA-C   SITagliptin (JANUVIA) 100 MG tablet TAKE 1 TABLET BY MOUTH DAILY 6/8/20   Danelle Man MD   albuterol sulfate HFA (VENTOLIN HFA) 108 (90 Base) MCG/ACT inhaler Inhale 1 puff into the lungs every 4 hours as needed for Wheezing 5/20/20   Danelle Man MD   glimepiride (AMARYL) 4 MG tablet TAKE 1 TABLET BY MOUTH TWICE DAILY 4/6/20   Navneet Angel PA-C   blood glucose monitor strips 1 strip by Other route 4 times daily Test 4  times a day & as needed for symptoms of irregular blood glucose.  5/3/19   MD KIRSTIE Linares ULTRATHIN 30G MISC 1 each by Other route 3 times daily 10/19/18   Danelle Man MD   acetaminophen (AMINOFEN) 325 MG tablet Take 2 tablets by mouth every 6 hours as needed for Pain 10/15/18   Nancy Curtis MD       Allergies:  Codeine and Simvastatin    Social History:    Social History     Socioeconomic History    Marital status:      Spouse name: Not on file    Number of children: Not on file    Years of education: Not on file    Highest education level: Not on file   Occupational History    Not on file   Social Needs    Financial resource strain: Not on file    Food insecurity     Worry: Not on file     Inability: Not on file    Transportation needs     Medical: Not on file     Non-medical: Not on file   Tobacco Use    Smoking status: Former Smoker     Packs/day: 0.50     Years: 30.00     Pack years: 15.00     Types: Cigarettes     Quit date: 3/26/2012     Years since quittin.0    Smokeless tobacco: Former User   Substance and Sexual Activity    Alcohol use: Not Currently     Alcohol/week: 0.0 standard drinks     Comment: used to drink \"a lot\"  None since Dec 2020    Drug use: Yes     Types: Marijuana     Comment: every evening    Sexual activity: Not on file   Lifestyle    Physical activity     Days per week: Not on file     Minutes per session: Not on file    Stress: Not on file   Relationships    Social connections     Talks on phone: Not on file     Gets together: Not on file     Attends Orthodoxy service: Not on file     Active member of club or organization: Not on file     Attends meetings of clubs or organizations: Not on file     Relationship status: Not on file    Intimate partner violence     Fear of current or ex partner: Not on file     Emotionally abused: Not on file     Physically abused: Not on file     Forced sexual activity: Not on file   Other Topics Concern    Not on file   Social History Narrative    Not on file       Family History:    Family History   Problem Relation Age of Onset    No Known Problems Brother     Diabetes Mother     Heart Attack Mother     Heart Disease Father     Diabetes Brother     Heart Disease Brother        REVIEW OF SYSTEMS:  Constitutional: negative  Eyes: negative  Respiratory: negative  Cardiovascular: negative  Gastrointestinal: negative  Genitourinary: no acute issues  Musculoskeletal: negative  Skin: negative   Neurological: negative  Hematological/Lymphatic: negative  Psychological: negative    PHYSICAL EXAM:    No data found. Constitutional: Patient in NAD  Neuro: Alert and oriented to person, place, and time  Psych: Mood and affect normal  Skin: Clean, dry, intact   Lungs: Respiratory effort normal, CTA  Cardiovascular:  Normal peripheral pulses; no murmur. Normal rhythm  Abdomen: Soft, non-tender, non-distended, no hepatosplenomegaly or hernia, CVA tenderness none  Bladder: Non-tender and non-disdended   : Non-tender, skin intact, no lesions       LABS:   No results for input(s): WBC, HGB, HCT, MCV, PLT in the last 72 hours. No results for input(s): NA, K, CL, CO2, PHOS, BUN, CREATININE in the last 72 hours. Invalid input(s): CA  Lab Results   Component Value Date    PSA 0.17 12/23/2020    PSA 0.21 10/23/2014         Urinalysis: No results for input(s): COLORU, PHUR, LABCAST, WBCUA, RBCUA, MUCUS, TRICHOMONAS, YEAST, BACTERIA, CLARITYU, SPECGRAV, LEUKOCYTESUR, UROBILINOGEN, Ulises Henry in the last 72 hours. Invalid input(s): NITRATE, GLUCOSEUKETONESUAMORPHOUS     -----------------------------------------------------------------    ASSESSMENT AND PLAN:    Impression:    Right renal stone     Plan: ESWL with cysto and stent placement RIGHT in OR today. Consent obtained    The patient was counseled at length about the risks of leonarda Covid-19 during their perioperative period and any recovery window from their procedure. The patient was made aware that leonarda Covid-19  may worsen their prognosis for recovering from their procedure  and lend to a higher morbidity and/or mortality risk.   All material risks, benefits, and reasonable

## 2021-04-02 NOTE — OP NOTE
Operative Note      Patient: Jarod Manzo  YOB: 1965  MRN: [id number]    Date of Procedure: 4/2/2021    Pre-Op Diagnosis: RIGHT KIDNEY STONE    Post-Op Diagnosis: Same       Procedure(s):  ESWL EXTRACORPOREAL SHOCK WAVE LITHOTRIPSY  ([facility] CONF# [id number] - [place]UGUSTO)-right side    Surgeon(s):  Jennifer Killian MD    Assistant:   Earnest Martinez MD    Anesthesia: General    Estimated Blood Loss (mL): Minimal    Complications: None    Specimens:   * No specimens in log *    Implants:  * No implants in log *      Drains: * No LDAs found *    Findings:   1. Right lower pole stone broken into small pieces       DESCRIPTION OF PROCEDURE:  The patient was placed in the supine position and underwent general anesthesia induction. Using fluoroscopy the stone was visualized in the right renal lower poleas noted previously. We positioned the stone over F2. The stone was then treated with 200 shocks at a low power level. There was a 2 minute pause after 200 shocks. We then continued the lithotripsy. We started at a power level of 1, and increased to a power level of 7. The total number of shocks given were 1700 shocks. The frequency was 60-90. Throughout the procedure we used fluoroscopy to identify the stone and reposition the lithotripter. The patient's stone did  appear to fragment throughout the case and she did well with no complications. The patient was extubated after the procedure and moved to PACU in good condition. The attending was present for all critical portions of the procedure. CONDITION:  Good.  DC home with flomax, pain meds      Electronically signed by Earnest Martinez MD on 4/2/2021 at 2:25 PM

## 2021-04-02 NOTE — ANESTHESIA POSTPROCEDURE EVALUATION
Department of Anesthesiology  Postprocedure Note    Patient: Inessa Baker  MRN: [id number]  [place]: 1965  Date of evaluation: 4/2/2021  Time:  7:26 PM     Procedure Summary     Date: 04/02/21 Room / Location: [address]    Anesthesia Start: [address] Anesthesia Stop: 5533    Procedure: ESWL EXTRACORPOREAL SHOCK WAVE LITHOTRIPSY  ([facility] CONF# [id number] - [place]UGUSTO) (N/A ) Diagnosis: (RIGHT KIDNEY STONE)    Surgeons: Shweta Salinas MD Responsible Provider: Boogie Chavez MD    Anesthesia Type: general ASA Status: 3          Anesthesia Type: general    Joanna Phase I: Joanna Score: 10    Joanna Phase II: Joanna Score: 10    Last vitals: Reviewed and per EMR flowsheets.        Anesthesia Post Evaluation    Patient location during evaluation: PACU  Patient participation: complete - patient participated  Level of consciousness: awake and alert  Pain score: 3  Airway patency: patent  Nausea & Vomiting: no nausea and no vomiting  Complications: no  Cardiovascular status: hemodynamically stable  Respiratory status: room air  Hydration status: euvolemic

## 2021-04-03 ENCOUNTER — TELEPHONE (OUTPATIENT)
Dept: PRIMARY CARE CLINIC | Age: 56
End: 2021-04-03

## 2021-04-04 ENCOUNTER — HOSPITAL ENCOUNTER (EMERGENCY)
Age: 56
Discharge: HOME | End: 2021-04-04
Attending: EMERGENCY MEDICINE
Payer: MEDICAID

## 2021-04-04 VITALS
BODY MASS INDEX: 31.15 KG/M2 | TEMPERATURE: 98.4 F | RESPIRATION RATE: 16 BRPM | WEIGHT: 230 LBS | DIASTOLIC BLOOD PRESSURE: 72 MMHG | HEIGHT: 72 IN | HEART RATE: 78 BPM | SYSTOLIC BLOOD PRESSURE: 150 MMHG | OXYGEN SATURATION: 97 %

## 2021-04-04 DIAGNOSIS — N23 RENAL COLIC: Primary | ICD-10-CM

## 2021-04-04 LAB
ABSOLUTE EOS #: 0.1 K/UL (ref 0–0.4)
ABSOLUTE IMMATURE GRANULOCYTE: ABNORMAL K/UL (ref 0–0.3)
ABSOLUTE LYMPH #: 0.7 K/UL (ref 1–4.8)
ABSOLUTE MONO #: 0.4 K/UL (ref 0.1–1.3)
ALBUMIN SERPL-MCNC: 3.7 G/DL (ref 3.5–5.2)
ALBUMIN/GLOBULIN RATIO: ABNORMAL (ref 1–2.5)
ALP BLD-CCNC: 103 U/L (ref 40–129)
ALT SERPL-CCNC: 37 U/L (ref 5–41)
ANION GAP SERPL CALCULATED.3IONS-SCNC: 10 MMOL/L (ref 9–17)
AST SERPL-CCNC: 38 U/L
BASOPHILS # BLD: 1 % (ref 0–2)
BASOPHILS ABSOLUTE: 0 K/UL (ref 0–0.2)
BILIRUB SERPL-MCNC: 2.84 MG/DL (ref 0.3–1.2)
BUN BLDV-MCNC: 18 MG/DL (ref 6–20)
BUN/CREAT BLD: ABNORMAL (ref 9–20)
CALCIUM SERPL-MCNC: 9.3 MG/DL (ref 8.6–10.4)
CHLORIDE BLD-SCNC: 106 MMOL/L (ref 98–107)
CO2: 24 MMOL/L (ref 20–31)
CREAT SERPL-MCNC: 0.8 MG/DL (ref 0.7–1.2)
DIFFERENTIAL TYPE: ABNORMAL
EOSINOPHILS RELATIVE PERCENT: 2 % (ref 0–4)
GFR AFRICAN AMERICAN: >60 ML/MIN
GFR NON-AFRICAN AMERICAN: >60 ML/MIN
GFR SERPL CREATININE-BSD FRML MDRD: ABNORMAL ML/MIN/{1.73_M2}
GFR SERPL CREATININE-BSD FRML MDRD: ABNORMAL ML/MIN/{1.73_M2}
GLUCOSE BLD-MCNC: 264 MG/DL (ref 70–99)
HCT VFR BLD CALC: 34.7 % (ref 41–53)
HEMOGLOBIN: 11.3 G/DL (ref 13.5–17.5)
IMMATURE GRANULOCYTES: ABNORMAL %
LYMPHOCYTES # BLD: 20 % (ref 24–44)
MCH RBC QN AUTO: 28.5 PG (ref 26–34)
MCHC RBC AUTO-ENTMCNC: 32.7 G/DL (ref 31–37)
MCV RBC AUTO: 87.3 FL (ref 80–100)
MONOCYTES # BLD: 11 % (ref 1–7)
NRBC AUTOMATED: ABNORMAL PER 100 WBC
PDW BLD-RTO: 15.5 % (ref 11.5–14.9)
PLATELET # BLD: 56 K/UL (ref 150–450)
PLATELET ESTIMATE: ABNORMAL
PMV BLD AUTO: 8.6 FL (ref 6–12)
POTASSIUM SERPL-SCNC: 3.8 MMOL/L (ref 3.7–5.3)
RBC # BLD: 3.97 M/UL (ref 4.5–5.9)
RBC # BLD: ABNORMAL 10*6/UL
SEG NEUTROPHILS: 66 % (ref 36–66)
SEGMENTED NEUTROPHILS ABSOLUTE COUNT: 2.3 K/UL (ref 1.3–9.1)
SODIUM BLD-SCNC: 140 MMOL/L (ref 135–144)
TOTAL PROTEIN: 6.6 G/DL (ref 6.4–8.3)
WBC # BLD: 3.5 K/UL (ref 3.5–11)
WBC # BLD: ABNORMAL 10*3/UL

## 2021-04-04 PROCEDURE — 2580000003 HC RX 258: Performed by: STUDENT IN AN ORGANIZED HEALTH CARE EDUCATION/TRAINING PROGRAM

## 2021-04-04 PROCEDURE — 96375 TX/PRO/DX INJ NEW DRUG ADDON: CPT

## 2021-04-04 PROCEDURE — 96374 THER/PROPH/DIAG INJ IV PUSH: CPT

## 2021-04-04 PROCEDURE — 36415 COLL VENOUS BLD VENIPUNCTURE: CPT

## 2021-04-04 PROCEDURE — 99283 EMERGENCY DEPT VISIT LOW MDM: CPT

## 2021-04-04 PROCEDURE — 6360000002 HC RX W HCPCS: Performed by: STUDENT IN AN ORGANIZED HEALTH CARE EDUCATION/TRAINING PROGRAM

## 2021-04-04 PROCEDURE — 80053 COMPREHEN METABOLIC PANEL: CPT

## 2021-04-04 PROCEDURE — 85025 COMPLETE CBC W/AUTO DIFF WBC: CPT

## 2021-04-04 RX ORDER — KETOROLAC TROMETHAMINE 30 MG/ML
30 INJECTION, SOLUTION INTRAMUSCULAR; INTRAVENOUS ONCE
Status: COMPLETED | OUTPATIENT
Start: 2021-04-04 | End: 2021-04-04

## 2021-04-04 RX ORDER — TAMSULOSIN HYDROCHLORIDE 0.4 MG/1
0.4 CAPSULE ORAL DAILY
Qty: 30 CAPSULE | Refills: 0 | Status: SHIPPED | OUTPATIENT
Start: 2021-04-04 | End: 2021-07-06 | Stop reason: SDUPTHER

## 2021-04-04 RX ORDER — 0.9 % SODIUM CHLORIDE 0.9 %
1000 INTRAVENOUS SOLUTION INTRAVENOUS ONCE
Status: COMPLETED | OUTPATIENT
Start: 2021-04-04 | End: 2021-04-04

## 2021-04-04 RX ORDER — FENTANYL CITRATE 50 UG/ML
75 INJECTION, SOLUTION INTRAMUSCULAR; INTRAVENOUS ONCE
Status: COMPLETED | OUTPATIENT
Start: 2021-04-04 | End: 2021-04-04

## 2021-04-04 RX ORDER — ONDANSETRON 2 MG/ML
4 INJECTION INTRAMUSCULAR; INTRAVENOUS ONCE
Status: COMPLETED | OUTPATIENT
Start: 2021-04-04 | End: 2021-04-04

## 2021-04-04 RX ADMIN — ONDANSETRON 4 MG: 2 INJECTION, SOLUTION INTRAMUSCULAR; INTRAVENOUS at 17:25

## 2021-04-04 RX ADMIN — FENTANYL CITRATE 75 MCG: 50 INJECTION, SOLUTION INTRAMUSCULAR; INTRAVENOUS at 17:24

## 2021-04-04 RX ADMIN — SODIUM CHLORIDE 1000 ML: 9 INJECTION, SOLUTION INTRAVENOUS at 17:25

## 2021-04-04 RX ADMIN — KETOROLAC TROMETHAMINE 30 MG: 30 INJECTION, SOLUTION INTRAMUSCULAR; INTRAVENOUS at 17:25

## 2021-04-04 ASSESSMENT — ENCOUNTER SYMPTOMS
SORE THROAT: 0
STRIDOR: 0
PHOTOPHOBIA: 0
BLOOD IN STOOL: 0
DIARRHEA: 0
NAUSEA: 1
ABDOMINAL PAIN: 0
VOMITING: 1
CONSTIPATION: 0
RHINORRHEA: 0

## 2021-04-04 ASSESSMENT — PAIN SCALES - GENERAL
PAINLEVEL_OUTOF10: 10
PAINLEVEL_OUTOF10: 10

## 2021-04-04 NOTE — ED PROVIDER NOTES
[address]  Emergency Department Encounter  EmergencyMedicine Resident     Pt Name:Jon Schroeder  MRN: [id number]  [place] 1965  Date of evaluation: 4/4/21  PCP:  Glory Sparks MD    [address] [place] [address]       Chief Complaint   Patient presents with    Flank Pain     Rt flank pain; Lithotripsy on 04/02/2021    Nausea    Emesis    Hematuria       HISTORY OF PRESENT ILLNESS  (Location/Symptom, Timing/Onset, Context/Setting, Quality, Duration, Modifying Factors, Severity.)      Cristian Cook is a 55 y.o. male who presents with complaint of right flank pain patient underwent a lithotripsy on 4/2/2021 she is presenting with significant pain renal colic nausea vomiting decreased p.o. afebrile    PAST MEDICAL / SURGICAL / SOCIAL / FAMILY HISTORY      has a past medical history of Calculus of kidney, Cerebral artery occlusion with cerebral infarction ([facility] [address] Cirrhosis ([address] COPD (chronic obstructive pulmonary disease) ([address] Essential hypertension, benign, Gout, unspecified, H/O colonoscopy, Mitral valve disorders(424.0), СЕРГЕЙ on CPAP, Other and unspecified hyperlipidemia, Type II or unspecified type diabetes mellitus without mention of complication, not stated as uncontrolled, Unspecified chronic liver disease without mention of alcohol, and Wellness examination. has a past surgical history that includes Lithotripsy; Upper gastrointestinal endoscopy (N/A, 5/25/2018); and Lithotripsy (04/02/2021).       Social History     Socioeconomic History    Marital status:      Spouse name: Not on file    Number of children: Not on file    Years of education: Not on file    Highest education level: Not on file   Occupational History    Not on file   Social Needs    Financial resource strain: Not on file    Food insecurity     Worry: Not on file     Inability: Not on file    Transportation needs     Medical: Not on file     Non-medical: Not on file   Tobacco Use    Smoking status: Former Smoker cephALEXin (KEFLEX) 500 MG capsule Take 1 capsule by mouth 2 times daily  Patient not taking: Reported on 3/31/2021 3/22/21   Andreia Turner MD   clotrimazole (LOTRIMIN) 1 % cream APPLY CREAM TOPICALLY TWICE DAILY 11/2/20   Pasha Smith MD   omeprazole (PRILOSEC) 20 MG delayed release capsule Take 1 capsule by mouth Daily 1/27/21   Lorin Florentino MD   metFORMIN (GLUCOPHAGE) 1000 MG tablet Take 1 tablet by mouth 2 times daily (with meals) 12/10/20   FAUSTO Kerr CNP   lisinopril (PRINIVIL;ZESTRIL) 5 MG tablet Take 1 tablet by mouth once daily 12/10/20   FAUSTO Kerr CNP   metoprolol tartrate (LOPRESSOR) 25 MG tablet Take 1 tablet by mouth 2 times daily 12/10/20   FAUSTO Kerr CNP   atorvastatin (LIPITOR) 40 MG tablet Take 1 tablet by mouth nightly 10/16/20   Cherie Springer MD   allopurinol (ZYLOPRIM) 100 MG tablet Take 1 tablet by mouth once daily 8/5/20   Lorin Florentino MD   blood glucose test strips (ASCENSIA AUTODISC VI;ONE TOUCH ULTRA TEST VI) strip Use with associated glucose meter. 7/22/20   Aaron Richardson PA-C   fluticasone-salmeterol (ADVAIR DISKUS) 100-50 MCG/DOSE diskus inhaler INHALE 1 PUFF BY MOUTH EVERY 12 HOURS 7/1/20   Aaron Richardson PA-C   SITagliptin (JANUVIA) 100 MG tablet TAKE 1 TABLET BY MOUTH DAILY 6/8/20   Lorin Florentino MD   albuterol sulfate HFA (VENTOLIN HFA) 108 (90 Base) MCG/ACT inhaler Inhale 1 puff into the lungs every 4 hours as needed for Wheezing 5/20/20   Lorin Florentino MD   glimepiride (AMARYL) 4 MG tablet TAKE 1 TABLET BY MOUTH TWICE DAILY 4/6/20   Aaron Richardson PA-C   blood glucose monitor strips 1 strip by Other route 4 times daily Test 4  times a day & as needed for symptoms of irregular blood glucose.  5/3/19   Lorin Florentino MD   [employer] LANCETS ULTRATHIN 30G MISC 1 each by Other route 3 times daily 10/19/18   Lorin Florentino MD   acetaminophen (AMINOFEN) 325 MG tablet Take 2 tablets by mouth every 6 hours as needed for Pain 10/15/18   Nancy Curtis MD       REVIEW OF SYSTEMS    (2-9 systems for level 4, 10 or more for level 5)      Review of Systems   Constitutional: Positive for activity change, appetite change, chills and fatigue. Negative for fever. HENT: Negative for congestion, rhinorrhea and sore throat. Eyes: Negative for photophobia. Respiratory: Negative for stridor. Cardiovascular: Negative for chest pain. Gastrointestinal: Positive for nausea and vomiting. Negative for abdominal pain, blood in stool, constipation and diarrhea. Genitourinary: Positive for decreased urine volume, flank pain and hematuria. Musculoskeletal: Negative for neck pain. Skin: Negative for rash and wound. Neurological: Negative for headaches. Psychiatric/Behavioral: Negative for agitation. PHYSICAL EXAM   (up to 7 for level 4, 8 or more for level 5)      INITIAL VITALS:   BP (!) 150/72   Pulse 78   Temp 98.4 °F (36.9 °C) (Oral)   Resp 16   Ht 6' (1.829 m)   Wt 230 lb (104.3 kg)   SpO2 97%   BMI 31.19 kg/m²     Physical Exam  Constitutional:       Appearance: He is obese. He is ill-appearing. He is not toxic-appearing. HENT:      Head: Normocephalic. Right Ear: External ear normal.      Left Ear: External ear normal.      Nose: Nose normal.      Mouth/Throat:      Mouth: Mucous membranes are moist.   Eyes:      Conjunctiva/sclera: Conjunctivae normal.   Neck:      Musculoskeletal: Normal range of motion. Cardiovascular:      Rate and Rhythm: Normal rate. Pulses: Normal pulses. Pulmonary:      Effort: Pulmonary effort is normal.   Abdominal:      Tenderness: There is abdominal tenderness. There is right CVA tenderness and guarding. Musculoskeletal: Normal range of motion. Skin:     General: Skin is warm. Capillary Refill: Capillary refill takes less than 2 seconds. Neurological:      Mental Status: He is alert and oriented to person, place, and time.    Psychiatric:         Mood and Affect: Mood normal.         DIFFERENTIAL  DIAGNOSIS     PLAN (Kit Nodaway / Corin Engman / EKG):  Orders Placed This Encounter   Procedures    Culture, Urine    CBC with DIFF    Comprehensive Metabolic Panel    Urinalysis with Microscopic    Inpatient consult to Urology       MEDICATIONS ORDERED:  Orders Placed This Encounter   Medications    fentaNYL (SUBLIMAZE) injection 75 mcg    0.9 % sodium chloride bolus    ketorolac (TORADOL) injection 30 mg    ondansetron (ZOFRAN) injection 4 mg    tamsulosin (FLOMAX) 0.4 MG capsule     Sig: Take 1 capsule by mouth daily     Dispense:  30 capsule     Refill:  0       DDX: renal colic    DIAGNOSTIC RESULTS / EMERGENCY DEPARTMENT COURSE / MDM   LAB RESULTS:  Results for orders placed or performed during the hospital encounter of 04/04/21   CBC with DIFF   Result Value Ref Range    WBC 3.5 3.5 - 11.0 k/uL    RBC 3.97 (L) 4.5 - 5.9 m/uL    Hemoglobin 11.3 (L) 13.5 - 17.5 g/dL    Hematocrit 34.7 (L) 41 - 53 %    MCV 87.3 80 - 100 fL    MCH 28.5 26 - 34 pg    MCHC 32.7 31 - 37 g/dL    RDW 15.5 (H) 11.5 - 14.9 %    Platelets 56 (L) 679 - 450 k/uL    MPV 8.6 6.0 - 12.0 fL    NRBC Automated NOT REPORTED per 100 WBC    Differential Type NOT REPORTED     Immature Granulocytes NOT REPORTED 0 %    Absolute Immature Granulocyte NOT REPORTED 0.00 - 0.30 k/uL    WBC Morphology NOT REPORTED     RBC Morphology NOT REPORTED     Platelet Estimate NOT REPORTED     Seg Neutrophils 66 36 - 66 %    Lymphocytes 20 (L) 24 - 44 %    Monocytes 11 (H) 1 - 7 %    Eosinophils % 2 0 - 4 %    Basophils 1 0 - 2 %    Segs Absolute 2.30 1.3 - 9.1 k/uL    Absolute Lymph # 0.70 (L) 1.0 - 4.8 k/uL    Absolute Mono # 0.40 0.1 - 1.3 k/uL    Absolute Eos # 0.10 0.0 - 0.4 k/uL    Basophils Absolute 0.00 0.0 - 0.2 k/uL   Comprehensive Metabolic Panel   Result Value Ref Range    Glucose 264 (H) 70 - 99 mg/dL    BUN 18 6 - 20 mg/dL    CREATININE 0.80 0.70 - 1.20 mg/dL    Bun/Cre Ratio NOT REPORTED 9 - 20    Calcium 9.3 8.6 - 10.4 mg/dL    Sodium 140 135 - 144 mmol/L    Potassium 3.8 3.7 - 5.3 mmol/L    Chloride 106 98 - 107 mmol/L    CO2 24 20 - 31 mmol/L    Anion Gap 10 9 - 17 mmol/L    Alkaline Phosphatase 103 40 - 129 U/L    ALT 37 5 - 41 U/L    AST 38 <40 U/L    Total Bilirubin 2.84 (H) 0.3 - 1.2 mg/dL    Total Protein 6.6 6.4 - 8.3 g/dL    Albumin 3.7 3.5 - 5.2 g/dL    Albumin/Globulin Ratio NOT REPORTED 1.0 - 2.5    GFR Non-African American >60 >60 mL/min    GFR African American >60 >60 mL/min    GFR Comment          GFR Staging NOT REPORTED        IMPRESSION: Oriented ill-appearing nontoxic 81year-old gentleman with right-sided flank pain after lithotripsy 3 days ago unable to tolerate p.o. pain is uncontrolled at home Percocet plan will be labs, will DC IV fluids urinalysis and reevaluation    RADIOLOGY:  No results found. EKG  none    All EKG's are interpreted by the Emergency Department Physician who either signs or Co-signs this chart in the absence of a cardiologist.    EMERGENCY DEPARTMENT COURSE:  ED Course as of Apr 04 2228   Sun Apr 04, 2021 1805 Case discussed with urology cross n.p.o. after midnight, KUB, renal ultrasound orders placed paged placed to PCP for admission.    [BG]   1809 Patient reevaluated again and now does not want to be admitted he is feeling well enough to go home    [BG]      ED Course User Index  [BG] Roz Akins,          PROCEDURES:  none    CONSULTS:  None    CRITICAL CARE:  Please see attending note    FINAL IMPRESSION      1.  Renal colic          DISPOSITION / PLAN     DISPOSITION  dc home      PATIENT REFERRED TO:  Riley Officer, [address] [place] [address] [address] [phone]    Call today      [facility] ED  [address]  [address] [address]  [phone]  Go to   As needed, If symptoms worsen    [place] [address] [phone]  Schedule an appointment as soon as possible for a visit today        DISCHARGE MEDICATIONS:  Discharge Medication List as of 4/4/2021  6:09 PM          Violeta Pathak DO  Emergency Medicine Resident    (Please note that portions of thisnote were completed with a voice recognition program.  Efforts were made to edit the dictations but occasionally words are mis-transcribed.)       Violeta Pathak DO  Resident  04/04/21 7339

## 2021-04-04 NOTE — ED PROVIDER NOTES
[address] ED  eMERGENCY dEPARTMENT eNCOUnter   Attending Attestation     Pt Name: Chris Steen  MRN: [id number]  [place] 1965  Date of evaluation: 4/4/21    History, EXAM, MDM:    Chris Steen is a 55 y.o. male who presents with Flank Pain (Rt flank pain; Lithotripsy on 04/02/2021), Nausea, Emesis, and Hematuria    Pt had lithotripsy 2 days ago. Pt having nausea, vomiting, hematuria. On exam, pt does not appear septic. VSS. No cva ttp. Laboratory studies show normal wbc count. Normal renal function, . Pt treated with zofran, toradol, fentanyl. Reports feels better and would like to go home. Recommended close follow up with urology, return to ED if symptoms worsen, and warning precautions provided. Vitals:   Vitals:    04/04/21 1635   BP: (!) 150/72   Pulse: 78   Resp: 16   Temp: 98.4 °F (36.9 °C)   TempSrc: Oral   SpO2: 97%   Weight: 230 lb (104.3 kg)   Height: 6' (1.829 m)     I performed a history and physical examination of the patient and discussed management with the resident. I reviewed the residents note and agree with the documented findings and plan of care. Any areas of disagreement are noted on the chart. I was personally present for the key portions of any procedures. I have documented in the chart those procedures where I was not present during the key portions. I have personally reviewed all images and agree with the resident's interpretation. I have reviewed the emergency nurses triage note. I agree with the chief complaint, past medical history, past surgical history, allergies, medications, social and family history as documented unless otherwise noted below. Documentation of the HPI, Physical Exam and Medical Decision Making performed by medical students or scribes is based on my personal performance of the HPI, PE and MDM.  For Phys Assistant/ Nurse Practitioner cases/documentation I have had a face to face evaluation of this patient and have completed at least

## 2021-04-04 NOTE — ED NOTES
Discharge instructions reviewed with patient verbalizing understanding of all instructions given. Patient discharged via wheel chair with wife accompanying without incident.       Marguerite Phipps RN  04/04/21 7338

## 2021-04-04 NOTE — ED NOTES
Patient complains of right flank pain that radiates from flank towards patient's back. Patient responded well to pain management administered. Patient verbalized relief and is rooming in with patient's wife at bedside.       Yoselyn Torres RN  04/04/21 7790

## 2021-04-05 ENCOUNTER — CARE COORDINATION (OUTPATIENT)
Dept: CARE COORDINATION | Age: 56
End: 2021-04-05

## 2021-04-05 DIAGNOSIS — N20.0 KIDNEY STONE: Primary | ICD-10-CM

## 2021-04-05 ASSESSMENT — ENCOUNTER SYMPTOMS
DIARRHEA: 0
CHEST TIGHTNESS: 0
ABDOMINAL PAIN: 0
COUGH: 0
CONSTIPATION: 0
SHORTNESS OF BREATH: 0
FACIAL SWELLING: 0
BACK PAIN: 0
ABDOMINAL DISTENTION: 0
APNEA: 0
COLOR CHANGE: 0
WHEEZING: 0

## 2021-04-05 NOTE — CARE COORDINATION
Attempt to reach patient as a follow up from ED visit on 4.4.21. Non covid related  No VM picked up, unable to leave message.

## 2021-04-06 DIAGNOSIS — M10.9 GOUT, UNSPECIFIED CAUSE, UNSPECIFIED CHRONICITY, UNSPECIFIED SITE: ICD-10-CM

## 2021-04-06 DIAGNOSIS — I10 ESSENTIAL HYPERTENSION: ICD-10-CM

## 2021-04-06 RX ORDER — ALLOPURINOL 100 MG/1
100 TABLET ORAL DAILY
Qty: 90 TABLET | Refills: 1 | Status: SHIPPED | OUTPATIENT
Start: 2021-04-06 | End: 2021-10-22

## 2021-04-06 RX ORDER — LISINOPRIL 5 MG/1
TABLET ORAL
Qty: 90 TABLET | Refills: 1 | Status: SHIPPED | OUTPATIENT
Start: 2021-04-06 | End: 2021-10-22

## 2021-04-22 ENCOUNTER — TELEPHONE (OUTPATIENT)
Dept: INTERNAL MEDICINE CLINIC | Age: 56
End: 2021-04-22

## 2021-04-22 DIAGNOSIS — J44.9 CHRONIC OBSTRUCTIVE PULMONARY DISEASE, UNSPECIFIED COPD TYPE (HCC): ICD-10-CM

## 2021-04-22 RX ORDER — ALBUTEROL SULFATE 90 UG/1
1 AEROSOL, METERED RESPIRATORY (INHALATION) EVERY 4 HOURS PRN
Qty: 18 G | Refills: 6 | Status: SHIPPED | OUTPATIENT
Start: 2021-04-22 | End: 2022-02-25 | Stop reason: SDUPTHER

## 2021-04-22 NOTE — TELEPHONE ENCOUNTER
Patients daughter calling on behalf of patient, states that recreantly Dr Effie Guillen discontinued blood thinner Plavix,and wanted to inform that Specialist Dr Alfredo Frank  put him back on it.would like to know from Dr Effie Guillen if this is okay?

## 2021-04-22 NOTE — TELEPHONE ENCOUNTER
Medication: Advair and Albuterol   Last visit: 3/31/2021  Next visit: 7/12/2021  Last refill: 7/1/20  Pharmacy: [facility] / [facility]

## 2021-04-28 ENCOUNTER — TELEPHONE (OUTPATIENT)
Dept: UROLOGY | Age: 56
End: 2021-04-28

## 2021-04-28 NOTE — TELEPHONE ENCOUNTER
Russell Hopepr from responsible party-asked if she had to set up  f/u appt called her back and let her know pt was already scheduled-has an appt July 26.

## 2021-05-07 ENCOUNTER — TELEPHONE (OUTPATIENT)
Dept: INTERNAL MEDICINE CLINIC | Age: 56
End: 2021-05-07

## 2021-05-07 NOTE — TELEPHONE ENCOUNTER
Patient's spouse ask if there is a generic medication for Januvia, pt spouse stated insurance company does not cover [facility]. Patient has Colgatkaiden Mcnally. Please Advise.

## 2021-05-10 RX ORDER — ATORVASTATIN CALCIUM 40 MG/1
40 TABLET, FILM COATED ORAL NIGHTLY
Qty: 30 TABLET | Refills: 0 | Status: SHIPPED | OUTPATIENT
Start: 2021-05-10 | End: 2021-06-10 | Stop reason: SDUPTHER

## 2021-05-11 DIAGNOSIS — K21.9 GASTROESOPHAGEAL REFLUX DISEASE WITHOUT ESOPHAGITIS: ICD-10-CM

## 2021-05-11 RX ORDER — GLIMEPIRIDE 4 MG/1
TABLET ORAL
Qty: 60 TABLET | Refills: 11 | Status: SHIPPED | OUTPATIENT
Start: 2021-05-11 | End: 2022-02-25 | Stop reason: SDUPTHER

## 2021-05-11 RX ORDER — OMEPRAZOLE 20 MG/1
20 CAPSULE, DELAYED RELEASE ORAL DAILY
Qty: 30 CAPSULE | Refills: 3 | Status: SHIPPED | OUTPATIENT
Start: 2021-05-11 | End: 2021-06-10 | Stop reason: SDUPTHER

## 2021-05-11 NOTE — TELEPHONE ENCOUNTER
Spoke to pt's wife, Surjit Cabrera (on HIPPA), advised no generic for Januvia available at this time. Stated she would need to call insurance and see what they do cover.

## 2021-05-11 NOTE — TELEPHONE ENCOUNTER
Received a letter from [facility], Shayy Valdes is approved from 4/28/21-5/5/22. Left detailed message on Patti's vmail. She is on HIPAA.

## 2021-05-26 VITALS
HEART RATE: 96 BPM | TEMPERATURE: 97.9 F | DIASTOLIC BLOOD PRESSURE: 89 MMHG | RESPIRATION RATE: 16 BRPM | OXYGEN SATURATION: 97 % | SYSTOLIC BLOOD PRESSURE: 147 MMHG

## 2021-05-26 LAB
-: ABNORMAL
AMORPHOUS: ABNORMAL
BACTERIA: ABNORMAL
BILIRUBIN URINE: NEGATIVE
CASTS UA: ABNORMAL /LPF
COLOR: YELLOW
COMMENT UA: ABNORMAL
CRYSTALS, UA: ABNORMAL /HPF
EPITHELIAL CELLS UA: ABNORMAL /HPF
GLUCOSE URINE: ABNORMAL
KETONES, URINE: NEGATIVE
LEUKOCYTE ESTERASE, URINE: NEGATIVE
MUCUS: ABNORMAL
NITRITE, URINE: NEGATIVE
OTHER OBSERVATIONS UA: ABNORMAL
PH UA: 5.5 (ref 5–8)
PROTEIN UA: NEGATIVE
RBC UA: ABNORMAL /HPF
RENAL EPITHELIAL, UA: ABNORMAL /HPF
SPECIFIC GRAVITY UA: 1.04 (ref 1–1.03)
TRICHOMONAS: ABNORMAL
TURBIDITY: CLEAR
URINE HGB: NEGATIVE
UROBILINOGEN, URINE: ABNORMAL
WBC UA: ABNORMAL /HPF
YEAST: ABNORMAL

## 2021-05-26 PROCEDURE — 87086 URINE CULTURE/COLONY COUNT: CPT

## 2021-05-26 PROCEDURE — 81001 URINALYSIS AUTO W/SCOPE: CPT

## 2021-05-27 ENCOUNTER — HOSPITAL ENCOUNTER (EMERGENCY)
Age: 56
Discharge: HOME | End: 2021-05-27
Attending: STUDENT IN AN ORGANIZED HEALTH CARE EDUCATION/TRAINING PROGRAM
Payer: MEDICAID

## 2021-05-27 DIAGNOSIS — R30.0 DYSURIA: Primary | ICD-10-CM

## 2021-05-27 PROCEDURE — 6370000000 HC RX 637 (ALT 250 FOR IP): Performed by: STUDENT IN AN ORGANIZED HEALTH CARE EDUCATION/TRAINING PROGRAM

## 2021-05-27 PROCEDURE — 99283 EMERGENCY DEPT VISIT LOW MDM: CPT

## 2021-05-27 RX ORDER — CEPHALEXIN 500 MG/1
500 CAPSULE ORAL 2 TIMES DAILY
Qty: 14 CAPSULE | Refills: 0 | Status: SHIPPED | OUTPATIENT
Start: 2021-05-27 | End: 2021-06-03

## 2021-05-27 RX ORDER — CEPHALEXIN 250 MG/1
500 CAPSULE ORAL ONCE
Status: COMPLETED | OUTPATIENT
Start: 2021-05-27 | End: 2021-05-27

## 2021-05-27 RX ADMIN — CEPHALEXIN 500 MG: 250 CAPSULE ORAL at 00:42

## 2021-05-27 ASSESSMENT — ENCOUNTER SYMPTOMS
EYE REDNESS: 0
DIARRHEA: 0
SHORTNESS OF BREATH: 0
FACIAL SWELLING: 0
RHINORRHEA: 0
EYE PAIN: 0
ABDOMINAL PAIN: 0
SORE THROAT: 0
NAUSEA: 0
BACK PAIN: 0
COLOR CHANGE: 0
COUGH: 0
VOMITING: 0

## 2021-05-27 NOTE — ED PROVIDER NOTES
EMERGENCY DEPARTMENT ENCOUNTER    Pt Name: Ana Moody  MRN: [id number]  [place] 1965  Date of evaluation: 5/27/21  CHIEF COMPLAINT       Chief Complaint   Patient presents with    Dysuria     HISTORY OF PRESENT ILLNESS   HPI  56-year-old male history of kidney stones, COPD presents for evaluation of dysuria. Ongoing symptoms for the past day or so. No blood in his urine. No fever chills. No abdominal pain. No back pain. No chest pain or shortness of breath. No penile discharge. No skin lesions. No testicular pain. Symptoms are moderate and intermittent. REVIEW OF SYSTEMS     Review of Systems   Constitutional: Negative for chills and fatigue. HENT: Negative for facial swelling, nosebleeds, rhinorrhea and sore throat. Eyes: Negative for pain and redness. Respiratory: Negative for cough and shortness of breath. Cardiovascular: Negative for chest pain and leg swelling. Gastrointestinal: Negative for abdominal pain, diarrhea, nausea and vomiting. Genitourinary: Positive for dysuria. Negative for flank pain and hematuria. Musculoskeletal: Negative for arthralgias and back pain. Skin: Negative for color change and rash. Neurological: Negative for dizziness, tremors, facial asymmetry, speech difficulty, weakness and numbness.      PASTMEDICAL HISTORY     Past Medical History:   Diagnosis Date    Calculus of kidney     Cerebral artery occlusion with cerebral infarction ([address] 2020    slight memory problem (can't remember name of neurologist)    Cirrhosis ([address]     sees PCP for this    COPD (chronic obstructive pulmonary disease) ([address]     does not see pulmonology    Essential hypertension, benign     Gout, unspecified     H/O colonoscopy 04/11/2016 2016    Mitral valve disorders(424.0)     СЕРГЕЙ on CPAP     not currently using due to malfunctioning    Other and unspecified hyperlipidemia     Type II or unspecified type diabetes mellitus without mention of complication, He indicated that both of his brothers are alive. SOCIAL HISTORY       Social History     Tobacco Use    Smoking status: Former Smoker     Packs/day: 0.50     Years: 30.00     Pack years: 15.00     Types: Cigarettes     Quit date: 3/26/2012     Years since quittin.1    Smokeless tobacco: Former User   Vaping Use    Vaping Use: Never used   Substance Use Topics    Alcohol use: Not Currently     Alcohol/week: 0.0 standard drinks     Comment: used to drink \"a lot\"  None since Dec 2020    Drug use: Yes     Types: Marijuana     Comment: every evening     PHYSICAL EXAM     INITIAL VITALS: BP (!) 147/89   Pulse 96   Temp 97.9 °F (36.6 °C) (Oral)   Resp 16   SpO2 97%    Physical Exam  Constitutional:       Appearance: Normal appearance. HENT:      Head: Normocephalic and atraumatic. Nose: Nose normal.   Eyes:      Extraocular Movements: Extraocular movements intact. Pupils: Pupils are equal, round, and reactive to light. Cardiovascular:      Rate and Rhythm: Normal rate and regular rhythm. Pulmonary:      Effort: Pulmonary effort is normal. No respiratory distress. Breath sounds: Normal breath sounds. Abdominal:      General: Abdomen is flat. There is no distension. Palpations: Abdomen is soft. There is no mass. Musculoskeletal:         General: No swelling. Normal range of motion. Cervical back: Normal range of motion. No rigidity. Skin:     General: Skin is warm and dry. Neurological:      General: No focal deficit present. Mental Status: He is alert. Mental status is at baseline. Cranial Nerves: No cranial nerve deficit. MEDICAL DECISION MAKIN-year-old male presents for evaluation of dysuria. No testicular pain doubt torsion. No hematuria back pain abdominal pain doubt stone. Normal vital signs patient is not septic today. Borderline signs of infection on urinalysis with some white cells and bacteria.   With symptoms we will send off a List as of 5/27/2021  1:14 AM      START taking these medications    Details   !! cephALEXin (KEFLEX) 500 MG capsule Take 1 capsule by mouth 2 times daily for 7 days, Disp-14 capsule, R-0Print       !! - Potential duplicate medications found. Please discuss with provider.         Venkat Hannon MD  Attending Emergency Physician                    Venkat Hannon MD  05/27/21 3318

## 2021-05-28 ENCOUNTER — CARE COORDINATION (OUTPATIENT)
Dept: CARE COORDINATION | Age: 56
End: 2021-05-28

## 2021-05-28 LAB
CULTURE: NORMAL
Lab: NORMAL
SPECIMEN DESCRIPTION: NORMAL

## 2021-05-28 NOTE — CARE COORDINATION
1st attempt to follow up with patient regarding recent ED visit. Patient seen for dysuria. Unable to leave VM.

## 2021-06-01 ENCOUNTER — CARE COORDINATION (OUTPATIENT)
Dept: CARE COORDINATION | Age: 56
End: 2021-06-01

## 2021-06-10 DIAGNOSIS — K21.9 GASTROESOPHAGEAL REFLUX DISEASE WITHOUT ESOPHAGITIS: ICD-10-CM

## 2021-06-10 DIAGNOSIS — E11.65 TYPE 2 DIABETES MELLITUS WITH HYPERGLYCEMIA, WITHOUT LONG-TERM CURRENT USE OF INSULIN (HCC): ICD-10-CM

## 2021-06-10 RX ORDER — ATORVASTATIN CALCIUM 40 MG/1
40 TABLET, FILM COATED ORAL NIGHTLY
Qty: 90 TABLET | Refills: 1 | Status: SHIPPED | OUTPATIENT
Start: 2021-06-10 | End: 2022-02-25 | Stop reason: SDUPTHER

## 2021-06-10 RX ORDER — OMEPRAZOLE 20 MG/1
20 CAPSULE, DELAYED RELEASE ORAL DAILY
Qty: 90 CAPSULE | Refills: 1 | Status: SHIPPED | OUTPATIENT
Start: 2021-06-10 | End: 2021-10-22 | Stop reason: SDUPTHER

## 2021-06-14 ENCOUNTER — HOSPITAL ENCOUNTER (EMERGENCY)
Age: 56
Discharge: LEFT BEFORE DISCHARGE | End: 2021-06-14
Payer: MEDICAID

## 2021-06-14 VITALS
BODY MASS INDEX: 31.15 KG/M2 | WEIGHT: 230 LBS | HEIGHT: 72 IN | RESPIRATION RATE: 15 BRPM | HEART RATE: 74 BPM | OXYGEN SATURATION: 98 % | DIASTOLIC BLOOD PRESSURE: 82 MMHG | SYSTOLIC BLOOD PRESSURE: 143 MMHG | TEMPERATURE: 98.2 F

## 2021-06-14 ASSESSMENT — PAIN SCALES - GENERAL: PAINLEVEL_OUTOF10: 7

## 2021-06-14 ASSESSMENT — PAIN DESCRIPTION - LOCATION: LOCATION: SHOULDER

## 2021-06-14 ASSESSMENT — PAIN DESCRIPTION - ORIENTATION: ORIENTATION: LEFT

## 2021-06-15 ENCOUNTER — NURSE TRIAGE (OUTPATIENT)
Dept: OTHER | Facility: CLINIC | Age: 56
End: 2021-06-15

## 2021-06-15 NOTE — TELEPHONE ENCOUNTER
Reason for Disposition   Caller has already spoken with the PCP (or office), and has no further questions    Protocols used: NO CONTACT OR DUPLICATE CONTACT CALL-ADULT-[place]    Received call from Hailey Harrington at [facility]) Karen with Red Flag Complaint. Brief description of triage:no triage needed daughter calling for ER follow up symptoms the same , no worsening symptoms stated        Care advice provided, patient verbalizes understanding; denies any other questions or concerns; instructed to call back for any new or worsening symptoms. Writer provided warm transfer to Hailey Harrington at [facility] for appointment scheduling. Attention Provider: Thank you for allowing me to participate in the care of your patient. The patient was connected to triage in response to information provided to the ECC. Please do not respond through this encounter as the response is not directed to a shared pool.

## 2021-06-16 ENCOUNTER — OFFICE VISIT (OUTPATIENT)
Dept: INTERNAL MEDICINE CLINIC | Age: 56
End: 2021-06-16
Payer: MEDICAID

## 2021-06-16 VITALS
TEMPERATURE: 97.7 F | SYSTOLIC BLOOD PRESSURE: 136 MMHG | BODY MASS INDEX: 31.56 KG/M2 | WEIGHT: 233 LBS | HEIGHT: 72 IN | DIASTOLIC BLOOD PRESSURE: 80 MMHG

## 2021-06-16 DIAGNOSIS — M25.512 CHRONIC LEFT SHOULDER PAIN: Primary | ICD-10-CM

## 2021-06-16 DIAGNOSIS — J44.9 CHRONIC OBSTRUCTIVE PULMONARY DISEASE, UNSPECIFIED COPD TYPE (HCC): ICD-10-CM

## 2021-06-16 DIAGNOSIS — E11.65 TYPE 2 DIABETES MELLITUS WITH HYPERGLYCEMIA, WITHOUT LONG-TERM CURRENT USE OF INSULIN (HCC): ICD-10-CM

## 2021-06-16 DIAGNOSIS — I10 ESSENTIAL HYPERTENSION: ICD-10-CM

## 2021-06-16 DIAGNOSIS — G89.29 CHRONIC LEFT SHOULDER PAIN: Primary | ICD-10-CM

## 2021-06-16 PROCEDURE — 3023F SPIROM DOC REV: CPT | Performed by: INTERNAL MEDICINE

## 2021-06-16 PROCEDURE — 3017F COLORECTAL CA SCREEN DOC REV: CPT | Performed by: INTERNAL MEDICINE

## 2021-06-16 PROCEDURE — G8417 CALC BMI ABV UP PARAM F/U: HCPCS | Performed by: INTERNAL MEDICINE

## 2021-06-16 PROCEDURE — 3052F HG A1C>EQUAL 8.0%<EQUAL 9.0%: CPT | Performed by: INTERNAL MEDICINE

## 2021-06-16 PROCEDURE — 2022F DILAT RTA XM EVC RTNOPTHY: CPT | Performed by: INTERNAL MEDICINE

## 2021-06-16 PROCEDURE — G8926 SPIRO NO PERF OR DOC: HCPCS | Performed by: INTERNAL MEDICINE

## 2021-06-16 PROCEDURE — 99214 OFFICE O/P EST MOD 30 MIN: CPT | Performed by: INTERNAL MEDICINE

## 2021-06-16 PROCEDURE — 1036F TOBACCO NON-USER: CPT | Performed by: INTERNAL MEDICINE

## 2021-06-16 PROCEDURE — G8427 DOCREV CUR MEDS BY ELIG CLIN: HCPCS | Performed by: INTERNAL MEDICINE

## 2021-06-16 RX ORDER — CLOPIDOGREL BISULFATE 75 MG/1
TABLET ORAL
COMMUNITY
Start: 2021-04-16 | End: 2022-01-27 | Stop reason: ALTCHOICE

## 2021-06-16 RX ORDER — AMOXICILLIN 500 MG/1
CAPSULE ORAL
COMMUNITY
Start: 2021-04-17 | End: 2021-08-30 | Stop reason: ALTCHOICE

## 2021-06-16 SDOH — ECONOMIC STABILITY: FOOD INSECURITY: WITHIN THE PAST 12 MONTHS, YOU WORRIED THAT YOUR FOOD WOULD RUN OUT BEFORE YOU GOT MONEY TO BUY MORE.: NEVER TRUE

## 2021-06-16 SDOH — ECONOMIC STABILITY: FOOD INSECURITY: WITHIN THE PAST 12 MONTHS, THE FOOD YOU BOUGHT JUST DIDN'T LAST AND YOU DIDN'T HAVE MONEY TO GET MORE.: NEVER TRUE

## 2021-06-16 ASSESSMENT — ENCOUNTER SYMPTOMS
SHORTNESS OF BREATH: 0
COUGH: 0
WHEEZING: 0
CONSTIPATION: 0
DIARRHEA: 0
CHEST TIGHTNESS: 0
ABDOMINAL DISTENTION: 0
FACIAL SWELLING: 0
APNEA: 0
BACK PAIN: 0
ABDOMINAL PAIN: 0
COLOR CHANGE: 0

## 2021-06-16 ASSESSMENT — SOCIAL DETERMINANTS OF HEALTH (SDOH): HOW HARD IS IT FOR YOU TO PAY FOR THE VERY BASICS LIKE FOOD, HOUSING, MEDICAL CARE, AND HEATING?: NOT HARD AT ALL
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
## 2021-07-06 DIAGNOSIS — E11.65 TYPE 2 DIABETES MELLITUS WITH HYPERGLYCEMIA, WITHOUT LONG-TERM CURRENT USE OF INSULIN (HCC): ICD-10-CM

## 2021-07-06 RX ORDER — TAMSULOSIN HYDROCHLORIDE 0.4 MG/1
0.4 CAPSULE ORAL DAILY
Qty: 90 CAPSULE | Refills: 3 | Status: SHIPPED | OUTPATIENT
Start: 2021-07-06 | End: 2022-02-25 | Stop reason: SDUPTHER

## 2021-07-06 NOTE — TELEPHONE ENCOUNTER
Medication: Flomax, Metformin  Last visit: 6/16/21  Next visit: 7/12/2021  Last refill: Flomax 4/4/21, Metformin 12/10/20  Pharmacy:  [facility]    Provider out of office

## 2021-07-07 ENCOUNTER — TELEPHONE (OUTPATIENT)
Dept: INTERNAL MEDICINE CLINIC | Age: 56
End: 2021-07-07

## 2021-07-07 DIAGNOSIS — G89.29 CHRONIC LEFT SHOULDER PAIN: Primary | ICD-10-CM

## 2021-07-07 DIAGNOSIS — M25.512 CHRONIC LEFT SHOULDER PAIN: Primary | ICD-10-CM

## 2021-07-07 NOTE — TELEPHONE ENCOUNTER
----- Message from Marlo Perez sent at 7/7/2021  1:27 PM EDT -----  Subject: Message to Provider    QUESTIONS  Information for Provider? Donna Murphy (patients daughter) is calling to request   for Uche Ervin to prescribe some medication for the patient to help   with his claustrophobia when he gets his MRI done on 07/14. Please contact   to when completed   ---------------------------------------------------------------------------  --------------  CALL BACK INFO  What is the best way for the office to contact you? OK to leave message on   voicemail  Preferred Call Back Phone Number? [phone]  ---------------------------------------------------------------------------  --------------  SCRIPT ANSWERS  Relationship to Patient? Third Party  Representative Name?  Bethel Wong.

## 2021-07-07 NOTE — TELEPHONE ENCOUNTER
Called and spoke to Elsa Greggur, pt's daughter (on HIPAA). I advised Dr Juan Alberto Allen out of the office until Monday 7/12. I stated I could send message to another provider or wait until Monday. Elsa Tuttle stated okay to wait until Monday when Dr Juan Alberto Allen is back in the office.

## 2021-07-12 RX ORDER — LORAZEPAM 0.5 MG/1
0.5 TABLET ORAL EVERY 8 HOURS PRN
Qty: 2 TABLET | Refills: 0 | Status: SHIPPED | OUTPATIENT
Start: 2021-07-12 | End: 2021-07-14

## 2021-07-12 NOTE — TELEPHONE ENCOUNTER
Called and spoke to pt's daughter, Mamie Garcia (on HIPAA), advised medication sent to the pharmacy.

## 2021-07-13 ENCOUNTER — TELEPHONE (OUTPATIENT)
Dept: ORTHOPEDIC SURGERY | Age: 56
End: 2021-07-13

## 2021-07-13 NOTE — TELEPHONE ENCOUNTER
LVM with [facility] rad dept requesting that they push the recent right shld films into the nucleus for [facility] ([facility] and [facility]).

## 2021-07-14 ENCOUNTER — HOSPITAL ENCOUNTER (OUTPATIENT)
Dept: MRI IMAGING | Age: 56
Discharge: HOME | End: 2021-07-16
Payer: MEDICAID

## 2021-07-14 DIAGNOSIS — M25.512 CHRONIC LEFT SHOULDER PAIN: ICD-10-CM

## 2021-07-14 DIAGNOSIS — G89.29 CHRONIC LEFT SHOULDER PAIN: ICD-10-CM

## 2021-07-14 PROCEDURE — 73221 MRI JOINT UPR EXTREM W/O DYE: CPT

## 2021-07-19 ENCOUNTER — OFFICE VISIT (OUTPATIENT)
Dept: ORTHOPEDIC SURGERY | Age: 56
End: 2021-07-19
Payer: MEDICAID

## 2021-07-19 VITALS — HEIGHT: 72 IN | WEIGHT: 230 LBS | BODY MASS INDEX: 31.15 KG/M2

## 2021-07-19 DIAGNOSIS — M25.512 LEFT SHOULDER PAIN, UNSPECIFIED CHRONICITY: Primary | ICD-10-CM

## 2021-07-19 PROCEDURE — 99203 OFFICE O/P NEW LOW 30 MIN: CPT | Performed by: ORTHOPAEDIC SURGERY

## 2021-07-19 PROCEDURE — 3017F COLORECTAL CA SCREEN DOC REV: CPT | Performed by: ORTHOPAEDIC SURGERY

## 2021-07-19 PROCEDURE — G8427 DOCREV CUR MEDS BY ELIG CLIN: HCPCS | Performed by: ORTHOPAEDIC SURGERY

## 2021-07-19 PROCEDURE — G8417 CALC BMI ABV UP PARAM F/U: HCPCS | Performed by: ORTHOPAEDIC SURGERY

## 2021-07-19 PROCEDURE — 1036F TOBACCO NON-USER: CPT | Performed by: ORTHOPAEDIC SURGERY
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
## 2021-08-27 ENCOUNTER — HOSPITAL ENCOUNTER (OUTPATIENT)
Age: 56
Discharge: HOME | End: 2021-08-29
Payer: MEDICAID

## 2021-08-27 ENCOUNTER — TELEPHONE (OUTPATIENT)
Dept: UROLOGY | Age: 56
End: 2021-08-27

## 2021-08-27 ENCOUNTER — HOSPITAL ENCOUNTER (OUTPATIENT)
Dept: GENERAL RADIOLOGY | Age: 56
Discharge: HOME | End: 2021-08-29
Payer: MEDICAID

## 2021-08-27 DIAGNOSIS — N20.0 KIDNEY STONE: ICD-10-CM

## 2021-08-27 PROCEDURE — 74018 RADEX ABDOMEN 1 VIEW: CPT

## 2021-08-27 NOTE — TELEPHONE ENCOUNTER
Writer called patient in regards to KUB not being completed.  Let patient know to get done prior to appt or we would have to reschedule

## 2021-08-30 ENCOUNTER — OFFICE VISIT (OUTPATIENT)
Dept: UROLOGY | Age: 56
End: 2021-08-30
Payer: MEDICAID

## 2021-08-30 VITALS
HEART RATE: 85 BPM | TEMPERATURE: 97 F | DIASTOLIC BLOOD PRESSURE: 72 MMHG | SYSTOLIC BLOOD PRESSURE: 114 MMHG | HEIGHT: 72 IN | WEIGHT: 230 LBS | BODY MASS INDEX: 31.15 KG/M2

## 2021-08-30 DIAGNOSIS — N20.0 KIDNEY STONE: Primary | ICD-10-CM

## 2021-08-30 PROCEDURE — 3017F COLORECTAL CA SCREEN DOC REV: CPT | Performed by: UROLOGY

## 2021-08-30 PROCEDURE — 99213 OFFICE O/P EST LOW 20 MIN: CPT | Performed by: UROLOGY

## 2021-08-30 PROCEDURE — G8427 DOCREV CUR MEDS BY ELIG CLIN: HCPCS | Performed by: UROLOGY

## 2021-08-30 PROCEDURE — G8417 CALC BMI ABV UP PARAM F/U: HCPCS | Performed by: UROLOGY

## 2021-08-30 PROCEDURE — 1036F TOBACCO NON-USER: CPT | Performed by: UROLOGY

## 2021-08-30 ASSESSMENT — ENCOUNTER SYMPTOMS
DIARRHEA: 0
CONSTIPATION: 0
VOMITING: 0
BACK PAIN: 1
NAUSEA: 0
EYE PAIN: 0
ABDOMINAL PAIN: 1
WHEEZING: 0
EYE REDNESS: 0
SHORTNESS OF BREATH: 0
COUGH: 0

## 2021-08-30 NOTE — PROGRESS NOTES
Review of Systems   Constitutional: Negative for chills, fatigue and fever. Eyes: Negative for pain, redness and visual disturbance. Respiratory: Negative for cough, shortness of breath and wheezing. Cardiovascular: Negative for chest pain and leg swelling. Gastrointestinal: Positive for abdominal pain (lower ). Negative for constipation, diarrhea, nausea and vomiting. Genitourinary: Positive for frequency and urgency. Negative for difficulty urinating, dysuria, flank pain, hematuria, scrotal swelling and testicular pain. Musculoskeletal: Positive for back pain. Negative for joint swelling and myalgias. Skin: Negative for rash and wound. Neurological: Negative for dizziness, tremors and numbness. Hematological: Does not bruise/bleed easily.

## 2021-08-30 NOTE — PROGRESS NOTES
[address] [address]  Dept:  [facility] Urology Office Note - Established    Patient:  Matty Palumbo  YOB: 1965  Date: 8/30/2021    The patient is a 55 y.o. male who presents todayfor evaluation of the following problems:   Chief Complaint   Patient presents with    Results     KUB    Check-Up     4 month        HPI  Oscar Stallworth is a 81-year-old gentleman who has a history of stones. He did have a right lithotripsy approximately 4 months ago. His KUB x-ray shows no residual stones. He denies any flank pain. He does have periumbilical pain which is unrelated to his urination. Summary of old records: N/A    Additional History: N/A    Procedures Today: N/A    Urinalysis today:  No results found for this visit on 08/30/21. Last several PSA's:  Lab Results   Component Value Date    PSA 0.17 12/23/2020    PSA 0.21 10/23/2014     Last total testosterone:  No results found for: TESTOSTERONE    AUA Symptom Score (8/30/2021):   INCOMPLETE EMPTYING: How often have you had the sensation of not emptying your bladder?: Less than 1 to 5 times  FREQUENCY: How often do you have to urinate less than every two hours?: Less than 1 to 5 times  INTERMITTENCY: How often have you found you stopped and started again several times when you urinated?: Not at all  URGENCY: How often have you found it difficult to postpone urination?: Less than 1 to 5 times  WEAK STREAM: How often have you had a weak urinary stream?: Not at all  STRAINING: How often have you had to strain to start  urination?: Not at all  NOCTURIA: How many times did you typically get up at night to uriniate?: 3 Times  TOTAL I-PSS SCORE[de-identified] 6  How would you feel if you were to spend the rest of your life with your urinary condition?: Mostly Satisfied    Last BUN and creatinine:  Lab Results   Component Value Date    BUN 18 04/04/2021     Lab Results Component Value Date    CREATININE 0.80 04/04/2021       Additional Lab/Culture results: none    Imaging Reviewed during this Office Visit: none  (results were independently reviewed by physician and radiology report verified)    PAST MEDICAL, FAMILY AND SOCIAL HISTORY UPDATE:  Past Medical History:   Diagnosis Date    Calculus of kidney     Cerebral artery occlusion with cerebral infarction ([facility] [address] 2020    slight memory problem (can't remember name of neurologist)    Cirrhosis ([address]     sees PCP for this    COPD (chronic obstructive pulmonary disease) ([facility] [address]     does not see pulmonology    Essential hypertension, benign     Gout, unspecified     H/O colonoscopy 04/11/2016 2016    Mitral valve disorders(424.0)     СЕРГЕЙ on CPAP     not currently using due to malfunctioning    Other and unspecified hyperlipidemia     Type II or unspecified type diabetes mellitus without mention of complication, not stated as uncontrolled     Unspecified chronic liver disease without mention of alcohol     Wellness examination      [facility] (last visit approx Feb 2021)     Past Surgical History:   Procedure Laterality Date    LITHOTRIPSY      LITHOTRIPSY  04/02/2021    LITHOTRIPSY N/A 4/2/2021    ESWL EXTRACORPOREAL SHOCK WAVE LITHOTRIPSY  ([employer] CONF# [id number] - AUGUSTO) performed by Rhona Hooker MD at [address] [place] [address] 5/25/2018    The esophagus was inspected to the Z-line. The endoscopic exam showed impression of varices (F1) in distal esophagus.       Family History   Problem Relation Age of Onset    No Known Problems Brother     Diabetes Mother     Heart Attack Mother     Heart Disease Father     Diabetes Brother     Heart Disease Brother      Outpatient Medications Marked as Taking for the 8/30/21 encounter (Office Visit) with Rhona Hooker MD   Medication Sig Dispense Refill    tamsulosin (FLOMAX) 0.4 MG capsule Take 1 capsule by mouth daily 90 capsule 3    metFORMIN (GLUCOPHAGE) 1000 MG tablet Take 1 tablet by mouth 2 times daily (with meals) 180 tablet 3    clopidogrel (PLAVIX) 75 MG tablet TAKE 1 TABLET BY MOUTH ONCE DAILY      atorvastatin (LIPITOR) 40 MG tablet Take 1 tablet by mouth nightly 90 tablet 1    SITagliptin (JANUVIA) 100 MG tablet TAKE 1 TABLET BY MOUTH DAILY 90 tablet 1    omeprazole (PRILOSEC) 20 MG delayed release capsule Take 1 capsule by mouth Daily 90 capsule 1    glimepiride (AMARYL) 4 MG tablet TAKE 1 TABLET BY MOUTH TWICE DAILY 60 tablet 11    albuterol sulfate HFA (VENTOLIN HFA) 108 (90 Base) MCG/ACT inhaler Inhale 1 puff into the lungs every 4 hours as needed for Wheezing 18 g 6    fluticasone-salmeterol (ADVAIR DISKUS) 100-50 MCG/DOSE diskus inhaler INHALE 1 PUFF BY MOUTH EVERY 12 HOURS 1 Inhaler 11    lisinopril (PRINIVIL;ZESTRIL) 5 MG tablet Take 1 tablet by mouth once daily 90 tablet 1    metoprolol tartrate (LOPRESSOR) 25 MG tablet Take 1 tablet by mouth 2 times daily 180 tablet 1    allopurinol (ZYLOPRIM) 100 MG tablet Take 1 tablet by mouth daily 90 tablet 1    blood glucose test strips (ASCENSIA AUTODISC VI;ONE TOUCH ULTRA TEST VI) strip Use with associated glucose meter. 100 strip 11    blood glucose monitor strips 1 strip by Other route 4 times daily Test 4  times a day & as needed for symptoms of irregular blood glucose.  100 strip 11    KROGER LANCETS ULTRATHIN 30G MISC 1 each by Other route 3 times daily 100 each 11    acetaminophen (AMINOFEN) 325 MG tablet Take 2 tablets by mouth every 6 hours as needed for Pain 50 tablet 0       Codeine and Simvastatin  Social History     Tobacco Use   Smoking Status Former Smoker    Packs/day: 0.50    Years: 30.00    Pack years: 15.00    Types: Cigarettes    Quit date: 3/26/2012    Years since quittin.4   Smokeless Tobacco Former User     (Ifpatient a smoker, smoking cessation counseling offered)    Social History     Substance and Sexual Activity   Alcohol Use Not Currently    Alcohol/week: 0.0 standard drinks    Comment: used to drink \"a lot\"  None since Dec 2020       REVIEW OF SYSTEMS:  Review of Systems    Physical Exam:      Vitals:    08/30/21 0940   BP: 114/72   Pulse: 85   Temp: 97 °F (36.1 °C)     Body mass index is 31.19 kg/m². Patient is a 55 y.o. male in no acute distress and alert and oriented to person, place and time. Physical Exam  Constitutional: Patient in no acute distress. Neuro: Alert and oriented to person, place and time. Psych: Mood normal, affect normal  Skin: No rash noted  HEENT: Head: Normocephalic andatraumatic  Conjunctivae and EOM are normal. Pupils are equal, round      Assessment and Plan      1. Kidney stone           Plan:   F/u 6 mo kub      Return in about 6 months (around 2/28/2022) for kub. Prescriptions Ordered:  No orders of the defined types were placed in this encounter. Orders Placed:  Orders Placed This Encounter   Procedures    XR ABDOMEN (KUB) (SINGLE AP VIEW)     Standing Status:   Future     Standing Expiration Date:   8/30/2022     Order Specific Question:   Reason for exam:     Answer:   kidney stone           Faisal Best MD    Agree with the ROS entered by the MA.

## 2021-09-20 ENCOUNTER — HOSPITAL ENCOUNTER (OUTPATIENT)
Age: 56
Setting detail: SPECIMEN
Discharge: HOME | End: 2021-09-20
Payer: MEDICAID

## 2021-09-20 ENCOUNTER — TELEPHONE (OUTPATIENT)
Dept: INTERNAL MEDICINE CLINIC | Age: 56
End: 2021-09-20

## 2021-09-20 DIAGNOSIS — N39.0 URINARY TRACT INFECTION WITHOUT HEMATURIA, SITE UNSPECIFIED: ICD-10-CM

## 2021-09-20 DIAGNOSIS — N39.0 URINARY TRACT INFECTION WITHOUT HEMATURIA, SITE UNSPECIFIED: Primary | ICD-10-CM

## 2021-09-20 DIAGNOSIS — E11.65 TYPE 2 DIABETES MELLITUS WITH HYPERGLYCEMIA, WITHOUT LONG-TERM CURRENT USE OF INSULIN (HCC): ICD-10-CM

## 2021-09-20 DIAGNOSIS — D68.59 HYPERCOAGULOPATHY (HCC): ICD-10-CM

## 2021-09-20 LAB
-: NORMAL
AMORPHOUS: NORMAL
BACTERIA: NORMAL
BILIRUBIN URINE: ABNORMAL
CASTS UA: NORMAL /LPF (ref 0–8)
COLOR: ABNORMAL
COMMENT UA: ABNORMAL
CRYSTALS, UA: NORMAL /HPF
EPITHELIAL CELLS UA: NORMAL /HPF (ref 0–5)
GLUCOSE URINE: ABNORMAL
KETONES, URINE: ABNORMAL
LEUKOCYTE ESTERASE, URINE: ABNORMAL
MUCUS: NORMAL
NITRITE, URINE: NEGATIVE
OTHER OBSERVATIONS UA: NORMAL
PH UA: 5 (ref 5–8)
PROTEIN UA: ABNORMAL
RBC UA: NORMAL /HPF (ref 0–4)
RENAL EPITHELIAL, UA: NORMAL /HPF
SPECIFIC GRAVITY UA: 1.02 (ref 1–1.03)
TRICHOMONAS: NORMAL
TURBIDITY: CLEAR
URINE HGB: ABNORMAL
UROBILINOGEN, URINE: NORMAL
WBC UA: NORMAL /HPF (ref 0–5)
YEAST: NORMAL

## 2021-09-21 LAB
ANTICARDIOLIPIN IGA ANTIBODY: 5.1 APL (ref 0–14)
ANTICARDIOLIPIN IGG ANTIBODY: 1.7 GPL (ref 0–10)
AT-III ACTIVITY: 68 % (ref 83–122)
CARDIOLIPIN AB IGM: 2.6 MPL (ref 0–10)
DILUTE RUSSELL VIPER VENOM TIME: NORMAL
ESTIMATED AVERAGE GLUCOSE: 177 MG/DL
HBA1C MFR BLD: 7.8 % (ref 4–6)
INR BLD: 1.1
LUPUS ANTICOAG: NORMAL
PARTIAL THROMBOPLASTIN TIME: 28.5 SEC (ref 20.5–30.5)
PROTEIN S ACTIVITY: 111 % (ref 77–116)
PROTHROMBIN TIME: 11.8 SEC (ref 9.1–12.3)

## 2021-09-22 LAB
ACTIVATED PROTEIN C RESISTANCE: 5.26
PROTEIN S ANTIGEN, FREE: 88 % (ref 74–147)

## 2021-09-23 LAB
MTHFR INTERPRETATION: ABNORMAL
MTHFR MUTATION A1286C: ABNORMAL
MTHFR MUTATION C665T: NEGATIVE
SPECIMEN: ABNORMAL

## 2021-09-24 LAB
PROTHROMBIN G20210A MUTATION: NEGATIVE
PT PCR SPECIMEN: NORMAL

## 2021-09-28 LAB
FACTOR V MUTATION: NEGATIVE
SPECIMEN: NORMAL

## 2021-09-29 ENCOUNTER — TELEPHONE (OUTPATIENT)
Dept: INTERNAL MEDICINE CLINIC | Age: 56
End: 2021-09-29

## 2021-09-29 DIAGNOSIS — N39.0 URINARY TRACT INFECTION WITHOUT HEMATURIA, SITE UNSPECIFIED: Primary | ICD-10-CM

## 2021-09-29 NOTE — TELEPHONE ENCOUNTER
Pt states that he is having symptoms of a UTI today. Painful when urinating w/ frequency. Please advise if you want to order atbx.   Allergies   Allergen Reactions    Codeine Nausea Only and Other (See Comments)     hallucinations    Simvastatin Other (See Comments)     Leg cramping

## 2021-09-29 NOTE — TELEPHONE ENCOUNTER
Pt dtr, Cassidy Mckeon Drake Reasoner, on hippa, called requesting HBAIC and UA  Results. Please advise.

## 2021-10-01 ENCOUNTER — HOSPITAL ENCOUNTER (OUTPATIENT)
Age: 56
Setting detail: SPECIMEN
Discharge: HOME | End: 2021-10-01
Payer: MEDICAID

## 2021-10-01 DIAGNOSIS — N39.0 URINARY TRACT INFECTION WITHOUT HEMATURIA, SITE UNSPECIFIED: ICD-10-CM

## 2021-10-02 LAB
-: NORMAL
AMORPHOUS: NORMAL
BACTERIA: NORMAL
BILIRUBIN URINE: ABNORMAL
CASTS UA: NORMAL /LPF (ref 0–8)
COLOR: ABNORMAL
COMMENT UA: ABNORMAL
CRYSTALS, UA: NORMAL /HPF
EPITHELIAL CELLS UA: NORMAL /HPF (ref 0–5)
GLUCOSE URINE: ABNORMAL
KETONES, URINE: ABNORMAL
LEUKOCYTE ESTERASE, URINE: ABNORMAL
MUCUS: NORMAL
NITRITE, URINE: POSITIVE
OTHER OBSERVATIONS UA: NORMAL
PH UA: 7 (ref 5–8)
PROTEIN UA: ABNORMAL
RBC UA: NORMAL /HPF (ref 0–4)
RENAL EPITHELIAL, UA: NORMAL /HPF
SPECIFIC GRAVITY UA: 1.03 (ref 1–1.03)
TRICHOMONAS: NORMAL
TURBIDITY: CLEAR
URINE HGB: NEGATIVE
UROBILINOGEN, URINE: ABNORMAL
WBC UA: NORMAL /HPF (ref 0–5)
YEAST: NORMAL

## 2021-10-03 LAB
CULTURE: NORMAL
Lab: NORMAL
SPECIMEN DESCRIPTION: NORMAL

## 2021-10-22 DIAGNOSIS — I10 ESSENTIAL HYPERTENSION: ICD-10-CM

## 2021-10-22 DIAGNOSIS — M10.9 GOUT, UNSPECIFIED CAUSE, UNSPECIFIED CHRONICITY, UNSPECIFIED SITE: ICD-10-CM

## 2021-10-22 DIAGNOSIS — K21.9 GASTROESOPHAGEAL REFLUX DISEASE WITHOUT ESOPHAGITIS: ICD-10-CM

## 2021-10-22 RX ORDER — ALLOPURINOL 100 MG/1
100 TABLET ORAL DAILY
Qty: 90 TABLET | Refills: 1 | Status: SHIPPED | OUTPATIENT
Start: 2021-10-22 | End: 2022-01-27 | Stop reason: SDUPTHER

## 2021-10-22 RX ORDER — LISINOPRIL 5 MG/1
TABLET ORAL
Qty: 90 TABLET | Refills: 0 | Status: SHIPPED | OUTPATIENT
Start: 2021-10-22 | End: 2021-12-10 | Stop reason: SDUPTHER

## 2021-10-22 RX ORDER — OMEPRAZOLE 20 MG/1
20 CAPSULE, DELAYED RELEASE ORAL DAILY
Qty: 90 CAPSULE | Refills: 1 | Status: SHIPPED | OUTPATIENT
Start: 2021-10-22 | End: 2022-02-25 | Stop reason: SDUPTHER

## 2021-10-22 RX ORDER — LISINOPRIL 5 MG/1
TABLET ORAL
Qty: 90 TABLET | Refills: 1 | Status: ON HOLD | OUTPATIENT
Start: 2021-10-22 | End: 2021-12-11 | Stop reason: HOSPADM

## 2021-10-22 RX ORDER — ALLOPURINOL 100 MG/1
TABLET ORAL
Qty: 90 TABLET | Refills: 0 | Status: SHIPPED | OUTPATIENT
Start: 2021-10-22 | End: 2021-12-10

## 2021-11-02 DIAGNOSIS — I10 ESSENTIAL HYPERTENSION: ICD-10-CM

## 2021-12-09 ENCOUNTER — NURSE TRIAGE (OUTPATIENT)
Dept: OTHER | Facility: CLINIC | Age: 56
End: 2021-12-09

## 2021-12-09 ENCOUNTER — HOSPITAL ENCOUNTER (INPATIENT)
Age: 56
LOS: 1 days | Discharge: HOME | DRG: 283 | End: 2021-12-11
Attending: STUDENT IN AN ORGANIZED HEALTH CARE EDUCATION/TRAINING PROGRAM | Admitting: INTERNAL MEDICINE
Payer: MEDICAID

## 2021-12-09 DIAGNOSIS — Z87.19 HISTORY OF CIRRHOSIS: ICD-10-CM

## 2021-12-09 DIAGNOSIS — K92.2 GASTROINTESTINAL HEMORRHAGE, UNSPECIFIED GASTROINTESTINAL HEMORRHAGE TYPE: Primary | ICD-10-CM

## 2021-12-09 DIAGNOSIS — K76.6 PORTAL HYPERTENSION (HCC): ICD-10-CM

## 2021-12-09 LAB
INR BLD: 1.1
PARTIAL THROMBOPLASTIN TIME: 31.5 SEC (ref 24–36)
PROTHROMBIN TIME: 14.6 SEC (ref 11.8–14.6)

## 2021-12-09 PROCEDURE — 85730 THROMBOPLASTIN TIME PARTIAL: CPT

## 2021-12-09 PROCEDURE — 85610 PROTHROMBIN TIME: CPT

## 2021-12-09 PROCEDURE — 85025 COMPLETE CBC W/AUTO DIFF WBC: CPT

## 2021-12-09 PROCEDURE — 96365 THER/PROPH/DIAG IV INF INIT: CPT

## 2021-12-09 PROCEDURE — 80053 COMPREHEN METABOLIC PANEL: CPT

## 2021-12-09 PROCEDURE — 96375 TX/PRO/DX INJ NEW DRUG ADDON: CPT

## 2021-12-09 PROCEDURE — 99285 EMERGENCY DEPT VISIT HI MDM: CPT

## 2021-12-09 PROCEDURE — 36415 COLL VENOUS BLD VENIPUNCTURE: CPT

## 2021-12-09 PROCEDURE — 81001 URINALYSIS AUTO W/SCOPE: CPT

## 2021-12-09 ASSESSMENT — PAIN DESCRIPTION - LOCATION: LOCATION: ABDOMEN

## 2021-12-09 ASSESSMENT — PAIN SCALES - GENERAL: PAINLEVEL_OUTOF10: 9

## 2021-12-09 ASSESSMENT — PAIN DESCRIPTION - DESCRIPTORS: DESCRIPTORS: ACHING

## 2021-12-09 ASSESSMENT — PAIN DESCRIPTION - FREQUENCY: FREQUENCY: INTERMITTENT

## 2021-12-09 ASSESSMENT — PAIN DESCRIPTION - ORIENTATION: ORIENTATION: LOWER

## 2021-12-09 NOTE — TELEPHONE ENCOUNTER
[facility] [employer] [facility]. Brief description of triage: abd pain with bloody stools    Triage indicates for patient to go to ED now. Care advice provided, patient verbalizes understanding; denies any other questions or concerns; instructed to call back for any new or worsening symptoms. Attention Provider: Thank you for allowing me to participate in the care of your patient. The patient was connected to triage in response to information provided to the ECC/PSC. Please do not respond through this encounter as the response is not directed to a shared pool. Reason for Disposition   Bloody, black, or tarry bowel movements (Exception: chronic-unchanged black-grey bowel movements and is taking iron pills or Pepto-bismol)    Answer Assessment - Initial Assessment Questions  1. LOCATION: \"Where does it hurt? \"       Lower mid section radiating to sides, more on left     2. RADIATION: \"Does the pain shoot anywhere else? \" (e.g., chest, back)      Denies    3. ONSET: \"When did the pain begin? \" (Minutes, hours or days ago)       Couple days ago    4. SUDDEN: \"Gradual or sudden onset? \"      Gradual, last couple days pain has worsen    5. PATTERN \"Does the pain come and go, or is it constant? \"     - If constant: \"Is it getting better, staying the same, or worsening? \"       (Note: Constant means the pain never goes away completely; most serious pain is constant and it progresses)      - If intermittent: \"How long does it last?\" \"Do you have pain now? \"      (Note: Intermittent means the pain goes away completely between bouts)      Intermittent, aggravated with stress    6. SEVERITY: \"How bad is the pain? \"  (e.g., Scale 1-10; mild, moderate, or severe)     - MILD (1-3): doesn't interfere with normal activities, abdomen soft and not tender to touch      - MODERATE (4-7): interferes with normal activities or awakens from sleep, tender to touch      - SEVERE (8-10): excruciating pain, doubled over, unable to do any normal activities        Moderate    7. RECURRENT SYMPTOM: \"Have you ever had this type of abdominal pain before? \" If so, ask: \"When was the last time? \" and \"What happened that time? \"       Yes, from his ulcer, a couple years ago    8. CAUSE: \"What do you think is causing the abdominal pain? \"      Ulcer    9. RELIEVING/AGGRAVATING FACTORS: \"What makes it better or worse? \" (e.g., movement, antacids, bowel movement)      Aggravated factor- stress      Better with- BM    10. OTHER SYMPTOMS: \"Has there been any vomiting, diarrhea, constipation, or urine problems? \"        Bloody stools, some diarrhea, some vomiting, wife verbalized that when pt is walking \"his eyes go into a daze and he become unsteady on feet and needs someone to hold him up\"    Protocols used: ABDOMINAL PAIN - MALE-ADULT-[place]

## 2021-12-10 ENCOUNTER — ANESTHESIA (OUTPATIENT)
Dept: ENDOSCOPY | Age: 56
DRG: 283 | End: 2021-12-10
Payer: MEDICAID

## 2021-12-10 ENCOUNTER — APPOINTMENT (OUTPATIENT)
Dept: CT IMAGING | Age: 56
DRG: 283 | End: 2021-12-10
Payer: MEDICAID

## 2021-12-10 ENCOUNTER — ANESTHESIA EVENT (OUTPATIENT)
Dept: ENDOSCOPY | Age: 56
DRG: 283 | End: 2021-12-10
Payer: MEDICAID

## 2021-12-10 VITALS
RESPIRATION RATE: 17 BRPM | TEMPERATURE: 96.8 F | OXYGEN SATURATION: 96 % | SYSTOLIC BLOOD PRESSURE: 170 MMHG | DIASTOLIC BLOOD PRESSURE: 79 MMHG

## 2021-12-10 PROBLEM — D69.6 THROMBOCYTOPENIA (HCC): Status: ACTIVE | Noted: 2021-12-10

## 2021-12-10 LAB
-: ABNORMAL
ABSOLUTE BANDS #: 0.12 K/UL (ref 0–1)
ABSOLUTE EOS #: 0.08 K/UL (ref 0–0.4)
ABSOLUTE IMMATURE GRANULOCYTE: ABNORMAL K/UL (ref 0–0.3)
ABSOLUTE LYMPH #: 1.09 K/UL (ref 1–4.8)
ABSOLUTE MONO #: 0.31 K/UL (ref 0.1–1.3)
ALBUMIN SERPL-MCNC: 3.7 G/DL (ref 3.5–5.2)
ALBUMIN/GLOBULIN RATIO: ABNORMAL (ref 1–2.5)
ALP BLD-CCNC: 133 U/L (ref 40–129)
ALT SERPL-CCNC: 33 U/L (ref 5–41)
AMORPHOUS: ABNORMAL
ANION GAP SERPL CALCULATED.3IONS-SCNC: 9 MMOL/L (ref 9–17)
AST SERPL-CCNC: 35 U/L
BACTERIA: ABNORMAL
BANDS: 3 % (ref 0–10)
BASOPHILS # BLD: 0 % (ref 0–2)
BASOPHILS ABSOLUTE: 0 K/UL (ref 0–0.2)
BILIRUB SERPL-MCNC: 2.75 MG/DL (ref 0.3–1.2)
BILIRUBIN URINE: ABNORMAL
BUN BLDV-MCNC: 15 MG/DL (ref 6–20)
BUN/CREAT BLD: ABNORMAL (ref 9–20)
CALCIUM SERPL-MCNC: 9.4 MG/DL (ref 8.6–10.4)
CASTS UA: ABNORMAL /LPF
CHLORIDE BLD-SCNC: 105 MMOL/L (ref 98–107)
CO2: 25 MMOL/L (ref 20–31)
COLOR: ABNORMAL
COMMENT UA: ABNORMAL
CREAT SERPL-MCNC: 0.66 MG/DL (ref 0.7–1.2)
CRYSTALS, UA: ABNORMAL /HPF
DATE, STOOL #1: NORMAL
DATE, STOOL #2: NORMAL
DATE, STOOL #3: NORMAL
DIFFERENTIAL TYPE: ABNORMAL
EOSINOPHILS RELATIVE PERCENT: 2 % (ref 0–4)
EPITHELIAL CELLS UA: ABNORMAL /HPF
GFR AFRICAN AMERICAN: >60 ML/MIN
GFR NON-AFRICAN AMERICAN: >60 ML/MIN
GFR SERPL CREATININE-BSD FRML MDRD: ABNORMAL ML/MIN/{1.73_M2}
GFR SERPL CREATININE-BSD FRML MDRD: ABNORMAL ML/MIN/{1.73_M2}
GLUCOSE BLD-MCNC: 166 MG/DL (ref 75–110)
GLUCOSE BLD-MCNC: 189 MG/DL (ref 75–110)
GLUCOSE BLD-MCNC: 193 MG/DL (ref 75–110)
GLUCOSE BLD-MCNC: 209 MG/DL (ref 75–110)
GLUCOSE BLD-MCNC: 209 MG/DL (ref 75–110)
GLUCOSE BLD-MCNC: 247 MG/DL (ref 75–110)
GLUCOSE BLD-MCNC: 263 MG/DL (ref 70–99)
GLUCOSE URINE: ABNORMAL
HCT VFR BLD CALC: 35.8 % (ref 41–53)
HCT VFR BLD CALC: 36.9 % (ref 41–53)
HEMOCCULT SP1 STL QL: NEGATIVE
HEMOCCULT SP2 STL QL: NORMAL
HEMOCCULT SP3 STL QL: NORMAL
HEMOGLOBIN: 11.9 G/DL (ref 13.5–17.5)
HEMOGLOBIN: 12.2 G/DL (ref 13.5–17.5)
IMMATURE GRANULOCYTES: ABNORMAL %
KETONES, URINE: ABNORMAL
LEUKOCYTE ESTERASE, URINE: NEGATIVE
LYMPHOCYTES # BLD: 28 % (ref 24–44)
MCH RBC QN AUTO: 28.8 PG (ref 26–34)
MCHC RBC AUTO-ENTMCNC: 33.1 G/DL (ref 31–37)
MCV RBC AUTO: 87.1 FL (ref 80–100)
METAMYELOCYTES ABSOLUTE COUNT: 0.04 K/UL
METAMYELOCYTES: 1 %
MONOCYTES # BLD: 8 % (ref 1–7)
MORPHOLOGY: ABNORMAL
MORPHOLOGY: ABNORMAL
MUCUS: ABNORMAL
NITRITE, URINE: NEGATIVE
NRBC AUTOMATED: ABNORMAL PER 100 WBC
OTHER OBSERVATIONS UA: ABNORMAL
PDW BLD-RTO: 15.9 % (ref 11.5–14.9)
PH UA: 6.5 (ref 5–8)
PLATELET # BLD: 74 K/UL (ref 150–450)
PLATELET ESTIMATE: ABNORMAL
PMV BLD AUTO: 8.4 FL (ref 6–12)
POTASSIUM SERPL-SCNC: 3.9 MMOL/L (ref 3.7–5.3)
PROTEIN UA: NEGATIVE
RBC # BLD: 4.24 M/UL (ref 4.5–5.9)
RBC # BLD: ABNORMAL 10*6/UL
RBC UA: ABNORMAL /HPF
RENAL EPITHELIAL, UA: ABNORMAL /HPF
SARS-COV-2, RAPID: NOT DETECTED
SEG NEUTROPHILS: 58 % (ref 36–66)
SEGMENTED NEUTROPHILS ABSOLUTE COUNT: 2.26 K/UL (ref 1.3–9.1)
SODIUM BLD-SCNC: 139 MMOL/L (ref 135–144)
SPECIFIC GRAVITY UA: 1.04 (ref 1–1.03)
SPECIMEN DESCRIPTION: NORMAL
TIME, STOOL #1: NORMAL
TIME, STOOL #2: NORMAL
TIME, STOOL #3: NORMAL
TOTAL PROTEIN: 7.1 G/DL (ref 6.4–8.3)
TRICHOMONAS: ABNORMAL
TURBIDITY: CLEAR
URINE HGB: NEGATIVE
UROBILINOGEN, URINE: ABNORMAL
WBC # BLD: 3.9 K/UL (ref 3.5–11)
WBC # BLD: ABNORMAL 10*3/UL
WBC UA: ABNORMAL /HPF
YEAST: ABNORMAL

## 2021-12-10 PROCEDURE — 82947 ASSAY GLUCOSE BLOOD QUANT: CPT

## 2021-12-10 PROCEDURE — 2580000003 HC RX 258: Performed by: INTERNAL MEDICINE

## 2021-12-10 PROCEDURE — G0378 HOSPITAL OBSERVATION PER HR: HCPCS

## 2021-12-10 PROCEDURE — 2580000003 HC RX 258: Performed by: NURSE PRACTITIONER

## 2021-12-10 PROCEDURE — 87635 SARS-COV-2 COVID-19 AMP PRB: CPT

## 2021-12-10 PROCEDURE — 99254 IP/OBS CNSLTJ NEW/EST MOD 60: CPT | Performed by: INTERNAL MEDICINE

## 2021-12-10 PROCEDURE — 2500000003 HC RX 250 WO HCPCS: Performed by: NURSE ANESTHETIST, CERTIFIED REGISTERED

## 2021-12-10 PROCEDURE — 2580000003 HC RX 258: Performed by: STUDENT IN AN ORGANIZED HEALTH CARE EDUCATION/TRAINING PROGRAM

## 2021-12-10 PROCEDURE — 6360000002 HC RX W HCPCS: Performed by: NURSE ANESTHETIST, CERTIFIED REGISTERED

## 2021-12-10 PROCEDURE — C9113 INJ PANTOPRAZOLE SODIUM, VIA: HCPCS | Performed by: STUDENT IN AN ORGANIZED HEALTH CARE EDUCATION/TRAINING PROGRAM

## 2021-12-10 PROCEDURE — 94640 AIRWAY INHALATION TREATMENT: CPT

## 2021-12-10 PROCEDURE — 6360000002 HC RX W HCPCS: Performed by: INTERNAL MEDICINE

## 2021-12-10 PROCEDURE — 2709999900 HC NON-CHARGEABLE SUPPLY: Performed by: INTERNAL MEDICINE

## 2021-12-10 PROCEDURE — 99223 1ST HOSP IP/OBS HIGH 75: CPT | Performed by: INTERNAL MEDICINE

## 2021-12-10 PROCEDURE — 3700000000 HC ANESTHESIA ATTENDED CARE: Performed by: INTERNAL MEDICINE

## 2021-12-10 PROCEDURE — 6370000000 HC RX 637 (ALT 250 FOR IP): Performed by: NURSE PRACTITIONER

## 2021-12-10 PROCEDURE — 6370000000 HC RX 637 (ALT 250 FOR IP): Performed by: INTERNAL MEDICINE

## 2021-12-10 PROCEDURE — 0DJ08ZZ INSPECTION OF UPPER INTESTINAL TRACT, VIA NATURAL OR ARTIFICIAL OPENING ENDOSCOPIC: ICD-10-PCS | Performed by: INTERNAL MEDICINE

## 2021-12-10 PROCEDURE — 36415 COLL VENOUS BLD VENIPUNCTURE: CPT

## 2021-12-10 PROCEDURE — 82105 ALPHA-FETOPROTEIN SERUM: CPT

## 2021-12-10 PROCEDURE — APPNB30 APP NON BILLABLE TIME 0-30 MINS: Performed by: NURSE PRACTITIONER

## 2021-12-10 PROCEDURE — 85014 HEMATOCRIT: CPT

## 2021-12-10 PROCEDURE — 74177 CT ABD & PELVIS W/CONTRAST: CPT

## 2021-12-10 PROCEDURE — 94761 N-INVAS EAR/PLS OXIMETRY MLT: CPT

## 2021-12-10 PROCEDURE — 2060000000 HC ICU INTERMEDIATE R&B

## 2021-12-10 PROCEDURE — 6360000002 HC RX W HCPCS: Performed by: STUDENT IN AN ORGANIZED HEALTH CARE EDUCATION/TRAINING PROGRAM

## 2021-12-10 PROCEDURE — C9113 INJ PANTOPRAZOLE SODIUM, VIA: HCPCS | Performed by: INTERNAL MEDICINE

## 2021-12-10 PROCEDURE — 80074 ACUTE HEPATITIS PANEL: CPT

## 2021-12-10 PROCEDURE — 43235 EGD DIAGNOSTIC BRUSH WASH: CPT | Performed by: INTERNAL MEDICINE

## 2021-12-10 PROCEDURE — 7100000001 HC PACU RECOVERY - ADDTL 15 MIN: Performed by: INTERNAL MEDICINE

## 2021-12-10 PROCEDURE — 3609017100 HC EGD: Performed by: INTERNAL MEDICINE

## 2021-12-10 PROCEDURE — 7100000000 HC PACU RECOVERY - FIRST 15 MIN: Performed by: INTERNAL MEDICINE

## 2021-12-10 PROCEDURE — 82270 OCCULT BLOOD FECES: CPT

## 2021-12-10 PROCEDURE — 6360000004 HC RX CONTRAST MEDICATION: Performed by: STUDENT IN AN ORGANIZED HEALTH CARE EDUCATION/TRAINING PROGRAM

## 2021-12-10 PROCEDURE — 6360000002 HC RX W HCPCS: Performed by: NURSE PRACTITIONER

## 2021-12-10 PROCEDURE — 85018 HEMOGLOBIN: CPT

## 2021-12-10 RX ORDER — HYDRALAZINE HYDROCHLORIDE 20 MG/ML
5 INJECTION INTRAMUSCULAR; INTRAVENOUS EVERY 10 MIN PRN
Status: DISCONTINUED | OUTPATIENT
Start: 2021-12-10 | End: 2021-12-10 | Stop reason: HOSPADM

## 2021-12-10 RX ORDER — SODIUM CHLORIDE 0.9 % (FLUSH) 0.9 %
10 SYRINGE (ML) INJECTION 2 TIMES DAILY
Status: DISCONTINUED | OUTPATIENT
Start: 2021-12-10 | End: 2021-12-10 | Stop reason: RX

## 2021-12-10 RX ORDER — FENTANYL CITRATE 50 UG/ML
50 INJECTION, SOLUTION INTRAMUSCULAR; INTRAVENOUS EVERY 5 MIN PRN
Status: DISCONTINUED | OUTPATIENT
Start: 2021-12-10 | End: 2021-12-10 | Stop reason: HOSPADM

## 2021-12-10 RX ORDER — SODIUM CHLORIDE 0.9 % (FLUSH) 0.9 %
10 SYRINGE (ML) INJECTION PRN
Status: DISCONTINUED | OUTPATIENT
Start: 2021-12-10 | End: 2021-12-11 | Stop reason: HOSPADM

## 2021-12-10 RX ORDER — ALBUTEROL SULFATE 90 UG/1
1 AEROSOL, METERED RESPIRATORY (INHALATION) EVERY 4 HOURS PRN
Status: DISCONTINUED | OUTPATIENT
Start: 2021-12-10 | End: 2021-12-11 | Stop reason: HOSPADM

## 2021-12-10 RX ORDER — SODIUM CHLORIDE 0.9 % (FLUSH) 0.9 %
5-40 SYRINGE (ML) INJECTION EVERY 12 HOURS SCHEDULED
Status: DISCONTINUED | OUTPATIENT
Start: 2021-12-10 | End: 2021-12-11 | Stop reason: HOSPADM

## 2021-12-10 RX ORDER — PANTOPRAZOLE SODIUM 40 MG/10ML
40 INJECTION, POWDER, LYOPHILIZED, FOR SOLUTION INTRAVENOUS 2 TIMES DAILY
Status: DISCONTINUED | OUTPATIENT
Start: 2021-12-10 | End: 2021-12-10 | Stop reason: RX

## 2021-12-10 RX ORDER — ONDANSETRON 4 MG/1
4 TABLET, ORALLY DISINTEGRATING ORAL EVERY 8 HOURS PRN
Status: DISCONTINUED | OUTPATIENT
Start: 2021-12-10 | End: 2021-12-11 | Stop reason: HOSPADM

## 2021-12-10 RX ORDER — FENTANYL CITRATE 50 UG/ML
25 INJECTION, SOLUTION INTRAMUSCULAR; INTRAVENOUS EVERY 5 MIN PRN
Status: DISCONTINUED | OUTPATIENT
Start: 2021-12-10 | End: 2021-12-10 | Stop reason: HOSPADM

## 2021-12-10 RX ORDER — SODIUM CHLORIDE 0.9 % (FLUSH) 0.9 %
10 SYRINGE (ML) INJECTION 2 TIMES DAILY
Status: DISCONTINUED | OUTPATIENT
Start: 2021-12-10 | End: 2021-12-11 | Stop reason: HOSPADM

## 2021-12-10 RX ORDER — SODIUM CHLORIDE 0.9 % (FLUSH) 0.9 %
10 SYRINGE (ML) INJECTION 2 TIMES DAILY
Status: DISCONTINUED | OUTPATIENT
Start: 2021-12-10 | End: 2021-12-10

## 2021-12-10 RX ORDER — DEXTROSE MONOHYDRATE 50 MG/ML
100 INJECTION, SOLUTION INTRAVENOUS PRN
Status: DISCONTINUED | OUTPATIENT
Start: 2021-12-10 | End: 2021-12-11 | Stop reason: HOSPADM

## 2021-12-10 RX ORDER — NICOTINE POLACRILEX 4 MG
15 LOZENGE BUCCAL PRN
Status: DISCONTINUED | OUTPATIENT
Start: 2021-12-10 | End: 2021-12-11 | Stop reason: HOSPADM

## 2021-12-10 RX ORDER — ACETAMINOPHEN 325 MG/1
650 TABLET ORAL EVERY 6 HOURS PRN
Status: DISCONTINUED | OUTPATIENT
Start: 2021-12-10 | End: 2021-12-11 | Stop reason: HOSPADM

## 2021-12-10 RX ORDER — PANTOPRAZOLE SODIUM 40 MG/10ML
40 INJECTION, POWDER, LYOPHILIZED, FOR SOLUTION INTRAVENOUS 2 TIMES DAILY
Status: DISCONTINUED | OUTPATIENT
Start: 2021-12-10 | End: 2021-12-10

## 2021-12-10 RX ORDER — ONDANSETRON 2 MG/ML
4 INJECTION INTRAMUSCULAR; INTRAVENOUS
Status: DISCONTINUED | OUTPATIENT
Start: 2021-12-10 | End: 2021-12-10 | Stop reason: HOSPADM

## 2021-12-10 RX ORDER — METOPROLOL TARTRATE 5 MG/5ML
2.5 INJECTION INTRAVENOUS EVERY 6 HOURS PRN
Status: DISCONTINUED | OUTPATIENT
Start: 2021-12-10 | End: 2021-12-11 | Stop reason: HOSPADM

## 2021-12-10 RX ORDER — PROPOFOL 10 MG/ML
INJECTION, EMULSION INTRAVENOUS PRN
Status: DISCONTINUED | OUTPATIENT
Start: 2021-12-10 | End: 2021-12-10 | Stop reason: SDUPTHER

## 2021-12-10 RX ORDER — ACETAMINOPHEN 650 MG/1
650 SUPPOSITORY RECTAL EVERY 6 HOURS PRN
Status: DISCONTINUED | OUTPATIENT
Start: 2021-12-10 | End: 2021-12-11 | Stop reason: HOSPADM

## 2021-12-10 RX ORDER — LABETALOL HYDROCHLORIDE 5 MG/ML
5 INJECTION, SOLUTION INTRAVENOUS EVERY 10 MIN PRN
Status: DISCONTINUED | OUTPATIENT
Start: 2021-12-10 | End: 2021-12-10 | Stop reason: HOSPADM

## 2021-12-10 RX ORDER — PANTOPRAZOLE SODIUM 40 MG/10ML
40 INJECTION, POWDER, LYOPHILIZED, FOR SOLUTION INTRAVENOUS 2 TIMES DAILY
Status: DISCONTINUED | OUTPATIENT
Start: 2021-12-10 | End: 2021-12-11 | Stop reason: HOSPADM

## 2021-12-10 RX ORDER — SODIUM CHLORIDE 9 MG/ML
25 INJECTION, SOLUTION INTRAVENOUS PRN
Status: DISCONTINUED | OUTPATIENT
Start: 2021-12-10 | End: 2021-12-11 | Stop reason: HOSPADM

## 2021-12-10 RX ORDER — SODIUM CHLORIDE 9 MG/ML
INJECTION, SOLUTION INTRAVENOUS CONTINUOUS
Status: DISCONTINUED | OUTPATIENT
Start: 2021-12-10 | End: 2021-12-11 | Stop reason: HOSPADM

## 2021-12-10 RX ORDER — DEXTROSE MONOHYDRATE 25 G/50ML
12.5 INJECTION, SOLUTION INTRAVENOUS PRN
Status: DISCONTINUED | OUTPATIENT
Start: 2021-12-10 | End: 2021-12-11 | Stop reason: HOSPADM

## 2021-12-10 RX ORDER — SODIUM CHLORIDE 0.9 % (FLUSH) 0.9 %
5-40 SYRINGE (ML) INJECTION PRN
Status: DISCONTINUED | OUTPATIENT
Start: 2021-12-10 | End: 2021-12-11 | Stop reason: HOSPADM

## 2021-12-10 RX ORDER — DIPHENHYDRAMINE HYDROCHLORIDE 50 MG/ML
12.5 INJECTION INTRAMUSCULAR; INTRAVENOUS
Status: DISCONTINUED | OUTPATIENT
Start: 2021-12-10 | End: 2021-12-10 | Stop reason: HOSPADM

## 2021-12-10 RX ORDER — ONDANSETRON 2 MG/ML
4 INJECTION INTRAMUSCULAR; INTRAVENOUS EVERY 6 HOURS PRN
Status: DISCONTINUED | OUTPATIENT
Start: 2021-12-10 | End: 2021-12-11 | Stop reason: HOSPADM

## 2021-12-10 RX ORDER — METOCLOPRAMIDE HYDROCHLORIDE 5 MG/ML
10 INJECTION INTRAMUSCULAR; INTRAVENOUS
Status: DISCONTINUED | OUTPATIENT
Start: 2021-12-10 | End: 2021-12-10 | Stop reason: HOSPADM

## 2021-12-10 RX ORDER — LIDOCAINE HYDROCHLORIDE 20 MG/ML
INJECTION, SOLUTION EPIDURAL; INFILTRATION; INTRACAUDAL; PERINEURAL PRN
Status: DISCONTINUED | OUTPATIENT
Start: 2021-12-10 | End: 2021-12-10 | Stop reason: SDUPTHER

## 2021-12-10 RX ORDER — MEPERIDINE HYDROCHLORIDE 25 MG/ML
12.5 INJECTION INTRAMUSCULAR; INTRAVENOUS; SUBCUTANEOUS EVERY 5 MIN PRN
Status: DISCONTINUED | OUTPATIENT
Start: 2021-12-10 | End: 2021-12-10 | Stop reason: HOSPADM

## 2021-12-10 RX ORDER — BUDESONIDE AND FORMOTEROL FUMARATE DIHYDRATE 80; 4.5 UG/1; UG/1
2 AEROSOL RESPIRATORY (INHALATION) 2 TIMES DAILY
Status: DISCONTINUED | OUTPATIENT
Start: 2021-12-10 | End: 2021-12-11 | Stop reason: HOSPADM

## 2021-12-10 RX ORDER — 0.9 % SODIUM CHLORIDE 0.9 %
100 INTRAVENOUS SOLUTION INTRAVENOUS ONCE
Status: COMPLETED | OUTPATIENT
Start: 2021-12-10 | End: 2021-12-10

## 2021-12-10 RX ADMIN — BUDESONIDE AND FORMOTEROL FUMARATE DIHYDRATE 2 PUFF: 80; 4.5 AEROSOL RESPIRATORY (INHALATION) at 08:25

## 2021-12-10 RX ADMIN — CEFTRIAXONE SODIUM 1000 MG: 1 INJECTION, POWDER, FOR SOLUTION INTRAMUSCULAR; INTRAVENOUS at 09:13

## 2021-12-10 RX ADMIN — SODIUM CHLORIDE: 9 INJECTION, SOLUTION INTRAVENOUS at 03:30

## 2021-12-10 RX ADMIN — BUDESONIDE AND FORMOTEROL FUMARATE DIHYDRATE 2 PUFF: 80; 4.5 AEROSOL RESPIRATORY (INHALATION) at 19:20

## 2021-12-10 RX ADMIN — INSULIN LISPRO 2 UNITS: 100 INJECTION, SOLUTION INTRAVENOUS; SUBCUTANEOUS at 21:09

## 2021-12-10 RX ADMIN — PROPOFOL 30 MG: 10 INJECTION, EMULSION INTRAVENOUS at 12:40

## 2021-12-10 RX ADMIN — PROPOFOL 70 MG: 10 INJECTION, EMULSION INTRAVENOUS at 12:38

## 2021-12-10 RX ADMIN — SODIUM CHLORIDE: 9 INJECTION, SOLUTION INTRAVENOUS at 22:37

## 2021-12-10 RX ADMIN — OCTREOTIDE ACETATE 25 MCG/HR: 500 INJECTION, SOLUTION INTRAVENOUS; SUBCUTANEOUS at 01:48

## 2021-12-10 RX ADMIN — INSULIN LISPRO 4 UNITS: 100 INJECTION, SOLUTION INTRAVENOUS; SUBCUTANEOUS at 16:30

## 2021-12-10 RX ADMIN — SODIUM CHLORIDE, PRESERVATIVE FREE 10 ML: 5 INJECTION INTRAVENOUS at 21:08

## 2021-12-10 RX ADMIN — LIDOCAINE HYDROCHLORIDE 80 MG: 20 INJECTION, SOLUTION EPIDURAL; INFILTRATION; INTRACAUDAL; PERINEURAL at 12:38

## 2021-12-10 RX ADMIN — ACETAMINOPHEN 650 MG: 325 TABLET, FILM COATED ORAL at 22:29

## 2021-12-10 RX ADMIN — OCTREOTIDE ACETATE 25 MCG/HR: 500 INJECTION, SOLUTION INTRAVENOUS; SUBCUTANEOUS at 09:07

## 2021-12-10 RX ADMIN — PANTOPRAZOLE SODIUM 40 MG: 40 INJECTION, POWDER, FOR SOLUTION INTRAVENOUS at 02:01

## 2021-12-10 RX ADMIN — SODIUM CHLORIDE 100 ML: 9 INJECTION, SOLUTION INTRAVENOUS at 00:35

## 2021-12-10 RX ADMIN — SODIUM CHLORIDE, PRESERVATIVE FREE 10 ML: 5 INJECTION INTRAVENOUS at 00:36

## 2021-12-10 RX ADMIN — SODIUM CHLORIDE, PRESERVATIVE FREE 10 ML: 5 INJECTION INTRAVENOUS at 02:01

## 2021-12-10 RX ADMIN — PANTOPRAZOLE SODIUM 40 MG: 40 INJECTION, POWDER, FOR SOLUTION INTRAVENOUS at 21:08

## 2021-12-10 RX ADMIN — SODIUM CHLORIDE, PRESERVATIVE FREE 10 ML: 5 INJECTION INTRAVENOUS at 07:51

## 2021-12-10 RX ADMIN — IOPAMIDOL 75 ML: 755 INJECTION, SOLUTION INTRAVENOUS at 00:36

## 2021-12-10 RX ADMIN — SODIUM CHLORIDE: 9 INJECTION, SOLUTION INTRAVENOUS at 15:23

## 2021-12-10 RX ADMIN — PANTOPRAZOLE SODIUM 40 MG: 40 INJECTION, POWDER, FOR SOLUTION INTRAVENOUS at 07:51

## 2021-12-10 ASSESSMENT — PULMONARY FUNCTION TESTS
PIF_VALUE: 0

## 2021-12-10 ASSESSMENT — ENCOUNTER SYMPTOMS
ABDOMINAL PAIN: 1
NAUSEA: 1
COLOR CHANGE: 0
COUGH: 0
EYE PAIN: 0
CONSTIPATION: 0
NAUSEA: 0
RHINORRHEA: 0
BLOOD IN STOOL: 1
WHEEZING: 0
SORE THROAT: 0
SHORTNESS OF BREATH: 0
VOMITING: 0
EYE REDNESS: 0
DIARRHEA: 0
BACK PAIN: 0
FACIAL SWELLING: 0

## 2021-12-10 ASSESSMENT — PAIN SCALES - GENERAL
PAINLEVEL_OUTOF10: 0
PAINLEVEL_OUTOF10: 8

## 2021-12-10 ASSESSMENT — PAIN DESCRIPTION - PAIN TYPE
TYPE: ACUTE PAIN
TYPE: ACUTE PAIN

## 2021-12-10 ASSESSMENT — PAIN DESCRIPTION - ORIENTATION: ORIENTATION: UPPER

## 2021-12-10 ASSESSMENT — PAIN - FUNCTIONAL ASSESSMENT: PAIN_FUNCTIONAL_ASSESSMENT: 0-10

## 2021-12-10 ASSESSMENT — PAIN DESCRIPTION - LOCATION
LOCATION: ABDOMEN
LOCATION: HEAD

## 2021-12-10 NOTE — H&P
[address]     HISTORY AND PHYSICAL EXAMINATION            Date:   12/10/2021  Patientname:  Belgica Summers  Date of admission:  12/9/2021 11:03 PM  MRN:   [id number]  Account:  [de-identified]  YOB: 1965  PCP:    Aaliyah Soto MD  Room:   11/11  Code Status:    Full Code    CHIEF COMPLAINT     Chief Complaint   Patient presents with    Rectal Bleeding       HISTORY OF PRESENT ILLNESS  (Character, Onset, Location, Duration,  Exacerbating/RelievingFactors, Radiation,   Associated Symptoms, Severity )      The patient is a 55 y.o.  male, with a history of CVA, chronic liver disease-cirrhosis of liver without ascites, COPD, HTN, and DM type II, who presents with rectal bleeding. According to patient, he has been having dark stools for the past couple weeks. Symptoms are associated with mild lower abdominal pain, fatigue and generalized weakness. Reports a brief episode nausea while in the ED, but denies vomiting. Denies fever, chills, chest pain, cough, diarrhea, and urinary symptoms. There are no aggravating or alleviating factors. Symptoms are reported as constant and moderate. PAST MEDICAL HISTORY   Patient  has a past medical history of Calculus of kidney, Cerebral artery occlusion with cerebral infarction ([address] Cirrhosis ([address] COPD (chronic obstructive pulmonary disease) ([address] Essential hypertension, benign, Gout, unspecified, H/O colonoscopy, Mitral valve disorders(424.0), СЕРГЕЙ on CPAP, Other and unspecified hyperlipidemia, Type II or unspecified type diabetes mellitus without mention of complication, not stated as uncontrolled, Unspecified chronic liver disease without mention of alcohol, and Wellness examination. PAST SURGICAL HISTORY    Patient  has a past surgical history that includes Lithotripsy; Upper gastrointestinal endoscopy (N/A, 5/25/2018); Lithotripsy (04/02/2021); and Lithotripsy (N/A, 4/2/2021).      FAMILY HISTORY    Patient family history includes Diabetes in his brother and mother; Heart Attack in his mother; Heart Disease in his brother and father; No Known Problems in his brother. SOCIAL HISTORY    Patient  reports that he quit smoking about 9 years ago. His smoking use included cigarettes. He has a 15.00 pack-year smoking history. He has quit using smokeless tobacco. He reports previous alcohol use. He reports current drug use. Drug: Marijuana Fatou Peals). HOME MEDICATIONS        Prior to Admission medications    Medication Sig Start Date End Date Taking?  Authorizing Provider   metoprolol tartrate (LOPRESSOR) 25 MG tablet Take 1 tablet by mouth twice daily 11/2/21  Yes Sohail Rodriguez MD   allopurinol (ZYLOPRIM) 100 MG tablet Take 1 tablet by mouth daily 10/22/21  Yes Sohail Rodriguez MD   omeprazole (PRILOSEC) 20 MG delayed release capsule Take 1 capsule by mouth Daily 10/22/21  Yes Sohail Rodriguez MD   lisinopril (PRINIVIL;ZESTRIL) 5 MG tablet Take 1 tablet by mouth once daily 10/22/21  Yes Sohail Rodriguez MD   tamsulosin (FLOMAX) 0.4 MG capsule Take 1 capsule by mouth daily 7/6/21  Yes Valencia Saul MD   metFORMIN (GLUCOPHAGE) 1000 MG tablet Take 1 tablet by mouth 2 times daily (with meals) 7/6/21  Yes Valencia Saul MD   atorvastatin (LIPITOR) 40 MG tablet Take 1 tablet by mouth nightly 6/10/21  Yes Sohail Rodriguez MD   SITagliptin (JANUVIA) 100 MG tablet TAKE 1 TABLET BY MOUTH DAILY 6/10/21  Yes Sohail Rodriguez MD   glimepiride (AMARYL) 4 MG tablet TAKE 1 TABLET BY MOUTH TWICE DAILY 5/11/21  Yes Sohail Rodriguez MD   albuterol sulfate HFA (VENTOLIN HFA) 108 (90 Base) MCG/ACT inhaler Inhale 1 puff into the lungs every 4 hours as needed for Wheezing 4/22/21  Yes Sohail Rodriguez MD   fluticasone-salmeterol (ADVAIR DISKUS) 100-50 MCG/DOSE diskus inhaler INHALE 1 PUFF BY MOUTH EVERY 12 HOURS 4/22/21  Yes Sohail Rodriguez MD   acetaminophen (AMINOFEN) 325 MG tablet Take 2 tablets by mouth every 6 hours as needed for Pain 10/15/18  Yes Melissa Wei MD   clopidogrel (PLAVIX) 75 MG tablet TAKE 1 TABLET BY MOUTH ONCE DAILY 4/16/21   Historical Provider, MD   metoprolol tartrate (LOPRESSOR) 25 MG tablet Take 1 tablet by mouth 2 times daily 4/6/21   Nghia Garsia MD   blood glucose test strips (ASCENSIA AUTODISC VI;ONE TOUCH ULTRA TEST VI) strip Use with associated glucose meter. 7/22/20   Liberty Laguerre PA-C   blood glucose monitor strips 1 strip by Other route 4 times daily Test 4  times a day & as needed for symptoms of irregular blood glucose. 5/3/19   Nghia Garsia MD   [employer] LANCETS ULTRATHIN 30G MISC 1 each by Other route 3 times daily 10/19/18   Nghia Garsia MD       ALLERGIES      Codeine and Simvastatin    REVIEW OF SYSTEMS     Review of Systems   Constitutional: Positive for fatigue. Negative for chills, diaphoresis and fever. HENT: Negative for congestion and sore throat. Respiratory: Negative for cough, shortness of breath and wheezing. Cardiovascular: Negative for chest pain, palpitations and leg swelling. Gastrointestinal: Positive for abdominal pain, blood in stool (Melena) and nausea. Negative for constipation, diarrhea and vomiting. Genitourinary: Negative for dysuria, frequency and urgency. Musculoskeletal: Negative for back pain and myalgias. Skin: Negative for rash. Neurological: Positive for weakness (Generalized). Negative for dizziness and headaches. Psychiatric/Behavioral: The patient is not nervous/anxious. PHYSICAL EXAM      BP (!) 142/86   Pulse 88   Temp 98.7 °F (37.1 °C) (Oral)   Resp 18   Ht 6' (1.829 m)   Wt 220 lb (99.8 kg)   SpO2 93%   BMI 29.84 kg/m²  Body mass index is 29.84 kg/m². Physical Exam  Constitutional:       General: He is not in acute distress. Appearance: He is well-developed. He is not diaphoretic. HENT:      Head: Normocephalic and atraumatic.    Eyes:      Conjunctiva/sclera: Conjunctivae normal.      Pupils: Pupils are equal, round, and reactive to light. Neck:      Trachea: No tracheal deviation. Cardiovascular:      Rate and Rhythm: Normal rate and regular rhythm. Heart sounds: Normal heart sounds. No murmur heard. No friction rub. No gallop. Pulmonary:      Effort: Pulmonary effort is normal. No respiratory distress. Breath sounds: Normal breath sounds. No wheezing or rales. Chest:      Chest wall: No tenderness. Abdominal:      General: Bowel sounds are normal. There is no distension. Palpations: Abdomen is soft. Tenderness: There is abdominal tenderness (Mild tenderness lower abdomen; right > left). There is no guarding. Musculoskeletal:         General: No tenderness. Normal range of motion. Cervical back: Normal range of motion and neck supple. Lymphadenopathy:      Cervical: No cervical adenopathy. Skin:     General: Skin is warm and dry. Coloration: Skin is not pale. Findings: No erythema or rash. Neurological:      Mental Status: He is alert and oriented to person, place, and time. Motor: No seizure activity. Coordination: Coordination normal.   Psychiatric:         Behavior: Behavior normal.         Thought Content: Thought content normal.       DIAGNOSTICS      EKG: none  Labs:  CBC:   Recent Labs     12/09/21  2339   WBC 3.9   HGB 12.2*   PLT 74*     BMP:    Recent Labs     12/09/21  2339      K 3.9      CO2 25   BUN 15   CREATININE 0.66*   GLUCOSE 263*     S. Calcium:  Recent Labs     12/09/21  2339   CALCIUM 9.4     S. Ionized Calcium:No results for input(s): IONCA in the last 72 hours. S. Magnesium:No results for input(s): MG in the last 72 hours. S. Phosphorus:No results for input(s): PHOS in the last 72 hours. S. Glucose:No results for input(s): POCGLU in the last 72 hours.   Glycosylated hemoglobin A1C:   Lab Results   Component Value Date    LABA1C 7.8 09/20/2021     Hepatic:   Recent Labs     12/09/21  2339   AST 35   ALT 33   ALKPHOS 133*     CARDIAC ENZY: No results for input(s): CKTOTAL, CKMB, CKMBINDEX, TROPHS, MYOGLOBIN in the last 72 hours. INR:   Recent Labs     12/09/21  2339   INR 1.1     BNP: No results for input(s): PROBNP in the last 72 hours. ABGs: No results for input(s): PH, PCO2, PO2, HCO3, O2SAT in the last 72 hours. Lipids: No results for input(s): CHOL, TRIG, HDL, LDLCALC in the last 72 hours. Invalid input(s): LDL  Pancreatic functions:No results for input(s): LIPASE, AMYLASE in the last 72 hours. [place] Dys: No results for input(s): LACTA in the last 72 hours. Thyroid functions:   Lab Results   Component Value Date    TSH 1.16 03/30/2020      U/A:  Recent Labs     12/09/21  2349   COLORU Dark Yellow*   WBCUA 5 TO 10   RBCUA 0 TO 2   MUCUS NOT REPORTED   BACTERIA FEW*   SPECGRAV 1.037*   LEUKOCYTESUR NEGATIVE   GLUCOSEU 3+*   AMORPHOUS NOT REPORTED     No results for input(s): COVID19 in the last 72 hours. Imaging/Diagonstics:     CT ABDOMEN PELVIS W IV CONTRAST Additional Contrast? None    Result Date: 12/10/2021  EXAMINATION: CT OF THE ABDOMEN AND PELVIS WITH CONTRAST 12/9/2021 7:20 pm TECHNIQUE: CT of the abdomen and pelvis was performed with the administration of intravenous contrast. Multiplanar reformatted images are provided for review. Dose modulation, iterative reconstruction, and/or weight based adjustment of the mA/kV was utilized to reduce the radiation dose to as low as reasonably achievable. COMPARISON: March 21, 2021 HISTORY: ORDERING SYSTEM PROVIDED HISTORY: abdominal pain TECHNOLOGIST PROVIDED HISTORY: abdominal pain Decision Support Exception - unselect if not a suspected or confirmed emergency medical condition->Emergency Medical Condition (MA) Reason for Exam: diffuse abd pain x2 weeks. Additional signs and symptoms: rectal bleeding Relevant Medical/Surgical History: HTN, DM FINDINGS: Lower Chest: Dependent changes are present within the lung bases. Organs: The liver is again noted to be cirrhotic in appearance. cirrhosis with evidence of portal hypertension  --Multiple varices seen  --Scattered colonic diverticula; no evidence of diverticulitis  -Change protonix to IV  -Octreotide gtt initiated in ED  -Hold ASA and VTE d/t bleeding  -Consult GI  -NPO  -Pain and Nausea control  Thrombocytopenia   -platelet count 74    Diabetes Mellitus  -Glucose 263 on arrival  -hold oral hypoglycemics/metformin for now d/t n.p.o.  -POCT ac and hs with sliding scale coverage    HTN  -Metoprolol 2.5 IV every 6 hours as needed while n.p.o.  -Monitor VS    Consultations:     Bethany [address], APRN - CNP   12/10/2021  3:45 AM    [address], [address] [place] [address]. Phone [phone]     Attending Physician Statement  I have discussed the care of [address] [place] [address] and I have examined the patient myselft and taken ros and hpi , including pertinent history and exam findings,  with the resident. I have reviewed the key elements of all parts of the encounter with the resident. I agree with the assessment, plan and orders as documented by the resident.   PCP dr Kadeem Draper  71-year-old gentleman from our office history of alcohol abuse quit 2 years ago history of liver cirrhosis secondary to alcohol history of EGD in 2018  Colonoscopy 5 years ago dr Renée Cazares hemoglobin is 12 for last 3 years  Admitted with a 1 episode of dark stool without diarrhea hemoglobin is noted 12.2  Blood pressure 136/89 pulse is 80 respiratory is 18  Patient has been unable to give any stool sample as did not have any bowel movements no occult blood tested in emergency room  Started on Sandostatin and IV Protonix CT scan noted cirrhosis with portal hypertension and splenomegaly  No fever no abdo pain  Check AFP   N.p.o. continue IV Protonix Sandostatin  We'll add nadolol 20 mg after patient is on diet  Plan for screening EGD today  Discharge planning after EGD      Electronically signed by Palmira Montenegro MD

## 2021-12-10 NOTE — PROGRESS NOTES
Physician Progress Note      David Conner  CSN #:                  [id number]  :                       1965  ADMIT DATE:       2021 11:03 PM  [address] [place] DATE:  RESPONDING  PROVIDER #:        Evan Dunbar MD        QUERY TEXT:    Type of Anemia: Please provide further specificity, if known. Clinical indicators include: melena, hgb, bleeding, cancer, anemia  Options provided:  -- Anemia due to acute blood loss  -- Anemia due to chronic blood loss  -- Anemia due to iron deficiency  -- Anemia due to postoperative blood loss  -- Anemia due to chronic disease  -- Other - I will add my own diagnosis  -- Disagree - Not applicable / Not valid  -- Disagree - Clinically Unable to determine / Unknown        PROVIDER RESPONSE TEXT:    The patient has anemia due to acute blood loss.       Electronically signed by:  Evan Dunbar MD 12/10/2021 2:46 PM

## 2021-12-10 NOTE — FLOWSHEET NOTE
Patient returned from PACU. Assessment completed. VSS as charted. Still no gag reflex present, will remain NPO until gag returns.

## 2021-12-10 NOTE — ANESTHESIA PRE PROCEDURE
Department of Anesthesiology  Preprocedure Note       Name:  Jaylyn Pace   Age:  55 y.o.  :  1965                                          MRN:  [id number]         Date:  12/10/2021      Surgeon: Jose Epstein):  Elsie Snellen, MD    Procedure: Procedure(s):  EGD ESOPHAGOGASTRODUODENOSCOPY    Medications prior to admission:   Prior to Admission medications    Medication Sig Start Date End Date Taking?  Authorizing Provider   metoprolol tartrate (LOPRESSOR) 25 MG tablet Take 1 tablet by mouth twice daily 21  Yes Peri Abraham MD   allopurinol (ZYLOPRIM) 100 MG tablet Take 1 tablet by mouth daily 10/22/21  Yes Peri Abraham MD   omeprazole (PRILOSEC) 20 MG delayed release capsule Take 1 capsule by mouth Daily 10/22/21  Yes Peri Abraham MD   lisinopril (PRINIVIL;ZESTRIL) 5 MG tablet Take 1 tablet by mouth once daily 10/22/21  Yes Peri Abraham MD   tamsulosin (FLOMAX) 0.4 MG capsule Take 1 capsule by mouth daily 21  Yes Orlando Zarco MD   metFORMIN (GLUCOPHAGE) 1000 MG tablet Take 1 tablet by mouth 2 times daily (with meals) 21  Yes Orlando Zarco MD   atorvastatin (LIPITOR) 40 MG tablet Take 1 tablet by mouth nightly 6/10/21  Yes Peri Abraham MD   SITagliptin (JANUVIA) 100 MG tablet TAKE 1 TABLET BY MOUTH DAILY 6/10/21  Yes Peri Abraham MD   glimepiride (AMARYL) 4 MG tablet TAKE 1 TABLET BY MOUTH TWICE DAILY 21  Yes Peri Abraham MD   albuterol sulfate HFA (VENTOLIN HFA) 108 (90 Base) MCG/ACT inhaler Inhale 1 puff into the lungs every 4 hours as needed for Wheezing 21  Yes Peri Abraham MD   fluticasone-salmeterol (ADVAIR DISKUS) 100-50 MCG/DOSE diskus inhaler INHALE 1 PUFF BY MOUTH EVERY 12 HOURS 21  Yes Peri Abraham MD   acetaminophen (AMINOFEN) 325 MG tablet Take 2 tablets by mouth every 6 hours as needed for Pain 10/15/18  Yes Zelalem Wolff MD   clopidogrel (PLAVIX) 75 MG tablet TAKE 1 TABLET BY MOUTH ONCE DAILY 21   Historical Provider, MD   metoprolol tartrate (LOPRESSOR) 25 MG tablet Take 1 tablet by mouth 2 times daily 4/6/21   Renetta Smith MD   blood glucose test strips (ASCENSIA AUTODISC VI;ONE TOUCH ULTRA TEST VI) strip Use with associated glucose meter. 7/22/20   Allyson Patel PA-C   blood glucose monitor strips 1 strip by Other route 4 times daily Test 4  times a day & as needed for symptoms of irregular blood glucose.  5/3/19   Renetta Smith MD   [employer] LANCETS ULTRATHIN 30G MISC 1 each by Other route 3 times daily 10/19/18   Renetta Smith MD       Current medications:    Current Facility-Administered Medications   Medication Dose Route Frequency Provider Last Rate Last Admin    sodium chloride flush 0.9 % injection 10 mL  10 mL IntraVENous PRN Ludin Marquez MD   10 mL at 12/10/21 0036    octreotide (SANDOSTATIN) 500 mcg in sodium chloride 0.9 % 100 mL infusion  25 mcg/hr IntraVENous Continuous Ludin Marquez MD 5 mL/hr at 12/10/21 0907 25 mcg/hr at 12/10/21 0907    pantoprazole (PROTONIX) injection 40 mg  40 mg IntraVENous BID Ludin Marquez MD   40 mg at 12/10/21 0751    And    sodium chloride flush 0.9 % injection 10 mL  10 mL IntraVENous BID Ludin Marquez MD   10 mL at 12/10/21 0201    albuterol sulfate  (90 Base) MCG/ACT inhaler 1 puff  1 puff Inhalation Q4H PRN Barbara Bird, APRN - CNP        budesonide-formoterol (SYMBICORT) 80-4.5 MCG/ACT inhaler 2 puff  2 puff Inhalation BID Barbara Bird APRN - CNP   2 puff at 12/10/21 0825    0.9 % sodium chloride infusion   IntraVENous Continuous Barbara Marge, APRN -  mL/hr at 12/10/21 0330 New Bag at 12/10/21 0330    sodium chloride flush 0.9 % injection 5-40 mL  5-40 mL IntraVENous 2 times per day Barbara Bird, APRN - CNP   10 mL at 12/10/21 0751    sodium chloride flush 0.9 % injection 5-40 mL  5-40 mL IntraVENous PRN Barbara Bird, APRN - CNP        0.9 % sodium chloride infusion  25 mL IntraVENous PRN Barbara Ferrum, APRN - CNP        ondansetron (ZOFRAN-ODT) disintegrating tablet 4 mg  4 mg Oral Q8H PRN Leeann Garcia, APRN - CNP        Or    [facility]) injection 4 mg  4 mg IntraVENous Q6H PRN Leeann Garcia, APRN - CNP        acetaminophen (TYLENOL) tablet 650 mg  650 mg Oral Q6H PRN Leeann Garcia, APRN - CNP        Or    acetaminophen (TYLENOL) suppository 650 mg  650 mg Rectal Q6H PRN Leeann Garcia, APRN - CNP        metoprolol (LOPRESSOR) injection 2.5 mg  2.5 mg IntraVENous Q6H PRN Leeann Garcia, APRN - CNP        glucose (GLUTOSE) 40 % oral gel 15 g  15 g Oral PRN Leeann Garcia, APRN - CNP        dextrose 50 % IV solution  12.5 g IntraVENous PRN Leeann Garcia, APRN - CNP        glucagon (rDNA) injection 1 mg  1 mg IntraMUSCular PRN Leeann Garcia, APRN - CNP        dextrose 5 % solution  100 mL/hr IntraVENous PRN Leeann Garcia, APRN - CNP        insulin lispro (HUMALOG) injection vial 0-12 Units  0-12 Units SubCUTAneous TID WC Leeann Garcia, APRN - CNP        insulin lispro (HUMALOG) injection vial 0-6 Units  0-6 Units SubCUTAneous Nightly Leeann Garcia, APRN - CNP        influenza quadrivalent split vaccine (FLUZONE;FLUARIX;FLULAVAL;AFLURIA) injection 0.5 mL  0.5 mL IntraMUSCular Prior to discharge Arely Naik RN        cefTRIAXone (ROCEPHIN) 1000 mg IVPB in 50 mL D5W minibag  1,000 mg IntraVENous Q24H Rafy Walker APRN -  mL/hr at 12/10/21 0913 1,000 mg at 12/10/21 0913       Allergies:     Allergies   Allergen Reactions    Codeine Nausea Only and Other (See Comments)     hallucinations    Simvastatin Other (See Comments)     Leg cramping       Problem List:    Patient Active Problem List   Diagnosis Code    Hyperlipidemia associated with type 2 diabetes mellitus (HCC) E11.69, E78.5    Essential hypertension I10    Calculus of kidney N20.0    Mitral valve disorder I05.9    Hyperlipidemia LDL goal <70 E78.5    Chronic liver disease K76.9    Ex-smoker Z87.891    H/O colonoscopy Z98.890    Type 2 diabetes mellitus, without long-term current use of insulin (HCC) E11.9    Insomnia G47.00    GI bleed K92.2    Cirrhosis of liver without ascites (HCC) K74.60    Numbness and tingling of left side of face R20.0, R20.2    Numbness and tingling R20.0, R20.2    Stroke-like symptoms R29.90    TIA (transient ischemic attack) G45.9    Chronic obstructive pulmonary disease, unspecified COPD type ([address] J44.9    Thrombocytopenia ([address] D69.6    History of cirrhosis Z87.19       Past Medical History:        Diagnosis Date    Calculus of kidney     Cerebral artery occlusion with cerebral infarction ([address]     slight memory problem (can't remember name of neurologist)    Cirrhosis ([address]     sees PCP for this    COPD (chronic obstructive pulmonary disease) ([facility]     does not see pulmonology    Essential hypertension, benign     Gout, unspecified     H/O colonoscopy 2016    Mitral valve disorders(424.0)     СЕРГЕЙ on CPAP     not currently using due to malfunctioning    Other and unspecified hyperlipidemia     Type II or unspecified type diabetes mellitus without mention of complication, not stated as uncontrolled     Unspecified chronic liver disease without mention of alcohol     Wellness examination      [facility] (last visit approx 2021)       Past Surgical History:        Procedure Laterality Date    LITHOTRIPSY      LITHOTRIPSY  2021    LITHOTRIPSY N/A 2021    ESWL EXTRACORPOREAL SHOCK WAVE LITHOTRIPSY  (NEX-MED CONF# [id number] - AUGUSTO) performed by Lucio Lindsay MD at [address] [place] [address] N/A 2018    The esophagus was inspected to the Z-line. The endoscopic exam showed impression of varices (F1) in distal esophagus.         Social History:    Social History     Tobacco Use    Smoking status: Former Smoker     Packs/day: 0.50     Years: 30.00     Pack years: 15.00     Types: Cigarettes     Quit date: 3/26/2012     Years since quittin.7    Smokeless tobacco: Former User   Substance Use Topics    Alcohol use: Not Currently     Alcohol/week: 0.0 standard drinks     Comment: used to drink \"a lot\"  None since Dec 2020                                Counseling given: Not Answered      Vital Signs (Current):   Vitals:    12/10/21 0500 12/10/21 0615 12/10/21 0715 12/10/21 0828   BP: (!) 136/96  136/89    Pulse: 85  80    Resp: 18  18    Temp: 98.2 °F (36.8 °C)  97.8 °F (36.6 °C)    TempSrc: Oral  Oral    SpO2: 96%  95% 94%   Weight:  234 lb 2.1 oz (106.2 kg)     Height:  6' (1.829 m)                                                BP Readings from Last 3 Encounters:   12/10/21 136/89   08/30/21 114/72   06/16/21 136/80       NPO Status:                                                                                 BMI:   Wt Readings from Last 3 Encounters:   12/10/21 234 lb 2.1 oz (106.2 kg)   08/30/21 230 lb (104.3 kg)   07/19/21 230 lb (104.3 kg)     Body mass index is 31.75 kg/m².     CBC:   Lab Results   Component Value Date    WBC 3.9 12/09/2021    RBC 4.24 12/09/2021    HGB 12.2 12/09/2021    HCT 36.9 12/09/2021    MCV 87.1 12/09/2021    RDW 15.9 12/09/2021    PLT 74 12/09/2021       CMP:   Lab Results   Component Value Date     12/09/2021    K 3.9 12/09/2021     12/09/2021    CO2 25 12/09/2021    BUN 15 12/09/2021    CREATININE 0.66 12/09/2021    GFRAA >60 12/09/2021    LABGLOM >60 12/09/2021    GLUCOSE 263 12/09/2021    PROT 7.1 12/09/2021    CALCIUM 9.4 12/09/2021    BILITOT 2.75 12/09/2021    ALKPHOS 133 12/09/2021    AST 35 12/09/2021    ALT 33 12/09/2021       POC Tests:   Recent Labs     12/10/21  0710   POCGLU 166*       Coags:   Lab Results   Component Value Date    PROTIME 14.6 12/09/2021    INR 1.1 12/09/2021    APTT 31.5 12/09/2021       HCG (If Applicable): No results found for: PREGTESTUR, PREGSERUM, HCG, HCGQUANT     ABGs: No results found for: PHART, PO2ART, AXY0FDV, QOU6UTD, BEART, V3VGXUBW     Type & Screen (If Applicable):  No results found for: LABABO, [address]    Drug/Infectious Status (If Applicable):  Lab Results   Component Value Date    HEPCAB NONREACTIVE 05/24/2018       COVID-19 Screening (If Applicable):   Lab Results   Component Value Date    COVID19 Not Detected 04/02/2021    COVID19 Not Detected 04/01/2021           Anesthesia Evaluation  Patient summary reviewed and Nursing notes reviewed  Airway: Mallampati: III  TM distance: >3 FB   Neck ROM: full  Mouth opening: > = 3 FB Dental: normal exam         Pulmonary: breath sounds clear to auscultation  (+) COPD:  sleep apnea: on noncompliant,                             Cardiovascular:    (+) hypertension:,       ECG reviewed  Rhythm: regular  Rate: normal  Echocardiogram reviewed               ROS comment: Normal left ventricle size and function with an estimated EF > 55%. No segmental wall motion abnormalities seen. Mild left ventricular hypertrophy. Grade II diastolic dysfunction     Neuro/Psych:   (+) CVA:, TIA,             GI/Hepatic/Renal:   (+) liver disease:,          ROS comment: MELENA . Endo/Other:    (+) DiabetesType II DM, , .                 Abdominal:             Vascular: negative vascular ROS. Other Findings:           Anesthesia Plan      general     ASA 3       Induction: intravenous. Anesthetic plan and risks discussed with patient. Plan discussed with CRNA.                   Sissy Godinez MD   12/10/2021

## 2021-12-10 NOTE — PLAN OF CARE
Problem: Cardiac Output - Decreased:  Goal: Hemodynamic stability will improve  Description: Hemodynamic stability will improve  Outcome: Ongoing  Note:   Vitals:    12/10/21 0252 12/10/21 0331 12/10/21 0432 12/10/21 0500   BP: 120/78 (!) 142/86 139/77 (!) 136/96   Pulse: 87 88 88 85   Resp: 18 18 18 18   Temp:  98.7 °F (37.1 °C) 98.2 °F (36.8 °C) 98.2 °F (36.8 °C)   TempSrc:  Oral Oral Oral   SpO2: 94% 93% 90% 96%   Weight:       Height:         Pt NSR on telemetry; adequate urine output; will continue to monitor hemodynamic stability. Problem: Pain:  Goal: Pain level will decrease  Description: Pain level will decrease  Outcome: Ongoing  Note: No pain at this time. 0/10 pain scale.

## 2021-12-10 NOTE — ED NOTES
Admission Dx: GI BLEED    Pts Chief Complaints on Arrival: ABDOMINAL PAIN AND RECTAL BLEEDING    ADL's - Partial assistance    Pending Diagnostics:  N/A    Residence PTA: single story home    Special Considerations/Circumstances:  N/A    Vitals: Current vital signs:  /77   Pulse 88   Temp 98.2 °F (36.8 °C) (Oral)   Resp 18   Ht 6' (1.829 m)   Wt 220 lb (99.8 kg)   SpO2 90%   BMI 29.84 kg/m²                MEWS Score: [id number] Nina Torres RN  12/10/21 6114

## 2021-12-10 NOTE — PROGRESS NOTES
PRE CONSULT ROUNDING NOTE  HPI  55year old male with pmh of etoh cirrhosis cva copd htn dmt2 who reports almost daily melena for two weeks. The pt states he has had melena in the past and had egd but does not know if he had esophageal varices. He is not on anticoagulation medications. hgb 12. 2. ct abd shows cirrhosis with diverticulosis. plt 74 alk phos 133 bili 2.75. inr 1.1 he has not had fevers chills abdominal pain weight loss dysphagia rectal bleeding rash change in the bowel habits.       Endoscopy no colonoscopy states he had a egd several years ago- no record in epic, pt states \"they burned something\" to stop bleeding  Family reports no hx of liver pancreatic stomach or colon cancer no uc/crohns  Social no smoking quit  etoh 2 years ago uses marijuana   /89   Pulse 80   Temp 97.8 °F (36.6 °C) (Oral)   Resp 18   Ht 6' (1.829 m)   Wt 234 lb 2.1 oz (106.2 kg)   SpO2 94%   BMI 31.75 kg/m²     ROS as above meds labs imaging and past medical records were reviewed    Exam  General Appearance: alert and oriented to person, place and time, well-developed and well-nourished, in no acute distress  Skin: warm and dry, no rash or erythema  Head: normocephalic and atraumatic  Eyes: pupils equal, round, and reactive to light, extraocular eye movements intact, conjunctivae normal  ENT: hearing grossly normal bilaterally  Neck: neck supple and non tender without mass, no thyromegaly or thyroid nodules, no cervical lymphadenopathy   Pulmonary/Chest: clear to auscultation bilaterally- no wheezes, rales or rhonchi, normal air movement, no respiratory distress  Cardiovascular: normal rate, regular rhythm, normal S1 and S2, no murmurs, rubs, clicks or gallops, distal pulses intact, no carotid bruits  Abdomen: soft, obese non tender, non-distended, normal bowel sounds, no masses or organomegaly no ascites  Extremities: no cyanosis, clubbing or edema  Musculoskeletal: normal range of motion, no joint swelling, deformity or tenderness  Neurologic: no cranial nerve deficit and muscle strength normal    Assessment  Anemia with melena  Cirrhosis secondary to etoh abuse  Thrombocytopenia secondary to cirrhosis  Elevated lft's    Plan  Will d/w md will keep npo for now and discuss egd   Continue sandostain drip  Rocephin ordered to decrease risk of sbp  afp and acute hepatitis panel ordered  Trend hh and keep hgb >7  Formal gi consult to follow  . Gina Park, APRN - CNP

## 2021-12-10 NOTE — CONSULTS
INITIAL CONSULTATION     HISTORY OF PRESENT ILLNESS: Sylvia Arreola is a 55 y.o. male admitted to the hospital on 12/9/2021 for melena for 2 weeks. Intermittent. No abdominal pain. No nausea or vomiting. History of esophageal varices. He is not on blood thinners. History of known cirrhosis. No abdominal pain. No dysphagia. No weight loss. No prior colonoscopy. Reports EGD several years ago. No known family history of any GI pathology. Reports no smoking. Quit alcohol 2 years ago. He uses marijuana. PAST MEDICAL HISTORY:     Past Medical History:   Diagnosis Date    Calculus of kidney     Cerebral artery occlusion with cerebral infarction ([address] 2020    slight memory problem (can't remember name of neurologist)    Cirrhosis ([address]     sees PCP for this    COPD (chronic obstructive pulmonary disease) ([facility]     does not see pulmonology    Essential hypertension, benign     Gout, unspecified     H/O colonoscopy 04/11/2016 2016    Mitral valve disorders(424.0)     СЕРГЕЙ on CPAP     not currently using due to malfunctioning    Other and unspecified hyperlipidemia     Type II or unspecified type diabetes mellitus without mention of complication, not stated as uncontrolled     Unspecified chronic liver disease without mention of alcohol     Wellness examination      [facility] (last visit approx Feb 2021)        Past Surgical History:   Procedure Laterality Date    LITHOTRIPSY      LITHOTRIPSY  04/02/2021    LITHOTRIPSY N/A 4/2/2021    ESWL EXTRACORPOREAL SHOCK WAVE LITHOTRIPSY  (NEX-MED CONF# [id number] - AUGUSTO) performed by Fina Love MD at [address] [place] [address] 5/25/2018    The esophagus was inspected to the Z-line. The endoscopic exam showed impression of varices (F1) in distal esophagus.            CURRENT MEDICATIONS:       Current Facility-Administered Medications:     sodium chloride flush 0.9 % injection 10 mL, 10 mL, IntraVENous, PRN, Lazarus Mais MD Ember, 10 mL at 12/10/21 0036    octreotide (SANDOSTATIN) 500 mcg in sodium chloride 0.9 % 100 mL infusion, 25 mcg/hr, IntraVENous, Continuous, Mirna Ascencio MD, Last Rate: 5 mL/hr at 12/10/21 0907, 25 mcg/hr at 12/10/21 0907    pantoprazole (PROTONIX) injection 40 mg, 40 mg, IntraVENous, BID, 40 mg at 12/10/21 0751 **AND** sodium chloride flush 0.9 % injection 10 mL, 10 mL, IntraVENous, BID, Mirna Ascencio MD, 10 mL at 12/10/21 0201    albuterol sulfate  (90 Base) MCG/ACT inhaler 1 puff, 1 puff, Inhalation, Q4H PRN, FAUSTO Shankar - CNP    budesonide-formoterol (SYMBICORT) 80-4.5 MCG/ACT inhaler 2 puff, 2 puff, Inhalation, BID, FAUSTO Shankar - CNP, 2 puff at 12/10/21 0825    0.9 % sodium chloride infusion, , IntraVENous, Continuous, FAUSTO Shankar - CNP, Last Rate: 150 mL/hr at 12/10/21 0330, New Bag at 12/10/21 0330    sodium chloride flush 0.9 % injection 5-40 mL, 5-40 mL, IntraVENous, 2 times per day, FAUSTO Shankar - CNP, 10 mL at 12/10/21 0751    sodium chloride flush 0.9 % injection 5-40 mL, 5-40 mL, IntraVENous, PRN, Ana Nunez APRN - CNP    0.9 % sodium chloride infusion, 25 mL, IntraVENous, PRN, Ana Nunez APRN - CNP    ondansetron (ZOFRAN-ODT) disintegrating tablet 4 mg, 4 mg, Oral, Q8H PRN **OR** ondansetron (ZOFRAN) injection 4 mg, 4 mg, IntraVENous, Q6H PRN, Ana Nunez APRN - CNP    acetaminophen (TYLENOL) tablet 650 mg, 650 mg, Oral, Q6H PRN **OR** acetaminophen (TYLENOL) suppository 650 mg, 650 mg, Rectal, Q6H PRN, Ana Nunez, APRN - CNP    metoprolol (LOPRESSOR) injection 2.5 mg, 2.5 mg, IntraVENous, Q6H PRN, Ana Nunez APRN - CNP    glucose (GLUTOSE) 40 % oral gel 15 g, 15 g, Oral, PRN, Ana Nunez, APRN - CNP    dextrose 50 % IV solution, 12.5 g, IntraVENous, PRN, Ana Nunez APRN - CNP    glucagon (rDNA) injection 1 mg, 1 mg, IntraMUSCular, PRN, Ana Nunez APRN - CNP    dextrose 5 % solution, 100 mL/hr, IntraVENous, PRN, FAUSTO Nathan CNP    insulin lispro (HUMALOG) injection vial 0-12 Units, 0-12 Units, SubCUTAneous, TID WC, FAUSTO Nathan CNP    insulin lispro (HUMALOG) injection vial 0-6 Units, 0-6 Units, SubCUTAneous, Nightly, FAUSTO Nathan CNP    influenza quadrivalent split vaccine (FLUZONE;FLUARIX;FLULAVAL;AFLURIA) injection 0.5 mL, 0.5 mL, IntraMUSCular, Prior to discharge, Jaime Conti RN    cefTRIAXone (ROCEPHIN) 1000 mg IVPB in 50 mL D5W minibag, 1,000 mg, IntraVENous, Q24H, FAUSTO Escalante CNP, Stopped at 12/10/21 1027       ALLERGIES:   Allergies   Allergen Reactions    Codeine Nausea Only and Other (See Comments)     hallucinations    Simvastatin Other (See Comments)     Leg cramping          FAMILY HISTORY: The patient's family history was reviewed.        SOCIAL HISTORY:   Social History     Socioeconomic History    Marital status:      Spouse name: Not on file    Number of children: Not on file    Years of education: Not on file    Highest education level: Not on file   Occupational History    Not on file   Tobacco Use    Smoking status: Former Smoker     Packs/day: 0.50     Years: 30.00     Pack years: 15.00     Types: Cigarettes     Quit date: 3/26/2012     Years since quittin.7    Smokeless tobacco: Former User   Vaping Use    Vaping Use: Never used   Substance and Sexual Activity    Alcohol use: Not Currently     Alcohol/week: 0.0 standard drinks     Comment: used to drink \"a lot\"  None since Dec 2020    Drug use: Yes     Types: Marijuana Darius Ghosh)     Comment: every evening    Sexual activity: Not on file   Other Topics Concern    Not on file   Social History Narrative    Not on file     Social Determinants of Health     Financial Resource Strain: Low Risk     Difficulty of Paying Living Expenses: Not hard at all   Food Insecurity: No Food Insecurity    Worried About [address] in the Last Year: Never true    [address] in the Last Year: Never true   Transportation Needs:     Lack of Transportation (Medical): Not on file    Lack of Transportation (Non-Medical): Not on file   Physical Activity:     Days of Exercise per Week: Not on file    Minutes of Exercise per Session: Not on file   Stress:     Feeling of Stress : Not on file   Social Connections:     Frequency of Communication with Friends and Family: Not on file    Frequency of Social Gatherings with Friends and Family: Not on file    Attends Holiness Services: Not on file    Active Member of [address] [place] [address] [employer] or Organizations: Not on file    Attends Club or Organization Meetings: Not on file    Marital Status: Not on file   Intimate Partner Violence:     Fear of Current or Ex-Partner: Not on file    Emotionally Abused: Not on file    Physically Abused: Not on file    Sexually Abused: Not on file   Housing Stability:     Unable to Pay for Housing in the Last Year: Not on file    Number of [place] in the Last Year: Not on file    Unstable Housing in the Last Year: Not on file         REVIEW OF SYSTEMS: A 14-point review of systems was obtained and pertinent positives andnegatives were enumerated above in the history of present illness. All other reviewed systems / symptoms were negative. Review of Systems      PHYSICAL EXAMINATION: Vital signs reviewed per the nursing documentation. /89   Pulse 80   Temp 97.8 °F (36.6 °C) (Oral)   Resp 18   Ht 6' (1.829 m)   Wt 234 lb 2.1 oz (106.2 kg)   SpO2 94%   BMI 31.75 kg/m²    [unfilled]   Body mass index is 31.75 kg/m². General:  A O x 3 in NAD   Psych: . Normal affect. Mentation normal   HEENT: PERRLA. Clear conjunctivae and sclerae. Moist oral mucosae, no lesions orulcers. The neck is supple, without lymphadenopathy or jugular venous distension. No masses. Normal thyroid. Cardiovascular: S1 S2 RRR no rubs or murmurs. Pulmonary: clear BL. No accessory muscle usage.    Abd Exam: Soft, NT ND, no hepato or spleno megaly, +BS, no ascites. No groin masses or lymphadenopathy. Extremities: No edema. Skin: Warm skin. No skin rash. No spider nevi palmar erythema naildystrophy. Joint: No joint swelling or deformity. Neurological: intact sensory. DTR+. No asterixis     LABORATORY DATA: Reviewed   Lab Results   Component Value Date    WBC 3.9 12/09/2021    HGB 12.2 (L) 12/09/2021    HCT 36.9 (L) 12/09/2021    MCV 87.1 12/09/2021    PLT 74 (L) 12/09/2021     12/09/2021    K 3.9 12/09/2021     12/09/2021    CO2 25 12/09/2021    BUN 15 12/09/2021    CREATININE 0.66 (L) 12/09/2021    LABPROT 8.1 07/21/2012    LABALBU 3.7 12/09/2021    BILITOT 2.75 (H) 12/09/2021    ALKPHOS 133 (H) 12/09/2021    AST 35 12/09/2021    ALT 33 12/09/2021    INR 1.1 12/09/2021      Lab Results   Component Value Date    RBC 4.24 (L) 12/09/2021    HGB 12.2 (L) 12/09/2021    MCV 87.1 12/09/2021    MCH 28.8 12/09/2021    MCHC 33.1 12/09/2021    RDW 15.9 (H) 12/09/2021    MPV 8.4 12/09/2021    BASOPCT 0 12/09/2021    LYMPHSABS 1.09 12/09/2021    MONOSABS 0.31 12/09/2021    NEUTROABS 2.26 12/09/2021    EOSABS 0.08 12/09/2021    BASOSABS 0.00 12/09/2021          DIAGNOSTIC TESTING:   CT ABDOMEN PELVIS W IV CONTRAST Additional Contrast? None    Result Date: 12/10/2021  EXAMINATION: CT OF THE ABDOMEN AND PELVIS WITH CONTRAST 12/9/2021 7:20 pm TECHNIQUE: CT of the abdomen and pelvis was performed with the administration of intravenous contrast. Multiplanar reformatted images are provided for review. Dose modulation, iterative reconstruction, and/or weight based adjustment of the mA/kV was utilized to reduce the radiation dose to as low as reasonably achievable.  COMPARISON: March 21, 2021 HISTORY: ORDERING SYSTEM PROVIDED HISTORY: abdominal pain TECHNOLOGIST PROVIDED HISTORY: abdominal pain Decision Support Exception - unselect if not a suspected or confirmed emergency medical condition->Emergency Medical Condition (MA) Reason for Exam: diffuse abd pain x2 weeks. Additional signs and symptoms: rectal bleeding Relevant Medical/Surgical History: HTN, DM FINDINGS: Lower Chest: Dependent changes are present within the lung bases. Organs: The liver is again noted to be cirrhotic in appearance. Splenomegaly is present, secondary to portal hypertension. Recannulization of the paraumbilical vein is noted. Multiple varices are present within the abdomen. Gallbladder is contracted, without gallstones. Spleen appears normal.  Adrenal glands appear normal.  Previously noted right intrarenal calculi are no longer visualized. No hydronephrosis is present. Cortical cyst formation is present on the kidneys. GI/Bowel: A small hiatal hernia is present. Small bowel appears normal without evidence of obstruction. No evidence of appendicitis is present. Scattered colonic diverticula are noted, without evidence of diverticulitis. Pelvis: Urinary bladder is incompletely distended. Prostate gland is normal in size. Peritoneum/Retroperitoneum: No free fluid or free air is present within the abdomen or pelvis. No aneurysm formation is present. Scattered mesenteric lymph nodes are again demonstrated, unchanged. Increased attenuation is noted within the mesentery, and may be related to the liver disease versus mesenteric panniculitis. Bones/Soft Tissues: No acute osseous abnormality is present. 1. Cirrhotic appearance to the liver again demonstrated, with evidence of portal hypertension, and multiple varices again demonstrated 2. Scattered colonic diverticula, without evidence of diverticulitis 3. Previously noted right intrarenal calculi no longer visualized        IMPRESSION: Mr. Brian Nieves is a 55 y.o. male with melena. Anemia. Cirrhosis due to prior alcohol use. Elevated LFTs. Monitor H&H closely. Transfuse as needed keep hemoglobin above 7. Protonix drip. Sandostatin. Rocephin. Plan for EGD today.       Thank you for allowing me to participate in the care of Mr. Xavi Sorenson. For any further questions please do not hesitate to contact me.        MD Becca Sewell

## 2021-12-10 NOTE — ED PROVIDER NOTES
EMERGENCY DEPARTMENT ENCOUNTER    Pt Name: Ana Moody  MRN: [id number]  [place] 1965  Date of evaluation: 12/10/21  CHIEF COMPLAINT       Chief Complaint   Patient presents with    Rectal Bleeding     HISTORY OF PRESENT ILLNESS   HPI  44-year-old male history of COPD, hypertension, liver cirrhosis, peptic ulcer disease presents for evaluation of dark tarry stools. Symptoms progressing over the past several weeks. Patient has associated lower abdominal pain. No associated lightheadedness or passing out. No chest pain or shortness of breath. Does intermittently have vomiting but not feeling nauseous now. No fever chills. Symptoms are moderate and progressive. Last endoscopy was in 2018 which showed varices, erosive gastritis, duodenal ulcer. REVIEW OF SYSTEMS     Review of Systems   Constitutional: Negative for chills and fatigue. HENT: Negative for facial swelling, nosebleeds, rhinorrhea and sore throat. Eyes: Negative for pain and redness. Respiratory: Negative for cough and shortness of breath. Cardiovascular: Negative for chest pain and leg swelling. Gastrointestinal: Positive for abdominal pain and blood in stool. Negative for diarrhea, nausea and vomiting. Genitourinary: Negative for flank pain and hematuria. Musculoskeletal: Negative for arthralgias and back pain. Skin: Negative for color change and rash. Neurological: Negative for dizziness, tremors, facial asymmetry, speech difficulty, weakness and numbness.      PASTMEDICAL HISTORY     Past Medical History:   Diagnosis Date    Calculus of kidney     Cerebral artery occlusion with cerebral infarction ([address] 2020    slight memory problem (can't remember name of neurologist)    Cirrhosis ([address]     sees PCP for this    COPD (chronic obstructive pulmonary disease) ([address]     does not see pulmonology    Essential hypertension, benign     Gout, unspecified     H/O colonoscopy 04/11/2016 2016    Mitral valve disorders(424.0)     СЕРГЕЙ on CPAP     not currently using due to malfunctioning    Other and unspecified hyperlipidemia     Type II or unspecified type diabetes mellitus without mention of complication, not stated as uncontrolled     Unspecified chronic liver disease without mention of alcohol     Wellness examination      [facility] (last visit approx Feb 2021)     Past Problem List  Patient Active Problem List   Diagnosis Code    Hyperlipidemia associated with type 2 diabetes mellitus ([facility] E11.69, E78.5    Essential hypertension I10    Calculus of kidney N20.0    Mitral valve disorder I05.9    Hyperlipidemia LDL goal <70 E78.5    Chronic liver disease K76.9    Ex-smoker Z87.891    H/O colonoscopy Z98.890    Type 2 diabetes mellitus, without long-term current use of insulin ([facility] E11.9    Insomnia G47.00    GI bleed K92.2    Cirrhosis of liver without ascites (HCC) K74.60    Numbness and tingling of left side of face R20.0, R20.2    Numbness and tingling R20.0, R20.2    Stroke-like symptoms R29.90    TIA (transient ischemic attack) G45.9    Chronic obstructive pulmonary disease, unspecified COPD type ([facility] J44.9     SURGICAL HISTORY       Past Surgical History:   Procedure Laterality Date    LITHOTRIPSY      LITHOTRIPSY  04/02/2021    LITHOTRIPSY N/A 4/2/2021    ESWL EXTRACORPOREAL SHOCK WAVE LITHOTRIPSY  (NEX-MED CONF# [id number] - AUGUSTO) performed by Carrie Be MD at [address] [place] [address] N/A 5/25/2018    The esophagus was inspected to the Z-line. The endoscopic exam showed impression of varices (F1) in distal esophagus.       CURRENT MEDICATIONS       Previous Medications    ACETAMINOPHEN (AMINOFEN) 325 MG TABLET    Take 2 tablets by mouth every 6 hours as needed for Pain    ALBUTEROL SULFATE HFA (VENTOLIN HFA) 108 (90 BASE) MCG/ACT INHALER    Inhale 1 puff into the lungs every 4 hours as needed for Wheezing    ALLOPURINOL (ZYLOPRIM) 100 MG TABLET    Take 1 tablet VITALS: BP (!) 142/86   Pulse 88   Temp 98.7 °F (37.1 °C) (Oral)   Resp 18   Ht 6' (1.829 m)   Wt 220 lb (99.8 kg)   SpO2 93%   BMI 29.84 kg/m²    Physical Exam  Vitals and nursing note reviewed. Constitutional:       Appearance: Normal appearance. HENT:      Head: Normocephalic and atraumatic. Nose: Nose normal.   Eyes:      Extraocular Movements: Extraocular movements intact. Pupils: Pupils are equal, round, and reactive to light. Cardiovascular:      Rate and Rhythm: Normal rate and regular rhythm. Pulmonary:      Effort: Pulmonary effort is normal. No respiratory distress. Breath sounds: Normal breath sounds. Abdominal:      General: Abdomen is flat. There is no distension. Palpations: Abdomen is soft. There is no mass. Comments: Mild Diffuse discomfort   Genitourinary:     Comments: Small nonbleeding nonthrombosed external hemorrhoids no stool in rectal vault on ANA  Musculoskeletal:         General: No swelling. Normal range of motion. Cervical back: Normal range of motion. No rigidity. Skin:     General: Skin is warm and dry. Neurological:      General: No focal deficit present. Mental Status: He is alert. Mental status is at baseline. Cranial Nerves: No cranial nerve deficit. MEDICAL DECISION MAKIN-year-old male presents for evaluation with concern for cirrhotic GI bleed with dark black stools progressing over the past several weeks. Patient is hemodynamically stable on initial evaluation. He is not have any stool in the vault on rectal exam.  Initial blood work showed stable hemoglobin. CT scan showed changes consistent with portal hypertension and liver cirrhosis with multiple varices.  With risk factors will start on protonix, octreotide, admit for observation         CRITICAL CARE:       PROCEDURES:    Procedures    DIAGNOSTIC RESULTS   EKG:All EKG's are interpreted by the Emergency Department Physician who either signs or Co-signs this chart in the absence of a cardiologist.        RADIOLOGY:All plain film, CT, MRI, and formal ultrasound images (except ED bedside ultrasound) are read by the radiologist, see reports below, unless otherwisenoted in MDM or here. CT ABDOMEN PELVIS W IV CONTRAST Additional Contrast? None   Final Result   1. Cirrhotic appearance to the liver again demonstrated, with evidence of   portal hypertension, and multiple varices again demonstrated   2. Scattered colonic diverticula, without evidence of diverticulitis   3. Previously noted right intrarenal calculi no longer visualized           LABS: All lab results were reviewed by myself, and all abnormals are listed below.   Labs Reviewed   CBC WITH AUTO DIFFERENTIAL - Abnormal; Notable for the following components:       Result Value    RBC 4.24 (*)     Hemoglobin 12.2 (*)     Hematocrit 36.9 (*)     RDW 15.9 (*)     Platelets 74 (*)     Monocytes 8 (*)     Metamyelocytes 1 (*)     Metamyelocytes Absolute 0.04 (*)     All other components within normal limits   COMPREHENSIVE METABOLIC PANEL W/ REFLEX TO MG FOR LOW K - Abnormal; Notable for the following components:    Glucose 263 (*)     CREATININE 0.66 (*)     Alkaline Phosphatase 133 (*)     Total Bilirubin 2.75 (*)     All other components within normal limits   URINALYSIS - Abnormal; Notable for the following components:    Color, UA Dark Yellow (*)     Glucose, Ur 3+ (*)     Bilirubin Urine SMALL (*)     Ketones, Urine TRACE (*)     Specific Gravity, UA 1.037 (*)     Urobilinogen, Urine ELEVATED (*)     All other components within normal limits   MICROSCOPIC URINALYSIS - Abnormal; Notable for the following components:    Bacteria, UA FEW (*)     All other components within normal limits   PROTIME-INR   APTT   HEMOGLOBIN AND HEMATOCRIT, BLOOD   POCT GLUCOSE   POCT GLUCOSE       EMERGENCY DEPARTMENTCOURSE:         Vitals:    Vitals:    12/09/21 2043 12/10/21 0205 12/10/21 0252 12/10/21 0331   BP: 103/62 (!) 146/90 120/78 (!) 142/86   Pulse: 100 94 87 88   Resp: 18 18 18 18   Temp: 98.3 °F (36.8 °C)   98.7 °F (37.1 °C)   TempSrc:    Oral   SpO2: 97% 95% 94% 93%   Weight: 220 lb (99.8 kg)      Height: 6' (1.829 m)          The patient was given the following medications while in the emergency department:  Orders Placed This Encounter   Medications    0.9 % sodium chloride bolus    iopamidol (ISOVUE-370) 76 % injection 75 mL    sodium chloride flush 0.9 % injection 10 mL    DISCONTD: pantoprazole (PROTONIX) injection 40 mg    DISCONTD: sodium chloride flush 0.9 % injection 10 mL    octreotide (SANDOSTATIN) 500 mcg in sodium chloride 0.9 % 100 mL infusion    AND Linked Order Group     pantoprazole (PROTONIX) injection 40 mg     sodium chloride flush 0.9 % injection 10 mL    albuterol sulfate  (90 Base) MCG/ACT inhaler 1 puff     Order Specific Question:   Initiate RT Bronchodilator Protocol     Answer: Yes    budesonide-formoterol (SYMBICORT) 80-4.5 MCG/ACT inhaler 2 puff    0.9 % sodium chloride infusion    OR Linked Order Group     ondansetron (ZOFRAN-ODT) disintegrating tablet 4 mg     ondansetron (ZOFRAN) injection 4 mg    OR Linked Order Group     acetaminophen (TYLENOL) tablet 650 mg     acetaminophen (TYLENOL) suppository 650 mg    metoprolol (LOPRESSOR) injection 2.5 mg    glucose (GLUTOSE) 40 % oral gel 15 g    dextrose 50 % IV solution    glucagon (rDNA) injection 1 mg    dextrose 5 % solution    insulin lispro (HUMALOG) injection vial 0-12 Units    insulin lispro (HUMALOG) injection vial 0-6 Units     CONSULTS:  IP CONSULT TO GI    FINAL IMPRESSION      1. Gastrointestinal hemorrhage, unspecified gastrointestinal hemorrhage type    2. History of cirrhosis    3. Portal hypertension ([facility]          DISPOSITION/PLAN   DISPOSITION Admitted 12/10/2021 02:53:06 AM      PATIENT REFERRED TO:  No follow-up provider specified.   DISCHARGE MEDICATIONS:  New Prescriptions    No medications on

## 2021-12-10 NOTE — CARE COORDINATION
CASE MANAGEMENT NOTE:    Admission Date:  12/9/2021 Belgica Summers is a 55 y.o.  male    Admitted for : Portal hypertension ([facility] [K76.6]  GI bleed [K92.2]  History of cirrhosis [Z87.19]  Gastrointestinal hemorrhage, unspecified gastrointestinal hemorrhage type [K92.2]    Met with:  Patient    PCP:  Dr. Velasco Nayana:  [facility]      Is patient alert and oriented at time of discussion:  Yes    Current Residence/ Living Arrangements:  independently at home lives in trailer with few steps and has a ramp            Current Services PTA:  No    Does patient go to outpatient dialysis: No  If yes, location and chair time: none    Is patient agreeable to VNS: No    Freedom of choice provided:  No    List of [address] provided: No    VNS chosen:  No    DME:  straight cane, walker, wheelchair and shower chair has at home but does not use    Home Oxygen: No    Nebulizer: Yes does have but does not use    CPAP/BIPAP: Yes Does have but has not used for years    Supplier: N/A and Unsure    Potential Assistance Needed: No    SNF needed: No    Freedom of choice and list provided: No    Pharmacy:  [place], [place]       Does Patient want to use MEDS to BEDS? No    Is patient currently receiving oral anticoagulation therapy? No    Is the Patient an [facility] [employer] [facility] with Readmission Risk Score greater than 14%? No  If yes, pt needs a follow up appointment made within 7 days. Family Members/Caregivers that pt would like involved in their care:    Yes    If yes, list name here: Debbie Oreilly (wife)    Transportation Provider:  Patient  And family           Discharge Plan:  12.10.2021 [id number] lives in mobile home with ramp and a few steps with wife. DME Cane, walker, wheelchair, shower chair, nebulizer (casas not use), and CPAP (does not use. Denies VNS. Hgb 12.2. EGD today. GI, IV Rocephin, denies needs will continue to follow. Electronically signed by: Mary Rick RN on 12/10/2021 at 11:28 AM

## 2021-12-10 NOTE — ANESTHESIA POSTPROCEDURE EVALUATION
POST- ANESTHESIA EVALUATION       Pt Name: Nya Melendez  MRN: [id number]  YOB: 1965  Date of evaluation: 12/10/2021  Time:  2:09 PM      BP (!) 145/84   Pulse 72   Temp 98 °F (36.7 °C) (Oral)   Resp 16   Ht 6' (1.829 m)   Wt 234 lb (106.1 kg)   SpO2 97%   BMI 31.74 kg/m²      Consciousness Level  Awake  Cardiopulmonary Status  Stable  Pain Adequately Treated YES  Nausea / Vomiting  NO  Adequate Hydration  YES  Anesthesia Related Complications NONE      Electronically signed by Dayan Hayes MD on 12/10/2021 at 2:09 PM       Department of Anesthesiology  Postprocedure Note    Patient: Nya Melendez  MRN: [id number]  YOB: 1965  Date of evaluation: 12/10/2021  Time:  2:09 PM     Procedure Summary     Date: 12/10/21 Room / Location: [address]    Anesthesia Start: 1233 Anesthesia Stop: 0056    Procedure: EGD ESOPHAGOGASTRODUODENOSCOPY (N/A Esophagus) Diagnosis:       (MELENA)      (FLR DOING RAPID COVID)    Surgeons: Palmira Sandoval MD Responsible Provider: Dayan Hayes MD    Anesthesia Type: general ASA Status: 3          Anesthesia Type: general    Joanna Phase I: Joanna Score: 10    Joanna Phase II:      Last vitals: Reviewed and per EMR flowsheets.        Anesthesia Post Evaluation

## 2021-12-10 NOTE — PLAN OF CARE
Problem: Cardiac Output - Decreased:  Goal: Hemodynamic stability will improve  Description: Hemodynamic stability will improve  12/10/2021 1632 by Nura Pimentel RN  Outcome: Ongoing  12/10/2021 0616 by Andrew Caceres RN  Outcome: Ongoing  Note:   Vitals:    12/10/21 0252 12/10/21 0331 12/10/21 0432 12/10/21 0500   BP: 120/78 (!) 142/86 139/77 (!) 136/96   Pulse: 87 88 88 85   Resp: 18 18 18 18   Temp:  98.7 °F (37.1 °C) 98.2 °F (36.8 °C) 98.2 °F (36.8 °C)   TempSrc:  Oral Oral Oral   SpO2: 94% 93% 90% 96%   Weight:       Height:         Pt NSR on telemetry; adequate urine output; will continue to monitor hemodynamic stability. Problem: Pain:  Goal: Pain level will decrease  Description: Pain level will decrease  12/10/2021 1632 by Nura Pimentel RN  Outcome: Ongoing  12/10/2021 0616 by Andrew Caceres RN  Outcome: Ongoing  Note: No pain at this time. 0/10 pain scale.     Goal: Recognizes and communicates pain  Description: Recognizes and communicates pain  Outcome: Ongoing  Goal: Control of acute pain  Description: Control of acute pain  Outcome: Ongoing

## 2021-12-10 NOTE — ED NOTES
Mode of arrival (squad #, walk in, police, etc) : Walked into triage        Chief complaint(s): Rectal Bleeding        Arrival Note (brief scenario, treatment PTA, etc). : Complaining of rectal bleeding x 2 weeks. States he has lower abdominal pain. Hx of ulcer a few months ago and was hospitalized here. A/O X 3        C= \"Have you ever felt that you should Cut down on your drinking? \"  No  A= \"Have people Annoyed you by criticizing your drinking? \"  No  G= \"Have you ever felt bad or Guilty about your drinking? \"  No  E= \"Have you ever had a drink as an Eye-opener first thing in the morning to steady your nerves or to help a hangover? \"  No      Deferred []      Reason for deferring: N/A    *If yes to two or more: probable alcohol abuse. Ghislaine Javier RN  12/09/21 2052

## 2021-12-10 NOTE — OP NOTE
[facility]     PROCEDURE DATE: 12/10/21    REFERRING PHYSICIAN: No ref. provider found     PRIMARY CARE PROVIDER: Aaliyah Soto MD    ATTENDING PHYSICIAN: Lew Diaz MD     HISTORY: Mr. Belgica Summers is a 55 y.o. male who presents to the [place] [address] Endoscopy unit for upper endoscopy. The patient's clinical history is remarkable for melena. He is currently medically stable and appropriate for the planned procedure. PREOPERATIVE DIAGNOSIS: melena. PROCEDURES:   1) Transoral Upper Endoscopy. POSTOPERATIVE DIAGNOSIS:   Mild postoperative gastropathy with contact oozing  Low risk F1-F2 esophageal varices    SPECIMENS: None    MEDICATIONS:   MAC per anesthesia    EBL: minimal    INSTRUMENT: Olympus GIF-H190 flexible Gastroscope. PREPARATION: The nature and character of the procedure as well as risks, benefits, and alternatives were discussed with the patient and informed consent was obtained. Complications were said to include, but were not limited to: medication allergy, medication reaction, cardiovascular and respiratory problems, bleeding, perforation, infection, and/or missed diagnosis. Following arrival in the endoscopy room, the patient was placed in the left lateral decubitus position and final time-out accomplished in the presence of the nursing staff. Baseline vital signs were obtained and reviewed, and IV sedation was subsequently initiated. FINDINGS:   Esophagus: The esophagus was inspected to the Z-line. The endoscopic exam showed low risk F1-F2 varices in mid and distal esophagus with no high-risk stigmata. Stomach: The stomach was inspected in both forward and retroflex fashion and was appropriately distensible. The cardia, fundus, incisura, antrum and pylorus were identified via direct visualization. The endoscopic exam showed mild portal hypertensive gastropathy diffusely with contact oozing.      Duodenum: The proximal small bowel was inspected through the bulb, sweep, and second portion of the duodenum. The endoscopic exam showed mild portal hypertensive no adenopathy. RECOMMENDATIONS:   1) Protonix 40 mg p.o. daily. 2) May benefit from Toprol on outpatient basis-he has not had follow-up in the GI office for a few years. 3) Continue Sandostatin and IV antibiotics. 4) Possible discharge home tomorrow.           Jenn Mace

## 2021-12-11 VITALS
HEART RATE: 84 BPM | WEIGHT: 234.79 LBS | TEMPERATURE: 98.3 F | RESPIRATION RATE: 16 BRPM | DIASTOLIC BLOOD PRESSURE: 94 MMHG | SYSTOLIC BLOOD PRESSURE: 151 MMHG | HEIGHT: 72 IN | OXYGEN SATURATION: 94 % | BODY MASS INDEX: 31.8 KG/M2

## 2021-12-11 LAB
AFP: 2.7 UG/L
ANION GAP SERPL CALCULATED.3IONS-SCNC: 8 MMOL/L (ref 9–17)
BUN BLDV-MCNC: 13 MG/DL (ref 6–20)
BUN/CREAT BLD: ABNORMAL (ref 9–20)
CALCIUM SERPL-MCNC: 8.2 MG/DL (ref 8.6–10.4)
CHLORIDE BLD-SCNC: 109 MMOL/L (ref 98–107)
CO2: 24 MMOL/L (ref 20–31)
CREAT SERPL-MCNC: 0.55 MG/DL (ref 0.7–1.2)
GFR AFRICAN AMERICAN: >60 ML/MIN
GFR NON-AFRICAN AMERICAN: >60 ML/MIN
GFR SERPL CREATININE-BSD FRML MDRD: ABNORMAL ML/MIN/{1.73_M2}
GFR SERPL CREATININE-BSD FRML MDRD: ABNORMAL ML/MIN/{1.73_M2}
GLUCOSE BLD-MCNC: 162 MG/DL (ref 75–110)
GLUCOSE BLD-MCNC: 181 MG/DL (ref 70–99)
HAV IGM SER IA-ACNC: NONREACTIVE
HCT VFR BLD CALC: 32.9 % (ref 41–53)
HCT VFR BLD CALC: 33.9 % (ref 41–53)
HCT VFR BLD CALC: 34.8 % (ref 41–53)
HEMOGLOBIN: 11 G/DL (ref 13.5–17.5)
HEMOGLOBIN: 11.3 G/DL (ref 13.5–17.5)
HEMOGLOBIN: 11.5 G/DL (ref 13.5–17.5)
HEPATITIS B CORE IGM ANTIBODY: NONREACTIVE
HEPATITIS B SURFACE ANTIGEN: NONREACTIVE
HEPATITIS C ANTIBODY: NONREACTIVE
IRON SATURATION: 41 % (ref 20–55)
IRON: 112 UG/DL (ref 59–158)
POTASSIUM SERPL-SCNC: 4 MMOL/L (ref 3.7–5.3)
SODIUM BLD-SCNC: 141 MMOL/L (ref 135–144)
TOTAL IRON BINDING CAPACITY: 273 UG/DL (ref 250–450)
UNSATURATED IRON BINDING CAPACITY: 161 UG/DL (ref 112–347)

## 2021-12-11 PROCEDURE — 6370000000 HC RX 637 (ALT 250 FOR IP): Performed by: INTERNAL MEDICINE

## 2021-12-11 PROCEDURE — APPSS30 APP SPLIT SHARED TIME 16-30 MINUTES: Performed by: NURSE PRACTITIONER

## 2021-12-11 PROCEDURE — 83550 IRON BINDING TEST: CPT

## 2021-12-11 PROCEDURE — 85014 HEMATOCRIT: CPT

## 2021-12-11 PROCEDURE — G0378 HOSPITAL OBSERVATION PER HR: HCPCS

## 2021-12-11 PROCEDURE — 94640 AIRWAY INHALATION TREATMENT: CPT

## 2021-12-11 PROCEDURE — 2580000003 HC RX 258: Performed by: INTERNAL MEDICINE

## 2021-12-11 PROCEDURE — 6360000002 HC RX W HCPCS: Performed by: INTERNAL MEDICINE

## 2021-12-11 PROCEDURE — 36415 COLL VENOUS BLD VENIPUNCTURE: CPT

## 2021-12-11 PROCEDURE — 85018 HEMOGLOBIN: CPT

## 2021-12-11 PROCEDURE — 82947 ASSAY GLUCOSE BLOOD QUANT: CPT

## 2021-12-11 PROCEDURE — C9113 INJ PANTOPRAZOLE SODIUM, VIA: HCPCS | Performed by: INTERNAL MEDICINE

## 2021-12-11 PROCEDURE — 90686 IIV4 VACC NO PRSV 0.5 ML IM: CPT | Performed by: INTERNAL MEDICINE

## 2021-12-11 PROCEDURE — G0008 ADMIN INFLUENZA VIRUS VAC: HCPCS | Performed by: INTERNAL MEDICINE

## 2021-12-11 PROCEDURE — 99239 HOSP IP/OBS DSCHRG MGMT >30: CPT | Performed by: INTERNAL MEDICINE

## 2021-12-11 PROCEDURE — 99232 SBSQ HOSP IP/OBS MODERATE 35: CPT | Performed by: INTERNAL MEDICINE

## 2021-12-11 PROCEDURE — 80048 BASIC METABOLIC PNL TOTAL CA: CPT

## 2021-12-11 PROCEDURE — 94760 N-INVAS EAR/PLS OXIMETRY 1: CPT

## 2021-12-11 PROCEDURE — 83540 ASSAY OF IRON: CPT

## 2021-12-11 RX ORDER — NADOLOL 20 MG/1
20 TABLET ORAL DAILY
Qty: 30 TABLET | Refills: 3 | Status: SHIPPED | OUTPATIENT
Start: 2021-12-11 | End: 2022-01-27 | Stop reason: SDUPTHER

## 2021-12-11 RX ORDER — LISINOPRIL 20 MG/1
20 TABLET ORAL DAILY
Status: DISCONTINUED | OUTPATIENT
Start: 2021-12-11 | End: 2021-12-11 | Stop reason: HOSPADM

## 2021-12-11 RX ORDER — LISINOPRIL 20 MG/1
20 TABLET ORAL DAILY
Qty: 30 TABLET | Refills: 3 | Status: SHIPPED | OUTPATIENT
Start: 2021-12-11 | End: 2022-04-20

## 2021-12-11 RX ORDER — NADOLOL 20 MG/1
20 TABLET ORAL DAILY
Status: DISCONTINUED | OUTPATIENT
Start: 2021-12-11 | End: 2021-12-11 | Stop reason: HOSPADM

## 2021-12-11 RX ORDER — BUMETANIDE 1 MG/1
1 TABLET ORAL DAILY
Qty: 90 TABLET | Refills: 1 | Status: SHIPPED | OUTPATIENT
Start: 2021-12-11 | End: 2022-05-31 | Stop reason: SDUPTHER

## 2021-12-11 RX ADMIN — SODIUM CHLORIDE: 9 INJECTION, SOLUTION INTRAVENOUS at 05:18

## 2021-12-11 RX ADMIN — LISINOPRIL 20 MG: 20 TABLET ORAL at 10:10

## 2021-12-11 RX ADMIN — INSULIN LISPRO 2 UNITS: 100 INJECTION, SOLUTION INTRAVENOUS; SUBCUTANEOUS at 07:34

## 2021-12-11 RX ADMIN — INFLUENZA A VIRUS A/VICTORIA/2570/2019 IVR-215 (H1N1) ANTIGEN (PROPIOLACTONE INACTIVATED), INFLUENZA A VIRUS A/CAMBODIA/E0826360/2020 IVR-224 (H3N2) ANTIGEN (PROPIOLACTONE INACTIVATED), INFLUENZA B VIRUS B/VICTORIA/705/2018 BVR-11 ANTIGEN (PROPIOLACTONE INACTIVATED), INFLUENZA B VIRUS B/PHUKET/3073/2013 BVR-1B ANTIGEN (PROPIOLACTONE INACTIVATED) 0.5 ML: 15; 15; 15; 15 INJECTION, SUSPENSION INTRAMUSCULAR at 11:05

## 2021-12-11 RX ADMIN — CEFTRIAXONE SODIUM 1000 MG: 1 INJECTION, POWDER, FOR SOLUTION INTRAMUSCULAR; INTRAVENOUS at 07:29

## 2021-12-11 RX ADMIN — BUDESONIDE AND FORMOTEROL FUMARATE DIHYDRATE 2 PUFF: 80; 4.5 AEROSOL RESPIRATORY (INHALATION) at 10:51

## 2021-12-11 RX ADMIN — PANTOPRAZOLE SODIUM 40 MG: 40 INJECTION, POWDER, FOR SOLUTION INTRAVENOUS at 07:27

## 2021-12-11 RX ADMIN — NADOLOL 20 MG: 20 TABLET ORAL at 10:10

## 2021-12-11 RX ADMIN — OCTREOTIDE ACETATE 25 MCG/HR: 500 INJECTION, SOLUTION INTRAVENOUS; SUBCUTANEOUS at 02:29

## 2021-12-11 NOTE — DISCHARGE SUMMARY
[place] [address] Internal Medicine    Discharge Summary     Patient ID: Brandee Payne  :  1965   MRN: [id number]     ACCOUNT:  [de-identified]   Patient's PCP: Ollie Huerta MD  Admit Date: 2021   Discharge Date: 2021    Length of Stay: 1  Code Status:  Full Code  Admitting Physician: Maria Alejandra Mcgovern MD  Discharge Physician: Maria Alejandra Mcgovern MD     Active Discharge Diagnoses:     Primary Problem  GI bleed      [facility] Problems    Diagnosis Date Noted    Thrombocytopenia ([facility] [address] [D69.6] 12/10/2021    History of cirrhosis [Z87.19]     GI bleed [K92.2] 2018    Cirrhosis of liver without ascites ([facility] [address] [K74.60]     Type 2 diabetes mellitus, without long-term current use of insulin ([facility] [address] [E11.9] 2016    Chronic liver disease [K76.9]     Essential hypertension [I10]        Admission Condition:  fair     Discharged Condition: fair    Hospital Stay:     Hospital Course:  Brandee Payne is a 55 y.o. male who was admitted for the management of GI bleed , presented to ER with Rectal Bleeding  PCP dr Estephanie Cortez  78-year-old gentleman from our office history of alcohol abuse quit 2 years ago history of liver cirrhosis secondary to alcohol history of EGD in 2018  Colonoscopy 5 years ago dr Nino Sommers hemoglobin is 12 for last 3 years  Admitted with a 1 episode of dark stool without diarrhea hemoglobin is noted 12.2  Fecal occult blood was negative  Blood pressure 136/89 pulse is 80 respiratory is 18  CT scan noted cirrhosis with portal hypertension and splenomegaly  No fever no abdo pain  Check AFP   Patient had EGD done which showed a grade 1 2 varices low risk for bleeding portal hypertensive gastropathy no ulcers no active bleeding noted  We started blood pressure medication no evidence of SBP clinically no fever no white cell count antibiotic discontinued by me  Discharge with follow-up with the PCP and GI within a week  Pt wants to change gi attending not happy with dr Valentina angelo for calling him non compliant  Arranged to see his partner  Pt need to be evaluated for liver transplant  Dc metformin for liver dis            Significant therapeutic interventions:     Significant Diagnostic Studies:   [place] / Micro:        ,     Radiology:    CT ABDOMEN PELVIS W IV CONTRAST Additional Contrast? None    Result Date: 12/10/2021  EXAMINATION: CT OF THE ABDOMEN AND PELVIS WITH CONTRAST 12/9/2021 7:20 pm TECHNIQUE: CT of the abdomen and pelvis was performed with the administration of intravenous contrast. Multiplanar reformatted images are provided for review. Dose modulation, iterative reconstruction, and/or weight based adjustment of the mA/kV was utilized to reduce the radiation dose to as low as reasonably achievable. COMPARISON: March 21, 2021 HISTORY: ORDERING SYSTEM PROVIDED HISTORY: abdominal pain TECHNOLOGIST PROVIDED HISTORY: abdominal pain Decision Support Exception - unselect if not a suspected or confirmed emergency medical condition->Emergency Medical Condition (MA) Reason for Exam: diffuse abd pain x2 weeks. Additional signs and symptoms: rectal bleeding Relevant Medical/Surgical History: HTN, DM FINDINGS: Lower Chest: Dependent changes are present within the lung bases. Organs: The liver is again noted to be cirrhotic in appearance. Splenomegaly is present, secondary to portal hypertension. Recannulization of the paraumbilical vein is noted. Multiple varices are present within the abdomen. Gallbladder is contracted, without gallstones. Spleen appears normal.  Adrenal glands appear normal.  Previously noted right intrarenal calculi are no longer visualized. No hydronephrosis is present. Cortical cyst formation is present on the kidneys. GI/Bowel: A small hiatal hernia is present. Small bowel appears normal without evidence of obstruction. No evidence of appendicitis is present.  Scattered colonic diverticula are noted, without evidence of diverticulitis. Pelvis: Urinary bladder is incompletely distended. Prostate gland is normal in size. Peritoneum/Retroperitoneum: No free fluid or free air is present within the abdomen or pelvis. No aneurysm formation is present. Scattered mesenteric lymph nodes are again demonstrated, unchanged. Increased attenuation is noted within the mesentery, and may be related to the liver disease versus mesenteric panniculitis. Bones/Soft Tissues: No acute osseous abnormality is present. 1. Cirrhotic appearance to the liver again demonstrated, with evidence of portal hypertension, and multiple varices again demonstrated 2. Scattered colonic diverticula, without evidence of diverticulitis 3. Previously noted right intrarenal calculi no longer visualized         Consultations:    Consults:     Final Specialist Recommendations/Findings:   IP CONSULT TO GI      The patient was seen and examined on day of discharge and this discharge summary is in conjunction with any daily progress note from day of discharge.     Discharge plan:     Disposition: Home    Physician Follow Up:     MD Jesusita Betancur [address], [place] [facility] [address]  [address] [facility] [place] [address]  [phone]    In 1 week         Requiring Further Evaluation/Follow Up POST HOSPITALIZATION/Incidental Findings:    Diet: hepatic diet  Low salt      Activity: As tolerated    Instructions to Patient:     Discharge Medications:      Medication List      START taking these medications    bumetanide 1 MG tablet  Commonly known as: BUMEX  Take 1 tablet by mouth daily     nadolol 20 MG tablet  Commonly known as: CORGARD  Take 1 tablet by mouth daily        CHANGE how you take these medications    lisinopril 20 MG tablet  Commonly known as: PRINIVIL;ZESTRIL  Take 1 tablet by mouth daily  What changed:   · medication strength  · how much to take  · how to take this  · when to take this  · additional instructions        CONTINUE taking these medications    albuterol sulfate  (90 Base) MCG/ACT inhaler  Commonly known as: Ventolin HFA  Inhale 1 puff into the lungs every 4 hours as needed for Wheezing     allopurinol 100 MG tablet  Commonly known as: ZYLOPRIM  Take 1 tablet by mouth daily     atorvastatin 40 MG tablet  Commonly known as: LIPITOR  Take 1 tablet by mouth nightly     * blood glucose test strips  1 strip by Other route 4 times daily Test 4  times a day & as needed for symptoms of irregular blood glucose. * blood glucose test strips strip  Commonly known as: ASCENSIA AUTODISC VI;ONE TOUCH ULTRA TEST VI  Use with associated glucose meter. clopidogrel 75 MG tablet  Commonly known as: PLAVIX     fluticasone-salmeterol 100-50 MCG/DOSE diskus inhaler  Commonly known as: Advair Diskus  INHALE 1 PUFF BY MOUTH EVERY 12 HOURS     glimepiride 4 MG tablet  Commonly known as: AMARYL  TAKE 1 TABLET BY MOUTH TWICE DAILY     [employer] Lancets UltraThin 30G Misc  1 each by Other route 3 times daily     omeprazole 20 MG delayed release capsule  Commonly known as: PRILOSEC  Take 1 capsule by mouth Daily     SITagliptin 100 MG tablet  Commonly known as: Januvia  TAKE 1 TABLET BY MOUTH DAILY     tamsulosin 0.4 MG capsule  Commonly known as: FLOMAX  Take 1 capsule by mouth daily         * This list has 2 medication(s) that are the same as other medications prescribed for you. Read the directions carefully, and ask your doctor or other care provider to review them with you.             STOP taking these medications    acetaminophen 325 MG tablet  Commonly known as: Aminofen     metFORMIN 1000 MG tablet  Commonly known as: GLUCOPHAGE     metoprolol tartrate 25 MG tablet  Commonly known as: LOPRESSOR           Where to Get Your Medications      These medications were sent to [place] [address]    Phone: [phone]   · bumetanide 1 MG tablet  · lisinopril 20 MG tablet  · nadolol

## 2021-12-11 NOTE — PLAN OF CARE
Problem: Cardiac Output - Decreased:  Goal: Hemodynamic stability will improve  Description: Hemodynamic stability will improve  12/11/2021 0508 by Ravi Vallejo RN  Outcome: Ongoing  Note:   Vitals:    12/10/21 1345 12/10/21 1922 12/10/21 2025 12/11/21 0027   BP: (!) 145/84  (!) 148/84 137/78   Pulse: 72  79 87   Resp: 16 18 20 20   Temp: 98 °F (36.7 °C)  98.3 °F (36.8 °C) 98.1 °F (36.7 °C)   TempSrc: Oral  Oral Oral   SpO2: 97% 96% 93% 94%   Weight:    234 lb 12.6 oz (106.5 kg)   Height:         Pt NSR on telemetry; adequate urine output; will continue to monitor hemodynamic stability. Problem: Pain:  Goal: Pain level will decrease  Description: Pain level will decrease  12/11/2021 0508 by Ravi Vallejo RN  Outcome: Ongoing  Note: No pain at this time. 0/10 pain scale.

## 2021-12-11 NOTE — PROGRESS NOTES
[facility]    Gastroenterology Daily Progress Note      Patient:   Shahida Aguayo   :    1965   Facility:   [address]  Date:     2021  Consultant:   FAUSTO Nicole - CNP, CNP      SUBJECTIVE  55 y.o. male admitted 2021 with Portal hypertension ([facility] [address] [K76.6]  GI bleed [K92.2]  History of cirrhosis [Z87.19]  Gastrointestinal hemorrhage, unspecified gastrointestinal hemorrhage type [K92.2] and seen for melena and cirrhosis. The pt was seen and examined. He is s/p egd that is discussed below. hgb 11, no melena overnight. No c/o abdominal pain or nausea, tolerating a diet.          OBJECTIVE  Scheduled Meds:   budesonide-formoterol  2 puff Inhalation BID    sodium chloride flush  5-40 mL IntraVENous 2 times per day    insulin lispro  0-12 Units SubCUTAneous TID WC    insulin lispro  0-6 Units SubCUTAneous Nightly    influenza virus vaccine  0.5 mL IntraMUSCular Prior to discharge    cefTRIAXone (ROCEPHIN) IV  1,000 mg IntraVENous Q24H    pantoprazole  40 mg IntraVENous BID    And    sodium chloride flush  10 mL IntraVENous BID       Vital Signs:  BP (!) 169/78   Pulse 84   Temp 98.3 °F (36.8 °C) (Oral)   Resp 20   Ht 6' (1.829 m)   Wt 234 lb 12.6 oz (106.5 kg)   SpO2 93%   BMI 31.84 kg/m²      Physical Exam:   General Appearance: alert and oriented to person, place and time, well-developed and well-nourished, in no acute distress  Skin: warm and dry, no rash or erythema  Head: normocephalic and atraumatic  Eyes: pupils equal, round, and reactive to light, extraocular eye movements intact, conjunctivae normal  ENT: hearing grossly normal bilaterally  Neck: neck supple and non tender without mass, no thyromegaly or thyroid nodules, no cervical lymphadenopathy   Pulmonary/Chest: clear to auscultation bilaterally- no wheezes, rales or rhonchi, normal air movement, no respiratory distress  Cardiovascular: normal rate, regular rhythm, normal S1 and S2, no murmurs, rubs, clicks or gallops, distal pulses intact, no carotid bruits  Abdomen: soft, non-tender, non-distended, normal bowel sounds, no masses or organomegaly  Extremities: no cyanosis, clubbing or edema  Musculoskeletal: normal range of motion, no joint swelling, deformity or tenderness  Neurologic: no cranial nerve deficit and muscle strength normal    Lab and Imaging Review     CBC  Recent Labs     12/09/21  2339 12/09/21  2339 12/10/21  1705 12/11/21  0032 12/11/21  0548   WBC 3.9  --   --   --   --    HGB 12.2*   < > 11.9* 11.5* 11.0*   HCT 36.9*   < > 35.8* 34.8* 32.9*   MCV 87.1  --   --   --   --    PLT 74*  --   --   --   --     < > = values in this interval not displayed. BMP  Recent Labs     12/09/21 2339 12/11/21  0548    141   K 3.9 4.0    109*   CO2 25 24   BUN 15 13   CREATININE 0.66* 0.55*   GLUCOSE 263* 181*   CALCIUM 9.4 8.2*       LFTS  Recent Labs     12/09/21 2339   ALKPHOS 133*   ALT 33   AST 35   PROT 7.1   BILITOT 2.75*   LABALBU 3.7   Results for Noy Chun (MRN [id number]) as of 12/11/2021 12:21   Ref. Range 12/10/2021 17:05   AFP (Alpha Fetoprotein) Latest Ref Range: <8.4 ug/L 2.7   Results for Noy Chun (MRN [id number]) as of 12/11/2021 12:21   Ref. Range 12/10/2021 17:05   Hep A IgM Latest Ref Range: NONREACTIVE  NONREACTIVE   Hepatitis B Surface Ag Latest Ref Range: NONREACTIVE  NONREACTIVE   Hepatitis C Ab Latest Ref Range: NONREACTIVE  NONREACTIVE   Hep B Core Ab, IgM Latest Ref Range: NONREACTIVE  NONREACTIVE       PT/INR  Recent Labs     12/09/21  2339   PROTIME 14.6   INR 1.1   CT ABDOMEN PELVIS W IV CONTRAST Additional Contrast? None     Result Date: 12/10/2021  EXAMINATION: CT OF THE ABDOMEN AND PELVIS WITH CONTRAST 12/9/2021 7:20 pm  FINDINGS: Lower Chest: Dependent changes are present within the lung bases. Organs: The liver is again noted to be cirrhotic in appearance. Splenomegaly is present, secondary to portal hypertension.   Recannulization of the paraumbilical vein is noted. Multiple varices are present within the abdomen. Gallbladder is contracted, without gallstones. Spleen appears normal.  Adrenal glands appear normal.  Previously noted right intrarenal calculi are no longer visualized. No hydronephrosis is present. Cortical cyst formation is present on the kidneys. GI/Bowel: A small hiatal hernia is present. Small bowel appears normal without evidence of obstruction. No evidence of appendicitis is present. Scattered colonic diverticula are noted, without evidence of diverticulitis. Pelvis: Urinary bladder is incompletely distended. Prostate gland is normal in size. Peritoneum/Retroperitoneum: No free fluid or free air is present within the abdomen or pelvis. No aneurysm formation is present. Scattered mesenteric lymph nodes are again demonstrated, unchanged. Increased attenuation is noted within the mesentery, and may be related to the liver disease versus mesenteric panniculitis. Bones/Soft Tissues: No acute osseous abnormality is present.      1. Cirrhotic appearance to the liver again demonstrated, with evidence of portal hypertension, and multiple varices again demonstrated 2. Scattered colonic diverticula, without evidence of diverticulitis 3. Previously noted right intrarenal calculi no longer visualized       PROCEDURES: dr Hadley Cantor 12/10/21  1) Transoral Upper Endoscopy.      POSTOPERATIVE DIAGNOSIS:   Mild postoperative gastropathy with contact oozing  Low risk F1-F2 esophageal varices      ASSESSMENT/plan  1. Anemia with melena s/p egd yesterday that showed gastropathy with contact oozing, low risk EV; improved  -ppi daily  -may benefit from toprol as outpt    2.cirrhosis secondary to etoh abuse, no sign of encephalopathy  -2gm low salt diet  -needs outpt follow up  D/w md sarabia to be discharged with outpt f/u          This plan was formulated in collaboration with Dr. Hadley Cantor .     Electronically signed by: FAUSTO Martino - CNP on 12/11/2021 at 8:06 AM

## 2021-12-11 NOTE — FLOWSHEET NOTE
Patient is being d/c.      12/11/21 1211   Encounter Summary   Services provided to: Patient   Referral/Consult From: [address] Family members; Parent   Continue Visiting   (12-11-21)   Complexity of Encounter Low   Length of Encounter 15 minutes   Routine   Type Initial   Assessment Calm; Approachable   Intervention Active listening; Explored feelings, thoughts, concerns; Solomon;  Discussed illness/injury and it's impact   Outcome Expressed gratitude; Engaged in conversation; Expressed feelings/needs/concerns; Coping

## 2021-12-15 ENCOUNTER — OFFICE VISIT (OUTPATIENT)
Dept: INTERNAL MEDICINE CLINIC | Age: 56
End: 2021-12-15
Payer: MEDICAID

## 2021-12-15 VITALS — WEIGHT: 221 LBS | BODY MASS INDEX: 29.97 KG/M2 | DIASTOLIC BLOOD PRESSURE: 80 MMHG | SYSTOLIC BLOOD PRESSURE: 128 MMHG

## 2021-12-15 DIAGNOSIS — J44.9 CHRONIC OBSTRUCTIVE PULMONARY DISEASE, UNSPECIFIED COPD TYPE (HCC): ICD-10-CM

## 2021-12-15 DIAGNOSIS — K76.9 CHRONIC LIVER DISEASE: ICD-10-CM

## 2021-12-15 DIAGNOSIS — I10 ESSENTIAL HYPERTENSION: Primary | ICD-10-CM

## 2021-12-15 DIAGNOSIS — K74.60 CIRRHOSIS OF LIVER WITHOUT ASCITES, UNSPECIFIED HEPATIC CIRRHOSIS TYPE (HCC): ICD-10-CM

## 2021-12-15 DIAGNOSIS — K92.1 GASTROINTESTINAL HEMORRHAGE WITH MELENA: ICD-10-CM

## 2021-12-15 PROCEDURE — 3017F COLORECTAL CA SCREEN DOC REV: CPT | Performed by: INTERNAL MEDICINE

## 2021-12-15 PROCEDURE — G8417 CALC BMI ABV UP PARAM F/U: HCPCS | Performed by: INTERNAL MEDICINE

## 2021-12-15 PROCEDURE — 3023F SPIROM DOC REV: CPT | Performed by: INTERNAL MEDICINE

## 2021-12-15 PROCEDURE — G8427 DOCREV CUR MEDS BY ELIG CLIN: HCPCS | Performed by: INTERNAL MEDICINE

## 2021-12-15 PROCEDURE — 1111F DSCHRG MED/CURRENT MED MERGE: CPT | Performed by: INTERNAL MEDICINE

## 2021-12-15 PROCEDURE — 99215 OFFICE O/P EST HI 40 MIN: CPT | Performed by: INTERNAL MEDICINE

## 2021-12-15 PROCEDURE — 1036F TOBACCO NON-USER: CPT | Performed by: INTERNAL MEDICINE

## 2021-12-15 PROCEDURE — G8926 SPIRO NO PERF OR DOC: HCPCS | Performed by: INTERNAL MEDICINE

## 2021-12-15 PROCEDURE — G8482 FLU IMMUNIZE ORDER/ADMIN: HCPCS | Performed by: INTERNAL MEDICINE

## 2021-12-15 NOTE — PROGRESS NOTES
Subjective:      Patient ID: Rachell Arnold is a 55 y.o. male. He is known to have cirrhosis of liver and was admitted to the hospital with rectal bleeding. Subsequent work-up showed that he had esophageal varices on EGD and history of for chronic liver disease with ascites his clinical condition stabilized and he was discharged he does remain a high risk for subsequent GI bleeding today was asymptomatic with no cardiorespiratory or GI symptoms no recurrence of rectal bleeding      Review of Systems   Constitutional: Positive for fatigue. HENT: Negative. Eyes: Negative. Respiratory: Negative. Cardiovascular: Negative. Gastrointestinal: Positive for blood in stool. History of cirrhosis of liver with ascites and esophageal varices   Endocrine: Negative. Genitourinary: Negative. Musculoskeletal: Negative. Neurological: Negative. Hematological: Negative. Psychiatric/Behavioral: Negative. Objective:   Physical Exam CONSTITUTIONAL: Alert and oriented   HEENT: Atraumatic, conjunctiva normal colored, no jaundice  NECK: No thyroid enlargement, no lymphadenopathy, no JVD   CHEST: Breath sounds normal, no wheezing, no rales   CVS: S1 S2 normal, no murmur, no gallop, no carotid bruit  ABDOMEN: Soft, hepatomegaly and ascites. Liver is palpable 6 to  7 cm below right costal margin  NEUROLOGICAL: Alert, CN 2-12 intact, no motor deficits   EXTREMITIES: No pedal edema, no calf tenderness    SKIN: No obvious lesions or rash     Assessment / Plan:      Diagnosis Orders   1. Essential hypertension     2. Chronic obstructive pulmonary disease, unspecified COPD type ([address]     3. Chronic liver disease     4. Cirrhosis of liver without ascites, unspecified hepatic cirrhosis type ([address]     5. Gastrointestinal hemorrhage with melena = his records from the hospital were reviewed, his medications were also reviewed.   He was advised to stay on present regimen       Visit Information    Have you changed or started any medications since your last visit including any over-the-counter medicines, vitamins, or herbal medicines? no   Are you having any side effects from any of your medications? -  no  Have you stopped taking any of your medications? Is so, why? -  no    Have you seen any other physician or provider since your last visit? Yes - Records Obtained  Have you had any other diagnostic tests since your last visit? Yes - Records Obtained  Have you been seen in the emergency room and/or had an admission to a hospital since we last saw you? Yes - Records Obtained  Have you had your routine dental cleaning in the past 6 months? no    Have you activated your [employer] account? If not, what are your barriers?  Yes     Patient Care Team:  Ino Wiseman MD as PCP - General (Internal Medicine)  Ino Wiseman MD as PCP - [facility] Provider    Medical History Review  Past Medical, Family, and Social History reviewed and does not contribute to the patient presenting condition    Health Maintenance   Topic Date Due    Hepatitis A vaccine (1 of 2 - Risk 2-dose series) Never done    HIV screen  Never done    Hepatitis B vaccine (1 of 3 - Risk 3-dose series) Never done    DTaP/Tdap/Td vaccine (1 - Tdap) Never done    Shingles Vaccine (1 of 2) Never done    Diabetic retinal exam  08/15/2016    Diabetic foot exam  05/30/2019    Diabetic microalbuminuria test  06/02/2021    COVID-19 Vaccine (3 - Booster for Moderna series) 10/07/2021    Lipid screen  10/15/2021    A1C test (Diabetic or Prediabetic)  09/20/2022    Potassium monitoring  12/11/2022    Creatinine monitoring  12/11/2022    Colon cancer screen colonoscopy  02/28/2026    Pneumococcal 0-64 years Vaccine (2 of 2 - PPSV23) 03/03/2031    Flu vaccine  Completed    Hepatitis C screen  Completed    Hib vaccine  Aged Out    Meningococcal (ACWY) vaccine  Aged Out     It was a complex visit after recent hospitalization and extensive review of records were done

## 2021-12-17 DIAGNOSIS — D68.59 HYPERCOAGULOPATHY (HCC): ICD-10-CM

## 2021-12-17 DIAGNOSIS — G45.9 TIA (TRANSIENT ISCHEMIC ATTACK): Primary | ICD-10-CM

## 2021-12-17 ASSESSMENT — ENCOUNTER SYMPTOMS
RESPIRATORY NEGATIVE: 1
BLOOD IN STOOL: 1
EYES NEGATIVE: 1

## 2021-12-21 ENCOUNTER — TELEPHONE (OUTPATIENT)
Dept: INTERNAL MEDICINE CLINIC | Age: 56
End: 2021-12-21

## 2021-12-21 DIAGNOSIS — J06.9 UPPER RESPIRATORY TRACT INFECTION, UNSPECIFIED TYPE: Primary | ICD-10-CM

## 2021-12-21 RX ORDER — AZITHROMYCIN 250 MG/1
250 TABLET, FILM COATED ORAL SEE ADMIN INSTRUCTIONS
Qty: 6 TABLET | Refills: 0 | Status: SHIPPED | OUTPATIENT
Start: 2021-12-21 | End: 2021-12-26

## 2021-12-21 NOTE — TELEPHONE ENCOUNTER
Sick Call    Jaylyn Pace   1965   [id number]   Peri Abraham MD   Allergies   Allergen Reactions    Codeine Nausea Only and Other (See Comments)     hallucinations    Simvastatin Other (See Comments)     Leg cramping        Last Visit: 12/15/2021  Reason for No Same Day Appt:     Complaint: Cough,congestion and nasal drainage , patient negative for covid.  Over the counter mucinex is not helping , requesting a Z-Pack      Pharmacy: [facility] / [facility]

## 2021-12-27 DIAGNOSIS — E11.65 TYPE 2 DIABETES MELLITUS WITH HYPERGLYCEMIA, WITHOUT LONG-TERM CURRENT USE OF INSULIN (HCC): ICD-10-CM

## 2021-12-31 ENCOUNTER — HOSPITAL ENCOUNTER (EMERGENCY)
Age: 56
Discharge: HOME | End: 2021-12-31
Attending: EMERGENCY MEDICINE
Payer: MEDICAID

## 2021-12-31 ENCOUNTER — APPOINTMENT (OUTPATIENT)
Dept: GENERAL RADIOLOGY | Age: 56
End: 2021-12-31
Payer: MEDICAID

## 2021-12-31 DIAGNOSIS — J10.1 INFLUENZA A: Primary | ICD-10-CM

## 2021-12-31 LAB
INFLUENZA A: DETECTED
INFLUENZA B: NOT DETECTED
SARS-COV-2 RNA, RT PCR: NOT DETECTED
SOURCE: ABNORMAL
SPECIMEN DESCRIPTION: ABNORMAL

## 2021-12-31 PROCEDURE — 71045 X-RAY EXAM CHEST 1 VIEW: CPT

## 2021-12-31 PROCEDURE — 6360000002 HC RX W HCPCS: Performed by: EMERGENCY MEDICINE

## 2021-12-31 PROCEDURE — 87636 SARSCOV2 & INF A&B AMP PRB: CPT

## 2021-12-31 PROCEDURE — 99283 EMERGENCY DEPT VISIT LOW MDM: CPT

## 2021-12-31 PROCEDURE — 6370000000 HC RX 637 (ALT 250 FOR IP): Performed by: EMERGENCY MEDICINE

## 2021-12-31 RX ORDER — OSELTAMIVIR PHOSPHATE 75 MG/1
75 CAPSULE ORAL 2 TIMES DAILY
Status: DISCONTINUED | OUTPATIENT
Start: 2021-12-31 | End: 2021-12-31 | Stop reason: HOSPADM

## 2021-12-31 RX ORDER — ONDANSETRON 4 MG/1
4 TABLET, ORALLY DISINTEGRATING ORAL EVERY 8 HOURS PRN
Qty: 20 TABLET | Refills: 0 | Status: SHIPPED | OUTPATIENT
Start: 2021-12-31 | End: 2022-05-20

## 2021-12-31 RX ORDER — OSELTAMIVIR PHOSPHATE 75 MG/1
75 CAPSULE ORAL 2 TIMES DAILY
Qty: 10 CAPSULE | Refills: 0 | Status: SHIPPED | OUTPATIENT
Start: 2021-12-31 | End: 2022-01-05

## 2021-12-31 RX ORDER — IBUPROFEN 800 MG/1
800 TABLET ORAL ONCE
Status: COMPLETED | OUTPATIENT
Start: 2021-12-31 | End: 2021-12-31

## 2021-12-31 RX ORDER — IBUPROFEN 800 MG/1
800 TABLET ORAL EVERY 8 HOURS PRN
Qty: 30 TABLET | Refills: 0 | Status: SHIPPED | OUTPATIENT
Start: 2021-12-31 | End: 2022-05-20 | Stop reason: ALTCHOICE

## 2021-12-31 RX ORDER — DEXAMETHASONE 4 MG/1
6 TABLET ORAL EVERY 12 HOURS SCHEDULED
Status: DISCONTINUED | OUTPATIENT
Start: 2021-12-31 | End: 2021-12-31 | Stop reason: HOSPADM

## 2021-12-31 RX ADMIN — DEXAMETHASONE 6 MG: 4 TABLET ORAL at 05:25

## 2021-12-31 RX ADMIN — OSELTAMIVIR PHOSPHATE 75 MG: 75 CAPSULE ORAL at 06:34

## 2021-12-31 RX ADMIN — IBUPROFEN 800 MG: 800 TABLET, FILM COATED ORAL at 06:33

## 2021-12-31 ASSESSMENT — PAIN SCALES - GENERAL: PAINLEVEL_OUTOF10: 5

## 2021-12-31 NOTE — ED PROVIDER NOTES
[address]  EMERGENCY DEPARTMENT ENCOUNTER      Pt Name: Beverly Velasquez  MRN: [id number]  [place] 1965  Date of evaluation: 12/31/2021  Provider: Wero Mars MD    CHIEF COMPLAINT     Chest congestion      HISTORY OF PRESENT ILLNESS  (Location/Symptom, Timing/Onset, Context/Setting, Quality, Duration, Modifying Factors, Severity.)   Beverly Velasquez is a 55 y.o. male who presents to the emergency department complaining of chest congestion. He does feel like he is having cough and shortness of breath. He has not really taken anything for symptoms. He relates his chest does hurt with coughing. He is having a lot of congestion and feels like he has some mild sputum production but no nausea or vomiting. He also tells me that he has a family member who was positive for influenza A. No problems with urination or bowel habits at this time. Nursing Notes were reviewed. REVIEW OF SYSTEMS    (2-9 systems for level 4, 10 or more for level 5)     Review of Systems   Constitutional: Negative for activity change, appetite change, chills, fatigue and fever. HENT: Positive for congestion. Negative for ear pain and sore throat. Eyes: Negative for pain, discharge and redness. Respiratory: Positive for cough and shortness of breath. Negative for wheezing and stridor. Cardiovascular: Negative for chest pain. Gastrointestinal: Negative for abdominal pain, constipation, diarrhea, nausea and vomiting. Genitourinary: Negative for decreased urine volume and difficulty urinating. Musculoskeletal: Negative for arthralgias and myalgias. Skin: Negative for color change and rash. Neurological: Negative for dizziness, weakness and headaches. Psychiatric/Behavioral: Negative for behavioral problems and confusion. Except as noted above the remainder of the review of systems was reviewed and negative.        PAST MEDICAL HISTORY     Past Medical History:   Diagnosis Date    Calculus of kidney     Cerebral artery occlusion with cerebral infarction ([facility] 2020    slight memory problem (can't remember name of neurologist)    Cirrhosis ([facility]     sees PCP for this    COPD (chronic obstructive pulmonary disease) ([facility]     does not see pulmonology    Essential hypertension, benign     Gout, unspecified     H/O colonoscopy 04/11/2016 2016    Mitral valve disorders(424.0)     СЕРГЕЙ on CPAP     not currently using due to malfunctioning    Other and unspecified hyperlipidemia     Type II or unspecified type diabetes mellitus without mention of complication, not stated as uncontrolled     Unspecified chronic liver disease without mention of alcohol     Wellness examination      [facility] (last visit approx Feb 2021)       SURGICAL HISTORY       Past Surgical History:   Procedure Laterality Date    LITHOTRIPSY      LITHOTRIPSY  04/02/2021    LITHOTRIPSY N/A 4/2/2021    ESWL EXTRACORPOREAL SHOCK WAVE LITHOTRIPSY  ([facility] CONF# [id number] - AUGUSTO) performed by Yuliet Mckeon MD at [address] [place] [address] N/A 5/25/2018    The esophagus was inspected to the Z-line. The endoscopic exam showed impression of varices (F1) in distal esophagus.      UPPER GASTROINTESTINAL ENDOSCOPY N/A 12/10/2021    EGD ESOPHAGOGASTRODUODENOSCOPY performed by Jameson Stratton MD at [address] [place] [address]       Discharge Medication List as of 12/31/2021  6:28 AM      CONTINUE these medications which have NOT CHANGED    Details   SITagliptin (JANUVIA) 100 MG tablet TAKE 1 TABLET BY MOUTH DAILY, Disp-90 tablet, R-1Normal      lisinopril (PRINIVIL;ZESTRIL) 20 MG tablet Take 1 tablet by mouth daily, Disp-30 tablet, R-3Normal      nadolol (CORGARD) 20 MG tablet Take 1 tablet by mouth daily, Disp-30 tablet, R-3Normal      bumetanide (BUMEX) 1 MG tablet Take 1 tablet by mouth daily, Disp-90 tablet, R-1Normal      allopurinol (ZYLOPRIM) 100 MG tablet Take 1 tablet by mouth daily, Disp-90 tablet, R-1Normal      omeprazole (PRILOSEC) 20 MG delayed release capsule Take 1 capsule by mouth Daily, Disp-90 capsule, R-1Normal      tamsulosin (FLOMAX) 0.4 MG capsule Take 1 capsule by mouth daily, Disp-90 capsule, R-3Normal      clopidogrel (PLAVIX) 75 MG tablet TAKE 1 TABLET BY MOUTH ONCE DAILYHistorical Med      atorvastatin (LIPITOR) 40 MG tablet Take 1 tablet by mouth nightly, Disp-90 tablet, R-1Normal      glimepiride (AMARYL) 4 MG tablet TAKE 1 TABLET BY MOUTH TWICE DAILY, Disp-60 tablet, R-11Please consider 90 day supplies to promote better adherenceNormal      albuterol sulfate HFA (VENTOLIN HFA) 108 (90 Base) MCG/ACT inhaler Inhale 1 puff into the lungs every 4 hours as needed for Wheezing, Disp-18 g, R-6Normal      fluticasone-salmeterol (ADVAIR DISKUS) 100-50 MCG/DOSE diskus inhaler INHALE 1 PUFF BY MOUTH EVERY 12 HOURS, Disp-1 Inhaler, R-11Normal      !! blood glucose test strips (ASCENSIA AUTODISC VI;ONE TOUCH ULTRA TEST VI) strip Disp-100 strip,R-11, NormalUse with associated glucose meter. !! blood glucose monitor strips 1 strip by Other route 4 times daily Test 4  times a day & as needed for symptoms of irregular blood glucose., Other, 4 TIMES DAILY Starting Fri 5/3/2019, Disp-100 strip, R-11, Normal      [employer] LANCETS ULTRATHIN 30G MISC 3 TIMES DAILY Starting Fri 10/19/2018, Disp-100 each, R-11, Normal       !! - Potential duplicate medications found. Please discuss with provider. ALLERGIES     Codeine and Simvastatin    FAMILY HISTORY           Problem Relation Age of Onset    No Known Problems Brother     Diabetes Mother     Heart Attack Mother     Heart Disease Father     Diabetes Brother     Heart Disease Brother      Family Status   Relation Name Status    Brother  Alive    Mother  Alive    Father      Brother  Alive        SOCIAL HISTORY      reports that he quit smoking about 9 years ago. His smoking use included cigarettes.  He has a 15.00 pack-year smoking history. He has quit using smokeless tobacco. He reports previous alcohol use. He reports current drug use. Drug: Marijuana [employer]). PHYSICAL EXAM    (up to 7 for level 4, 8 or more for level 5)     Physical Exam  Vitals and nursing note reviewed. Constitutional:       General: He is not in acute distress. Appearance: He is well-developed. He is not ill-appearing, toxic-appearing or diaphoretic. HENT:      Head: Normocephalic and atraumatic. Right Ear: External ear normal.      Left Ear: External ear normal.      Nose: Nose normal.      Mouth/Throat:      Mouth: Mucous membranes are moist.   Eyes:      General:         Right eye: No discharge. Left eye: No discharge. Conjunctiva/sclera: Conjunctivae normal.      Pupils: Pupils are equal, round, and reactive to light. Cardiovascular:      Rate and Rhythm: Normal rate and regular rhythm. Heart sounds: Normal heart sounds. No murmur heard. Pulmonary:      Effort: Pulmonary effort is normal. No respiratory distress. Breath sounds: Normal breath sounds. No wheezing, rhonchi or rales. Chest:      Chest wall: No tenderness. Abdominal:      General: Bowel sounds are normal. There is no distension. Palpations: Abdomen is soft. There is no mass. Tenderness: There is no abdominal tenderness. There is no guarding or rebound. Musculoskeletal:         General: Normal range of motion. Cervical back: Normal range of motion and neck supple. Right lower leg: No edema. Left lower leg: No edema. Lymphadenopathy:      Cervical: No cervical adenopathy. Skin:     General: Skin is warm. Findings: No rash. Neurological:      General: No focal deficit present. Mental Status: He is alert and oriented to person, place, and time. Motor: No abnormal muscle tone.    Psychiatric:         Mood and Affect: Mood normal.         Behavior: Behavior normal.         DIAGNOSTIC RESULTS     EKG: All EKG's are (ADVIL;MOTRIN) 800 MG tablet Take 1 tablet by mouth every 8 hours as needed for Pain, Disp-30 tablet, R-0Normal      ondansetron (ZOFRAN ODT) 4 MG disintegrating tablet Take 1 tablet by mouth every 8 hours as needed for Nausea, Disp-20 tablet, R-0Normal             (Please note that portions of this note were completed with a voice recognition program.  Efforts were made to edit the dictations but occasionally words are mis-transcribed.)    Eva Hardy MD  Attending Emergency Physician        Eva Hardy MD  01/07/22 [id number]

## 2021-12-31 NOTE — PROGRESS NOTES
Physician Progress Note      Narinder NORWOOD #:                  [id number]  :                       1965  ADMIT DATE:       2021 11:03 PM  Dacia Warren DATE:        2021 12:12 PM  RESPONDING  PROVIDER #:        Liliane Meza MD          QUERY TEXT:    Patient admitted with GI bleeding, noted to have esophageal varices and portal   hypertensive gastropathy during EGD. If possible, please document in   progress notes and discharge summary the cause of the GI bleeding: The medical record reflects the following:  Risk Factors: 55yo, Hx cirrhosis d/t prior alcohol use, duodenal ulcer  Clinical Indicators: EGD--> mild postoperative gastropathy with contact   oozing, low risk F1-F2 varices in mid and distal esophagus with no high-risk   stigmata; CT ABD PELVIS-->Cirrhotic appearance to the liver again   demonstrated, with evidence of  portal hypertension, and multiple varices again demonstrated;  Treatment: Protonix IV then switched to PO, Sandostatin gtt, EGD, GI consult,   monitoring, hospital admission  Options provided:  -- GI bleeding due to portal hypertensive gastropathy  -- GI bleeding due to esophageal varices  -- Other - I will add my own diagnosis  -- Disagree - Not applicable / Not valid  -- Disagree - Clinically unable to determine / Unknown  -- Refer to Clinical Documentation Reviewer    PROVIDER RESPONSE TEXT:    This patient has GI bleeding due to portal hypertensive gastropathy.     Query created by: Loreta Loya on 2021 9:28 AM      Electronically signed by:  Liliane Meza MD 2021 3:17 PM

## 2022-01-07 ASSESSMENT — ENCOUNTER SYMPTOMS
EYE PAIN: 0
WHEEZING: 0
STRIDOR: 0
CONSTIPATION: 0
EYE DISCHARGE: 0
SORE THROAT: 0
VOMITING: 0
NAUSEA: 0
COLOR CHANGE: 0
SHORTNESS OF BREATH: 1
COUGH: 1
ABDOMINAL PAIN: 0
DIARRHEA: 0
EYE REDNESS: 0

## 2022-01-27 ENCOUNTER — OFFICE VISIT (OUTPATIENT)
Dept: INTERNAL MEDICINE CLINIC | Age: 57
End: 2022-01-27
Payer: MEDICAID

## 2022-01-27 ENCOUNTER — HOSPITAL ENCOUNTER (OUTPATIENT)
Age: 57
Setting detail: SPECIMEN
Discharge: HOME | End: 2022-01-27

## 2022-01-27 VITALS
DIASTOLIC BLOOD PRESSURE: 80 MMHG | BODY MASS INDEX: 30.23 KG/M2 | WEIGHT: 223.2 LBS | HEIGHT: 72 IN | SYSTOLIC BLOOD PRESSURE: 110 MMHG

## 2022-01-27 DIAGNOSIS — E11.65 TYPE 2 DIABETES MELLITUS WITH HYPERGLYCEMIA, WITHOUT LONG-TERM CURRENT USE OF INSULIN (HCC): Primary | ICD-10-CM

## 2022-01-27 DIAGNOSIS — I10 ESSENTIAL HYPERTENSION: ICD-10-CM

## 2022-01-27 DIAGNOSIS — M10.9 GOUT, UNSPECIFIED CAUSE, UNSPECIFIED CHRONICITY, UNSPECIFIED SITE: ICD-10-CM

## 2022-01-27 DIAGNOSIS — N39.0 URINARY TRACT INFECTION WITHOUT HEMATURIA, SITE UNSPECIFIED: ICD-10-CM

## 2022-01-27 DIAGNOSIS — I86.4 GASTRIC VARICES: ICD-10-CM

## 2022-01-27 DIAGNOSIS — K76.9 CHRONIC LIVER DISEASE: ICD-10-CM

## 2022-01-27 PROCEDURE — G8427 DOCREV CUR MEDS BY ELIG CLIN: HCPCS | Performed by: INTERNAL MEDICINE

## 2022-01-27 PROCEDURE — 99214 OFFICE O/P EST MOD 30 MIN: CPT | Performed by: INTERNAL MEDICINE

## 2022-01-27 PROCEDURE — G8417 CALC BMI ABV UP PARAM F/U: HCPCS | Performed by: INTERNAL MEDICINE

## 2022-01-27 PROCEDURE — 3017F COLORECTAL CA SCREEN DOC REV: CPT | Performed by: INTERNAL MEDICINE

## 2022-01-27 PROCEDURE — 1036F TOBACCO NON-USER: CPT | Performed by: INTERNAL MEDICINE

## 2022-01-27 PROCEDURE — G8482 FLU IMMUNIZE ORDER/ADMIN: HCPCS | Performed by: INTERNAL MEDICINE

## 2022-01-27 PROCEDURE — 2022F DILAT RTA XM EVC RTNOPTHY: CPT | Performed by: INTERNAL MEDICINE

## 2022-01-27 PROCEDURE — 3046F HEMOGLOBIN A1C LEVEL >9.0%: CPT | Performed by: INTERNAL MEDICINE

## 2022-01-27 RX ORDER — LANCETS 30 GAUGE
1 EACH MISCELLANEOUS 2 TIMES DAILY
Qty: 300 EACH | Refills: 1 | Status: SHIPPED | OUTPATIENT
Start: 2022-01-27

## 2022-01-27 RX ORDER — GLUCOSAMINE HCL/CHONDROITIN SU 500-400 MG
1 CAPSULE ORAL 2 TIMES DAILY
Qty: 100 STRIP | Refills: 11 | Status: SHIPPED | OUTPATIENT
Start: 2022-01-27 | End: 2022-08-18

## 2022-01-27 RX ORDER — ALLOPURINOL 100 MG/1
100 TABLET ORAL DAILY
Qty: 90 TABLET | Refills: 1 | Status: SHIPPED | OUTPATIENT
Start: 2022-01-27 | End: 2022-02-25 | Stop reason: SDUPTHER

## 2022-01-27 RX ORDER — NADOLOL 20 MG/1
20 TABLET ORAL DAILY
Qty: 30 TABLET | Refills: 3 | Status: SHIPPED | OUTPATIENT
Start: 2022-01-27 | End: 2022-02-25 | Stop reason: SDUPTHER

## 2022-01-27 NOTE — PROGRESS NOTES
Subjective:      Patient ID: Migdalia Brunson is a 55 y.o. male. HPI Patient is here for evaluation of Multiple medical Problems , he has HTN, DM, HFPEF, Thrombocytopenia , Cirrhosis of liver , СЕРГЕЙ , not using his CPAP , COPD quits smoking . He do not checks his  Blood sugar   Was Admitted in Hospital with Rectal Bleeding   Which is improved. Review of Systems   Constitutional: Negative for activity change, appetite change, chills and diaphoresis. HENT: Negative for congestion, dental problem, ear discharge, facial swelling and hearing loss. Respiratory: Negative for apnea, cough, chest tightness, shortness of breath and wheezing. Cardiovascular: Negative for chest pain and leg swelling. Gastrointestinal: Negative for abdominal distention, abdominal pain, constipation and diarrhea. Genitourinary: Negative for difficulty urinating, dysuria, enuresis, flank pain and frequency. Musculoskeletal: Negative for arthralgias (left shoulder ), back pain, gait problem and joint swelling. Skin: Negative for color change, pallor and rash. Neurological: Negative for dizziness, seizures, facial asymmetry, light-headedness, numbness and headaches. Psychiatric/Behavioral: Negative for agitation, behavioral problems, confusion, decreased concentration and dysphoric mood. Objective:   Physical Exam  Vitals and nursing note reviewed. Constitutional:       General: He is not in acute distress. Appearance: He is well-developed. He is not diaphoretic. HENT:      Head: Normocephalic and atraumatic. Mouth/Throat:      Pharynx: No oropharyngeal exudate. Eyes:      General: No scleral icterus. Right eye: No discharge. Left eye: No discharge. Conjunctiva/sclera: Conjunctivae normal.      Pupils: Pupils are equal, round, and reactive to light. Neck:      Thyroid: No thyromegaly. Vascular: No JVD. Trachea: No tracheal deviation.    Cardiovascular:      Rate and Rhythm: Normal rate. Heart sounds: Normal heart sounds. No murmur heard. No gallop. Pulmonary:      Effort: Pulmonary effort is normal. No respiratory distress. Breath sounds: Normal breath sounds. No stridor. No wheezing or rales. Abdominal:      General: Bowel sounds are normal. There is no distension. Palpations: Abdomen is soft. Tenderness: There is no abdominal tenderness. There is no guarding or rebound. Musculoskeletal:         General: No tenderness. Normal range of motion. Cervical back: Normal range of motion and neck supple. Skin:     General: Skin is warm and dry. Findings: No erythema or rash. Neurological:      Mental Status: He is alert and oriented to person, place, and time. Assessment / Plan:   1. Gout, unspecified cause, unspecified chronicity, unspecified site  - allopurinol (ZYLOPRIM) 100 MG tablet; Take 1 tablet by mouth daily  Dispense: 90 tablet; Refill: 1    2. Type 2 diabetes mellitus with hyperglycemia, without long-term current use of insulin (HCC)  Controlled  - Lipid Panel; Future  - DME Order for Glucometer as OP  - Lancets MISC; 1 each by Does not apply route 2 times daily  Dispense: 300 each; Refill: 1  - blood glucose monitor strips; 1 strip by Other route 2 times daily Test 2  times a day & as needed for symptoms of irregular blood glucose. Dispense sufficient amount for indicated testing frequency plus additional to accommodate PRN testing needs. Dispense: 100 strip; Refill: 11    3. Essential hypertension  Controlled  4. Chronic liver disease  Started back on Nadolol with a history of varices  - nadolol (CORGARD) 20 MG tablet; Take 1 tablet by mouth daily  Dispense: 30 tablet; Refill: 3    5. Gastric varices  Stable, advised to follow-up with GI as outpatient    6. Urinary tract infection without hematuria, site unspecified  Stable       · Return in about 3 months (around 4/27/2022). · Reviewed prior labs and health maintenance. · Discussed use, benefit, and side effects of prescribed medications. Barriers to medication compliance addressed. All patient questions answered. Pt voiced understanding. Elizabeth Samano MD  [facility]  1/30/2022, 7:49 PM    Please note that this chart was generated using voice recognition Dragon dictation software. Although every effort was made to ensure the accuracy of this automated transcription, some errors in transcription may have occurred. Visit Information    Have you changed or started any medications since your last visit including any over-the-counter medicines, vitamins, or herbal medicines? no   Are you having any side effects from any of your medications? -  no  Have you stopped taking any of your medications? Is so, why? -  no    Have you seen any other physician or provider since your last visit? yes  Have you had any other diagnostic tests since your last visit? Yes - Records Requested  Have you been seen in the emergency room and/or had an admission to a hospital since we last saw you? Yes - Records Requested  Have you had your routine dental cleaning in the past 6 months? no    Have you activated your [employer] account? If not, what are your barriers?  Yes     Patient Care Team:  Elizabeth Samano MD as PCP - General (Internal Medicine)  Elizabeth Samano MD as PCP - [facility] Provider    Medical History Review  Past Medical, Family, and Social History reviewed and does not contribute to the patient presenting condition    Health Maintenance   Topic Date Due    Hepatitis A vaccine (1 of 2 - Risk 2-dose series) Never done    HIV screen  Never done    Hepatitis B vaccine (1 of 3 - Risk 3-dose series) Never done    DTaP/Tdap/Td vaccine (1 - Tdap) Never done    Shingles Vaccine (1 of 2) Never done    Diabetic retinal exam  08/15/2016    Diabetic foot exam  05/30/2019    Diabetic microalbuminuria test  06/02/2021    COVID-19 Vaccine (3 - Booster for Ayleen [place] series) 09/07/2021    Lipid screen  10/15/2021    Depression Screen  02/03/2022    A1C test (Diabetic or Prediabetic)  09/20/2022    Potassium monitoring  12/11/2022    Creatinine monitoring  12/11/2022    Colon cancer screen colonoscopy  02/28/2026    Pneumococcal 0-64 years Vaccine (2 of 2 - PPSV23) 03/03/2031    Flu vaccine  Completed    Hepatitis C screen  Completed    Hib vaccine  Aged Out    Meningococcal (ACWY) vaccine  Aged Out

## 2022-01-28 DIAGNOSIS — E11.9 TYPE 2 DIABETES MELLITUS WITHOUT COMPLICATION, WITHOUT LONG-TERM CURRENT USE OF INSULIN (HCC): Primary | ICD-10-CM

## 2022-01-29 DIAGNOSIS — N39.0 URINARY TRACT INFECTION WITHOUT HEMATURIA, SITE UNSPECIFIED: ICD-10-CM

## 2022-01-30 ASSESSMENT — ENCOUNTER SYMPTOMS
DIARRHEA: 0
WHEEZING: 0
ABDOMINAL DISTENTION: 0
COUGH: 0
SHORTNESS OF BREATH: 0
APNEA: 0
CONSTIPATION: 0
BACK PAIN: 0
ABDOMINAL PAIN: 0
CHEST TIGHTNESS: 0
FACIAL SWELLING: 0
COLOR CHANGE: 0

## 2022-01-31 ENCOUNTER — TELEPHONE (OUTPATIENT)
Dept: INTERNAL MEDICINE CLINIC | Age: 57
End: 2022-01-31

## 2022-01-31 DIAGNOSIS — N39.0 URINARY TRACT INFECTION WITHOUT HEMATURIA, SITE UNSPECIFIED: Primary | ICD-10-CM

## 2022-01-31 NOTE — TELEPHONE ENCOUNTER
Jeffery Nunez from the lab called and stated that the patient urine arrived to lab with the patients name on the sample as leroy and the order saying elizabeth. Lab rejected the sample and will need new sample.  Patients wife (On [employer]) was informed or this

## 2022-02-21 ENCOUNTER — TELEPHONE (OUTPATIENT)
Dept: UROLOGY | Age: 57
End: 2022-02-21

## 2022-02-21 ENCOUNTER — OFFICE VISIT (OUTPATIENT)
Dept: GASTROENTEROLOGY | Age: 57
End: 2022-02-21
Payer: MEDICAID

## 2022-02-21 VITALS — SYSTOLIC BLOOD PRESSURE: 107 MMHG | BODY MASS INDEX: 31.46 KG/M2 | WEIGHT: 232 LBS | DIASTOLIC BLOOD PRESSURE: 61 MMHG

## 2022-02-21 DIAGNOSIS — Z12.11 SCREENING FOR COLON CANCER: ICD-10-CM

## 2022-02-21 DIAGNOSIS — K70.30 ALCOHOLIC CIRRHOSIS OF LIVER WITHOUT ASCITES (HCC): Primary | ICD-10-CM

## 2022-02-21 PROCEDURE — G8417 CALC BMI ABV UP PARAM F/U: HCPCS | Performed by: INTERNAL MEDICINE

## 2022-02-21 PROCEDURE — G8482 FLU IMMUNIZE ORDER/ADMIN: HCPCS | Performed by: INTERNAL MEDICINE

## 2022-02-21 PROCEDURE — 99213 OFFICE O/P EST LOW 20 MIN: CPT | Performed by: INTERNAL MEDICINE

## 2022-02-21 PROCEDURE — 1036F TOBACCO NON-USER: CPT | Performed by: INTERNAL MEDICINE

## 2022-02-21 PROCEDURE — G8427 DOCREV CUR MEDS BY ELIG CLIN: HCPCS | Performed by: INTERNAL MEDICINE

## 2022-02-21 PROCEDURE — 3017F COLORECTAL CA SCREEN DOC REV: CPT | Performed by: INTERNAL MEDICINE

## 2022-02-21 ASSESSMENT — ENCOUNTER SYMPTOMS
GASTROINTESTINAL NEGATIVE: 1
RESPIRATORY NEGATIVE: 1
EYES NEGATIVE: 1
ALLERGIC/IMMUNOLOGIC NEGATIVE: 1

## 2022-02-21 NOTE — PROGRESS NOTES
GI CLINIC FOLLOW UP    INTERVAL HISTORY:   No referring provider defined for this encounter. Chief Complaint   Patient presents with    Follow-up     hfu, GI bleed            HISTORY OF PRESENT ILLNESS: Anjel Benavides is a 55 y.o. male with alcoholic cirrhosis. He reports no further alcohol use. No bleeding. No swelling. EGD showed portal tensive gastropathy. He reports a colonoscopy around 10 years ago or more. He does not recall any findings during the procedure. Past Medical,Family, and Social History reviewed and does not contribute to the patient presenting condition. Patient's PMH/PSH,SH,PSYCH Hx, MEDs, ALLERGIES, and ROS were all reviewed and updated in the appropriate sections. PAST MEDICAL HISTORY:  Past Medical History:   Diagnosis Date    Calculus of kidney     Cerebral artery occlusion with cerebral infarction ([facility] 2020    slight memory problem (can't remember name of neurologist)    Cirrhosis ([facility]     sees PCP for this    COPD (chronic obstructive pulmonary disease) ([facility]     does not see pulmonology    Essential hypertension, benign     Gout, unspecified     H/O colonoscopy 04/11/2016 2016    Mitral valve disorders(424.0)     СЕРГЕЙ on CPAP     not currently using due to malfunctioning    Other and unspecified hyperlipidemia     Type II or unspecified type diabetes mellitus without mention of complication, not stated as uncontrolled     Unspecified chronic liver disease without mention of alcohol     Wellness examination      [facility] (last visit approx Feb 2021)       Past Surgical History:   Procedure Laterality Date    LITHOTRIPSY      LITHOTRIPSY  04/02/2021    LITHOTRIPSY N/A 4/2/2021    ESWL EXTRACORPOREAL SHOCK WAVE LITHOTRIPSY  (NEX-MED CONF# [id number] - AUGUSTO) performed by Tika Gallagher MD at [address] [place] [address] 5/25/2018    The esophagus was inspected to the Z-line.  The endoscopic exam showed impression of varices (F1) in distal esophagus.  UPPER GASTROINTESTINAL ENDOSCOPY N/A 12/10/2021    EGD ESOPHAGOGASTRODUODENOSCOPY performed by Scherrie Nyhan, MD at [address]:    Current Outpatient Medications:     blood glucose monitor kit and supplies, Dispense sufficient amount for indicated testing frequency. Dispense all needed supplies to include: monitor, strips, lancing device, lancets, control solutions, alcohol swabs. Testing once daily, Disp: 1 kit, Rfl: 0    allopurinol (ZYLOPRIM) 100 MG tablet, Take 1 tablet by mouth daily, Disp: 90 tablet, Rfl: 1    nadolol (CORGARD) 20 MG tablet, Take 1 tablet by mouth daily, Disp: 30 tablet, Rfl: 3    Lancets MISC, 1 each by Does not apply route 2 times daily, Disp: 300 each, Rfl: 1    blood glucose monitor strips, 1 strip by Other route 2 times daily Test 2  times a day & as needed for symptoms of irregular blood glucose. Dispense sufficient amount for indicated testing frequency plus additional to accommodate PRN testing needs. , Disp: 100 strip, Rfl: 11    ibuprofen (ADVIL;MOTRIN) 800 MG tablet, Take 1 tablet by mouth every 8 hours as needed for Pain, Disp: 30 tablet, Rfl: 0    ondansetron (ZOFRAN ODT) 4 MG disintegrating tablet, Take 1 tablet by mouth every 8 hours as needed for Nausea, Disp: 20 tablet, Rfl: 0    SITagliptin (JANUVIA) 100 MG tablet, TAKE 1 TABLET BY MOUTH DAILY, Disp: 90 tablet, Rfl: 1    lisinopril (PRINIVIL;ZESTRIL) 20 MG tablet, Take 1 tablet by mouth daily, Disp: 30 tablet, Rfl: 3    bumetanide (BUMEX) 1 MG tablet, Take 1 tablet by mouth daily (Patient not taking: Reported on 12/15/2021), Disp: 90 tablet, Rfl: 1    omeprazole (PRILOSEC) 20 MG delayed release capsule, Take 1 capsule by mouth Daily, Disp: 90 capsule, Rfl: 1    tamsulosin (FLOMAX) 0.4 MG capsule, Take 1 capsule by mouth daily, Disp: 90 capsule, Rfl: 3    atorvastatin (LIPITOR) 40 MG tablet, Take 1 tablet by mouth nightly, Disp: 90 tablet, Rfl: 1   glimepiride (AMARYL) 4 MG tablet, TAKE 1 TABLET BY MOUTH TWICE DAILY, Disp: 60 tablet, Rfl: 11    albuterol sulfate HFA (VENTOLIN HFA) 108 (90 Base) MCG/ACT inhaler, Inhale 1 puff into the lungs every 4 hours as needed for Wheezing, Disp: 18 g, Rfl: 6    fluticasone-salmeterol (ADVAIR DISKUS) 100-50 MCG/DOSE diskus inhaler, INHALE 1 PUFF BY MOUTH EVERY 12 HOURS, Disp: 1 Inhaler, Rfl: 11    blood glucose test strips (ASCENSIA AUTODISC VI;ONE TOUCH ULTRA TEST VI) strip, Use with associated glucose meter. , Disp: 100 strip, Rfl: 6    blood glucose monitor strips, 1 strip by Other route 4 times daily Test 4  times a day & as needed for symptoms of irregular blood glucose., Disp: 100 strip, Rfl: 11    [employer] LANCETS ULTRATHIN 30G MISC, 1 each by Other route 3 times daily, Disp: 100 each, Rfl: 11    ALLERGIES:   Allergies   Allergen Reactions    Codeine Nausea Only and Other (See Comments)     hallucinations    Simvastatin Other (See Comments)     Leg cramping       FAMILY HISTORY:       Problem Relation Age of Onset    No Known Problems Brother     Diabetes Mother     Heart Attack Mother     Heart Disease Father     Diabetes Brother     Heart Disease Brother          SOCIAL HISTORY:   Social History     Socioeconomic History    Marital status:      Spouse name: Not on file    Number of children: Not on file    Years of education: Not on file    Highest education level: Not on file   Occupational History    Not on file   Tobacco Use    Smoking status: Former Smoker     Packs/day: 0.50     Years: 30.00     Pack years: 15.00     Types: Cigarettes     Quit date: 3/26/2012     Years since quittin.9    Smokeless tobacco: Former User   Vaping Use    Vaping Use: Never used   Substance and Sexual Activity    Alcohol use: Not Currently     Alcohol/week: 0.0 standard drinks     Comment: used to drink \"a lot\"  None since Dec 2020    Drug use: Yes     Types: Marijuana [facility] [place])     Comment: every evening    Sexual activity: Not on file   Other Topics Concern    Not on file   Social History Narrative    Not on file     Social Determinants of Health     Financial Resource Strain: Low Risk     Difficulty of Paying Living Expenses: Not hard at all   Food Insecurity: No Food Insecurity    Worried About Running Out of Food in the Last Year: Never true    [address] in the Last Year: Never true   Transportation Needs:     Lack of Transportation (Medical): Not on file    Lack of Transportation (Non-Medical): Not on file   Physical Activity:     Days of Exercise per Week: Not on file    Minutes of Exercise per Session: Not on file   Stress:     Feeling of Stress : Not on file   Social Connections:     Frequency of Communication with Friends and Family: Not on file    Frequency of Social Gatherings with Friends and Family: Not on file    Attends Mandaen Services: Not on file    Active Member of [address] [place] [address] [employer] or Organizations: Not on file    Attends Club or Organization Meetings: Not on file    Marital Status: Not on file   Intimate Partner Violence:     Fear of Current or Ex-Partner: Not on file    Emotionally Abused: Not on file    Physically Abused: Not on file    Sexually Abused: Not on file   Housing Stability:     Unable to Pay for Housing in the Last Year: Not on file    Number of [place] in the Last Year: Not on file    Unstable Housing in the Last Year: Not on file       REVIEW OF SYSTEMS: A 12-point review of systemswas obtained and pertinent positives and negatives were enumerated above in the history of present illness. All other reviewed systems / symptoms were negative. Review of Systems   Constitutional: Negative. HENT: Negative. Eyes: Negative. Respiratory: Negative. Cardiovascular: Negative. Gastrointestinal: Negative. Endocrine: Negative. Genitourinary: Negative. Musculoskeletal: Negative. Skin: Negative. Allergic/Immunologic: Negative. Neurological: Negative. Hematological: Negative. Psychiatric/Behavioral: Negative. LABORATORY DATA: Reviewed  Lab Results   Component Value Date    WBC 3.9 12/09/2021    HGB 11.3 (L) 12/11/2021    HCT 33.9 (L) 12/11/2021    MCV 87.1 12/09/2021    PLT 74 (L) 12/09/2021     12/11/2021    K 4.0 12/11/2021     (H) 12/11/2021    CO2 24 12/11/2021    BUN 13 12/11/2021    CREATININE 0.55 (L) 12/11/2021    LABPROT 8.1 07/21/2012    LABALBU 3.7 12/09/2021    BILITOT 2.75 (H) 12/09/2021    ALKPHOS 133 (H) 12/09/2021    AST 35 12/09/2021    ALT 33 12/09/2021    INR 1.1 12/09/2021         Lab Results   Component Value Date    RBC 4.24 (L) 12/09/2021    HGB 11.3 (L) 12/11/2021    MCV 87.1 12/09/2021    MCH 28.8 12/09/2021    MCHC 33.1 12/09/2021    RDW 15.9 (H) 12/09/2021    MPV 8.4 12/09/2021    BASOPCT 0 12/09/2021    LYMPHSABS 1.09 12/09/2021    MONOSABS 0.31 12/09/2021    NEUTROABS 2.26 12/09/2021    EOSABS 0.08 12/09/2021    BASOSABS 0.00 12/09/2021         DIAGNOSTIC TESTING:     No results found. PHYSICAL EXAMINATION: Vital signs reviewed per the nursing documentation. There were no vitals taken for this visit. There is no height or weight on file to calculate BMI. Physical Exam      I personally reviewed the nurse's notes and documentation and I agree with her notes. General: alert, appears stated age and cooperative Psych: Normal. and Alert and oriented, appropriate affect. . Normal affect. Mentation normal  HEENT: PERRLA. Clear conjunctivae and sclerae. Moist oral mucosae, no lesions or ulcers. The neck is supple, without lymphadenopathy or jugular venous distension. No masses. Normal thyroid. Cardiovascular: S1 S2 RRR no rubs or murmurs. Pulmonary: clear BL. No accessory muscle usage. Abdominal Exam: Soft, NT ND, no hepato or spleno megaly, +BS, no ascites. IMPRESSION: Mr. Tre Martinez is a 55 y.o. male with alcoholic cirrhosis. Stay off alcohol completely.   Continue [facility] [address] screening. Plan for screening colonoscopy. Follow-up in 3 months. Thank you for allowing me to participate in the care of Mr. Nita Cuellar. For any further questions please do not hesitate to contact me. I have reviewed and agree with the ROS entered by the MA/LPN. Note is dictated utilizing voice recognition software. Unfortunately this leads to occasional typographical errors. Please contact our office if you have any questions.     Annetta Zarco MD  [facility]  O: #[phone]

## 2022-02-21 NOTE — TELEPHONE ENCOUNTER
Patient is scheduled for 2/28/22 with Amador Arnold   Was unable to leave a voicemail reminding pt to get KUB done prior to appt.

## 2022-02-22 RX ORDER — BISACODYL 5 MG
TABLET, DELAYED RELEASE (ENTERIC COATED) ORAL
Qty: 4 TABLET | Refills: 0 | Status: SHIPPED | OUTPATIENT
Start: 2022-02-22 | End: 2022-08-18

## 2022-02-22 RX ORDER — POLYETHYLENE GLYCOL 3350 17 G/17G
POWDER, FOR SOLUTION ORAL
Qty: 238 G | Refills: 0 | Status: SHIPPED | OUTPATIENT
Start: 2022-02-22 | End: 2022-08-18

## 2022-02-25 DIAGNOSIS — K76.9 CHRONIC LIVER DISEASE: ICD-10-CM

## 2022-02-25 DIAGNOSIS — M10.9 GOUT, UNSPECIFIED CAUSE, UNSPECIFIED CHRONICITY, UNSPECIFIED SITE: ICD-10-CM

## 2022-02-25 DIAGNOSIS — J44.9 CHRONIC OBSTRUCTIVE PULMONARY DISEASE, UNSPECIFIED COPD TYPE (HCC): ICD-10-CM

## 2022-02-25 DIAGNOSIS — E11.69 HYPERLIPIDEMIA ASSOCIATED WITH TYPE 2 DIABETES MELLITUS (HCC): Primary | ICD-10-CM

## 2022-02-25 DIAGNOSIS — E11.65 TYPE 2 DIABETES MELLITUS WITH HYPERGLYCEMIA, WITHOUT LONG-TERM CURRENT USE OF INSULIN (HCC): ICD-10-CM

## 2022-02-25 DIAGNOSIS — K21.9 GASTROESOPHAGEAL REFLUX DISEASE WITHOUT ESOPHAGITIS: ICD-10-CM

## 2022-02-25 DIAGNOSIS — E78.5 HYPERLIPIDEMIA ASSOCIATED WITH TYPE 2 DIABETES MELLITUS (HCC): Primary | ICD-10-CM

## 2022-02-25 RX ORDER — ALLOPURINOL 100 MG/1
100 TABLET ORAL DAILY
Qty: 90 TABLET | Refills: 1 | Status: SHIPPED | OUTPATIENT
Start: 2022-02-25 | End: 2022-05-09 | Stop reason: SDUPTHER

## 2022-02-25 RX ORDER — ATORVASTATIN CALCIUM 40 MG/1
40 TABLET, FILM COATED ORAL NIGHTLY
Qty: 90 TABLET | Refills: 1 | Status: SHIPPED | OUTPATIENT
Start: 2022-02-25

## 2022-02-25 RX ORDER — TAMSULOSIN HYDROCHLORIDE 0.4 MG/1
0.4 CAPSULE ORAL DAILY
Qty: 90 CAPSULE | Refills: 3 | Status: SHIPPED | OUTPATIENT
Start: 2022-02-25 | End: 2022-05-09 | Stop reason: SDUPTHER

## 2022-02-25 RX ORDER — ALBUTEROL SULFATE 90 UG/1
1 AEROSOL, METERED RESPIRATORY (INHALATION) EVERY 4 HOURS PRN
Qty: 18 G | Refills: 6 | Status: SHIPPED | OUTPATIENT
Start: 2022-02-25 | End: 2022-05-09 | Stop reason: SDUPTHER

## 2022-02-25 RX ORDER — OMEPRAZOLE 20 MG/1
20 CAPSULE, DELAYED RELEASE ORAL DAILY
Qty: 90 CAPSULE | Refills: 1 | Status: SHIPPED | OUTPATIENT
Start: 2022-02-25 | End: 2022-05-09 | Stop reason: SDUPTHER

## 2022-02-25 RX ORDER — ATORVASTATIN CALCIUM 40 MG/1
40 TABLET, FILM COATED ORAL NIGHTLY
Qty: 90 TABLET | Refills: 0 | Status: SHIPPED | OUTPATIENT
Start: 2022-02-25 | End: 2022-05-09 | Stop reason: SDUPTHER

## 2022-02-25 RX ORDER — NADOLOL 20 MG/1
20 TABLET ORAL DAILY
Qty: 30 TABLET | Refills: 3 | Status: SHIPPED | OUTPATIENT
Start: 2022-02-25 | End: 2022-05-09 | Stop reason: SDUPTHER

## 2022-02-25 RX ORDER — GLIMEPIRIDE 4 MG/1
TABLET ORAL
Qty: 60 TABLET | Refills: 11 | Status: SHIPPED | OUTPATIENT
Start: 2022-02-25 | End: 2022-05-09 | Stop reason: SDUPTHER

## 2022-03-07 ENCOUNTER — TELEPHONE (OUTPATIENT)
Dept: UROLOGY | Age: 57
End: 2022-03-07

## 2022-03-14 ENCOUNTER — TELEPHONE (OUTPATIENT)
Dept: GASTROENTEROLOGY | Age: 57
End: 2022-03-14

## 2022-03-14 NOTE — TELEPHONE ENCOUNTER
LVM on pt's wife #. Pt was scheduled for his PAT PC and missed it. Pt's phone does not have a VMB set up. We need to get pt r/s for a PAT PC or we will need to cancel his procedure. Will attempt to f/u once more if we do not hear back today.

## 2022-03-15 ENCOUNTER — ANESTHESIA EVENT (OUTPATIENT)
Dept: ENDOSCOPY | Age: 57
End: 2022-03-15
Payer: MEDICAID

## 2022-03-16 ENCOUNTER — HOSPITAL ENCOUNTER (OUTPATIENT)
Age: 57
Setting detail: OUTPATIENT SURGERY
Discharge: HOME | End: 2022-03-16
Attending: INTERNAL MEDICINE | Admitting: INTERNAL MEDICINE
Payer: MEDICAID

## 2022-03-16 ENCOUNTER — HOSPITAL ENCOUNTER (OUTPATIENT)
Age: 57
Discharge: HOME | End: 2022-03-16
Attending: INTERNAL MEDICINE
Payer: MEDICAID

## 2022-03-16 ENCOUNTER — ANESTHESIA (OUTPATIENT)
Dept: ENDOSCOPY | Age: 57
End: 2022-03-16
Payer: MEDICAID

## 2022-03-16 VITALS
RESPIRATION RATE: 16 BRPM | SYSTOLIC BLOOD PRESSURE: 119 MMHG | TEMPERATURE: 96.8 F | OXYGEN SATURATION: 97 % | DIASTOLIC BLOOD PRESSURE: 74 MMHG

## 2022-03-16 VITALS
TEMPERATURE: 98.2 F | HEIGHT: 72 IN | WEIGHT: 227 LBS | RESPIRATION RATE: 14 BRPM | SYSTOLIC BLOOD PRESSURE: 137 MMHG | BODY MASS INDEX: 30.75 KG/M2 | DIASTOLIC BLOOD PRESSURE: 81 MMHG | OXYGEN SATURATION: 98 % | HEART RATE: 81 BPM

## 2022-03-16 DIAGNOSIS — K70.30 ALCOHOLIC CIRRHOSIS OF LIVER WITHOUT ASCITES (HCC): ICD-10-CM

## 2022-03-16 DIAGNOSIS — K74.60 CIRRHOSIS OF LIVER WITHOUT ASCITES, UNSPECIFIED HEPATIC CIRRHOSIS TYPE (HCC): ICD-10-CM

## 2022-03-16 LAB
AFP: 3.1 UG/L
ALBUMIN SERPL-MCNC: 3.7 G/DL (ref 3.5–5.2)
ALP BLD-CCNC: 110 U/L (ref 40–129)
ALT SERPL-CCNC: 39 U/L (ref 5–41)
ANION GAP SERPL CALCULATED.3IONS-SCNC: 11 MMOL/L (ref 9–17)
AST SERPL-CCNC: 55 U/L
BACTERIA: NORMAL
BILIRUB SERPL-MCNC: 4.52 MG/DL (ref 0.3–1.2)
BILIRUBIN DIRECT: 0.75 MG/DL
BILIRUBIN URINE: NEGATIVE
BILIRUBIN, INDIRECT: 3.77 MG/DL (ref 0–1)
BUN BLDV-MCNC: 14 MG/DL (ref 6–20)
CALCIUM SERPL-MCNC: 8.8 MG/DL (ref 8.6–10.4)
CASTS UA: NORMAL /LPF
CHLORIDE BLD-SCNC: 106 MMOL/L (ref 98–107)
CHOLESTEROL/HDL RATIO: 2.5
CHOLESTEROL: 108 MG/DL
CO2: 24 MMOL/L (ref 20–31)
COLOR: ABNORMAL
CREAT SERPL-MCNC: 0.53 MG/DL (ref 0.7–1.2)
EPITHELIAL CELLS UA: NORMAL /HPF
GFR AFRICAN AMERICAN: >60 ML/MIN
GFR NON-AFRICAN AMERICAN: >60 ML/MIN
GFR SERPL CREATININE-BSD FRML MDRD: ABNORMAL ML/MIN/{1.73_M2}
GLUCOSE BLD-MCNC: 116 MG/DL (ref 75–110)
GLUCOSE BLD-MCNC: 154 MG/DL (ref 70–99)
GLUCOSE URINE: NEGATIVE
HCT VFR BLD CALC: 36.4 % (ref 41–53)
HDLC SERPL-MCNC: 44 MG/DL
HEMOGLOBIN: 12 G/DL (ref 13.5–17.5)
INR BLD: 1.2
KETONES, URINE: NEGATIVE
LDL CHOLESTEROL: 46 MG/DL (ref 0–130)
LEUKOCYTE ESTERASE, URINE: ABNORMAL
MCH RBC QN AUTO: 29 PG (ref 26–34)
MCHC RBC AUTO-ENTMCNC: 33.1 G/DL (ref 31–37)
MCV RBC AUTO: 87.6 FL (ref 80–100)
NITRITE, URINE: NEGATIVE
PDW BLD-RTO: 15.6 % (ref 11.5–14.9)
PH UA: 5 (ref 5–8)
PLATELET # BLD: 61 K/UL (ref 150–450)
PMV BLD AUTO: 7.6 FL (ref 6–12)
POTASSIUM SERPL-SCNC: 4.2 MMOL/L (ref 3.7–5.3)
PROTEIN UA: NEGATIVE
PROTHROMBIN TIME: 14.7 SEC (ref 11.8–14.6)
RBC # BLD: 4.16 M/UL (ref 4.5–5.9)
RBC UA: NORMAL /HPF
SODIUM BLD-SCNC: 141 MMOL/L (ref 135–144)
SPECIFIC GRAVITY UA: 1.02 (ref 1–1.03)
TOTAL PROTEIN: 6.5 G/DL (ref 6.4–8.3)
TRIGL SERPL-MCNC: 90 MG/DL
TURBIDITY: CLEAR
URINE HGB: NEGATIVE
UROBILINOGEN, URINE: NORMAL
WBC # BLD: 2.7 K/UL (ref 3.5–11)
WBC UA: NORMAL /HPF

## 2022-03-16 PROCEDURE — 2720000010 HC SURG SUPPLY STERILE: Performed by: INTERNAL MEDICINE

## 2022-03-16 PROCEDURE — 3609010300 HC COLONOSCOPY W/BIOPSY SINGLE/MULTIPLE: Performed by: INTERNAL MEDICINE

## 2022-03-16 PROCEDURE — 81001 URINALYSIS AUTO W/SCOPE: CPT

## 2022-03-16 PROCEDURE — 85027 COMPLETE CBC AUTOMATED: CPT

## 2022-03-16 PROCEDURE — 6360000002 HC RX W HCPCS: Performed by: NURSE ANESTHETIST, CERTIFIED REGISTERED

## 2022-03-16 PROCEDURE — 45380 COLONOSCOPY AND BIOPSY: CPT | Performed by: INTERNAL MEDICINE

## 2022-03-16 PROCEDURE — 85610 PROTHROMBIN TIME: CPT

## 2022-03-16 PROCEDURE — 7100000011 HC PHASE II RECOVERY - ADDTL 15 MIN: Performed by: INTERNAL MEDICINE

## 2022-03-16 PROCEDURE — 3700000001 HC ADD 15 MINUTES (ANESTHESIA): Performed by: INTERNAL MEDICINE

## 2022-03-16 PROCEDURE — 82105 ALPHA-FETOPROTEIN SERUM: CPT

## 2022-03-16 PROCEDURE — 82947 ASSAY GLUCOSE BLOOD QUANT: CPT

## 2022-03-16 PROCEDURE — 80053 COMPREHEN METABOLIC PANEL: CPT

## 2022-03-16 PROCEDURE — 3700000000 HC ANESTHESIA ATTENDED CARE: Performed by: INTERNAL MEDICINE

## 2022-03-16 PROCEDURE — 88305 TISSUE EXAM BY PATHOLOGIST: CPT

## 2022-03-16 PROCEDURE — 7100000010 HC PHASE II RECOVERY - FIRST 15 MIN: Performed by: INTERNAL MEDICINE

## 2022-03-16 PROCEDURE — 2709999900 HC NON-CHARGEABLE SUPPLY: Performed by: INTERNAL MEDICINE

## 2022-03-16 PROCEDURE — 36415 COLL VENOUS BLD VENIPUNCTURE: CPT

## 2022-03-16 PROCEDURE — 2580000003 HC RX 258: Performed by: ANESTHESIOLOGY

## 2022-03-16 PROCEDURE — 82248 BILIRUBIN DIRECT: CPT

## 2022-03-16 PROCEDURE — 80061 LIPID PANEL: CPT

## 2022-03-16 RX ORDER — SODIUM CHLORIDE 9 MG/ML
INJECTION, SOLUTION INTRAVENOUS CONTINUOUS
Status: DISCONTINUED | OUTPATIENT
Start: 2022-03-16 | End: 2022-03-16 | Stop reason: HOSPADM

## 2022-03-16 RX ORDER — LIDOCAINE HYDROCHLORIDE 20 MG/ML
INJECTION, SOLUTION INTRAVENOUS PRN
Status: DISCONTINUED | OUTPATIENT
Start: 2022-03-16 | End: 2022-03-16 | Stop reason: SDUPTHER

## 2022-03-16 RX ORDER — PROPOFOL 10 MG/ML
INJECTION, EMULSION INTRAVENOUS PRN
Status: DISCONTINUED | OUTPATIENT
Start: 2022-03-16 | End: 2022-03-16 | Stop reason: SDUPTHER

## 2022-03-16 RX ORDER — LIDOCAINE HYDROCHLORIDE 10 MG/ML
1 INJECTION, SOLUTION EPIDURAL; INFILTRATION; INTRACAUDAL; PERINEURAL
Status: DISCONTINUED | OUTPATIENT
Start: 2022-03-16 | End: 2022-03-16 | Stop reason: HOSPADM

## 2022-03-16 RX ADMIN — PROPOFOL 30 MG: 10 INJECTION, EMULSION INTRAVENOUS at 11:36

## 2022-03-16 RX ADMIN — PROPOFOL 30 MG: 10 INJECTION, EMULSION INTRAVENOUS at 11:44

## 2022-03-16 RX ADMIN — LIDOCAINE HYDROCHLORIDE 60 MG: 20 INJECTION, SOLUTION INTRAVENOUS at 11:32

## 2022-03-16 RX ADMIN — SODIUM CHLORIDE: 9 INJECTION, SOLUTION INTRAVENOUS at 10:10

## 2022-03-16 RX ADMIN — PROPOFOL 50 MG: 10 INJECTION, EMULSION INTRAVENOUS at 11:32

## 2022-03-16 RX ADMIN — PROPOFOL 20 MG: 10 INJECTION, EMULSION INTRAVENOUS at 11:47

## 2022-03-16 RX ADMIN — PROPOFOL 30 MG: 10 INJECTION, EMULSION INTRAVENOUS at 11:40

## 2022-03-16 ASSESSMENT — PULMONARY FUNCTION TESTS
PIF_VALUE: 1

## 2022-03-16 ASSESSMENT — ENCOUNTER SYMPTOMS
APNEA: 0
COUGH: 0
SINUS PRESSURE: 0
SHORTNESS OF BREATH: 0
TROUBLE SWALLOWING: 0
CHEST TIGHTNESS: 0
BACK PAIN: 0
RHINORRHEA: 0
WHEEZING: 0
SINUS PAIN: 0
SORE THROAT: 0

## 2022-03-16 ASSESSMENT — PAIN SCALES - GENERAL: PAINLEVEL_OUTOF10: 0

## 2022-03-16 ASSESSMENT — PAIN - FUNCTIONAL ASSESSMENT: PAIN_FUNCTIONAL_ASSESSMENT: 0-10

## 2022-03-16 NOTE — ANESTHESIA POSTPROCEDURE EVALUATION
Department of Anesthesiology  Postprocedure Note    Patient: Eric Graham  MRN: [id number]  YOB: 1965  Date of evaluation: 3/16/2022  Time:  1:07 PM     Procedure Summary     Date: 03/16/22 Room / Location: [address] [place] [address] ENDO 04 / [facility] ENDO    Anesthesia Start: 1128 Anesthesia Stop: 0153    Procedure: COLONOSCOPY WITH BIOPSY AND RESOLUTION CLIPPING X1 (N/A Anus) Diagnosis:       (CIRRHOSIS)      (PT FULLY VACCINATED)    Surgeons: Stef Aparicio MD Responsible Provider: Jennifer Bailey MD    Anesthesia Type: general ASA Status: 3          Anesthesia Type: general    Joanna Phase I:      Joanna Phase II: Joanna Score: 10    Last vitals: Reviewed and per EMR flowsheets.        Anesthesia Post Evaluation    Comments: POST- ANESTHESIA EVALUATION       Pt Name: Eric Graham  MRN: [id number]  YOB: 1965  Date of evaluation: 3/16/2022  Time:  1:07 PM      /81   Pulse 81   Temp 98.2 °F (36.8 °C)   Resp 14   Ht 6' (1.829 m)   Wt 227 lb (103 kg)   SpO2 98%   BMI 30.79 kg/m²      Consciousness Level  Awake  Cardiopulmonary Status  Stable  Pain Adequately Treated YES  Nausea / Vomiting  NO  Adequate Hydration  YES  Anesthesia Related Complications NONE      Electronically signed by Jennifer Bailey MD on 3/16/2022 at 1:07 PM

## 2022-03-16 NOTE — OP NOTE
[facility]     PROCEDURE DATE: 03/16/22    REFERRING PHYSICIAN: No ref. provider found     PRIMARY CARE PROVIDER: Cedrick Martin MD    ATTENDING PHYSICIAN: Mayte Dunbar MD     HISTORY: Mr. Amy Love is a 65 y.o. male who presents to the [place] [address] Endoscopy unit for colonoscopy. The patient's clinical history is remarkable for cirrhosis. First screening colonoscopy. He is currently medically stable and appropriate for the planned procedure. PREOPERATIVE DIAGNOSIS: screening for colon cancer. PROCEDURES:   Transanal Colonoscopy with polypectomy (cold biopsy), placement of 1 Hemoclip. POSTPROCEDURE DIAGNOSIS:  1 polyp  Portal hypertensive colopathy  Moderate internal hemorrhoids with small rectal varices    SPECIMENS: polyp    MEDICATIONS:     MAC per anesthesia    EBL: minimal    INSTRUMENT: Olympus PCF-H190 AL flexible Colonoscope. PREPARATION: The nature and character of the procedure as well as risks, benefits, and alternatives were discussed with the patient and informed consent was obtained. Complications were said to include, but were not limited to: medication allergy, medication reaction, cardiovascular and respiratory problems, bleeding, perforation, infection, and/or missed diagnosis. Following arrival in the endoscopy room, the patient was placed in the left lateral decubitus position and final time-out accomplished in the presence of the nursing staff. Baseline vital signs were obtained and reviewed, and IV sedation was subsequently initiated. FINDINGS: Rectal examination demonstrated no significant visible external abnormality and digital palpation was unremarkable. Following adequate conscious sedation the colonoscope was introduced and advanced under direct visualization to the terminal ileum. The bowel preparation was felt to be adequate.  This included small amounts of thin and watery stool that was able to be adequately irrigated and aspirated. Cecal intubation time was 3 minutes. Once maximally inserted, the endoscope was withdrawn and the mucosa was carefully inspected. The mucosal exam showed mild diffuse portal hypertensive colopathy. 5 mm polyp was removed from transverse colon by cold biopsy forceps. Bleeding was noted from the area. Aggressive irrigation was performed. Bleeding persisted. 1 Hemoclip was placed with adequate hemostasis. Retroflexion was performed in the rectum and showed small to moderate internal hemorrhoids with small rectal varices. Withdrawal time was 9 minutes. RECOMMENDATIONS:   1) Follow up with referring provider, as previously scheduled. 2) Follow up on pathology report. 3) Follow up with me in office in 1-2 months.         Ruby Cerrato

## 2022-03-16 NOTE — ANESTHESIA PRE PROCEDURE
Department of Anesthesiology  Preprocedure Note       Name:  Christiane Dsouza   Age:  65 y.o.  :  1965                                          MRN:  [id number]         Date:  3/16/2022      Surgeon: Ally Cuellar):  Sharif Calero MD    Procedure: Procedure(s):  COLONOSCOPY DIAGNOSTIC    Medications prior to admission:   Prior to Admission medications    Medication Sig Start Date End Date Taking? Authorizing Provider   SITagliptin (JANUVIA) 100 MG tablet TAKE 1 TABLET BY MOUTH DAILY 3/3/22   FAUSTO Manley - CNP   atorvastatin (LIPITOR) 40 MG tablet Take 1 tablet by mouth nightly 22   Nova Ribeiro MD   tamsulosin (FLOMAX) 0.4 MG capsule Take 1 capsule by mouth daily 22   Pretty Mathew MD   albuterol sulfate HFA (VENTOLIN HFA) 108 (90 Base) MCG/ACT inhaler Inhale 1 puff into the lungs every 4 hours as needed for Wheezing 22   Nova Ribeiro MD   fluticasone-salmeterol (ADVAIR DISKUS) 100-50 MCG/DOSE diskus inhaler INHALE 1 PUFF BY MOUTH EVERY 12 HOURS 22   Nova Ribeiro MD   glimepiride (AMARYL) 4 MG tablet TAKE 1 TABLET BY MOUTH TWICE DAILY 22   Nova Ribeiro MD   atorvastatin (LIPITOR) 40 MG tablet Take 1 tablet by mouth nightly 22   Nova Ribeiro MD   omeprazole (PRILOSEC) 20 MG delayed release capsule Take 1 capsule by mouth Daily 22   Nova Ribeiro MD   allopurinol (ZYLOPRIM) 100 MG tablet Take 1 tablet by mouth daily 22   Nova Ribeiro MD   nadolol (CORGARD) 20 MG tablet Take 1 tablet by mouth daily 22   Nova Ribeiro MD   polyethylene glycol [facility]) 17 GM/SCOOP powder Follow instructions provided to you from physician's office. 22   Sharif Calero MD   bisacodyl (BISACODYL) 5 MG EC tablet Follow instructions provided given by the physician's office.  22   Sharif Calero MD   magnesium citrate solution Take 296 mLs by mouth once for 1 dose 22  Sharif Calero MD   blood glucose monitor kit and supplies Dispense sufficient amount for indicated testing frequency. Dispense all needed supplies to include: monitor, strips, lancing device, lancets, control solutions, alcohol swabs. Testing once daily 1/28/22   Wilian Stephens MD   Lancets MISC 1 each by Does not apply route 2 times daily 1/27/22   Jaret Saldivar MD   blood glucose monitor strips 1 strip by Other route 2 times daily Test 2  times a day & as needed for symptoms of irregular blood glucose. Dispense sufficient amount for indicated testing frequency plus additional to accommodate PRN testing needs. 1/27/22   Jaret Saldivar MD   ibuprofen (ADVIL;MOTRIN) 800 MG tablet Take 1 tablet by mouth every 8 hours as needed for Pain 12/31/21   Marialuisa Camejo MD   ondansetron (ZOFRAN ODT) 4 MG disintegrating tablet Take 1 tablet by mouth every 8 hours as needed for Nausea 12/31/21   Marialuisa Camejo MD   lisinopril (PRINIVIL;ZESTRIL) 20 MG tablet Take 1 tablet by mouth daily 12/11/21   Ernie Schmidt MD   bumetanide (BUMEX) 1 MG tablet Take 1 tablet by mouth daily 12/11/21   Ernie Schmidt MD   metoprolol tartrate (LOPRESSOR) 25 MG tablet Take 1 tablet by mouth 2 times daily 4/6/21   Jaret Saldivar MD   blood glucose test strips (ASCENSIA AUTODISC VI;ONE TOUCH ULTRA TEST VI) strip Use with associated glucose meter. 7/22/20   Tyrel Norton PA-C   blood glucose monitor strips 1 strip by Other route 4 times daily Test 4  times a day & as needed for symptoms of irregular blood glucose. 5/3/19   Jaret Saldivar MD   [employer] LANCETS ULTRATHIN 30G MISC 1 each by Other route 3 times daily 10/19/18   Jaret Saldivar MD       Current medications:    Current Facility-Administered Medications   Medication Dose Route Frequency Provider Last Rate Last Admin    0.9 % sodium chloride infusion   IntraVENous Continuous Santos Browning MD        lidocaine PF 1 % injection 1 mL  1 mL IntraDERmal Once PRN Mile Iraheta MD           Allergies:     Allergies   Allergen Reactions    Codeine Nausea Only and Other (See Comments)     hallucinations    Simvastatin Other (See Comments)     Leg cramping       Problem List:    Patient Active Problem List   Diagnosis Code    Hyperlipidemia associated with type 2 diabetes mellitus (HCC) E11.69, E78.5    Essential hypertension I10    Calculus of kidney N20.0    Mitral valve disorder I05.9    Hyperlipidemia LDL goal <70 E78.5    Chronic liver disease K76.9    Ex-smoker Z87.891    H/O colonoscopy Z98.890    Type 2 diabetes mellitus, without long-term current use of insulin ([address] E11.9    Insomnia G47.00    GI bleed K92.2    Cirrhosis of liver without ascites (HCC) K74.60    Numbness and tingling of left side of face R20.0, R20.2    Numbness and tingling R20.0, R20.2    Stroke-like symptoms R29.90    TIA (transient ischemic attack) G45.9    Chronic obstructive pulmonary disease, unspecified COPD type ([address] J44.9    Thrombocytopenia ([address] D69.6    History of cirrhosis Z87.19       Past Medical History:        Diagnosis Date    Calculus of kidney     Cerebral artery occlusion with cerebral infarction ([address] 2020    slight memory problem (can't remember name of neurologist)    Cirrhosis ([address]     sees PCP for this    COPD (chronic obstructive pulmonary disease) ([address]     does not see pulmonology    COVID-19 12/31/2021    DETECTED    Essential hypertension, benign     Gout, unspecified     H/O colonoscopy 04/11/2016    2016    Mitral valve disorders(424.0)     СЕРГЕЙ on CPAP     not currently using due to malfunctioning    Other and unspecified hyperlipidemia     Type II or unspecified type diabetes mellitus without mention of complication, not stated as uncontrolled     Unspecified chronic liver disease without mention of alcohol     Wellness examination      [facility] (last visit approx Feb 2021)       Past Surgical History:        Procedure Laterality Date    LITHOTRIPSY      LITHOTRIPSY  04/02/2021    LITHOTRIPSY N/A 4/2/2021 ESWL EXTRACORPOREAL SHOCK WAVE LITHOTRIPSY  ([employer]-MED CONF# [id number] - AUGUSTO) performed by Trinity Velazco MD at [facility] 2018    The esophagus was inspected to the Z-line. The endoscopic exam showed impression of varices (F1) in distal esophagus.  UPPER GASTROINTESTINAL ENDOSCOPY N/A 12/10/2021    EGD ESOPHAGOGASTRODUODENOSCOPY performed by Sandhya Wade MD at [address] [facility]       Social History:    Social History     Tobacco Use    Smoking status: Former Smoker     Packs/day: 0.50     Years: 30.00     Pack years: 15.00     Types: Cigarettes     Quit date: 3/26/2012     Years since quittin.9    Smokeless tobacco: Former User   Substance Use Topics    Alcohol use: Not Currently     Alcohol/week: 0.0 standard drinks     Comment: used to drink \"a lot\"  None since Dec 2020                                Counseling given: Not Answered      Vital Signs (Current):   Vitals:    22 0949   BP: 119/69   Pulse: 87   Resp: 16   Temp: 97.1 °F (36.2 °C)   TempSrc: Infrared   SpO2: 98%   Weight: 227 lb (103 kg)   Height: 6' (1.829 m)                                              BP Readings from Last 3 Encounters:   22 119/69   22 107/61   22 110/80       NPO Status:                                                                                 BMI:   Wt Readings from Last 3 Encounters:   22 227 lb (103 kg)   22 232 lb (105.2 kg)   22 223 lb 3.2 oz (101.2 kg)     Body mass index is 30.79 kg/m².     CBC:   Lab Results   Component Value Date    WBC 3.9 2021    RBC 4.24 2021    HGB 11.3 2021    HCT 33.9 2021    MCV 87.1 2021    RDW 15.9 2021    PLT 74 2021       CMP:   Lab Results   Component Value Date     2021    K 4.0 2021     2021    CO2 24 2021    BUN 13 2021    CREATININE 0.55 2021    GFRAA >60 2021    LABGLOM >60 2021    GLUCOSE 181 12/11/2021    PROT 7.1 12/09/2021    CALCIUM 8.2 12/11/2021    BILITOT 2.75 12/09/2021    ALKPHOS 133 12/09/2021    AST 35 12/09/2021    ALT 33 12/09/2021       POC Tests: No results for input(s): POCGLU, POCNA, POCK, POCCL, POCBUN, POCHEMO, POCHCT in the last 72 hours. Coags:   Lab Results   Component Value Date    PROTIME 14.6 12/09/2021    INR 1.1 12/09/2021    APTT 31.5 12/09/2021       HCG (If Applicable): No results found for: PREGTESTUR, PREGSERUM, HCG, HCGQUANT     ABGs: No results found for: PHART, PO2ART, TGJ6CPV, AJJ5PMR, BEART, L8ACUSGX     Type & Screen (If Applicable):  No results found for: LABABO, LABRH    Drug/Infectious Status (If Applicable):  Lab Results   Component Value Date    HEPCAB NONREACTIVE 12/10/2021       COVID-19 Screening (If Applicable):   Lab Results   Component Value Date    COVID19 Not Detected 12/31/2021           Anesthesia Evaluation  Patient summary reviewed and Nursing notes reviewed no history of anesthetic complications:   Airway: Mallampati: III  TM distance: >3 FB   Neck ROM: full  Mouth opening: > = 3 FB Dental: normal exam         Pulmonary:normal exam  breath sounds clear to auscultation  (+) COPD:  sleep apnea: on noncompliant,                             Cardiovascular:    (+) hypertension:, hyperlipidemia      ECG reviewed  Rhythm: regular  Rate: normal  Echocardiogram reviewed                  Neuro/Psych:   (+) CVA:, TIA,             GI/Hepatic/Renal:   (+) liver disease (cirrhosis):, bowel prep,           Endo/Other:    (+) DiabetesType II DM, , .                 Abdominal:             Vascular: Other Findings:           Anesthesia Plan      general     ASA 3       Induction: intravenous. MIPS: Prophylactic antiemetics administered. Anesthetic plan and risks discussed with patient. Plan discussed with CRNA.                   Jennifer Meza MD   3/16/2022

## 2022-03-16 NOTE — H&P
HISTORY and [facility] [id number]       NAME:  Amy Love  MRN: [id number]   YOB: 1965   Date: 3/16/2022   Age: 65 y.o. Gender: male       COMPLAINT AND PRESENT HISTORY:   Amy Love  is a 65 y.o. male presenting today for COLONOSCOPY DIAGNOSTIC as r/t hx of cirrhosis. Pt denies any gi symptoms including n/v/d/c, no abdominal pain. He does report some BRBPR for a few months intermittently with BMs only when wiping. Pt reports completing bowel prep without complications, last BM reported this morning. He states last BM was clear with no stool particles. Pt has a PMHX significant for СЕРГЕЙ-no machine DM2, COPD, CVA-no residual effects. NPO since midnight. No  meds taken   Pt does not wear dentures. Pt denies any hx of MRSA infection  Pt not currently taking any blood thinners or anticoagulants  Pt denies any personal or FHx of complications with anesthesia. Pt denies any acute symptoms of illness at this time including no SOB, CP, fever, URI or UTI symptoms. RECENT IMAGING    No results found.      PAST MEDICAL HISTORY     Past Medical History:   Diagnosis Date    Calculus of kidney     Cerebral artery occlusion with cerebral infarction ([address] 2020    slight memory problem (can't remember name of neurologist)    Cirrhosis ([address]     sees PCP for this    COPD (chronic obstructive pulmonary disease) [facility]     does not see pulmonology    COVID-19 12/31/2021    DETECTED    Essential hypertension, benign     Gout, unspecified     H/O colonoscopy 04/11/2016    2016    Mitral valve disorders(424.0)     СЕРГЕЙ on CPAP     not currently using due to malfunctioning    Other and unspecified hyperlipidemia     Type II or unspecified type diabetes mellitus without mention of complication, not stated as uncontrolled     Unspecified chronic liver disease without mention of alcohol     Wellness examination      [facility] (last visit approx Feb 2021) SURGICAL HISTORY       Past Surgical History:   Procedure Laterality Date    LITHOTRIPSY      LITHOTRIPSY  2021    LITHOTRIPSY N/A 2021    ESWL EXTRACORPOREAL SHOCK WAVE LITHOTRIPSY  ([employer]-MED CONF# [id number] - AUGUSTO) performed by Caleb Woo MD at [address] [place] [address] 2018    The esophagus was inspected to the Z-line. The endoscopic exam showed impression of varices (F1) in distal esophagus.      UPPER GASTROINTESTINAL ENDOSCOPY N/A 12/10/2021    EGD ESOPHAGOGASTRODUODENOSCOPY performed by Bandar Booker MD at [address] [place] [address]       Family History   Problem Relation Age of Onset    No Known Problems Brother     Diabetes Mother     Heart Attack Mother     Heart Disease Father     Diabetes Brother     Heart Disease Brother        SOCIAL HISTORY       Social History     Socioeconomic History    Marital status:      Spouse name: Not on file    Number of children: Not on file    Years of education: Not on file    Highest education level: Not on file   Occupational History    Not on file   Tobacco Use    Smoking status: Former Smoker     Packs/day: 0.50     Years: 30.00     Pack years: 15.00     Types: Cigarettes     Quit date: 3/26/2012     Years since quittin.9    Smokeless tobacco: Former User   Vaping Use    Vaping Use: Never used   Substance and Sexual Activity    Alcohol use: Not Currently     Alcohol/week: 0.0 standard drinks     Comment: used to drink \"a lot\"  None since Dec 2020    Drug use: Yes     Types: Marijuana Veldon Bunting)     Comment: every evening    Sexual activity: Not on file   Other Topics Concern    Not on file   Social History Narrative    Not on file     Social Determinants of Health     Financial Resource Strain: Low Risk     Difficulty of Paying Living Expenses: Not hard at all   Food Insecurity: No Food Insecurity    Worried About [address] [employer] in the Last Year: Never true    [address] [facility] [address] in the Last Year: Never true   Transportation Needs:     Lack of Transportation (Medical): Not on file    Lack of Transportation (Non-Medical): Not on file   Physical Activity:     Days of Exercise per Week: Not on file    Minutes of Exercise per Session: Not on file   Stress:     Feeling of Stress : Not on file   Social Connections:     Frequency of Communication with Friends and Family: Not on file    Frequency of Social Gatherings with Friends and Family: Not on file    Attends Christianity Services: Not on file    Active Member of [address] [place] [address] [employer] or Organizations: Not on file    Attends Club or Organization Meetings: Not on file    Marital Status: Not on file   Intimate Partner Violence:     Fear of Current or Ex-Partner: Not on file    Emotionally Abused: Not on file    Physically Abused: Not on file    Sexually Abused: Not on file   Housing Stability:     Unable to Pay for Housing in the Last Year: Not on file    Number of [place] in the Last Year: Not on file    Unstable Housing in the Last Year: Not on file           REVIEW OF SYSTEMS      Allergies   Allergen Reactions    Codeine Nausea Only and Other (See Comments)     hallucinations    Simvastatin Other (See Comments)     Leg cramping       No current facility-administered medications on file prior to encounter. Current Outpatient Medications on File Prior to Encounter   Medication Sig Dispense Refill    blood glucose monitor kit and supplies Dispense sufficient amount for indicated testing frequency. Dispense all needed supplies to include: monitor, strips, lancing device, lancets, control solutions, alcohol swabs. Testing once daily 1 kit 0    Lancets MISC 1 each by Does not apply route 2 times daily 300 each 1    blood glucose monitor strips 1 strip by Other route 2 times daily Test 2  times a day & as needed for symptoms of irregular blood glucose.  Dispense sufficient amount for indicated testing frequency plus additional to accommodate PRN testing needs. 100 strip 11    ibuprofen (ADVIL;MOTRIN) 800 MG tablet Take 1 tablet by mouth every 8 hours as needed for Pain 30 tablet 0    ondansetron (ZOFRAN ODT) 4 MG disintegrating tablet Take 1 tablet by mouth every 8 hours as needed for Nausea 20 tablet 0    lisinopril (PRINIVIL;ZESTRIL) 20 MG tablet Take 1 tablet by mouth daily 30 tablet 3    bumetanide (BUMEX) 1 MG tablet Take 1 tablet by mouth daily 90 tablet 1    [DISCONTINUED] metoprolol tartrate (LOPRESSOR) 25 MG tablet Take 1 tablet by mouth 2 times daily 180 tablet 1    blood glucose test strips (ASCENSIA AUTODISC VI;ONE TOUCH ULTRA TEST VI) strip Use with associated glucose meter. 100 strip 11    blood glucose monitor strips 1 strip by Other route 4 times daily Test 4  times a day & as needed for symptoms of irregular blood glucose. 100 strip 11    [employer] LANCETS ULTRATHIN 30G MISC 1 each by Other route 3 times daily 100 each 11        Review of Systems   Constitutional: Negative for chills, diaphoresis, fatigue and fever. HENT: Positive for hearing loss. Negative for congestion, dental problem, ear pain, postnasal drip, rhinorrhea, sinus pressure, sinus pain, sore throat and trouble swallowing. Right > left    Eyes: Positive for visual disturbance. Glasses    Respiratory: Negative for apnea, cough, chest tightness, shortness of breath and wheezing. Cardiovascular: Positive for chest pain. Negative for palpitations and leg swelling. Intermittently    Gastrointestinal:        SEE HPI    Genitourinary: Negative for dysuria, flank pain, frequency and hematuria. Musculoskeletal: Negative for back pain, joint swelling and myalgias. Skin: Negative for rash and wound. Neurological: Negative for dizziness, weakness, numbness and headaches. Hematological: Does not bruise/bleed easily. Psychiatric/Behavioral: Negative for agitation and confusion. The patient is not nervous/anxious. See HPI    GENERAL PHYSICAL EXAM:     Vitals: See nurse flowsheet for current vitals. Physical Exam  Constitutional:       General: He is not in acute distress. Appearance: Normal appearance. He is well-developed and normal weight. He is not ill-appearing or toxic-appearing. HENT:      Head: Normocephalic and atraumatic. Mouth/Throat:      Mouth: Mucous membranes are moist.      Pharynx: Oropharynx is clear. No oropharyngeal exudate or posterior oropharyngeal erythema. Eyes:      Extraocular Movements: Extraocular movements intact. Conjunctiva/sclera: Conjunctivae normal.      Pupils: Pupils are equal, round, and reactive to light. Cardiovascular:      Rate and Rhythm: Normal rate and regular rhythm. Pulses: Normal pulses. Heart sounds: Murmur heard. No friction rub. No gallop. Pulmonary:      Effort: Pulmonary effort is normal.      Breath sounds: Normal breath sounds. No wheezing. Abdominal:      General: Bowel sounds are normal. There is no distension. Palpations: Abdomen is soft. Tenderness: There is no abdominal tenderness. There is no guarding or rebound. Musculoskeletal:         General: No swelling. Normal range of motion. Cervical back: Normal range of motion and neck supple. No rigidity or tenderness. Right lower leg: No edema. Left lower leg: No edema. Skin:     General: Skin is warm and dry. Findings: No erythema. Neurological:      General: No focal deficit present. Mental Status: He is alert and oriented to person, place, and time. Mental status is at baseline. Sensory: No sensory deficit. Psychiatric:         Mood and Affect: Mood normal.         Behavior: Behavior normal.         Thought Content:  Thought content normal.         Judgment: Judgment normal.                                                                                         PROVISIONAL DIAGNOSES / SURGERY:      COLONOSCOPY DIAGNOSTIC  Cirrhosis       Patient Active Problem List    Diagnosis Date Noted    Thrombocytopenia ([facility] 12/10/2021    History of cirrhosis     Chronic obstructive pulmonary disease, unspecified COPD type ([facility] 06/16/2021    TIA (transient ischemic attack) 04/06/2020    Stroke-like symptoms 03/30/2020    Numbness and tingling 01/07/2019    Numbness and tingling of left side of face 01/06/2019    GI bleed 05/24/2018    Cirrhosis of liver without ascites ([facility]     Insomnia 11/24/2017    Type 2 diabetes mellitus, without long-term current use of insulin ([facility] 06/23/2016    H/O colonoscopy 04/11/2016    Ex-smoker 03/26/2015    Hyperlipidemia associated with type 2 diabetes mellitus ([facility]     Essential hypertension     Calculus of kidney     Mitral valve disorder     Hyperlipidemia LDL goal <70     Chronic liver disease                FAUSTO Cabrera - CNP on 3/16/2022 at 9:21 AM

## 2022-03-17 LAB — SURGICAL PATHOLOGY REPORT: NORMAL

## 2022-03-22 ENCOUNTER — TELEPHONE (OUTPATIENT)
Dept: INTERNAL MEDICINE CLINIC | Age: 57
End: 2022-03-22

## 2022-03-22 NOTE — TELEPHONE ENCOUNTER
Patient daughter called to see if Jl Reagan can provide paperwork for her dog saying it is emotional support animal for Pt's depression for place of living. Animals over 35lbs are not allowed and dog helps with pt's current depression diagnosis.

## 2022-03-22 NOTE — LETTER
FRANDY GLEZ [address],[facility] [address] [place] [address]  Phone: [phone]  Fax: [phone]    Matti Camacho MD         March 24, 2022     Patient: Shahida Aguayo   YOB: 1965   Date of Visit: 3/22/2022       To Whom It May Concern:    Niru Chow is currently a patient under my care. I am intimately familiar with his history and with functional limitations imposed by his emotional/mental health related issue. Due to this emotional disability, Martina Villatoro has certain limitations coping with what would otherwise be considered normal, but significant day to day functionality, I recommend he be allowed to maintain his emotional support animal.    The presence of this animal is necessary for the emotional/mental health of Martina Villatoro because its presence will mitigate the symptoms he is currently experiencing. If you have any questions or concerns, please don't hesitate to call.     Sincerely,        Matti Camacho MD

## 2022-03-28 NOTE — PROGRESS NOTES
Per Dr. Chanda Smith order placed Upper Endoscopy   WHAT YOU NEED TO KNOW:   An upper endoscopy is also called an upper gastrointestinal (GI) endoscopy, or an esophagogastroduodenoscopy (EGD)  It is a procedure to examine the inside of your esophagus, stomach, and duodenum (first part of the small intestine) with a scope  You may feel bloated, gassy, or have some abdominal discomfort after your procedure  Your throat may be sore for 24 to 36 hours  You may burp or pass gas from air that is still inside your body  DISCHARGE INSTRUCTIONS:   Seek care immediately if:    You have sudden, severe abdominal pain   You have problems swallowing   You have a large amount of black, sticky bowel movements or blood in your bowel movements   You have sudden trouble breathing   You feel weak, lightheaded, or faint or your heart beats faster than normal for you  Contact your healthcare provider if:    You have a fever and chills   You have nausea or are vomiting   Your abdomen is bloated or feels full and hard   You have abdominal pain   You have black, sticky bowel movements or blood in your bowel movements   You have not had a bowel movement for 3 days after your procedure   You have rash or hives   You have questions or concerns about your procedure  Activity:    Do not lift, strain, or run for 24 hours after your procedure   Rest after your procedure  You have been given medicine to relax you  Do not drive or make important decisions until the day after your procedure  Return to your normal activity as directed   Relieve gas and discomfort from bloating by lying on your right side with a heating pad on your abdomen  You may need to take short walks to help the gas move out  Eat small meals until bloating is relieved  Follow up with your healthcare provider as directed: Write down your questions so you remember to ask them during your visits       If you take a blood thinner, please review the specific instructions from your endoscopist about when you should resume it  These can be found in the Recommendation and Your Medication list sections of this After Visit Summary

## 2022-04-09 ENCOUNTER — HOSPITAL ENCOUNTER (EMERGENCY)
Age: 57
Discharge: HOME | End: 2022-04-09
Attending: EMERGENCY MEDICINE
Payer: MEDICAID

## 2022-04-09 ENCOUNTER — APPOINTMENT (OUTPATIENT)
Dept: CT IMAGING | Age: 57
End: 2022-04-09
Payer: MEDICAID

## 2022-04-09 VITALS
RESPIRATION RATE: 18 BRPM | TEMPERATURE: 98.1 F | BODY MASS INDEX: 31.15 KG/M2 | SYSTOLIC BLOOD PRESSURE: 143 MMHG | HEART RATE: 69 BPM | DIASTOLIC BLOOD PRESSURE: 88 MMHG | WEIGHT: 230 LBS | OXYGEN SATURATION: 97 % | HEIGHT: 72 IN

## 2022-04-09 DIAGNOSIS — R10.32 LEFT LOWER QUADRANT ABDOMINAL PAIN: Primary | ICD-10-CM

## 2022-04-09 LAB
ABSOLUTE EOS #: 0.05 K/UL (ref 0–0.4)
ABSOLUTE LYMPH #: 1.01 K/UL (ref 1–4.8)
ABSOLUTE MONO #: 0.26 K/UL (ref 0.1–1.3)
ALBUMIN SERPL-MCNC: 3.2 G/DL (ref 3.5–5.2)
ALP BLD-CCNC: 128 U/L (ref 40–129)
ALT SERPL-CCNC: 25 U/L (ref 5–41)
ANION GAP SERPL CALCULATED.3IONS-SCNC: 8 MMOL/L (ref 9–17)
AST SERPL-CCNC: 32 U/L
BASOPHILS # BLD: 0 % (ref 0–2)
BASOPHILS ABSOLUTE: 0 K/UL (ref 0–0.2)
BILIRUB SERPL-MCNC: 2.14 MG/DL (ref 0.3–1.2)
BILIRUBIN URINE: NEGATIVE
BUN BLDV-MCNC: 14 MG/DL (ref 6–20)
CALCIUM SERPL-MCNC: 8.6 MG/DL (ref 8.6–10.4)
CHLORIDE BLD-SCNC: 109 MMOL/L (ref 98–107)
CO2: 22 MMOL/L (ref 20–31)
COLOR: YELLOW
COMMENT UA: ABNORMAL
CREAT SERPL-MCNC: 0.59 MG/DL (ref 0.7–1.2)
EOSINOPHILS RELATIVE PERCENT: 2 % (ref 0–4)
GFR AFRICAN AMERICAN: >60 ML/MIN
GFR NON-AFRICAN AMERICAN: >60 ML/MIN
GFR SERPL CREATININE-BSD FRML MDRD: ABNORMAL ML/MIN/{1.73_M2}
GLUCOSE BLD-MCNC: 282 MG/DL (ref 70–99)
GLUCOSE URINE: ABNORMAL
HCT VFR BLD CALC: 32.6 % (ref 41–53)
HEMOGLOBIN: 11.2 G/DL (ref 13.5–17.5)
KETONES, URINE: ABNORMAL
LEUKOCYTE ESTERASE, URINE: NEGATIVE
LIPASE: 58 U/L (ref 13–60)
LYMPHOCYTES # BLD: 42 % (ref 24–44)
MAGNESIUM: 1.6 MG/DL (ref 1.6–2.6)
MCH RBC QN AUTO: 29.3 PG (ref 26–34)
MCHC RBC AUTO-ENTMCNC: 34.5 G/DL (ref 31–37)
MCV RBC AUTO: 85 FL (ref 80–100)
MONOCYTES # BLD: 11 % (ref 1–7)
MORPHOLOGY: ABNORMAL
NITRITE, URINE: NEGATIVE
PDW BLD-RTO: 16 % (ref 11.5–14.9)
PH UA: 6 (ref 5–8)
PLATELET # BLD: 64 K/UL (ref 150–450)
PMV BLD AUTO: 7.8 FL (ref 6–12)
POTASSIUM SERPL-SCNC: 3.7 MMOL/L (ref 3.7–5.3)
PROTEIN UA: NEGATIVE
RBC # BLD: 3.83 M/UL (ref 4.5–5.9)
SEG NEUTROPHILS: 45 % (ref 36–66)
SEGMENTED NEUTROPHILS ABSOLUTE COUNT: 1.08 K/UL (ref 1.3–9.1)
SODIUM BLD-SCNC: 139 MMOL/L (ref 135–144)
SPECIFIC GRAVITY UA: 1.06 (ref 1–1.03)
TOTAL PROTEIN: 6.1 G/DL (ref 6.4–8.3)
TURBIDITY: CLEAR
URINE HGB: NEGATIVE
UROBILINOGEN, URINE: NORMAL
WBC # BLD: 2.4 K/UL (ref 3.5–11)

## 2022-04-09 PROCEDURE — 6360000002 HC RX W HCPCS: Performed by: EMERGENCY MEDICINE

## 2022-04-09 PROCEDURE — 36415 COLL VENOUS BLD VENIPUNCTURE: CPT

## 2022-04-09 PROCEDURE — 80053 COMPREHEN METABOLIC PANEL: CPT

## 2022-04-09 PROCEDURE — 83735 ASSAY OF MAGNESIUM: CPT

## 2022-04-09 PROCEDURE — 83690 ASSAY OF LIPASE: CPT

## 2022-04-09 PROCEDURE — 74177 CT ABD & PELVIS W/CONTRAST: CPT

## 2022-04-09 PROCEDURE — 2580000003 HC RX 258: Performed by: EMERGENCY MEDICINE

## 2022-04-09 PROCEDURE — 2500000003 HC RX 250 WO HCPCS: Performed by: EMERGENCY MEDICINE

## 2022-04-09 PROCEDURE — 96375 TX/PRO/DX INJ NEW DRUG ADDON: CPT

## 2022-04-09 PROCEDURE — 96374 THER/PROPH/DIAG INJ IV PUSH: CPT

## 2022-04-09 PROCEDURE — 6360000004 HC RX CONTRAST MEDICATION: Performed by: EMERGENCY MEDICINE

## 2022-04-09 PROCEDURE — 85025 COMPLETE CBC W/AUTO DIFF WBC: CPT

## 2022-04-09 PROCEDURE — A4216 STERILE WATER/SALINE, 10 ML: HCPCS | Performed by: EMERGENCY MEDICINE

## 2022-04-09 PROCEDURE — 99284 EMERGENCY DEPT VISIT MOD MDM: CPT

## 2022-04-09 PROCEDURE — 81003 URINALYSIS AUTO W/O SCOPE: CPT

## 2022-04-09 RX ORDER — SODIUM CHLORIDE 0.9 % (FLUSH) 0.9 %
10 SYRINGE (ML) INJECTION PRN
Status: DISCONTINUED | OUTPATIENT
Start: 2022-04-09 | End: 2022-04-09 | Stop reason: HOSPADM

## 2022-04-09 RX ORDER — 0.9 % SODIUM CHLORIDE 0.9 %
80 INTRAVENOUS SOLUTION INTRAVENOUS ONCE
Status: COMPLETED | OUTPATIENT
Start: 2022-04-09 | End: 2022-04-09

## 2022-04-09 RX ORDER — METOCLOPRAMIDE HYDROCHLORIDE 5 MG/ML
10 INJECTION INTRAMUSCULAR; INTRAVENOUS ONCE
Status: COMPLETED | OUTPATIENT
Start: 2022-04-09 | End: 2022-04-09

## 2022-04-09 RX ORDER — ONDANSETRON 4 MG/1
4 TABLET, FILM COATED ORAL EVERY 8 HOURS PRN
Qty: 20 TABLET | Refills: 0 | Status: SHIPPED | OUTPATIENT
Start: 2022-04-09 | End: 2022-05-20 | Stop reason: ALTCHOICE

## 2022-04-09 RX ORDER — 0.9 % SODIUM CHLORIDE 0.9 %
1000 INTRAVENOUS SOLUTION INTRAVENOUS ONCE
Status: COMPLETED | OUTPATIENT
Start: 2022-04-09 | End: 2022-04-09

## 2022-04-09 RX ORDER — ONDANSETRON 2 MG/ML
8 INJECTION INTRAMUSCULAR; INTRAVENOUS ONCE
Status: COMPLETED | OUTPATIENT
Start: 2022-04-09 | End: 2022-04-09

## 2022-04-09 RX ADMIN — METOCLOPRAMIDE 10 MG: 5 INJECTION, SOLUTION INTRAMUSCULAR; INTRAVENOUS at 02:58

## 2022-04-09 RX ADMIN — SODIUM CHLORIDE, PRESERVATIVE FREE 10 ML: 5 INJECTION INTRAVENOUS at 02:23

## 2022-04-09 RX ADMIN — SODIUM CHLORIDE 80 ML: 9 INJECTION, SOLUTION INTRAVENOUS at 02:23

## 2022-04-09 RX ADMIN — HYDROMORPHONE HYDROCHLORIDE 1 MG: 1 INJECTION, SOLUTION INTRAMUSCULAR; INTRAVENOUS; SUBCUTANEOUS at 01:30

## 2022-04-09 RX ADMIN — SODIUM CHLORIDE 1000 ML: 9 INJECTION, SOLUTION INTRAVENOUS at 01:39

## 2022-04-09 RX ADMIN — ONDANSETRON 8 MG: 2 INJECTION INTRAMUSCULAR; INTRAVENOUS at 01:31

## 2022-04-09 RX ADMIN — FAMOTIDINE 20 MG: 10 INJECTION, SOLUTION INTRAVENOUS at 01:32

## 2022-04-09 RX ADMIN — IOPAMIDOL 75 ML: 755 INJECTION, SOLUTION INTRAVENOUS at 02:23

## 2022-04-09 ASSESSMENT — PAIN DESCRIPTION - ORIENTATION: ORIENTATION: LEFT;LOWER

## 2022-04-09 ASSESSMENT — PAIN - FUNCTIONAL ASSESSMENT: PAIN_FUNCTIONAL_ASSESSMENT: 0-10

## 2022-04-09 ASSESSMENT — ENCOUNTER SYMPTOMS: ABDOMINAL PAIN: 1

## 2022-04-09 ASSESSMENT — PAIN SCALES - GENERAL: PAINLEVEL_OUTOF10: 9

## 2022-04-09 ASSESSMENT — PAIN DESCRIPTION - LOCATION: LOCATION: ABDOMEN

## 2022-04-09 ASSESSMENT — PAIN DESCRIPTION - PAIN TYPE: TYPE: ACUTE PAIN

## 2022-04-09 NOTE — ED NOTES
Mode of arrival (squad #, walk in, police, etc) : walk-in      Chief complaint(s): LLQ pain      Arrival Note (brief scenario, treatment PTA, etc). : c/o LLQ pain, symptom onset x1 day. Denies N/V/D. Recent endoscopy with biospy on 3/16. No meds PTA. Rates pain at 9/10, describes as stabbing. LBM 4/8/22. C= \"Have you ever felt that you should Cut down on your drinking? \"  No  A= \"Have people Annoyed you by criticizing your drinking? \"  No  G= \"Have you ever felt bad or Guilty about your drinking? \"  No  E= \"Have you ever had a drink as an Eye-opener first thing in the morning to steady your nerves or to help a hangover? \"  No      Deferred []      Reason for deferring: N/A    *If yes to two or more: probable alcohol abuse. Rhonda Cortes RN  04/09/22 [id number]

## 2022-04-09 NOTE — ED NOTES
Pt vomiting on way to CT.       Smooth, [facility]  04/09/22 [id number]

## 2022-04-09 NOTE — ED PROVIDER NOTES
EMERGENCY DEPARTMENT ENCOUNTER    Pt Name: Jorge Cummings  MRN: [id number]  [place] 1965  Date of evaluation: 4/9/22  CHIEF COMPLAINT       Chief Complaint   Patient presents with    Abdominal Pain     HISTORY OF PRESENT ILLNESS     Abdominal Pain  Pain location:  LLQ (left mid)  Pain quality: aching    Pain radiates to:  Does not radiate  Pain severity:  Moderate  Onset quality:  Gradual  Duration:  1 day  Timing:  Constant  Progression:  Unchanged  Chronicity:  New  Relieved by:  Nothing  Worsened by:  Nothing  Ineffective treatments:  None tried    Had polypectomy done recently during colonoscopy      REVIEW OF SYSTEMS     Review of Systems   Gastrointestinal: Positive for abdominal pain. All other systems reviewed and are negative.     PASTMEDICAL HISTORY     Past Medical History:   Diagnosis Date    Calculus of kidney     Cerebral artery occlusion with cerebral infarction ([address] 2020    slight memory problem (can't remember name of neurologist)    Cirrhosis ([address]     sees PCP for this    COPD (chronic obstructive pulmonary disease) ([facility]     does not see pulmonology    COVID-19 12/31/2021    DETECTED    Essential hypertension, benign     Gout, unspecified     H/O colonoscopy 04/11/2016 2016    Mitral valve disorders(424.0)     СЕРГЕЙ on CPAP     not currently using due to malfunctioning    Other and unspecified hyperlipidemia     Type II or unspecified type diabetes mellitus without mention of complication, not stated as uncontrolled     Unspecified chronic liver disease without mention of alcohol     Wellness examination      [facility] (last visit approx Feb 2021)     Past Problem List  Patient Active Problem List   Diagnosis Code    Hyperlipidemia associated with type 2 diabetes mellitus ([facility] E11.69, E78.5    Essential hypertension I10    Calculus of kidney N20.0    Mitral valve disorder I05.9    Hyperlipidemia LDL goal <70 E78.5    Chronic liver disease K76.9    Ex-smoker Z87.891  H/O colonoscopy Z98.890    Type 2 diabetes mellitus, without long-term current use of insulin (HCC) E11.9    Insomnia G47.00    GI bleed K92.2    Cirrhosis of liver without ascites (HCC) K74.60    Numbness and tingling of left side of face R20.0, R20.2    Numbness and tingling R20.0, R20.2    Stroke-like symptoms R29.90    TIA (transient ischemic attack) G45.9    Chronic obstructive pulmonary disease, unspecified COPD type ([facility] J44.9    Thrombocytopenia ([facility] D69.6    History of cirrhosis Z87.19     SURGICAL HISTORY       Past Surgical History:   Procedure Laterality Date    COLONOSCOPY N/A 3/16/2022    COLONOSCOPY WITH BIOPSY AND RESOLUTION CLIPPING X1 performed by Hermila Rogel MD at [address] LITHOTRIPSY  04/02/2021    LITHOTRIPSY N/A 4/2/2021    ESWL EXTRACORPOREAL SHOCK WAVE LITHOTRIPSY  ([employer] CONF# [id number] - AUGUSTO) performed by Luisa Hdz MD at [address] [place] [address] N/A 5/25/2018    The esophagus was inspected to the Z-line. The endoscopic exam showed impression of varices (F1) in distal esophagus.  UPPER GASTROINTESTINAL ENDOSCOPY N/A 12/10/2021    EGD ESOPHAGOGASTRODUODENOSCOPY performed by Hermila Rogel MD at [facility]       Previous Medications    ALBUTEROL SULFATE HFA (VENTOLIN HFA) 108 (90 BASE) MCG/ACT INHALER    Inhale 1 puff into the lungs every 4 hours as needed for Wheezing    ALLOPURINOL (ZYLOPRIM) 100 MG TABLET    Take 1 tablet by mouth daily    ATORVASTATIN (LIPITOR) 40 MG TABLET    Take 1 tablet by mouth nightly    ATORVASTATIN (LIPITOR) 40 MG TABLET    Take 1 tablet by mouth nightly    BISACODYL (BISACODYL) 5 MG EC TABLET    Follow instructions provided given by the physician's office. BLOOD GLUCOSE MONITOR KIT AND SUPPLIES    Dispense sufficient amount for indicated testing frequency.  Dispense all needed supplies to include: monitor, strips, lancing device, lancets, control solutions, alcohol swabs. Testing once daily    BLOOD GLUCOSE MONITOR STRIPS    1 strip by Other route 4 times daily Test 4  times a day & as needed for symptoms of irregular blood glucose. BLOOD GLUCOSE MONITOR STRIPS    1 strip by Other route 2 times daily Test 2  times a day & as needed for symptoms of irregular blood glucose. Dispense sufficient amount for indicated testing frequency plus additional to accommodate PRN testing needs. BLOOD GLUCOSE TEST STRIPS (ASCENSIA AUTODISC VI;ONE TOUCH ULTRA TEST VI) STRIP    Use with associated glucose meter. BUMETANIDE (BUMEX) 1 MG TABLET    Take 1 tablet by mouth daily    FLUTICASONE-SALMETEROL (ADVAIR DISKUS) 100-50 MCG/DOSE DISKUS INHALER    INHALE 1 PUFF BY MOUTH EVERY 12 HOURS    GLIMEPIRIDE (AMARYL) 4 MG TABLET    TAKE 1 TABLET BY MOUTH TWICE DAILY    IBUPROFEN (ADVIL;MOTRIN) 800 MG TABLET    Take 1 tablet by mouth every 8 hours as needed for Pain    [employer] LANCETS ULTRATHIN 30G MISC    1 each by Other route 3 times daily    LANCETS MISC    1 each by Does not apply route 2 times daily    LISINOPRIL (PRINIVIL;ZESTRIL) 20 MG TABLET    Take 1 tablet by mouth daily    MAGNESIUM CITRATE SOLUTION    Take 296 mLs by mouth once for 1 dose    NADOLOL (CORGARD) 20 MG TABLET    Take 1 tablet by mouth daily    OMEPRAZOLE (PRILOSEC) 20 MG DELAYED RELEASE CAPSULE    Take 1 capsule by mouth Daily    ONDANSETRON (ZOFRAN ODT) 4 MG DISINTEGRATING TABLET    Take 1 tablet by mouth every 8 hours as needed for Nausea    POLYETHYLENE GLYCOL (GLYCOLAX) 17 GM/SCOOP POWDER    Follow instructions provided to you from physician's office. SITAGLIPTIN (JANUVIA) 100 MG TABLET    TAKE 1 TABLET BY MOUTH DAILY    TAMSULOSIN (FLOMAX) 0.4 MG CAPSULE    Take 1 capsule by mouth daily     ALLERGIES     is allergic to codeine and simvastatin. FAMILY HISTORY     He indicated that his mother is alive. He indicated that his father is . He indicated that both of his brothers are alive.      SOCIAL HISTORY       Social History     Tobacco Use    Smoking status: Former Smoker     Packs/day: 0.50     Years: 30.00     Pack years: 15.00     Types: Cigarettes     Quit date: 3/26/2012     Years since quitting: 10.0    Smokeless tobacco: Former User   Vaping Use    Vaping Use: Never used   Substance Use Topics    Alcohol use: Not Currently     Alcohol/week: 0.0 standard drinks     Comment: used to drink \"a lot\"  None since Dec 2020    Drug use: Yes     Types: Marijuana (Weed)     Comment: every evening     PHYSICAL EXAM     INITIAL VITALS: BP (!) 143/88   Pulse 69   Temp 98.1 °F (36.7 °C) (Oral)   Resp 18   Ht 6' (1.829 m)   Wt 230 lb (104.3 kg)   SpO2 97%   BMI 31.19 kg/m²    Physical Exam  Constitutional:       General: He is not in acute distress. Appearance: Normal appearance. He is well-developed. He is not diaphoretic. HENT:      Head: Normocephalic and atraumatic. Right Ear: External ear normal.      Left Ear: External ear normal.   Eyes:      General:         Right eye: No discharge. Left eye: No discharge. Conjunctiva/sclera: Conjunctivae normal.      Pupils: Pupils are equal, round, and reactive to light. Neck:      Trachea: No tracheal deviation. Cardiovascular:      Rate and Rhythm: Normal rate and regular rhythm. Pulses: Normal pulses. Heart sounds: Normal heart sounds. Pulmonary:      Effort: Pulmonary effort is normal. No respiratory distress. Breath sounds: Normal breath sounds. No stridor. No wheezing or rales. Abdominal:      Palpations: Abdomen is soft. Tenderness: There is abdominal tenderness. There is no guarding or rebound. Comments: Left mid tenderness   Genitourinary:     Penis: Normal.       Testes: Normal.      Comments: No hernias  Musculoskeletal:         General: No tenderness or deformity. Normal range of motion. Cervical back: Normal range of motion and neck supple. Skin:     General: Skin is warm and dry. components within normal limits   LIPASE   MAGNESIUM       EMERGENCY DEPARTMENTCOURSE:         Vitals:    Vitals:    04/09/22 0030   BP: (!) 143/88   Pulse: 69   Resp: 18   Temp: 98.1 °F (36.7 °C)   TempSrc: Oral   SpO2: 97%   Weight: 230 lb (104.3 kg)   Height: 6' (1.829 m)       The patient was given the following medications while in the emergency department:  Orders Placed This Encounter   Medications    0.9 % sodium chloride bolus    HYDROmorphone (DILAUDID) injection 1 mg    famotidine (PEPCID) 20 mg in sodium chloride (PF) 10 mL injection    ondansetron (ZOFRAN) injection 8 mg    0.9 % sodium chloride bolus    sodium chloride flush 0.9 % injection 10 mL    iopamidol (ISOVUE-370) 76 % injection 75 mL    metoclopramide (REGLAN) injection 10 mg    ondansetron (ZOFRAN) 4 MG tablet     Sig: Take 1 tablet by mouth every 8 hours as needed for Nausea or Vomiting     Dispense:  20 tablet     Refill:  0       FINAL IMPRESSION      1. Left lower quadrant abdominal pain          DISPOSITION/PLAN   DISPOSITION Decision To Discharge 04/09/2022 03:04:24 AM      PATIENT REFERRED TO:  Lilly Matt MD  [address] [place] [address]  [phone]    Schedule an appointment as soon as possible for a visit in 1 day      DISCHARGE MEDICATIONS:  New Prescriptions    ONDANSETRON (ZOFRAN) 4 MG TABLET    Take 1 tablet by mouth every 8 hours as needed for Nausea or Vomiting     The care is provided during an unprecedented national emergency due to the novel coronavirus, COVID 19.   MD Katlin Lares MD  04/09/22 [id number]

## 2022-04-14 ENCOUNTER — OFFICE VISIT (OUTPATIENT)
Dept: INTERNAL MEDICINE CLINIC | Age: 57
End: 2022-04-14
Payer: MEDICAID

## 2022-04-14 VITALS
SYSTOLIC BLOOD PRESSURE: 130 MMHG | HEART RATE: 79 BPM | WEIGHT: 228.8 LBS | BODY MASS INDEX: 30.99 KG/M2 | OXYGEN SATURATION: 98 % | HEIGHT: 72 IN | DIASTOLIC BLOOD PRESSURE: 70 MMHG

## 2022-04-14 DIAGNOSIS — J44.9 CHRONIC OBSTRUCTIVE PULMONARY DISEASE, UNSPECIFIED COPD TYPE (HCC): Primary | ICD-10-CM

## 2022-04-14 DIAGNOSIS — E11.69 HYPERLIPIDEMIA ASSOCIATED WITH TYPE 2 DIABETES MELLITUS (HCC): ICD-10-CM

## 2022-04-14 DIAGNOSIS — D69.6 THROMBOCYTOPENIA (HCC): ICD-10-CM

## 2022-04-14 DIAGNOSIS — F32.A DEPRESSION, UNSPECIFIED DEPRESSION TYPE: ICD-10-CM

## 2022-04-14 DIAGNOSIS — E11.65 TYPE 2 DIABETES MELLITUS WITH HYPERGLYCEMIA, WITHOUT LONG-TERM CURRENT USE OF INSULIN (HCC): ICD-10-CM

## 2022-04-14 DIAGNOSIS — R22.9 SKIN NODULE: ICD-10-CM

## 2022-04-14 DIAGNOSIS — K74.60 CIRRHOSIS OF LIVER WITHOUT ASCITES, UNSPECIFIED HEPATIC CIRRHOSIS TYPE (HCC): ICD-10-CM

## 2022-04-14 DIAGNOSIS — E78.5 HYPERLIPIDEMIA ASSOCIATED WITH TYPE 2 DIABETES MELLITUS (HCC): ICD-10-CM

## 2022-04-14 PROCEDURE — 3046F HEMOGLOBIN A1C LEVEL >9.0%: CPT | Performed by: INTERNAL MEDICINE

## 2022-04-14 PROCEDURE — 99214 OFFICE O/P EST MOD 30 MIN: CPT | Performed by: INTERNAL MEDICINE

## 2022-04-14 PROCEDURE — 2022F DILAT RTA XM EVC RTNOPTHY: CPT | Performed by: INTERNAL MEDICINE

## 2022-04-14 PROCEDURE — 3023F SPIROM DOC REV: CPT | Performed by: INTERNAL MEDICINE

## 2022-04-14 PROCEDURE — 3017F COLORECTAL CA SCREEN DOC REV: CPT | Performed by: INTERNAL MEDICINE

## 2022-04-14 PROCEDURE — G8417 CALC BMI ABV UP PARAM F/U: HCPCS | Performed by: INTERNAL MEDICINE

## 2022-04-14 PROCEDURE — 1036F TOBACCO NON-USER: CPT | Performed by: INTERNAL MEDICINE

## 2022-04-14 PROCEDURE — G8427 DOCREV CUR MEDS BY ELIG CLIN: HCPCS | Performed by: INTERNAL MEDICINE

## 2022-04-14 ASSESSMENT — ENCOUNTER SYMPTOMS
BACK PAIN: 0
ABDOMINAL DISTENTION: 0
COUGH: 0
COLOR CHANGE: 0
CHEST TIGHTNESS: 0
CONSTIPATION: 0
WHEEZING: 0
APNEA: 0
ABDOMINAL PAIN: 0
DIARRHEA: 0
SHORTNESS OF BREATH: 0
FACIAL SWELLING: 0

## 2022-04-14 ASSESSMENT — COLUMBIA-SUICIDE SEVERITY RATING SCALE - C-SSRS
7. DID THIS OCCUR IN THE LAST THREE MONTHS: YES
6. HAVE YOU EVER DONE ANYTHING, STARTED TO DO ANYTHING, OR PREPARED TO DO ANYTHING TO END YOUR LIFE?: YES
2. HAVE YOU ACTUALLY HAD ANY THOUGHTS OF KILLING YOURSELF?: YES
4. HAVE YOU HAD THESE THOUGHTS AND HAD SOME INTENTION OF ACTING ON THEM?: YES
3. HAVE YOU BEEN THINKING ABOUT HOW YOU MIGHT KILL YOURSELF?: YES
1. WITHIN THE PAST MONTH, HAVE YOU WISHED YOU WERE DEAD OR WISHED YOU COULD GO TO SLEEP AND NOT WAKE UP?: YES
5. HAVE YOU STARTED TO WORK OUT OR WORKED OUT THE DETAILS OF HOW TO KILL YOURSELF? DO YOU INTEND TO CARRY OUT THIS PLAN?: YES

## 2022-04-14 ASSESSMENT — PATIENT HEALTH QUESTIONNAIRE - PHQ9
10. IF YOU CHECKED OFF ANY PROBLEMS, HOW DIFFICULT HAVE THESE PROBLEMS MADE IT FOR YOU TO DO YOUR WORK, TAKE CARE OF THINGS AT HOME, OR GET ALONG WITH OTHER PEOPLE: 2
SUM OF ALL RESPONSES TO PHQ9 QUESTIONS 1 & 2: 4
3. TROUBLE FALLING OR STAYING ASLEEP: 2
6. FEELING BAD ABOUT YOURSELF - OR THAT YOU ARE A FAILURE OR HAVE LET YOURSELF OR YOUR FAMILY DOWN: 2
4. FEELING TIRED OR HAVING LITTLE ENERGY: 2
SUM OF ALL RESPONSES TO PHQ QUESTIONS 1-9: 13
1. LITTLE INTEREST OR PLEASURE IN DOING THINGS: 2
5. POOR APPETITE OR OVEREATING: 0
SUM OF ALL RESPONSES TO PHQ QUESTIONS 1-9: 13
8. MOVING OR SPEAKING SO SLOWLY THAT OTHER PEOPLE COULD HAVE NOTICED. OR THE OPPOSITE, BEING SO FIGETY OR RESTLESS THAT YOU HAVE BEEN MOVING AROUND A LOT MORE THAN USUAL: 0
SUM OF ALL RESPONSES TO PHQ QUESTIONS 1-9: 10
9. THOUGHTS THAT YOU WOULD BE BETTER OFF DEAD, OR OF HURTING YOURSELF: 3
SUM OF ALL RESPONSES TO PHQ QUESTIONS 1-9: 13
7. TROUBLE CONCENTRATING ON THINGS, SUCH AS READING THE NEWSPAPER OR WATCHING TELEVISION: 0
2. FEELING DOWN, DEPRESSED OR HOPELESS: 2

## 2022-04-14 NOTE — PROGRESS NOTES
Subjective:      Patient ID: Carlos Britt is a 65 y.o. male. HPI   1) Location/Symptom - feels Depressed   2) Timing/Onset: couple of Months   3) Context/Setting: has a Friend , she Constantly puts him down   4) Quality: had  thoughts of hurting himself , ( by Trying to Overdose on Pills almost a Month ago) ) , he was having Lots of Bills ( Not suicidal Now )   5) Duration: continuous   6) Modifying Factors: has Guns at Home   7) Severity: tried to Commit suicide when he was a Teenager   Has Lack of energy , Sleep ( has weared Hours of Sleep )   Does not wants to go to [facility] to Safe where Guns are kept   Quit Drinking already   Uses Marijuana Occasionally   He lives with his Wife , Daughter and [employer] , has Good family and social support   Checks Blood sugar 1-2 /day , as Per patient Most of readings are OK  Review of Systems   Constitutional: Negative for activity change, appetite change, chills and diaphoresis. HENT: Negative for congestion, dental problem, ear discharge, facial swelling and hearing loss. Respiratory: Negative for apnea, cough, chest tightness, shortness of breath and wheezing. Cardiovascular: Negative for chest pain and leg swelling. Gastrointestinal: Negative for abdominal distention, abdominal pain, constipation and diarrhea. Genitourinary: Negative for difficulty urinating, dysuria, enuresis, flank pain and frequency. Musculoskeletal: Negative for arthralgias, back pain, gait problem and joint swelling. Skin: Negative for color change, pallor and rash. Has Lesion on Nose , Increasing in size    Neurological: Negative for dizziness, seizures, facial asymmetry, light-headedness, numbness and headaches. Psychiatric/Behavioral: Positive for dysphoric mood. Negative for agitation, behavioral problems, confusion and decreased concentration. Objective:   Physical Exam  Vitals and nursing note reviewed.    Constitutional:       General: He is not in acute distress. Appearance: He is well-developed. He is not diaphoretic. HENT:      Head: Normocephalic and atraumatic. Mouth/Throat:      Pharynx: No oropharyngeal exudate. Eyes:      General: No scleral icterus. Right eye: No discharge. Left eye: No discharge. Conjunctiva/sclera: Conjunctivae normal.      Pupils: Pupils are equal, round, and reactive to light. Neck:      Thyroid: No thyromegaly. Vascular: No JVD. Trachea: No tracheal deviation. Cardiovascular:      Rate and Rhythm: Normal rate. Heart sounds: Normal heart sounds. No murmur heard. No gallop. Pulmonary:      Effort: Pulmonary effort is normal. No respiratory distress. Breath sounds: Normal breath sounds. No stridor. No wheezing or rales. Abdominal:      General: Bowel sounds are normal. There is no distension. Palpations: Abdomen is soft. Tenderness: There is no abdominal tenderness. There is no guarding or rebound. Musculoskeletal:         General: No tenderness. Normal range of motion. Cervical back: Normal range of motion and neck supple. Skin:     General: Skin is warm and dry. Findings: No erythema or rash. Comments: Nodule 2x2 cm in size on nose , non tender in nature    Neurological:      Mental Status: He is alert and oriented to person, place, and time. Assessment / Plan:   1. Chronic obstructive pulmonary disease, unspecified COPD type ([address]  Stable     2. Hyperlipidemia associated with type 2 diabetes mellitus (HCC)  Stable     3. Cirrhosis of liver without ascites, unspecified hepatic cirrhosis type (HCC)  Stable , quit drinking     4. Thrombocytopenia (HCC)  Stable     5. Depression, unspecified depression type  Advised to sent to hospital , for psyche eval   Patient refused   Not suicidal any longer  - sertraline (ZOLOFT) 50 MG tablet; Take 1 tablet by mouth daily  Dispense: 30 tablet;  Refill: 5  - TSH With Reflex Ft4; Future  - External Referral To Psychiatry    6. Type 2 diabetes mellitus with hyperglycemia, without long-term current use of insulin (HCC)  - Hemoglobin A1C; Future    7. Skin nodule  - Sheila Amaya MD, Dermatology, [place] [facility]      Return in about 2 weeks (around 4/28/2022). · Reviewed prior labs and health maintenance. · Discussed use, benefit, and side effects of prescribed medications. Barriers to medication compliance addressed. All patient questions answered. Pt voiced understanding. Elissa Camacho MD  [facility]  4/14/2022, 4:46 PM    Please note that this chart was generated using voice recognition Dragon dictation software. Although every effort was made to ensure the accuracy of this automated transcription, some errors in transcription may have occurred. Visit Information    Have you changed or started any medications since your last visit including any over-the-counter medicines, vitamins, or herbal medicines? no   Are you having any side effects from any of your medications? -  no  Have you stopped taking any of your medications? Is so, why? -  no    Have you seen any other physician or provider since your last visit? Yes - Records Obtained  Have you had any other diagnostic tests since your last visit? Yes - Records Obtained  Have you been seen in the emergency room and/or had an admission to a hospital since we last saw you? Yes - Records Obtained  Have you had your routine dental cleaning in the past 6 months? no    Have you activated your [employer] account? If not, what are your barriers?  Yes     Patient Care Team:  Elissa Camacho MD as PCP - General (Internal Medicine)  Elissa Camacho MD as PCP - [facility] Empaneled Provider    Medical History Review  Past Medical, Family, and Social History reviewed and does contribute to the patient presenting condition    Health Maintenance   Topic Date Due    Hepatitis A vaccine (1 of 2 - Risk 2-dose series) Never done    HIV screen Never done    Hepatitis B vaccine (1 of 3 - Risk 3-dose series) Never done    DTaP/Tdap/Td vaccine (1 - Tdap) Never done    Shingles Vaccine (1 of 2) Never done    Diabetic retinal exam  08/15/2016    Pneumococcal 0-64 years Vaccine (2 - PCV) 09/16/2017    Diabetic foot exam  05/30/2019    Diabetic microalbuminuria test  06/02/2021    COVID-19 Vaccine (3 - Booster for Moderna series) 09/07/2021    Depression Screen  02/03/2022    A1C test (Diabetic or Prediabetic)  09/20/2022    Lipid screen  03/16/2023    Potassium monitoring  04/09/2023    Creatinine monitoring  04/09/2023    Colorectal Cancer Screen  03/16/2032    Flu vaccine  Completed    Hepatitis C screen  Completed    Hib vaccine  Aged Out    Meningococcal (ACWY) vaccine  Aged Out

## 2022-04-20 ENCOUNTER — HOSPITAL ENCOUNTER (EMERGENCY)
Age: 57
Discharge: HOME | End: 2022-04-20
Attending: EMERGENCY MEDICINE
Payer: MEDICAID

## 2022-04-20 VITALS
HEART RATE: 68 BPM | OXYGEN SATURATION: 96 % | TEMPERATURE: 98.3 F | RESPIRATION RATE: 16 BRPM | WEIGHT: 228 LBS | BODY MASS INDEX: 30.88 KG/M2 | DIASTOLIC BLOOD PRESSURE: 62 MMHG | HEIGHT: 72 IN | SYSTOLIC BLOOD PRESSURE: 111 MMHG

## 2022-04-20 DIAGNOSIS — K62.5 RECTAL BLEEDING: Primary | ICD-10-CM

## 2022-04-20 LAB
ABSOLUTE EOS #: 0.16 K/UL (ref 0–0.4)
ABSOLUTE LYMPH #: 1.44 K/UL (ref 1–4.8)
ABSOLUTE MONO #: 0.47 K/UL (ref 0.1–1.3)
ALBUMIN SERPL-MCNC: 3.2 G/DL (ref 3.5–5.2)
ALP BLD-CCNC: 100 U/L (ref 40–129)
ALT SERPL-CCNC: 26 U/L (ref 5–41)
ANION GAP SERPL CALCULATED.3IONS-SCNC: 10 MMOL/L (ref 9–17)
AST SERPL-CCNC: 28 U/L
BASOPHILS # BLD: 0 % (ref 0–2)
BASOPHILS ABSOLUTE: 0 K/UL (ref 0–0.2)
BILIRUB SERPL-MCNC: 3.38 MG/DL (ref 0.3–1.2)
BILIRUBIN DIRECT: 0.57 MG/DL
BILIRUBIN, INDIRECT: 2.81 MG/DL (ref 0–1)
BUN BLDV-MCNC: 16 MG/DL (ref 6–20)
CALCIUM SERPL-MCNC: 8.7 MG/DL (ref 8.6–10.4)
CHLORIDE BLD-SCNC: 105 MMOL/L (ref 98–107)
CO2: 21 MMOL/L (ref 20–31)
CREAT SERPL-MCNC: 0.58 MG/DL (ref 0.7–1.2)
DATE, STOOL #1: NORMAL
EOSINOPHILS RELATIVE PERCENT: 4 % (ref 0–4)
ETHANOL PERCENT: <0.01 %
ETHANOL: <10 MG/DL
GFR AFRICAN AMERICAN: >60 ML/MIN
GFR NON-AFRICAN AMERICAN: >60 ML/MIN
GFR SERPL CREATININE-BSD FRML MDRD: ABNORMAL ML/MIN/{1.73_M2}
GLUCOSE BLD-MCNC: 361 MG/DL (ref 70–99)
HCT VFR BLD CALC: 33.1 % (ref 41–53)
HEMOCCULT SP1 STL QL: POSITIVE
HEMOGLOBIN: 11 G/DL (ref 13.5–17.5)
INR BLD: 1.3
LIPASE: 48 U/L (ref 13–60)
LYMPHOCYTES # BLD: 37 % (ref 24–44)
MAGNESIUM: 1.6 MG/DL (ref 1.6–2.6)
MCH RBC QN AUTO: 28.3 PG (ref 26–34)
MCHC RBC AUTO-ENTMCNC: 33.3 G/DL (ref 31–37)
MCV RBC AUTO: 85 FL (ref 80–100)
MONOCYTES # BLD: 12 % (ref 1–7)
MORPHOLOGY: ABNORMAL
PARTIAL THROMBOPLASTIN TIME: 32.8 SEC (ref 24–36)
PDW BLD-RTO: 15.8 % (ref 11.5–14.9)
PLATELET # BLD: 61 K/UL (ref 150–450)
PMV BLD AUTO: 7.4 FL (ref 6–12)
POTASSIUM SERPL-SCNC: 3.6 MMOL/L (ref 3.7–5.3)
PROTHROMBIN TIME: 15.8 SEC (ref 11.8–14.6)
RBC # BLD: 3.9 M/UL (ref 4.5–5.9)
SEG NEUTROPHILS: 47 % (ref 36–66)
SEGMENTED NEUTROPHILS ABSOLUTE COUNT: 1.83 K/UL (ref 1.3–9.1)
SODIUM BLD-SCNC: 136 MMOL/L (ref 135–144)
SPECIMEN DESCRIPTION: NORMAL
TIME, STOOL #1: NORMAL
TOTAL PROTEIN: 6.1 G/DL (ref 6.4–8.3)
WBC # BLD: 3.9 K/UL (ref 3.5–11)

## 2022-04-20 PROCEDURE — 36415 COLL VENOUS BLD VENIPUNCTURE: CPT

## 2022-04-20 PROCEDURE — 83735 ASSAY OF MAGNESIUM: CPT

## 2022-04-20 PROCEDURE — 85610 PROTHROMBIN TIME: CPT

## 2022-04-20 PROCEDURE — 85730 THROMBOPLASTIN TIME PARTIAL: CPT

## 2022-04-20 PROCEDURE — 80076 HEPATIC FUNCTION PANEL: CPT

## 2022-04-20 PROCEDURE — G0480 DRUG TEST DEF 1-7 CLASSES: HCPCS

## 2022-04-20 PROCEDURE — 80048 BASIC METABOLIC PNL TOTAL CA: CPT

## 2022-04-20 PROCEDURE — 83690 ASSAY OF LIPASE: CPT

## 2022-04-20 PROCEDURE — 82272 OCCULT BLD FECES 1-3 TESTS: CPT

## 2022-04-20 PROCEDURE — 85025 COMPLETE CBC W/AUTO DIFF WBC: CPT

## 2022-04-20 PROCEDURE — 99283 EMERGENCY DEPT VISIT LOW MDM: CPT

## 2022-04-20 RX ORDER — LISINOPRIL 20 MG/1
TABLET ORAL
Qty: 30 TABLET | Refills: 0 | Status: SHIPPED | OUTPATIENT
Start: 2022-04-20 | End: 2022-05-09 | Stop reason: SDUPTHER

## 2022-04-20 ASSESSMENT — PAIN DESCRIPTION - FREQUENCY: FREQUENCY: CONTINUOUS

## 2022-04-20 ASSESSMENT — ENCOUNTER SYMPTOMS
ABDOMINAL PAIN: 1
BLOOD IN STOOL: 1
COUGH: 0
BACK PAIN: 0
VOMITING: 0
DIARRHEA: 0
SHORTNESS OF BREATH: 0

## 2022-04-20 ASSESSMENT — PAIN DESCRIPTION - ORIENTATION: ORIENTATION: LOWER

## 2022-04-20 ASSESSMENT — PAIN SCALES - GENERAL: PAINLEVEL_OUTOF10: 2

## 2022-04-20 ASSESSMENT — PAIN DESCRIPTION - DESCRIPTORS: DESCRIPTORS: ACHING

## 2022-04-20 ASSESSMENT — PAIN DESCRIPTION - LOCATION: LOCATION: ABDOMEN

## 2022-04-20 NOTE — ED PROVIDER NOTES
EMERGENCY DEPARTMENT ENCOUNTER    Pt Name: Elisa Sharpe  MRN: [id number]  [place] 1965  Date of evaluation: 4/20/22  CHIEF COMPLAINT       Chief Complaint   Patient presents with    Rectal Bleeding     HISTORY OF PRESENT ILLNESS   HPI    This is a 60-year-old male with a history of alcohol liver cirrhosis who comes in today with what he thinks are blood in his stool. The patient states that he had a few episodes yesterday and a few episodes today. Patient had a colonoscopy about a month ago and they removed a polyp and he was concerned that the polyp might be bleeding. He describes some mild global abdominal pain no chest pain no shortness of breath. REVIEW OF SYSTEMS     Review of Systems   Constitutional: Negative for fever. HENT: Negative for congestion. Respiratory: Negative for cough and shortness of breath. Cardiovascular: Negative for chest pain. Gastrointestinal: Positive for abdominal pain and blood in stool. Negative for diarrhea and vomiting. Genitourinary: Negative for dysuria. Musculoskeletal: Negative for back pain. Skin: Negative for rash. Neurological: Negative for headaches. All other systems reviewed and are negative.     PASTMEDICAL HISTORY     Past Medical History:   Diagnosis Date    Calculus of kidney     Cerebral artery occlusion with cerebral infarction ([facility] 2020    slight memory problem (can't remember name of neurologist)    Cirrhosis ([facility]     sees PCP for this    COPD (chronic obstructive pulmonary disease) ([facility] [address] [facility]     does not see pulmonology    COVID-19 12/31/2021    DETECTED    Essential hypertension, benign     Gout, unspecified     H/O colonoscopy 04/11/2016    2016    Mitral valve disorders(424.0)     СЕРГЕЙ on CPAP     not currently using due to malfunctioning    Other and unspecified hyperlipidemia     Type II or unspecified type diabetes mellitus without mention of complication, not stated as uncontrolled     Unspecified chronic liver disease without mention of alcohol     Wellness examination      [facility] (last visit approx Feb 2021)     SURGICAL HISTORY       Past Surgical History:   Procedure Laterality Date    COLONOSCOPY N/A 3/16/2022    COLONOSCOPY WITH BIOPSY AND RESOLUTION CLIPPING X1 performed by Heloise Peabody, MD at [address] LITHOTRIPSY  04/02/2021    LITHOTRIPSY N/A 4/2/2021    ESWL EXTRACORPOREAL SHOCK WAVE LITHOTRIPSY  (NEX-MED CONF# [id number] - AUGUSTO) performed by Rhona Hooker MD at [facility] 5/25/2018    The esophagus was inspected to the Z-line. The endoscopic exam showed impression of varices (F1) in distal esophagus.      UPPER GASTROINTESTINAL ENDOSCOPY N/A 12/10/2021    EGD ESOPHAGOGASTRODUODENOSCOPY performed by Heloise Peabody, MD at [facility]       Discharge Medication List as of 4/20/2022  2:21 AM      CONTINUE these medications which have NOT CHANGED    Details   sertraline (ZOLOFT) 50 MG tablet Take 1 tablet by mouth daily, Disp-30 tablet, R-5Normal      ondansetron (ZOFRAN) 4 MG tablet Take 1 tablet by mouth every 8 hours as needed for Nausea or Vomiting, Disp-20 tablet, R-0Normal      SITagliptin (JANUVIA) 100 MG tablet TAKE 1 TABLET BY MOUTH DAILY, Disp-90 tablet, R-1Normal      !! atorvastatin (LIPITOR) 40 MG tablet Take 1 tablet by mouth nightly, Disp-90 tablet, R-0Normal      tamsulosin (FLOMAX) 0.4 MG capsule Take 1 capsule by mouth daily, Disp-90 capsule, R-3Normal      albuterol sulfate HFA (VENTOLIN HFA) 108 (90 Base) MCG/ACT inhaler Inhale 1 puff into the lungs every 4 hours as needed for Wheezing, Disp-18 g, R-6Normal      fluticasone-salmeterol (ADVAIR DISKUS) 100-50 MCG/DOSE diskus inhaler INHALE 1 PUFF BY MOUTH EVERY 12 HOURS, Disp-1 each, R-3Normal      glimepiride (AMARYL) 4 MG tablet TAKE 1 TABLET BY MOUTH TWICE DAILY, Disp-60 tablet, R-11Please consider 90 day supplies to promote better adherenceNormal      !! atorvastatin (LIPITOR) 40 MG tablet Take 1 tablet by mouth nightly, Disp-90 tablet, R-1Normal      omeprazole (PRILOSEC) 20 MG delayed release capsule Take 1 capsule by mouth Daily, Disp-90 capsule, R-1Normal      allopurinol (ZYLOPRIM) 100 MG tablet Take 1 tablet by mouth daily, Disp-90 tablet, R-1Normal      nadolol (CORGARD) 20 MG tablet Take 1 tablet by mouth daily, Disp-30 tablet, R-3Normal      polyethylene glycol (GLYCOLAX) 17 GM/SCOOP powder Follow instructions provided to you from physician's office. , Disp-238 g, R-0Normal      bisacodyl (BISACODYL) 5 MG EC tablet Follow instructions provided given by the physician's office. , Disp-4 tablet, R-0Normal      magnesium citrate solution Take 296 mLs by mouth once for 1 dose, Disp-296 mL, R-0Normal      blood glucose monitor kit and supplies Dispense sufficient amount for indicated testing frequency. Dispense all needed supplies to include: monitor, strips, lancing device, lancets, control solutions, alcohol swabs. Testing once daily, Disp-1 kit, R-0, Normal      !! Lancets MISC 2 TIMES DAILY Starting Thu 1/27/2022, Disp-300 each, R-1, Normal      !! blood glucose monitor strips 1 strip by Other route 2 times daily Test 2  times a day & as needed for symptoms of irregular blood glucose. Dispense sufficient amount for indicated testing frequency plus additional to accommodate PRN testing needs. , Other, 2 TIMES DAILY Starting Thu  1/27/2022, Disp-100 strip, R-11, Normal      ibuprofen (ADVIL;MOTRIN) 800 MG tablet Take 1 tablet by mouth every 8 hours as needed for Pain, Disp-30 tablet, R-0Normal      ondansetron (ZOFRAN ODT) 4 MG disintegrating tablet Take 1 tablet by mouth every 8 hours as needed for Nausea, Disp-20 tablet, R-0Normal      lisinopril (PRINIVIL;ZESTRIL) 20 MG tablet Take 1 tablet by mouth daily, Disp-30 tablet, R-3Normal      bumetanide (BUMEX) 1 MG tablet Take 1 tablet by mouth daily, Disp-90 tablet, R-1Normal      !! blood glucose test strips (ASCENSIA AUTODISC VI;ONE TOUCH ULTRA TEST VI) strip Disp-100 strip,R-11, NormalUse with associated glucose meter. !! blood glucose monitor strips 1 strip by Other route 4 times daily Test 4  times a day & as needed for symptoms of irregular blood glucose., Other, 4 TIMES DAILY Starting Fri 5/3/2019, Disp-100 strip, R-11, Normal      !! [employer] LANCETS ULTRATHIN 30G MISC 3 TIMES DAILY Starting Fri 10/19/2018, Disp-100 each, R-11, Normal       !! - Potential duplicate medications found. Please discuss with provider. ALLERGIES     is allergic to codeine and simvastatin. FAMILY HISTORY     He indicated that his mother is alive. He indicated that his father is . He indicated that both of his brothers are alive. SOCIAL HISTORY       Social History     Tobacco Use    Smoking status: Former Smoker     Packs/day: 0.50     Years: 30.00     Pack years: 15.00     Types: Cigarettes     Quit date: 3/26/2012     Years since quitting: 10.0    Smokeless tobacco: Former User   Vaping Use    Vaping Use: Never used   Substance Use Topics    Alcohol use: Not Currently     Alcohol/week: 0.0 standard drinks     Comment: used to drink \"a lot\"  None since Dec 2020    Drug use: Yes     Types: Marijuana (Weed)     Comment: every evening     PHYSICAL EXAM     INITIAL VITALS: /62   Pulse 68   Temp 98.3 °F (36.8 °C) (Oral)   Resp 16   Ht 6' (1.829 m)   Wt 228 lb (103.4 kg)   SpO2 96%   BMI 30.92 kg/m²    Physical Exam  Vitals and nursing note reviewed. Constitutional:       General: He is not in acute distress. Appearance: He is well-developed. HENT:      Head: Normocephalic and atraumatic. Eyes:      Conjunctiva/sclera: Conjunctivae normal.   Cardiovascular:      Rate and Rhythm: Normal rate and regular rhythm. Heart sounds: No murmur heard. No friction rub. Pulmonary:      Effort: Pulmonary effort is normal. No respiratory distress. Breath sounds: Normal breath sounds.  No stridor. No wheezing or rhonchi. Abdominal:      General: There is no distension. Palpations: Abdomen is soft. Tenderness: There is no abdominal tenderness. There is no guarding or rebound. Genitourinary:     Comments: Rectal exam chaperoned by Suleiman Pena RN: Light brown stool no visible blood  Musculoskeletal:      Cervical back: Neck supple. Skin:     General: Skin is warm and dry. Capillary Refill: Capillary refill takes less than 2 seconds. Neurological:      Mental Status: He is alert. DIAGNOSTIC RESULTS   RADIOLOGY:All plain film, CT, MRI, and formal ultrasound images (except ED bedside ultrasound) are read by the radiologist, see reports below, unless otherwisenoted in MDM or here. No orders to display     LABS: All lab results were reviewed by myself, and all abnormals are listed below. Labs Reviewed   CBC WITH AUTO DIFFERENTIAL - Abnormal; Notable for the following components:       Result Value    RBC 3.90 (*)     Hemoglobin 11.0 (*)     Hematocrit 33.1 (*)     RDW 15.8 (*)     Platelets 61 (*)     Monocytes 12 (*)     All other components within normal limits   BASIC METABOLIC PANEL - Abnormal; Notable for the following components:    Glucose 361 (*)     CREATININE 0.58 (*)     Potassium 3.6 (*)     All other components within normal limits   HEPATIC FUNCTION PANEL - Abnormal; Notable for the following components:    Albumin 3.2 (*)     Total Bilirubin 3.38 (*)     Bilirubin, Direct 0.57 (*)     Bilirubin, Indirect 2.81 (*)     Total Protein 6.1 (*)     All other components within normal limits   PROTIME-INR - Abnormal; Notable for the following components:    Protime 15.8 (*)     All other components within normal limits   MAGNESIUM   LIPASE   ETHANOL   APTT   BLOOD OCCULT STOOL DIAGNOSTIC   POCT OCCULT BLOOD STOOL NON CA SCREEN       EMERGENCY DEPARTMENTCOURSE:   Differential diagnosis includes diverticulosis varices hemorrhoids acute blood loss anemia.   Patient does not have bright red blood per rectum. I did review his notes from GI he had a colonoscopy on March 16 which revealed 1 polyp with some rectal varices and internal hemorrhoids. Seems to be an ongoing issue for the patient we will check his hemoglobin. 2:34 AM EDT  Patient's hemoglobin is 11.0, stable from the 9th. LFTs slightly up from before but they have been higher in the past.  Patient is not tachycardic hemodynamically stable. He will be discharged to follow up with Dr. Isaias Olivarez. Vitals:    Vitals:    04/20/22 0024   BP: 111/62   Pulse: 68   Resp: 16   Temp: 98.3 °F (36.8 °C)   TempSrc: Oral   SpO2: 96%   Weight: 228 lb (103.4 kg)   Height: 6' (1.829 m)         FINAL IMPRESSION      1.  Rectal bleeding         DISPOSITION/PLAN   DISPOSITION Decision To Discharge 04/20/2022 02:20:54 AM      PATIENT REFERRED TO:  Anthony Sandoval MD  [address] [place] [id number]  [phone]    Schedule an appointment as soon as possible for a visit     Soo Kamara MD  Attending Emergency Physician    This charting supersedes any ED resident or staff charting and was written using speech recognition software        Soo Kamara MD  04/20/22 0282

## 2022-04-29 ENCOUNTER — OFFICE VISIT (OUTPATIENT)
Dept: INTERNAL MEDICINE CLINIC | Age: 57
End: 2022-04-29
Payer: MEDICAID

## 2022-04-29 VITALS
BODY MASS INDEX: 30.83 KG/M2 | HEIGHT: 72 IN | SYSTOLIC BLOOD PRESSURE: 118 MMHG | OXYGEN SATURATION: 98 % | WEIGHT: 227.6 LBS | DIASTOLIC BLOOD PRESSURE: 64 MMHG | HEART RATE: 69 BPM

## 2022-04-29 DIAGNOSIS — E11.69 HYPERLIPIDEMIA ASSOCIATED WITH TYPE 2 DIABETES MELLITUS (HCC): Primary | ICD-10-CM

## 2022-04-29 DIAGNOSIS — J44.9 CHRONIC OBSTRUCTIVE PULMONARY DISEASE, UNSPECIFIED COPD TYPE (HCC): ICD-10-CM

## 2022-04-29 DIAGNOSIS — E11.65 TYPE 2 DIABETES MELLITUS WITH HYPERGLYCEMIA, WITHOUT LONG-TERM CURRENT USE OF INSULIN (HCC): ICD-10-CM

## 2022-04-29 DIAGNOSIS — F32.A DEPRESSION, UNSPECIFIED DEPRESSION TYPE: ICD-10-CM

## 2022-04-29 DIAGNOSIS — D69.6 THROMBOCYTOPENIA (HCC): ICD-10-CM

## 2022-04-29 DIAGNOSIS — E78.5 HYPERLIPIDEMIA ASSOCIATED WITH TYPE 2 DIABETES MELLITUS (HCC): Primary | ICD-10-CM

## 2022-04-29 PROCEDURE — 2022F DILAT RTA XM EVC RTNOPTHY: CPT | Performed by: INTERNAL MEDICINE

## 2022-04-29 PROCEDURE — 3023F SPIROM DOC REV: CPT | Performed by: INTERNAL MEDICINE

## 2022-04-29 PROCEDURE — 3017F COLORECTAL CA SCREEN DOC REV: CPT | Performed by: INTERNAL MEDICINE

## 2022-04-29 PROCEDURE — 99214 OFFICE O/P EST MOD 30 MIN: CPT | Performed by: INTERNAL MEDICINE

## 2022-04-29 PROCEDURE — G8427 DOCREV CUR MEDS BY ELIG CLIN: HCPCS | Performed by: INTERNAL MEDICINE

## 2022-04-29 PROCEDURE — 1036F TOBACCO NON-USER: CPT | Performed by: INTERNAL MEDICINE

## 2022-04-29 PROCEDURE — G8417 CALC BMI ABV UP PARAM F/U: HCPCS | Performed by: INTERNAL MEDICINE

## 2022-04-29 PROCEDURE — 3046F HEMOGLOBIN A1C LEVEL >9.0%: CPT | Performed by: INTERNAL MEDICINE

## 2022-04-29 ASSESSMENT — ENCOUNTER SYMPTOMS
COLOR CHANGE: 0
BLOOD IN STOOL: 1
WHEEZING: 0
CONSTIPATION: 0
ABDOMINAL DISTENTION: 0
ABDOMINAL PAIN: 0
COUGH: 0
CHEST TIGHTNESS: 0
DIARRHEA: 0
BACK PAIN: 0
APNEA: 0
SHORTNESS OF BREATH: 0
FACIAL SWELLING: 0

## 2022-04-29 NOTE — PROGRESS NOTES
Subjective:      Patient ID: Carlos Britt is a 65 y.o. male. HPI-patient is here for evaluation of multiple medical problem. He has diabetes, BPH, GERD, cirrhosis of liver, depression  Patient was evaluated in the office couple of weeks ago with depression, he was started on Zoloft. He feels that his depression is much controlled, patient was recently in emergency room, with rectal bleeding. Patient hemoglobin was stable  Patient had colonoscopy on 3/16 which was suggestive of colonic polyp  Biopsy results was suggestive tubular adenoma  Patient told me that he does not have any bleeding since he visited emergency room  His depression is much controlled  Very happy with the results of Zoloft  No other complaints    Review of Systems   Constitutional: Negative for activity change, appetite change, chills and diaphoresis. HENT: Negative for congestion, dental problem, ear discharge, facial swelling and hearing loss. Respiratory: Negative for apnea, cough, chest tightness, shortness of breath and wheezing. Cardiovascular: Negative for chest pain and leg swelling. Gastrointestinal: Positive for blood in stool (Improving). Negative for abdominal distention, abdominal pain, constipation and diarrhea. Genitourinary: Negative for difficulty urinating, dysuria, enuresis, flank pain and frequency. Musculoskeletal: Negative for arthralgias, back pain, gait problem and joint swelling. Skin: Negative for color change, pallor and rash. Neurological: Negative for dizziness, seizures, facial asymmetry, light-headedness, numbness and headaches. Psychiatric/Behavioral: Positive for dysphoric mood (Improve). Negative for agitation, behavioral problems, confusion and decreased concentration. Objective:   Physical Exam  Vitals and nursing note reviewed. Constitutional:       General: He is not in acute distress. Appearance: He is well-developed. He is not diaphoretic.    HENT:      Head: Normocephalic and atraumatic. Mouth/Throat:      Pharynx: No oropharyngeal exudate. Eyes:      General: No scleral icterus. Right eye: No discharge. Left eye: No discharge. Conjunctiva/sclera: Conjunctivae normal.      Pupils: Pupils are equal, round, and reactive to light. Neck:      Thyroid: No thyromegaly. Vascular: No JVD. Trachea: No tracheal deviation. Cardiovascular:      Rate and Rhythm: Normal rate. Heart sounds: Normal heart sounds. No murmur heard. No gallop. Pulmonary:      Effort: Pulmonary effort is normal. No respiratory distress. Breath sounds: Normal breath sounds. No stridor. No wheezing or rales. Abdominal:      General: Bowel sounds are normal. There is no distension. Palpations: Abdomen is soft. Tenderness: There is no abdominal tenderness. There is no guarding or rebound. Musculoskeletal:         General: No tenderness. Normal range of motion. Cervical back: Normal range of motion and neck supple. Skin:     General: Skin is warm and dry. Findings: No erythema or rash. Neurological:      Mental Status: He is alert and oriented to person, place, and time. Assessment / Plan:   1. Hyperlipidemia associated with type 2 diabetes mellitus (HCC)  Stable    2. Chronic obstructive pulmonary disease, unspecified COPD type ([address]  Compensated    3. 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 Type 2 diabetes mellitus with hyperglycemia, without long-term current use of insulin (HCC)  Does not check his blood sugars regularly, last HbA1c was 7.8    5. Depression, unspecified depression type  Getting better with Zoloft      · Return in about 3 months (around 7/29/2022). · Reviewed prior labs and health maintenance. · Discussed use, benefit, and side effects of prescribed medications. Barriers to medication compliance addressed. All patient questions answered. Pt voiced understanding.          [place] Colorectal Cancer Screen  03/16/2032    Flu vaccine  Completed    Hepatitis C screen  Completed    Hib vaccine  Aged Out    Meningococcal (ACWY) vaccine  Aged Out

## 2022-05-09 DIAGNOSIS — M10.9 GOUT, UNSPECIFIED CAUSE, UNSPECIFIED CHRONICITY, UNSPECIFIED SITE: ICD-10-CM

## 2022-05-09 DIAGNOSIS — E78.5 HYPERLIPIDEMIA ASSOCIATED WITH TYPE 2 DIABETES MELLITUS (HCC): ICD-10-CM

## 2022-05-09 DIAGNOSIS — E11.65 TYPE 2 DIABETES MELLITUS WITH HYPERGLYCEMIA, WITHOUT LONG-TERM CURRENT USE OF INSULIN (HCC): ICD-10-CM

## 2022-05-09 DIAGNOSIS — J44.9 CHRONIC OBSTRUCTIVE PULMONARY DISEASE, UNSPECIFIED COPD TYPE (HCC): ICD-10-CM

## 2022-05-09 DIAGNOSIS — K76.9 CHRONIC LIVER DISEASE: ICD-10-CM

## 2022-05-09 DIAGNOSIS — E11.69 HYPERLIPIDEMIA ASSOCIATED WITH TYPE 2 DIABETES MELLITUS (HCC): ICD-10-CM

## 2022-05-09 DIAGNOSIS — K21.9 GASTROESOPHAGEAL REFLUX DISEASE WITHOUT ESOPHAGITIS: ICD-10-CM

## 2022-05-09 DIAGNOSIS — F32.A DEPRESSION, UNSPECIFIED DEPRESSION TYPE: ICD-10-CM

## 2022-05-09 RX ORDER — ALBUTEROL SULFATE 90 UG/1
1 AEROSOL, METERED RESPIRATORY (INHALATION) EVERY 4 HOURS PRN
Qty: 18 G | Refills: 6 | Status: SHIPPED | OUTPATIENT
Start: 2022-05-09 | End: 2022-07-26 | Stop reason: SDUPTHER

## 2022-05-09 RX ORDER — OMEPRAZOLE 20 MG/1
20 CAPSULE, DELAYED RELEASE ORAL DAILY
Qty: 90 CAPSULE | Refills: 1 | Status: SHIPPED | OUTPATIENT
Start: 2022-05-09

## 2022-05-09 RX ORDER — LISINOPRIL 20 MG/1
20 TABLET ORAL DAILY
Qty: 90 TABLET | Refills: 0 | Status: SHIPPED | OUTPATIENT
Start: 2022-05-09 | End: 2022-07-19

## 2022-05-09 RX ORDER — NADOLOL 20 MG/1
20 TABLET ORAL DAILY
Qty: 30 TABLET | Refills: 3 | Status: SHIPPED | OUTPATIENT
Start: 2022-05-09 | End: 2022-07-26 | Stop reason: SDUPTHER

## 2022-05-09 RX ORDER — ATORVASTATIN CALCIUM 40 MG/1
40 TABLET, FILM COATED ORAL NIGHTLY
Qty: 90 TABLET | Refills: 0 | Status: SHIPPED | OUTPATIENT
Start: 2022-05-09 | End: 2022-05-20

## 2022-05-09 RX ORDER — ALLOPURINOL 100 MG/1
100 TABLET ORAL DAILY
Qty: 90 TABLET | Refills: 1 | Status: SHIPPED | OUTPATIENT
Start: 2022-05-09 | End: 2022-07-26 | Stop reason: SDUPTHER

## 2022-05-09 RX ORDER — TAMSULOSIN HYDROCHLORIDE 0.4 MG/1
0.4 CAPSULE ORAL DAILY
Qty: 90 CAPSULE | Refills: 3 | Status: SHIPPED | OUTPATIENT
Start: 2022-05-09 | End: 2022-07-26 | Stop reason: SDUPTHER

## 2022-05-09 RX ORDER — GLIMEPIRIDE 4 MG/1
TABLET ORAL
Qty: 60 TABLET | Refills: 11 | Status: SHIPPED | OUTPATIENT
Start: 2022-05-09 | End: 2022-07-26 | Stop reason: SDUPTHER

## 2022-05-09 RX ORDER — ALBUTEROL SULFATE 90 UG/1
1 AEROSOL, METERED RESPIRATORY (INHALATION) EVERY 4 HOURS PRN
Qty: 18 G | Refills: 6 | OUTPATIENT
Start: 2022-05-09

## 2022-05-20 ENCOUNTER — OFFICE VISIT (OUTPATIENT)
Dept: DERMATOLOGY | Age: 57
End: 2022-05-20
Payer: MEDICAID

## 2022-05-20 VITALS
TEMPERATURE: 97.1 F | WEIGHT: 224 LBS | HEART RATE: 74 BPM | BODY MASS INDEX: 30.34 KG/M2 | SYSTOLIC BLOOD PRESSURE: 145 MMHG | OXYGEN SATURATION: 95 % | HEIGHT: 72 IN | DIASTOLIC BLOOD PRESSURE: 77 MMHG

## 2022-05-20 DIAGNOSIS — L28.1 PRURIGO NODULARIS: ICD-10-CM

## 2022-05-20 DIAGNOSIS — L72.0 EPIDERMOID CYST: ICD-10-CM

## 2022-05-20 DIAGNOSIS — D48.5 NEOPLASM OF UNCERTAIN BEHAVIOR OF SKIN: Primary | ICD-10-CM

## 2022-05-20 DIAGNOSIS — L81.4 LENTIGINES: ICD-10-CM

## 2022-05-20 PROCEDURE — G8417 CALC BMI ABV UP PARAM F/U: HCPCS | Performed by: PHYSICIAN ASSISTANT

## 2022-05-20 PROCEDURE — 11102 TANGNTL BX SKIN SINGLE LES: CPT | Performed by: PHYSICIAN ASSISTANT

## 2022-05-20 PROCEDURE — 3017F COLORECTAL CA SCREEN DOC REV: CPT | Performed by: PHYSICIAN ASSISTANT

## 2022-05-20 PROCEDURE — 99203 OFFICE O/P NEW LOW 30 MIN: CPT | Performed by: PHYSICIAN ASSISTANT

## 2022-05-20 PROCEDURE — G8427 DOCREV CUR MEDS BY ELIG CLIN: HCPCS | Performed by: PHYSICIAN ASSISTANT

## 2022-05-20 PROCEDURE — 1036F TOBACCO NON-USER: CPT | Performed by: PHYSICIAN ASSISTANT

## 2022-05-20 RX ORDER — LIDOCAINE HYDROCHLORIDE AND EPINEPHRINE 10; 10 MG/ML; UG/ML
0.5 INJECTION, SOLUTION INFILTRATION; PERINEURAL ONCE
Status: SHIPPED | OUTPATIENT
Start: 2022-05-20

## 2022-05-20 NOTE — PATIENT INSTRUCTIONS
Sun Protection     There are two types of sun rays that are harmful to the skin. UVA rays cause skin aging and skin cancer, such as melanoma. UVB rays cause sunburns, cataracts, and also contribute to skin cancer. The American-Academy of Dermatology recommends that children and adults wear a broad spectrum, waterproof sunscreen with a Sun Protection Factor (SPF) of 30 or higher. It is important to check the ingredient label to be sure the sunscreen will protect the skin from both UVA and UVB sunrays. Your sunscreen should contain at least one of the following ingredients: titanium dioxide, zinc oxide, or avobenzone. Sunscreen will not be effective unless it is applied to all exposed skin. Sunscreens work best if they are applied 30 minutes before sun exposure. They should be reapplied every 2 hours and after any water exposure. Sunscreen is not perfect. It is important to use other methods to protect the skin from sun exposure also. Wear hats, sunglasses and other sun protective clothing when outdoors. Stay in the shade during the peak hours of sun exposure between 10 AM and 4 PM.  BIOPSY WOUND CARE    A biopsy is where a small piece of skin tissue is removed and examined by a pathologist.  When a biopsy is done, there is a small wound site that requires proper care to prevent infection and scarring. Some biopsies require sutures and their removal.    How to Care for Biopsy Wound    A.  Leave band-aid or dressing on for 24 hours. B. Wash two times a day with soap and water. C.  Let the wound air dry, then apply Vaseline ointment and cover with a Band-Aid       unless otherwise instructed by your provider. D. If there is slight discomfort, you may give acetaminophen or ibuprofen. When To Call the Doctor    Call the Dermatology Clinic or your doctor if any of the following occur:    A. Redness and swelling  B. Tenderness and warm to touch  C.  Drainage from wound  D.   Fever    Biopsy Results    Biopsy results are usually available in 1-2 weeks. We provide biopsy results in letters or via vivitt for benign results or we will call for any concerning results. If you have not heard from our staff please call the office within 2 weeks. Please call our office with any concerns at [phone].

## 2022-05-23 ENCOUNTER — NURSE ONLY (OUTPATIENT)
Dept: LAB | Age: 57
End: 2022-05-23

## 2022-05-25 ENCOUNTER — TELEPHONE (OUTPATIENT)
Dept: INTERNAL MEDICINE CLINIC | Age: 57
End: 2022-05-25

## 2022-05-25 NOTE — TELEPHONE ENCOUNTER
Shave biopsy of nasal bridge, suggestive of basal cell carcinoma, patient need to follow-up with his skin doctor who did biopsy on 5/20 to discuss treatment options

## 2022-05-26 ENCOUNTER — TELEPHONE (OUTPATIENT)
Dept: DERMATOLOGY | Age: 57
End: 2022-05-26

## 2022-05-26 NOTE — TELEPHONE ENCOUNTER
Spoke to patients daughter and informed her of results.  Referral was sent to [facility]

## 2022-05-31 RX ORDER — BUMETANIDE 1 MG/1
1 TABLET ORAL DAILY
Qty: 90 TABLET | Refills: 1 | Status: SHIPPED | OUTPATIENT
Start: 2022-05-31

## 2022-06-09 ENCOUNTER — TELEPHONE (OUTPATIENT)
Dept: INTERNAL MEDICINE CLINIC | Age: 57
End: 2022-06-09

## 2022-06-09 DIAGNOSIS — K21.9 GASTROESOPHAGEAL REFLUX DISEASE WITHOUT ESOPHAGITIS: Primary | ICD-10-CM

## 2022-06-09 NOTE — TELEPHONE ENCOUNTER
Dr Severino Ramsey is leaving the GI practice. He would like a referral to Dr Waleska Byrd or Dr Efren Bob please.   Not Dr Ravin Christie

## 2022-06-15 ENCOUNTER — TELEPHONE (OUTPATIENT)
Dept: INTERNAL MEDICINE CLINIC | Age: 57
End: 2022-06-15

## 2022-06-15 ENCOUNTER — TELEPHONE (OUTPATIENT)
Dept: GASTROENTEROLOGY | Age: 57
End: 2022-06-15

## 2022-06-15 DIAGNOSIS — C44.91 NODULAR BASAL CELL CARCINOMA (BCC): Primary | ICD-10-CM

## 2022-06-15 NOTE — TELEPHONE ENCOUNTER
Please check with the pharmacy has any other strength of Bumex available for example 0.5 mg or 2 mg tablets, if not let me know and I will send a prescription of equivalent  dose of Arben Vivas Caro, MD

## 2022-06-15 NOTE — TELEPHONE ENCOUNTER
Patients order for Bumex is on back order at the pharmacy currently, Please advise PCP out of office

## 2022-06-15 NOTE — TELEPHONE ENCOUNTER
Est. 05/2018/Bleibel/Gastroesophageal reflux disease without esophagitis/Romie    1st attempt- Called and LVM to call office to uzma an appt. 2nd attempt- Letter sent.

## 2022-06-15 NOTE — TELEPHONE ENCOUNTER
Pharmacy had enough of the .5mg tablets for a months supple to give to the patient, they changed script over and stated that hopefully they will be able to fill the normal 1mg dosage next month for him

## 2022-07-19 RX ORDER — LISINOPRIL 20 MG/1
TABLET ORAL
Qty: 30 TABLET | Refills: 0 | Status: SHIPPED | OUTPATIENT
Start: 2022-07-19 | End: 2022-08-18 | Stop reason: SDUPTHER

## 2022-07-26 ENCOUNTER — APPOINTMENT (OUTPATIENT)
Dept: GENERAL RADIOLOGY | Age: 57
End: 2022-07-26
Payer: MEDICAID

## 2022-07-26 ENCOUNTER — HOSPITAL ENCOUNTER (EMERGENCY)
Age: 57
Discharge: HOME | End: 2022-07-26
Attending: STUDENT IN AN ORGANIZED HEALTH CARE EDUCATION/TRAINING PROGRAM
Payer: MEDICAID

## 2022-07-26 VITALS
HEART RATE: 77 BPM | OXYGEN SATURATION: 96 % | BODY MASS INDEX: 31.19 KG/M2 | RESPIRATION RATE: 18 BRPM | SYSTOLIC BLOOD PRESSURE: 121 MMHG | TEMPERATURE: 98.4 F | DIASTOLIC BLOOD PRESSURE: 79 MMHG | WEIGHT: 230 LBS

## 2022-07-26 DIAGNOSIS — J44.9 CHRONIC OBSTRUCTIVE PULMONARY DISEASE, UNSPECIFIED COPD TYPE (HCC): ICD-10-CM

## 2022-07-26 DIAGNOSIS — M10.9 GOUT, UNSPECIFIED CAUSE, UNSPECIFIED CHRONICITY, UNSPECIFIED SITE: ICD-10-CM

## 2022-07-26 DIAGNOSIS — R07.9 CHEST PAIN, UNSPECIFIED TYPE: Primary | ICD-10-CM

## 2022-07-26 DIAGNOSIS — K76.9 CHRONIC LIVER DISEASE: ICD-10-CM

## 2022-07-26 LAB
ABSOLUTE EOS #: 0.1 K/UL (ref 0–0.4)
ABSOLUTE LYMPH #: 1 K/UL (ref 1–4.8)
ABSOLUTE MONO #: 0.4 K/UL (ref 0.1–1.3)
ANION GAP SERPL CALCULATED.3IONS-SCNC: 11 MMOL/L (ref 9–17)
BASOPHILS # BLD: 1 % (ref 0–2)
BASOPHILS ABSOLUTE: 0 K/UL (ref 0–0.2)
BUN BLDV-MCNC: 12 MG/DL (ref 6–20)
CALCIUM SERPL-MCNC: 9.1 MG/DL (ref 8.6–10.4)
CHLORIDE BLD-SCNC: 103 MMOL/L (ref 98–107)
CO2: 23 MMOL/L (ref 20–31)
CREAT SERPL-MCNC: 0.7 MG/DL (ref 0.7–1.2)
EKG ATRIAL RATE: 82 BPM
EKG P AXIS: 57 DEGREES
EKG P-R INTERVAL: 156 MS
EKG Q-T INTERVAL: 392 MS
EKG QRS DURATION: 92 MS
EKG QTC CALCULATION (BAZETT): 457 MS
EKG R AXIS: -29 DEGREES
EKG T AXIS: 4 DEGREES
EKG VENTRICULAR RATE: 82 BPM
EOSINOPHILS RELATIVE PERCENT: 2 % (ref 0–4)
GFR AFRICAN AMERICAN: >60 ML/MIN
GFR NON-AFRICAN AMERICAN: >60 ML/MIN
GFR SERPL CREATININE-BSD FRML MDRD: ABNORMAL ML/MIN/{1.73_M2}
GLUCOSE BLD-MCNC: 356 MG/DL (ref 70–99)
HCT VFR BLD CALC: 35.5 % (ref 41–53)
HEMOGLOBIN: 12 G/DL (ref 13.5–17.5)
LYMPHOCYTES # BLD: 27 % (ref 24–44)
MCH RBC QN AUTO: 29 PG (ref 26–34)
MCHC RBC AUTO-ENTMCNC: 33.7 G/DL (ref 31–37)
MCV RBC AUTO: 86.1 FL (ref 80–100)
MONOCYTES # BLD: 11 % (ref 1–7)
PDW BLD-RTO: 16.3 % (ref 11.5–14.9)
PLATELET # BLD: 82 K/UL (ref 150–450)
PMV BLD AUTO: 9.9 FL (ref 6–12)
POTASSIUM SERPL-SCNC: 3.8 MMOL/L (ref 3.7–5.3)
RBC # BLD: 4.13 M/UL (ref 4.5–5.9)
SEG NEUTROPHILS: 59 % (ref 36–66)
SEGMENTED NEUTROPHILS ABSOLUTE COUNT: 2.2 K/UL (ref 1.3–9.1)
SODIUM BLD-SCNC: 137 MMOL/L (ref 135–144)
TROPONIN, HIGH SENSITIVITY: 9 NG/L (ref 0–22)
TROPONIN, HIGH SENSITIVITY: 9 NG/L (ref 0–22)
WBC # BLD: 3.6 K/UL (ref 3.5–11)

## 2022-07-26 PROCEDURE — 71046 X-RAY EXAM CHEST 2 VIEWS: CPT

## 2022-07-26 PROCEDURE — 85025 COMPLETE CBC W/AUTO DIFF WBC: CPT

## 2022-07-26 PROCEDURE — 93005 ELECTROCARDIOGRAM TRACING: CPT | Performed by: STUDENT IN AN ORGANIZED HEALTH CARE EDUCATION/TRAINING PROGRAM

## 2022-07-26 PROCEDURE — 80048 BASIC METABOLIC PNL TOTAL CA: CPT

## 2022-07-26 PROCEDURE — 93010 ELECTROCARDIOGRAM REPORT: CPT | Performed by: INTERNAL MEDICINE

## 2022-07-26 PROCEDURE — 6370000000 HC RX 637 (ALT 250 FOR IP): Performed by: STUDENT IN AN ORGANIZED HEALTH CARE EDUCATION/TRAINING PROGRAM

## 2022-07-26 PROCEDURE — 99285 EMERGENCY DEPT VISIT HI MDM: CPT

## 2022-07-26 PROCEDURE — 84484 ASSAY OF TROPONIN QUANT: CPT

## 2022-07-26 PROCEDURE — 36415 COLL VENOUS BLD VENIPUNCTURE: CPT

## 2022-07-26 RX ORDER — ALLOPURINOL 100 MG/1
100 TABLET ORAL DAILY
Qty: 90 TABLET | Refills: 1 | Status: SHIPPED | OUTPATIENT
Start: 2022-07-26

## 2022-07-26 RX ORDER — ASPIRIN 81 MG/1
324 TABLET, CHEWABLE ORAL ONCE
Status: COMPLETED | OUTPATIENT
Start: 2022-07-26 | End: 2022-07-26

## 2022-07-26 RX ORDER — TAMSULOSIN HYDROCHLORIDE 0.4 MG/1
0.4 CAPSULE ORAL DAILY
Qty: 90 CAPSULE | Refills: 3 | Status: SHIPPED | OUTPATIENT
Start: 2022-07-26

## 2022-07-26 RX ORDER — NADOLOL 20 MG/1
20 TABLET ORAL DAILY
Qty: 30 TABLET | Refills: 3 | Status: SHIPPED | OUTPATIENT
Start: 2022-07-26

## 2022-07-26 RX ORDER — GLIMEPIRIDE 4 MG/1
TABLET ORAL
Qty: 60 TABLET | Refills: 11 | Status: SHIPPED | OUTPATIENT
Start: 2022-07-26

## 2022-07-26 RX ORDER — ALBUTEROL SULFATE 90 UG/1
1 AEROSOL, METERED RESPIRATORY (INHALATION) EVERY 4 HOURS PRN
Qty: 18 G | Refills: 6 | Status: SHIPPED | OUTPATIENT
Start: 2022-07-26

## 2022-07-26 RX ADMIN — ASPIRIN 324 MG: 81 TABLET, CHEWABLE ORAL at 04:17

## 2022-07-26 ASSESSMENT — ENCOUNTER SYMPTOMS
ABDOMINAL PAIN: 0
SHORTNESS OF BREATH: 0
NAUSEA: 0
COUGH: 0
RHINORRHEA: 0
BACK PAIN: 0
VOMITING: 0

## 2022-07-26 ASSESSMENT — PAIN SCALES - GENERAL: PAINLEVEL_OUTOF10: 4

## 2022-07-26 ASSESSMENT — PAIN - FUNCTIONAL ASSESSMENT: PAIN_FUNCTIONAL_ASSESSMENT: 0-10

## 2022-07-26 NOTE — ED PROVIDER NOTES
[facility]  Emergency Department Encounter  Emergency Medicine Physician     Pt Name: Nya Melendez  MRN: [id number]  [place] 1965  Date of evaluation: 7/26/22  PCP:  Zainab Davidson MD    [address] [place] [address]       Chief Complaint   Patient presents with    Chest Pain    Arm Pain       HISTORY OF PRESENT ILLNESS  (Location/Symptom, Timing/Onset, Context/Setting, Quality, Duration, Modifying Factors, Severity.)    Nya Melendez is a 65 y.o. male who presents with chest pain and intermittent left arm pain. Patient states that he got an argument with his future son-in-law via text messaging earlier tonight. Patient states that he got angry and then began having some centralized chest pain that radiated up to his left chest and into his left arm. He states that he has been having intermittent left arm pain this past week. Denies any shortness of breath, nausea, vomiting. He states there is a history of mitral valve prolapse, hyperlipidemia, hypertension, diabetes. Denies any history of coronary artery disease or any stents. No history of bypass. He states that currently he does not have any chest pain. Did not take any aspirin today. PAST MEDICAL / SURGICAL / SOCIAL / FAMILY HISTORY    has a past medical history of Calculus of kidney, Cerebral artery occlusion with cerebral infarction ([address] Cirrhosis ([address] COPD (chronic obstructive pulmonary disease) ([address] COVID-19, Essential hypertension, benign, Gout, unspecified, H/O colonoscopy, Mitral valve disorders(424.0), СЕРГЕЙ on CPAP, Other and unspecified hyperlipidemia, Type II or unspecified type diabetes mellitus without mention of complication, not stated as uncontrolled, Unspecified chronic liver disease without mention of alcohol, and Wellness examination. has a past surgical history that includes Lithotripsy; Upper gastrointestinal endoscopy (N/A, 5/25/2018); Lithotripsy (04/02/2021); Lithotripsy (N/A, 4/2/2021);  Upper gastrointestinal endoscopy (N/A, 12/10/2021); and Colonoscopy (N/A, 3/16/2022). Social History     Socioeconomic History    Marital status:      Spouse name: Not on file    Number of children: Not on file    Years of education: Not on file    Highest education level: Not on file   Occupational History    Not on file   Tobacco Use    Smoking status: Former     Packs/day: 0.50     Years: 30.00     Pack years: 15.00     Types: Cigarettes, E-Cigarettes     Quit date: 3/26/2012     Years since quitting: 10.3    Smokeless tobacco: Former   Vaping Use    Vaping Use: Never used   Substance and Sexual Activity    Alcohol use: Not Currently     Alcohol/week: 0.0 standard drinks     Comment: used to drink \"a lot\"  None since Dec 2020    Drug use: Yes     Types: Marijuana (Weed)     Comment: every evening    Sexual activity: Not on file   Other Topics Concern    Not on file   Social History Narrative    Not on file     Social Determinants of Health     Financial Resource Strain: Not on file   Food Insecurity: Not on file   Transportation Needs: Not on file   Physical Activity: Not on file   Stress: Not on file   Social Connections: Not on file   Intimate Partner Violence: Not on file   Housing Stability: Not on file       Family History   Problem Relation Age of Onset    No Known Problems Brother     Diabetes Mother     Heart Attack Mother     Heart Disease Father     Diabetes Brother     Heart Disease Brother        Allergies:    Codeine and Simvastatin    Home Medications:  Prior to Admission medications    Medication Sig Start Date End Date Taking?  Authorizing Provider   lisinopril (PRINIVIL;ZESTRIL) 20 MG tablet Take 1 tablet by mouth once daily 7/19/22   Christ Ritchie MD   bumetanide (BUMEX) 1 MG tablet Take 1 tablet by mouth daily 5/31/22   Zainab Davidson MD   tamsulosin (FLOMAX) 0.4 MG capsule Take 1 capsule by mouth daily 5/9/22   Zainab Davidson MD   albuterol sulfate HFA (VENTOLIN HFA) 108 (90 Base) MCG/ACT inhaler Inhale 1 puff into the lungs every 4 hours as needed for Wheezing 5/9/22   Bart Russell MD   fluticasone-salmeterol (ADVAIR) 100-50 MCG/DOSE diskus inhaler INHALE 1 PUFF BY MOUTH EVERY 12 HOURS 5/9/22   Bart Russell MD   glimepiride (AMARYL) 4 MG tablet TAKE 1 TABLET BY MOUTH TWICE DAILY 5/9/22   Bart Russell MD   omeprazole (PRILOSEC) 20 MG delayed release capsule Take 1 capsule by mouth Daily 5/9/22   Bart Russell MD   allopurinol (ZYLOPRIM) 100 MG tablet Take 1 tablet by mouth daily 5/9/22   Bart Russell MD   nadolol (CORGARD) 20 MG tablet Take 1 tablet by mouth daily 5/9/22   Bart Russell MD   sertraline (ZOLOFT) 50 MG tablet Take 1 tablet by mouth daily 5/9/22   Bart Russell MD   SITagliptin (JANUVIA) 100 MG tablet TAKE 1 TABLET BY MOUTH DAILY 5/9/22   Bart Russell MD   atorvastatin (LIPITOR) 40 MG tablet Take 1 tablet by mouth nightly 2/25/22   Michelle Valderrama MD   polyethylene glycol (GLYCOLAX) 17 GM/SCOOP powder Follow instructions provided to you from physician's office. 2/22/22   Arely Hill MD   bisacodyl (BISACODYL) 5 MG EC tablet Follow instructions provided given by the physician's office. 2/22/22   Arely Hill MD   blood glucose monitor kit and supplies Dispense sufficient amount for indicated testing frequency. Dispense all needed supplies to include: monitor, strips, lancing device, lancets, control solutions, alcohol swabs. Testing once daily 1/28/22   Katlin Mccoy MD   Lancets MISC 1 each by Does not apply route 2 times daily 1/27/22   Bart Russell MD   blood glucose monitor strips 1 strip by Other route 2 times daily Test 2  times a day & as needed for symptoms of irregular blood glucose. Dispense sufficient amount for indicated testing frequency plus additional to accommodate PRN testing needs.  1/27/22   Bart Russell MD   metoprolol tartrate (LOPRESSOR) 25 MG tablet Take 1 tablet by mouth 2 times daily 4/6/21   Bart Russell MD   blood glucose test strips (ASCENSIA AUTODISC VI;ONE TOUCH ULTRA TEST VI) strip Use with associated glucose meter. 7/22/20   Fidencio Mendiola PA-C   blood glucose monitor strips 1 strip by Other route 4 times daily Test 4  times a day & as needed for symptoms of irregular blood glucose. 5/3/19   Kenn Arboleda MD   [employer] LANCETS ULTRATHIN 30G MISC 1 each by Other route 3 times daily 10/19/18   Kenn Arboleda MD       REVIEW OF SYSTEMS    (2-9 systems for level 4, 10 or more for level 5)    Review of Systems   Constitutional:  Negative for chills, fatigue and fever. HENT:  Negative for congestion and rhinorrhea. Respiratory:  Negative for cough and shortness of breath. Cardiovascular:  Positive for chest pain. Gastrointestinal:  Negative for abdominal pain, nausea and vomiting. Genitourinary:  Negative for dysuria and flank pain. Musculoskeletal:  Negative for back pain. Skin:  Negative for pallor. Neurological:  Negative for light-headedness and headaches. All other systems reviewed and are negative. PHYSICAL EXAM   (up to 7 for level 4, 8 or more for level 5)    INITIAL VITALS:   ED Triage Vitals [07/26/22 0241]   BP Temp Temp Source Heart Rate Resp SpO2 Height Weight   (!) 152/81 98.4 °F (36.9 °C) Oral 88 15 98 % -- 230 lb (104.3 kg)       Physical Exam  Vitals and nursing note reviewed. Constitutional:       General: He is not in acute distress. Appearance: Normal appearance. He is not ill-appearing. Cardiovascular:      Rate and Rhythm: Normal rate and regular rhythm. Pulses: Normal pulses. Radial pulses are 2+ on the right side and 2+ on the left side. Posterior tibial pulses are 2+ on the right side and 2+ on the left side. Heart sounds: Normal heart sounds. No murmur heard. Pulmonary:      Effort: Pulmonary effort is normal. No tachypnea or respiratory distress. Breath sounds: Normal breath sounds. No decreased breath sounds, wheezing, rhonchi or rales. Abdominal:      Palpations: Abdomen is soft. Tenderness: There is no abdominal tenderness. Musculoskeletal:      Right lower leg: No edema. Left lower leg: No edema. Skin:     General: Skin is warm and dry. Capillary Refill: Capillary refill takes less than 2 seconds. Neurological:      General: No focal deficit present. Mental Status: He is alert and oriented to person, place, and time. DIFFERENTIAL  DIAGNOSIS   PLAN (LABS / IMAGING / EKG):  Orders Placed This Encounter   Procedures    XR CHEST (2 VW)    BMP    CBC with Auto Differential    Troponin    Troponin    EKG 12 Lead       MEDICATIONS ORDERED:  Orders Placed This Encounter   Medications    aspirin chewable tablet 324 mg         DIAGNOSTIC RESULTS / EMERGENCYDEPARTMENT COURSE / MDM   LABS:  Labs Reviewed   BASIC METABOLIC PANEL - Abnormal; Notable for the following components:       Result Value    Glucose 356 (*)     All other components within normal limits   CBC WITH AUTO DIFFERENTIAL - Abnormal; Notable for the following components:    RBC 4.13 (*)     Hemoglobin 12.0 (*)     Hematocrit 35.5 (*)     RDW 16.3 (*)     Platelets 82 (*)     Monocytes 11 (*)     All other components within normal limits   TROPONIN   TROPONIN       RADIOLOGY:  XR CHEST (2 VW)    Result Date: 7/26/2022  EXAMINATION: TWO XRAY VIEWS OF THE CHEST 7/26/2022 4:34 am COMPARISON: Portable chest dated 12/31/2021. HISTORY: ORDERING SYSTEM PROVIDED HISTORY: left sided chest pain TECHNOLOGIST PROVIDED HISTORY: left sided chest pain Reason for Exam: PT CO L sided CP X 1 night after getting worked up after an argument. FINDINGS: Heart size and pulmonary vasculature are normal.  The lungs are clear and normally expanded. Surrounding osseous and soft tissue structures are unremarkable. Normal examination.       EKG    EKG Interpretation    Interpreted by me    Rhythm: normal sinus   Rate: normal  Axis: Left axis deviation  Ectopy: none  Conduction: normal  ST Segments: no acute change  T Waves: no acute change  Q Waves: none    Clinical Impression: Sinus rhythm rate of 88. No ST segment changes, no arrhythmia, no ectopy. Left axis deviation, good R wave progression. T wave inversion in lead III. Left axis deviation is old compared to EKG dated 10/15/2020 as is the T wave inversion. All EKG's are interpreted by the Emergency Department Physician whoeither signs or Co-signs this chart in the absence of a cardiologist.    Impression:  Intermittent left arm pain for the past week along with acute episode of chest pain tonight after emotional distress. Will start patient on aspirin, cardiac work-up chest x-ray, troponin. EKG as above without any significant changes. Does have history of hyperlipidemia, hypertension, and diabetes. Patient has a baseline heart score of 4, will see what initial troponin is. Possible admission. EMERGENCY DEPARTMENT COURSE:  Labs notable for hyperglycemia, glucose 356 mg/dL. Initial troponin 9 and then secondary troponin also 9. Spoke with patient about findings, and recommended that since he has been having the symptoms for about a week now and has not seen a cardiologist in several years that he should come into the hospital for an observation stay to be seen and evaluated by cardiology. However patient stated that he wanted some time to think about this. No return to the room approximately 20 minutes later patient stated at this time that he would like to go home to follow-up with his primary care provider. I stated that is an option however would be best if he could follow-up with a cardiologist however the best option remain that he stay in the hospital to be seen however patient again really stated that he does not want to stay. At this time, patient has no symptoms. I will not be forcing him to stay against his will and he states that he will call his primary care provider today.   We will provide him the number for cardiology group so that he can at least make an appointment. Strict return precautions given and patient voiced understanding of all instructions given. CONSULTS:  None    CRITICAL CARE:  There was a high probability of clinically significant/life threatening deterioration in this patient's condition which required my urgent intervention. Total critical care time was 15 minutes. This excludes any time for separately reportable procedures. FINAL IMPRESSION     1. Chest pain, unspecified type          DISPOSITION / PLAN   DISPOSITION Decision To Discharge 07/26/2022 06:48:13 AM      Evaluation and treatment course in the ED, and plan of care upon discharge was discussed in length with the patient/patient representative. Patient/patient representative had no further questions prior to being discharged and was instructed to return to the ED for new or worsening symptoms. Any changes to existing medications or new prescriptions were reviewed with patient/patient representative and they expressed understanding of how to correctly take their medications and the possible side effects.     PATIENT REFERRED TO:  Osei Mancuso, [address] [facility]  [phone]    Schedule an appointment as soon as possible for a visit       [facility]  [address] [place] [address]  [phone]  Schedule an appointment as soon as possible for a visit       DISCHARGE MEDICATIONS:  Discharge Medication List as of 7/26/2022  6:50 AM          Ayde Russell DO  Emergency Medicine Attending    (Please note that portions of this note were completed with a voice recognition program.  Efforts were made to edit the dictations but occasionally words are mis-transcribed.)         Ayde Russell DO  07/26/22 4026

## 2022-07-26 NOTE — DISCHARGE INSTRUCTIONS
Please follow-up with your family doctor soon as possible  Please follow-up with a cardiologist as soon as possible  If you have any repeat episodes of chest pain, please return to an emergency room soon as possible  Your glucose also today was rather elevated at 356 mg/dL which is high. Please talk about this with your primary care provider soon as possible as you may need adjustments to your diabetic regimen.

## 2022-07-26 NOTE — ED TRIAGE NOTES
Mode of arrival (squad #, walk in, police, etc) : walk in        Chief complaint(s): chest pain        Arrival Note (brief scenario, treatment PTA, etc). : pt states he got into a \"squabble with an idiot\" via text this evening and has since developed chest pain and left arm pain. C= \"Have you ever felt that you should Cut down on your drinking? \"  No  A= \"Have people Annoyed you by criticizing your drinking? \"  No  G= \"Have you ever felt bad or Guilty about your drinking? \"  No  E= \"Have you ever had a drink as an Eye-opener first thing in the morning to steady your nerves or to help a hangover? \"  No      Deferred []      Reason for deferring: N/A    *If yes to two or more: probable alcohol abuse. *

## 2022-08-15 ENCOUNTER — HOSPITAL ENCOUNTER (EMERGENCY)
Age: 57
Discharge: HOME | End: 2022-08-16
Attending: STUDENT IN AN ORGANIZED HEALTH CARE EDUCATION/TRAINING PROGRAM
Payer: MEDICAID

## 2022-08-15 DIAGNOSIS — R10.30 LOWER ABDOMINAL PAIN: Primary | ICD-10-CM

## 2022-08-15 PROCEDURE — 80053 COMPREHEN METABOLIC PANEL: CPT

## 2022-08-15 PROCEDURE — 83690 ASSAY OF LIPASE: CPT

## 2022-08-15 PROCEDURE — 85025 COMPLETE CBC W/AUTO DIFF WBC: CPT

## 2022-08-15 PROCEDURE — 6360000002 HC RX W HCPCS

## 2022-08-15 PROCEDURE — 81003 URINALYSIS AUTO W/O SCOPE: CPT

## 2022-08-15 PROCEDURE — 99285 EMERGENCY DEPT VISIT HI MDM: CPT

## 2022-08-15 PROCEDURE — 96374 THER/PROPH/DIAG INJ IV PUSH: CPT

## 2022-08-15 PROCEDURE — 2580000003 HC RX 258

## 2022-08-15 RX ORDER — KETOROLAC TROMETHAMINE 30 MG/ML
30 INJECTION, SOLUTION INTRAMUSCULAR; INTRAVENOUS ONCE
Status: COMPLETED | OUTPATIENT
Start: 2022-08-15 | End: 2022-08-15

## 2022-08-15 RX ORDER — 0.9 % SODIUM CHLORIDE 0.9 %
1000 INTRAVENOUS SOLUTION INTRAVENOUS ONCE
Status: COMPLETED | OUTPATIENT
Start: 2022-08-15 | End: 2022-08-16

## 2022-08-15 RX ADMIN — SODIUM CHLORIDE 1000 ML: 9 INJECTION, SOLUTION INTRAVENOUS at 23:59

## 2022-08-15 RX ADMIN — KETOROLAC TROMETHAMINE 30 MG: 30 INJECTION, SOLUTION INTRAMUSCULAR at 23:55

## 2022-08-15 ASSESSMENT — ENCOUNTER SYMPTOMS
DIARRHEA: 0
ABDOMINAL PAIN: 1
CONSTIPATION: 0
PHOTOPHOBIA: 0
WHEEZING: 0
VOMITING: 0
NAUSEA: 0
SHORTNESS OF BREATH: 0
BACK PAIN: 0
RHINORRHEA: 0

## 2022-08-15 ASSESSMENT — PAIN DESCRIPTION - LOCATION: LOCATION: ABDOMEN

## 2022-08-15 ASSESSMENT — PAIN SCALES - GENERAL: PAINLEVEL_OUTOF10: 9

## 2022-08-15 ASSESSMENT — PAIN DESCRIPTION - DESCRIPTORS: DESCRIPTORS: ACHING

## 2022-08-15 ASSESSMENT — PAIN DESCRIPTION - ORIENTATION: ORIENTATION: LOWER

## 2022-08-16 ENCOUNTER — APPOINTMENT (OUTPATIENT)
Dept: CT IMAGING | Age: 57
End: 2022-08-16
Payer: MEDICAID

## 2022-08-16 VITALS
HEART RATE: 77 BPM | OXYGEN SATURATION: 94 % | HEIGHT: 72 IN | SYSTOLIC BLOOD PRESSURE: 116 MMHG | RESPIRATION RATE: 16 BRPM | TEMPERATURE: 98.9 F | DIASTOLIC BLOOD PRESSURE: 74 MMHG | BODY MASS INDEX: 31.19 KG/M2

## 2022-08-16 LAB
ABSOLUTE EOS #: 0.1 K/UL (ref 0–0.4)
ABSOLUTE LYMPH #: 1.2 K/UL (ref 1–4.8)
ABSOLUTE MONO #: 0.6 K/UL (ref 0.1–1.3)
ALBUMIN SERPL-MCNC: 3.2 G/DL (ref 3.5–5.2)
ALP BLD-CCNC: 109 U/L (ref 40–129)
ALT SERPL-CCNC: 29 U/L (ref 5–41)
ANION GAP SERPL CALCULATED.3IONS-SCNC: 10 MMOL/L (ref 9–17)
AST SERPL-CCNC: 33 U/L
BASOPHILS # BLD: 0 % (ref 0–2)
BASOPHILS ABSOLUTE: 0 K/UL (ref 0–0.2)
BILIRUB SERPL-MCNC: 3.71 MG/DL (ref 0.3–1.2)
BILIRUBIN URINE: NEGATIVE
BUN BLDV-MCNC: 14 MG/DL (ref 6–20)
CALCIUM SERPL-MCNC: 9 MG/DL (ref 8.6–10.4)
CHLORIDE BLD-SCNC: 102 MMOL/L (ref 98–107)
CO2: 25 MMOL/L (ref 20–31)
COLOR: YELLOW
COMMENT UA: ABNORMAL
CREAT SERPL-MCNC: 0.51 MG/DL (ref 0.7–1.2)
EOSINOPHILS RELATIVE PERCENT: 2 % (ref 0–4)
GFR AFRICAN AMERICAN: >60 ML/MIN
GFR NON-AFRICAN AMERICAN: >60 ML/MIN
GFR SERPL CREATININE-BSD FRML MDRD: ABNORMAL ML/MIN/{1.73_M2}
GLUCOSE BLD-MCNC: 293 MG/DL (ref 70–99)
GLUCOSE URINE: ABNORMAL
HCT VFR BLD CALC: 35.1 % (ref 41–53)
HEMOGLOBIN: 11.4 G/DL (ref 13.5–17.5)
KETONES, URINE: NEGATIVE
LEUKOCYTE ESTERASE, URINE: NEGATIVE
LIPASE: 47 U/L (ref 13–60)
LYMPHOCYTES # BLD: 27 % (ref 24–44)
MCH RBC QN AUTO: 28.2 PG (ref 26–34)
MCHC RBC AUTO-ENTMCNC: 32.5 G/DL (ref 31–37)
MCV RBC AUTO: 86.9 FL (ref 80–100)
MONOCYTES # BLD: 15 % (ref 1–7)
NITRITE, URINE: NEGATIVE
PDW BLD-RTO: 16.8 % (ref 11.5–14.9)
PH UA: 5.5 (ref 5–8)
PLATELET # BLD: 67 K/UL (ref 150–450)
PMV BLD AUTO: 8.3 FL (ref 6–12)
POTASSIUM SERPL-SCNC: 4 MMOL/L (ref 3.7–5.3)
PROTEIN UA: NEGATIVE
RBC # BLD: 4.04 M/UL (ref 4.5–5.9)
SEG NEUTROPHILS: 56 % (ref 36–66)
SEGMENTED NEUTROPHILS ABSOLUTE COUNT: 2.4 K/UL (ref 1.3–9.1)
SODIUM BLD-SCNC: 137 MMOL/L (ref 135–144)
SPECIFIC GRAVITY UA: 1.03 (ref 1–1.03)
TOTAL PROTEIN: 6.2 G/DL (ref 6.4–8.3)
TURBIDITY: CLEAR
URINE HGB: NEGATIVE
UROBILINOGEN, URINE: NORMAL
WBC # BLD: 4.3 K/UL (ref 3.5–11)

## 2022-08-16 PROCEDURE — 6360000004 HC RX CONTRAST MEDICATION: Performed by: STUDENT IN AN ORGANIZED HEALTH CARE EDUCATION/TRAINING PROGRAM

## 2022-08-16 PROCEDURE — 74177 CT ABD & PELVIS W/CONTRAST: CPT

## 2022-08-16 PROCEDURE — 2580000003 HC RX 258: Performed by: STUDENT IN AN ORGANIZED HEALTH CARE EDUCATION/TRAINING PROGRAM

## 2022-08-16 RX ORDER — 0.9 % SODIUM CHLORIDE 0.9 %
100 INTRAVENOUS SOLUTION INTRAVENOUS ONCE
Status: COMPLETED | OUTPATIENT
Start: 2022-08-16 | End: 2022-08-16

## 2022-08-16 RX ORDER — SODIUM CHLORIDE 0.9 % (FLUSH) 0.9 %
10 SYRINGE (ML) INJECTION PRN
Status: COMPLETED | OUTPATIENT
Start: 2022-08-16 | End: 2022-08-16

## 2022-08-16 RX ADMIN — IOPAMIDOL 75 ML: 755 INJECTION, SOLUTION INTRAVENOUS at 00:37

## 2022-08-16 RX ADMIN — SODIUM CHLORIDE 100 ML: 9 INJECTION, SOLUTION INTRAVENOUS at 00:37

## 2022-08-16 RX ADMIN — SODIUM CHLORIDE, PRESERVATIVE FREE 10 ML: 5 INJECTION INTRAVENOUS at 00:37

## 2022-08-16 NOTE — ED PROVIDER NOTES
EMERGENCY DEPARTMENT ENCOUNTER   ATTENDING ATTESTATION     Pt Name: Al Gillette  MRN: [id number]  [place] 1965  Date of evaluation: 8/15/22       Al Gillette is a 65 y.o. male who presents with Abdominal Pain    Lower abdominal pain. Plan is labs and CT    MDM:     Workup unremarkable. CT nonspecific. Pain controlled. Discharge with GI follow up. Vitals:   Vitals:    08/15/22 2153 08/15/22 2359   BP: 129/64 116/74   Pulse: 83 77   Resp: 16 16   Temp: 98.9 °F (37.2 °C)    TempSrc: Oral    SpO2: 98% 94%   Height: 6' (1.829 m)          I personally saw and examined the patient. I have reviewed and agree with the resident's findings, including all diagnostic interpretations and treatment plan as written. I was present for the key portions of any procedures performed and the inclusive time noted for any critical care statement. The care is provided during an unprecedented national emergency due to the novel coronavirus, COVID 19.   Ivonne Tompkins MD  Attending Emergency Physician           Ivonne Tompkins MD  08/16/22 0130       Ivonne Tompkins MD  08/16/22 0130

## 2022-08-16 NOTE — ED PROVIDER NOTES
[address]  Emergency Department Encounter  Emergency Medicine Resident     Pt Name:Jon Serna  MRN: [id number]  [place] 1965  Date of evaluation: 8/15/22  PCP:  Liliam Quinn MD      [address] [place] [address]       Chief Complaint   Patient presents with    Abdominal Pain       HISTORY OF PRESENT ILLNESS  (Location/Symptom, Timing/Onset, Context/Setting, Quality, Duration, Modifying Factors, Severity.)      Boris Oliver is a 65 y.o. male who presents with c/o lower abd pain. No nausea or vomiting. No chest pain or shortness of breath. Notes pain has been ongoing for 3 days, nonradiating. Patient has history of nephrolithiasis but notes that this feels dissimilar. Denies any testicular pain or pain in groin. Denies any issues urinating. No blood in urine. Last bowel movement yesterday and normal.  Denied any pain on defecation. No dysuria or frequency. PAST MEDICAL / SURGICAL / SOCIAL / FAMILY HISTORY      has a past medical history of Calculus of kidney, Cerebral artery occlusion with cerebral infarction ([address] Cirrhosis ([address] COPD (chronic obstructive pulmonary disease) ([address] COVID-19, Essential hypertension, benign, Gout, unspecified, H/O colonoscopy, Mitral valve disorders(424.0), СЕРГЕЙ on CPAP, Other and unspecified hyperlipidemia, Type II or unspecified type diabetes mellitus without mention of complication, not stated as uncontrolled, Unspecified chronic liver disease without mention of alcohol, and Wellness examination. Reviewed with patient     has a past surgical history that includes Lithotripsy; Upper gastrointestinal endoscopy (N/A, 5/25/2018); Lithotripsy (04/02/2021); Lithotripsy (N/A, 4/2/2021); Upper gastrointestinal endoscopy (N/A, 12/10/2021); and Colonoscopy (N/A, 3/16/2022).   Reviewed with patient    Social History     Socioeconomic History    Marital status:      Spouse name: Not on file    Number of children: Not on file    Years of education: Not on file Highest education level: Not on file   Occupational History    Not on file   Tobacco Use    Smoking status: Former     Packs/day: 0.50     Years: 30.00     Pack years: 15.00     Types: Cigarettes, E-Cigarettes     Quit date: 3/26/2012     Years since quitting: 10.3    Smokeless tobacco: Former   Vaping Use    Vaping Use: Never used   Substance and Sexual Activity    Alcohol use: Not Currently     Alcohol/week: 0.0 standard drinks     Comment: used to drink \"a lot\"  None since Dec 2020    Drug use: Yes     Types: Marijuana (Weed)     Comment: every evening    Sexual activity: Not on file   Other Topics Concern    Not on file   Social History Narrative    Not on file     Social Determinants of Health     Financial Resource Strain: Not on file   Food Insecurity: Not on file   Transportation Needs: Not on file   Physical Activity: Not on file   Stress: Not on file   Social Connections: Not on file   Intimate Partner Violence: Not on file   Housing Stability: Not on file       Family History   Problem Relation Age of Onset    No Known Problems Brother     Diabetes Mother     Heart Attack Mother     Heart Disease Father     Diabetes Brother     Heart Disease Brother        Allergies:  Codeine and Simvastatin    Home Medications:  Prior to Admission medications    Medication Sig Start Date End Date Taking? Authorizing Provider   tamsulosin (FLOMAX) 0.4 MG capsule Take 1 capsule by mouth in the morning. 7/26/22   Clifton Shipley MD   albuterol sulfate HFA (VENTOLIN HFA) 108 (90 Base) MCG/ACT inhaler Inhale 1 puff into the lungs every 4 hours as needed for Wheezing 7/26/22   Clifton Shipley MD   fluticasone-salmeterol (ADVAIR) 100-50 MCG/DOSE diskus inhaler INHALE 1 PUFF BY MOUTH EVERY 12 HOURS 7/26/22   Clifton Shipley MD   glimepiride (AMARYL) 4 MG tablet TAKE 1 TABLET BY MOUTH TWICE DAILY 7/26/22   Clifton Shipley MD   allopurinol (ZYLOPRIM) 100 MG tablet Take 1 tablet by mouth in the morning.  7/26/22   Clifton Shipley MD   nadolol (CORGARD) 20 MG tablet Take 1 tablet by mouth in the morning. 7/26/22   Mary Perkins MD   lisinopril (PRINIVIL;ZESTRIL) 20 MG tablet Take 1 tablet by mouth once daily 7/19/22   Jamal Bello MD   bumetanide (BUMEX) 1 MG tablet Take 1 tablet by mouth daily 5/31/22   Fernandez Tan MD   omeprazole (PRILOSEC) 20 MG delayed release capsule Take 1 capsule by mouth Daily 5/9/22   Fernandez Tan, MD   sertraline (ZOLOFT) 50 MG tablet Take 1 tablet by mouth daily 5/9/22   Fernandez Tan, MD   SITagliptin (JANUVIA) 100 MG tablet TAKE 1 TABLET BY MOUTH DAILY 5/9/22   Fernandez Tan, MD   atorvastatin (LIPITOR) 40 MG tablet Take 1 tablet by mouth nightly 2/25/22   Mary Perkins MD   polyethylene glycol (GLYCOLAX) 17 GM/SCOOP powder Follow instructions provided to you from physician's office. 2/22/22   Felipe Love MD   bisacodyl (BISACODYL) 5 MG EC tablet Follow instructions provided given by the physician's office. 2/22/22   Felipe Love MD   blood glucose monitor kit and supplies Dispense sufficient amount for indicated testing frequency. Dispense all needed supplies to include: monitor, strips, lancing device, lancets, control solutions, alcohol swabs. Testing once daily 1/28/22   Angelo Murphy MD   Lancets MISC 1 each by Does not apply route 2 times daily 1/27/22   Fernandez Tan MD   blood glucose monitor strips 1 strip by Other route 2 times daily Test 2  times a day & as needed for symptoms of irregular blood glucose. Dispense sufficient amount for indicated testing frequency plus additional to accommodate PRN testing needs. 1/27/22   Fernandez Tan MD   metoprolol tartrate (LOPRESSOR) 25 MG tablet Take 1 tablet by mouth 2 times daily 4/6/21   Fernandez Tan MD   blood glucose test strips (ASCENSIA AUTODISC VI;ONE TOUCH ULTRA TEST VI) strip Use with associated glucose meter.  7/22/20   Frank Membreno PA-C   blood glucose monitor strips 1 strip by Other route 4 times daily Test 4  times a day & as needed for symptoms of irregular blood glucose. 5/3/19   Renetta Smith MD   [employer] LANCETS ULTRATHIN 30G MISC 1 each by Other route 3 times daily 10/19/18   Renetta Smith MD       REVIEW OF SYSTEMS    (2-9 systems for level 4, 10 or more for level 5)      Review of Systems   Constitutional:  Negative for chills and fever. HENT:  Negative for congestion and rhinorrhea. Eyes:  Negative for photophobia and visual disturbance. Respiratory:  Negative for shortness of breath and wheezing. Cardiovascular:  Negative for chest pain and palpitations. Gastrointestinal:  Positive for abdominal pain. Negative for constipation, diarrhea, nausea and vomiting. Genitourinary:  Negative for dysuria and frequency. Musculoskeletal:  Negative for back pain and neck pain. Skin:  Negative for rash and wound. Neurological:  Negative for dizziness and headaches. PHYSICAL EXAM   (up to 7 for level 4, 8 or more for level 5)      INITIAL VITALS:   /74   Pulse 77   Temp 98.9 °F (37.2 °C) (Oral)   Resp 16   Ht 6' (1.829 m)   SpO2 94%   BMI 31.19 kg/m²     Physical Exam  Vitals and nursing note reviewed. Constitutional:       General: He is not in acute distress. HENT:      Head: Atraumatic. Right Ear: External ear normal.      Left Ear: External ear normal.      Nose: Nose normal.      Mouth/Throat:      Mouth: Mucous membranes are moist.      Pharynx: Oropharynx is clear. Eyes:      Extraocular Movements: Extraocular movements intact. Cardiovascular:      Rate and Rhythm: Normal rate and regular rhythm. Pulses: Normal pulses. Pulmonary:      Effort: Pulmonary effort is normal. No respiratory distress. Breath sounds: Normal breath sounds. Abdominal:      Palpations: Abdomen is soft. Tenderness: There is abdominal tenderness in the right lower quadrant, suprapubic area and left lower quadrant. There is no right CVA tenderness, left CVA tenderness, guarding or rebound. Musculoskeletal:         General: Normal range of motion. Cervical back: Normal range of motion. Skin:     General: Skin is warm and dry. Capillary Refill: Capillary refill takes less than 2 seconds. Neurological:      General: No focal deficit present. Mental Status: He is alert and oriented to person, place, and time.        DIFFERENTIAL  DIAGNOSIS     PLAN (LABS / [facility] / EKG):  Orders Placed This Encounter   Procedures    CT ABDOMEN PELVIS W IV CONTRAST Additional Contrast? None    CBC with Auto Differential    Comprehensive Metabolic Panel w/ Reflex to MG    Lipase    Urinalysis with Reflex to Culture       MEDICATIONS ORDERED:  Orders Placed This Encounter   Medications    ketorolac (TORADOL) injection 30 mg    0.9 % sodium chloride bolus    0.9 % sodium chloride bolus    iopamidol (ISOVUE-370) 76 % injection 75 mL    sodium chloride flush 0.9 % injection 10 mL       DDX: Diverticulitis, nephrolithiasis, appendicitis, UTI    DIAGNOSTIC RESULTS / EMERGENCY DEPARTMENT COURSE / MDM   LAB RESULTS:  Results for orders placed or performed during the hospital encounter of 08/15/22   CBC with Auto Differential   Result Value Ref Range    WBC 4.3 3.5 - 11.0 k/uL    RBC 4.04 (L) 4.5 - 5.9 m/uL    Hemoglobin 11.4 (L) 13.5 - 17.5 g/dL    Hematocrit 35.1 (L) 41 - 53 %    MCV 86.9 80 - 100 fL    MCH 28.2 26 - 34 pg    MCHC 32.5 31 - 37 g/dL    RDW 16.8 (H) 11.5 - 14.9 %    Platelets 67 (L) 620 - 450 k/uL    MPV 8.3 6.0 - 12.0 fL    Seg Neutrophils 56 36 - 66 %    Lymphocytes 27 24 - 44 %    Monocytes 15 (H) 1 - 7 %    Eosinophils % 2 0 - 4 %    Basophils 0 0 - 2 %    Segs Absolute 2.40 1.3 - 9.1 k/uL    Absolute Lymph # 1.20 1.0 - 4.8 k/uL    Absolute Mono # 0.60 0.1 - 1.3 k/uL    Absolute Eos # 0.10 0.0 - 0.4 k/uL    Basophils Absolute 0.00 0.0 - 0.2 k/uL   Comprehensive Metabolic Panel w/ Reflex to MG   Result Value Ref Range    Glucose 293 (H) 70 - 99 mg/dL    BUN 14 6 - 20 mg/dL    Creatinine 0.51 likely. Consider sonography or a nuclear medicine   hepatobiliary scan if there are clinical findings of cholecystitis. EMERGENCY DEPARTMENT COURSE:  78-year-old male, history of cirrhosis, presented to ED with complaints of lower abdominal pain over the past 3 days that has been constant without any nausea, vomiting. On exam, afebrile, nontachycardic, abdomen soft with mild tenderness to right lower quadrant, suprapubic, left lower quadrant, no CVA tenderness. CT without evidence of acute findings but did suggest some chronic cirrhotic similar findings and possible proctitis versus colitis. Patient denied any tenesmus, still passing gas and noted significant improvement in pain from Toradol. UA without evidence of infection. No leukocytosis. Patient instructed to return to ED for any worsening abdominal pain, chest pain, shortness of breath. ED Course as of 08/16/22 0155   Tue Aug 16, 2022   0010 WBC: 4.3 [AR]   0011 Nitrite, Urine: NEGATIVE [AR]   0045 Leukocyte Esterase, Urine: NEGATIVE [AR]   0046 Creatinine(!): 0.51 [AR]   0113 Patient denied any issues having bowel movements. No tenesmus. Is still passing gas. Noted significant improvement status post Toradol. [AR]      ED Course User Index  [AR] Gilford Hane, MD       No notes of [facility] Admission Criteria type on file. PROCEDURES:  None    CONSULTS:  None    CRITICAL CARE:  None        FINAL IMPRESSION      1.  Lower abdominal pain          DISPOSITION / PLAN     DISPOSITION Decision To Discharge 08/16/2022 01:15:13 AM      PATIENT REFERRED TO:  Ivana Temple MD  [address]  [phone]    In 2 days      . [address] [facility]  [address]  [address] [place] [address]  [phone]    As needed    DISCHARGE MEDICATIONS:  New Prescriptions    No medications on file       Jose Roberto Saunders MD  Emergency Medicine Resident    (Please note that portions of thisnote were completed with a voice recognition program.  Efforts were made to edit the dictations but occasionally words are mis-transcribed.)       Reva Rush MD  Resident  08/16/22 [id number]

## 2022-08-16 NOTE — DISCHARGE INSTRUCTIONS
Thank you for visiting [facility] Emergency Department. You need to call Torri Ascencio MD to make an appointment as directed for follow up. Please return to emergency department for any new or worrisome symptoms including any vomiting, fever, chest pain, worsening abdominal pain.
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
## 2022-08-18 ENCOUNTER — OFFICE VISIT (OUTPATIENT)
Dept: INTERNAL MEDICINE CLINIC | Age: 57
End: 2022-08-18
Payer: MEDICAID

## 2022-08-18 VITALS
WEIGHT: 233 LBS | OXYGEN SATURATION: 96 % | BODY MASS INDEX: 31.6 KG/M2 | SYSTOLIC BLOOD PRESSURE: 138 MMHG | DIASTOLIC BLOOD PRESSURE: 68 MMHG | HEART RATE: 88 BPM

## 2022-08-18 DIAGNOSIS — J44.9 CHRONIC OBSTRUCTIVE PULMONARY DISEASE, UNSPECIFIED COPD TYPE (HCC): ICD-10-CM

## 2022-08-18 DIAGNOSIS — E11.65 TYPE 2 DIABETES MELLITUS WITH HYPERGLYCEMIA, WITHOUT LONG-TERM CURRENT USE OF INSULIN (HCC): ICD-10-CM

## 2022-08-18 DIAGNOSIS — K52.9 COLITIS: Primary | ICD-10-CM

## 2022-08-18 LAB — HBA1C MFR BLD: 9.1 %

## 2022-08-18 PROCEDURE — 2022F DILAT RTA XM EVC RTNOPTHY: CPT | Performed by: INTERNAL MEDICINE

## 2022-08-18 PROCEDURE — 99407 BEHAV CHNG SMOKING > 10 MIN: CPT | Performed by: INTERNAL MEDICINE

## 2022-08-18 PROCEDURE — G8417 CALC BMI ABV UP PARAM F/U: HCPCS | Performed by: INTERNAL MEDICINE

## 2022-08-18 PROCEDURE — 1036F TOBACCO NON-USER: CPT | Performed by: INTERNAL MEDICINE

## 2022-08-18 PROCEDURE — 83036 HEMOGLOBIN GLYCOSYLATED A1C: CPT | Performed by: INTERNAL MEDICINE

## 2022-08-18 PROCEDURE — G8427 DOCREV CUR MEDS BY ELIG CLIN: HCPCS | Performed by: INTERNAL MEDICINE

## 2022-08-18 PROCEDURE — 3046F HEMOGLOBIN A1C LEVEL >9.0%: CPT | Performed by: INTERNAL MEDICINE

## 2022-08-18 PROCEDURE — 99214 OFFICE O/P EST MOD 30 MIN: CPT | Performed by: INTERNAL MEDICINE

## 2022-08-18 PROCEDURE — 3017F COLORECTAL CA SCREEN DOC REV: CPT | Performed by: INTERNAL MEDICINE

## 2022-08-18 PROCEDURE — 3023F SPIROM DOC REV: CPT | Performed by: INTERNAL MEDICINE

## 2022-08-18 RX ORDER — METRONIDAZOLE 500 MG/1
500 TABLET ORAL 3 TIMES DAILY
Qty: 21 TABLET | Refills: 0 | Status: SHIPPED | OUTPATIENT
Start: 2022-08-18 | End: 2022-08-25

## 2022-08-18 RX ORDER — CEPHALEXIN 500 MG/1
500 CAPSULE ORAL 3 TIMES DAILY
Qty: 21 CAPSULE | Refills: 0 | Status: SHIPPED | OUTPATIENT
Start: 2022-08-18 | End: 2022-08-25

## 2022-08-18 RX ORDER — LISINOPRIL 20 MG/1
TABLET ORAL
Qty: 30 TABLET | Refills: 3 | Status: SHIPPED | OUTPATIENT
Start: 2022-08-18

## 2022-08-18 SDOH — ECONOMIC STABILITY: TRANSPORTATION INSECURITY
IN THE PAST 12 MONTHS, HAS LACK OF TRANSPORTATION KEPT YOU FROM MEETINGS, WORK, OR FROM GETTING THINGS NEEDED FOR DAILY LIVING?: NO

## 2022-08-18 SDOH — ECONOMIC STABILITY: FOOD INSECURITY: WITHIN THE PAST 12 MONTHS, THE FOOD YOU BOUGHT JUST DIDN'T LAST AND YOU DIDN'T HAVE MONEY TO GET MORE.: NEVER TRUE

## 2022-08-18 SDOH — ECONOMIC STABILITY: FOOD INSECURITY: WITHIN THE PAST 12 MONTHS, YOU WORRIED THAT YOUR FOOD WOULD RUN OUT BEFORE YOU GOT MONEY TO BUY MORE.: NEVER TRUE

## 2022-08-18 SDOH — ECONOMIC STABILITY: TRANSPORTATION INSECURITY
IN THE PAST 12 MONTHS, HAS THE LACK OF TRANSPORTATION KEPT YOU FROM MEDICAL APPOINTMENTS OR FROM GETTING MEDICATIONS?: NO

## 2022-08-18 ASSESSMENT — PATIENT HEALTH QUESTIONNAIRE - PHQ9
DEPRESSION UNABLE TO ASSESS: YES
SUM OF ALL RESPONSES TO PHQ9 QUESTIONS 1 & 2: 0
1. LITTLE INTEREST OR PLEASURE IN DOING THINGS: NOT AT ALL
2. FEELING DOWN, DEPRESSED OR HOPELESS: NOT AT ALL

## 2022-08-18 ASSESSMENT — LIFESTYLE VARIABLES: HOW OFTEN DO YOU HAVE A DRINK CONTAINING ALCOHOL: NEVER

## 2022-08-18 ASSESSMENT — SOCIAL DETERMINANTS OF HEALTH (SDOH): HOW HARD IS IT FOR YOU TO PAY FOR THE VERY BASICS LIKE FOOD, HOUSING, MEDICAL CARE, AND HEATING?: NOT HARD AT ALL

## 2022-08-18 NOTE — PROGRESS NOTES
Laterality Date    COLONOSCOPY N/A 3/16/2022    COLONOSCOPY WITH BIOPSY AND RESOLUTION CLIPPING X1 performed by Berlin Nance MD at [facility]      LITHOTRIPSY  04/02/2021    LITHOTRIPSY N/A 4/2/2021    ESWL EXTRACORPOREAL SHOCK WAVE LITHOTRIPSY  (NEX-MED CONF# [id number] - AUGUSTO) performed by Rhona Snellen, MD at [facility] N/A 5/25/2018    The esophagus was inspected to the Z-line. The endoscopic exam showed impression of varices (F1) in distal esophagus.      UPPER GASTROINTESTINAL ENDOSCOPY N/A 12/10/2021    EGD ESOPHAGOGASTRODUODENOSCOPY performed by Berlin Nance MD at [facility]        Medications:      Current Outpatient Medications:     cephALEXin (KEFLEX) 500 MG capsule, Take 1 capsule by mouth 3 times daily for 7 days, Disp: 21 capsule, Rfl: 0    metroNIDAZOLE (FLAGYL) 500 MG tablet, Take 1 tablet by mouth 3 times daily for 7 days, Disp: 21 tablet, Rfl: 0    lisinopril (PRINIVIL;ZESTRIL) 20 MG tablet, Take 1 tablet by mouth once daily, Disp: 30 tablet, Rfl: 3    tamsulosin (FLOMAX) 0.4 MG capsule, Take 1 capsule by mouth in the morning., Disp: 90 capsule, Rfl: 3    albuterol sulfate HFA (VENTOLIN HFA) 108 (90 Base) MCG/ACT inhaler, Inhale 1 puff into the lungs every 4 hours as needed for Wheezing, Disp: 18 g, Rfl: 6    fluticasone-salmeterol (ADVAIR) 100-50 MCG/DOSE diskus inhaler, INHALE 1 PUFF BY MOUTH EVERY 12 HOURS, Disp: 1 each, Rfl: 3    glimepiride (AMARYL) 4 MG tablet, TAKE 1 TABLET BY MOUTH TWICE DAILY, Disp: 60 tablet, Rfl: 11    allopurinol (ZYLOPRIM) 100 MG tablet, Take 1 tablet by mouth in the morning., Disp: 90 tablet, Rfl: 1    nadolol (CORGARD) 20 MG tablet, Take 1 tablet by mouth in the morning., Disp: 30 tablet, Rfl: 3    bumetanide (BUMEX) 1 MG tablet, Take 1 tablet by mouth daily, Disp: 90 tablet, Rfl: 1    omeprazole (PRILOSEC) 20 MG delayed release capsule, Take 1 capsule by mouth Daily, Disp: 90 capsule, Rfl: 1    SITagliptin (Adonay Cantu) 100 MG tablet, TAKE 1 TABLET BY MOUTH DAILY, Disp: 90 tablet, Rfl: 1    atorvastatin (LIPITOR) 40 MG tablet, Take 1 tablet by mouth nightly, Disp: 90 tablet, Rfl: 1    Lancets MISC, 1 each by Does not apply route 2 times daily, Disp: 300 each, Rfl: 1    blood glucose monitor strips, 1 strip by Other route 4 times daily Test 4  times a day & as needed for symptoms of irregular blood glucose., Disp: 100 strip, Rfl: 11    Allergies:      Codeine and Simvastatin    Social History:    Tobacco:    reports that he quit smoking about 10 years ago. His smoking use included cigarettes and e-cigarettes. He has a 15.00 pack-year smoking history. He has quit using smokeless tobacco.  Alcohol:      reports that he does not currently use alcohol. Drug Use:  reports current drug use. Drug: Marijuana [place]).     Family History:    Family History   Problem Relation Age of Onset    No Known Problems Brother     Diabetes Mother     Heart Attack Mother     Heart Disease Father     Diabetes Brother     Heart Disease Brother        Review of Systems:    Positive and Negative as described in HPI    Constitutional:  negative for  fevers, chills, sweats, fatigue, and weight loss  HEENT:  negative for vision or hearing changes,   Respiratory:  negative for shortness of breath, cough, or congestion  Cardiovascular:  negative for  chest pain, palpitations  Gastrointestinal:  negative for nausea, vomiting, diarrhea, constipation, abdominal pain  Genitourinary:  negative for frequency, dysuria  Integument/Breast:  negative for rash, skin lesions  Musculoskeletal:  negative for muscle aches or joint pain  Neurological:  negative for headaches, dizziness, lightheadedness, numbness, pain and tingling extrimities  Behavior/Psych:  negative for depression and anxiety      Physical Exam:    Vitals:  /68 (Site: Left Upper Arm)   Pulse 88   Wt 233 lb (105.7 kg)   SpO2 96%   BMI 31.60 kg/m²     General appearance - alert, well appearing, and in no acute distress  Mental status - oriented to person, place, and time with normal affect  Head - normocephalic and atraumatic  Eyes - pupils equal and reactive, extraocular eye movements intact, conjunctiva clear  Ears - hearing appears to be intact  Nose - no drainage noted  Mouth - mucous membranes moist  Neck - supple, no carotid bruits, thyroid not palpable  Chest - clear to auscultation, normal effort  Heart - normal rate, regular rhythm, no murmurs  Abdomen - soft, nontender, nondistended, bowel sounds present all four quadrants, no masses, hepatomegaly or splenomegaly  Neurological - normal speech, no focal findings or movement disorder noted, cranial nerves II through XII grossly intact  Extremities - peripheral pulses palpable, no pedal edema or calf pain with palpation  Skin - no gross lesions, rashes, or induration noted      Data:    Lab Results   Component Value Date/Time     08/15/2022 11:42 PM    K 4.0 08/15/2022 11:42 PM     08/15/2022 11:42 PM    CO2 25 08/15/2022 11:42 PM    BUN 14 08/15/2022 11:42 PM    CREATININE 0.51 08/15/2022 11:42 PM    GLUCOSE 293 08/15/2022 11:42 PM    PROT 6.2 08/15/2022 11:42 PM    LABALBU 3.2 08/15/2022 11:42 PM    BILITOT 3.71 08/15/2022 11:42 PM    ALKPHOS 109 08/15/2022 11:42 PM    AST 33 08/15/2022 11:42 PM    ALT 29 08/15/2022 11:42 PM     Lab Results   Component Value Date/Time    WBC 4.3 08/15/2022 11:42 PM    RBC 4.04 08/15/2022 11:42 PM    HGB 11.4 08/15/2022 11:42 PM    HCT 35.1 08/15/2022 11:42 PM    MCV 86.9 08/15/2022 11:42 PM    MCH 28.2 08/15/2022 11:42 PM    MCHC 32.5 08/15/2022 11:42 PM    RDW 16.8 08/15/2022 11:42 PM    PLT 67 08/15/2022 11:42 PM    MPV 8.3 08/15/2022 11:42 PM     Lab Results   Component Value Date/Time    TSH 1.16 03/30/2020 03:20 PM     Lab Results   Component Value Date/Time    CHOL 108 03/16/2022 12:57 PM    CHOL 105 07/21/2017 12:00 AM    HDL 44 03/16/2022 12:57 PM    PSA 0.17 12/23/2020 03:25 PM    LABA1C 7.8 09/20/2021 02:00 PM          Assessment & Plan:     Diagnosis Orders   1. Colitis        2. Chronic obstructive pulmonary disease, unspecified COPD type ([facility]        3. Type 2 diabetes mellitus with hyperglycemia, without long-term current use of insulin (HCC)            1. Colitis  2. Chronic obstructive pulmonary disease, unspecified COPD type ([facility]  3. Type 2 diabetes mellitus with hyperglycemia, without long-term current use of insulin (HCC)     Abx  keflex 500 tid x 5 days   Flagyl 500 TID x 5 days   Lots of fluids     DM:  Discussed in detail about the Diabetes, lab results, importance of diet/carb control, exercise, and med compliance. Medication side effects discussed in detail and has none today. Micro and Macrovascular complications discussed with patient today. Patient verbalized understanding. Hemoglobin A1C   Date Value Ref Range Status   09/20/2021 7.8 (H) 4.0 - 6.0 % Final   02/19/2021 8.6 % Final   11/02/2020 6.3 % Final       Lab Results   Component Value Date    LDLCALC 46 07/21/2017    LDLCHOLESTEROL 46 03/16/2022    LDLDIRECT 55 10/15/2015       Lab Results   Component Value Date    LABMICR 22 (H) 06/02/2020         Foot Exam completed within last 12 months? Yes   Eye Exam completed within last 12 months? Yes    Hyperbilirubinemia,   Chronic   Liver cirhosis   Follows with GI     Smoking   Pt still smokes >10 pack yr   Strongly advised to quit,  pt will try /help offered  Counseling done for >10 mins                      Completed Refills   Requested Prescriptions     Signed Prescriptions Disp Refills    cephALEXin (KEFLEX) 500 MG capsule 21 capsule 0     Sig: Take 1 capsule by mouth 3 times daily for 7 days    metroNIDAZOLE (FLAGYL) 500 MG tablet 21 tablet 0     Sig: Take 1 tablet by mouth 3 times daily for 7 days    lisinopril (PRINIVIL;ZESTRIL) 20 MG tablet 30 tablet 3     Sig: Take 1 tablet by mouth once daily     No follow-ups on file.   Orders Placed This Encounter   Medications cephALEXin (KEFLEX) 500 MG capsule     Sig: Take 1 capsule by mouth 3 times daily for 7 days     Dispense:  21 capsule     Refill:  0    metroNIDAZOLE (FLAGYL) 500 MG tablet     Sig: Take 1 tablet by mouth 3 times daily for 7 days     Dispense:  21 tablet     Refill:  0    lisinopril (PRINIVIL;ZESTRIL) 20 MG tablet     Sig: Take 1 tablet by mouth once daily     Dispense:  30 tablet     Refill:  3     No orders of the defined types were placed in this encounter.       Electronically signed by Adeline Goldmann, MD on 8/18/2022 at 9:20 AM

## 2022-08-18 NOTE — PROGRESS NOTES
Visit Information    Have you changed or started any medications since your last visit including any over-the-counter medicines, vitamins, or herbal medicines? no   Are you having any side effects from any of your medications? -  no  Have you stopped taking any of your medications? Is so, why? -  no    Have you seen any other physician or provider since your last visit? No  Have you had any other diagnostic tests since your last visit? Yes - Records Obtained  Have you been seen in the emergency room and/or had an admission to a hospital since we last saw you? Yes - Records Obtained  Have you had your routine dental cleaning in the past 6 months? no    Have you activated your [employer] account? If not, what are your barriers?  Yes     Patient Care Team:  Ruddy Jorgensen MD as PCP - General (Internal Medicine)  Ruddy Jorgensen MD as PCP - [facility] Provider  Denise Miles MD as Consulting Physician (Gastroenterology)    Medical History Review  Past Medical, Family, and Social History reviewed and does not contribute to the patient presenting condition    Health Maintenance   Topic Date Due    Hepatitis A vaccine (1 of 2 - Risk 2-dose series) Never done    HIV screen  Never done    Hepatitis B vaccine (1 of 3 - Risk 3-dose series) Never done    DTaP/Tdap/Td vaccine (1 - Tdap) Never done    Shingles vaccine (1 of 2) Never done    Diabetic retinal exam  08/15/2016    Pneumococcal 0-64 years Vaccine (2 - PCV) 09/16/2017    Diabetic foot exam  05/30/2019    Diabetic microalbuminuria test  06/02/2021    COVID-19 Vaccine (3 - Booster for Moderna series) 09/07/2021    Flu vaccine (1) 09/01/2022    A1C test (Diabetic or Prediabetic)  09/20/2022    Lipids  03/16/2023    Depression Monitoring  04/14/2023    Colorectal Cancer Screen  03/16/2032    Hepatitis C screen  Completed    Hib vaccine  Aged Out    Meningococcal (ACWY) vaccine  Aged Out

## 2022-08-30 ENCOUNTER — TELEPHONE (OUTPATIENT)
Dept: INTERNAL MEDICINE CLINIC | Age: 57
End: 2022-08-30

## 2022-08-30 DIAGNOSIS — J44.9 CHRONIC OBSTRUCTIVE PULMONARY DISEASE, UNSPECIFIED COPD TYPE (HCC): Primary | ICD-10-CM

## 2022-08-30 NOTE — TELEPHONE ENCOUNTER
Signed and placed in folder to , patient did not answer when trying to inform, another person answered and stated that he would call the office back

## 2022-10-07 ENCOUNTER — OFFICE VISIT (OUTPATIENT)
Dept: GASTROENTEROLOGY | Age: 57
End: 2022-10-07
Payer: MEDICAID

## 2022-10-07 VITALS
WEIGHT: 228 LBS | BODY MASS INDEX: 30.92 KG/M2 | TEMPERATURE: 98.5 F | DIASTOLIC BLOOD PRESSURE: 74 MMHG | SYSTOLIC BLOOD PRESSURE: 127 MMHG

## 2022-10-07 DIAGNOSIS — F12.90 MARIJUANA USER: ICD-10-CM

## 2022-10-07 DIAGNOSIS — K21.9 GASTROESOPHAGEAL REFLUX DISEASE, UNSPECIFIED WHETHER ESOPHAGITIS PRESENT: ICD-10-CM

## 2022-10-07 DIAGNOSIS — Z87.891 EX-SMOKER: ICD-10-CM

## 2022-10-07 DIAGNOSIS — Z87.19 HISTORY OF CIRRHOSIS: Primary | ICD-10-CM

## 2022-10-07 DIAGNOSIS — Z98.890 H/O COLONOSCOPY: ICD-10-CM

## 2022-10-07 DIAGNOSIS — D69.6 THROMBOCYTOPENIA (HCC): ICD-10-CM

## 2022-10-07 PROCEDURE — G8417 CALC BMI ABV UP PARAM F/U: HCPCS | Performed by: INTERNAL MEDICINE

## 2022-10-07 PROCEDURE — G8484 FLU IMMUNIZE NO ADMIN: HCPCS | Performed by: INTERNAL MEDICINE

## 2022-10-07 PROCEDURE — G8427 DOCREV CUR MEDS BY ELIG CLIN: HCPCS | Performed by: INTERNAL MEDICINE

## 2022-10-07 PROCEDURE — 99214 OFFICE O/P EST MOD 30 MIN: CPT | Performed by: INTERNAL MEDICINE

## 2022-10-07 PROCEDURE — 3017F COLORECTAL CA SCREEN DOC REV: CPT | Performed by: INTERNAL MEDICINE

## 2022-10-07 PROCEDURE — 1036F TOBACCO NON-USER: CPT | Performed by: INTERNAL MEDICINE

## 2022-10-07 ASSESSMENT — ENCOUNTER SYMPTOMS
CONSTIPATION: 0
RECTAL PAIN: 0
ABDOMINAL PAIN: 1
TROUBLE SWALLOWING: 0
WHEEZING: 0
SHORTNESS OF BREATH: 0
NAUSEA: 0
BLOOD IN STOOL: 0
CHOKING: 0
COLOR CHANGE: 0
DIARRHEA: 0
ANAL BLEEDING: 0
ABDOMINAL DISTENTION: 0
COUGH: 0
VOMITING: 0
SORE THROAT: 0

## 2022-10-07 NOTE — PROGRESS NOTES
GI CLINIC FOLLOW UP    [facility] HISTORY:   Amador Ocampo MD  [address] [facility] [place],  [address] [place] [address]    Chief Complaint   Patient presents with    Gastroesophageal Reflux     Pt is here today for a f/u prev Dr Etka Wheat pt last seen on 02/21/22 for GERD, Cirrhosis of the liver & GI bleed. 1. History of cirrhosis    2. Thrombocytopenia ([address]    3. H/O colonoscopy    4. Ex-smoker    5. Gastroesophageal reflux disease, unspecified whether esophagitis present    6. Marijuana user      This patient evaluated in my office for above issues he has been seen and followed by my other partner who has left the practice now    He has history significant for what appears to be alcohol related cirrhosis history for some thrombocytopenia    Had a colonoscopy done in the past    Patient also uses marijuana    Overall seems to be doing okay    Some acid reflux indigestion taking proton pump inhibitor therapy    Denies any rectal bleeding denies any melanotic stool    Mild irritable bowel syndrome-like symptom with abdominal bloating and gas and off-and-on crampy    Patient's previous records were reviewed with him and his accompanying daughter    It appears that patient never had an upper endoscopy to screen for varices  Also has history for GERD taking PPI    HISTORY OF PRESENT ILLNESS: Regla Bentley is a 65 y.o. male with a past history remarkable for , referred for evaluation of   Chief Complaint   Patient presents with    Gastroesophageal Reflux     Pt is here today for a f/u prev Dr Ekta Wheat pt last seen on 02/21/22 for GERD, Cirrhosis of the liver & GI bleed. .    Past Medical,Family, and Social History reviewed and does contribute to the patient presenting condition. Patient's PMH/PSH,SH,PSYCH Hx, MEDs, ALLERGIES, and ROS were all reviewed and updated in the appropriate sections.     PAST MEDICAL HISTORY:  Past Medical History:   Diagnosis Date    Calculus of kidney     Cerebral artery occlusion with cerebral infarction ([facility] 2020    slight memory problem (can't remember name of neurologist)    Cirrhosis ([facility]     sees PCP for this    COPD (chronic obstructive pulmonary disease) ([facility]     does not see pulmonology    COVID-19 12/31/2021    DETECTED    Essential hypertension, benign     Gout, unspecified     H/O colonoscopy 04/11/2016 2016    Mitral valve disorders(424.0)     СЕРГЕЙ on CPAP     not currently using due to malfunctioning    Other and unspecified hyperlipidemia     Type II or unspecified type diabetes mellitus without mention of complication, not stated as uncontrolled     Unspecified chronic liver disease without mention of alcohol     Wellness examination      [facility] (last visit approx Feb 2021)       Past Surgical History:   Procedure Laterality Date    COLONOSCOPY N/A 3/16/2022    COLONOSCOPY WITH BIOPSY AND RESOLUTION CLIPPING X1 performed by Margareth Black MD at [address] [place] [address]      LITHOTRIPSY  04/02/2021    LITHOTRIPSY N/A 4/2/2021    ESWL EXTRACORPOREAL SHOCK WAVE LITHOTRIPSY  ([facility] CONF# [id number] - AUGUSTO) performed by Ariana Moody MD at [place] [address] 5/25/2018    The esophagus was inspected to the Z-line. The endoscopic exam showed impression of varices (F1) in distal esophagus.      UPPER GASTROINTESTINAL ENDOSCOPY N/A 12/10/2021    EGD ESOPHAGOGASTRODUODENOSCOPY performed by Margareth Black MD at [address] [place] [address]:    Current Outpatient Medications:     lisinopril (PRINIVIL;ZESTRIL) 20 MG tablet, Take 1 tablet by mouth once daily, Disp: 30 tablet, Rfl: 3    tamsulosin (FLOMAX) 0.4 MG capsule, Take 1 capsule by mouth in the morning., Disp: 90 capsule, Rfl: 3    albuterol sulfate HFA (VENTOLIN HFA) 108 (90 Base) MCG/ACT inhaler, Inhale 1 puff into the lungs every 4 hours as needed for Wheezing, Disp: 18 g, Rfl: 6    fluticasone-salmeterol (ADVAIR) 100-50 MCG/DOSE diskus inhaler, INHALE 1 PUFF BY MOUTH EVERY 12 HOURS, Disp: 1 each, Rfl: 3    glimepiride (AMARYL) 4 MG tablet, TAKE 1 TABLET BY MOUTH TWICE DAILY, Disp: 60 tablet, Rfl: 11    allopurinol (ZYLOPRIM) 100 MG tablet, Take 1 tablet by mouth in the morning., Disp: 90 tablet, Rfl: 1    nadolol (CORGARD) 20 MG tablet, Take 1 tablet by mouth in the morning., Disp: 30 tablet, Rfl: 3    bumetanide (BUMEX) 1 MG tablet, Take 1 tablet by mouth daily, Disp: 90 tablet, Rfl: 1    omeprazole (PRILOSEC) 20 MG delayed release capsule, Take 1 capsule by mouth Daily, Disp: 90 capsule, Rfl: 1    SITagliptin (JANUVIA) 100 MG tablet, TAKE 1 TABLET BY MOUTH DAILY, Disp: 90 tablet, Rfl: 1    atorvastatin (LIPITOR) 40 MG tablet, Take 1 tablet by mouth nightly, Disp: 90 tablet, Rfl: 1    Lancets MISC, 1 each by Does not apply route 2 times daily, Disp: 300 each, Rfl: 1    blood glucose monitor strips, 1 strip by Other route 4 times daily Test 4  times a day & as needed for symptoms of irregular blood glucose., Disp: 100 strip, Rfl: 11    ALLERGIES:   Allergies   Allergen Reactions    Codeine Nausea Only and Other (See Comments)     hallucinations    Simvastatin Other (See Comments)     Leg cramping       FAMILY HISTORY:       Problem Relation Age of Onset    No Known Problems Brother     Diabetes Mother     Heart Attack Mother     Heart Disease Father     Diabetes Brother     Heart Disease Brother          SOCIAL HISTORY:   Social History     Socioeconomic History    Marital status:      Spouse name: Not on file    Number of children: Not on file    Years of education: Not on file    Highest education level: Not on file   Occupational History    Not on file   Tobacco Use    Smoking status: Former     Packs/day: 0.50     Years: 30.00     Pack years: 15.00     Types: Cigarettes, E-Cigarettes     Quit date: 3/26/2012     Years since quitting: 10.5    Smokeless tobacco: Former   Vaping Use    Vaping Use: Never used   Substance and Sexual Activity    Alcohol use: Not Currently     Alcohol/week: 0.0 standard drinks     Comment: used to drink \"a lot\"  None since Dec 2020    Drug use: Yes     Types: Marijuana Daril Geronimo)     Comment: every evening    Sexual activity: Not on file   Other Topics Concern    Not on file   Social History Narrative    Not on file     Social Determinants of Health     Financial Resource Strain: Low Risk     Difficulty of Paying Living Expenses: Not hard at all   Food Insecurity: No Food Insecurity    Worried About [address] in the Last Year: Never true    [address] in the Last Year: Never true   Transportation Needs: No Transportation Needs    Lack of Transportation (Medical): No    Lack of Transportation (Non-Medical): No   Physical Activity: Not on file   Stress: Not on file   Social Connections: Not on file   Intimate Partner Violence: Not on file   Housing Stability: Not on file         REVIEW OF SYSTEMS:         Review of Systems   Constitutional:  Negative for appetite change and fatigue. HENT:  Negative for sore throat and trouble swallowing. Eyes:  Negative for visual disturbance. Respiratory:  Negative for cough, choking, shortness of breath and wheezing. Cardiovascular:  Negative for chest pain, palpitations and leg swelling. Gastrointestinal:  Positive for abdominal pain. Negative for abdominal distention, anal bleeding, blood in stool, constipation, diarrhea, nausea, rectal pain and vomiting. Skin:  Negative for color change. Allergic/Immunologic: Negative for environmental allergies and food allergies. Neurological:  Positive for light-headedness. Negative for dizziness, weakness, numbness and headaches. Hematological:  Does not bruise/bleed easily. Psychiatric/Behavioral:  Negative for confusion and sleep disturbance. The patient is not nervous/anxious. PHYSICAL EXAMINATION: Vital signs reviewed per the nursing documentation.      /74   Temp 98.5 °F (36.9 °C)   Wt 228 lb (103.4 kg)   BMI 30.92 kg/m²   Body mass index is 30.92 kg/m². Physical Exam  Nursing note reviewed. Constitutional:       Appearance: He is well-developed. Comments: Anxious    HENT:      Head: Normocephalic and atraumatic. Eyes:      Conjunctiva/sclera: Conjunctivae normal.      Pupils: Pupils are equal, round, and reactive to light. Cardiovascular:      Rate and Rhythm: Normal rate and regular rhythm. Pulmonary:      Effort: Pulmonary effort is normal.      Breath sounds: Normal breath sounds. Abdominal:      General: Bowel sounds are normal.      Palpations: Abdomen is soft. Comments: NON TENDER, NON DISTENTED  LIVER SPLEEN AND HERNIAS ARE NOT  PALPABLE  BOWEL SOUNDS ARE POSITIVE      Genitourinary:     Rectum: Normal.   Musculoskeletal:         General: Normal range of motion. Cervical back: Normal range of motion and neck supple. Skin:     General: Skin is warm. Neurological:      Mental Status: He is alert and oriented to person, place, and time. Deep Tendon Reflexes: Reflexes are normal and symmetric. LABORATORY DATA: Reviewed  Lab Results   Component Value Date    WBC 4.3 08/15/2022    HGB 11.4 (L) 08/15/2022    HCT 35.1 (L) 08/15/2022    MCV 86.9 08/15/2022    PLT 67 (L) 08/15/2022     08/15/2022    K 4.0 08/15/2022     08/15/2022    CO2 25 08/15/2022    BUN 14 08/15/2022    CREATININE 0.51 (L) 08/15/2022    LABPROT 8.1 07/21/2012    LABALBU 3.2 (L) 08/15/2022    BILITOT 3.71 (H) 08/15/2022    ALKPHOS 109 08/15/2022    AST 33 08/15/2022    ALT 29 08/15/2022    INR 1.3 04/20/2022         Lab Results   Component Value Date    RBC 4.04 (L) 08/15/2022    HGB 11.4 (L) 08/15/2022    MCV 86.9 08/15/2022    MCH 28.2 08/15/2022    MCHC 32.5 08/15/2022    RDW 16.8 (H) 08/15/2022    MPV 8.3 08/15/2022    BASOPCT 0 08/15/2022    LYMPHSABS 1.20 08/15/2022    MONOSABS 0.60 08/15/2022    NEUTROABS 2.40 08/15/2022    EOSABS 0.10 08/15/2022    BASOSABS 0.00 08/15/2022         DIAGNOSTIC TESTING:     No results found. Assessment  1. History of cirrhosis    2. Thrombocytopenia ([address]    3. H/O colonoscopy    4. Ex-smoker    5. Gastroesophageal reflux disease, unspecified whether esophagitis present    6. Marijuana user        Plan    Plan EGD    The Endoscopic procedure was explained to the patient in detail  The prep and NPO were explained  All the Risks, Benefits, and Alternatives were explained  Risk of Bleeding, Perforation and Cardio Respiratory risks were explained  his questions were answered  The procedure has been scheduled with the  in the office  Patient was asked to give us a call for any questions  The patient has verbalized understanding and agreement to this plan. F/ U blood tests      Pt seems to have signs and symptoms consistent with GERD, acid indigestion and heartburns. He was discussed  in detail about some possible life style and dietary modifications. He was stressed about the maintenance  of appropriate weight and effect of obesity contributing to reflux symptoms. Routine exercise was streesed. Avoidance of Caffeine, nicotine and chocolate were explained. Pt was asked to avoid spices grease and fried food. Advices were also given about avoidance of any kind of fast foods, soda pops and high energy drinks. Pt was advised to place two small block under the head end of the bed which may help with night time reflux. Was advised not to eat any thin at least 2-3 hrs before going to bed and walk especially after dinner    Pt has verbalized understanding and agreement to this plan. Pt was discussed in detail about the possible side effects of proton pump inhibiter therapy. He was explained about the possibility of calcium and magnesium malabsorption and was advised to start taking calcium supplements with Vit D. Some over the counter regimens were explained to patient. Some dietary advices were also given. He has verbalized understanding and agreement to this.   Pt was advised in detail about some life style and dietary modifications. He was advised about avoidance of caffeine, nicotine and chocolate. Pt was also told to stay away from any kind of fast foods, soda pops. He was also advised to avoid lots of spices, grease and fried food etc.     Instructions were also given about trying to arrange the timing, quality and quantity of food. Instructions were given about using ample amount of fiber including dietary and supplemental fiber either metamucil, bennafiber or citrucell etc.  Pt was advised about drinking ample amount of water without any colors or chemicals. Stress was given about regular exercise. Pt has verbalized understanding and agreement to these modifications. More than half of patient's clinic visit time was spent in counseling about lifestyle and dietary modifications  Patient's  questions were answered in this regard as well  The patient has verbalized understanding and agreement     Completely avoid alcohol  Thank you for allowing me to participate in the care of Mr. Seilna Aguilar. For any further questions please do not hesitate to contact me. I have reviewed and agree with the ROS entered by the MA/Nurse.          Ulises Christensen MD, [facility]  Board Certified in Gastroenterology and [address] [place] [address] Gastroenterology  Office #: [phone]

## 2022-11-07 DIAGNOSIS — M10.9 GOUT, UNSPECIFIED CAUSE, UNSPECIFIED CHRONICITY, UNSPECIFIED SITE: ICD-10-CM

## 2022-11-07 RX ORDER — ALLOPURINOL 100 MG/1
100 TABLET ORAL DAILY
Qty: 90 TABLET | Refills: 1 | Status: SHIPPED | OUTPATIENT
Start: 2022-11-07

## 2022-11-08 DIAGNOSIS — J44.9 CHRONIC OBSTRUCTIVE PULMONARY DISEASE, UNSPECIFIED COPD TYPE (HCC): ICD-10-CM

## 2022-11-08 DIAGNOSIS — K21.9 GASTROESOPHAGEAL REFLUX DISEASE WITHOUT ESOPHAGITIS: ICD-10-CM

## 2022-11-08 DIAGNOSIS — K76.9 CHRONIC LIVER DISEASE: ICD-10-CM

## 2022-11-08 DIAGNOSIS — M10.9 GOUT, UNSPECIFIED CAUSE, UNSPECIFIED CHRONICITY, UNSPECIFIED SITE: ICD-10-CM

## 2022-11-08 DIAGNOSIS — E11.65 TYPE 2 DIABETES MELLITUS WITH HYPERGLYCEMIA, WITHOUT LONG-TERM CURRENT USE OF INSULIN (HCC): ICD-10-CM

## 2022-11-09 RX ORDER — BUMETANIDE 1 MG/1
1 TABLET ORAL DAILY
Qty: 90 TABLET | Refills: 1 | Status: SHIPPED | OUTPATIENT
Start: 2022-11-09

## 2022-11-09 RX ORDER — OMEPRAZOLE 20 MG/1
20 CAPSULE, DELAYED RELEASE ORAL DAILY
Qty: 90 CAPSULE | Refills: 1 | Status: SHIPPED | OUTPATIENT
Start: 2022-11-09 | End: 2022-11-28

## 2022-11-09 RX ORDER — NADOLOL 20 MG/1
20 TABLET ORAL DAILY
Qty: 30 TABLET | Refills: 3 | Status: SHIPPED | OUTPATIENT
Start: 2022-11-09

## 2022-11-09 RX ORDER — ALBUTEROL SULFATE 90 UG/1
1 AEROSOL, METERED RESPIRATORY (INHALATION) EVERY 4 HOURS PRN
Qty: 18 G | Refills: 6 | Status: SHIPPED | OUTPATIENT
Start: 2022-11-09

## 2022-11-09 RX ORDER — ATORVASTATIN CALCIUM 40 MG/1
40 TABLET, FILM COATED ORAL NIGHTLY
Qty: 90 TABLET | Refills: 1 | Status: SHIPPED | OUTPATIENT
Start: 2022-11-09

## 2022-11-09 RX ORDER — TAMSULOSIN HYDROCHLORIDE 0.4 MG/1
0.4 CAPSULE ORAL DAILY
Qty: 90 CAPSULE | Refills: 3 | Status: SHIPPED | OUTPATIENT
Start: 2022-11-09

## 2022-11-09 RX ORDER — ALLOPURINOL 100 MG/1
100 TABLET ORAL DAILY
Qty: 90 TABLET | Refills: 1 | OUTPATIENT
Start: 2022-11-09

## 2022-11-09 RX ORDER — LISINOPRIL 20 MG/1
TABLET ORAL
Qty: 30 TABLET | Refills: 3 | Status: SHIPPED | OUTPATIENT
Start: 2022-11-09

## 2022-11-09 RX ORDER — GLIMEPIRIDE 4 MG/1
TABLET ORAL
Qty: 60 TABLET | Refills: 11 | Status: SHIPPED | OUTPATIENT
Start: 2022-11-09

## 2022-11-16 ENCOUNTER — TELEPHONE (OUTPATIENT)
Dept: DERMATOLOGY | Age: 57
End: 2022-11-16

## 2022-11-16 DIAGNOSIS — K21.9 GASTROESOPHAGEAL REFLUX DISEASE WITHOUT ESOPHAGITIS: ICD-10-CM

## 2022-11-16 DIAGNOSIS — E11.65 TYPE 2 DIABETES MELLITUS WITH HYPERGLYCEMIA, WITHOUT LONG-TERM CURRENT USE OF INSULIN (HCC): ICD-10-CM

## 2022-11-16 RX ORDER — OMEPRAZOLE 20 MG/1
CAPSULE, DELAYED RELEASE ORAL
Qty: 30 CAPSULE | Refills: 0 | OUTPATIENT
Start: 2022-11-16

## 2022-11-16 NOTE — TELEPHONE ENCOUNTER
Called gage for update on PT's Mohs procedure. PT cancelled and said he would call back to reschedule and never did. Asked gage to call pt to reschedule. I also attempted to call pt to see if he planned on treating his skin cancer but number was out of service.

## 2022-11-26 DIAGNOSIS — E11.65 TYPE 2 DIABETES MELLITUS WITH HYPERGLYCEMIA, WITHOUT LONG-TERM CURRENT USE OF INSULIN (HCC): ICD-10-CM

## 2022-11-26 DIAGNOSIS — K21.9 GASTROESOPHAGEAL REFLUX DISEASE WITHOUT ESOPHAGITIS: ICD-10-CM

## 2022-11-28 RX ORDER — OMEPRAZOLE 20 MG/1
CAPSULE, DELAYED RELEASE ORAL
Qty: 27 CAPSULE | Refills: 0 | Status: SHIPPED | OUTPATIENT
Start: 2022-11-28

## 2022-12-02 DIAGNOSIS — K21.9 GASTROESOPHAGEAL REFLUX DISEASE WITHOUT ESOPHAGITIS: ICD-10-CM

## 2022-12-02 RX ORDER — OMEPRAZOLE 20 MG/1
CAPSULE, DELAYED RELEASE ORAL
Qty: 30 CAPSULE | Refills: 0 | OUTPATIENT
Start: 2022-12-02

## 2022-12-21 ENCOUNTER — HOSPITAL ENCOUNTER (INPATIENT)
Age: 57
LOS: 1 days | Discharge: HOME | DRG: 198 | End: 2022-12-22
Attending: STUDENT IN AN ORGANIZED HEALTH CARE EDUCATION/TRAINING PROGRAM | Admitting: INTERNAL MEDICINE
Payer: MEDICAID

## 2022-12-21 ENCOUNTER — APPOINTMENT (OUTPATIENT)
Dept: GENERAL RADIOLOGY | Age: 57
DRG: 198 | End: 2022-12-21
Payer: MEDICAID

## 2022-12-21 ENCOUNTER — APPOINTMENT (OUTPATIENT)
Dept: NUCLEAR MEDICINE | Age: 57
DRG: 198 | End: 2022-12-21
Payer: MEDICAID

## 2022-12-21 ENCOUNTER — APPOINTMENT (OUTPATIENT)
Dept: NON INVASIVE DIAGNOSTICS | Age: 57
DRG: 198 | End: 2022-12-21
Payer: MEDICAID

## 2022-12-21 DIAGNOSIS — R07.9 CHEST PAIN, UNSPECIFIED TYPE: Primary | ICD-10-CM

## 2022-12-21 PROBLEM — I20.9 ANGINA PECTORIS (HCC): Status: ACTIVE | Noted: 2022-12-21

## 2022-12-21 LAB
ABSOLUTE EOS #: 0 K/UL (ref 0–0.4)
ABSOLUTE LYMPH #: 0.96 K/UL (ref 1–4.8)
ABSOLUTE MONO #: 0.39 K/UL (ref 0.1–1.3)
ALBUMIN SERPL-MCNC: 3.7 G/DL (ref 3.5–5.2)
ALP BLD-CCNC: 156 U/L (ref 40–129)
ALT SERPL-CCNC: 32 U/L (ref 5–41)
ANION GAP SERPL CALCULATED.3IONS-SCNC: 11 MMOL/L (ref 9–17)
ANION GAP SERPL CALCULATED.3IONS-SCNC: 8 MMOL/L (ref 9–17)
AST SERPL-CCNC: 34 U/L
BASOPHILS # BLD: 0 % (ref 0–2)
BASOPHILS ABSOLUTE: 0 K/UL (ref 0–0.2)
BILIRUB SERPL-MCNC: 3.1 MG/DL (ref 0.3–1.2)
BUN BLDV-MCNC: 10 MG/DL (ref 6–20)
BUN BLDV-MCNC: 11 MG/DL (ref 6–20)
CALCIUM SERPL-MCNC: 8.8 MG/DL (ref 8.6–10.4)
CALCIUM SERPL-MCNC: 9.3 MG/DL (ref 8.6–10.4)
CHLORIDE BLD-SCNC: 107 MMOL/L (ref 98–107)
CHLORIDE BLD-SCNC: 109 MMOL/L (ref 98–107)
CO2: 23 MMOL/L (ref 20–31)
CO2: 24 MMOL/L (ref 20–31)
CREAT SERPL-MCNC: 0.51 MG/DL (ref 0.7–1.2)
CREAT SERPL-MCNC: 0.67 MG/DL (ref 0.7–1.2)
EKG ATRIAL RATE: 92 BPM
EKG P AXIS: 61 DEGREES
EKG P-R INTERVAL: 160 MS
EKG Q-T INTERVAL: 376 MS
EKG QRS DURATION: 100 MS
EKG QTC CALCULATION (BAZETT): 464 MS
EKG R AXIS: -22 DEGREES
EKG T AXIS: 37 DEGREES
EKG VENTRICULAR RATE: 92 BPM
EOSINOPHILS RELATIVE PERCENT: 0 % (ref 0–4)
GFR SERPL CREATININE-BSD FRML MDRD: >60 ML/MIN/1.73M2
GFR SERPL CREATININE-BSD FRML MDRD: >60 ML/MIN/1.73M2
GLUCOSE BLD-MCNC: 199 MG/DL (ref 75–110)
GLUCOSE BLD-MCNC: 253 MG/DL (ref 75–110)
GLUCOSE BLD-MCNC: 266 MG/DL (ref 70–99)
GLUCOSE BLD-MCNC: 292 MG/DL (ref 75–110)
GLUCOSE BLD-MCNC: 322 MG/DL (ref 75–110)
GLUCOSE BLD-MCNC: 433 MG/DL (ref 70–99)
HCT VFR BLD CALC: 36.2 % (ref 41–53)
HEMOGLOBIN: 12.2 G/DL (ref 13.5–17.5)
INFLUENZA A: NOT DETECTED
INFLUENZA B: NOT DETECTED
INR BLD: 1.2
LIPASE: 88 U/L (ref 13–60)
LV EF: 43 %
LV EF: 58 %
LVEF MODALITY: NORMAL
LVEF MODALITY: NORMAL
LYMPHOCYTES # BLD: 32 % (ref 24–44)
MAGNESIUM: 1.7 MG/DL (ref 1.6–2.6)
MCH RBC QN AUTO: 28.8 PG (ref 26–34)
MCHC RBC AUTO-ENTMCNC: 33.6 G/DL (ref 31–37)
MCV RBC AUTO: 85.7 FL (ref 80–100)
MONOCYTES # BLD: 13 % (ref 1–7)
MORPHOLOGY: ABNORMAL
MORPHOLOGY: ABNORMAL
PDW BLD-RTO: 15.6 % (ref 11.5–14.9)
PLATELET # BLD: 65 K/UL (ref 150–450)
PMV BLD AUTO: 8.5 FL (ref 6–12)
POTASSIUM SERPL-SCNC: 3.7 MMOL/L (ref 3.7–5.3)
POTASSIUM SERPL-SCNC: 4.1 MMOL/L (ref 3.7–5.3)
PRO-BNP: <20 PG/ML
PROTHROMBIN TIME: 14.9 SEC (ref 11.8–14.6)
RBC # BLD: 4.22 M/UL (ref 4.5–5.9)
SARS-COV-2 RNA, RT PCR: NOT DETECTED
SEG NEUTROPHILS: 55 % (ref 36–66)
SEGMENTED NEUTROPHILS ABSOLUTE COUNT: 1.65 K/UL (ref 1.3–9.1)
SODIUM BLD-SCNC: 140 MMOL/L (ref 135–144)
SODIUM BLD-SCNC: 142 MMOL/L (ref 135–144)
SOURCE: NORMAL
SPECIMEN DESCRIPTION: NORMAL
TOTAL PROTEIN: 6.9 G/DL (ref 6.4–8.3)
TROPONIN, HIGH SENSITIVITY: 10 NG/L (ref 0–22)
TROPONIN, HIGH SENSITIVITY: 9 NG/L (ref 0–22)
WBC # BLD: 3 K/UL (ref 3.5–11)

## 2022-12-21 PROCEDURE — 83690 ASSAY OF LIPASE: CPT

## 2022-12-21 PROCEDURE — 83735 ASSAY OF MAGNESIUM: CPT

## 2022-12-21 PROCEDURE — 84484 ASSAY OF TROPONIN QUANT: CPT

## 2022-12-21 PROCEDURE — 6370000000 HC RX 637 (ALT 250 FOR IP): Performed by: NURSE PRACTITIONER

## 2022-12-21 PROCEDURE — G0378 HOSPITAL OBSERVATION PER HR: HCPCS

## 2022-12-21 PROCEDURE — 80053 COMPREHEN METABOLIC PANEL: CPT

## 2022-12-21 PROCEDURE — 93017 CV STRESS TEST TRACING ONLY: CPT

## 2022-12-21 PROCEDURE — 36415 COLL VENOUS BLD VENIPUNCTURE: CPT

## 2022-12-21 PROCEDURE — 78452 HT MUSCLE IMAGE SPECT MULT: CPT

## 2022-12-21 PROCEDURE — 3430000000 HC RX DIAGNOSTIC RADIOPHARMACEUTICAL: Performed by: NURSE PRACTITIONER

## 2022-12-21 PROCEDURE — 99223 1ST HOSP IP/OBS HIGH 75: CPT | Performed by: INTERNAL MEDICINE

## 2022-12-21 PROCEDURE — 6370000000 HC RX 637 (ALT 250 FOR IP): Performed by: STUDENT IN AN ORGANIZED HEALTH CARE EDUCATION/TRAINING PROGRAM

## 2022-12-21 PROCEDURE — 93306 TTE W/DOPPLER COMPLETE: CPT

## 2022-12-21 PROCEDURE — 99285 EMERGENCY DEPT VISIT HI MDM: CPT

## 2022-12-21 PROCEDURE — 87636 SARSCOV2 & INF A&B AMP PRB: CPT

## 2022-12-21 PROCEDURE — 6360000002 HC RX W HCPCS: Performed by: STUDENT IN AN ORGANIZED HEALTH CARE EDUCATION/TRAINING PROGRAM

## 2022-12-21 PROCEDURE — 83880 ASSAY OF NATRIURETIC PEPTIDE: CPT

## 2022-12-21 PROCEDURE — 80048 BASIC METABOLIC PNL TOTAL CA: CPT

## 2022-12-21 PROCEDURE — 96374 THER/PROPH/DIAG INJ IV PUSH: CPT

## 2022-12-21 PROCEDURE — 71045 X-RAY EXAM CHEST 1 VIEW: CPT

## 2022-12-21 PROCEDURE — 82947 ASSAY GLUCOSE BLOOD QUANT: CPT

## 2022-12-21 PROCEDURE — 2580000003 HC RX 258: Performed by: NURSE PRACTITIONER

## 2022-12-21 PROCEDURE — 6360000002 HC RX W HCPCS: Performed by: NURSE PRACTITIONER

## 2022-12-21 PROCEDURE — 93005 ELECTROCARDIOGRAM TRACING: CPT | Performed by: STUDENT IN AN ORGANIZED HEALTH CARE EDUCATION/TRAINING PROGRAM

## 2022-12-21 PROCEDURE — 85025 COMPLETE CBC W/AUTO DIFF WBC: CPT

## 2022-12-21 PROCEDURE — 93010 ELECTROCARDIOGRAM REPORT: CPT | Performed by: INTERNAL MEDICINE

## 2022-12-21 PROCEDURE — 2060000000 HC ICU INTERMEDIATE R&B

## 2022-12-21 PROCEDURE — A9500 TC99M SESTAMIBI: HCPCS | Performed by: NURSE PRACTITIONER

## 2022-12-21 PROCEDURE — 85610 PROTHROMBIN TIME: CPT

## 2022-12-21 PROCEDURE — 96372 THER/PROPH/DIAG INJ SC/IM: CPT

## 2022-12-21 PROCEDURE — 2580000003 HC RX 258: Performed by: STUDENT IN AN ORGANIZED HEALTH CARE EDUCATION/TRAINING PROGRAM

## 2022-12-21 RX ORDER — SODIUM CHLORIDE 9 MG/ML
INJECTION, SOLUTION INTRAVENOUS PRN
Status: DISCONTINUED | OUTPATIENT
Start: 2022-12-21 | End: 2022-12-22 | Stop reason: HOSPADM

## 2022-12-21 RX ORDER — SODIUM CHLORIDE 0.9 % (FLUSH) 0.9 %
5-40 SYRINGE (ML) INJECTION PRN
Status: DISCONTINUED | OUTPATIENT
Start: 2022-12-21 | End: 2022-12-21 | Stop reason: SDUPTHER

## 2022-12-21 RX ORDER — NADOLOL 20 MG/1
20 TABLET ORAL DAILY
Status: DISCONTINUED | OUTPATIENT
Start: 2022-12-21 | End: 2022-12-22 | Stop reason: HOSPADM

## 2022-12-21 RX ORDER — ATROPINE SULFATE 0.1 MG/ML
0.5 INJECTION INTRAVENOUS EVERY 5 MIN PRN
Status: DISCONTINUED | OUTPATIENT
Start: 2022-12-21 | End: 2022-12-21 | Stop reason: SDUPTHER

## 2022-12-21 RX ORDER — ACETAMINOPHEN 325 MG/1
650 TABLET ORAL EVERY 6 HOURS PRN
Status: DISCONTINUED | OUTPATIENT
Start: 2022-12-21 | End: 2022-12-22 | Stop reason: HOSPADM

## 2022-12-21 RX ORDER — SODIUM CHLORIDE 0.9 % (FLUSH) 0.9 %
10 SYRINGE (ML) INJECTION PRN
Status: DISCONTINUED | OUTPATIENT
Start: 2022-12-21 | End: 2022-12-22 | Stop reason: HOSPADM

## 2022-12-21 RX ORDER — NITROGLYCERIN 0.4 MG/1
0.4 TABLET SUBLINGUAL EVERY 5 MIN PRN
Status: DISCONTINUED | OUTPATIENT
Start: 2022-12-21 | End: 2022-12-21 | Stop reason: SDUPTHER

## 2022-12-21 RX ORDER — ACETAMINOPHEN 650 MG/1
650 SUPPOSITORY RECTAL EVERY 6 HOURS PRN
Status: DISCONTINUED | OUTPATIENT
Start: 2022-12-21 | End: 2022-12-22 | Stop reason: HOSPADM

## 2022-12-21 RX ORDER — ENOXAPARIN SODIUM 100 MG/ML
30 INJECTION SUBCUTANEOUS 2 TIMES DAILY
Status: DISCONTINUED | OUTPATIENT
Start: 2022-12-21 | End: 2022-12-22 | Stop reason: HOSPADM

## 2022-12-21 RX ORDER — PANTOPRAZOLE SODIUM 40 MG/1
40 TABLET, DELAYED RELEASE ORAL
Status: DISCONTINUED | OUTPATIENT
Start: 2022-12-21 | End: 2022-12-22 | Stop reason: HOSPADM

## 2022-12-21 RX ORDER — BUMETANIDE 1 MG/1
1 TABLET ORAL DAILY
Status: DISCONTINUED | OUTPATIENT
Start: 2022-12-21 | End: 2022-12-22 | Stop reason: HOSPADM

## 2022-12-21 RX ORDER — TECHNETIUM TC-99M SESTAMIBI 1 MG/10ML
15.6 INJECTION INTRAVENOUS
Status: COMPLETED | OUTPATIENT
Start: 2022-12-21 | End: 2022-12-21

## 2022-12-21 RX ORDER — AMINOPHYLLINE DIHYDRATE 25 MG/ML
50 INJECTION, SOLUTION INTRAVENOUS PRN
Status: DISCONTINUED | OUTPATIENT
Start: 2022-12-21 | End: 2022-12-21 | Stop reason: SDUPTHER

## 2022-12-21 RX ORDER — ATROPINE SULFATE 0.1 MG/ML
0.5 INJECTION INTRAVENOUS EVERY 5 MIN PRN
Status: ACTIVE | OUTPATIENT
Start: 2022-12-21 | End: 2022-12-21

## 2022-12-21 RX ORDER — BUDESONIDE AND FORMOTEROL FUMARATE DIHYDRATE 80; 4.5 UG/1; UG/1
2 AEROSOL RESPIRATORY (INHALATION) 2 TIMES DAILY
Status: DISCONTINUED | OUTPATIENT
Start: 2022-12-21 | End: 2022-12-22 | Stop reason: HOSPADM

## 2022-12-21 RX ORDER — TECHNETIUM TC-99M SESTAMIBI 1 MG/10ML
38.2 INJECTION INTRAVENOUS
Status: COMPLETED | OUTPATIENT
Start: 2022-12-21 | End: 2022-12-21

## 2022-12-21 RX ORDER — ALBUTEROL SULFATE 90 UG/1
1 AEROSOL, METERED RESPIRATORY (INHALATION) EVERY 4 HOURS PRN
Status: DISCONTINUED | OUTPATIENT
Start: 2022-12-21 | End: 2022-12-22 | Stop reason: HOSPADM

## 2022-12-21 RX ORDER — LISINOPRIL 20 MG/1
20 TABLET ORAL DAILY
Status: DISCONTINUED | OUTPATIENT
Start: 2022-12-21 | End: 2022-12-22 | Stop reason: HOSPADM

## 2022-12-21 RX ORDER — ALLOPURINOL 100 MG/1
100 TABLET ORAL DAILY
Status: DISCONTINUED | OUTPATIENT
Start: 2022-12-21 | End: 2022-12-22 | Stop reason: HOSPADM

## 2022-12-21 RX ORDER — ENOXAPARIN SODIUM 100 MG/ML
40 INJECTION SUBCUTANEOUS DAILY
Status: DISCONTINUED | OUTPATIENT
Start: 2022-12-21 | End: 2022-12-21 | Stop reason: DRUGHIGH

## 2022-12-21 RX ORDER — ONDANSETRON 2 MG/ML
4 INJECTION INTRAMUSCULAR; INTRAVENOUS EVERY 6 HOURS PRN
Status: DISCONTINUED | OUTPATIENT
Start: 2022-12-21 | End: 2022-12-22 | Stop reason: HOSPADM

## 2022-12-21 RX ORDER — AMINOPHYLLINE DIHYDRATE 25 MG/ML
50 INJECTION, SOLUTION INTRAVENOUS PRN
Status: ACTIVE | OUTPATIENT
Start: 2022-12-21 | End: 2022-12-21

## 2022-12-21 RX ORDER — SODIUM CHLORIDE 0.9 % (FLUSH) 0.9 %
5-40 SYRINGE (ML) INJECTION PRN
Status: ACTIVE | OUTPATIENT
Start: 2022-12-21 | End: 2022-12-21

## 2022-12-21 RX ORDER — SODIUM CHLORIDE 9 MG/ML
500 INJECTION, SOLUTION INTRAVENOUS CONTINUOUS PRN
Status: ACTIVE | OUTPATIENT
Start: 2022-12-21 | End: 2022-12-21

## 2022-12-21 RX ORDER — ASPIRIN 81 MG/1
324 TABLET, CHEWABLE ORAL ONCE
Status: COMPLETED | OUTPATIENT
Start: 2022-12-21 | End: 2022-12-21

## 2022-12-21 RX ORDER — ATORVASTATIN CALCIUM 40 MG/1
40 TABLET, FILM COATED ORAL NIGHTLY
Status: DISCONTINUED | OUTPATIENT
Start: 2022-12-21 | End: 2022-12-22

## 2022-12-21 RX ORDER — INSULIN LISPRO 100 [IU]/ML
0-8 INJECTION, SOLUTION INTRAVENOUS; SUBCUTANEOUS
Status: DISCONTINUED | OUTPATIENT
Start: 2022-12-21 | End: 2022-12-22 | Stop reason: HOSPADM

## 2022-12-21 RX ORDER — SODIUM CHLORIDE 0.9 % (FLUSH) 0.9 %
5-40 SYRINGE (ML) INJECTION EVERY 12 HOURS SCHEDULED
Status: DISCONTINUED | OUTPATIENT
Start: 2022-12-21 | End: 2022-12-22 | Stop reason: HOSPADM

## 2022-12-21 RX ORDER — ONDANSETRON 4 MG/1
4 TABLET, ORALLY DISINTEGRATING ORAL EVERY 8 HOURS PRN
Status: DISCONTINUED | OUTPATIENT
Start: 2022-12-21 | End: 2022-12-22 | Stop reason: HOSPADM

## 2022-12-21 RX ORDER — ALBUTEROL SULFATE 90 UG/1
2 AEROSOL, METERED RESPIRATORY (INHALATION) PRN
Status: ACTIVE | OUTPATIENT
Start: 2022-12-21 | End: 2022-12-21

## 2022-12-21 RX ORDER — ASPIRIN 81 MG/1
81 TABLET, CHEWABLE ORAL DAILY
Status: DISCONTINUED | OUTPATIENT
Start: 2022-12-22 | End: 2022-12-22

## 2022-12-21 RX ORDER — ALBUTEROL SULFATE 90 UG/1
2 AEROSOL, METERED RESPIRATORY (INHALATION) PRN
Status: DISCONTINUED | OUTPATIENT
Start: 2022-12-21 | End: 2022-12-21

## 2022-12-21 RX ORDER — POLYETHYLENE GLYCOL 3350 17 G/17G
17 POWDER, FOR SOLUTION ORAL DAILY PRN
Status: DISCONTINUED | OUTPATIENT
Start: 2022-12-21 | End: 2022-12-22 | Stop reason: HOSPADM

## 2022-12-21 RX ORDER — INSULIN LISPRO 100 [IU]/ML
0-4 INJECTION, SOLUTION INTRAVENOUS; SUBCUTANEOUS NIGHTLY
Status: DISCONTINUED | OUTPATIENT
Start: 2022-12-21 | End: 2022-12-22 | Stop reason: HOSPADM

## 2022-12-21 RX ORDER — METOPROLOL TARTRATE 5 MG/5ML
5 INJECTION INTRAVENOUS EVERY 5 MIN PRN
Status: ACTIVE | OUTPATIENT
Start: 2022-12-21 | End: 2022-12-21

## 2022-12-21 RX ORDER — SODIUM CHLORIDE 9 MG/ML
500 INJECTION, SOLUTION INTRAVENOUS CONTINUOUS PRN
Status: DISCONTINUED | OUTPATIENT
Start: 2022-12-21 | End: 2022-12-21 | Stop reason: SDUPTHER

## 2022-12-21 RX ORDER — METOPROLOL TARTRATE 5 MG/5ML
5 INJECTION INTRAVENOUS EVERY 5 MIN PRN
Status: DISCONTINUED | OUTPATIENT
Start: 2022-12-21 | End: 2022-12-21 | Stop reason: SDUPTHER

## 2022-12-21 RX ORDER — DEXTROSE MONOHYDRATE 100 MG/ML
INJECTION, SOLUTION INTRAVENOUS CONTINUOUS PRN
Status: DISCONTINUED | OUTPATIENT
Start: 2022-12-21 | End: 2022-12-22 | Stop reason: HOSPADM

## 2022-12-21 RX ORDER — MORPHINE SULFATE 4 MG/ML
4 INJECTION, SOLUTION INTRAMUSCULAR; INTRAVENOUS ONCE
Status: COMPLETED | OUTPATIENT
Start: 2022-12-21 | End: 2022-12-21

## 2022-12-21 RX ORDER — TAMSULOSIN HYDROCHLORIDE 0.4 MG/1
0.4 CAPSULE ORAL DAILY
Status: DISCONTINUED | OUTPATIENT
Start: 2022-12-21 | End: 2022-12-22 | Stop reason: HOSPADM

## 2022-12-21 RX ORDER — INSULIN LISPRO 100 [IU]/ML
10 INJECTION, SOLUTION INTRAVENOUS; SUBCUTANEOUS ONCE
Status: COMPLETED | OUTPATIENT
Start: 2022-12-21 | End: 2022-12-21

## 2022-12-21 RX ORDER — NITROGLYCERIN 0.4 MG/1
0.4 TABLET SUBLINGUAL EVERY 5 MIN PRN
Status: ACTIVE | OUTPATIENT
Start: 2022-12-21 | End: 2022-12-21

## 2022-12-21 RX ORDER — 0.9 % SODIUM CHLORIDE 0.9 %
1000 INTRAVENOUS SOLUTION INTRAVENOUS ONCE
Status: COMPLETED | OUTPATIENT
Start: 2022-12-21 | End: 2022-12-21

## 2022-12-21 RX ADMIN — SODIUM CHLORIDE, PRESERVATIVE FREE 10 ML: 5 INJECTION INTRAVENOUS at 12:44

## 2022-12-21 RX ADMIN — LISINOPRIL 20 MG: 20 TABLET ORAL at 11:23

## 2022-12-21 RX ADMIN — SODIUM CHLORIDE, PRESERVATIVE FREE 10 ML: 5 INJECTION INTRAVENOUS at 20:37

## 2022-12-21 RX ADMIN — SODIUM CHLORIDE 1000 ML: 9 INJECTION, SOLUTION INTRAVENOUS at 01:58

## 2022-12-21 RX ADMIN — SODIUM CHLORIDE, PRESERVATIVE FREE 10 ML: 5 INJECTION INTRAVENOUS at 11:23

## 2022-12-21 RX ADMIN — INSULIN LISPRO 10 UNITS: 100 INJECTION, SOLUTION INTRAVENOUS; SUBCUTANEOUS at 01:58

## 2022-12-21 RX ADMIN — Medication 15.6 MILLICURIE: at 09:32

## 2022-12-21 RX ADMIN — NADOLOL 20 MG: 20 TABLET ORAL at 11:25

## 2022-12-21 RX ADMIN — INSULIN LISPRO 4 UNITS: 100 INJECTION, SOLUTION INTRAVENOUS; SUBCUTANEOUS at 12:24

## 2022-12-21 RX ADMIN — BUMETANIDE 1 MG: 1 TABLET ORAL at 11:23

## 2022-12-21 RX ADMIN — TAMSULOSIN HYDROCHLORIDE 0.4 MG: 0.4 CAPSULE ORAL at 11:24

## 2022-12-21 RX ADMIN — Medication 38.2 MILLICURIE: at 12:44

## 2022-12-21 RX ADMIN — ALLOPURINOL 100 MG: 100 TABLET ORAL at 11:24

## 2022-12-21 RX ADMIN — REGADENOSON 0.4 MG: 0.08 INJECTION, SOLUTION INTRAVENOUS at 09:22

## 2022-12-21 RX ADMIN — MORPHINE SULFATE 4 MG: 4 INJECTION, SOLUTION INTRAMUSCULAR; INTRAVENOUS at 00:57

## 2022-12-21 RX ADMIN — ENOXAPARIN SODIUM 30 MG: 100 INJECTION SUBCUTANEOUS at 20:36

## 2022-12-21 RX ADMIN — PANTOPRAZOLE SODIUM 40 MG: 40 TABLET, DELAYED RELEASE ORAL at 11:23

## 2022-12-21 RX ADMIN — ASPIRIN 324 MG: 81 TABLET, CHEWABLE ORAL at 00:54

## 2022-12-21 RX ADMIN — ATORVASTATIN CALCIUM 40 MG: 40 TABLET, FILM COATED ORAL at 20:36

## 2022-12-21 ASSESSMENT — ENCOUNTER SYMPTOMS
BACK PAIN: 0
VOMITING: 0
COUGH: 0
ABDOMINAL PAIN: 0
SORE THROAT: 0
DIARRHEA: 0
RHINORRHEA: 0
CONSTIPATION: 0
CHEST TIGHTNESS: 0
EYE REDNESS: 0
TROUBLE SWALLOWING: 0
COLOR CHANGE: 0
SHORTNESS OF BREATH: 0
EYE DISCHARGE: 0
NAUSEA: 0

## 2022-12-21 ASSESSMENT — PAIN DESCRIPTION - LOCATION
LOCATION: CHEST

## 2022-12-21 ASSESSMENT — PAIN SCALES - GENERAL
PAINLEVEL_OUTOF10: 7
PAINLEVEL_OUTOF10: 2
PAINLEVEL_OUTOF10: 7
PAINLEVEL_OUTOF10: 7
PAINLEVEL_OUTOF10: 2

## 2022-12-21 ASSESSMENT — HEART SCORE: ECG: 0

## 2022-12-21 ASSESSMENT — PAIN DESCRIPTION - ORIENTATION
ORIENTATION: MID;RIGHT
ORIENTATION: RIGHT

## 2022-12-21 ASSESSMENT — PAIN DESCRIPTION - DESCRIPTORS: DESCRIPTORS: HEAVINESS

## 2022-12-21 ASSESSMENT — PAIN - FUNCTIONAL ASSESSMENT
PAIN_FUNCTIONAL_ASSESSMENT: 0-10
PAIN_FUNCTIONAL_ASSESSMENT: 0-10

## 2022-12-21 NOTE — PROGRESS NOTES
Pharmacy Note     Enoxaparin Dose Adjustment    Lloyd Salinas is a 65 y.o. male. Pharmacist assessment of enoxaparin dose for VTE prophylaxis. Recent Labs     12/21/22  0058   BUN 10       Recent Labs     12/21/22  0058   CREATININE 0.67*       Estimated Creatinine Clearance: 154 mL/min (A) (based on SCr of 0.67 mg/dL (L)). Height:   Ht Readings from Last 1 Encounters:   12/21/22 6' (1.829 m)     Weight:  Wt Readings from Last 1 Encounters:   12/21/22 230 lb (104.3 kg)       The following enoxaparin dose has been adjusted based upon renal function and/or patient weight per P&T Guidelines:    Lovenox dose has been changed from 40mg daily to 30mg twice daily for weight 100-149kg and crcl 30ml/min or greater.     [facility]    12/21/2022   5:43 AM

## 2022-12-21 NOTE — PROGRESS NOTES
[facility] Internal Medicine  Jacklyn Walsh MD; Santo Coronado MD; Jarrod Espinoza MD; MD Claude Bradford MD; Murphy Shipley MD  [facility]   [facility]                 Date:   12/21/2022  Patientname:  Fredy Rivera  Date of admission:  12/21/2022 12:41 AM  MRN:   [id number]  Account:  [de-identified]  YOB: 1965  PCP:    Anabela Starr MD  Room:   08/08  Code Status:    Full code      Chief Complaint:     Chief Complaint   Patient presents with    Chest Pain     Right side, 7/10, heavy quality x 1 day       History of Present Illness:     Fredy Rivera is a 65 y.o. Non- / non  male who presents with Chest Pain (Right side, 7/10, heavy quality x 1 day) and is admitted to the hospital for the management of Angina pectoris ([facility] [address] [facility] Difficult medical history includes COPD, СЕРГЕЙ on CPAP, HTN, HLD, chronic liver disease, TIA and DM type II. According to patient, he has been experiencing chest pain for approximately 1 to 2 days. He describes the pain as sharp and intermittent with a heavy pressure on the right side of his chest.  He denies any shortness of breath or nausea. He denies radiation of pain. ED chest pain resolved with ministration of aspirin and morphine. Troponins negative x2, EKG without acute changes. His last echo was in 2020 with EF of 55%. Last stress test was in 2016. Denies fever, chills, chest pain, cough, abdominal pain, nausea, vomiting, diarrhea, and urinary symptoms. There are no aggravating or alleviating factors. Symptoms are reported as   Acute, intermittent and moderate in severity.   Past Medical History:     Past Medical History:   Diagnosis Date    Calculus of kidney     Cerebral artery occlusion with cerebral infarction ([facility] [address] 2020    slight memory problem (can't remember name of neurologist)    Cirrhosis ([facility] [address]     sees PCP for this    COPD (chronic obstructive pulmonary disease) [facility])     does not see pulmonology    COVID-19 12/31/2021    DETECTED    Essential hypertension, benign     Gout, unspecified     H/O colonoscopy 04/11/2016    2016    Mitral valve disorders(424.0)     СЕРГЕЙ on CPAP     not currently using due to malfunctioning    Other and unspecified hyperlipidemia     Type II or unspecified type diabetes mellitus without mention of complication, not stated as uncontrolled     Unspecified chronic liver disease without mention of alcohol     Wellness examination      [facility] (last visit approx Feb 2021)        Past Surgical History:     Past Surgical History:   Procedure Laterality Date    COLONOSCOPY N/A 3/16/2022    COLONOSCOPY WITH BIOPSY AND RESOLUTION CLIPPING X1 performed by Benito Lennox, MD at [facility]      LITHOTRIPSY  04/02/2021    LITHOTRIPSY N/A 4/2/2021    ESWL EXTRACORPOREAL SHOCK WAVE LITHOTRIPSY  ([facility] CONF# [id number] - AUGUSTO) performed by Kenan Choi MD at [address] 5/25/2018    The esophagus was inspected to the Z-line. The endoscopic exam showed impression of varices (F1) in distal esophagus. UPPER GASTROINTESTINAL ENDOSCOPY N/A 12/10/2021    EGD ESOPHAGOGASTRODUODENOSCOPY performed by Benito Lennox, MD at [facility]        Medications Prior to Admission:     Prior to Admission medications    Medication Sig Start Date End Date Taking?  Authorizing Provider   SITagliptin (JANUVIA) 100 MG tablet Take 1 tablet by mouth once daily 11/28/22  Yes Jeffery Hines MD   omeprazole (PRILOSEC) 20 MG delayed release capsule Take 1 capsule by mouth once daily 11/28/22  Yes Jeffery Hines MD   atorvastatin (LIPITOR) 40 MG tablet Take 1 tablet by mouth nightly 11/9/22  Yes Jeffery Hines MD   tamsulosin (FLOMAX) 0.4 MG capsule Take 1 capsule by mouth daily 11/9/22  Yes Jeffery Hines MD   albuterol sulfate HFA (VENTOLIN HFA) 108 (90 Base) MCG/ACT inhaler Inhale 1 puff into the lungs every 4 hours as needed for Wheezing 11/9/22  Yes Tanika Live MD   fluticasone-salmeterol (ADVAIR) 100-50 MCG/DOSE diskus inhaler INHALE 1 PUFF BY MOUTH EVERY 12 HOURS 11/9/22  Yes Tanika Live MD   glimepiride (AMARYL) 4 MG tablet TAKE 1 TABLET BY MOUTH TWICE DAILY 11/9/22  Yes Tanika Live MD   nadolol (CORGARD) 20 MG tablet Take 1 tablet by mouth daily 11/9/22  Yes Tanika Live MD   lisinopril (PRINIVIL;ZESTRIL) 20 MG tablet Take 1 tablet by mouth once daily 11/9/22  Yes Tanika Live MD   bumetanide (BUMEX) 1 MG tablet Take 1 tablet by mouth daily 11/9/22  Yes Tanika Live MD   allopurinol (ZYLOPRIM) 100 MG tablet Take 1 tablet by mouth daily 11/7/22  Yes Tanika Live MD   Lancets MISC 1 each by Does not apply route 2 times daily 1/27/22   Tanika Live MD   metoprolol tartrate (LOPRESSOR) 25 MG tablet Take 1 tablet by mouth 2 times daily 4/6/21   Tanika Live MD   blood glucose monitor strips 1 strip by Other route 4 times daily Test 4  times a day & as needed for symptoms of irregular blood glucose. 5/3/19   Tanika Live MD        Allergies:     Codeine and Simvastatin    Social History:     Tobacco:    reports that he quit smoking about 10 years ago. His smoking use included cigarettes and e-cigarettes. He has a 15.00 pack-year smoking history. He has quit using smokeless tobacco.  Alcohol:      reports that he does not currently use alcohol. Drug Use:  reports current drug use. Drug: Marijuana [employer]).     Family History:     Family History   Problem Relation Age of Onset    No Known Problems Brother     Diabetes Mother     Heart Attack Mother     Heart Disease Father     Diabetes Brother     Heart Disease Brother        Investigations:      Laboratory Testing:  Recent Results (from the past 24 hour(s))   CBC with Auto Differential    Collection Time: 12/21/22 12:58 AM   Result Value Ref Range    WBC 3.0 (L) 3.5 - 11.0 k/uL    RBC 4.22 (L) 4.5 - 5.9 m/uL    Hemoglobin 12.2 (L) 13.5 - 17.5 g/dL    Hematocrit 36.2 (L) 41 - 53 %    MCV 85.7 80 - 100 fL    MCH 28.8 26 - 34 pg    MCHC 33.6 31 - 37 g/dL    RDW 15.6 (H) 11.5 - 14.9 %    Platelets 65 (L) 332 - 450 k/uL    MPV 8.5 6.0 - 12.0 fL    Seg Neutrophils 55 36 - 66 %    Lymphocytes 32 24 - 44 %    Monocytes 13 (H) 1 - 7 %    Eosinophils % 0 0 - 4 %    Basophils 0 0 - 2 %    Segs Absolute 1.65 1.3 - 9.1 k/uL    Absolute Lymph # 0.96 (L) 1.0 - 4.8 k/uL    Absolute Mono # 0.39 0.1 - 1.3 k/uL    Absolute Eos # 0.00 0.0 - 0.4 k/uL    Basophils Absolute 0.00 0.0 - 0.2 k/uL    Morphology ANISOCYTOSIS PRESENT     Morphology 1+ POLYCHROMASIA    CMP    Collection Time: 12/21/22 12:58 AM   Result Value Ref Range    Glucose 433 (HH) 70 - 99 mg/dL    BUN 10 6 - 20 mg/dL    Creatinine 0.67 (L) 0.70 - 1.20 mg/dL    Est, Glom Filt Rate >60 >60 mL/min/1.73m2    Calcium 9.3 8.6 - 10.4 mg/dL    Sodium 142 135 - 144 mmol/L    Potassium 4.1 3.7 - 5.3 mmol/L    Chloride 107 98 - 107 mmol/L    CO2 24 20 - 31 mmol/L    Anion Gap 11 9 - 17 mmol/L    Alkaline Phosphatase 156 (H) 40 - 129 U/L    ALT 32 5 - 41 U/L    AST 34 <40 U/L    Total Bilirubin 3.1 (H) 0.3 - 1.2 mg/dL    Total Protein 6.9 6.4 - 8.3 g/dL    Albumin 3.7 3.5 - 5.2 g/dL   Lipase    Collection Time: 12/21/22 12:58 AM   Result Value Ref Range    Lipase 88 (H) 13 - 60 U/L   Magnesium    Collection Time: 12/21/22 12:58 AM   Result Value Ref Range    Magnesium 1.7 1.6 - 2.6 mg/dL   Protime-INR    Collection Time: 12/21/22 12:58 AM   Result Value Ref Range    Protime 14.9 (H) 11.8 - 14.6 sec    INR 1.2    Troponin Now and Q 1 Hour    Collection Time: 12/21/22 12:58 AM   Result Value Ref Range    Troponin, High Sensitivity 10 0 - 22 ng/L   EKG 12 Lead    Collection Time: 12/21/22  1:06 AM   Result Value Ref Range    Ventricular Rate 92 BPM    Atrial Rate 92 BPM    P-R Interval 160 ms    QRS Duration 100 ms    Q-T Interval 376 ms    QTc Calculation (Bazett) 464 ms    P Axis 61 degrees    R Axis -22 degrees    T Axis 37 degrees   COVID-19 & Influenza Combo    Collection Time: 12/21/22  1:08 AM    Specimen: Nasopharyngeal Swab   Result Value Ref Range    Specimen Description . NASOPHARYNGEAL SWAB     Source . NASOPHARYNGEAL SWAB     SARS-CoV-2 RNA, RT PCR Not Detected Not Detected    INFLUENZA A Not Detected Not Detected    INFLUENZA B Not Detected Not Detected   Troponin Now and Q 1 Hour    Collection Time: 12/21/22  3:00 AM   Result Value Ref Range    Troponin, High Sensitivity 9 0 - 22 ng/L   POC Glucose Fingerstick    Collection Time: 12/21/22  3:36 AM   Result Value Ref Range    POC Glucose 322 (H) 75 - 110 mg/dL       Imaging/Diagnostics:  XR CHEST PORTABLE    Result Date: 12/21/2022  No acute airspace disease identified. Plan:     Patient status inpatient in the Progressive Unit/Step down    Chest Pain  -Troponin  10 and repeat 9  -EKG - NSR  -Stress Test in am  --NPO after midnight  -Check magnesium, BNP, TSH, Lipid panel, & HgbA1c in am  -Check 2D echocardiogram  -Pain/nausea control  -EKG PRN chest pain     Diabetes Mellitus  -hold oral hypoglycemics/metformin for now  -POCT ac and hs with sliding scale coverage    Full code    Lovenox for DVT prophylaxis     FAUSTO Rich - NP  12/21/2022  4:10 AM      Please note that this chart was generated using voice recognition Dragon dictation software. Although every effort was made to ensure the accuracy of this automated transcription, some errors in transcription may have occurred. [address]    Phone [phone]

## 2022-12-21 NOTE — PROGRESS NOTES
Siddharth Dee. Stress Tech performs patient preparation of physical comfort, review test procedures, pre-stress EKG. Lung Sounds clear in all fields. Consent verified. Educated patient on test procedure and possible side effects of Lexiscan. Cardiologist reviewed pre-test EKG and is present for test. Patient tolerated test well with minor SOB and H/A which resolved to baseline after test with caffeine. EKG portion of test complete, Nuc Med portion pending.   Pretest VS: /91 HR 70  Post test VS: /92 HR 97

## 2022-12-21 NOTE — ED PROVIDER NOTES
EMERGENCY DEPARTMENT ENCOUNTER    Pt Name: Kristen Chowdhury  MRN: [id number]  [place] 1965  Date of evaluation: 12/21/22  CHIEF COMPLAINT       Chief Complaint   Patient presents with    Chest Pain     Right side, 7/10, heavy quality x 1 day     HISTORY OF PRESENT ILLNESS   65year-old history of hypertension, hyperlipidemia, diabetes, prior TIA, former smoker presenting with chest pain  going on for about a day. Chest pressure. Right-sided. Heaviness. Nothing makes it better. Nothing makes it worse    No leg swelling recent travel or surgery no history of blood clots                REVIEW OF SYSTEMS     Review of Systems   Constitutional:  Negative for chills and fever. HENT:  Negative for rhinorrhea and sore throat. Eyes:  Negative for discharge and redness. Respiratory:  Negative for chest tightness and shortness of breath. Cardiovascular:  Positive for chest pain. Gastrointestinal:  Negative for abdominal pain, diarrhea, nausea and vomiting. Genitourinary:  Negative for dysuria and frequency. Musculoskeletal:  Negative for arthralgias and myalgias. Skin:  Negative for rash. Neurological:  Negative for weakness and numbness. Psychiatric/Behavioral:  Negative for suicidal ideas. All other systems reviewed and are negative.   PASTMEDICAL HISTORY     Past Medical History:   Diagnosis Date    Calculus of kidney     Cerebral artery occlusion with cerebral infarction ([address] 2020    slight memory problem (can't remember name of neurologist)    Cirrhosis ([address]     sees PCP for this    COPD (chronic obstructive pulmonary disease) ([address]     does not see pulmonology    COVID-19 12/31/2021    DETECTED    Essential hypertension, benign     Gout, unspecified     H/O colonoscopy 04/11/2016    2016    Mitral valve disorders(424.0)     СЕРГЕЙ on CPAP     not currently using due to malfunctioning    Other and unspecified hyperlipidemia     Type II or unspecified type diabetes mellitus without mention of complication, not stated as uncontrolled     Unspecified chronic liver disease without mention of alcohol     Wellness examination      [facility] (last visit approx Feb 2021)     Past Problem List  Patient Active Problem List   Diagnosis Code    Hyperlipidemia associated with type 2 diabetes mellitus ([facility] E11.69, E78.5    Essential hypertension I10    Calculus of kidney N20.0    Mitral valve disorder I05.9    Hyperlipidemia LDL goal <70 E78.5    Chronic liver disease K76.9    Ex-smoker Z87.891    H/O colonoscopy Z98.890    Type 2 diabetes mellitus, without long-term current use of insulin (HCC) E11.9    Insomnia G47.00    GI bleed K92.2    Cirrhosis of liver without ascites (HCC) K74.60    Numbness and tingling of left side of face R20.0, R20.2    Numbness and tingling R20.0, R20.2    Stroke-like symptoms R29.90    TIA (transient ischemic attack) G45.9    Chronic obstructive pulmonary disease, unspecified COPD type ([facility] J44.9    Thrombocytopenia ([facility] D69.6    History of cirrhosis Z87.19    Gastroesophageal reflux disease K21.9    Marijuana user F12.90     SURGICAL HISTORY       Past Surgical History:   Procedure Laterality Date    COLONOSCOPY N/A 3/16/2022    COLONOSCOPY WITH BIOPSY AND RESOLUTION CLIPPING X1 performed by Binu Vidal MD at [address] [facility]      LITHOTRIPSY  04/02/2021    LITHOTRIPSY N/A 4/2/2021    ESWL EXTRACORPOREAL SHOCK WAVE LITHOTRIPSY  (NEX-MED CONF# [id number] - AUGUSTO) performed by Danielle Monteiro MD at [address] [facility] N/A 5/25/2018    The esophagus was inspected to the Z-line. The endoscopic exam showed impression of varices (F1) in distal esophagus.      UPPER GASTROINTESTINAL ENDOSCOPY N/A 12/10/2021    EGD ESOPHAGOGASTRODUODENOSCOPY performed by Binu Vidal MD at [address] [facility]       Previous Medications    ALBUTEROL SULFATE HFA (VENTOLIN HFA) 108 (90 BASE) MCG/ACT INHALER    Inhale 1 puff into the lungs every 4 hours as needed for Wheezing    ALLOPURINOL (ZYLOPRIM) 100 MG TABLET    Take 1 tablet by mouth daily    ATORVASTATIN (LIPITOR) 40 MG TABLET    Take 1 tablet by mouth nightly    BLOOD GLUCOSE MONITOR STRIPS    1 strip by Other route 4 times daily Test 4  times a day & as needed for symptoms of irregular blood glucose. BUMETANIDE (BUMEX) 1 MG TABLET    Take 1 tablet by mouth daily    FLUTICASONE-SALMETEROL (ADVAIR) 100-50 MCG/DOSE DISKUS INHALER    INHALE 1 PUFF BY MOUTH EVERY 12 HOURS    GLIMEPIRIDE (AMARYL) 4 MG TABLET    TAKE 1 TABLET BY MOUTH TWICE DAILY    LANCETS MISC    1 each by Does not apply route 2 times daily    LISINOPRIL (PRINIVIL;ZESTRIL) 20 MG TABLET    Take 1 tablet by mouth once daily    NADOLOL (CORGARD) 20 MG TABLET    Take 1 tablet by mouth daily    OMEPRAZOLE (PRILOSEC) 20 MG DELAYED RELEASE CAPSULE    Take 1 capsule by mouth once daily    SITAGLIPTIN (JANUVIA) 100 MG TABLET    Take 1 tablet by mouth once daily    TAMSULOSIN (FLOMAX) 0.4 MG CAPSULE    Take 1 capsule by mouth daily     ALLERGIES     is allergic to codeine and simvastatin. FAMILY HISTORY     He indicated that his mother is alive. He indicated that his father is . He indicated that both of his brothers are alive.      SOCIAL HISTORY       Social History     Tobacco Use    Smoking status: Former     Packs/day: 0.50     Years: 30.00     Pack years: 15.00     Types: Cigarettes, E-Cigarettes     Quit date: 3/26/2012     Years since quitting: 10.7    Smokeless tobacco: Former   Vaping Use    Vaping Use: Never used   Substance Use Topics    Alcohol use: Not Currently     Alcohol/week: 0.0 standard drinks     Comment: used to drink \"a lot\"  None since Dec 2020    Drug use: Yes     Types: Marijuana (Weed)     Comment: every evening     PHYSICAL EXAM     INITIAL VITALS: /82   Pulse 77   Temp 98.8 °F (37.1 °C)   Resp 17   Ht 6' (1.829 m)   Wt 230 lb (104.3 kg)   SpO2 97%   BMI 31.19 kg/m²    Physical Exam  Vitals and nursing note reviewed. Constitutional:       Appearance: Normal appearance. HENT:      Head: Normocephalic and atraumatic. Nose: Nose normal.      Mouth/Throat:      Mouth: Mucous membranes are moist.   Eyes:      Conjunctiva/sclera: Conjunctivae normal.      Pupils: Pupils are equal, round, and reactive to light. Cardiovascular:      Rate and Rhythm: Normal rate and regular rhythm. Pulses: Normal pulses. Heart sounds: Normal heart sounds. Pulmonary:      Effort: Pulmonary effort is normal.      Breath sounds: Normal breath sounds. Abdominal:      Palpations: Abdomen is soft. Tenderness: There is no abdominal tenderness. Musculoskeletal:         General: No swelling or deformity. Cervical back: Normal range of motion. Skin:     General: Skin is warm. Findings: No rash. Neurological:      General: No focal deficit present. Mental Status: He is alert and oriented to person, place, and time. Psychiatric:         Mood and Affect: Mood normal.       MEDICAL DECISION MAKING / ED COURSE:   Summary of Patient Presentation:      60-year-old with chest pain    Will establish IV and blood work and chest x-ray    1)  Number and Complexity of Problems  Problem List This Visit: Chest pain    Differential Diagnosis: ACS, pneumonia, pneumothorax    Diagnoses Considered but Do Not Suspect: PE, dissection    Pertinent Comorbid Conditions: Hypertension, hyperlipidemia, former smoker, obesity    2)  Data Reviewed  My EKG interpretation:  see procedure     Decision Rules/Scores utilized: Heart score of 4      Imaging that is independently reviewed and interpreted by me as: Chest x-ray unremarkable    See more data below for the lab and radiology tests and orders.     3)  Treatment and Disposition    Patient repeat assessment: Patient seen and evaluated for chest pain    Now chest pain-free    Disposition discussion with patient/family: Discussed with patient admission for evaluation by cardiology    He is agreeable with plan      \"ED Course\" Notes From Epic Narrator:  ED Course as of 12/21/22 0339   Wed Dec 21, 2022   0147 CBC with Auto Differential(!):    WBC 3.0(!)   RBC 4.22(!)   Hemoglobin Quant 12.2(!)   Hematocrit 36.2(!)   MCV 85.7   MCH 28.8   MCHC 33.6   RDW 15.6(!)   Platelet Count 65(!)   MPV 8.5   Seg Neutrophils 55   Lymphocytes 32   Monocytes 13(!)   Eosinophils % 0   Basophils 0   Segs Absolute 1.65   Absolute Lymph # 0.96(!)   Absolute Mono # 0.39   Absolute Eos # 0.00   Basophils Absolute 0.00   Morphology ANISOCYTOSIS PRESENT   Morphology 1+ POLYCHROMASIA  Unremarkable [VERONIQUE]   0148 CMP(!!):    Glucose, Random 433(!!)   BUN,BUNPL 10   Creatinine 0.67(!)   Est, Glom Filt Rate >60   CALCIUM, SERUM, 592749 9.3   Sodium 142   Potassium 4.1   Chloride 107   CO2 24   Anion Gap 11   Alk Phos 156(!)   ALT 32   AST 34   Bilirubin 3.1(!)   Total Protein 6.9   Albumin 3.7  Hyperglycemia but no evidence of DKA [VERONIQUE]   0148 Lipase(!):    Lipase 88(!)  Midl elevation [VERONIQUE]   0148 Protime-INR(!):    Prothrombin Time 14. 9(!)   INR 1.2  unremarkable [VERONIQUE]   0148 COVID-19 & Influenza Combo:    Specimen Description . NASOPHARYNGEAL SWAB   SOURCE, [id number] . NASOPHARYNGEAL SWAB   SARS-CoV-2 RNA, RT PCR Not Detected   INFLUENZA A Not Detected   INFLUENZA B Not Detected  negative [VERONIQUE]   0148 Troponin Now and Q 1 Hour:    Troponin, High Sensitivity 10  Negative [VERONIQUE]   0148 XR CHEST PORTABLE  Negative [VERONIQUE]   0333 Troponin Now and Q 1 Hour:    Troponin, High Sensitivity 9  Negative [VERONIQUE]      ED Course User Index  [VERONIQUE] Castro Hopson MD     Is a 41-year-old male that presented with chest pain    He has 2 negative cardiac enzymes but he has a heart score of 4 with no recent cardiac evaluation or stress testing    We will go ahead and admit for cardiology evaluation        CRITICAL CARE:       PROCEDURES:    EKG 12 Lead    Date/Time: 12/21/2022 1:21 AM  Performed by: Castro Hopson MD  Authorized by: Tameka Rosario MD Fifi     ECG reviewed by ED Physician in the absence of a cardiologist: yes    Interpretation:     Interpretation: normal    Rate:     ECG rate:  92    ECG rate assessment: normal    Rhythm:     Rhythm: sinus rhythm    Ectopy:     Ectopy: none    QRS:     QRS axis:  Normal    QRS intervals:  Normal    QRS conduction: normal    ST segments:     ST segments:  Normal  T waves:     T waves: normal    Q waves:     Abnormal Q-waves: not present        DATA FOR LAB AND RADIOLOGY TESTS ORDERED BELOW ARE REVIEWED BY THE ED CLINICIAN:    RADIOLOGY: All x-rays, CT, MRI, and formal ultrasound images (except ED bedside ultrasound) are read by the radiologist, see reports below, unless otherwise noted in MDM or here. Reports below are reviewed by myself. XR CHEST PORTABLE   Final Result   No acute airspace disease identified. LABS: Lab orders shown below, the results are reviewed by myself, and all abnormals are listed below.   Labs Reviewed   CBC WITH AUTO DIFFERENTIAL - Abnormal; Notable for the following components:       Result Value    WBC 3.0 (*)     RBC 4.22 (*)     Hemoglobin 12.2 (*)     Hematocrit 36.2 (*)     RDW 15.6 (*)     Platelets 65 (*)     Monocytes 13 (*)     Absolute Lymph # 0.96 (*)     All other components within normal limits   COMPREHENSIVE METABOLIC PANEL - Abnormal; Notable for the following components:    Glucose 433 (*)     Creatinine 0.67 (*)     Alkaline Phosphatase 156 (*)     Total Bilirubin 3.1 (*)     All other components within normal limits   LIPASE - Abnormal; Notable for the following components:    Lipase 88 (*)     All other components within normal limits   PROTIME-INR - Abnormal; Notable for the following components:    Protime 14.9 (*)     All other components within normal limits   COVID-19 & INFLUENZA COMBO   MAGNESIUM   TROPONIN   TROPONIN       Vitals Reviewed:    Vitals:    12/21/22 0159 12/21/22 0215 12/21/22 0245 12/21/22 0300   BP: 126/71 123/66 119/69 128/82 Pulse: 80 78 78 77   Resp: 16 15 15 17   Temp:       SpO2: 96% 96% 94% 97%   Weight:       Height:         MEDICATIONS GIVEN TO PATIENT THIS ENCOUNTER:  Orders Placed This Encounter   Medications    morphine sulfate (PF) injection 4 mg    aspirin chewable tablet 324 mg    insulin lispro (HUMALOG) injection vial 10 Units    0.9 % sodium chloride bolus     DISCHARGE PRESCRIPTIONS:  New Prescriptions    No medications on file     PHYSICIAN CONSULTS ORDERED THIS ENCOUNTER:  None  FINAL IMPRESSION      1. Chest pain, unspecified type          DISPOSITION/PLAN   DISPOSITION Decision To Admit 12/21/2022 03:35:28 AM      OUTPATIENT FOLLOW UP THE PATIENT:  No follow-up provider specified.     MD Sophie Lewis MD  12/21/22 9613

## 2022-12-21 NOTE — ED NOTES
Verbal report given to Bryant Lui, ED RD at this time.      Clay Thomason RN  12/21/22 [id number]       Clay Thomason RN  12/21/22 [id number]

## 2022-12-21 NOTE — PROGRESS NOTES
Patient admitted to room 2093. Patient transferred to bed per self. Telemetry applied, vitals obtained. Nurse reviewed plan with patient. No s/s of distress noted at this time. Educated on how to use call light, call light in reach.

## 2022-12-21 NOTE — PROGRESS NOTES
RN notified Jose Alejandro Floyd that patient's nuclear med stress test came back as intermediate risk. Jose Alejandro Floyd said to consult Dr. Ricardo Gregory and make the patient NPO at midnight with sips with meds.

## 2022-12-21 NOTE — ED NOTES
Report given to  Rom Claros RN from  PCU   Report method by phone   The following was reviewed with receiving RN:   Current vital signs:  /73   Pulse 82   Temp 97.5 °F (36.4 °C) (Oral)   Resp 14   Ht 6' (1.829 m)   Wt 230 lb (104.3 kg)   SpO2 95%   BMI 31.19 kg/m²                MEWS Score: 0     Any medication or safety alerts were reviewed. Any pending diagnostics and notifications were also reviewed, as well as any safety concerns or issues, abnormal labs, abnormal imaging, and abnormal assessment findings. Questions were answered.           Shira Gillis RN  12/21/22 [id number]

## 2022-12-21 NOTE — H&P
[address] [place] [address]     HISTORY AND PHYSICAL EXAMINATION            Date:   12/21/2022  Patient name:  Bridget Dodson  Date of admission:  12/21/2022 12:41 AM  MRN:   [id number]  Account:  [de-identified]  YOB: 1965  PCP:    Marcy Duverney, MD  Room:   2093/2093-01  Code Status:    Full Code    Chief Complaint:     Chief Complaint   Patient presents with    Chest Pain     Right side, 7/10, heavy quality x 1 day       History Obtained From:     patient, electronic medical record    History of Present Illness: The patient is a 65 y.o. Non- / non  male with a past medical history significant for hypertension, hyperlipidemia, liver cirrhosis, COPD, СЕРГЕЙ on CPAP, type 2 diabetes who presents withChest Pain (Right side, 7/10, heavy quality x 1 day)   and he is admitted to the hospital for the management of unstable angina. Patient states that having chest pain for the last day occurring mainly at rest.  Describes the pain as sharp and intermittent with heavy pressure mainly located on the right side of his chest.  Denies any associated symptoms with it, shortness of breath or nausea. Pain does not radiate. Chest pain resolved after administration of aspirin and morphine. Currently on examination patient is not having any chest pain. Patient states that he has not had chest pain like this before. Denies any nausea, vomiting, fever, chills. In the emergency department troponins were within normal limits x2, EKG did not demonstrate any acute changes. Patient's last echo was in 2020 with an EF of 55%. Last stress test was done in 2016. Patient has a history of marijuana use, denies any tobacco use or alcohol use. Wife present at bedside patient follows with Dr. Heather Segura who is his PCP.         Past Medical History:     Past Medical History:   Diagnosis Date    Calculus of kidney     Cerebral artery occlusion with cerebral infarction ([facility] [address] 2020    slight memory problem (can't remember name of neurologist)    Cirrhosis ([facility] [address]     sees PCP for this    COPD (chronic obstructive pulmonary disease) ([facility] [address]     does not see pulmonology    COVID-19 12/31/2021    DETECTED    Essential hypertension, benign     Gout, unspecified     H/O colonoscopy 04/11/2016 2016    Mitral valve disorders(424.0)     СЕРГЕЙ on CPAP     not currently using due to malfunctioning    Other and unspecified hyperlipidemia     Type II or unspecified type diabetes mellitus without mention of complication, not stated as uncontrolled     Unspecified chronic liver disease without mention of alcohol     Wellness examination      [facility] (last visit approx Feb 2021)        Past SurgicalHistory:     Past Surgical History:   Procedure Laterality Date    COLONOSCOPY N/A 3/16/2022    COLONOSCOPY WITH BIOPSY AND RESOLUTION CLIPPING X1 performed by Avelino Franco MD at [address] [place] [address]      LITHOTRIPSY  04/02/2021    LITHOTRIPSY N/A 4/2/2021    ESWL EXTRACORPOREAL SHOCK WAVE LITHOTRIPSY  ([facility] CONF# [id number] - AUGUSTO) performed by Anastasia Caballero MD at [place] [address]. 5/25/2018    The esophagus was inspected to the Z-line. The endoscopic exam showed impression of varices (F1) in distal esophagus. UPPER GASTROINTESTINAL ENDOSCOPY N/A 12/10/2021    EGD ESOPHAGOGASTRODUODENOSCOPY performed by Avelino Franco MD at [facility]        Medications Prior to Admission:        Prior to Admission medications    Medication Sig Start Date End Date Taking?  Authorizing Provider   SITagliptin (JANUVIA) 100 MG tablet Take 1 tablet by mouth once daily 11/28/22  Yes Albina Guerra MD   omeprazole (PRILOSEC) 20 MG delayed release capsule Take 1 capsule by mouth once daily 11/28/22  Yes Albina Guerra MD   atorvastatin (LIPITOR) 40 MG tablet Take 1 tablet by mouth nightly 11/9/22  Yes Marcy Duverney, MD   tamsulosin (FLOMAX) 0.4 MG capsule Take 1 capsule by mouth daily 11/9/22  Yes Marcy Duverney, MD   albuterol sulfate HFA (VENTOLIN HFA) 108 (90 Base) MCG/ACT inhaler Inhale 1 puff into the lungs every 4 hours as needed for Wheezing 11/9/22  Yes Marcy Duverney, MD   fluticasone-salmeterol (ADVAIR) 100-50 MCG/DOSE diskus inhaler INHALE 1 PUFF BY MOUTH EVERY 12 HOURS 11/9/22  Yes Marcy Duverney, MD   glimepiride (AMARYL) 4 MG tablet TAKE 1 TABLET BY MOUTH TWICE DAILY 11/9/22  Yes Marcy Duverney, MD   nadolol (CORGARD) 20 MG tablet Take 1 tablet by mouth daily 11/9/22  Yes Marcy Duverney, MD   lisinopril (PRINIVIL;ZESTRIL) 20 MG tablet Take 1 tablet by mouth once daily 11/9/22  Yes Marcy Duverney, MD   bumetanide (BUMEX) 1 MG tablet Take 1 tablet by mouth daily 11/9/22  Yes Marcy Duverney, MD   allopurinol (ZYLOPRIM) 100 MG tablet Take 1 tablet by mouth daily 11/7/22  Yes Marcy Duverney, MD   Lancets MISC 1 each by Does not apply route 2 times daily 1/27/22   Marcy Duverney, MD   metoprolol tartrate (LOPRESSOR) 25 MG tablet Take 1 tablet by mouth 2 times daily 4/6/21   Marcy Duverney, MD   blood glucose monitor strips 1 strip by Other route 4 times daily Test 4  times a day & as needed for symptoms of irregular blood glucose. 5/3/19   Marcy Duverney, MD        Allergies:     Codeine and Simvastatin    Social History:     Tobacco:    reports that he quit smoking about 10 years ago. His smoking use included cigarettes and e-cigarettes. He has a 15.00 pack-year smoking history. He has quit using smokeless tobacco.  Alcohol:      reports that he does not currently use alcohol. Drug Use:  reports current drug use. Drug: Marijuana Bushra Ish).     Family History:     Family History   Problem Relation Age of Onset    No Known Problems Brother     Diabetes Mother     Heart Attack Mother     Heart Disease Father     Diabetes Brother     Heart Disease Brother        Review of Systems:     Positive and Negative as described in HPI. Review of Systems   Constitutional:  Negative for chills and fever. HENT:  Negative for hearing loss and trouble swallowing. Eyes:  Negative for visual disturbance. Respiratory:  Negative for cough and shortness of breath. Cardiovascular:  Negative for chest pain and leg swelling. Gastrointestinal:  Negative for abdominal pain, constipation, diarrhea, nausea and vomiting. Genitourinary:  Negative for difficulty urinating and hematuria. Musculoskeletal:  Negative for back pain. Skin:  Negative for color change. Neurological:  Negative for dizziness, numbness and headaches. Psychiatric/Behavioral:  Negative for behavioral problems and confusion. Physical Exam:   BP (!) 152/87   Pulse 97   Temp 97.7 °F (36.5 °C) (Oral)   Resp 16   Ht 6' (1.829 m)   Wt 230 lb (104.3 kg)   SpO2 99%   BMI 31.19 kg/m²   Temp (24hrs), Av.8 °F (36.6 °C), Min:97.5 °F (36.4 °C), Max:98.8 °F (37.1 °C)    Recent Labs     22  0336 22  0733   POCGLU 322* 253*     No intake or output data in the 24 hours ending 22 1118    Physical Exam  Constitutional:       Appearance: Normal appearance. HENT:      Head: Normocephalic and atraumatic. Nose: Nose normal.      Mouth/Throat:      Mouth: Mucous membranes are moist.   Eyes:      Extraocular Movements: Extraocular movements intact. Pupils: Pupils are equal, round, and reactive to light. Cardiovascular:      Rate and Rhythm: Normal rate and regular rhythm. Pulses: Normal pulses. Heart sounds: Normal heart sounds. Pulmonary:      Effort: Pulmonary effort is normal.      Breath sounds: Normal breath sounds. Abdominal:      General: Bowel sounds are normal.      Palpations: Abdomen is soft. Musculoskeletal:         General: Normal range of motion. Cervical back: Neck supple. Skin:     General: Skin is warm and dry. Capillary Refill: Capillary refill takes less than 2 seconds. Neurological:      General: No focal deficit present. Mental Status: He is alert and oriented to person, place, and time.    Psychiatric:         Mood and Affect: Mood normal.         Behavior: Behavior normal.       Investigations:     Laboratory Testing:  Recent Results (from the past 24 hour(s))   CBC with Auto Differential    Collection Time: 12/21/22 12:58 AM   Result Value Ref Range    WBC 3.0 (L) 3.5 - 11.0 k/uL    RBC 4.22 (L) 4.5 - 5.9 m/uL    Hemoglobin 12.2 (L) 13.5 - 17.5 g/dL    Hematocrit 36.2 (L) 41 - 53 %    MCV 85.7 80 - 100 fL    MCH 28.8 26 - 34 pg    MCHC 33.6 31 - 37 g/dL    RDW 15.6 (H) 11.5 - 14.9 %    Platelets 65 (L) 129 - 450 k/uL    MPV 8.5 6.0 - 12.0 fL    Seg Neutrophils 55 36 - 66 %    Lymphocytes 32 24 - 44 %    Monocytes 13 (H) 1 - 7 %    Eosinophils % 0 0 - 4 %    Basophils 0 0 - 2 %    Segs Absolute 1.65 1.3 - 9.1 k/uL    Absolute Lymph # 0.96 (L) 1.0 - 4.8 k/uL    Absolute Mono # 0.39 0.1 - 1.3 k/uL    Absolute Eos # 0.00 0.0 - 0.4 k/uL    Basophils Absolute 0.00 0.0 - 0.2 k/uL    Morphology ANISOCYTOSIS PRESENT     Morphology 1+ POLYCHROMASIA    CMP    Collection Time: 12/21/22 12:58 AM   Result Value Ref Range    Glucose 433 (HH) 70 - 99 mg/dL    BUN 10 6 - 20 mg/dL    Creatinine 0.67 (L) 0.70 - 1.20 mg/dL    Est, Glom Filt Rate >60 >60 mL/min/1.73m2    Calcium 9.3 8.6 - 10.4 mg/dL    Sodium 142 135 - 144 mmol/L    Potassium 4.1 3.7 - 5.3 mmol/L    Chloride 107 98 - 107 mmol/L    CO2 24 20 - 31 mmol/L    Anion Gap 11 9 - 17 mmol/L    Alkaline Phosphatase 156 (H) 40 - 129 U/L    ALT 32 5 - 41 U/L    AST 34 <40 U/L    Total Bilirubin 3.1 (H) 0.3 - 1.2 mg/dL    Total Protein 6.9 6.4 - 8.3 g/dL    Albumin 3.7 3.5 - 5.2 g/dL   Lipase    Collection Time: 12/21/22 12:58 AM   Result Value Ref Range    Lipase 88 (H) 13 - 60 U/L   Magnesium    Collection Time: 12/21/22 12:58 AM   Result Value Ref Range    Magnesium 1.7 1.6 - 2.6 mg/dL   Protime-INR    Collection Time: 12/21/22 12:58 AM   Result Value Ref Range    Protime 14.9 (H) 11.8 - 14.6 sec    INR 1.2    Troponin Now and Q 1 Hour    Collection Time: 12/21/22 12:58 AM   Result Value Ref Range    Troponin, High Sensitivity 10 0 - 22 ng/L   EKG 12 Lead    Collection Time: 12/21/22  1:06 AM   Result Value Ref Range    Ventricular Rate 92 BPM    Atrial Rate 92 BPM    P-R Interval 160 ms    QRS Duration 100 ms    Q-T Interval 376 ms    QTc Calculation (Bazett) 464 ms    P Axis 61 degrees    R Axis -22 degrees    T Axis 37 degrees   COVID-19 & Influenza Combo    Collection Time: 12/21/22  1:08 AM    Specimen: Nasopharyngeal Swab   Result Value Ref Range    Specimen Description . NASOPHARYNGEAL SWAB     Source . NASOPHARYNGEAL SWAB     SARS-CoV-2 RNA, RT PCR Not Detected Not Detected    INFLUENZA A Not Detected Not Detected    INFLUENZA B Not Detected Not Detected   Troponin Now and Q 1 Hour    Collection Time: 12/21/22  3:00 AM   Result Value Ref Range    Troponin, High Sensitivity 9 0 - 22 ng/L   POC Glucose Fingerstick    Collection Time: 12/21/22  3:36 AM   Result Value Ref Range    POC Glucose 322 (H) 75 - 110 mg/dL   Basic Metabolic Panel w/ Reflex to MG    Collection Time: 12/21/22  5:47 AM   Result Value Ref Range    Glucose 266 (H) 70 - 99 mg/dL    BUN 11 6 - 20 mg/dL    Creatinine 0.51 (L) 0.70 - 1.20 mg/dL    Est, Glom Filt Rate >60 >60 mL/min/1.73m2    Calcium 8.8 8.6 - 10.4 mg/dL    Sodium 140 135 - 144 mmol/L    Potassium 3.7 3.7 - 5.3 mmol/L    Chloride 109 (H) 98 - 107 mmol/L    CO2 23 20 - 31 mmol/L    Anion Gap 8 (L) 9 - 17 mmol/L   Brain natriuretic peptide    Collection Time: 12/21/22  5:47 AM   Result Value Ref Range    Pro-BNP <20 <300 pg/mL   POC Glucose Fingerstick    Collection Time: 12/21/22  7:33 AM   Result Value Ref Range    POC Glucose 253 (H) 75 - 110 mg/dL       Imaging/Diagnostics:  XR CHEST PORTABLE    Result Date: 12/21/2022  EXAMINATION: ONE XRAY VIEW OF THE CHEST 12/21/2022 1:23 am COMPARISON: 07/26/2022 HISTORY: ORDERING SYSTEM PROVIDED HISTORY: chest pain TECHNOLOGIST PROVIDED HISTORY: chest pain Reason for Exam: Chest pain, chest heaviness Additional signs and symptoms: Chest pain, chest heaviness Relevant Medical/Surgical History: Chest pain, chest heaviness FINDINGS: The cardiomediastinal silhouette is within normal limits. There is no consolidation, pneumothorax or evidence for edema. No evidence for effusion. No acute osseous abnormality is identified. No acute airspace disease identified.        Assessment :      Primary Problem  Angina pectoris [facility])    Active Hospital Problems    Diagnosis Date Noted    Angina pectoris ([facility] [address] [I20.9] 12/21/2022     Priority: Medium    Marijuana user [F12.90] 10/07/2022     Priority: Medium    Cirrhosis of liver without ascites ([facility] [address] [K74.60]     Hyperlipidemia associated with type 2 diabetes mellitus (HCC) [E11.69, E78.5]     Essential hypertension [I10]     Chronic liver disease [K76.9]        Plan:     Patient status Admit as inpatient in the  Med/Surge    Unstable angina  - Troponin within normal limits x2 in ED  - EKG unremarkable for any acute changes  - Patient placed n.p.o.  - Stress test today  - BNP within normal limits  - Check TSH, lipid panel and hemoglobin A1c  - 2D echocardiogram ordered  - Continue statin, aspirin    Diabetes mellitus type 2  - Hold home dose of Januvia and glimepiride  - Patient's glucose on admission in the 400s, decreasing  - Patient currently n.p.o.  - Glucose checks, sliding scale insulin, will check A1c    Chronic liver disease  - Continue home dose of Bumex and nadolol    Hypertension  - Continue home dose of lisinopril and metoprolol    DVT prophylaxis: Lovenox  Diet NPO  Full code    Consultations:   None     Patient is admitted as inpatient status because of co-morbiditieslisted above, severity of signs and symptoms as outlined, requirement for current medical therapies and most importantly because of direct risk to patient if care not provided in a hospital setting.     Thang Yee MD  12/21/2022  11:18 AM    Copy sent to Dr. Jane An MD     12/21/22    61-year-old male high risk for coronary artery disease has underlying history of hypertension hyperlipidemia  Had a Lexiscan stress test  Second part is still pending  Had echocardiogram  No chest pain today

## 2022-12-21 NOTE — CARE COORDINATION
CASE MANAGEMENT NOTE:    Admission Date:  12/21/2022 Sherrie Levine is a 65 y.o.  male    Admitted for : Angina pectoris ([facility] [address] [I20.9]  Chest pain, unspecified type [R07.9]    Met with:  Patient's Wife, Chance Russell at the bedside    PCP:  Dr. Giorgio Moody:  Hayder Crawford      Is patient alert and oriented at time of discussion:  Yes    Current Residence/ Living Arrangements:  independently at home w/ Wife            Current Services PTA:  No    Does patient go to outpatient dialysis: No  If yes, location and chair time: NA  Who is their nephrologist? NA    Is patient agreeable to VNS: No    Freedom of choice provided:  No    List of [address] provided: No    VNS chosen:  No    DME:  straight cane, walker, wheelchair, and shower chair    Home Oxygen: No    Nebulizer: Yes, it is old    CPAP/BIPAP: No    Supplier: N/A    Potential Assistance Needed: No    SNF needed: No    Freedom of choice and list provided: NA    Pharmacy:  [facility]       Is patient currently receiving oral anticoagulation therapy? No    Is the Patient an [facility] with Readmission Risk Score greater than 14%? No  If yes, pt needs a follow up appointment made within 7 days. Family Members/Caregivers that pt would like involved in their care:    Yes    If yes, list name here:  Wife, Chance Russell    Transportation Provider:  Patient             Discharge Plan:  12/21/22 [facility] pt. Lives in [facility] w/ [place] [address] w/ Wife.  DME- [employer] [place], [place], [place], [place], it is old, Denies VNS, Stress, Echo, Denies needs//KB                 Electronically signed by: Yanna Dao, RN on 12/21/2022 at 2:35 PM

## 2022-12-21 NOTE — PROGRESS NOTES
12/21/22 1621   Encounter Summary   Encounter Overview/Reason  Attempted Encounter   Service Provided For: Patient not available  (patient on phone)

## 2022-12-21 NOTE — ED NOTES
Blood sugar on finger stick POCT , 322 mg/dl .  Inform  To dr Chiqui Ordaz, RN  12/21/22 072 [facility]  12/21/22 0506

## 2022-12-22 VITALS
DIASTOLIC BLOOD PRESSURE: 62 MMHG | HEART RATE: 99 BPM | WEIGHT: 220.68 LBS | TEMPERATURE: 98.2 F | HEIGHT: 72 IN | SYSTOLIC BLOOD PRESSURE: 105 MMHG | BODY MASS INDEX: 29.89 KG/M2 | OXYGEN SATURATION: 92 % | RESPIRATION RATE: 18 BRPM

## 2022-12-22 LAB
ANION GAP SERPL CALCULATED.3IONS-SCNC: 8 MMOL/L (ref 9–17)
BUN BLDV-MCNC: 18 MG/DL (ref 6–20)
CALCIUM SERPL-MCNC: 8.5 MG/DL (ref 8.6–10.4)
CHLORIDE BLD-SCNC: 107 MMOL/L (ref 98–107)
CHOLESTEROL/HDL RATIO: 2.4
CHOLESTEROL: 88 MG/DL
CO2: 25 MMOL/L (ref 20–31)
CREAT SERPL-MCNC: 0.56 MG/DL (ref 0.7–1.2)
ESTIMATED AVERAGE GLUCOSE: 246 MG/DL
GFR SERPL CREATININE-BSD FRML MDRD: >60 ML/MIN/1.73M2
GLUCOSE BLD-MCNC: 119 MG/DL (ref 75–110)
GLUCOSE BLD-MCNC: 144 MG/DL (ref 70–99)
GLUCOSE BLD-MCNC: 225 MG/DL (ref 75–110)
HBA1C MFR BLD: 10.2 % (ref 4–6)
HCT VFR BLD CALC: 32.3 % (ref 41–53)
HDLC SERPL-MCNC: 37 MG/DL
HEMOGLOBIN: 10.8 G/DL (ref 13.5–17.5)
LDL CHOLESTEROL: 34 MG/DL (ref 0–130)
MAGNESIUM: 1.6 MG/DL (ref 1.6–2.6)
MCH RBC QN AUTO: 28.6 PG (ref 26–34)
MCHC RBC AUTO-ENTMCNC: 33.4 G/DL (ref 31–37)
MCV RBC AUTO: 85.5 FL (ref 80–100)
PDW BLD-RTO: 15.8 % (ref 11.5–14.9)
PLATELET # BLD: 53 K/UL (ref 150–450)
PMV BLD AUTO: 8.5 FL (ref 6–12)
POTASSIUM SERPL-SCNC: 3.4 MMOL/L (ref 3.7–5.3)
RBC # BLD: 3.78 M/UL (ref 4.5–5.9)
SODIUM BLD-SCNC: 140 MMOL/L (ref 135–144)
TRIGL SERPL-MCNC: 83 MG/DL
TSH SERPL DL<=0.05 MIU/L-ACNC: 0.56 UIU/ML (ref 0.3–5)
WBC # BLD: 2.8 K/UL (ref 3.5–11)

## 2022-12-22 PROCEDURE — 99233 SBSQ HOSP IP/OBS HIGH 50: CPT | Performed by: INTERNAL MEDICINE

## 2022-12-22 PROCEDURE — 82947 ASSAY GLUCOSE BLOOD QUANT: CPT

## 2022-12-22 PROCEDURE — 83036 HEMOGLOBIN GLYCOSYLATED A1C: CPT

## 2022-12-22 PROCEDURE — 83735 ASSAY OF MAGNESIUM: CPT

## 2022-12-22 PROCEDURE — G0378 HOSPITAL OBSERVATION PER HR: HCPCS

## 2022-12-22 PROCEDURE — 6370000000 HC RX 637 (ALT 250 FOR IP): Performed by: NURSE PRACTITIONER

## 2022-12-22 PROCEDURE — 84443 ASSAY THYROID STIM HORMONE: CPT

## 2022-12-22 PROCEDURE — 36415 COLL VENOUS BLD VENIPUNCTURE: CPT

## 2022-12-22 PROCEDURE — 85027 COMPLETE CBC AUTOMATED: CPT

## 2022-12-22 PROCEDURE — 94761 N-INVAS EAR/PLS OXIMETRY MLT: CPT

## 2022-12-22 PROCEDURE — 6370000000 HC RX 637 (ALT 250 FOR IP): Performed by: INTERNAL MEDICINE

## 2022-12-22 PROCEDURE — 80061 LIPID PANEL: CPT

## 2022-12-22 PROCEDURE — 93005 ELECTROCARDIOGRAM TRACING: CPT | Performed by: NURSE PRACTITIONER

## 2022-12-22 PROCEDURE — 2580000003 HC RX 258: Performed by: NURSE PRACTITIONER

## 2022-12-22 PROCEDURE — 94640 AIRWAY INHALATION TREATMENT: CPT

## 2022-12-22 PROCEDURE — 80048 BASIC METABOLIC PNL TOTAL CA: CPT

## 2022-12-22 RX ORDER — ATORVASTATIN CALCIUM 20 MG/1
20 TABLET, FILM COATED ORAL NIGHTLY
Qty: 30 TABLET | Refills: 3 | Status: SHIPPED | OUTPATIENT
Start: 2022-12-22

## 2022-12-22 RX ORDER — ATORVASTATIN CALCIUM 20 MG/1
20 TABLET, FILM COATED ORAL NIGHTLY
Status: DISCONTINUED | OUTPATIENT
Start: 2022-12-22 | End: 2022-12-22 | Stop reason: HOSPADM

## 2022-12-22 RX ORDER — POTASSIUM CHLORIDE 20 MEQ/1
40 TABLET, EXTENDED RELEASE ORAL ONCE
Status: COMPLETED | OUTPATIENT
Start: 2022-12-22 | End: 2022-12-22

## 2022-12-22 RX ADMIN — INSULIN LISPRO 2 UNITS: 100 INJECTION, SOLUTION INTRAVENOUS; SUBCUTANEOUS at 12:22

## 2022-12-22 RX ADMIN — POTASSIUM CHLORIDE 40 MEQ: 1500 TABLET, EXTENDED RELEASE ORAL at 08:52

## 2022-12-22 RX ADMIN — BUMETANIDE 1 MG: 1 TABLET ORAL at 11:12

## 2022-12-22 RX ADMIN — BUDESONIDE AND FORMOTEROL FUMARATE DIHYDRATE 2 PUFF: 80; 4.5 AEROSOL RESPIRATORY (INHALATION) at 08:24

## 2022-12-22 RX ADMIN — ALLOPURINOL 100 MG: 100 TABLET ORAL at 08:38

## 2022-12-22 RX ADMIN — TAMSULOSIN HYDROCHLORIDE 0.4 MG: 0.4 CAPSULE ORAL at 08:38

## 2022-12-22 RX ADMIN — SODIUM CHLORIDE, PRESERVATIVE FREE 10 ML: 5 INJECTION INTRAVENOUS at 08:39

## 2022-12-22 ASSESSMENT — PAIN DESCRIPTION - ORIENTATION: ORIENTATION: MID;RIGHT

## 2022-12-22 ASSESSMENT — PAIN DESCRIPTION - DESCRIPTORS: DESCRIPTORS: HEAVINESS

## 2022-12-22 ASSESSMENT — PAIN DESCRIPTION - LOCATION: LOCATION: CHEST

## 2022-12-22 ASSESSMENT — PAIN SCALES - GENERAL: PAINLEVEL_OUTOF10: 1

## 2022-12-22 NOTE — PROCEDURES
[address] [facility]                    [address] [employer]                              CARDIAC STRESS TEST    PATIENT NAME: Rachel Lino                   :        1965  MED REC NO:   [id number]                              ROOM:       2093  ACCOUNT NO:   [de-identified]                           ADMIT DATE: 2022  PROVIDER:     Leticia Welch    DATE OF STUDY:  2022    TEST TYPE: LEXISCAN CARDIOLYTE STRESS TEST  INDICATION: CHEST PAIN  REFERRING PHYSICIAN: Arturo Pinto CNP    RESTING HEART RATE: 70 BEATS PER MINUTE  RESTING BLOOD PRESSURE: 146/91    MEDICATION(S) GIVEN: 0.4MG IV LEXISCAN  REASON FOR TERMINATION: MEDICATION INFUSION COMPLETE    RESTING EKG: NORMAL, SINUS RHYTHM, 90 BEATS PER MINUTE  STRESS HEART RESPONSE: NORMAL RESPONSE  BLOOD PRESSURE RESPONSE: APPROPRIATE  STRESS EKGs: NO CHANGES SEEN  CHEST DISCOMFORT: NO PAIN DURING STRESS  ISCHEMIC EKG CHANGES: NONE    EKG IMPRESSION: ELECTROCARDIOGRAPHICALLY NEGATIVE LEXISCAN STRESS TEST. RADIOISOTOPE RESULTS TO FOLLOW FROM THE DEPARTMENT OF NUCLEAR MEDICINE.     COMMENTS: NO ARRHYTHMIA    GLADYS JANE    D: 2022 12:13:50       T: 2022 12:14:47     AS/MAGGIE  Job#: [id number]     Doc#: Unknown    CC:    (Retain this field even if not dictated or not decipherable)

## 2022-12-22 NOTE — PLAN OF CARE
Problem: Discharge Planning  Goal: Discharge to home or other facility with appropriate resources  Outcome: Progressing  Flowsheets (Taken 12/21/2022 1112)  Discharge to home or other facility with appropriate resources: Identify barriers to discharge with patient and caregiver     Problem: ABCDS Injury Assessment  Goal: Absence of physical injury  Outcome: Progressing     Problem: Pain  Goal: Verbalizes/displays adequate comfort level or baseline comfort level  Outcome: Progressing

## 2022-12-22 NOTE — CARE COORDINATION
ONGOING DISCHARGE PLAN:    Patient is alert and oriented x4. Spoke with patient regarding discharge plan and patient confirms that plan is still to return to home w/ no needs. Denies VNS. Stress/Echo.    Cardio following. Will continue to follow for additional discharge needs.     Electronically signed by Marlo Younger RN on 12/22/2022 at 2:13 PM

## 2022-12-22 NOTE — PROGRESS NOTES
PT was happy to go home, and appreciated prayer. 12/22/22 1418   Encounter Summary   Encounter Overview/Reason  Spiritual/Emotional Needs   Service Provided For: Patient and family together   Referral/Consult From: Rounding   Last Encounter  12/22/22   Complexity of Encounter Moderate   Spiritual/Emotional needs   Type Spiritual Support   Assessment/Intervention/Outcome   Assessment Peaceful;Calm; Hopeful   Intervention Active listening;Prayer (assurance of)/[place];Sustaining Presence/Ministry of presence   Outcome Acceptance; Coping;Engaged in conversation;Peace; Receptive

## 2022-12-22 NOTE — PLAN OF CARE
Problem: Pain  Goal: Verbalizes/displays adequate comfort level or baseline comfort level  12/22/2022 0441 by Angel Monreal RN  Outcome: Progressing  Note: Assessed all pain characteristics including level, type, location, frequency, and onset. Non-pharmacologic interventions offered to pt as well. Pt states pain is tolerable at this time. Will continue to monitor. Problem: ABCDS Injury Assessment  Goal: Absence of physical injury  12/22/2022 0441 by Angel Monreal RN  Outcome: Adequate for Discharge  Note: No falls noted this shift. Patient ambulates per self without difficulty. Bed kept in low position. Safe environment maintained. Bedside table & call light in reach. Uses call light appropriately when needing assistance.

## 2022-12-22 NOTE — PROGRESS NOTES
SHARMILA SCOTT [facility] Internal Medicine  Peter Moreno MD; Cecilia Goldman MD; Duglas Muro MD; MD Omar Mendiola MD; MD FRANDY Murphy [facility] Internal Medicine   [address]     Progress Note    12/22/2022    9:29 AM    Name:   Virginie Amezquita  MRN:     [id number]     Acct:      [de-identified]   Room:   2093/2093-01   Day:  1  Admit Date:  12/21/2022 12:41 AM    PCP:   Alma Davies MD  Code Status:  Full Code    Subjective:     C/C:   Chief Complaint   Patient presents with    Chest Pain     Right side, 7/10, heavy quality x 1 day     Interval History Status: improved. Patient seen and examined at bedside this morning, still has some intermittent right-sided chest pain. Denies any other symptoms. Brief History:     Hospital Problems             Last Modified POA    * (Principal) Angina pectoris ([facility] 12/21/2022 Yes    Marijuana user 12/21/2022 Yes    Hyperlipidemia associated with type 2 diabetes mellitus ([address] 12/21/2022 Yes    Essential hypertension 12/21/2022 Yes    Chronic liver disease 12/21/2022 Yes    Cirrhosis of liver without ascites ([address] 12/21/2022 Yes       Interval hx ; On Admission ; The patient is a 65 y.o. Non- / non  male with a past medical history significant for hypertension, hyperlipidemia, liver cirrhosis, COPD, СЕРГЕЙ on CPAP, type 2 diabetes who presents withChest Pain (Right side, 7/10, heavy quality x 1 day)   and he is admitted to the hospital for the management of unstable angina. Patient states that having chest pain for the last day occurring mainly at rest.  Describes the pain as sharp and intermittent with heavy pressure mainly located on the right side of his chest.  Denies any associated symptoms with it, shortness of breath or nausea. Pain does not radiate. Chest pain resolved after administration of aspirin and morphine.   Currently on examination patient is not having any chest pain. Patient states that he has not had chest pain like this before. Denies any nausea, vomiting, fever, chills. In the emergency department troponins were within normal limits x2, EKG did not demonstrate any acute changes. Patient's last echo was in 2020 with an EF of 55%. Last stress test was done in 2016. Patient has a history of marijuana use, denies any tobacco use or alcohol use. Wife present at bedside patient follows with Dr. Heather Segura who is his PCP. Review of Systems:         As recorded in interval hx                                    ---     Medications: Allergies: Allergies   Allergen Reactions    Codeine Nausea Only and Other (See Comments)     hallucinations    Simvastatin Other (See Comments)     Leg cramping       Current Meds:   Scheduled Meds:    allopurinol  100 mg Oral Daily    atorvastatin  40 mg Oral Nightly    bumetanide  1 mg Oral Daily    budesonide-formoterol  2 puff Inhalation BID    lisinopril  20 mg Oral Daily    nadolol  20 mg Oral Daily    pantoprazole  40 mg Oral QAM AC    tamsulosin  0.4 mg Oral Daily    insulin lispro  0-8 Units SubCUTAneous TID WC    insulin lispro  0-4 Units SubCUTAneous Nightly    sodium chloride flush  5-40 mL IntraVENous 2 times per day    aspirin  81 mg Oral Daily    enoxaparin  30 mg SubCUTAneous BID     Continuous Infusions:    dextrose      sodium chloride       PRN Meds: albuterol sulfate HFA, glucose, dextrose bolus **OR** dextrose bolus, glucagon (rDNA), dextrose, sodium chloride, ondansetron **OR** ondansetron, acetaminophen **OR** acetaminophen, polyethylene glycol, sodium chloride flush    Data:     I/O (24Hr):     Intake/Output Summary (Last 24 hours) at 12/22/2022 0929  Last data filed at 12/22/2022 8187  Gross per 24 hour   Intake 860 ml   Output --   Net 860 ml       Labs:  Significant last 24 hr data reviewed ;   Vitals:    12/22/22 0015 12/22/22 0430 12/22/22 0700 12/22/22 0826   BP: (!) 97/51  109/69    Pulse: 74  77 Resp: 18  18    Temp: 98.3 °F (36.8 °C)  98.2 °F (36.8 °C)    TempSrc: Oral  Oral    SpO2: 94%  96% 96%   Weight:  220 lb 10.9 oz (100.1 kg)     Height:         Recent Labs     12/21/22  0336 12/21/22  0733 12/21/22  1636 12/21/22  2017 12/22/22  0733   POCGLU 322* 253* 199* 292* 119*       Recent Results (from the past 24 hour(s))   POC Glucose Fingerstick    Collection Time: 12/21/22  4:36 PM   Result Value Ref Range    POC Glucose 199 (H) 75 - 110 mg/dL   POC Glucose Fingerstick    Collection Time: 12/21/22  8:17 PM   Result Value Ref Range    POC Glucose 292 (H) 75 - 110 mg/dL   CBC    Collection Time: 12/22/22  5:34 AM   Result Value Ref Range    WBC 2.8 (L) 3.5 - 11.0 k/uL    RBC 3.78 (L) 4.5 - 5.9 m/uL    Hemoglobin 10.8 (L) 13.5 - 17.5 g/dL    Hematocrit 32.3 (L) 41 - 53 %    MCV 85.5 80 - 100 fL    MCH 28.6 26 - 34 pg    MCHC 33.4 31 - 37 g/dL    RDW 15.8 (H) 11.5 - 14.9 %    Platelets 53 (L) 093 - 450 k/uL    MPV 8.5 6.0 - 12.0 fL   Basic Metabolic Panel w/ Reflex to MG    Collection Time: 12/22/22  5:34 AM   Result Value Ref Range    Glucose 144 (H) 70 - 99 mg/dL    BUN 18 6 - 20 mg/dL    Creatinine 0.56 (L) 0.70 - 1.20 mg/dL    Est, Glom Filt Rate >60 >60 mL/min/1.73m2    Calcium 8.5 (L) 8.6 - 10.4 mg/dL    Sodium 140 135 - 144 mmol/L    Potassium 3.4 (L) 3.7 - 5.3 mmol/L    Chloride 107 98 - 107 mmol/L    CO2 25 20 - 31 mmol/L    Anion Gap 8 (L) 9 - 17 mmol/L   Lipid Panel    Collection Time: 12/22/22  5:34 AM   Result Value Ref Range    Cholesterol 88 <200 mg/dL    HDL 37 (L) >40 mg/dL    LDL Cholesterol 34 0 - 130 mg/dL    Chol/HDL Ratio 2.4 <5    Triglycerides 83 <150 mg/dL   TSH w/reflex to FT4    Collection Time: 12/22/22  5:34 AM   Result Value Ref Range    TSH 0.56 0.30 - 5.00 uIU/mL   Magnesium    Collection Time: 12/22/22  5:34 AM   Result Value Ref Range    Magnesium 1.6 1.6 - 2.6 mg/dL   EKG 12 lead    Collection Time: 12/22/22  6:46 AM   Result Value Ref Range    Ventricular Rate 71 BPM Atrial Rate 71 BPM    P-R Interval 168 ms    QRS Duration 94 ms    Q-T Interval 414 ms    QTc Calculation (Bazett) 449 ms    P Axis 47 degrees    R Axis -20 degrees    T Axis 4 degrees   POC Glucose Fingerstick    Collection Time: 12/22/22  7:33 AM   Result Value Ref Range    POC Glucose 119 (H) 75 - 110 mg/dL     Recent Labs     12/21/22  0336 12/21/22  0733 12/21/22  1636 12/21/22  2017 12/22/22  0733   POCGLU 322* 253* 199* 292* 119*                   URINE ANALYSIS: No results found for: LABURIN     CBC:  Lab Results   Component Value Date/Time    WBC 2.8 12/22/2022 05:34 AM    WBC 3.0 12/21/2022 12:58 AM    WBC 4.3 08/15/2022 11:42 PM    HGB 10.8 12/22/2022 05:34 AM    HGB 12.2 12/21/2022 12:58 AM    HGB 11.4 08/15/2022 11:42 PM    PLT 53 12/22/2022 05:34 AM    PLT 65 12/21/2022 12:58 AM    PLT 67 08/15/2022 11:42 PM        BMP:    Lab Results   Component Value Date/Time     12/22/2022 05:34 AM     12/21/2022 05:47 AM     12/21/2022 12:58 AM    K 3.4 12/22/2022 05:34 AM    K 3.7 12/21/2022 05:47 AM    K 4.1 12/21/2022 12:58 AM     12/22/2022 05:34 AM     12/21/2022 05:47 AM     12/21/2022 12:58 AM    CO2 25 12/22/2022 05:34 AM    CO2 23 12/21/2022 05:47 AM    CO2 24 12/21/2022 12:58 AM    BUN 18 12/22/2022 05:34 AM    BUN 11 12/21/2022 05:47 AM    BUN 10 12/21/2022 12:58 AM    CREATININE 0.56 12/22/2022 05:34 AM    CREATININE 0.51 12/21/2022 05:47 AM    CREATININE 0.67 12/21/2022 12:58 AM    GLUCOSE 144 12/22/2022 05:34 AM    GLUCOSE 266 12/21/2022 05:47 AM    GLUCOSE 433 12/21/2022 12:58 AM      LIVER PROFILE:  Lab Results   Component Value Date/Time    ALT 32 12/21/2022 12:58 AM    AST 34 12/21/2022 12:58 AM    PROT 6.9 12/21/2022 12:58 AM    BILITOT 3.1 12/21/2022 12:58 AM    BILIDIR 0.57 04/20/2022 12:55 AM    LABALBU 3.7 12/21/2022 12:58 AM               Lab Results   Component Value Date/Time    SPECIAL NOT REPORTED 10/01/2021 04:41 PM     Lab Results   Component Value Date/Time    CULTURE NO SIGNIFICANT GROWTH 10/01/2021 04:41 PM       Radiology:  XR CHEST PORTABLE    Result Date: 12/21/2022  No acute airspace disease identified. NM Cardiac Stress Test Nuclear Imaging    Result Date: 12/21/2022  1. Apical wall thinning. No definitive scintigraphic evidence for reversible ischemia or infarct 2. Left ventricular ejection fraction of 43%. 3.  Please see report for EKG portion of the examination which will be performed separately by physician from cardiology. Risk stratification:  Intermediate risk Note:  Risk stratification incorporates both clinical history and test results. Final risk determination is the responsibility of the ordering provider as history and other test results may increase or decrease the risk stratification reported for this examination. Risk stratification criteria are adapted from \"Noninvasive Risk Stratification\" criteria from [employer]. Al, ACC/AATS/AHA/ASE/ASNC/SCAI/SCCT/STS 2017 Appropriate Use Criteria For Coronary Revascularization in Patients With Stable Ischemic Heart Disease [facility] Volume 69, Issue 17, May 2017 High risk (>3% annual death or MI) 1. Severe resting LV dysfunction (LVEF >35%) not readily explained by non coronary causes 2. Resting perfusion abnormalities greater than 10% of the myocardium in patients without prior history or evidence of MI 3. Stress-induced perfusion abnormalities encumbering greater than or equal to 10% myocardium or stress segmental scores indicating multiple vascular territories with abnormalities 4. Stress-induced LV dilatation (TID ratio greater than 1.19 for exercise and greater than 1.39 for regadenoson) Intermediate risk (1% to 3% annual death or MI) 1. Mild/moderate resting LV dysfunction (LVEF 35% to 49%) not readily explained by non coronary causes. 2.  Resting perfusion abnormalities in 5%-9.9% of the myocardium in patients without a history or prior evidence of MI 3.   Stress-induced perfusion abnormality encumbering 5%-9.9% of the myocardium or stress segmental scores indicating 1 vascular territory with abnormalities but without LV dilation 4. Small wall motion abnormality involving 1-2 segments and only 1 coronary bed. Low Risk (Less than 1% annual death or MI) 1. Normal or small myocardial perfusion defect at rest or with stress encumbering less than 5% of the myocardium. Physical Examination:      Physical Exam   Vitals:    Vitals:    12/22/22 0015 12/22/22 0430 12/22/22 0700 12/22/22 0826   BP: (!) 97/51  109/69    Pulse: 74  77    Resp: 18  18    Temp: 98.3 °F (36.8 °C)  98.2 °F (36.8 °C)    TempSrc: Oral  Oral    SpO2: 94%  96% 96%   Weight:  220 lb 10.9 oz (100.1 kg)     Height:                        Body mass index is 29.93 kg/m². General Appearance:   No Acute distress                                                        Pulmonary/Chest:        Clear to auscultation bilaterally . No wheezes, rales or rhonchi . Cardiovascular:            Normal rate, regular rhythm,                                          No murmur or  Gallop . Abdomen:                       Soft, non-tender                                                                                    Extremities:                    No  Edema .                                                          Assessment:        Hospital Problems             Last Modified POA    * (Principal) Angina pectoris ([address] 12/21/2022 Yes    Marijuana user 12/21/2022 Yes    Hyperlipidemia associated with type 2 diabetes mellitus ([address] 12/21/2022 Yes    Essential hypertension 12/21/2022 Yes    Chronic liver disease 12/21/2022 Yes    Cirrhosis of liver without ascites ([address] 12/21/2022 Yes       Plan:      Unstable angina  - Troponin within normal limits x2 in ED  - EKG unremarkable for any acute changes  - Patient placed n.p.o.  - Stress test today  - BNP within normal limits  - Check TSH, lipid panel and hemoglobin A1c  - 2D echocardiogram ordered  - Continue statin, aspirin     Diabetes mellitus type 2  - Hold home dose of Januvia and glimepiride  - Patient's glucose on admission in the 400s, decreasing  - Patient currently n.p.o.  - Glucose checks, sliding scale insulin, will check A1c    Chronic liver disease  - Continue home dose of Bumex and nadolol    Hypertension  - Continue home dose of lisinopril and metoprolol      12/22/22    History of hypertension, hyperlipidemia stress test yesterday demonstrated intermediate risk, cardiology consulted  Cardio determine preserved LVEF, no ischemia or infarct on nuclear scan, no further cardiac work-up indicated at this time  Patient platelet 53 this morning, appears from records that he is baseline is around 60-70. Aspirin discontinued and decrease Lipitor to 20 mg daily  Patient's chest pain has improved, likely discharge home today              Awais Wright MD  12/22/2022  9:29 AM     Attestation and add on       I have discussed the care of [address] [place] [address] , including pertinent history and exam findings,      12/22/22    with the resident. I have seen and examined the patient and the key elements of all parts of the encounter have been performed by me . I agree with the assessment, plan and orders as documented by the resident. MD FRANDY Armendariz[address], [address] [place] [address]. Phone [phone]   Fax: [phone]  Answering Service: [phone]            Please note that this chart was generated using voice recognition Dragon dictation software. Although every effort was made to ensure the accuracy of this automated transcription, some errors in transcription may have occurred.

## 2022-12-22 NOTE — DISCHARGE INSTR - COC
Continuity of Care Form    Patient Name: Michael Navarrete   :  1965  MRN:  [id number]    Admit date:  2022  Discharge date:  ***    Code Status Order: Full Code   Advance Directives:     Admitting Physician:  Uche Santos MD  PCP: Uche Santos MD    Discharging Nurse: [facility] Unit/Room#: 2093/2093-01  Discharging Unit Phone Number: ***    Emergency Contact:   Extended Emergency Contact Information  Primary Emergency Contact: LewisayoPatti  Address: [address] [place] [address], [facility] [employer] [address] Phone: [phone]  Mobile Phone: [phone]  Relation: Spouse   needed? No  Secondary Emergency Contact: Vickie Odom  Address: [address] Phone: [phone]  Relation: Parent    Past Surgical History:  Past Surgical History:   Procedure Laterality Date    COLONOSCOPY N/A 3/16/2022    COLONOSCOPY WITH BIOPSY AND RESOLUTION CLIPPING X1 performed by Anabella Lewis MD at [address] [place] [address]      LITHOTRIPSY  2021    LITHOTRIPSY N/A 2021    ESWL EXTRACORPOREAL SHOCK WAVE LITHOTRIPSY  (NEX-MED CONF# [id number] - AUGUSTO) performed by Kelle Munoz MD at [place] [address] 2018    The esophagus was inspected to the Z-line. The endoscopic exam showed impression of varices (F1) in distal esophagus.      UPPER GASTROINTESTINAL ENDOSCOPY N/A 12/10/2021    EGD ESOPHAGOGASTRODUODENOSCOPY performed by Anabella Lewis MD at [facility]       Immunization History:   Immunization History   Administered Date(s) Administered    COVID-19, MODERNA BLUE border, Primary or Immunocompromised, (age 12y+), IM, 100 mcg/0.5mL 03/10/2021, 2021    Influenza Virus Vaccine 10/15/2015    Influenza, AFLURIA (age 1 yrs+), FLUZONE, (age 10 mo+), MDV, 0.5mL 2020    Influenza, FLUARIX, FLULAVAL, FLUZONE (age 10 mo+) AND AFLURIA, (age 1 y+), PF, 0.5mL 10/10/2017, 12/11/2021    Pneumococcal Polysaccharide ([id number]) 07/26/2015, 09/16/2016       Active Problems:  Patient Active Problem List   Diagnosis Code    Hyperlipidemia associated with type 2 diabetes mellitus ([facility] E11.69, E78.5    Essential hypertension I10    Calculus of kidney N20.0    Mitral valve disorder I05.9    Hyperlipidemia LDL goal <70 E78.5    Chronic liver disease K76.9    Ex-smoker Z87.891    H/O colonoscopy Z98.890    Type 2 diabetes mellitus, without long-term current use of insulin ([facility] E11.9    Insomnia G47.00    GI bleed K92.2    Cirrhosis of liver without ascites (HCC) K74.60    Numbness and tingling of left side of face R20.0, R20.2    Numbness and tingling R20.0, R20.2    Stroke-like symptoms R29.90    TIA (transient ischemic attack) G45.9    Chronic obstructive pulmonary disease, unspecified COPD type ([facility] J44.9    Thrombocytopenia ([facility] D69.6    History of cirrhosis Z87.19    Gastroesophageal reflux disease K21.9    Marijuana user F12.90    Angina pectoris ([facility] I20.9       Isolation/Infection:   Isolation            No Isolation          Patient Infection Status       Infection Onset Added Last Indicated Last Indicated By Review Planned Expiration Resolved Resolved By    None active    Resolved    COVID-19 (Rule Out) 12/21/22 12/21/22 12/21/22 COVID-19 & Influenza Combo (Ordered)   12/21/22 Rule-Out Test Resulted    COVID-19 (Rule Out) 12/31/21 12/31/21 12/31/21 COVID-19 & Influenza Combo (Ordered)   12/31/21 Rule-Out Test Resulted    COVID-19 (Rule Out) 04/01/21 04/02/21 04/01/21 COVID-19 (Ordered)   04/02/21 Rule-Out Test Resulted            Nurse Assessment:  Last Vital Signs: /62   Pulse 99   Temp 98.2 °F (36.8 °C) (Axillary)   Resp 18   Ht 6' (1.829 m)   Wt 220 lb 10.9 oz (100.1 kg)   SpO2 92%   BMI 29.93 kg/m²     Last documented pain score (0-10 scale): Pain Level: 1  Last Weight:   Wt Readings from Last 1 Encounters:   12/22/22 220 lb 10.9 oz (100.1 kg) Mental Status:  {IP PT MENTAL STATUS:}    IV Access:  { GRIFFIN IV ACCESS:760122692}    Nursing Mobility/ADLs:  Walking   {CHP DME XRZB:175265299}  Transfer  {CHP DME DQCS:911179987}  Bathing  {CHP DME KVFT:947961234}  Dressing  {CHP DME MIIH:680082222}  Toileting  {CHP DME HEIJ:783693915}  Feeding  {CHP DME JAED:715388653}  Med Admin  {CHP DME ZEAS:735076446}  Med Delivery   { GRIFFIN MED Delivery:485512481}    Wound Care Documentation and Therapy:        Elimination:  Continence: Bowel: {YES / TO:97589}  Bladder: {YES / BZ:19414}  Urinary Catheter: {Urinary Catheter:278578793}   Colostomy/Ileostomy/Ileal Conduit: {YES / QY:76544}       Date of Last BM: ***    Intake/Output Summary (Last 24 hours) at 2022 1403  Last data filed at 2022 1065  Gross per 24 hour   Intake 860 ml   Output --   Net 860 ml     I/O last 3 completed shifts:   In: 18 [P.O.:860]  Out: -     Safety Concerns:     [address] Concerns:122473835}    Impairments/Disabilities:      [address] Impairments/Disabilities:274968288}    Nutrition Therapy:  Current Nutrition Therapy:   [address] Diet List:633771446}    Routes of Feeding: {P DME Other Feedings:473821567}  Liquids: {Slp liquid thickness:08862}  Daily Fluid Restriction: {CHP DME Yes amt example:715179527}  Last Modified Barium Swallow with Video (Video Swallowing Test): {Done Not Done RFGR:570120288}    Treatments at the Time of Hospital Discharge:   Respiratory Treatments: ***  Oxygen Therapy:  {Therapy; copd oxygen:44177}  Ventilator:    {[facility] Vent OTMX:769124755}    Rehab Therapies: {THERAPEUTIC INTERVENTION:6101028417}  Weight Bearing Status/Restrictions: [address]  Weight Bearin}  Other Medical Equipment (for information only, NOT a DME order):  {EQUIPMENT:697903894}  Other Treatments: ***    Patient's personal belongings (please select all that are sent with patient):  {BASILIO DME Belongings:308433017}    RN SIGNATURE:  {Esignature:947673543}    CASE MANAGEMENT/SOCIAL WORK SECTION    Inpatient Status Date: ***    Readmission Risk Assessment Score:  Readmission Risk              Risk of Unplanned Readmission:  23           Discharging to Facility/ Agency   Name:   Address:  Phone:  Fax:    Dialysis Facility (if applicable)   Name:  Address:  Dialysis Schedule:  Phone:  Fax:    / signature: {Esignature:180156638}    PHYSICIAN SECTION    Prognosis: {Prognosis:3947684362}    Condition at Discharge: [facility] Patient Condition:494391905}    Rehab Potential (if transferring to Rehab): {Prognosis:7386302287}    Recommended Labs or Other Treatments After Discharge: ***    Physician Certification: I certify the above information and transfer of Manuel Vilchis  is necessary for the continuing treatment of the diagnosis listed and that he requires {Admit to Appropriate Level of Care:48732} for {GREATER/LESS:550582423} 30 days.      Update Admission H&P: {CHP DME Changes in EUZDF:817757514}    PHYSICIAN SIGNATURE:  {Esignature:658254101}

## 2022-12-22 NOTE — CONSULTS
[facility]  CONSULT NOTE                  Date:   12/22/2022  Patient name: Manuel Vilchis  Date of admission:  12/21/2022 12:41 AM  MRN:   [id number]  YOB: 1965    Reason for Admission: cardiac evaluation     CHIEF COMPLAINT:   right sided chest pain     History Obtained From:  Patient and chart review     HISTORY OF PRESENT ILLNESS:    history significant for hypertension, hyperlipidemia, liver cirrhosis, COPD, СЕРГЕЙ on CPAP, type 2 diabetes who presents with Chest Pain which is focal on right side, sharp in character, non radiating, no relation to exertion. He underwent stress test and echo yesterday as described below. Past Medical History:   has a past medical history of Calculus of kidney, Cerebral artery occlusion with cerebral infarction ([address] Cirrhosis ([facility] [address] COPD (chronic obstructive pulmonary disease) ([facility] [address] COVID-19, Essential hypertension, benign, Gout, unspecified, H/O colonoscopy, Mitral valve disorders(424.0), СЕРГЕЙ on CPAP, Other and unspecified hyperlipidemia, Type II or unspecified type diabetes mellitus without mention of complication, not stated as uncontrolled, Unspecified chronic liver disease without mention of alcohol, and Wellness examination. Past Surgical History:   has a past surgical history that includes Lithotripsy; Upper gastrointestinal endoscopy (N/A, 5/25/2018); Lithotripsy (04/02/2021); Lithotripsy (N/A, 4/2/2021); Upper gastrointestinal endoscopy (N/A, 12/10/2021); and Colonoscopy (N/A, 3/16/2022). Home Medications:    Prior to Admission medications    Medication Sig Start Date End Date Taking?  Authorizing Provider   SITagliptin (JANUVIA) 100 MG tablet Take 1 tablet by mouth once daily 11/28/22  Yes Darius Upton MD   omeprazole (PRILOSEC) 20 MG delayed release capsule Take 1 capsule by mouth once daily 11/28/22  Yes Darius Upton MD   atorvastatin (LIPITOR) 40 MG tablet Take 1 tablet by mouth nightly 11/9/22  Yes Darius Upton MD tamsulosin (FLOMAX) 0.4 MG capsule Take 1 capsule by mouth daily 11/9/22  Yes Gianna Bailey MD   albuterol sulfate HFA (VENTOLIN HFA) 108 (90 Base) MCG/ACT inhaler Inhale 1 puff into the lungs every 4 hours as needed for Wheezing 11/9/22  Yes Gianna Bailey MD   fluticasone-salmeterol (ADVAIR) 100-50 MCG/DOSE diskus inhaler INHALE 1 PUFF BY MOUTH EVERY 12 HOURS 11/9/22  Yes Gianna Bailey MD   glimepiride (AMARYL) 4 MG tablet TAKE 1 TABLET BY MOUTH TWICE DAILY 11/9/22  Yes Gianna Bailey MD   nadolol (CORGARD) 20 MG tablet Take 1 tablet by mouth daily 11/9/22  Yes Gianna Bailey MD   lisinopril (PRINIVIL;ZESTRIL) 20 MG tablet Take 1 tablet by mouth once daily 11/9/22  Yes Gianna Bailey MD   bumetanide (BUMEX) 1 MG tablet Take 1 tablet by mouth daily 11/9/22  Yes Gianna Bailey MD   allopurinol (ZYLOPRIM) 100 MG tablet Take 1 tablet by mouth daily 11/7/22  Yes Gianna Bailey MD   Lancets MISC 1 each by Does not apply route 2 times daily 1/27/22   Gianna Bailey MD   metoprolol tartrate (LOPRESSOR) 25 MG tablet Take 1 tablet by mouth 2 times daily 4/6/21   Gianna Bailey MD   blood glucose monitor strips 1 strip by Other route 4 times daily Test 4  times a day & as needed for symptoms of irregular blood glucose. 5/3/19   Gianna Bailey MD       Allergies:  Codeine and Simvastatin    Social History:   reports that he quit smoking about 10 years ago. His smoking use included cigarettes and e-cigarettes. He has a 15.00 pack-year smoking history. He has quit using smokeless tobacco. He reports that he does not currently use alcohol. He reports current drug use. Drug: Marijuana [place]). Family History:    Negative for early CAD    REVIEW OF SYSTEMS:    Constitutional: there has been no unanticipated weight loss. No change in functional capacity. Eyes: No visual changes or diplopia. ENT: No Headaches, hearing loss or vertigo. No mouth sores or sore throat.   Cardiovascular: +ve right sided chest pain as mentioned above, dyspnea, orthopnea, palpitations or pre syncope  Respiratory: No hx of productive cough, pleuritic chest pain   Gastrointestinal: No abdominal pain, appetite loss, blood in stools. No change in bowel habits. Genitourinary: No dysuria, trouble voiding, or hematuria. Musculoskeletal:  No gait disturbance, No weakness or joint complaints. Integumentary: No rash or pruritis. Neurological: No headache, weakness, numbness or tingling. No change in gait, balance, coordination. Psychiatric: No anxiety, or depression. Endocrine: No temperature intolerance. No excessive thirst, fluid intake, or urination. No tremor. Hematologic/Lymphatic: No abnormal bruising or bleeding, blood clots or swollen lymph nodes. Allergic/Immunologic: No nasal congestion or hives. PHYSICAL EXAM:    Physical Examination:    /69   Pulse 77   Temp 98.2 °F (36.8 °C) (Oral)   Resp 18   Ht 6' (1.829 m)   Wt 220 lb 10.9 oz (100.1 kg)   SpO2 96%   BMI 29.93 kg/m²    Constitutional and General Appearance: alert, cooperative, in no distress   HEENT: PERRL, no cervical lymphadenopathy. Normal oral mucosa  Respiratory:  Normal excursion and expansion without use of accessory muscles  Resp Auscultation: Good respiratory effort. No for increased work of breathing. On auscultation: clear to auscultation bilaterally, no wheezing, no rales. Cardiovascular: The apical impulse is not displaced  Heart tones are crisp and normal. regular S1 and S2. Murmurs: None   Jugular venous pulsation Normal  Thre is no carotid bruit   Peripheral pulses are symmetrical and full   Abdomen:  No masses or tenderness  Bowel sounds present  Extremities:   No Cyanosis or Clubbing   Lower extremity edema: No edema    Skin: Warm and dry  Neurological:  Not done     DATA:    Diagnostics:      EKG:   SR, normal ecg       TTE 12.21.2022  Left ventricle is normal in size and wall thickness.   Global left ventricular systolic function is normal. Estimated LV EF 55-60%. No obvious wall motion abnormality seen. Both atria are normal in size. Normal right ventricular size and function. No significant valvular regurgitation or stenosis seen. No significant pericardial effusion is seen. Normal aortic root dimension. Normal IVC diameter and respiratory variations indicating normal Rt atrial  filling pressor    Lexiscan stress test 12.21.2022  1. Apical wall thinning. No definitive scintigraphic evidence for reversible   ischemia or infarct   2. Left ventricular ejection fraction of 43%. 3.  Please see report for EKG portion of the examination which will be   performed separately by physician from cardiology. Risk stratification:  Intermediate risk         Labs:     CBC:   Recent Labs     12/21/22 0058 12/22/22 0534   WBC 3.0* 2.8*   HGB 12.2* 10.8*   HCT 36.2* 32.3*   PLT 65* 53*     BMP:   Recent Labs     12/21/22 0547 12/22/22 0534    140   K 3.7 3.4*   CO2 23 25   BUN 11 18   CREATININE 0.51* 0.56*   LABGLOM >60 >60   GLUCOSE 266* 144*     BNP: No results for input(s): BNP in the last 72 hours. PT/INR:   Recent Labs     12/21/22 0058   PROTIME 14.9*   INR 1.2     APTT:No results for input(s): APTT in the last 72 hours. CARDIAC ENZYMES:No results for input(s): CKTOTAL, CKMB, CKMBINDEX, TROPONINI in the last 72 hours. FASTING LIPID PANEL:  Lab Results   Component Value Date/Time    HDL 37 12/22/2022 05:34 AM    LDLDIRECT 55 10/15/2015 12:10 PM    LDLCALC 46 07/21/2017 12:00 AM    TRIG 83 12/22/2022 05:34 AM     LIVER PROFILE:  Recent Labs     12/21/22  0058   AST 34   ALT 32   LABALBU 3.7         IMPRESSION:    Atypical chest pain, right sided   (Preserved LVEF with no RWMA. No ischemia or infract on nuclear perfusion scan)  Diabetes mellitus   COPD  Liver cirrhosis   Thrombocytopenia     RECOMMENDATIONS:  D/c ASA.  Plat counts < 60 K   Decrease lipitor dose to 20 mg qd   No further cardiac work up indicated   Patient counseled to keep abstinence from alcohol use.          Aster Guerrero MD, [facility]

## 2022-12-22 NOTE — DISCHARGE SUMMARY
[address]    Discharge Summary     Patient ID: Virginie Amezquita  :  1965   MRN: [id number]     ACCOUNT:  [de-identified]   Patient's PCP: Alma Davies MD  Admit Date: 2022   Discharge Date: 2022     Length of Stay: 1  Code Status:  Full Code  Admitting Physician: Alma Davies MD  Discharge Physician: Mendez Garner MD     Active Discharge Diagnoses:       Primary Problem  Angina pectoris [facility])      Matth\[facility] [place] [facility]\"" Problems    Diagnosis Date Noted    Angina pectoris ([facility] [address] [I20.9] 2022     Priority: Medium    Marijuana user [F12.90] 10/07/2022     Priority: Medium    Thrombocytopenia ([facility] [address] [D69.6] 12/10/2021    Cirrhosis of liver without ascites ([facility] [address] [K74.60]     Hyperlipidemia associated with type 2 diabetes mellitus ([facility] [address] [E11.69, E78.5]     Essential hypertension [I10]     Chronic liver disease [K76.9]        Admission Condition:  fair     Discharged Condition: good    Hospital Stay:       Hospital Course:  Virginie Amezquita is a 65 y.o. male with past medical history significant for hypertension, hyperlipidemia, liver cirrhosis, COPD, СЕРГЕЙ, type 2 diabetes who was admitted for the management of   Angina pectoris ([facility] [address] , presented to ER with Chest Pain (Right side, 7/10, heavy quality x 1 day)    In the emergency department troponins were within normal limits, EKG did not demonstrate any acute changes. Patient was placed n.p.o. and received stress test.  Stress test indicated patient intermediate risk for underlying cardiac etiology. Cardiology was consulted and deemed that no further intervention was necessary. Right-sided chest pain did resolve. Patient remained vitally stable and glucose was well controlled. Of note patient was thrombocytopenic throughout hospital course, it appears from looking at his past records it patient's baseline platelet count is around 60-70.   Aspirin was discontinued Lipitor decreased from 40 mg to 20 mg. Significant therapeutic interventions: Cardiac nuclear stress test    Significant Diagnostic Studies:   Labs / Micro:  CBC:   Lab Results   Component Value Date/Time    WBC 2.8 12/22/2022 05:34 AM    RBC 3.78 12/22/2022 05:34 AM    HGB 10.8 12/22/2022 05:34 AM    HCT 32.3 12/22/2022 05:34 AM    MCV 85.5 12/22/2022 05:34 AM    MCH 28.6 12/22/2022 05:34 AM    MCHC 33.4 12/22/2022 05:34 AM    RDW 15.8 12/22/2022 05:34 AM    PLT 53 12/22/2022 05:34 AM     BMP:    Lab Results   Component Value Date/Time    GLUCOSE 144 12/22/2022 05:34 AM     12/22/2022 05:34 AM    K 3.4 12/22/2022 05:34 AM     12/22/2022 05:34 AM    CO2 25 12/22/2022 05:34 AM    ANIONGAP 8 12/22/2022 05:34 AM    BUN 18 12/22/2022 05:34 AM    CREATININE 0.56 12/22/2022 05:34 AM    BUNCRER NOT REPORTED 12/11/2021 05:48 AM    CALCIUM 8.5 12/22/2022 05:34 AM    LABGLOM >60 12/22/2022 05:34 AM    GFRAA >60 08/15/2022 11:42 PM    GFR      08/15/2022 11:42 PM        Radiology:    XR CHEST PORTABLE    Result Date: 12/21/2022  EXAMINATION: ONE XRAY VIEW OF THE CHEST 12/21/2022 1:23 am COMPARISON: 07/26/2022 HISTORY: ORDERING SYSTEM PROVIDED HISTORY: chest pain TECHNOLOGIST PROVIDED HISTORY: chest pain Reason for Exam: Chest pain, chest heaviness Additional signs and symptoms: Chest pain, chest heaviness Relevant Medical/Surgical History: Chest pain, chest heaviness FINDINGS: The cardiomediastinal silhouette is within normal limits. There is no consolidation, pneumothorax or evidence for edema. No evidence for effusion. No acute osseous abnormality is identified. No acute airspace disease identified.      NM Cardiac Stress Test Nuclear Imaging    Result Date: 12/21/2022  EXAMINATION: MYOCARDIAL PERFUSION IMAGING 12/21/2022 9:33 am TECHNIQUE: Rest dose:  38.2 mCi Tc-99m sestamibi intravenously Stress dose:  15 mCi Tc-99m sestamibi intravenously Under cardiology supervision, 0.4 mg Lexiscan was infused intravenously prior to injection of the stress dose. SPECT imaging was acquired following injection of the sestamibi. ECG gating was obtained following the stress acquisition. COMPARISON: None Available. HISTORY: ORDERING SYSTEM PROVIDED HISTORY: Chest pain TECHNOLOGIST PROVIDED HISTORY: Reason for Exam: Chest pain Procedure Type->Rx Reason for Exam: Chest pain 78-year-old male with chest pain FINDINGS: The patient achieved a maximum heart rate of 103 beats per minute, 62 % of the maximum age predicted heart rate of 164 beats per minute. Perfusion: Apical wall thinning. There is no scintigraphic evidence for a reversible or fixed perfusion defect to suggest reversible ischemia or infarct. Function: The gated SPECT data demonstrates normal left ventricular size and normal wall motion. Left ventricular ejection fraction:  43% TID score:  0.85 (threshold value of 1.39 is used for Lexiscan stress with Tc-99m). There is no stress-induced cavitary dilatation to suggest compensated triple vessel disease. End diastolic volume:  628IW Scores are visually adjusted to account for potential artifact. Summed stress score:  0 Summed rest score:  0 Summed reversibility score:  0     1. Apical wall thinning. No definitive scintigraphic evidence for reversible ischemia or infarct 2. Left ventricular ejection fraction of 43%. 3.  Please see report for EKG portion of the examination which will be performed separately by physician from cardiology. Risk stratification:  Intermediate risk Note:  Risk stratification incorporates both clinical history and test results. Final risk determination is the responsibility of the ordering provider as history and other test results may increase or decrease the risk stratification reported for this examination. Risk stratification criteria are adapted from \"Noninvasive Risk Stratification\" criteria from [employer].   Al, ACC/AATS/AHA/ASE/ASNC/SCAI/SCCT/STS 2017 Appropriate Use Criteria For Coronary Revascularization in Patients With Stable Ischemic Heart Disease [facility] Volume 69, Issue 17, May 2017 High risk (>3% annual death or MI) 1. Severe resting LV dysfunction (LVEF >35%) not readily explained by non coronary causes 2. Resting perfusion abnormalities greater than 10% of the myocardium in patients without prior history or evidence of MI 3. Stress-induced perfusion abnormalities encumbering greater than or equal to 10% myocardium or stress segmental scores indicating multiple vascular territories with abnormalities 4. Stress-induced LV dilatation (TID ratio greater than 1.19 for exercise and greater than 1.39 for regadenoson) Intermediate risk (1% to 3% annual death or MI) 1. Mild/moderate resting LV dysfunction (LVEF 35% to 49%) not readily explained by non coronary causes. 2.  Resting perfusion abnormalities in 5%-9.9% of the myocardium in patients without a history or prior evidence of MI 3. Stress-induced perfusion abnormality encumbering 5%-9.9% of the myocardium or stress segmental scores indicating 1 vascular territory with abnormalities but without LV dilation 4. Small wall motion abnormality involving 1-2 segments and only 1 coronary bed. Low Risk (Less than 1% annual death or MI) 1. Normal or small myocardial perfusion defect at rest or with stress encumbering less than 5% of the myocardium. Consultations:    Consults:     Final Specialist Recommendations/Findings:   IP CONSULT TO CARDIOLOGY      The patient was seen and examined on day of discharge and this discharge summary is in conjunction with any daily progress note from day of discharge.     Discharge plan:       Disposition: Home    Physician Follow Up:     Levi Samuels MD   [address] [place] [address] [address] [facility]  [phone]    Schedule an appointment as soon as possible for a visit in 2 week(s)      Levi Samuels, [address] [facility]  [address] [id number] [phone]             Requiring Further Evaluation/Follow Up POST HOSPITALIZATION/Incidental Findings: Follow-up with primary care provider for further management and treatment of underlying chronic conditions. Thrombocytopenia evident on labs and has been for the last several years, follow-up with PCP for further investigation    Diet: cardiac diet    Activity: As tolerated    Instructions to Patient: Call and schedule appointment with primary care provider for posthospitalization visit in order to manage chronic conditions. Discharge Medications:      Medication List        CONTINUE taking these medications      Lancets Misc  1 each by Does not apply route 2 times daily            ASK your doctor about these medications      albuterol sulfate  (90 Base) MCG/ACT inhaler  Commonly known as: Ventolin HFA  Inhale 1 puff into the lungs every 4 hours as needed for Wheezing     allopurinol 100 MG tablet  Commonly known as: ZYLOPRIM  Take 1 tablet by mouth daily     atorvastatin 40 MG tablet  Commonly known as: LIPITOR  Take 1 tablet by mouth nightly     blood glucose test strips  1 strip by Other route 4 times daily Test 4  times a day & as needed for symptoms of irregular blood glucose.      bumetanide 1 MG tablet  Commonly known as: BUMEX  Take 1 tablet by mouth daily     fluticasone-salmeterol 100-50 MCG/DOSE diskus inhaler  Commonly known as: ADVAIR  INHALE 1 PUFF BY MOUTH EVERY 12 HOURS     glimepiride 4 MG tablet  Commonly known as: AMARYL  TAKE 1 TABLET BY MOUTH TWICE DAILY     lisinopril 20 MG tablet  Commonly known as: PRINIVIL;ZESTRIL  Take 1 tablet by mouth once daily     nadolol 20 MG tablet  Commonly known as: CORGARD  Take 1 tablet by mouth daily     omeprazole 20 MG delayed release capsule  Commonly known as: PRILOSEC  Take 1 capsule by mouth once daily     SITagliptin 100 MG tablet  Commonly known as: Januvia  Take 1 tablet by mouth once daily     tamsulosin 0.4 MG capsule  Commonly known as: FLOMAX  Take 1 capsule by mouth daily Electronically signed by   Chiqui Madsen MD  12/22/2022  11:12 AM      Thank you Dr. Moris Hanna MD for the opportunity to be involved in this patient's care.

## 2022-12-23 LAB
EKG ATRIAL RATE: 71 BPM
EKG P AXIS: 47 DEGREES
EKG P-R INTERVAL: 168 MS
EKG Q-T INTERVAL: 414 MS
EKG QRS DURATION: 94 MS
EKG QTC CALCULATION (BAZETT): 449 MS
EKG R AXIS: -20 DEGREES
EKG T AXIS: 4 DEGREES
EKG VENTRICULAR RATE: 71 BPM

## 2022-12-23 PROCEDURE — 93010 ELECTROCARDIOGRAM REPORT: CPT | Performed by: INTERNAL MEDICINE

## 2023-01-12 NOTE — TELEPHONE ENCOUNTER
Patient returned call , informed of Dr Francie Womack biopsy result note. Patient voiced understanding to contact derm and schedule follow up. (3) no apparent problem

## 2023-01-19 ENCOUNTER — TELEPHONE (OUTPATIENT)
Dept: INTERNAL MEDICINE CLINIC | Age: 58
End: 2023-01-19

## 2023-01-19 DIAGNOSIS — K76.9 CHRONIC LIVER DISEASE: Primary | ICD-10-CM

## 2023-01-19 DIAGNOSIS — J44.9 CHRONIC OBSTRUCTIVE PULMONARY DISEASE, UNSPECIFIED COPD TYPE (HCC): ICD-10-CM

## 2023-01-24 NOTE — TELEPHONE ENCOUNTER
Verified with daughter Abelardo Lim, HIPAA verified, copy of handicap placard has already been picked up

## 2023-02-20 DIAGNOSIS — E11.65 TYPE 2 DIABETES MELLITUS WITH HYPERGLYCEMIA, WITHOUT LONG-TERM CURRENT USE OF INSULIN (HCC): ICD-10-CM

## 2023-02-21 RX ORDER — LANCETS 33 GAUGE
EACH MISCELLANEOUS
Qty: 120 EACH | Refills: 0 | Status: SHIPPED | OUTPATIENT
Start: 2023-02-21

## 2023-02-24 ENCOUNTER — TELEPHONE (OUTPATIENT)
Dept: GASTROENTEROLOGY | Age: 58
End: 2023-02-24

## 2023-02-24 NOTE — TELEPHONE ENCOUNTER
Called the patient to confirm and Despina Jeans answered. Informed that I was calling to confirm for 2-27-23. Stated okay thank you and hung up.   (On hippa)

## 2023-02-28 ENCOUNTER — OFFICE VISIT (OUTPATIENT)
Dept: DERMATOLOGY | Age: 58
End: 2023-02-28
Payer: MEDICAID

## 2023-02-28 VITALS
HEIGHT: 72 IN | SYSTOLIC BLOOD PRESSURE: 112 MMHG | HEART RATE: 73 BPM | DIASTOLIC BLOOD PRESSURE: 65 MMHG | OXYGEN SATURATION: 97 % | BODY MASS INDEX: 30.56 KG/M2 | WEIGHT: 225.6 LBS | TEMPERATURE: 98.4 F

## 2023-02-28 DIAGNOSIS — I97.89 NECROSIS OF SURGICAL WOUND (HCC): Primary | ICD-10-CM

## 2023-02-28 DIAGNOSIS — I96 NECROSIS OF SURGICAL WOUND (HCC): Primary | ICD-10-CM

## 2023-02-28 PROCEDURE — 1036F TOBACCO NON-USER: CPT | Performed by: PHYSICIAN ASSISTANT

## 2023-02-28 PROCEDURE — 99213 OFFICE O/P EST LOW 20 MIN: CPT | Performed by: PHYSICIAN ASSISTANT

## 2023-02-28 PROCEDURE — G8484 FLU IMMUNIZE NO ADMIN: HCPCS | Performed by: PHYSICIAN ASSISTANT

## 2023-02-28 PROCEDURE — 3017F COLORECTAL CA SCREEN DOC REV: CPT | Performed by: PHYSICIAN ASSISTANT

## 2023-02-28 PROCEDURE — 3074F SYST BP LT 130 MM HG: CPT | Performed by: PHYSICIAN ASSISTANT

## 2023-02-28 PROCEDURE — G8417 CALC BMI ABV UP PARAM F/U: HCPCS | Performed by: PHYSICIAN ASSISTANT

## 2023-02-28 PROCEDURE — 3078F DIAST BP <80 MM HG: CPT | Performed by: PHYSICIAN ASSISTANT

## 2023-02-28 PROCEDURE — G8427 DOCREV CUR MEDS BY ELIG CLIN: HCPCS | Performed by: PHYSICIAN ASSISTANT

## 2023-02-28 RX ORDER — KETOCONAZOLE 20 MG/G
CREAM TOPICAL
COMMUNITY
Start: 2023-01-04

## 2023-02-28 RX ORDER — DOXYCYCLINE HYCLATE 100 MG/1
CAPSULE ORAL
Qty: 20 CAPSULE | Refills: 0 | Status: SHIPPED | OUTPATIENT
Start: 2023-02-28

## 2023-02-28 RX ORDER — DOXYCYCLINE HYCLATE 100 MG/1
CAPSULE ORAL
COMMUNITY
Start: 2023-02-09 | End: 2023-02-28

## 2023-02-28 NOTE — PROGRESS NOTES
Dermatology Patient Note  [address]  [address] [facility] [address] [address]  Dept: [phone]  Dept Fax: [phone]      VISITDATE: 2/28/2023   REFERRING PROVIDER: No ref. provider found      Pb Arboleda is a 65 y.o. male  who presents today in the office for:    Follow-up (Pt states the bx on his nose that was done a week ago  is still sore but is starting to scab over )      HISTORY OF PRESENT ILLNESS:  Pt had Mohs surgery, on nose, approximately 1 week ago. He states that the area has scabbed and become tender since that time. He notes that he has kept the area covered, with Vaseline and a Band-aid, \"most of the time\".     MEDICAL PROBLEMS:  Patient Active Problem List    Diagnosis Date Noted    Angina pectoris ([facility] 12/21/2022     Priority: Medium    Gastroesophageal reflux disease 10/07/2022     Priority: Medium    Marijuana user 10/07/2022     Priority: Medium    Thrombocytopenia ([address] 12/10/2021    History of cirrhosis     Chronic obstructive pulmonary disease, unspecified COPD type ([address] 06/16/2021    TIA (transient ischemic attack) 04/06/2020    Stroke-like symptoms 03/30/2020    Numbness and tingling 01/07/2019    Numbness and tingling of left side of face 01/06/2019    GI bleed 05/24/2018    Cirrhosis of liver without ascites ([address]     Insomnia 11/24/2017    Type 2 diabetes mellitus, without long-term current use of insulin ([address] 06/23/2016    H/O colonoscopy 04/11/2016     2016      Ex-smoker 03/26/2015    Hyperlipidemia associated with type 2 diabetes mellitus ([address]     Essential hypertension     Calculus of kidney     Mitral valve disorder     Hyperlipidemia LDL goal <70     Chronic liver disease        CURRENT MEDICATIONS:   Current Outpatient Medications   Medication Sig Dispense Refill    ketoconazole (NIZORAL) 2 % cream APPLY CREAM TOPICALLY BETWEEN EYEBROW AREA TWICE DAILY AS NEEDED FOR FLARES AVOID EYELIDS AND SKIN FOLDS      doxycycline hyclate (VIBRAMYCIN) 100 MG capsule Take 1 pill twice daily with food 20 capsule 0    Lancets (ONETOUCH DELICA PLUS [id number]) MISC USE 1  TO CHECK GLUCOSE TWICE DAILY 120 each 0    atorvastatin (LIPITOR) 20 MG tablet Take 1 tablet by mouth nightly 30 tablet 3    SITagliptin (JANUVIA) 100 MG tablet Take 1 tablet by mouth once daily 27 tablet 0    omeprazole (PRILOSEC) 20 MG delayed release capsule Take 1 capsule by mouth once daily 27 capsule 0    tamsulosin (FLOMAX) 0.4 MG capsule Take 1 capsule by mouth daily 90 capsule 3    albuterol sulfate HFA (VENTOLIN HFA) 108 (90 Base) MCG/ACT inhaler Inhale 1 puff into the lungs every 4 hours as needed for Wheezing 18 g 6    fluticasone-salmeterol (ADVAIR) 100-50 MCG/DOSE diskus inhaler INHALE 1 PUFF BY MOUTH EVERY 12 HOURS 1 each 3    glimepiride (AMARYL) 4 MG tablet TAKE 1 TABLET BY MOUTH TWICE DAILY 60 tablet 11    nadolol (CORGARD) 20 MG tablet Take 1 tablet by mouth daily 30 tablet 3    lisinopril (PRINIVIL;ZESTRIL) 20 MG tablet Take 1 tablet by mouth once daily 30 tablet 3    bumetanide (BUMEX) 1 MG tablet Take 1 tablet by mouth daily 90 tablet 1    allopurinol (ZYLOPRIM) 100 MG tablet Take 1 tablet by mouth daily 90 tablet 1    blood glucose monitor strips 1 strip by Other route 4 times daily Test 4  times a day & as needed for symptoms of irregular blood glucose.  100 strip 11     Current Facility-Administered Medications   Medication Dose Route Frequency Provider Last Rate Last Admin    lidocaine-EPINEPHrine 1 %-1:112562 injection 0.5 mL  0.5 mL IntraDERmal Once David Poon PA-C           ALLERGIES:   Allergies   Allergen Reactions    Codeine Nausea Only and Other (See Comments)     hallucinations    Simvastatin Other (See Comments)     Leg cramping       SOCIAL HISTORY:  Social History     Tobacco Use    Smoking status: Former     Packs/day: 0.50     Years: 30.00     Pack years: 15.00     Types: Cigarettes, E-Cigarettes     Quit date: 3/26/2012     Years since quitting: 10.9    Smokeless tobacco: Former   Substance Use Topics    Alcohol use: Not Currently     Alcohol/week: 0.0 standard drinks     Comment: used to drink \"a lot\"  None since Dec 2020       Pertinent ROS:  Review of Systems  Skin: Denies any new changing, growing or bleeding lesions or rashes except as described in the HPI   Constitutional: Denies fevers, chills, and malaise. PHYSICAL EXAM:   /65   Pulse 73   Temp 98.4 °F (36.9 °C) (Temporal)   Ht 6' (1.829 m)   Wt 225 lb 9.6 oz (102.3 kg)   SpO2 97%   BMI 30.60 kg/m²     The patient is generally well appearing, well nourished, alert and conversational. Affect is normal.    Cutaneous Exam:  Physical Exam  Face and neck only was examined. Facial covering was removed during examination. Diagnoses/exam findings/medical history pertinent to this visit are listed below:    Assessment:   Diagnosis Orders   1. Necrosis of surgical wound (HCC)  doxycycline hyclate (VIBRAMYCIN) 100 MG capsule           Plan:  1. Necrosis of surgical wound ([address]  - [place] Vaseline and keep covered with a band-aid at all times, except when showering.  - Doxycycline 100 mg BID x 10 days. RTC 4W    Future Appointments   Date Time Provider Babs Elias   4/4/2023  3:00 PM Trae Bernstein PA-C [facility]   4/11/2023  2:30 PM Ary Wang MD [facility]         There are no Patient Instructions on file for this visit.       Electronically signed by Trae Bernstein PA-C on 2/28/23 at 2:26 PM EST

## 2023-04-18 ENCOUNTER — HOSPITAL ENCOUNTER (EMERGENCY)
Age: 58
Discharge: HOME | End: 2023-04-18
Attending: EMERGENCY MEDICINE
Payer: MEDICAID

## 2023-04-18 VITALS
OXYGEN SATURATION: 95 % | WEIGHT: 220 LBS | TEMPERATURE: 98.4 F | SYSTOLIC BLOOD PRESSURE: 108 MMHG | HEIGHT: 72 IN | DIASTOLIC BLOOD PRESSURE: 63 MMHG | HEART RATE: 86 BPM | RESPIRATION RATE: 18 BRPM | BODY MASS INDEX: 29.8 KG/M2

## 2023-04-18 DIAGNOSIS — B37.42 CANDIDAL BALANITIS: Primary | ICD-10-CM

## 2023-04-18 PROCEDURE — 99283 EMERGENCY DEPT VISIT LOW MDM: CPT

## 2023-04-18 RX ORDER — DIAPER,BRIEF,INFANT-TODD,DISP
EACH MISCELLANEOUS
Qty: 30 G | Refills: 1 | Status: SHIPPED | OUTPATIENT
Start: 2023-04-18 | End: 2023-04-25

## 2023-04-18 RX ORDER — KETOCONAZOLE 20 MG/G
CREAM TOPICAL
Qty: 60 G | Refills: 0 | Status: SHIPPED | OUTPATIENT
Start: 2023-04-18

## 2023-04-18 NOTE — ED TRIAGE NOTES
Mode of arrival (squad #, walk in, police, etc) : walk in         Chief complaint(s): yeast infection         Arrival Note (brief scenario, treatment PTA, etc). : Pt states having a hx of yeast infection in his groin area. Pt states x1 week symptoms. C= \"Have you ever felt that you should Cut down on your drinking? \"  No  A= \"Have people Annoyed you by criticizing your drinking? \"  No  G= \"Have you ever felt bad or Guilty about your drinking? \"  No  E= \"Have you ever had a drink as an Eye-opener first thing in the morning to steady your nerves or to help a hangover? \"  No      Deferred []      Reason for deferring: N/A    *If yes to two or more: probable alcohol abuse. *

## 2023-04-18 NOTE — ED PROVIDER NOTES
eMERGENCY dEPARTMENT eNCOUnter   Independent Attestation     Pt Name: Iain Montenegro  MRN: [id number]  [place] 1965  Date of evaluation: 4/18/23     Iain Montenegro is a 63 y.o. male with CC: Rash      Based on the medical record the care appears appropriate. I was personally available for consultation in the Emergency Department.     Parry Mortimer, DO  Attending Emergency Physician                 Parry Mortimer, DO  04/19/23 0635
Numbness and tingling R20.0, R20.2    Stroke-like symptoms R29.90    TIA (transient ischemic attack) G45.9    Chronic obstructive pulmonary disease, unspecified COPD type (HCC) J44.9    Thrombocytopenia (HCC) D69.6    History of cirrhosis Z87.19    Gastroesophageal reflux disease K21.9    Marijuana user F12.90    Angina pectoris ([address] I20.9     SURGICAL HISTORY       Past Surgical History:   Procedure Laterality Date    COLONOSCOPY N/A 3/16/2022    COLONOSCOPY WITH BIOPSY AND RESOLUTION CLIPPING X1 performed by Shirley Sykes MD at [address] [facility]      LITHOTRIPSY  04/02/2021    LITHOTRIPSY N/A 4/2/2021    ESWL EXTRACORPOREAL SHOCK WAVE LITHOTRIPSY  ([facility] CONF# [id number] - AUGUSTO) performed by Britt Chun MD at [address] [facility] 5/25/2018    The esophagus was inspected to the Z-line. The endoscopic exam showed impression of varices (F1) in distal esophagus.      UPPER GASTROINTESTINAL ENDOSCOPY N/A 12/10/2021    EGD ESOPHAGOGASTRODUODENOSCOPY performed by Shirley Sykes MD at [address] [facility]       Current Facility-Administered Medications on File Prior to Encounter   Medication Dose Route Frequency Provider Last Rate Last Admin    lidocaine-EPINEPHrine 1 %-1:565739 injection 0.5 mL  0.5 mL IntraDERmal Once Marlena Petit PA-C         Current Outpatient Medications on File Prior to Encounter   Medication Sig Dispense Refill    doxycycline hyclate (VIBRAMYCIN) 100 MG capsule Take 1 pill twice daily with food 20 capsule 0    Lancets (ONETOUCH DELICA PLUS [id number]) MISC USE 1  TO CHECK GLUCOSE TWICE DAILY 120 each 0    atorvastatin (LIPITOR) 20 MG tablet Take 1 tablet by mouth nightly 30 tablet 3    SITagliptin (JANUVIA) 100 MG tablet Take 1 tablet by mouth once daily 27 tablet 0    omeprazole (PRILOSEC) 20 MG delayed release capsule Take 1 capsule by mouth once daily 27 capsule 0    tamsulosin (FLOMAX) 0.4 MG capsule Take 1 capsule by mouth daily 90 capsule

## 2023-05-04 RX ORDER — LISINOPRIL 20 MG/1
TABLET ORAL
Qty: 30 TABLET | Refills: 3 | Status: SHIPPED | OUTPATIENT
Start: 2023-05-04

## 2023-05-04 RX ORDER — LISINOPRIL 20 MG/1
TABLET ORAL
Qty: 30 TABLET | Refills: 0 | OUTPATIENT
Start: 2023-05-04

## 2023-05-11 ENCOUNTER — OFFICE VISIT (OUTPATIENT)
Dept: INTERNAL MEDICINE CLINIC | Age: 58
End: 2023-05-11
Payer: MEDICAID

## 2023-05-11 VITALS
DIASTOLIC BLOOD PRESSURE: 64 MMHG | SYSTOLIC BLOOD PRESSURE: 122 MMHG | HEIGHT: 72 IN | BODY MASS INDEX: 31.77 KG/M2 | WEIGHT: 234.6 LBS | HEART RATE: 100 BPM | OXYGEN SATURATION: 99 %

## 2023-05-11 DIAGNOSIS — J44.9 CHRONIC OBSTRUCTIVE PULMONARY DISEASE, UNSPECIFIED COPD TYPE (HCC): ICD-10-CM

## 2023-05-11 DIAGNOSIS — K76.9 CHRONIC LIVER DISEASE: ICD-10-CM

## 2023-05-11 DIAGNOSIS — J06.9 UPPER RESPIRATORY TRACT INFECTION, UNSPECIFIED TYPE: ICD-10-CM

## 2023-05-11 DIAGNOSIS — E11.65 TYPE 2 DIABETES MELLITUS WITH HYPERGLYCEMIA, WITHOUT LONG-TERM CURRENT USE OF INSULIN (HCC): Primary | ICD-10-CM

## 2023-05-11 DIAGNOSIS — I10 ESSENTIAL HYPERTENSION: ICD-10-CM

## 2023-05-11 DIAGNOSIS — N40.1 BENIGN PROSTATIC HYPERPLASIA WITH LOWER URINARY TRACT SYMPTOMS, SYMPTOM DETAILS UNSPECIFIED: ICD-10-CM

## 2023-05-11 DIAGNOSIS — D69.6 THROMBOCYTOPENIA (HCC): ICD-10-CM

## 2023-05-11 LAB — HBA1C MFR BLD: 10.1 %

## 2023-05-11 PROCEDURE — 2022F DILAT RTA XM EVC RTNOPTHY: CPT | Performed by: INTERNAL MEDICINE

## 2023-05-11 PROCEDURE — 3078F DIAST BP <80 MM HG: CPT | Performed by: INTERNAL MEDICINE

## 2023-05-11 PROCEDURE — 3017F COLORECTAL CA SCREEN DOC REV: CPT | Performed by: INTERNAL MEDICINE

## 2023-05-11 PROCEDURE — 99214 OFFICE O/P EST MOD 30 MIN: CPT | Performed by: INTERNAL MEDICINE

## 2023-05-11 PROCEDURE — 1036F TOBACCO NON-USER: CPT | Performed by: INTERNAL MEDICINE

## 2023-05-11 PROCEDURE — G8427 DOCREV CUR MEDS BY ELIG CLIN: HCPCS | Performed by: INTERNAL MEDICINE

## 2023-05-11 PROCEDURE — G8417 CALC BMI ABV UP PARAM F/U: HCPCS | Performed by: INTERNAL MEDICINE

## 2023-05-11 PROCEDURE — 3023F SPIROM DOC REV: CPT | Performed by: INTERNAL MEDICINE

## 2023-05-11 PROCEDURE — 3074F SYST BP LT 130 MM HG: CPT | Performed by: INTERNAL MEDICINE

## 2023-05-11 PROCEDURE — 83036 HEMOGLOBIN GLYCOSYLATED A1C: CPT | Performed by: INTERNAL MEDICINE

## 2023-05-11 PROCEDURE — 3046F HEMOGLOBIN A1C LEVEL >9.0%: CPT | Performed by: INTERNAL MEDICINE

## 2023-05-11 RX ORDER — BLOOD-GLUCOSE METER
1 EACH MISCELLANEOUS DAILY
Qty: 1 KIT | Refills: 0 | Status: SHIPPED | OUTPATIENT
Start: 2023-05-11

## 2023-05-11 RX ORDER — TAMSULOSIN HYDROCHLORIDE 0.4 MG/1
0.4 CAPSULE ORAL DAILY
Qty: 90 CAPSULE | Refills: 3 | Status: SHIPPED | OUTPATIENT
Start: 2023-05-11

## 2023-05-11 RX ORDER — AMOXICILLIN AND CLAVULANATE POTASSIUM 875; 125 MG/1; MG/1
1 TABLET, FILM COATED ORAL 2 TIMES DAILY
Qty: 28 TABLET | Refills: 0 | Status: SHIPPED | OUTPATIENT
Start: 2023-05-11 | End: 2023-05-25

## 2023-05-11 SDOH — ECONOMIC STABILITY: HOUSING INSECURITY
IN THE LAST 12 MONTHS, WAS THERE A TIME WHEN YOU DID NOT HAVE A STEADY PLACE TO SLEEP OR SLEPT IN A SHELTER (INCLUDING NOW)?: NO

## 2023-05-11 SDOH — ECONOMIC STABILITY: FOOD INSECURITY: WITHIN THE PAST 12 MONTHS, YOU WORRIED THAT YOUR FOOD WOULD RUN OUT BEFORE YOU GOT MONEY TO BUY MORE.: NEVER TRUE

## 2023-05-11 SDOH — ECONOMIC STABILITY: FOOD INSECURITY: WITHIN THE PAST 12 MONTHS, THE FOOD YOU BOUGHT JUST DIDN'T LAST AND YOU DIDN'T HAVE MONEY TO GET MORE.: NEVER TRUE

## 2023-05-11 SDOH — ECONOMIC STABILITY: INCOME INSECURITY: HOW HARD IS IT FOR YOU TO PAY FOR THE VERY BASICS LIKE FOOD, HOUSING, MEDICAL CARE, AND HEATING?: NOT HARD AT ALL

## 2023-05-11 ASSESSMENT — PATIENT HEALTH QUESTIONNAIRE - PHQ9
SUM OF ALL RESPONSES TO PHQ QUESTIONS 1-9: 0
5. POOR APPETITE OR OVEREATING: 0
9. THOUGHTS THAT YOU WOULD BE BETTER OFF DEAD, OR OF HURTING YOURSELF: 0
SUM OF ALL RESPONSES TO PHQ QUESTIONS 1-9: 0
SUM OF ALL RESPONSES TO PHQ9 QUESTIONS 1 & 2: 0
6. FEELING BAD ABOUT YOURSELF - OR THAT YOU ARE A FAILURE OR HAVE LET YOURSELF OR YOUR FAMILY DOWN: 0
7. TROUBLE CONCENTRATING ON THINGS, SUCH AS READING THE NEWSPAPER OR WATCHING TELEVISION: 0
SUM OF ALL RESPONSES TO PHQ QUESTIONS 1-9: 0
2. FEELING DOWN, DEPRESSED OR HOPELESS: 0
1. LITTLE INTEREST OR PLEASURE IN DOING THINGS: 0
SUM OF ALL RESPONSES TO PHQ QUESTIONS 1-9: 0
8. MOVING OR SPEAKING SO SLOWLY THAT OTHER PEOPLE COULD HAVE NOTICED. OR THE OPPOSITE, BEING SO FIGETY OR RESTLESS THAT YOU HAVE BEEN MOVING AROUND A LOT MORE THAN USUAL: 0
4. FEELING TIRED OR HAVING LITTLE ENERGY: 0
3. TROUBLE FALLING OR STAYING ASLEEP: 0
10. IF YOU CHECKED OFF ANY PROBLEMS, HOW DIFFICULT HAVE THESE PROBLEMS MADE IT FOR YOU TO DO YOUR WORK, TAKE CARE OF THINGS AT HOME, OR GET ALONG WITH OTHER PEOPLE: 0

## 2023-05-11 ASSESSMENT — ENCOUNTER SYMPTOMS
CONSTIPATION: 0
ABDOMINAL PAIN: 0
FACIAL SWELLING: 0
WHEEZING: 0
COLOR CHANGE: 0
BACK PAIN: 0
APNEA: 0
SHORTNESS OF BREATH: 0
RHINORRHEA: 1
CHEST TIGHTNESS: 0
DIARRHEA: 0
ABDOMINAL DISTENTION: 0
COUGH: 1

## 2023-05-11 NOTE — PROGRESS NOTES
Continuous Blood Gluc  (FREESTYLE DENTON 2 READER) JOAQUIN; 1 each by Does not apply route daily  Dispense: 1 each; Refill: 0  - CBC; Future  - POCT glycosylated hemoglobin (Hb A1C)    2. Chronic liver disease  - Ammonia; Future  - Comprehensive Metabolic Panel; Future  Has appointment coming with GI for endoscopy  3. Chronic obstructive pulmonary disease, unspecified COPD type ([facility]  Stable    4. Thrombocytopenia ([facility]      5. Essential hypertension  Controlled    6. Benign prostatic hyperplasia with lower urinary tract symptoms, symptom details unspecified  - tamsulosin (FLOMAX) 0.4 MG capsule; Take 1 capsule by mouth daily  Dispense: 90 capsule; Refill: 3    7. Upper respiratory tract infection, unspecified type  - amoxicillin-clavulanate (AUGMENTIN) 875-125 MG per tablet; Take 1 tablet by mouth 2 times daily for 14 days  Dispense: 28 tablet; Refill: 0      Very difficult situation, patient is not compliant with his diet, medication, does not check his blood sugars  Although he mentioned that he is seen ophthalmologist recently  Advised to use continuous glucose monitoring system  Take medication regularly  Will evaluate soon   Likely will need adjustment of medications  Patient also had episode of syncope, advised wife to check blood sugar and blood pressure right away if he has more episode  Had stress test and echo done recently  Need to go to hospital right away if he has more episode of syncope. Return in about 6 weeks (around 6/22/2023). Reviewed prior labs and health maintenance. Discussed use, benefit, and side effects of prescribed medications. Barriers to medication compliance addressed. All patient questions answered. Pt voiced understanding. MD FRANDY Rod [facility]  5/11/2023, 4:25 PM    Please note that this chart was generated using voice recognition Dragon dictation software.   Although every effort was made to ensure the accuracy of this automated

## 2023-05-21 DIAGNOSIS — K76.9 CHRONIC LIVER DISEASE: ICD-10-CM

## 2023-05-22 RX ORDER — NADOLOL 20 MG/1
TABLET ORAL
Qty: 30 TABLET | Refills: 0 | Status: SHIPPED | OUTPATIENT
Start: 2023-05-22

## 2023-05-24 ENCOUNTER — ANESTHESIA EVENT (OUTPATIENT)
Dept: OPERATING ROOM | Age: 58
End: 2023-05-24
Payer: MEDICAID

## 2023-05-24 ENCOUNTER — HOSPITAL ENCOUNTER (OUTPATIENT)
Age: 58
Setting detail: OUTPATIENT SURGERY
Discharge: HOME | End: 2023-05-24
Attending: INTERNAL MEDICINE | Admitting: INTERNAL MEDICINE
Payer: MEDICAID

## 2023-05-24 ENCOUNTER — ANESTHESIA (OUTPATIENT)
Dept: OPERATING ROOM | Age: 58
End: 2023-05-24
Payer: MEDICAID

## 2023-05-24 VITALS
HEIGHT: 72 IN | SYSTOLIC BLOOD PRESSURE: 155 MMHG | DIASTOLIC BLOOD PRESSURE: 97 MMHG | BODY MASS INDEX: 32.51 KG/M2 | OXYGEN SATURATION: 96 % | HEART RATE: 78 BPM | TEMPERATURE: 97.3 F | WEIGHT: 240 LBS | RESPIRATION RATE: 16 BRPM

## 2023-05-24 DIAGNOSIS — K21.9 GASTROESOPHAGEAL REFLUX DISEASE, UNSPECIFIED WHETHER ESOPHAGITIS PRESENT: ICD-10-CM

## 2023-05-24 LAB — GLUCOSE BLD-MCNC: 105 MG/DL (ref 75–110)

## 2023-05-24 PROCEDURE — 43239 EGD BIOPSY SINGLE/MULTIPLE: CPT | Performed by: INTERNAL MEDICINE

## 2023-05-24 PROCEDURE — 82947 ASSAY GLUCOSE BLOOD QUANT: CPT

## 2023-05-24 PROCEDURE — 88305 TISSUE EXAM BY PATHOLOGIST: CPT

## 2023-05-24 PROCEDURE — 2500000003 HC RX 250 WO HCPCS: Performed by: NURSE ANESTHETIST, CERTIFIED REGISTERED

## 2023-05-24 PROCEDURE — 2720000010 HC SURG SUPPLY STERILE: Performed by: INTERNAL MEDICINE

## 2023-05-24 PROCEDURE — 2709999900 HC NON-CHARGEABLE SUPPLY: Performed by: INTERNAL MEDICINE

## 2023-05-24 PROCEDURE — 7100000010 HC PHASE II RECOVERY - FIRST 15 MIN: Performed by: INTERNAL MEDICINE

## 2023-05-24 PROCEDURE — 6360000002 HC RX W HCPCS: Performed by: NURSE ANESTHETIST, CERTIFIED REGISTERED

## 2023-05-24 PROCEDURE — 3609012300 HC EGD BAND LIGATION ESOPHGEAL/GASTRIC VARICES: Performed by: INTERNAL MEDICINE

## 2023-05-24 PROCEDURE — 43244 EGD VARICES LIGATION: CPT | Performed by: INTERNAL MEDICINE

## 2023-05-24 PROCEDURE — 7100000011 HC PHASE II RECOVERY - ADDTL 15 MIN: Performed by: INTERNAL MEDICINE

## 2023-05-24 PROCEDURE — 88342 IMHCHEM/IMCYTCHM 1ST ANTB: CPT

## 2023-05-24 PROCEDURE — 2580000003 HC RX 258: Performed by: ANESTHESIOLOGY

## 2023-05-24 PROCEDURE — 3700000000 HC ANESTHESIA ATTENDED CARE: Performed by: INTERNAL MEDICINE

## 2023-05-24 PROCEDURE — 3700000001 HC ADD 15 MINUTES (ANESTHESIA): Performed by: INTERNAL MEDICINE

## 2023-05-24 RX ORDER — SODIUM CHLORIDE 9 MG/ML
INJECTION, SOLUTION INTRAVENOUS CONTINUOUS
Status: DISCONTINUED | OUTPATIENT
Start: 2023-05-24 | End: 2023-05-24 | Stop reason: HOSPADM

## 2023-05-24 RX ORDER — FENTANYL CITRATE 50 UG/ML
INJECTION, SOLUTION INTRAMUSCULAR; INTRAVENOUS PRN
Status: DISCONTINUED | OUTPATIENT
Start: 2023-05-24 | End: 2023-05-24 | Stop reason: SDUPTHER

## 2023-05-24 RX ORDER — LIDOCAINE HYDROCHLORIDE 20 MG/ML
INJECTION, SOLUTION INFILTRATION; PERINEURAL PRN
Status: DISCONTINUED | OUTPATIENT
Start: 2023-05-24 | End: 2023-05-24 | Stop reason: SDUPTHER

## 2023-05-24 RX ORDER — SODIUM CHLORIDE 0.9 % (FLUSH) 0.9 %
5-40 SYRINGE (ML) INJECTION PRN
Status: DISCONTINUED | OUTPATIENT
Start: 2023-05-24 | End: 2023-05-24 | Stop reason: HOSPADM

## 2023-05-24 RX ORDER — SODIUM CHLORIDE 9 MG/ML
INJECTION, SOLUTION INTRAVENOUS PRN
Status: DISCONTINUED | OUTPATIENT
Start: 2023-05-24 | End: 2023-05-24 | Stop reason: HOSPADM

## 2023-05-24 RX ORDER — LIDOCAINE HYDROCHLORIDE 10 MG/ML
1 INJECTION, SOLUTION EPIDURAL; INFILTRATION; INTRACAUDAL; PERINEURAL
Status: DISCONTINUED | OUTPATIENT
Start: 2023-05-24 | End: 2023-05-24 | Stop reason: HOSPADM

## 2023-05-24 RX ORDER — SODIUM CHLORIDE 0.9 % (FLUSH) 0.9 %
5-40 SYRINGE (ML) INJECTION EVERY 12 HOURS SCHEDULED
Status: DISCONTINUED | OUTPATIENT
Start: 2023-05-24 | End: 2023-05-24 | Stop reason: HOSPADM

## 2023-05-24 RX ORDER — PROPOFOL 10 MG/ML
INJECTION, EMULSION INTRAVENOUS PRN
Status: DISCONTINUED | OUTPATIENT
Start: 2023-05-24 | End: 2023-05-24 | Stop reason: SDUPTHER

## 2023-05-24 RX ORDER — SODIUM CHLORIDE, SODIUM LACTATE, POTASSIUM CHLORIDE, CALCIUM CHLORIDE 600; 310; 30; 20 MG/100ML; MG/100ML; MG/100ML; MG/100ML
INJECTION, SOLUTION INTRAVENOUS CONTINUOUS
Status: DISCONTINUED | OUTPATIENT
Start: 2023-05-24 | End: 2023-05-24 | Stop reason: HOSPADM

## 2023-05-24 RX ADMIN — FENTANYL CITRATE 50 MCG: 50 INJECTION INTRAMUSCULAR; INTRAVENOUS at 08:43

## 2023-05-24 RX ADMIN — PROPOFOL 50 MG: 10 INJECTION, EMULSION INTRAVENOUS at 08:54

## 2023-05-24 RX ADMIN — LIDOCAINE HYDROCHLORIDE 100 MG: 20 INJECTION, SOLUTION INFILTRATION; PERINEURAL at 08:47

## 2023-05-24 RX ADMIN — PROPOFOL 100 MG: 10 INJECTION, EMULSION INTRAVENOUS at 08:47

## 2023-05-24 RX ADMIN — PROPOFOL 100 MG: 10 INJECTION, EMULSION INTRAVENOUS at 08:49

## 2023-05-24 RX ADMIN — SODIUM CHLORIDE, POTASSIUM CHLORIDE, SODIUM LACTATE AND CALCIUM CHLORIDE: 600; 310; 30; 20 INJECTION, SOLUTION INTRAVENOUS at 08:10

## 2023-05-24 ASSESSMENT — PAIN - FUNCTIONAL ASSESSMENT: PAIN_FUNCTIONAL_ASSESSMENT: 0-10

## 2023-05-24 NOTE — ANESTHESIA POSTPROCEDURE EVALUATION
Department of Anesthesiology  Postprocedure Note    Patient: Ochoa Ramirez  MRN: [id number]  YOB: 1965  Date of evaluation: 5/24/2023      Procedure Summary     Date: 05/24/23 Room / Location: [place] [address] / [facility] - INPATIENT    Anesthesia Start: 2198 Anesthesia Stop: 4538    Procedure: EGD BAND LIGATION AND BIOPSY (Esophagus) Diagnosis:       Gastroesophageal reflux disease, unspecified whether esophagitis present      (Gastroesophageal reflux disease, unspecified whether esophagitis present [K21.9])    Surgeons: Anny Manning MD Responsible Provider: Brittany Yost DO    Anesthesia Type: MAC, general ASA Status: 3          Anesthesia Type: No value filed.     Joanna Phase I:      Joanna Phase II: Joanna Score: 10      Anesthesia Post Evaluation    Patient location during evaluation: PACU  Patient participation: complete - patient participated  Level of consciousness: awake and alert  Airway patency: patent  Nausea & Vomiting: no nausea and no vomiting  Complications: no  Cardiovascular status: hemodynamically stable  Respiratory status: acceptable  Hydration status: stable

## 2023-05-24 NOTE — DISCHARGE INSTRUCTIONS
Upper GI Endoscopy: What to Expect at [address] may have a sore throat for a day or two after the test.  How can you care for yourself at home? Activity  Rest as much as you need to after you go home. You should be able to go back to your usual activities the day after the test.  Diet  Drink plenty of fluids (unless your doctor has told you not to). Follow-up care is a key part of your treatment and safety. Be sure to make and go to all appointments, and call your doctor if you are having problems. When should you call for help? Call 911 anytime you think you may need emergency care. For example, call if:  You passed out (lost consciousness). You cough up blood. You vomit blood or what looks like coffee grounds. You pass maroon or very bloody stools. Call your doctor now or seek immediate medical care if:  You have trouble swallowing. You have belly pain. Your stools are black and tarlike or have streaks of blood. You are sick to your stomach or cannot keep fluids down. Watch closely for changes in your health, and be sure to contact your doctor if:  Your throat still hurts after a day or two. You do not get better as expected. Where can you learn more? Go to [email or web address] and sign in to your [employer] account. Enter Y666 in the [facility] box to learn more about Upper GI Endoscopy: What to Expect at Home.     .     © 4459-3090 Healthwise, Incorporated. Care instructions adapted under license by [facility]. This care instruction is for use with your licensed healthcare professional. If you have questions about a medical condition or this instruction, always ask your healthcare professional. [place] [address] any warranty or liability for your use of this information.   Content Version: 6.0.142136; Last Revised: February 20, 2013

## 2023-05-24 NOTE — OP NOTE
PROCEDURE NOTE    DATE OF PROCEDURE: 5/24/2023     SURGEON: Lear Blizzard, MD    ASSISTANT: None    PREOPERATIVE DIAGNOSIS: CIRRHOSIS  GERD  ABD PAINS    POSTOPERATIVE DIAGNOSIS: As described below    OPERATION: Upper GI endoscopy with Biopsy  VARICEAL BANDING X   ANESTHESIA: MAC PER ANESTHESIA     ESTIMATED BLOOD LOSS: Less than 50 ml    COMPLICATIONS: None. SPECIMENS:  Was Obtained:     HISTORY: The patient is a 63y.o. year old male with history of above preop diagnosis. I recommended esophagogastroduodenoscopy with possible biopsy and I explained the risk, benefits, expected outcome, and alternatives to the procedure. Risks included but are not limited to bleeding, infection, respiratory distress, hypotension, and perforation of the esophagus, stomach, or duodenum. Patient understands and is in agreement. PROCEDURE: The patient was given IV conscious sedation. The patient's SPO2 remained above 90% throughout the procedure. The gastroscope was inserted orally and advanced under direct vision through the esophagus, through the stomach, through the pylorus, and into the descending duodenum. Findings:    Retropharyngeal area was grossly normal appearing    Esophagus: abnormal: LARGE GRADE 3 VARICES WERE NOTED  TWO BANDS WERE DEPLOYED      Stomach:    Fundus: abnormal: MOD TO SEVERE PORTAL HYPERTENSIVE GASTROPATHY   NO FUNDIC VARICES    Body: abnormal: AS ABOVE    Antrum: abnormal: AS ABOVE    Duodenum:     Descending: normal    Bulb: normal    The scope was removed and the patient tolerated the procedure well.      Recommendations/Plan:   F/U Biopsies  F/U In Office in 3-4 weeks  Discussed with the family  Post sedation patient was stable with stable vital signs and stable O2 saturations    Electronically signed by Lear Blizzard, MD  on 5/24/2023 at 9:01 AM

## 2023-05-24 NOTE — ANESTHESIA PRE PROCEDURE
Department of Anesthesiology  Preprocedure Note       Name:  Andrew Reyes   Age:  63 y.o.  :  1965                                          MRN:  [id number]         Date:  2023      Surgeon: Perez Shelton):  Darion Colmenares MD    Procedure: Procedure(s):  EGD ESOPHAGOGASTRODUODENOSCOPY    Medications prior to admission:   Prior to Admission medications    Medication Sig Start Date End Date Taking?  Authorizing Provider   nadolol (CORGARD) 20 MG tablet Take 1 tablet by mouth once daily 23   Levi Dyer MD   Blood Glucose Monitoring Suppl (ONE TOUCH ULTRA 2) w/Device KIT 1 kit by Does not apply route daily 23   Leandra Alvarez MD   Continuous Blood Gluc Sensor (FREESTYLE DENTON 2 SENSOR) MISC 1 each by Does not apply route every 14 days 23   Leandra Alvarez MD   Continuous Blood Gluc  (FREESTYLE DENTON 2 READER) JOAQUIN 1 each by Does not apply route daily 23   Leandra Alvarez MD   tamsulosin (FLOMAX) 0.4 MG capsule Take 1 capsule by mouth daily 23   Leandra Alvarez MD   lisinopril (PRINIVIL;ZESTRIL) 20 MG tablet Take 1 tablet by mouth once daily 23   Hoa Johnson MD   ketoconazole (NIZORAL) 2 % cream Apply topically to rash twice daily until clear 23   FRANKIE Mccallum   Lancets ([address]) [address] [facility] USE 1  TO CHECK GLUCOSE TWICE DAILY 23   Eli Brittle, MD   atorvastatin (LIPITOR) 20 MG tablet Take 1 tablet by mouth nightly 22   Deangelo Godoy MD   SITagliptin (JANUVIA) 100 MG tablet Take 1 tablet by mouth once daily 22   Leandra Alvarez MD   omeprazole (PRILOSEC) 20 MG delayed release capsule Take 1 capsule by mouth once daily 22   Leandra Alvarez MD   albuterol sulfate HFA (VENTOLIN HFA) 108 (90 Base) MCG/ACT inhaler Inhale 1 puff into the lungs every 4 hours as needed for Wheezing 22   Leandra Alvarez MD   fluticasone-salmeterol (ADVAIR) 100-50 MCG/DOSE diskus inhaler INHALE 1 PUFF BY MOUTH EVERY 12 HOURS 22

## 2023-05-25 LAB — SURGICAL PATHOLOGY REPORT: NORMAL

## 2023-06-02 DIAGNOSIS — Z87.891 EX-SMOKER: ICD-10-CM

## 2023-06-02 DIAGNOSIS — F12.90 MARIJUANA USER: ICD-10-CM

## 2023-06-02 DIAGNOSIS — K21.9 GASTROESOPHAGEAL REFLUX DISEASE, UNSPECIFIED WHETHER ESOPHAGITIS PRESENT: ICD-10-CM

## 2023-06-02 DIAGNOSIS — Z98.890 H/O COLONOSCOPY: ICD-10-CM

## 2023-06-02 DIAGNOSIS — D69.6 THROMBOCYTOPENIA (HCC): ICD-10-CM

## 2023-06-02 DIAGNOSIS — Z87.19 HISTORY OF CIRRHOSIS: ICD-10-CM

## 2023-06-05 RX ORDER — IBUPROFEN 600 MG/1
TABLET ORAL
COMMUNITY
Start: 2023-04-13

## 2023-06-06 ENCOUNTER — OFFICE VISIT (OUTPATIENT)
Dept: INTERNAL MEDICINE CLINIC | Age: 58
End: 2023-06-06
Payer: MEDICAID

## 2023-06-06 VITALS
SYSTOLIC BLOOD PRESSURE: 120 MMHG | HEART RATE: 75 BPM | BODY MASS INDEX: 32.51 KG/M2 | DIASTOLIC BLOOD PRESSURE: 60 MMHG | OXYGEN SATURATION: 95 % | HEIGHT: 72 IN | WEIGHT: 240 LBS

## 2023-06-06 DIAGNOSIS — E16.2 HYPOGLYCEMIA: Primary | ICD-10-CM

## 2023-06-06 DIAGNOSIS — E11.65 TYPE 2 DIABETES MELLITUS WITH HYPERGLYCEMIA, WITHOUT LONG-TERM CURRENT USE OF INSULIN (HCC): ICD-10-CM

## 2023-06-06 PROCEDURE — 99213 OFFICE O/P EST LOW 20 MIN: CPT | Performed by: NURSE PRACTITIONER

## 2023-06-06 PROCEDURE — 1036F TOBACCO NON-USER: CPT | Performed by: NURSE PRACTITIONER

## 2023-06-06 PROCEDURE — 3078F DIAST BP <80 MM HG: CPT | Performed by: NURSE PRACTITIONER

## 2023-06-06 PROCEDURE — 3017F COLORECTAL CA SCREEN DOC REV: CPT | Performed by: NURSE PRACTITIONER

## 2023-06-06 PROCEDURE — G8427 DOCREV CUR MEDS BY ELIG CLIN: HCPCS | Performed by: NURSE PRACTITIONER

## 2023-06-06 PROCEDURE — G8417 CALC BMI ABV UP PARAM F/U: HCPCS | Performed by: NURSE PRACTITIONER

## 2023-06-06 PROCEDURE — 3074F SYST BP LT 130 MM HG: CPT | Performed by: NURSE PRACTITIONER

## 2023-06-06 PROCEDURE — 2022F DILAT RTA XM EVC RTNOPTHY: CPT | Performed by: NURSE PRACTITIONER

## 2023-06-06 PROCEDURE — 3046F HEMOGLOBIN A1C LEVEL >9.0%: CPT | Performed by: NURSE PRACTITIONER

## 2023-06-22 ASSESSMENT — ENCOUNTER SYMPTOMS
COUGH: 0
NAUSEA: 0
VOMITING: 0
ABDOMINAL PAIN: 0
WHEEZING: 0
TROUBLE SWALLOWING: 0
SHORTNESS OF BREATH: 0

## 2023-07-03 DIAGNOSIS — J44.9 CHRONIC OBSTRUCTIVE PULMONARY DISEASE, UNSPECIFIED COPD TYPE (HCC): ICD-10-CM

## 2023-07-03 DIAGNOSIS — K76.9 CHRONIC LIVER DISEASE: ICD-10-CM

## 2023-07-05 RX ORDER — NADOLOL 20 MG/1
20 TABLET ORAL DAILY
Qty: 30 TABLET | Refills: 0 | Status: SHIPPED | OUTPATIENT
Start: 2023-07-05 | End: 2023-07-06 | Stop reason: SDUPTHER

## 2023-07-05 RX ORDER — ALBUTEROL SULFATE 90 UG/1
1 AEROSOL, METERED RESPIRATORY (INHALATION) EVERY 4 HOURS PRN
Qty: 18 G | Refills: 6 | Status: SHIPPED | OUTPATIENT
Start: 2023-07-05 | End: 2023-07-06 | Stop reason: SDUPTHER

## 2023-07-06 ENCOUNTER — OFFICE VISIT (OUTPATIENT)
Dept: INTERNAL MEDICINE CLINIC | Age: 58
End: 2023-07-06
Payer: MEDICAID

## 2023-07-06 ENCOUNTER — HOSPITAL ENCOUNTER (OUTPATIENT)
Age: 58
Setting detail: SPECIMEN
Discharge: HOME | End: 2023-07-06

## 2023-07-06 VITALS
WEIGHT: 240.2 LBS | OXYGEN SATURATION: 95 % | HEART RATE: 107 BPM | DIASTOLIC BLOOD PRESSURE: 84 MMHG | SYSTOLIC BLOOD PRESSURE: 136 MMHG | BODY MASS INDEX: 32.58 KG/M2

## 2023-07-06 DIAGNOSIS — K76.9 CHRONIC LIVER DISEASE: ICD-10-CM

## 2023-07-06 DIAGNOSIS — K70.31 ASCITES DUE TO ALCOHOLIC CIRRHOSIS (HCC): ICD-10-CM

## 2023-07-06 DIAGNOSIS — E11.65 TYPE 2 DIABETES MELLITUS WITH HYPERGLYCEMIA, WITHOUT LONG-TERM CURRENT USE OF INSULIN (HCC): Primary | ICD-10-CM

## 2023-07-06 DIAGNOSIS — J44.9 CHRONIC OBSTRUCTIVE PULMONARY DISEASE, UNSPECIFIED COPD TYPE (HCC): ICD-10-CM

## 2023-07-06 DIAGNOSIS — I10 ESSENTIAL HYPERTENSION: ICD-10-CM

## 2023-07-06 LAB
ERYTHROCYTE [DISTWIDTH] IN BLOOD BY AUTOMATED COUNT: 16 % (ref 11.8–14.4)
HCT VFR BLD AUTO: 38 % (ref 40.7–50.3)
HGB BLD-MCNC: 12.2 G/DL (ref 13–17)
MCH RBC QN AUTO: 29.3 PG (ref 25.2–33.5)
MCHC RBC AUTO-ENTMCNC: 32.1 G/DL (ref 28.4–34.8)
MCV RBC AUTO: 91.1 FL (ref 82.6–102.9)
NRBC BLD-RTO: 0 PER 100 WBC
PLATELET # BLD AUTO: 67 K/UL (ref 138–453)
PMV BLD AUTO: 11.8 FL (ref 8.1–13.5)
RBC # BLD AUTO: 4.17 M/UL (ref 4.21–5.77)
WBC OTHER # BLD: 3.2 K/UL (ref 3.5–11.3)

## 2023-07-06 PROCEDURE — 3079F DIAST BP 80-89 MM HG: CPT | Performed by: INTERNAL MEDICINE

## 2023-07-06 PROCEDURE — G8427 DOCREV CUR MEDS BY ELIG CLIN: HCPCS | Performed by: INTERNAL MEDICINE

## 2023-07-06 PROCEDURE — 1036F TOBACCO NON-USER: CPT | Performed by: INTERNAL MEDICINE

## 2023-07-06 PROCEDURE — 3046F HEMOGLOBIN A1C LEVEL >9.0%: CPT | Performed by: INTERNAL MEDICINE

## 2023-07-06 PROCEDURE — G8417 CALC BMI ABV UP PARAM F/U: HCPCS | Performed by: INTERNAL MEDICINE

## 2023-07-06 PROCEDURE — 2022F DILAT RTA XM EVC RTNOPTHY: CPT | Performed by: INTERNAL MEDICINE

## 2023-07-06 PROCEDURE — 3023F SPIROM DOC REV: CPT | Performed by: INTERNAL MEDICINE

## 2023-07-06 PROCEDURE — 3017F COLORECTAL CA SCREEN DOC REV: CPT | Performed by: INTERNAL MEDICINE

## 2023-07-06 PROCEDURE — 99214 OFFICE O/P EST MOD 30 MIN: CPT | Performed by: INTERNAL MEDICINE

## 2023-07-06 PROCEDURE — 3075F SYST BP GE 130 - 139MM HG: CPT | Performed by: INTERNAL MEDICINE

## 2023-07-06 RX ORDER — SPIRONOLACTONE 100 MG/1
100 TABLET, FILM COATED ORAL DAILY
Qty: 30 TABLET | Refills: 3 | Status: SHIPPED | OUTPATIENT
Start: 2023-07-06

## 2023-07-06 RX ORDER — ALBUTEROL SULFATE 90 UG/1
1 AEROSOL, METERED RESPIRATORY (INHALATION) EVERY 4 HOURS PRN
Qty: 18 G | Refills: 6 | Status: SHIPPED | OUTPATIENT
Start: 2023-07-06

## 2023-07-06 RX ORDER — NADOLOL 20 MG/1
20 TABLET ORAL DAILY
Qty: 30 TABLET | Refills: 0 | Status: SHIPPED | OUTPATIENT
Start: 2023-07-06 | End: 2023-07-07 | Stop reason: SDUPTHER

## 2023-07-06 ASSESSMENT — ENCOUNTER SYMPTOMS
APNEA: 0
FACIAL SWELLING: 0
CHEST TIGHTNESS: 0
SHORTNESS OF BREATH: 0
ABDOMINAL PAIN: 0
BACK PAIN: 0
CONSTIPATION: 0
WHEEZING: 0
COUGH: 0
ABDOMINAL DISTENTION: 1
COLOR CHANGE: 0
DIARRHEA: 0

## 2023-07-06 NOTE — PROGRESS NOTES
Subjective:      Patient ID: Jay Villa is a 63 y.o. male. HPI  Patient is here for eval of multimedical problem. He has diabetes, alcoholic cirrhosis of liver, history of esophageal varices, ex alcoholic, patient told me he is compliant with diet and medication  He use continuous glucose monitoring system, averaging around 210  On glimepiride and Januvia, does not remember the dose of Januvia  He has quit drinking pop  Has not keep appointment with GI  Did not get blood work done    Review of Systems   Constitutional:  Negative for activity change, appetite change, chills and diaphoresis. HENT:  Negative for congestion, dental problem, ear discharge, facial swelling and hearing loss. Respiratory:  Negative for apnea, cough, chest tightness, shortness of breath and wheezing. Cardiovascular:  Positive for leg swelling. Negative for chest pain. Gastrointestinal:  Positive for abdominal distention. Negative for abdominal pain, constipation and diarrhea. Genitourinary:  Negative for difficulty urinating, dysuria, enuresis, flank pain and frequency. Musculoskeletal:  Negative for arthralgias, back pain, gait problem and joint swelling. Skin:  Negative for color change, pallor and rash. Neurological:  Negative for dizziness, seizures, facial asymmetry, light-headedness, numbness and headaches. Psychiatric/Behavioral:  Negative for agitation, behavioral problems, confusion, decreased concentration and dysphoric mood. Objective:   Physical Exam  Vitals and nursing note reviewed. Constitutional:       General: He is not in acute distress. Appearance: He is well-developed. He is not diaphoretic. HENT:      Head: Normocephalic and atraumatic. Mouth/Throat:      Pharynx: No oropharyngeal exudate. Eyes:      General: No scleral icterus. Right eye: No discharge. Left eye: No discharge.       Conjunctiva/sclera: Conjunctivae normal.      Pupils: Pupils are equal,

## 2023-07-07 ENCOUNTER — HOSPITAL ENCOUNTER (OUTPATIENT)
Age: 58
Setting detail: SPECIMEN
Discharge: HOME | End: 2023-07-07

## 2023-07-07 DIAGNOSIS — K76.9 CHRONIC LIVER DISEASE: ICD-10-CM

## 2023-07-07 DIAGNOSIS — K21.9 GASTROESOPHAGEAL REFLUX DISEASE WITHOUT ESOPHAGITIS: ICD-10-CM

## 2023-07-07 DIAGNOSIS — E11.65 TYPE 2 DIABETES MELLITUS WITH HYPERGLYCEMIA, WITHOUT LONG-TERM CURRENT USE OF INSULIN (HCC): ICD-10-CM

## 2023-07-07 LAB
ALBUMIN SERPL-MCNC: 3.3 G/DL (ref 3.5–5.2)
ALBUMIN/GLOB SERPL: 1 {RATIO} (ref 1–2.5)
ALP SERPL-CCNC: 118 U/L (ref 40–129)
ALT SERPL-CCNC: 29 U/L (ref 5–41)
ANION GAP SERPL CALCULATED.3IONS-SCNC: 8 MMOL/L (ref 9–17)
AST SERPL-CCNC: 35 U/L
BILIRUB SERPL-MCNC: 3.2 MG/DL (ref 0.3–1.2)
BUN SERPL-MCNC: 13 MG/DL (ref 6–20)
CALCIUM SERPL-MCNC: 9.3 MG/DL (ref 8.6–10.4)
CHLORIDE SERPL-SCNC: 109 MMOL/L (ref 98–107)
CO2 SERPL-SCNC: 24 MMOL/L (ref 20–31)
CREAT SERPL-MCNC: 0.64 MG/DL (ref 0.7–1.2)
GFR SERPL CREATININE-BSD FRML MDRD: >60 ML/MIN/1.73M2
GLUCOSE SERPL-MCNC: 215 MG/DL (ref 70–99)
POTASSIUM SERPL-SCNC: 4 MMOL/L (ref 3.7–5.3)
PROT SERPL-MCNC: 6.7 G/DL (ref 6.4–8.3)
SODIUM SERPL-SCNC: 141 MMOL/L (ref 135–144)

## 2023-07-07 RX ORDER — NADOLOL 20 MG/1
20 TABLET ORAL DAILY
Qty: 30 TABLET | Refills: 0 | Status: SHIPPED | OUTPATIENT
Start: 2023-07-07

## 2023-07-07 RX ORDER — OMEPRAZOLE 20 MG/1
20 CAPSULE, DELAYED RELEASE ORAL DAILY
Qty: 27 CAPSULE | Refills: 0 | Status: SHIPPED | OUTPATIENT
Start: 2023-07-07

## 2023-07-08 LAB
CREAT UR-MCNC: 201.5 MG/DL (ref 39–259)
MICROALBUMIN UR-MCNC: <12 MG/L
MICROALBUMIN/CREAT UR-RTO: NORMAL MCG/MG CREAT

## 2023-07-14 ENCOUNTER — HOSPITAL ENCOUNTER (OUTPATIENT)
Dept: ULTRASOUND IMAGING | Age: 58
End: 2023-07-14
Payer: MEDICAID

## 2023-07-14 ENCOUNTER — HOSPITAL ENCOUNTER (OUTPATIENT)
Age: 58
Discharge: HOME | End: 2023-07-14
Payer: MEDICAID

## 2023-07-14 VITALS
HEART RATE: 78 BPM | OXYGEN SATURATION: 94 % | DIASTOLIC BLOOD PRESSURE: 69 MMHG | RESPIRATION RATE: 18 BRPM | SYSTOLIC BLOOD PRESSURE: 105 MMHG

## 2023-07-14 DIAGNOSIS — K70.31 ASCITES DUE TO ALCOHOLIC CIRRHOSIS (HCC): ICD-10-CM

## 2023-07-14 DIAGNOSIS — Z87.19 HISTORY OF CIRRHOSIS: ICD-10-CM

## 2023-07-14 DIAGNOSIS — Z87.19 HISTORY OF CIRRHOSIS: Primary | ICD-10-CM

## 2023-07-14 LAB
INR PPP: 1.3
PARTIAL THROMBOPLASTIN TIME: 32.3 SEC (ref 23–36.5)
PROTHROMBIN TIME: 15.8 SEC (ref 11.7–14.9)

## 2023-07-14 PROCEDURE — 36415 COLL VENOUS BLD VENIPUNCTURE: CPT

## 2023-07-14 PROCEDURE — 85610 PROTHROMBIN TIME: CPT

## 2023-07-14 PROCEDURE — 85730 THROMBOPLASTIN TIME PARTIAL: CPT

## 2023-07-14 PROCEDURE — 49083 ABD PARACENTESIS W/IMAGING: CPT

## 2023-07-14 NOTE — PROGRESS NOTES
Pt arrives to room for therapeutic para   PA and RS RDMS to bedside   Site prepped and draped  Access obtained and draining clear dark yellow fluid  1.5L removed  Access removed and site covered with dsd  Dc home with family

## 2023-07-14 NOTE — BRIEF OP NOTE
Brief Postoperative Note for Paracentesis    Carlotta Cortes  YOB: 1965  [id number]    Pre-operative Diagnosis:  Ascites     Post-operative Diagnosis: Same    Procedure: Ultrasound guided Paracentesis     Anesthesia: 1% Lidocaine     Surgeons/Assistants: Jorge Gloria PA-C    Complications: none    EBL: Minimal    Specimens: Were obtained    Ultrasound guided paracentesis performed. 1500 ml dark yellow fluid obtained. Dressing applied.      Electronically signed by FRANKIE Crawley on 7/14/2023 at 1:43 PM

## 2023-07-17 DIAGNOSIS — J06.9 UPPER RESPIRATORY TRACT INFECTION, UNSPECIFIED TYPE: ICD-10-CM

## 2023-07-17 RX ORDER — AMOXICILLIN AND CLAVULANATE POTASSIUM 875; 125 MG/1; MG/1
TABLET, FILM COATED ORAL
Qty: 28 TABLET | Refills: 0 | OUTPATIENT
Start: 2023-07-17

## 2023-07-18 ENCOUNTER — HOSPITAL ENCOUNTER (OUTPATIENT)
Age: 58
Setting detail: SPECIMEN
Discharge: HOME | End: 2023-07-18

## 2023-07-18 ENCOUNTER — OFFICE VISIT (OUTPATIENT)
Dept: GASTROENTEROLOGY | Age: 58
End: 2023-07-18
Payer: MEDICAID

## 2023-07-18 VITALS
WEIGHT: 228 LBS | TEMPERATURE: 98.3 F | DIASTOLIC BLOOD PRESSURE: 69 MMHG | SYSTOLIC BLOOD PRESSURE: 118 MMHG | BODY MASS INDEX: 30.92 KG/M2

## 2023-07-18 DIAGNOSIS — F12.90 MARIJUANA USER: ICD-10-CM

## 2023-07-18 DIAGNOSIS — D69.6 THROMBOCYTOPENIA (HCC): ICD-10-CM

## 2023-07-18 DIAGNOSIS — K21.9 GASTROESOPHAGEAL REFLUX DISEASE, UNSPECIFIED WHETHER ESOPHAGITIS PRESENT: ICD-10-CM

## 2023-07-18 DIAGNOSIS — Z87.19 HISTORY OF CIRRHOSIS: Primary | ICD-10-CM

## 2023-07-18 DIAGNOSIS — Z87.891 EX-SMOKER: ICD-10-CM

## 2023-07-18 DIAGNOSIS — Z87.19 HISTORY OF CIRRHOSIS: ICD-10-CM

## 2023-07-18 LAB
AFP SERPL-MCNC: 2.4 UG/L
ALBUMIN SERPL-MCNC: 3.4 G/DL (ref 3.5–5.2)
ALBUMIN/GLOB SERPL: 1.2 {RATIO} (ref 1–2.5)
ALP SERPL-CCNC: 96 U/L (ref 40–129)
ALT SERPL-CCNC: 33 U/L (ref 5–41)
AST SERPL-CCNC: 37 U/L
BILIRUB DIRECT SERPL-MCNC: 0.5 MG/DL
BILIRUB INDIRECT SERPL-MCNC: 2.7 MG/DL (ref 0–1)
BILIRUB SERPL-MCNC: 3.2 MG/DL (ref 0.3–1.2)
PROT SERPL-MCNC: 6.3 G/DL (ref 6.4–8.3)

## 2023-07-18 PROCEDURE — G8427 DOCREV CUR MEDS BY ELIG CLIN: HCPCS | Performed by: INTERNAL MEDICINE

## 2023-07-18 PROCEDURE — 1036F TOBACCO NON-USER: CPT | Performed by: INTERNAL MEDICINE

## 2023-07-18 PROCEDURE — G8417 CALC BMI ABV UP PARAM F/U: HCPCS | Performed by: INTERNAL MEDICINE

## 2023-07-18 PROCEDURE — 3078F DIAST BP <80 MM HG: CPT | Performed by: INTERNAL MEDICINE

## 2023-07-18 PROCEDURE — 3017F COLORECTAL CA SCREEN DOC REV: CPT | Performed by: INTERNAL MEDICINE

## 2023-07-18 PROCEDURE — 3074F SYST BP LT 130 MM HG: CPT | Performed by: INTERNAL MEDICINE

## 2023-07-18 PROCEDURE — 99214 OFFICE O/P EST MOD 30 MIN: CPT | Performed by: INTERNAL MEDICINE

## 2023-07-18 ASSESSMENT — ENCOUNTER SYMPTOMS
WHEEZING: 0
DIARRHEA: 0
NAUSEA: 0
ABDOMINAL PAIN: 0
COUGH: 0
SORE THROAT: 0
SHORTNESS OF BREATH: 0
BLOOD IN STOOL: 0
VOMITING: 0
RECTAL PAIN: 0
TROUBLE SWALLOWING: 0
CHOKING: 0
ANAL BLEEDING: 0
CONSTIPATION: 0
ABDOMINAL DISTENTION: 0
COLOR CHANGE: 0

## 2023-07-18 NOTE — PROGRESS NOTES
GI CLINIC FOLLOW UP    NTERVAL HISTORY:   No referring provider defined for this encounter. Chief Complaint   Patient presents with    Results     Pt is here today for a f/u from EGD procedure completed on 5/24/23. 1. History of cirrhosis    2. Thrombocytopenia ([address])    3. Ex-smoker    4. Gastroesophageal reflux disease, unspecified whether esophagitis present    5. Marijuana user       The patient is here as a follow up of his recent GI procedure. The results have been sent to you separately   The findings were explained to the patient in detail and biopsies were also discussed   with him    Patient had a recent upper endoscopy done by me in May had variceal banding done  Overall clinically seems to be doing okay  Signs of portal hypertensive gastropathy were noted  Gastric hyperplastic polyp was removed  Mild to moderate chronic gastritis without any evidence of Helicobacter pylori were noted  Current hemoglobin stable at 12.2 platelet count 67  Both AST ALT are normal bilirubin total is 3.2  He is INR stable at 1.3  He had a paracentesis done 4 days ago about 1500 mL of fluid was removed  Overall clinically stable  No overt decompensation at this time  Previous records were reviewed with him  No current alcohol    HISTORY OF PRESENT ILLNESS: Nacho Wang is a 63 y.o. male with a past history remarkable for , referred for evaluation of   Chief Complaint   Patient presents with    Results     Pt is here today for a f/u from EGD procedure completed on 5/24/23. Boris Serra Past Medical,Family, and Social History reviewed and does contribute to the patient presenting condition. Patient's PMH/PSH,SH,PSYCH Hx, MEDs, ALLERGIES, and ROS were all reviewed and updated in the appropriate sections.     PAST MEDICAL HISTORY:  Past Medical History:   Diagnosis Date    Calculus of kidney     Cerebral artery occlusion with cerebral infarction ([address]) 2020    slight memory problem (can't remember name of

## 2023-07-21 DIAGNOSIS — E11.65 TYPE 2 DIABETES MELLITUS WITH HYPERGLYCEMIA, WITHOUT LONG-TERM CURRENT USE OF INSULIN (HCC): ICD-10-CM

## 2023-08-11 DIAGNOSIS — K21.9 GASTROESOPHAGEAL REFLUX DISEASE WITHOUT ESOPHAGITIS: ICD-10-CM

## 2023-08-11 DIAGNOSIS — K76.9 CHRONIC LIVER DISEASE: ICD-10-CM

## 2023-08-11 DIAGNOSIS — J44.9 CHRONIC OBSTRUCTIVE PULMONARY DISEASE, UNSPECIFIED COPD TYPE (HCC): ICD-10-CM

## 2023-08-11 RX ORDER — NADOLOL 20 MG/1
20 TABLET ORAL DAILY
Qty: 30 TABLET | Refills: 0 | Status: SHIPPED | OUTPATIENT
Start: 2023-08-11

## 2023-08-11 RX ORDER — ALBUTEROL SULFATE 90 UG/1
1 AEROSOL, METERED RESPIRATORY (INHALATION) EVERY 4 HOURS PRN
Qty: 18 G | Refills: 6 | Status: SHIPPED | OUTPATIENT
Start: 2023-08-11

## 2023-08-11 RX ORDER — OMEPRAZOLE 20 MG/1
20 CAPSULE, DELAYED RELEASE ORAL DAILY
Qty: 27 CAPSULE | Refills: 0 | Status: SHIPPED | OUTPATIENT
Start: 2023-08-11

## 2023-10-15 DIAGNOSIS — K21.9 GASTROESOPHAGEAL REFLUX DISEASE WITHOUT ESOPHAGITIS: ICD-10-CM

## 2023-10-15 DIAGNOSIS — K76.9 CHRONIC LIVER DISEASE: ICD-10-CM

## 2023-10-16 RX ORDER — OMEPRAZOLE 20 MG/1
20 CAPSULE, DELAYED RELEASE ORAL DAILY
Qty: 27 CAPSULE | Refills: 0 | Status: SHIPPED | OUTPATIENT
Start: 2023-10-16

## 2023-10-16 RX ORDER — NADOLOL 20 MG/1
20 TABLET ORAL DAILY
Qty: 30 TABLET | Refills: 0 | Status: SHIPPED | OUTPATIENT
Start: 2023-10-16

## 2023-10-20 ENCOUNTER — HOSPITAL ENCOUNTER (OUTPATIENT)
Dept: ULTRASOUND IMAGING | Age: 58
End: 2023-10-20
Attending: INTERNAL MEDICINE
Payer: MEDICAID

## 2023-10-20 DIAGNOSIS — D69.6 THROMBOCYTOPENIA (HCC): ICD-10-CM

## 2023-10-20 DIAGNOSIS — Z87.19 HISTORY OF CIRRHOSIS: ICD-10-CM

## 2023-10-20 DIAGNOSIS — F12.90 MARIJUANA USER: ICD-10-CM

## 2023-10-20 DIAGNOSIS — Z87.891 EX-SMOKER: ICD-10-CM

## 2023-10-20 DIAGNOSIS — K21.9 GASTROESOPHAGEAL REFLUX DISEASE, UNSPECIFIED WHETHER ESOPHAGITIS PRESENT: ICD-10-CM

## 2023-10-20 PROCEDURE — 76705 ECHO EXAM OF ABDOMEN: CPT

## 2023-11-21 DIAGNOSIS — K76.9 CHRONIC LIVER DISEASE: ICD-10-CM

## 2023-11-21 DIAGNOSIS — K21.9 GASTROESOPHAGEAL REFLUX DISEASE WITHOUT ESOPHAGITIS: ICD-10-CM

## 2023-11-21 RX ORDER — NADOLOL 20 MG/1
20 TABLET ORAL DAILY
Qty: 30 TABLET | Refills: 0 | Status: SHIPPED | OUTPATIENT
Start: 2023-11-21

## 2023-11-21 RX ORDER — OMEPRAZOLE 20 MG/1
20 CAPSULE, DELAYED RELEASE ORAL DAILY
Qty: 27 CAPSULE | Refills: 0 | Status: SHIPPED | OUTPATIENT
Start: 2023-11-21

## 2023-12-11 ENCOUNTER — OFFICE VISIT (OUTPATIENT)
Dept: GASTROENTEROLOGY | Age: 58
End: 2023-12-11
Payer: MEDICAID

## 2023-12-11 VITALS
BODY MASS INDEX: 30.48 KG/M2 | HEART RATE: 75 BPM | SYSTOLIC BLOOD PRESSURE: 129 MMHG | HEIGHT: 72 IN | DIASTOLIC BLOOD PRESSURE: 86 MMHG | WEIGHT: 225 LBS

## 2023-12-11 DIAGNOSIS — K21.9 GASTROESOPHAGEAL REFLUX DISEASE, UNSPECIFIED WHETHER ESOPHAGITIS PRESENT: ICD-10-CM

## 2023-12-11 DIAGNOSIS — Z98.890 H/O COLONOSCOPY: ICD-10-CM

## 2023-12-11 DIAGNOSIS — F12.90 MARIJUANA USER: ICD-10-CM

## 2023-12-11 DIAGNOSIS — D69.6 THROMBOCYTOPENIA (HCC): ICD-10-CM

## 2023-12-11 DIAGNOSIS — Z87.891 EX-SMOKER: ICD-10-CM

## 2023-12-11 DIAGNOSIS — Z87.19 HISTORY OF CIRRHOSIS: Primary | ICD-10-CM

## 2023-12-11 PROCEDURE — 3017F COLORECTAL CA SCREEN DOC REV: CPT | Performed by: INTERNAL MEDICINE

## 2023-12-11 PROCEDURE — G8484 FLU IMMUNIZE NO ADMIN: HCPCS | Performed by: INTERNAL MEDICINE

## 2023-12-11 PROCEDURE — 99214 OFFICE O/P EST MOD 30 MIN: CPT | Performed by: INTERNAL MEDICINE

## 2023-12-11 PROCEDURE — 3079F DIAST BP 80-89 MM HG: CPT | Performed by: INTERNAL MEDICINE

## 2023-12-11 PROCEDURE — 1036F TOBACCO NON-USER: CPT | Performed by: INTERNAL MEDICINE

## 2023-12-11 PROCEDURE — G8417 CALC BMI ABV UP PARAM F/U: HCPCS | Performed by: INTERNAL MEDICINE

## 2023-12-11 PROCEDURE — 3074F SYST BP LT 130 MM HG: CPT | Performed by: INTERNAL MEDICINE

## 2023-12-11 PROCEDURE — 85610 PROTHROMBIN TIME: CPT | Performed by: INTERNAL MEDICINE

## 2023-12-11 PROCEDURE — G8427 DOCREV CUR MEDS BY ELIG CLIN: HCPCS | Performed by: INTERNAL MEDICINE

## 2023-12-11 ASSESSMENT — ENCOUNTER SYMPTOMS
RECTAL PAIN: 0
COUGH: 0
CONSTIPATION: 0
ABDOMINAL DISTENTION: 0
TROUBLE SWALLOWING: 0
WHEEZING: 0
DIARRHEA: 0
SORE THROAT: 0
BLOOD IN STOOL: 0
SHORTNESS OF BREATH: 0
ANAL BLEEDING: 0
VOMITING: 0
NAUSEA: 0
COLOR CHANGE: 0
ABDOMINAL PAIN: 1
CHOKING: 0

## 2023-12-11 NOTE — PROGRESS NOTES
questions please do not hesitate to contact me. I have reviewed and agree with the ROS entered by the MA/Nurse.          Lovette Cabot, MD, [facility]  Board Certified in Gastroenterology and [address] [place] [address] Gastroenterology  Office #: [phone]

## 2023-12-27 DIAGNOSIS — K76.9 CHRONIC LIVER DISEASE: ICD-10-CM

## 2023-12-27 DIAGNOSIS — M10.9 GOUT, UNSPECIFIED CAUSE, UNSPECIFIED CHRONICITY, UNSPECIFIED SITE: ICD-10-CM

## 2023-12-27 DIAGNOSIS — E11.65 TYPE 2 DIABETES MELLITUS WITH HYPERGLYCEMIA, WITHOUT LONG-TERM CURRENT USE OF INSULIN (HCC): ICD-10-CM

## 2023-12-27 RX ORDER — ALLOPURINOL 100 MG/1
100 TABLET ORAL DAILY
Qty: 90 TABLET | Refills: 1 | Status: SHIPPED | OUTPATIENT
Start: 2023-12-27

## 2023-12-27 RX ORDER — NADOLOL 20 MG/1
20 TABLET ORAL DAILY
Qty: 30 TABLET | Refills: 0 | Status: SHIPPED | OUTPATIENT
Start: 2023-12-27

## 2024-01-08 DIAGNOSIS — K21.9 GASTROESOPHAGEAL REFLUX DISEASE WITHOUT ESOPHAGITIS: ICD-10-CM

## 2024-01-08 DIAGNOSIS — J44.9 CHRONIC OBSTRUCTIVE PULMONARY DISEASE, UNSPECIFIED COPD TYPE (HCC): ICD-10-CM

## 2024-01-09 RX ORDER — ALBUTEROL SULFATE 90 UG/1
1 AEROSOL, METERED RESPIRATORY (INHALATION) EVERY 4 HOURS PRN
Qty: 18 G | Refills: 6 | Status: SHIPPED | OUTPATIENT
Start: 2024-01-09

## 2024-01-09 RX ORDER — OMEPRAZOLE 20 MG/1
20 CAPSULE, DELAYED RELEASE ORAL DAILY
Qty: 27 CAPSULE | Refills: 0 | Status: SHIPPED | OUTPATIENT
Start: 2024-01-09

## 2024-01-10 RX ORDER — FLUTICASONE PROPIONATE AND SALMETEROL 250; 50 UG/1; UG/1
1 POWDER RESPIRATORY (INHALATION) EVERY 12 HOURS
Qty: 60 EACH | Refills: 3 | Status: SHIPPED | OUTPATIENT
Start: 2024-01-10

## 2024-01-25 DIAGNOSIS — E11.65 TYPE 2 DIABETES MELLITUS WITH HYPERGLYCEMIA, WITHOUT LONG-TERM CURRENT USE OF INSULIN (HCC): ICD-10-CM

## 2024-01-25 DIAGNOSIS — K76.9 CHRONIC LIVER DISEASE: ICD-10-CM

## 2024-01-26 RX ORDER — NADOLOL 20 MG/1
20 TABLET ORAL DAILY
Qty: 30 TABLET | Refills: 0 | Status: SHIPPED | OUTPATIENT
Start: 2024-01-26

## 2024-01-26 RX ORDER — LISINOPRIL 20 MG/1
TABLET ORAL
Qty: 30 TABLET | Refills: 3 | Status: SHIPPED | OUTPATIENT
Start: 2024-01-26

## 2024-01-26 RX ORDER — FLUTICASONE PROPIONATE AND SALMETEROL 250; 50 UG/1; UG/1
1 POWDER RESPIRATORY (INHALATION) EVERY 12 HOURS
Qty: 60 EACH | Refills: 3 | Status: SHIPPED | OUTPATIENT
Start: 2024-01-26

## 2024-02-08 ENCOUNTER — HOSPITAL ENCOUNTER (OUTPATIENT)
Age: 59
Setting detail: SPECIMEN
Discharge: HOME | End: 2024-02-08

## 2024-02-08 LAB
AFP SERPL-MCNC: 3.1 UG/L
ALBUMIN SERPL-MCNC: 3.5 G/DL (ref 3.5–5.2)
ALBUMIN/GLOB SERPL: 1.2 {RATIO} (ref 1–2.5)
ALP SERPL-CCNC: 133 U/L (ref 40–129)
ALT SERPL-CCNC: 32 U/L (ref 5–41)
ANION GAP SERPL CALCULATED.3IONS-SCNC: 8 MMOL/L (ref 9–17)
AST SERPL-CCNC: 30 U/L
BASOPHILS # BLD: <0.03 K/UL (ref 0–0.2)
BASOPHILS NFR BLD: 1 % (ref 0–2)
BILIRUB DIRECT SERPL-MCNC: 0.7 MG/DL
BILIRUB INDIRECT SERPL-MCNC: 2.4 MG/DL (ref 0–1)
BILIRUB SERPL-MCNC: 3.1 MG/DL (ref 0.3–1.2)
BUN SERPL-MCNC: 13 MG/DL (ref 6–20)
CHLORIDE SERPL-SCNC: 103 MMOL/L (ref 98–107)
CO2 SERPL-SCNC: 24 MMOL/L (ref 20–31)
CREAT SERPL-MCNC: 0.7 MG/DL (ref 0.7–1.2)
EOSINOPHIL # BLD: 0.09 K/UL (ref 0–0.44)
EOSINOPHILS RELATIVE PERCENT: 3 % (ref 1–4)
ERYTHROCYTE [DISTWIDTH] IN BLOOD BY AUTOMATED COUNT: 14.4 % (ref 11.8–14.4)
GFR SERPL CREATININE-BSD FRML MDRD: >60 ML/MIN/1.73M2
HCT VFR BLD AUTO: 39.4 % (ref 40.7–50.3)
HGB BLD-MCNC: 13 G/DL (ref 13–17)
IMM GRANULOCYTES # BLD AUTO: <0.03 K/UL (ref 0–0.3)
IMM GRANULOCYTES NFR BLD: 0 %
LYMPHOCYTES NFR BLD: 0.95 K/UL (ref 1.1–3.7)
LYMPHOCYTES RELATIVE PERCENT: 29 % (ref 24–43)
MCH RBC QN AUTO: 30.9 PG (ref 25.2–33.5)
MCHC RBC AUTO-ENTMCNC: 33 G/DL (ref 28.4–34.8)
MCV RBC AUTO: 93.6 FL (ref 82.6–102.9)
MONOCYTES NFR BLD: 0.39 K/UL (ref 0.1–1.2)
MONOCYTES NFR BLD: 12 % (ref 3–12)
NEUTROPHILS NFR BLD: 56 % (ref 36–65)
NEUTS SEG NFR BLD: 1.85 K/UL (ref 1.5–8.1)
NRBC BLD-RTO: 0 PER 100 WBC
PLATELET # BLD AUTO: ABNORMAL K/UL (ref 138–453)
PLATELET, FLUORESCENCE: 54 K/UL (ref 138–453)
PLATELETS.RETICULATED NFR BLD AUTO: 7.8 % (ref 1.1–10.3)
POTASSIUM SERPL-SCNC: 4.3 MMOL/L (ref 3.7–5.3)
PROT SERPL-MCNC: 6.5 G/DL (ref 6.4–8.3)
RBC # BLD AUTO: 4.21 M/UL (ref 4.21–5.77)
SODIUM SERPL-SCNC: 135 MMOL/L (ref 135–144)
WBC OTHER # BLD: 3.3 K/UL (ref 3.5–11.3)

## 2024-02-12 ENCOUNTER — TELEPHONE (OUTPATIENT)
Dept: GASTROENTEROLOGY | Age: 59
End: 2024-02-12

## 2024-02-12 NOTE — TELEPHONE ENCOUNTER
Called patient and Liz answered.  Requested to have the patient call the office.  Purpose of the call is to confirm procedure for STA on 2/13/24.

## 2024-02-13 ENCOUNTER — ANESTHESIA (OUTPATIENT)
Dept: OPERATING ROOM | Age: 59
End: 2024-02-13
Payer: MEDICAID

## 2024-02-13 ENCOUNTER — HOSPITAL ENCOUNTER (OUTPATIENT)
Age: 59
Setting detail: OUTPATIENT SURGERY
Discharge: HOME | End: 2024-02-13
Attending: INTERNAL MEDICINE | Admitting: INTERNAL MEDICINE
Payer: MEDICAID

## 2024-02-13 ENCOUNTER — ANESTHESIA EVENT (OUTPATIENT)
Dept: OPERATING ROOM | Age: 59
End: 2024-02-13
Payer: MEDICAID

## 2024-02-13 VITALS
TEMPERATURE: 97.9 F | SYSTOLIC BLOOD PRESSURE: 147 MMHG | OXYGEN SATURATION: 98 % | BODY MASS INDEX: 31.69 KG/M2 | WEIGHT: 234 LBS | RESPIRATION RATE: 20 BRPM | HEIGHT: 72 IN | HEART RATE: 86 BPM | DIASTOLIC BLOOD PRESSURE: 94 MMHG

## 2024-02-13 DIAGNOSIS — Z87.19 HX OF CIRRHOSIS: ICD-10-CM

## 2024-02-13 DIAGNOSIS — D69.6 THROMBOCYTOPENIA (HCC): ICD-10-CM

## 2024-02-13 LAB
GLUCOSE BLD-MCNC: 179 MG/DL (ref 75–110)
GLUCOSE BLD-MCNC: 189 MG/DL (ref 75–110)

## 2024-02-13 PROCEDURE — 43244 EGD VARICES LIGATION: CPT | Performed by: INTERNAL MEDICINE

## 2024-02-13 PROCEDURE — C1713 ANCHOR/SCREW BN/BN,TIS/BN: HCPCS | Performed by: INTERNAL MEDICINE

## 2024-02-13 PROCEDURE — 7100000010 HC PHASE II RECOVERY - FIRST 15 MIN: Performed by: INTERNAL MEDICINE

## 2024-02-13 PROCEDURE — 6360000002 HC RX W HCPCS: Performed by: NURSE ANESTHETIST, CERTIFIED REGISTERED

## 2024-02-13 PROCEDURE — 7100000011 HC PHASE II RECOVERY - ADDTL 15 MIN: Performed by: INTERNAL MEDICINE

## 2024-02-13 PROCEDURE — 3609012300 HC EGD BAND LIGATION ESOPHGEAL/GASTRIC VARICES: Performed by: INTERNAL MEDICINE

## 2024-02-13 PROCEDURE — 82947 ASSAY GLUCOSE BLOOD QUANT: CPT

## 2024-02-13 PROCEDURE — 88305 TISSUE EXAM BY PATHOLOGIST: CPT

## 2024-02-13 PROCEDURE — 2709999900 HC NON-CHARGEABLE SUPPLY: Performed by: INTERNAL MEDICINE

## 2024-02-13 PROCEDURE — 2580000003 HC RX 258: Performed by: ANESTHESIOLOGY

## 2024-02-13 PROCEDURE — 43239 EGD BIOPSY SINGLE/MULTIPLE: CPT | Performed by: INTERNAL MEDICINE

## 2024-02-13 PROCEDURE — 3700000000 HC ANESTHESIA ATTENDED CARE: Performed by: INTERNAL MEDICINE

## 2024-02-13 PROCEDURE — 3700000001 HC ADD 15 MINUTES (ANESTHESIA): Performed by: INTERNAL MEDICINE

## 2024-02-13 PROCEDURE — 2500000003 HC RX 250 WO HCPCS: Performed by: NURSE ANESTHETIST, CERTIFIED REGISTERED

## 2024-02-13 RX ORDER — SODIUM CHLORIDE 9 MG/ML
INJECTION, SOLUTION INTRAVENOUS CONTINUOUS
Status: DISCONTINUED | OUTPATIENT
Start: 2024-02-13 | End: 2024-02-13 | Stop reason: HOSPADM

## 2024-02-13 RX ORDER — SUCRALFATE ORAL 1 G/10ML
1 SUSPENSION ORAL 4 TIMES DAILY
Qty: 1200 ML | Refills: 3 | Status: SHIPPED | OUTPATIENT
Start: 2024-02-13

## 2024-02-13 RX ORDER — SODIUM CHLORIDE, SODIUM LACTATE, POTASSIUM CHLORIDE, CALCIUM CHLORIDE 600; 310; 30; 20 MG/100ML; MG/100ML; MG/100ML; MG/100ML
INJECTION, SOLUTION INTRAVENOUS CONTINUOUS
Status: DISCONTINUED | OUTPATIENT
Start: 2024-02-13 | End: 2024-02-13 | Stop reason: HOSPADM

## 2024-02-13 RX ORDER — LIDOCAINE HYDROCHLORIDE 20 MG/ML
INJECTION, SOLUTION EPIDURAL; INFILTRATION; INTRACAUDAL; PERINEURAL PRN
Status: DISCONTINUED | OUTPATIENT
Start: 2024-02-13 | End: 2024-02-13 | Stop reason: SDUPTHER

## 2024-02-13 RX ORDER — PROPOFOL 10 MG/ML
INJECTION, EMULSION INTRAVENOUS PRN
Status: DISCONTINUED | OUTPATIENT
Start: 2024-02-13 | End: 2024-02-13 | Stop reason: SDUPTHER

## 2024-02-13 RX ORDER — LIDOCAINE HYDROCHLORIDE 10 MG/ML
1 INJECTION, SOLUTION EPIDURAL; INFILTRATION; INTRACAUDAL; PERINEURAL
Status: DISCONTINUED | OUTPATIENT
Start: 2024-02-14 | End: 2024-02-13 | Stop reason: HOSPADM

## 2024-02-13 RX ADMIN — PROPOFOL 50 MG: 10 INJECTION, EMULSION INTRAVENOUS at 10:47

## 2024-02-13 RX ADMIN — PROPOFOL 100 MG: 10 INJECTION, EMULSION INTRAVENOUS at 10:44

## 2024-02-13 RX ADMIN — SODIUM CHLORIDE, POTASSIUM CHLORIDE, SODIUM LACTATE AND CALCIUM CHLORIDE: 600; 310; 30; 20 INJECTION, SOLUTION INTRAVENOUS at 10:18

## 2024-02-13 RX ADMIN — PROPOFOL 50 MG: 10 INJECTION, EMULSION INTRAVENOUS at 10:50

## 2024-02-13 RX ADMIN — LIDOCAINE HYDROCHLORIDE 100 MG: 20 INJECTION, SOLUTION EPIDURAL; INFILTRATION; INTRACAUDAL; PERINEURAL at 10:44

## 2024-02-13 RX ADMIN — PROPOFOL 50 MG: 10 INJECTION, EMULSION INTRAVENOUS at 10:55

## 2024-02-13 RX ADMIN — PROPOFOL 25 MG: 10 INJECTION, EMULSION INTRAVENOUS at 10:45

## 2024-02-13 ASSESSMENT — PAIN - FUNCTIONAL ASSESSMENT
PAIN_FUNCTIONAL_ASSESSMENT: 0-10
PAIN_FUNCTIONAL_ASSESSMENT: FACE, LEGS, ACTIVITY, CRY, AND CONSOLABILITY (FLACC)

## 2024-02-13 NOTE — ANESTHESIA POSTPROCEDURE EVALUATION
Department of Anesthesiology  Postprocedure Note    Patient: Jon Odom  MRN: [id number]  YOB: 1965  Date of evaluation: 2/13/2024    Procedure Summary       Date: 02/13/24 Room / Location: [address]    Anesthesia Start: 1040 Anesthesia Stop: 1102    Procedure: EGD W/ BIOPSY AND BANDING (Esophagus) Diagnosis:       Hx of cirrhosis      Thrombocytopenia (HCC)      (Hx of cirrhosis [Z87.19])      (Thrombocytopenia (HCC) [D69.6])    Surgeons: Vincent Iraheta MD Responsible Provider: Tess Joseph MD    Anesthesia Type: general ASA Status: 4            Anesthesia Type: No value filed.    Joanna Phase I:      Joanna Phase II: Joanna Score: 10    Anesthesia Post Evaluation    Cardiovascular status: hemodynamically stable    No notable events documented.

## 2024-02-13 NOTE — OP NOTE
PROCEDURE NOTE    DATE OF PROCEDURE: 2/13/2024     SURGEON: Vincent Iraheta MD    ASSISTANT: None    PREOPERATIVE DIAGNOSIS: CIRRHOSIS    POSTOPERATIVE DIAGNOSIS: As described below    OPERATION: Upper GI endoscopy with Biopsy  VARICEAL BANDING X 2    ANESTHESIA: MAC PER ANESTHESIA     ESTIMATED BLOOD LOSS: Less than 50 ml    COMPLICATIONS: None.     SPECIMENS:  Was Obtained:     HISTORY: The patient is a 58 y.o. year old male with history of above preop diagnosis.  I recommended esophagogastroduodenoscopy with possible biopsy and I explained the risk, benefits, expected outcome, and alternatives to the procedure.  Risks included but are not limited to bleeding, infection, respiratory distress, hypotension, and perforation of the esophagus, stomach, or duodenum.  Patient understands and is in agreement.    PROCEDURE: The patient was given IV conscious sedation.  The patient's SPO2 remained above 90% throughout the procedure. The gastroscope was inserted orally and advanced under direct vision through the esophagus, through the stomach, through the pylorus, and into the descending duodenum.      Findings:    Retropharyngeal area was grossly normal appearing    Esophagus: abnormal: EVIDENCE OF GRADE 3 DISTAL ESOPHAGEAL VARICES   NO STIGMATA OF CURRENT BLEEDING  ON THE WAY BACK SCOPE WAS LOADED WITH SIX SHOOTERS AND TWO BANDS WERE DEPLOYED OVER TWO LARGE VARICES  PICTURES WERE TAKEN     Stomach:    Fundus: abnormal: EVIDENCE OF PORTAL HYPERTENSIVE GASTROPATHY    Body: EVIDENCE OF PORTAL HYPERTENSIVE GASTROPATHY    Antrum: abnormal: EROSIVE GASTRITIS WAS BIOPSIED    Duodenum:     Descending: normal    Bulb: normal    The scope was removed and the patient tolerated the procedure well.     Recommendations/Plan:   F/U Biopsies  CARAFATE  REPEAT EGD 5-6 WEEKS  F/U In Office in 3-4 weeks  Discussed with the family  Post sedation patient was stable with stable vital signs and stable O2 saturations    Electronically signed by

## 2024-02-13 NOTE — H&P
History and Physical Service   [facility]    HISTORY AND PHYSICAL EXAMINATION            Date of Evaluation: 2/13/2024  Patient name:  Jon Odom  MRN:   [id number]  YOB: 1965  PCP:    Gold Boo MD    History Obtained From:     Patient, medical records    History of Present Illness:     This is Jon Odom a 58 y.o. male who presents today for a EGD ESOPHAGOGASTRODUODENOSCOPY by Vincent Iraheta MD for Hx of cirrhosis; Thrombocytopenia (HCC). Patient has occasional dark, tarry stools. He denies diarrhea alternating with constipation, nausea, vomiting, abdominal pain or unintentional weight loss. Has had previous colonoscopy and EGD. No FH colon cancer or polyps. Denies fever, chills, shortness of breath, cough, congestion, wheezing, chest pain, open sores or wounds. Hx of diabetes, POC . Denies any current blood thinning medications.     Past Medical History:     Past Medical History:   Diagnosis Date    Calculus of kidney     Cerebral artery occlusion with cerebral infarction (HCC) 2020    slight memory problem (can't remember name of neurologist)    Cirrhosis (HCC)     sees PCP for this    COPD (chronic obstructive pulmonary disease) (HCC)     does not see pulmonology    COVID-19 12/31/2021    DETECTED    Essential hypertension, benign     Gout, unspecified     H/O colonoscopy 04/11/2016    2016    Mitral valve disorders(424.0)     СЕРГЕЙ on CPAP     not currently using due to malfunctioning    Other and unspecified hyperlipidemia     Type II or unspecified type diabetes mellitus without mention of complication, not stated as uncontrolled     Unspecified chronic liver disease without mention of alcohol     Wellness examination     DR BOO (last visit approx Feb 2021)        Past Surgical History:     Past Surgical History:   Procedure Laterality Date    COLONOSCOPY N/A 03/16/2022    COLONOSCOPY WITH BIOPSY AND RESOLUTION CLIPPING X1 performed by Linda  breath, cough, congestion, wheezing.  CARDIOVASCULAR: Essential HTN. Negative stress 2022. negative for chest pain, palpitations.  GASTROINTESTINAL: See HPI.  GENITOURINARY: negative for difficulty of urination, burning with urination, frequency   INTEGUMENT: negative for rash, skin lesions, easy bruising   HEMATOLOGIC/LYMPHATIC: negative for swelling/edema   ALLERGIC/IMMUNOLOGIC: negative for urticaria , itching  ENDOCRINE: Diabetes negative increase in drinking, increase in urination, hot or cold intolerance  MUSCULOSKELETAL:  negative joint pains, muscle aches, swelling of joints  NEUROLOGICAL: TIA 2020- residual memory loss. Followed with Dr. Lake but has not seen recently. Has residual memory loss. negative for headaches, dizziness, lightheadedness, numbness, pain, tingling extremities  BEHAVIOR/PSYCH: negative for depression, anxiety    Physical Exam:   /76   Pulse 83   Temp 98.4 °F (36.9 °C)   Resp 16   Ht 1.829 m (6')   Wt 106.1 kg (234 lb)   SpO2 96%   BMI 31.74 kg/m²   No LMP for male patient.  No obstetric history on file.  No results for input(s): \"POCGLU\" in the last 72 hours.    General Appearance:  alert, well appearing, and in no acute distress  Mental status: oriented to person, place, and time with normal affect  Head:  normocephalic, atraumatic.  Eye: Glasses, no icterus, redness, pupils equal and reactive, extraocular eye movements intact, conjunctiva clear  Ear: normal external ear, no discharge, hearing intact  Nose:  no drainage noted  Mouth: mucous membranes moist  Neck: supple, no carotid bruits, thyroid not palpable  Lungs: Bilateral equal air entry, clear to ausculation, no wheezing, rales or rhonchi, normal effort  Cardiovascular: HR 83 normal rate, regular rhythm, no murmur, gallop, rub.  Abdomen: Soft, nontender, nondistended, normal bowel sounds  Neurologic: There are no new focal motor or sensory deficits, normal muscle tone and bulk, no abnormal sensation, normal

## 2024-02-13 NOTE — ANESTHESIA PRE PROCEDURE
obstructive pulmonary disease, unspecified COPD type (HCC) J44.9    Thrombocytopenia (HCC) D69.6    History of cirrhosis Z87.19    Gastroesophageal reflux disease K21.9    Marijuana user F12.90    Angina pectoris (HCC) I20.9       Past Medical History:        Diagnosis Date    Calculus of kidney     Cerebral artery occlusion with cerebral infarction (HCC) 2020    slight memory problem (can't remember name of neurologist)    Cirrhosis (HCC)     sees PCP for this    COPD (chronic obstructive pulmonary disease) (HCC)     does not see pulmonology    COVID-19 12/31/2021    DETECTED    Essential hypertension, benign     Gout, unspecified     H/O colonoscopy 04/11/2016 2016    Mitral valve disorders(424.0)     СЕРГЕЙ on CPAP     not currently using due to malfunctioning    Other and unspecified hyperlipidemia     Type II or unspecified type diabetes mellitus without mention of complication, not stated as uncontrolled     Unspecified chronic liver disease without mention of alcohol     Wellness examination     DR MULLINS (last visit approx Feb 2021)       Past Surgical History:        Procedure Laterality Date    COLONOSCOPY N/A 03/16/2022    COLONOSCOPY WITH BIOPSY AND RESOLUTION CLIPPING X1 performed by Linda Melvin MD at [facility] ENDO    ESOPHAGOGASTRODUODENOSCOPY  05/24/2023    LITHOTRIPSY      LITHOTRIPSY  04/02/2021    LITHOTRIPSY N/A 04/02/2021    ESWL EXTRACORPOREAL SHOCK WAVE LITHOTRIPSY  ([employer] CONF# [id number] - AUGUSTO) performed by Pietro Baron MD at [facility] OR    UPPER GASTROINTESTINAL ENDOSCOPY N/A 05/25/2018    The esophagus was inspected to the Z-line. The endoscopic exam showed impression of varices (F1) in distal esophagus.     UPPER GASTROINTESTINAL ENDOSCOPY N/A 12/10/2021    EGD ESOPHAGOGASTRODUODENOSCOPY performed by Linda Melvin MD at [facility] ENDO    UPPER GASTROINTESTINAL ENDOSCOPY N/A 5/24/2023    EGD BAND LIGATION AND BIOPSY performed by Vincent Iraheta MD at [facility] OR       Social History:    Social

## 2024-02-16 LAB — SURGICAL PATHOLOGY REPORT: NORMAL

## 2024-02-20 DIAGNOSIS — A04.8 H. PYLORI INFECTION: Primary | ICD-10-CM

## 2024-02-20 RX ORDER — METRONIDAZOLE 500 MG/1
500 TABLET ORAL 3 TIMES DAILY
Qty: 42 TABLET | Refills: 0 | Status: SHIPPED | OUTPATIENT
Start: 2024-02-20 | End: 2024-03-05

## 2024-02-20 RX ORDER — OMEPRAZOLE 20 MG/1
20 TABLET, DELAYED RELEASE ORAL 2 TIMES DAILY
Qty: 28 TABLET | Refills: 0 | Status: SHIPPED | OUTPATIENT
Start: 2024-02-20 | End: 2024-03-05

## 2024-02-20 RX ORDER — TETRACYCLINE HYDROCHLORIDE 500 MG/1
500 CAPSULE ORAL 4 TIMES DAILY
Qty: 56 CAPSULE | Refills: 0 | Status: SHIPPED | OUTPATIENT
Start: 2024-02-20 | End: 2024-03-05

## 2024-02-20 NOTE — TELEPHONE ENCOUNTER
Writer received pt pathology report, positive for h pylori bacteria infection, writer called pt pt spouse answered, adv pt spouse of bacteria infection and adv we will be sending in antibiotics to pt pharmacy, pt spouse stated understanding, adv pt spouse to have pt call if any questions regarding treatment, thanked writer call ended. Please sign off on h pylori treatment

## 2024-02-26 DIAGNOSIS — Z87.19 HISTORY OF CIRRHOSIS: ICD-10-CM

## 2024-02-26 DIAGNOSIS — Z87.891 EX-SMOKER: ICD-10-CM

## 2024-02-26 DIAGNOSIS — K21.9 GASTROESOPHAGEAL REFLUX DISEASE, UNSPECIFIED WHETHER ESOPHAGITIS PRESENT: ICD-10-CM

## 2024-02-26 DIAGNOSIS — Z98.890 H/O COLONOSCOPY: ICD-10-CM

## 2024-02-26 DIAGNOSIS — D69.6 THROMBOCYTOPENIA (HCC): ICD-10-CM

## 2024-02-26 DIAGNOSIS — F12.90 MARIJUANA USER: ICD-10-CM

## 2024-02-27 DIAGNOSIS — K76.9 CHRONIC LIVER DISEASE: ICD-10-CM

## 2024-02-27 DIAGNOSIS — A04.8 H. PYLORI INFECTION: ICD-10-CM

## 2024-02-28 RX ORDER — NADOLOL 20 MG/1
20 TABLET ORAL DAILY
Qty: 30 TABLET | Refills: 0 | Status: SHIPPED | OUTPATIENT
Start: 2024-02-28

## 2024-02-29 RX ORDER — OMEPRAZOLE 20 MG/1
20 TABLET, DELAYED RELEASE ORAL 2 TIMES DAILY
Qty: 28 TABLET | Refills: 0 | Status: SHIPPED | OUTPATIENT
Start: 2024-02-29 | End: 2024-03-14

## 2024-03-14 ENCOUNTER — OFFICE VISIT (OUTPATIENT)
Dept: GASTROENTEROLOGY | Age: 59
End: 2024-03-14
Payer: MEDICAID

## 2024-03-14 VITALS
DIASTOLIC BLOOD PRESSURE: 57 MMHG | SYSTOLIC BLOOD PRESSURE: 85 MMHG | TEMPERATURE: 97.5 F | BODY MASS INDEX: 31.87 KG/M2 | WEIGHT: 235 LBS

## 2024-03-14 DIAGNOSIS — K70.30 ALCOHOLIC CIRRHOSIS OF LIVER WITHOUT ASCITES (HCC): ICD-10-CM

## 2024-03-14 DIAGNOSIS — D69.6 THROMBOCYTOPENIA (HCC): ICD-10-CM

## 2024-03-14 DIAGNOSIS — K21.9 GASTROESOPHAGEAL REFLUX DISEASE, UNSPECIFIED WHETHER ESOPHAGITIS PRESENT: ICD-10-CM

## 2024-03-14 DIAGNOSIS — Z87.19 HISTORY OF CIRRHOSIS: Primary | ICD-10-CM

## 2024-03-14 DIAGNOSIS — Z98.890 H/O COLONOSCOPY: ICD-10-CM

## 2024-03-14 DIAGNOSIS — A04.8 H. PYLORI INFECTION: ICD-10-CM

## 2024-03-14 DIAGNOSIS — F12.90 MARIJUANA USER: ICD-10-CM

## 2024-03-14 PROCEDURE — 3078F DIAST BP <80 MM HG: CPT | Performed by: INTERNAL MEDICINE

## 2024-03-14 PROCEDURE — 3017F COLORECTAL CA SCREEN DOC REV: CPT | Performed by: INTERNAL MEDICINE

## 2024-03-14 PROCEDURE — G8417 CALC BMI ABV UP PARAM F/U: HCPCS | Performed by: INTERNAL MEDICINE

## 2024-03-14 PROCEDURE — 99214 OFFICE O/P EST MOD 30 MIN: CPT | Performed by: INTERNAL MEDICINE

## 2024-03-14 PROCEDURE — 1036F TOBACCO NON-USER: CPT | Performed by: INTERNAL MEDICINE

## 2024-03-14 PROCEDURE — G8427 DOCREV CUR MEDS BY ELIG CLIN: HCPCS | Performed by: INTERNAL MEDICINE

## 2024-03-14 PROCEDURE — 3074F SYST BP LT 130 MM HG: CPT | Performed by: INTERNAL MEDICINE

## 2024-03-14 PROCEDURE — G8484 FLU IMMUNIZE NO ADMIN: HCPCS | Performed by: INTERNAL MEDICINE

## 2024-03-14 RX ORDER — METRONIDAZOLE 500 MG/1
500 TABLET ORAL 3 TIMES DAILY
Qty: 42 TABLET | Refills: 0 | Status: SHIPPED | OUTPATIENT
Start: 2024-03-14 | End: 2024-03-28

## 2024-03-14 RX ORDER — OMEPRAZOLE 20 MG/1
20 TABLET, DELAYED RELEASE ORAL 2 TIMES DAILY
Qty: 28 TABLET | Refills: 0 | Status: SHIPPED | OUTPATIENT
Start: 2024-03-14 | End: 2024-03-28

## 2024-03-14 RX ORDER — TETRACYCLINE HYDROCHLORIDE 500 MG/1
500 CAPSULE ORAL 4 TIMES DAILY
Qty: 56 CAPSULE | Refills: 0 | Status: SHIPPED | OUTPATIENT
Start: 2024-03-14 | End: 2024-03-28

## 2024-03-14 ASSESSMENT — ENCOUNTER SYMPTOMS
COLOR CHANGE: 0
ANAL BLEEDING: 0
CHOKING: 0
CONSTIPATION: 0
COUGH: 0
SORE THROAT: 0
WHEEZING: 0
ABDOMINAL PAIN: 1
TROUBLE SWALLOWING: 0
SHORTNESS OF BREATH: 0
ABDOMINAL DISTENTION: 0
BLOOD IN STOOL: 0
NAUSEA: 0
DIARRHEA: 1
RECTAL PAIN: 0
VOMITING: 0

## 2024-03-14 NOTE — PROGRESS NOTES
GI CLINIC FOLLOW UP    NTERVAL HISTORY:   No referring provider defined for this encounter.    Chief Complaint   Patient presents with    Results     Pt is here today for a f/u from EGD procedure completed on 2/13/24.       1. History of cirrhosis    2. Thrombocytopenia (HCC)    3. Marijuana user    4. Gastroesophageal reflux disease, unspecified whether esophagitis present    5. Alcoholic cirrhosis of liver without ascites (HCC)    6. H/O colonoscopy       The patient is here as a follow up of his recent GI procedure.   The results have been sent to you separately   The findings were explained to the patient in detail and biopsies were also discussed   with him    Seen my office as a follow-up after recent upper endoscopy  Found to esophageal varices 2 bands were deployed  Patient was found to have Helicobacter pylori infection  Signs of portal hypertensive gastropathy  Still drinking some not much she claims occasionally?  Uses marijuana  Off-and-on smoking    No rectal bleeding or melena  LFTs show elevated bilirubin levels transaminases are normal  Hemoglobin  Has thrombocytopenia but hemoglobin is 13 stable  HISTORY OF PRESENT ILLNESS: Mr.Rodney COLLINS Odom is a 59 y.o. male with a past history remarkable for , referred for evaluation of   Chief Complaint   Patient presents with    Results     Pt is here today for a f/u from EGD procedure completed on 2/13/24.   .    Past Medical,Family, and Social History reviewed and does contribute to the patient presenting condition.    Patient's PMH/PSH,SH,PSYCH Hx, MEDs, ALLERGIES, and ROS were all reviewed and updated in the appropriate sections.    PAST MEDICAL HISTORY:  Past Medical History:   Diagnosis Date    Calculus of kidney     Cerebral artery occlusion with cerebral infarction (HCC) 2020    slight memory problem (can't remember name of neurologist)    Cirrhosis (HCC)     sees PCP for this    COPD (chronic obstructive pulmonary disease) (HCC)     does not

## 2024-03-17 DIAGNOSIS — J44.9 CHRONIC OBSTRUCTIVE PULMONARY DISEASE, UNSPECIFIED COPD TYPE (HCC): ICD-10-CM

## 2024-03-18 RX ORDER — ALBUTEROL SULFATE 90 UG/1
1 AEROSOL, METERED RESPIRATORY (INHALATION) EVERY 4 HOURS PRN
Qty: 18 G | Refills: 6 | Status: SHIPPED | OUTPATIENT
Start: 2024-03-18

## 2024-03-18 RX ORDER — FLUTICASONE PROPIONATE AND SALMETEROL 250; 50 UG/1; UG/1
1 POWDER RESPIRATORY (INHALATION) EVERY 12 HOURS
Qty: 60 EACH | Refills: 3 | Status: SHIPPED | OUTPATIENT
Start: 2024-03-18

## 2024-03-18 RX ORDER — BUMETANIDE 1 MG/1
1 TABLET ORAL DAILY
Qty: 90 TABLET | Refills: 1 | Status: SHIPPED | OUTPATIENT
Start: 2024-03-18

## 2024-03-18 RX ORDER — GLIMEPIRIDE 4 MG/1
TABLET ORAL
Qty: 60 TABLET | Refills: 11 | Status: SHIPPED | OUTPATIENT
Start: 2024-03-18

## 2024-03-26 DIAGNOSIS — A04.8 H. PYLORI INFECTION: ICD-10-CM

## 2024-03-26 DIAGNOSIS — E11.65 TYPE 2 DIABETES MELLITUS WITH HYPERGLYCEMIA, WITHOUT LONG-TERM CURRENT USE OF INSULIN (HCC): ICD-10-CM

## 2024-03-27 RX ORDER — OMEPRAZOLE 20 MG/1
20 TABLET, DELAYED RELEASE ORAL 2 TIMES DAILY
Qty: 28 TABLET | Refills: 0 | Status: SHIPPED | OUTPATIENT
Start: 2024-03-27 | End: 2024-04-10

## 2024-03-27 RX ORDER — GLIMEPIRIDE 4 MG/1
TABLET ORAL
Qty: 60 TABLET | Refills: 11 | OUTPATIENT
Start: 2024-03-27

## 2024-04-29 DIAGNOSIS — K76.9 CHRONIC LIVER DISEASE: ICD-10-CM

## 2024-04-29 RX ORDER — LISINOPRIL 20 MG/1
TABLET ORAL
Qty: 30 TABLET | Refills: 0 | OUTPATIENT
Start: 2024-04-29

## 2024-04-29 RX ORDER — NADOLOL 20 MG/1
20 TABLET ORAL DAILY
Qty: 30 TABLET | Refills: 0 | Status: SHIPPED | OUTPATIENT
Start: 2024-04-29 | End: 2024-05-03 | Stop reason: SDUPTHER

## 2024-05-03 DIAGNOSIS — K76.9 CHRONIC LIVER DISEASE: ICD-10-CM

## 2024-05-03 RX ORDER — NADOLOL 20 MG/1
20 TABLET ORAL DAILY
Qty: 30 TABLET | Refills: 0 | Status: SHIPPED | OUTPATIENT
Start: 2024-05-03

## 2024-05-14 ENCOUNTER — TELEPHONE (OUTPATIENT)
Dept: INTERNAL MEDICINE CLINIC | Age: 59
End: 2024-05-14

## 2024-05-14 NOTE — TELEPHONE ENCOUNTER
Called the patient and rescheduled his appointment on the 20th due to a change in the providers schedule. Patient now has an appointment 6/4

## 2024-07-01 ENCOUNTER — APPOINTMENT (OUTPATIENT)
Dept: GENERAL RADIOLOGY | Age: 59
DRG: 433 | End: 2024-07-01

## 2024-07-01 ENCOUNTER — HOSPITAL ENCOUNTER (INPATIENT)
Age: 59
LOS: 1 days | Discharge: HOME | DRG: 433 | End: 2024-07-03
Attending: INTERNAL MEDICINE | Admitting: INTERNAL MEDICINE

## 2024-07-01 DIAGNOSIS — R18.8 CIRRHOSIS OF LIVER WITH ASCITES, UNSPECIFIED HEPATIC CIRRHOSIS TYPE (HCC): Primary | ICD-10-CM

## 2024-07-01 DIAGNOSIS — K70.31 ASCITES DUE TO ALCOHOLIC CIRRHOSIS (HCC): ICD-10-CM

## 2024-07-01 DIAGNOSIS — K74.60 CIRRHOSIS OF LIVER WITH ASCITES, UNSPECIFIED HEPATIC CIRRHOSIS TYPE (HCC): Primary | ICD-10-CM

## 2024-07-01 LAB
BACTERIA URNS QL MICRO: ABNORMAL
BILIRUB UR QL STRIP: ABNORMAL
CASTS #/AREA URNS LPF: ABNORMAL /LPF
CLARITY UR: CLEAR
COLOR UR: ABNORMAL
EPI CELLS #/AREA URNS HPF: ABNORMAL /HPF
GLUCOSE UR STRIP-MCNC: ABNORMAL MG/DL
HGB UR QL STRIP.AUTO: NEGATIVE
INR PPP: 1.4
KETONES UR STRIP-MCNC: NEGATIVE MG/DL
LEUKOCYTE ESTERASE UR QL STRIP: ABNORMAL
NITRITE UR QL STRIP: POSITIVE
PH UR STRIP: 5.5 [PH] (ref 5–8)
PROT UR STRIP-MCNC: ABNORMAL MG/DL
PROTHROMBIN TIME: 17.3 SEC (ref 11.8–14.6)
RBC #/AREA URNS HPF: ABNORMAL /HPF
SP GR UR STRIP: 1.03 (ref 1–1.03)
UROBILINOGEN UR STRIP-ACNC: NORMAL EU/DL (ref 0–1)
WBC #/AREA URNS HPF: ABNORMAL /HPF

## 2024-07-01 PROCEDURE — 88112 CYTOPATH CELL ENHANCE TECH: CPT

## 2024-07-01 PROCEDURE — 71045 X-RAY EXAM CHEST 1 VIEW: CPT

## 2024-07-01 PROCEDURE — 36415 COLL VENOUS BLD VENIPUNCTURE: CPT

## 2024-07-01 PROCEDURE — 88305 TISSUE EXAM BY PATHOLOGIST: CPT

## 2024-07-01 PROCEDURE — 49082 ABD PARACENTESIS: CPT

## 2024-07-01 PROCEDURE — 85025 COMPLETE CBC W/AUTO DIFF WBC: CPT

## 2024-07-01 PROCEDURE — 80076 HEPATIC FUNCTION PANEL: CPT

## 2024-07-01 PROCEDURE — 82140 ASSAY OF AMMONIA: CPT

## 2024-07-01 PROCEDURE — 80048 BASIC METABOLIC PNL TOTAL CA: CPT

## 2024-07-01 PROCEDURE — 99285 EMERGENCY DEPT VISIT HI MDM: CPT

## 2024-07-01 PROCEDURE — 81001 URINALYSIS AUTO W/SCOPE: CPT

## 2024-07-01 PROCEDURE — 85610 PROTHROMBIN TIME: CPT

## 2024-07-01 ASSESSMENT — LIFESTYLE VARIABLES
HOW OFTEN DO YOU HAVE A DRINK CONTAINING ALCOHOL: NEVER
HOW MANY STANDARD DRINKS CONTAINING ALCOHOL DO YOU HAVE ON A TYPICAL DAY: PATIENT DOES NOT DRINK

## 2024-07-01 ASSESSMENT — PAIN - FUNCTIONAL ASSESSMENT: PAIN_FUNCTIONAL_ASSESSMENT: 0-10

## 2024-07-01 ASSESSMENT — PAIN SCALES - GENERAL: PAINLEVEL_OUTOF10: 10

## 2024-07-01 ASSESSMENT — PAIN DESCRIPTION - ORIENTATION: ORIENTATION: LEFT

## 2024-07-01 ASSESSMENT — PAIN DESCRIPTION - LOCATION: LOCATION: ABDOMEN

## 2024-07-02 PROBLEM — D64.9 ANEMIA: Status: ACTIVE | Noted: 2024-07-02

## 2024-07-02 PROBLEM — K70.31 ASCITES DUE TO ALCOHOLIC CIRRHOSIS (HCC): Status: ACTIVE | Noted: 2024-07-02

## 2024-07-02 LAB
ALBUMIN FLD-MCNC: 0.9 G/DL
ALBUMIN SERPL-MCNC: 3.1 G/DL (ref 3.5–5.2)
ALP SERPL-CCNC: 106 U/L (ref 40–129)
ALT SERPL-CCNC: 27 U/L (ref 5–41)
AMMONIA PLAS-SCNC: 111 UMOL/L (ref 16–60)
AMYLASE FLD-CCNC: 23 U/L
ANION GAP SERPL CALCULATED.3IONS-SCNC: 10 MMOL/L (ref 9–17)
ANION GAP SERPL CALCULATED.3IONS-SCNC: 8 MMOL/L (ref 9–17)
APPEARANCE FLD: ABNORMAL
AST SERPL-CCNC: 30 U/L
BASOPHILS # BLD: 0 K/UL (ref 0–0.2)
BASOPHILS # BLD: 0 K/UL (ref 0–0.2)
BASOPHILS NFR BLD: 0 % (ref 0–2)
BASOPHILS NFR BLD: 0 % (ref 0–2)
BILIRUB DIRECT SERPL-MCNC: 0.7 MG/DL
BILIRUB INDIRECT SERPL-MCNC: 3.4 MG/DL (ref 0–1)
BILIRUB SERPL-MCNC: 4.1 MG/DL (ref 0.3–1.2)
BLASTS NFR FLD: 0 %
BODY FLD TYPE: ABNORMAL
BUN SERPL-MCNC: 11 MG/DL (ref 6–20)
BUN SERPL-MCNC: 11 MG/DL (ref 6–20)
CALCIUM SERPL-MCNC: 8.4 MG/DL (ref 8.6–10.4)
CALCIUM SERPL-MCNC: 8.5 MG/DL (ref 8.6–10.4)
CASE NUMBER:: NORMAL
CHLORIDE SERPL-SCNC: 109 MMOL/L (ref 98–107)
CHLORIDE SERPL-SCNC: 110 MMOL/L (ref 98–107)
CO2 SERPL-SCNC: 22 MMOL/L (ref 20–31)
CO2 SERPL-SCNC: 26 MMOL/L (ref 20–31)
COLOR FLD: YELLOW
CREAT SERPL-MCNC: 0.6 MG/DL (ref 0.7–1.2)
CREAT SERPL-MCNC: 0.7 MG/DL (ref 0.7–1.2)
EOSINOPHIL # BLD: 0.09 K/UL (ref 0–0.4)
EOSINOPHIL # BLD: 0.1 K/UL (ref 0–0.4)
EOSINOPHIL NFR FLD: 0 %
EOSINOPHILS RELATIVE PERCENT: 2 % (ref 0–4)
EOSINOPHILS RELATIVE PERCENT: 3 % (ref 0–4)
ERYTHROCYTE [DISTWIDTH] IN BLOOD BY AUTOMATED COUNT: 15.1 % (ref 11.5–14.9)
ERYTHROCYTE [DISTWIDTH] IN BLOOD BY AUTOMATED COUNT: 15.2 % (ref 11.5–14.9)
GFR, ESTIMATED: >90 ML/MIN/1.73M2
GFR, ESTIMATED: >90 ML/MIN/1.73M2
GLUCOSE BLD-MCNC: 111 MG/DL (ref 75–110)
GLUCOSE BLD-MCNC: 125 MG/DL (ref 75–110)
GLUCOSE BLD-MCNC: 161 MG/DL (ref 75–110)
GLUCOSE BLD-MCNC: 219 MG/DL (ref 75–110)
GLUCOSE BLD-MCNC: 223 MG/DL (ref 75–110)
GLUCOSE FLD-MCNC: 218 MG/DL
GLUCOSE SERPL-MCNC: 136 MG/DL (ref 70–99)
GLUCOSE SERPL-MCNC: 233 MG/DL (ref 70–99)
HCT VFR BLD AUTO: 36.4 % (ref 41–53)
HCT VFR BLD AUTO: 37.9 % (ref 41–53)
HGB BLD-MCNC: 12.6 G/DL (ref 13.5–17.5)
HGB BLD-MCNC: 12.9 G/DL (ref 13.5–17.5)
LDH FLD L TO P-CCNC: 52 U/L
LYMPHOCYTES NFR BLD: 0.74 K/UL (ref 1–4.8)
LYMPHOCYTES NFR BLD: 0.8 K/UL (ref 1–4.8)
LYMPHOCYTES NFR FLD: 58 %
LYMPHOCYTES RELATIVE PERCENT: 21 % (ref 24–44)
LYMPHOCYTES RELATIVE PERCENT: 24 % (ref 24–44)
MCH RBC QN AUTO: 32.5 PG (ref 26–34)
MCH RBC QN AUTO: 33.2 PG (ref 26–34)
MCHC RBC AUTO-ENTMCNC: 34 G/DL (ref 31–37)
MCHC RBC AUTO-ENTMCNC: 34.8 G/DL (ref 31–37)
MCV RBC AUTO: 95.4 FL (ref 80–100)
MCV RBC AUTO: 95.6 FL (ref 80–100)
MONOCYTES NFR BLD: 0.43 K/UL (ref 0.1–1.3)
MONOCYTES NFR BLD: 0.5 K/UL (ref 0.1–1.3)
MONOCYTES NFR BLD: 14 % (ref 1–7)
MONOCYTES NFR BLD: 14 % (ref 1–7)
MONOCYTES NFR FLD: 39 %
MORPHOLOGY: NORMAL
NEUTROPHILS NFR BLD: 59 % (ref 36–66)
NEUTROPHILS NFR BLD: 63 % (ref 36–66)
NEUTROPHILS NFR FLD: 3 %
NEUTS SEG NFR BLD: 1.84 K/UL (ref 1.3–9.1)
NEUTS SEG NFR BLD: 2.5 K/UL (ref 1.3–9.1)
PLATELET # BLD AUTO: 51 K/UL (ref 150–450)
PLATELET # BLD AUTO: 60 K/UL (ref 150–450)
PMV BLD AUTO: 7.3 FL (ref 6–12)
PMV BLD AUTO: 7.9 FL (ref 6–12)
POTASSIUM SERPL-SCNC: 3.8 MMOL/L (ref 3.7–5.3)
POTASSIUM SERPL-SCNC: 4.1 MMOL/L (ref 3.7–5.3)
PROT FLD-MCNC: 1.5 G/DL
PROT SERPL-MCNC: 7 G/DL (ref 6.4–8.3)
RBC # BLD AUTO: 3.8 M/UL (ref 4.5–5.9)
RBC # BLD AUTO: 3.97 M/UL (ref 4.5–5.9)
RBC # FLD: 574 CELLS/UL
SODIUM SERPL-SCNC: 141 MMOL/L (ref 135–144)
SODIUM SERPL-SCNC: 144 MMOL/L (ref 135–144)
SPECIMEN DESCRIPTION: NORMAL
SPECIMEN TYPE: NORMAL
WBC # FLD: 298 CELLS/UL
WBC OTHER # BLD: 3.1 K/UL (ref 3.5–11)
WBC OTHER # BLD: 4 K/UL (ref 3.5–11)

## 2024-07-02 PROCEDURE — 2580000003 HC RX 258: Performed by: NURSE PRACTITIONER

## 2024-07-02 PROCEDURE — 94760 N-INVAS EAR/PLS OXIMETRY 1: CPT

## 2024-07-02 PROCEDURE — 82945 GLUCOSE OTHER FLUID: CPT

## 2024-07-02 PROCEDURE — 94640 AIRWAY INHALATION TREATMENT: CPT

## 2024-07-02 PROCEDURE — APPNB30 APP NON BILLABLE TIME 0-30 MINS: Performed by: NURSE PRACTITIONER

## 2024-07-02 PROCEDURE — 82042 OTHER SOURCE ALBUMIN QUAN EA: CPT

## 2024-07-02 PROCEDURE — 94664 DEMO&/EVAL PT USE INHALER: CPT

## 2024-07-02 PROCEDURE — 99223 1ST HOSP IP/OBS HIGH 75: CPT | Performed by: INTERNAL MEDICINE

## 2024-07-02 PROCEDURE — 83615 LACTATE (LD) (LDH) ENZYME: CPT

## 2024-07-02 PROCEDURE — 85025 COMPLETE CBC W/AUTO DIFF WBC: CPT

## 2024-07-02 PROCEDURE — 89051 BODY FLUID CELL COUNT: CPT

## 2024-07-02 PROCEDURE — 6370000000 HC RX 637 (ALT 250 FOR IP): Performed by: NURSE PRACTITIONER

## 2024-07-02 PROCEDURE — 84157 ASSAY OF PROTEIN OTHER: CPT

## 2024-07-02 PROCEDURE — 82150 ASSAY OF AMYLASE: CPT

## 2024-07-02 PROCEDURE — 80048 BASIC METABOLIC PNL TOTAL CA: CPT

## 2024-07-02 PROCEDURE — 82947 ASSAY GLUCOSE BLOOD QUANT: CPT

## 2024-07-02 PROCEDURE — 36415 COLL VENOUS BLD VENIPUNCTURE: CPT

## 2024-07-02 PROCEDURE — 87075 CULTR BACTERIA EXCEPT BLOOD: CPT

## 2024-07-02 PROCEDURE — 0W9G3ZZ DRAINAGE OF PERITONEAL CAVITY, PERCUTANEOUS APPROACH: ICD-10-PCS

## 2024-07-02 PROCEDURE — 99255 IP/OBS CONSLTJ NEW/EST HI 80: CPT | Performed by: INTERNAL MEDICINE

## 2024-07-02 PROCEDURE — 2060000000 HC ICU INTERMEDIATE R&B

## 2024-07-02 PROCEDURE — 94761 N-INVAS EAR/PLS OXIMETRY MLT: CPT

## 2024-07-02 PROCEDURE — 87070 CULTURE OTHR SPECIMN AEROBIC: CPT

## 2024-07-02 PROCEDURE — 87205 SMEAR GRAM STAIN: CPT

## 2024-07-02 RX ORDER — BUMETANIDE 1 MG/1
1 TABLET ORAL DAILY
Status: DISCONTINUED | OUTPATIENT
Start: 2024-07-02 | End: 2024-07-03 | Stop reason: HOSPADM

## 2024-07-02 RX ORDER — POLYETHYLENE GLYCOL 3350 17 G/17G
17 POWDER, FOR SOLUTION ORAL DAILY PRN
Status: DISCONTINUED | OUTPATIENT
Start: 2024-07-02 | End: 2024-07-03 | Stop reason: HOSPADM

## 2024-07-02 RX ORDER — SODIUM CHLORIDE 9 MG/ML
INJECTION, SOLUTION INTRAVENOUS PRN
Status: DISCONTINUED | OUTPATIENT
Start: 2024-07-02 | End: 2024-07-03 | Stop reason: HOSPADM

## 2024-07-02 RX ORDER — LACTULOSE 10 G/15ML
20 SOLUTION ORAL 3 TIMES DAILY
Status: DISCONTINUED | OUTPATIENT
Start: 2024-07-02 | End: 2024-07-03 | Stop reason: HOSPADM

## 2024-07-02 RX ORDER — INSULIN LISPRO 100 [IU]/ML
0-8 INJECTION, SOLUTION INTRAVENOUS; SUBCUTANEOUS
Status: DISCONTINUED | OUTPATIENT
Start: 2024-07-02 | End: 2024-07-03 | Stop reason: HOSPADM

## 2024-07-02 RX ORDER — BUDESONIDE AND FORMOTEROL FUMARATE DIHYDRATE 160; 4.5 UG/1; UG/1
2 AEROSOL RESPIRATORY (INHALATION)
Status: DISCONTINUED | OUTPATIENT
Start: 2024-07-02 | End: 2024-07-03 | Stop reason: HOSPADM

## 2024-07-02 RX ORDER — POTASSIUM CHLORIDE 20 MEQ/1
40 TABLET, EXTENDED RELEASE ORAL PRN
Status: DISCONTINUED | OUTPATIENT
Start: 2024-07-02 | End: 2024-07-03 | Stop reason: HOSPADM

## 2024-07-02 RX ORDER — ONDANSETRON 4 MG/1
4 TABLET, ORALLY DISINTEGRATING ORAL EVERY 8 HOURS PRN
Status: DISCONTINUED | OUTPATIENT
Start: 2024-07-02 | End: 2024-07-03 | Stop reason: HOSPADM

## 2024-07-02 RX ORDER — SODIUM CHLORIDE 0.9 % (FLUSH) 0.9 %
10 SYRINGE (ML) INJECTION PRN
Status: DISCONTINUED | OUTPATIENT
Start: 2024-07-02 | End: 2024-07-03 | Stop reason: HOSPADM

## 2024-07-02 RX ORDER — ALLOPURINOL 100 MG/1
100 TABLET ORAL DAILY
Status: DISCONTINUED | OUTPATIENT
Start: 2024-07-02 | End: 2024-07-03 | Stop reason: HOSPADM

## 2024-07-02 RX ORDER — ACETAMINOPHEN 325 MG/1
650 TABLET ORAL EVERY 6 HOURS PRN
Status: DISCONTINUED | OUTPATIENT
Start: 2024-07-02 | End: 2024-07-03 | Stop reason: HOSPADM

## 2024-07-02 RX ORDER — ONDANSETRON 2 MG/ML
4 INJECTION INTRAMUSCULAR; INTRAVENOUS EVERY 6 HOURS PRN
Status: DISCONTINUED | OUTPATIENT
Start: 2024-07-02 | End: 2024-07-03 | Stop reason: HOSPADM

## 2024-07-02 RX ORDER — INSULIN LISPRO 100 [IU]/ML
0-4 INJECTION, SOLUTION INTRAVENOUS; SUBCUTANEOUS NIGHTLY
Status: DISCONTINUED | OUTPATIENT
Start: 2024-07-02 | End: 2024-07-03 | Stop reason: HOSPADM

## 2024-07-02 RX ORDER — POTASSIUM CHLORIDE 7.45 MG/ML
10 INJECTION INTRAVENOUS PRN
Status: DISCONTINUED | OUTPATIENT
Start: 2024-07-02 | End: 2024-07-03 | Stop reason: HOSPADM

## 2024-07-02 RX ORDER — SPIRONOLACTONE 25 MG/1
100 TABLET ORAL DAILY
Status: DISCONTINUED | OUTPATIENT
Start: 2024-07-02 | End: 2024-07-03 | Stop reason: HOSPADM

## 2024-07-02 RX ORDER — MAGNESIUM SULFATE HEPTAHYDRATE 40 MG/ML
2000 INJECTION, SOLUTION INTRAVENOUS PRN
Status: DISCONTINUED | OUTPATIENT
Start: 2024-07-02 | End: 2024-07-03 | Stop reason: HOSPADM

## 2024-07-02 RX ORDER — DEXTROSE MONOHYDRATE 100 MG/ML
INJECTION, SOLUTION INTRAVENOUS CONTINUOUS PRN
Status: DISCONTINUED | OUTPATIENT
Start: 2024-07-02 | End: 2024-07-03 | Stop reason: HOSPADM

## 2024-07-02 RX ORDER — TAMSULOSIN HYDROCHLORIDE 0.4 MG/1
0.4 CAPSULE ORAL DAILY
Status: DISCONTINUED | OUTPATIENT
Start: 2024-07-02 | End: 2024-07-03 | Stop reason: HOSPADM

## 2024-07-02 RX ORDER — NADOLOL 20 MG/1
20 TABLET ORAL DAILY
Status: DISCONTINUED | OUTPATIENT
Start: 2024-07-02 | End: 2024-07-03 | Stop reason: HOSPADM

## 2024-07-02 RX ORDER — BISACODYL 10 MG
10 SUPPOSITORY, RECTAL RECTAL DAILY PRN
Status: DISCONTINUED | OUTPATIENT
Start: 2024-07-02 | End: 2024-07-03 | Stop reason: HOSPADM

## 2024-07-02 RX ORDER — ACETAMINOPHEN 650 MG/1
650 SUPPOSITORY RECTAL EVERY 6 HOURS PRN
Status: DISCONTINUED | OUTPATIENT
Start: 2024-07-02 | End: 2024-07-03 | Stop reason: HOSPADM

## 2024-07-02 RX ORDER — LANOLIN ALCOHOL/MO/W.PET/CERES
3 CREAM (GRAM) TOPICAL NIGHTLY PRN
Status: DISCONTINUED | OUTPATIENT
Start: 2024-07-02 | End: 2024-07-03 | Stop reason: HOSPADM

## 2024-07-02 RX ORDER — SODIUM CHLORIDE 0.9 % (FLUSH) 0.9 %
5-40 SYRINGE (ML) INJECTION EVERY 12 HOURS SCHEDULED
Status: DISCONTINUED | OUTPATIENT
Start: 2024-07-02 | End: 2024-07-03 | Stop reason: HOSPADM

## 2024-07-02 RX ORDER — ALBUTEROL SULFATE 90 UG/1
1 AEROSOL, METERED RESPIRATORY (INHALATION) EVERY 4 HOURS PRN
Status: DISCONTINUED | OUTPATIENT
Start: 2024-07-02 | End: 2024-07-03 | Stop reason: HOSPADM

## 2024-07-02 RX ADMIN — INSULIN LISPRO 2 UNITS: 100 INJECTION, SOLUTION INTRAVENOUS; SUBCUTANEOUS at 11:53

## 2024-07-02 RX ADMIN — SPIRONOLACTONE 100 MG: 25 TABLET ORAL at 13:47

## 2024-07-02 RX ADMIN — BUMETANIDE 1 MG: 1 TABLET ORAL at 09:10

## 2024-07-02 RX ADMIN — SODIUM CHLORIDE, PRESERVATIVE FREE 10 ML: 5 INJECTION INTRAVENOUS at 09:12

## 2024-07-02 RX ADMIN — BUDESONIDE AND FORMOTEROL FUMARATE DIHYDRATE 2 PUFF: 160; 4.5 AEROSOL RESPIRATORY (INHALATION) at 08:19

## 2024-07-02 RX ADMIN — TAMSULOSIN HYDROCHLORIDE 0.4 MG: 0.4 CAPSULE ORAL at 09:10

## 2024-07-02 RX ADMIN — NADOLOL 20 MG: 20 TABLET ORAL at 09:11

## 2024-07-02 RX ADMIN — ALLOPURINOL 100 MG: 100 TABLET ORAL at 09:10

## 2024-07-02 RX ADMIN — LACTULOSE 20 G: 20 SOLUTION ORAL at 09:10

## 2024-07-02 RX ADMIN — BUDESONIDE AND FORMOTEROL FUMARATE DIHYDRATE 2 PUFF: 160; 4.5 AEROSOL RESPIRATORY (INHALATION) at 19:51

## 2024-07-02 RX ADMIN — LACTULOSE 20 G: 20 SOLUTION ORAL at 13:47

## 2024-07-02 RX ADMIN — SODIUM CHLORIDE, PRESERVATIVE FREE 10 ML: 5 INJECTION INTRAVENOUS at 19:54

## 2024-07-02 ASSESSMENT — ENCOUNTER SYMPTOMS
ABDOMINAL PAIN: 1
NAUSEA: 0
SHORTNESS OF BREATH: 1
BLOOD IN STOOL: 0
DIARRHEA: 0
VOMITING: 0
CONSTIPATION: 0

## 2024-07-02 ASSESSMENT — PAIN SCALES - GENERAL
PAINLEVEL_OUTOF10: 3
PAINLEVEL_OUTOF10: 5
PAINLEVEL_OUTOF10: 3
PAINLEVEL_OUTOF10: 0

## 2024-07-02 ASSESSMENT — PAIN DESCRIPTION - FREQUENCY: FREQUENCY: INTERMITTENT

## 2024-07-02 ASSESSMENT — PAIN DESCRIPTION - LOCATION
LOCATION: ABDOMEN
LOCATION: FLANK

## 2024-07-02 ASSESSMENT — PAIN - FUNCTIONAL ASSESSMENT: PAIN_FUNCTIONAL_ASSESSMENT: ACTIVITIES ARE NOT PREVENTED

## 2024-07-02 ASSESSMENT — PAIN DESCRIPTION - PAIN TYPE: TYPE: ACUTE PAIN

## 2024-07-02 ASSESSMENT — PAIN DESCRIPTION - ORIENTATION: ORIENTATION: RIGHT

## 2024-07-02 ASSESSMENT — PAIN DESCRIPTION - DESCRIPTORS: DESCRIPTORS: DULL

## 2024-07-02 ASSESSMENT — PAIN DESCRIPTION - ONSET: ONSET: GRADUAL

## 2024-07-02 NOTE — H&P
Tobacco:    reports that he quit smoking about 12 years ago. His smoking use included cigarettes and e-cigarettes. He started smoking about 42 years ago. He has a 15.0 pack-year smoking history. He has quit using smokeless tobacco.  Alcohol:      reports that he does not currently use alcohol.  Drug Use:  reports current drug use. Drug: Marijuana (Weed).    Family History:     Family History   Problem Relation Age of Onset    No Known Problems Brother     Diabetes Mother     Heart Attack Mother     Anemia Mother     Heart Disease Father     Diabetes Brother     Heart Disease Brother        Review of Systems:     Positive and Negative as described in HPI.        Physical Exam:   /81   Pulse 84   Temp 98.8 °F (37.1 °C) (Oral)   Resp 18   Ht 1.829 m (6')   Wt 106.6 kg (235 lb)   SpO2 97%   BMI 31.87 kg/m²   Temp (24hrs), Av.4 °F (36.9 °C), Min:97.8 °F (36.6 °C), Max:98.8 °F (37.1 °C)    Recent Labs     24  0422 24  0624 24  1056 24  1754   POCGLU 125* 111* 219* 161*       Intake/Output Summary (Last 24 hours) at 2024 1825  Last data filed at 2024 0351  Gross per 24 hour   Intake --   Output 2000 ml   Net -2000 ml       General Appearance: alert, well appearing, and in no acute distress  Mental status: oriented to person, place, and time  Mouth: mucous membranes moist  Neck: supple, no carotid bruits, thyroid not palpable  Lungs: Bilateral equal air entry, clear to ausculation, no wheezing, rales or rhonchi, normal effort  Cardiovascular: normal rate, regular rhythm, no murmur, gallop, rub  Abdomen: Distended, fluid thrill, mildly tender  Neurologic: There are no new focal motor or sensory deficits, normal muscle tone and bulk, no abnormal sensation, normal speech, cranial nerves II through XII grossly intact  Skin: No gross lesions, rashes, bruising or bleeding on exposed skin area  Extremities: peripheral pulses palpable, no pedal edema or calf pain with

## 2024-07-02 NOTE — ED NOTES
Mode of arrival (squad #, walk in, police, etc) : walk-in        Chief complaint(s): right flank pain, abdominal pain and distention      Arrival Note (brief scenario, treatment PTA, etc).: Patient presents with right flank pain which started yesterday. Patient also reports of abdominal distention and pain, Patient has history of cirrhosis and was not able to take his home medications for 1 week now.        C= \"Have you ever felt that you should Cut down on your drinking?\"  No  A= \"Have people Annoyed you by criticizing your drinking?\"  No  G= \"Have you ever felt bad or Guilty about your drinking?\"  No  E= \"Have you ever had a drink as an Eye-opener first thing in the morning to steady your nerves or to help a hangover?\"  No      Deferred []      Reason for deferring: N/A    *If yes to two or more: probable alcohol abuse.*

## 2024-07-02 NOTE — ED NOTES
TRANSFER - OUT REPORT:    Verbal report given to Lissette FLOR on Jon Odom  being transferred to 2103 for routine progression of patient care       Report consisted of patient's Situation, Background, Assessment and   Recommendations(SBAR).     Information from the following report(s) ED Encounter Summary was reviewed with the receiving nurse.    [place] Fall Assessment:    Presents to emergency department  because of falls (Syncope, seizure, or loss of consciousness): No  Age > 70: No  Altered Mental Status, Intoxication with alcohol or substance confusion (Disorientation, impaired judgment, poor safety awaremess, or inability to follow instructions): No  Impaired Mobility: Ambulates or transfers with assistive devices or assistance; Unable to ambulate or transer.: No  Nursing Judgement: No          Lines:   Peripheral IV 07/01/24 Distal;Right;Anterior Cephalic (Active)   Site Assessment Clean, dry & intact 07/01/24 2344   Line Status Blood return noted 07/01/24 2344   Phlebitis Assessment No symptoms 07/01/24 2344   Infiltration Assessment 0 07/01/24 2344   Dressing Status Clean, dry & intact 07/01/24 2344   Dressing Type Transparent 07/01/24 2344   Dressing Intervention New 07/01/24 2344        Opportunity for questions and clarification was provided.      Patient transported with:  Tech

## 2024-07-02 NOTE — ED NOTES
Let patient showered in the decontamination room, changed his clothes to hospital gown, placed street clothes to vinegar bag.

## 2024-07-02 NOTE — CONSULTS
neurologist)    Cirrhosis (HCC)     sees PCP for this    COPD (chronic obstructive pulmonary disease) (HCC)     does not see pulmonology    COVID-19 12/31/2021    DETECTED    Essential hypertension, benign     Gout, unspecified     H/O colonoscopy 04/11/2016 2016    Mitral valve disorders(424.0)     СЕРГЕЙ on CPAP     not currently using due to malfunctioning    Other and unspecified hyperlipidemia     Type II or unspecified type diabetes mellitus without mention of complication, not stated as uncontrolled     Unspecified chronic liver disease without mention of alcohol     Wellness examination     DR MULLINS (last visit approx Feb 2021)     Past Surgical History:   Procedure Laterality Date    COLONOSCOPY N/A 03/16/2022    COLONOSCOPY WITH BIOPSY AND RESOLUTION CLIPPING X1 performed by Linda Melvin MD at [facility] ENDO    ESOPHAGOGASTRODUODENOSCOPY  05/24/2023    LITHOTRIPSY      LITHOTRIPSY  04/02/2021    LITHOTRIPSY N/A 04/02/2021    ESWL EXTRACORPOREAL SHOCK WAVE LITHOTRIPSY  ([employer] CONF# [id number] - AUGUSTO) performed by Pietro Baron MD at [facility] OR    UPPER GASTROINTESTINAL ENDOSCOPY N/A 05/25/2018    The esophagus was inspected to the Z-line. The endoscopic exam showed impression of varices (F1) in distal esophagus.     UPPER GASTROINTESTINAL ENDOSCOPY N/A 12/10/2021    EGD ESOPHAGOGASTRODUODENOSCOPY performed by Linda Melvin MD at [facility] ENDO    UPPER GASTROINTESTINAL ENDOSCOPY N/A 5/24/2023    EGD BAND LIGATION AND BIOPSY performed by Vincent Iraheta MD at [facility] OR    UPPER GASTROINTESTINAL ENDOSCOPY N/A 2/13/2024    EGD W/ BIOPSY AND BANDING performed by Vincent Iraheta MD at [facility] OR       ALLERGIES:  Allergies   Allergen Reactions    Codeine Nausea Only and Other (See Comments)     hallucinations    Simvastatin Other (See Comments)     Leg cramping       HOME MEDICATIONS:  Prior to Admission medications    Medication Sig Start Date End Date Taking? Authorizing Provider   nadolol (CORGARD) 20 MG tablet Take 1

## 2024-07-02 NOTE — ED PROVIDER NOTES
present.   Pulmonary:      Effort: Pulmonary effort is normal. No respiratory distress.   Abdominal:      General: Abdomen is protuberant. There is distension.      Palpations: There is shifting dullness and fluid wave.      Tenderness: There is generalized abdominal tenderness.      Comments: Significant ascites   Skin:     General: Skin is warm and dry.      Capillary Refill: Capillary refill takes less than 2 seconds.   Neurological:      Mental Status: He is alert.         DDX/DIAGNOSTIC RESULTS / EMERGENCY DEPARTMENT COURSE / MDM     Medical Decision Making  Amount and/or Complexity of Data Reviewed  Labs: ordered.  Radiology: ordered.    Risk  Decision regarding hospitalization.    59-year-old male with a history of cirrhosis presenting for evaluation of abdominal pain, bloating and flank pain for the past 3 days.  Patient noncompliant with medications due to insurance issues.  Examination reveals distended abdomen with significant ascites.  Patient diffusely tender.  Patient initially tachycardic on initial vital signs but remainder of other vital signs within normal limits, patient afebrile.  Patient does not appear to be in any acute distress.  He does endorse some difficulty taking a deep breath but upon further questioning denies any true shortness of breath or difficulty breathing.  Denies any chest pain.  He denies any hematochezia, melena, hematemesis, nausea or vomiting.  He endorses some chills but no fevers.    On examination, patient has protuberant abdomen with overt ascites.  Ultrasound confirms presence of free fluid within the abdomen consistent with ascites.  Patient is diffusely tender, however there is no guarding or rigidity or peritoneal signs.  Patient does have fluid wave.  Bilateral breath sounds reveal diminished in the bases of the lower lobes, however otherwise there are no other adventitious breath sounds.  HRRR.  Radial pulses 2+ bilaterally.  Cap refill less than 2 seconds.

## 2024-07-03 VITALS
OXYGEN SATURATION: 97 % | SYSTOLIC BLOOD PRESSURE: 104 MMHG | HEART RATE: 78 BPM | WEIGHT: 235 LBS | TEMPERATURE: 98.5 F | BODY MASS INDEX: 31.83 KG/M2 | RESPIRATION RATE: 16 BRPM | DIASTOLIC BLOOD PRESSURE: 70 MMHG | HEIGHT: 72 IN

## 2024-07-03 LAB
ALBUMIN SERPL-MCNC: 2.9 G/DL (ref 3.5–5.2)
ALP SERPL-CCNC: 98 U/L (ref 40–129)
ALT SERPL-CCNC: 23 U/L (ref 5–41)
AMMONIA PLAS-SCNC: 105 UMOL/L (ref 16–60)
ANION GAP SERPL CALCULATED.3IONS-SCNC: 11 MMOL/L (ref 9–17)
AST SERPL-CCNC: 28 U/L
BASOPHILS # BLD: 0 K/UL (ref 0–0.2)
BASOPHILS NFR BLD: 0 % (ref 0–2)
BILIRUB DIRECT SERPL-MCNC: 0.6 MG/DL
BILIRUB INDIRECT SERPL-MCNC: 2.8 MG/DL (ref 0–1)
BILIRUB SERPL-MCNC: 3.4 MG/DL (ref 0.3–1.2)
BUN SERPL-MCNC: 10 MG/DL (ref 6–20)
CALCIUM SERPL-MCNC: 8.3 MG/DL (ref 8.6–10.4)
CHLORIDE SERPL-SCNC: 106 MMOL/L (ref 98–107)
CO2 SERPL-SCNC: 21 MMOL/L (ref 20–31)
CREAT SERPL-MCNC: 0.5 MG/DL (ref 0.7–1.2)
EOSINOPHIL # BLD: 0.1 K/UL (ref 0–0.4)
EOSINOPHILS RELATIVE PERCENT: 2 % (ref 0–4)
ERYTHROCYTE [DISTWIDTH] IN BLOOD BY AUTOMATED COUNT: 14.8 % (ref 11.5–14.9)
GFR, ESTIMATED: >90 ML/MIN/1.73M2
GLUCOSE BLD-MCNC: 174 MG/DL (ref 75–110)
GLUCOSE BLD-MCNC: 200 MG/DL (ref 75–110)
GLUCOSE SERPL-MCNC: 216 MG/DL (ref 70–99)
HCT VFR BLD AUTO: 36.1 % (ref 41–53)
HGB BLD-MCNC: 12.5 G/DL (ref 13.5–17.5)
LYMPHOCYTES NFR BLD: 0.9 K/UL (ref 1–4.8)
LYMPHOCYTES RELATIVE PERCENT: 26 % (ref 24–44)
MAGNESIUM SERPL-MCNC: 1.7 MG/DL (ref 1.6–2.6)
MCH RBC QN AUTO: 33.4 PG (ref 26–34)
MCHC RBC AUTO-ENTMCNC: 34.6 G/DL (ref 31–37)
MCV RBC AUTO: 96.5 FL (ref 80–100)
MONOCYTES NFR BLD: 0.4 K/UL (ref 0.1–1.3)
MONOCYTES NFR BLD: 12 % (ref 1–7)
NEUTROPHILS NFR BLD: 60 % (ref 36–66)
NEUTS SEG NFR BLD: 2.1 K/UL (ref 1.3–9.1)
PLATELET # BLD AUTO: 56 K/UL (ref 150–450)
PMV BLD AUTO: 7.8 FL (ref 6–12)
POTASSIUM SERPL-SCNC: 3.4 MMOL/L (ref 3.7–5.3)
PROT SERPL-MCNC: 6.4 G/DL (ref 6.4–8.3)
RBC # BLD AUTO: 3.74 M/UL (ref 4.5–5.9)
SODIUM SERPL-SCNC: 138 MMOL/L (ref 135–144)
SURGICAL PATHOLOGY REPORT: NORMAL
WBC OTHER # BLD: 3.6 K/UL (ref 3.5–11)

## 2024-07-03 PROCEDURE — 2580000003 HC RX 258: Performed by: NURSE PRACTITIONER

## 2024-07-03 PROCEDURE — 82140 ASSAY OF AMMONIA: CPT

## 2024-07-03 PROCEDURE — APPSS30 APP SPLIT SHARED TIME 16-30 MINUTES: Performed by: NURSE PRACTITIONER

## 2024-07-03 PROCEDURE — 80076 HEPATIC FUNCTION PANEL: CPT

## 2024-07-03 PROCEDURE — 6370000000 HC RX 637 (ALT 250 FOR IP): Performed by: NURSE PRACTITIONER

## 2024-07-03 PROCEDURE — 80048 BASIC METABOLIC PNL TOTAL CA: CPT

## 2024-07-03 PROCEDURE — 83735 ASSAY OF MAGNESIUM: CPT

## 2024-07-03 PROCEDURE — 36415 COLL VENOUS BLD VENIPUNCTURE: CPT

## 2024-07-03 PROCEDURE — 94640 AIRWAY INHALATION TREATMENT: CPT

## 2024-07-03 PROCEDURE — 85025 COMPLETE CBC W/AUTO DIFF WBC: CPT

## 2024-07-03 PROCEDURE — 99231 SBSQ HOSP IP/OBS SF/LOW 25: CPT | Performed by: INTERNAL MEDICINE

## 2024-07-03 PROCEDURE — 82947 ASSAY GLUCOSE BLOOD QUANT: CPT

## 2024-07-03 RX ORDER — LACTULOSE 10 G/15ML
10 SOLUTION ORAL 2 TIMES DAILY PRN
Qty: 473 ML | Refills: 1 | Status: SHIPPED | OUTPATIENT
Start: 2024-07-03

## 2024-07-03 RX ORDER — SPIRONOLACTONE 100 MG/1
100 TABLET, FILM COATED ORAL DAILY
Qty: 30 TABLET | Refills: 3 | Status: SHIPPED | OUTPATIENT
Start: 2024-07-03

## 2024-07-03 RX ADMIN — TAMSULOSIN HYDROCHLORIDE 0.4 MG: 0.4 CAPSULE ORAL at 07:20

## 2024-07-03 RX ADMIN — BUDESONIDE AND FORMOTEROL FUMARATE DIHYDRATE 2 PUFF: 160; 4.5 AEROSOL RESPIRATORY (INHALATION) at 07:32

## 2024-07-03 RX ADMIN — BUMETANIDE 1 MG: 1 TABLET ORAL at 07:20

## 2024-07-03 RX ADMIN — ALLOPURINOL 100 MG: 100 TABLET ORAL at 07:25

## 2024-07-03 RX ADMIN — POTASSIUM CHLORIDE 40 MEQ: 1500 TABLET, EXTENDED RELEASE ORAL at 11:13

## 2024-07-03 RX ADMIN — NADOLOL 20 MG: 20 TABLET ORAL at 07:26

## 2024-07-03 RX ADMIN — SODIUM CHLORIDE, PRESERVATIVE FREE 10 ML: 5 INJECTION INTRAVENOUS at 07:26

## 2024-07-03 RX ADMIN — LACTULOSE 20 G: 20 SOLUTION ORAL at 07:20

## 2024-07-03 RX ADMIN — SPIRONOLACTONE 100 MG: 25 TABLET ORAL at 07:19

## 2024-07-03 ASSESSMENT — PAIN DESCRIPTION - LOCATION: LOCATION: ABDOMEN

## 2024-07-03 ASSESSMENT — PAIN SCALES - GENERAL
PAINLEVEL_OUTOF10: 0
PAINLEVEL_OUTOF10: 3

## 2024-07-03 ASSESSMENT — PAIN DESCRIPTION - ORIENTATION: ORIENTATION: RIGHT

## 2024-07-03 NOTE — PLAN OF CARE
Problem: Pain  Goal: Verbalizes/displays adequate comfort level or baseline comfort level  7/3/2024 0015 by Janneth Guerrier RN  Outcome: Progressing     Problem: Discharge Planning  Goal: Discharge to home or other facility with appropriate resources  7/3/2024 0015 by Janneth Guerrier RN  Outcome: Progressing  Flowsheets (Taken 7/2/2024 1946)  Discharge to home or other facility with appropriate resources:   Identify barriers to discharge with patient and caregiver   Refer to discharge planning if patient needs post-hospital services based on physician order or complex needs related to functional status, cognitive ability or social support system   Arrange for needed discharge resources and transportation as appropriate   Identify discharge learning needs (meds, wound care, etc)     Problem: Chronic Conditions and Co-morbidities  Goal: Patient's chronic conditions and co-morbidity symptoms are monitored and maintained or improved  7/3/2024 0015 by Janneth Guerrier, RN  Outcome: Progressing  Flowsheets (Taken 7/2/2024 1946)  Care Plan - Patient's Chronic Conditions and Co-Morbidity Symptoms are Monitored and Maintained or Improved:   Monitor and assess patient's chronic conditions and comorbid symptoms for stability, deterioration, or improvement   Collaborate with multidisciplinary team to address chronic and comorbid conditions and prevent exacerbation or deterioration   Update acute care plan with appropriate goals if chronic or comorbid symptoms are exacerbated and prevent overall improvement and discharge     
  Problem: Pain  Goal: Verbalizes/displays adequate comfort level or baseline comfort level  7/3/2024 1322 by Lizeth Sneed, RN  Outcome: Completed  Flowsheets (Taken 7/3/2024 0715)  Verbalizes/displays adequate comfort level or baseline comfort level:   Assess pain using appropriate pain scale   Encourage patient to monitor pain and request assistance     Problem: Discharge Planning  Goal: Discharge to home or other facility with appropriate resources  7/3/2024 1322 by Lizeth Sneed, RN  Outcome: Completed  Flowsheets  Taken 7/3/2024 1200  Discharge to home or other facility with appropriate resources:   Identify barriers to discharge with patient and caregiver   Arrange for needed discharge resources and transportation as appropriate  Taken 7/3/2024 0800  Discharge to home or other facility with appropriate resources:   Identify barriers to discharge with patient and caregiver   Arrange for needed discharge resources and transportation as appropriate  Problem: Safety - Adult  Goal: Free from fall injury  Outcome: Completed  Flowsheets (Taken 7/3/2024 0800)  Free From Fall Injury: Instruct family/caregiver on patient safety        
  Problem: Pain  Goal: Verbalizes/displays adequate comfort level or baseline comfort level  Outcome: Progressing     Problem: Discharge Planning  Goal: Discharge to home or other facility with appropriate resources  Outcome: Progressing     
  Problem: Pain  Goal: Verbalizes/displays adequate comfort level or baseline comfort level  Outcome: Progressing     Problem: Discharge Planning  Goal: Discharge to home or other facility with appropriate resources  Outcome: Progressing  Flowsheets (Taken 7/2/2024 0858)  Discharge to home or other facility with appropriate resources: Identify barriers to discharge with patient and caregiver     Problem: Chronic Conditions and Co-morbidities  Goal: Patient's chronic conditions and co-morbidity symptoms are monitored and maintained or improved  Outcome: Progressing  Flowsheets (Taken 7/2/2024 0858)  Care Plan - Patient's Chronic Conditions and Co-Morbidity Symptoms are Monitored and Maintained or Improved: Monitor and assess patient's chronic conditions and comorbid symptoms for stability, deterioration, or improvement     
50 y/o female with hx of PE not on anticoagulation presents to the ED with nausea and epigastric pain x 1 week. patient states food aggravates symptoms. patient states pain radiates to chest. no diarrhea, fevers. patient denies any back pain, dysuria . LMP 4 years ago, denies any vaginal bleeding or discharge.

## 2024-07-03 NOTE — PROGRESS NOTES
[facility] Internal Medicine  Juan David Olvera MD; Miguel Bautista MD; William Santos MD; MD Liliya Henao MD; Seda Bermudez MD  [facility] Internal Medicine   IN-PATIENT SERVICE  [facility]                 Date:   7/2/2024  Patientname:  Jon Odom  Date of admission:  7/1/2024 11:11 PM  MRN:   [id number]  Account:  [id number]  YOB: 1965  PCP:    Gold Boo MD  Room:   2103/2103-01  Code Status:    Full Code      Chief Complaint:     Chief Complaint   Patient presents with    Flank Pain     Right flank pain x yesterday    Abdominal Pain     Started yesterday    Bloated     Abdominal bloatedness       History of Present Illness:     Jon Odom is a 59 y.o. Non- / non  male who presents with Flank Pain (Right flank pain x yesterday), Abdominal Pain (Started yesterday), and Bloated (Abdominal bloatedness) and is admitted to the hospital for the management of Ascites due to alcoholic cirrhosis (HCC). Medical history significant for HTN, HLD, COPD, cerebral artery occlusion with cerebral infarction, ascites and chronic liver disease. Present to ED complaining of abdominal pain and severe bloating. Has a history of requiring paracentesis but states that it has been several months due to loss of health insurance. Abdomen significantly distended. Paracentesis completed bedside in ED with 2000 ml fluid removed, sent for culture and fluid analysis. Ammonia level 111, no prior record of elevation.  Patient also found to have bed bugs. Denies fever, chills, chest pain, nausea, vomiting, diarrhea, and urinary symptoms.  Symptoms are reported as chronic with acute increase in severity.     Past Medical History:     Past Medical History:   Diagnosis Date    Calculus of kidney     Cerebral artery occlusion with cerebral infarction (HCC) 2020    slight memory problem (can't remember name of neurologist)    Cirrhosis (HCC)     sees PCP for this 
CLINICAL PHARMACY NOTE: MEDS TO BEDS    Total # of Prescriptions Filled: 2   The following medications were delivered to the patient:  Vouchered due to lack of prescription insurance  Lactulose 10GM/15ml  Spironolactone 100mg    Additional Documentation: 7/3/24 shankar delivered to pt and family in room 12:54pm shankar Nurse Lizeth lopez [id number] complete  
Discharge instructions reviewed with patient and all questions answered. Patient's PIV removed. Patient's cell phone and  taken with patient. Patient and family deny further questions.   
Erika Herrera (GI) notified of consult.  
Michelle Herrera NP (Gastroenterology) notified of new consult.   
Physical Therapy        Physical Therapy Cancel Note      DATE: 2024    NAME: Jon Odom  MRN: [id number]   : 1965      Patient not seen this date for Physical Therapy due to:    Patient at baseline functional level with no acute PT needs. Will defer PT evaluation at this time. Please reorder PT if future needs arise. Pt demonstrates good safety and independence w/mobility and reports being able to go to bathroom without assistance. Pt denies needs for PT and no concerns for mobility at discharge.     The above documentation completed by Student Physical Therapist was provided under the direct line of sight by supervision. Documentation was reviewed and accepted by supervising Clinical Instructor Rajan Shaw PT.         Electronically signed by GOLDIE BROOKS on 2024 at 3:40 PM     
Pt arrived to floor from ED and was transfered to bed.  Vitals taken, telemetry applied. Assessment complete. Call light within reach, and pt educated on its use. Bed in lowest position, and locked. Side rails up x 2. Denied further questions or needs at this time. Will continue to monitor.  
RN notified patient O2 sat was 87% while patient was sleeping. Patient states he does have sleep apnea but does not wear O2 or cpap at home. Patient was placed on 2L. He states he was told he needed a possible sleep study by he thinks, Dr. Boo, who he states he sees OP. But states nothing has ever been set up. MD notified.   
[facility]   OCCUPATIONAL THERAPY MISSED TREATMENT NOTE   INPATIENT   Date: 24  Patient Name: Jon Odom       Room:   MRN: [id number]   Account #: [id number]    : 1965  (59 y.o.)  Gender: male                 REASON FOR MISSED TREATMENT:  24    -   OT being discontinued at this time. Patient functioning at Premorbid Level  No further needs . Pt demonstrates good safety and independence with mobility, reports he is up in bathroom per self without assist. Pt denies need for skilled OT, no concerns with completion of self-care upon d/c. D/C OT - please reorder if future OT needs arise.    [id number]         Electronically signed by GLORIA No on 24 at 3:33 PM EDT    
follow-up outpatient    Electronically signed by Bo Arguello MD

## 2024-07-03 NOTE — DISCHARGE INSTR - COC
Discharging to Facility/ Agency   Name:   Address:  Phone:  Fax:    Dialysis Facility (if applicable)   Name:  Address:  Dialysis Schedule:  Phone:  Fax:    / signature: {Esignature:[id number]}    PHYSICIAN SECTION    Prognosis: {Prognosis:7793900010}    Condition at Discharge: { Patient Condition:844193871}    Rehab Potential (if transferring to Rehab): {Prognosis:3189171854}    Recommended Labs or Other Treatments After Discharge: ***    Physician Certification: I certify the above information and transfer of Jon Odom  is necessary for the continuing treatment of the diagnosis listed and that he requires {Admit to Appropriate Level of Care:56634} for {GREATER/LESS:847435765} 30 days.     Update Admission H&P: {CHP DME Changes in HandP:093883091}    PHYSICIAN SIGNATURE:  {Esignature:042520110}

## 2024-07-03 NOTE — DISCHARGE INSTR - DIET

## 2024-07-03 NOTE — CARE COORDINATION
ONGOING DISCHARGE PLAN:    Patient is alert and oriented x4.    Spoke with patient & Patient's Wife, at the bedside,regarding discharge plan and they confirms that plan is still to return to home w/ no needs.     Denies VNS.    Pt. Has No Insurance. Received Permission, from Mojgan Montemayor, , to Voucher for Spironolactone/Lactulose, $45.96, from [facility].    Pt's Wife, states \"she just got Insurance thru [employer] yesterday, however, it will not go into effect until Monday.    Had Paracentesis in ER, 2000 ML off. GI on board.     Ammonia today 105, from 111    Anticipate DC today w/ no other needs.         Will continue to follow for additional discharge needs.    If patient is discharged prior to next notation, then this note serves as note for discharge by case management.    Electronically signed by Deborah Gan RN on 7/3/2024 at 12:44 PM   
Discharge Plan? Yes    Deborah Gan RN  Case Management Department  Ph:  Fax:

## 2024-07-07 LAB
MICROORGANISM SPEC CULT: NORMAL
MICROORGANISM/AGENT SPEC: NORMAL
SERVICE CMNT-IMP: NORMAL
SPECIMEN DESCRIPTION: NORMAL

## 2024-07-25 ENCOUNTER — OFFICE VISIT (OUTPATIENT)
Dept: INTERNAL MEDICINE CLINIC | Age: 59
End: 2024-07-25
Payer: COMMERCIAL

## 2024-07-25 VITALS
SYSTOLIC BLOOD PRESSURE: 108 MMHG | HEART RATE: 122 BPM | WEIGHT: 230 LBS | HEIGHT: 72 IN | OXYGEN SATURATION: 98 % | DIASTOLIC BLOOD PRESSURE: 66 MMHG | BODY MASS INDEX: 31.15 KG/M2

## 2024-07-25 DIAGNOSIS — K76.9 CHRONIC LIVER DISEASE: ICD-10-CM

## 2024-07-25 DIAGNOSIS — N40.1 BENIGN PROSTATIC HYPERPLASIA WITH LOWER URINARY TRACT SYMPTOMS, SYMPTOM DETAILS UNSPECIFIED: ICD-10-CM

## 2024-07-25 DIAGNOSIS — Z13.220 SCREENING FOR HYPERLIPIDEMIA: Primary | ICD-10-CM

## 2024-07-25 DIAGNOSIS — J44.9 CHRONIC OBSTRUCTIVE PULMONARY DISEASE, UNSPECIFIED COPD TYPE (HCC): ICD-10-CM

## 2024-07-25 DIAGNOSIS — I96 NECROSIS OF SURGICAL WOUND (HCC): ICD-10-CM

## 2024-07-25 DIAGNOSIS — J45.40 MODERATE PERSISTENT ASTHMA, UNSPECIFIED WHETHER COMPLICATED: ICD-10-CM

## 2024-07-25 DIAGNOSIS — I97.89 NECROSIS OF SURGICAL WOUND (HCC): ICD-10-CM

## 2024-07-25 DIAGNOSIS — M10.9 GOUT, UNSPECIFIED CAUSE, UNSPECIFIED CHRONICITY, UNSPECIFIED SITE: ICD-10-CM

## 2024-07-25 DIAGNOSIS — E11.65 TYPE 2 DIABETES MELLITUS WITH HYPERGLYCEMIA, WITHOUT LONG-TERM CURRENT USE OF INSULIN (HCC): ICD-10-CM

## 2024-07-25 DIAGNOSIS — K76.6 PORTAL HYPERTENSION (HCC): ICD-10-CM

## 2024-07-25 PROCEDURE — 3074F SYST BP LT 130 MM HG: CPT | Performed by: INTERNAL MEDICINE

## 2024-07-25 PROCEDURE — 99214 OFFICE O/P EST MOD 30 MIN: CPT | Performed by: INTERNAL MEDICINE

## 2024-07-25 PROCEDURE — 3078F DIAST BP <80 MM HG: CPT | Performed by: INTERNAL MEDICINE

## 2024-07-25 RX ORDER — ALLOPURINOL 100 MG/1
100 TABLET ORAL DAILY
Qty: 90 TABLET | Refills: 1 | Status: SHIPPED | OUTPATIENT
Start: 2024-07-25

## 2024-07-25 RX ORDER — FLUTICASONE PROPIONATE AND SALMETEROL 250; 50 UG/1; UG/1
1 POWDER RESPIRATORY (INHALATION) EVERY 12 HOURS
Qty: 60 EACH | Refills: 3 | Status: SHIPPED | OUTPATIENT
Start: 2024-07-25

## 2024-07-25 RX ORDER — ALBUTEROL SULFATE 90 UG/1
1 AEROSOL, METERED RESPIRATORY (INHALATION) EVERY 4 HOURS PRN
Qty: 18 G | Refills: 6 | Status: SHIPPED | OUTPATIENT
Start: 2024-07-25

## 2024-07-25 RX ORDER — TAMSULOSIN HYDROCHLORIDE 0.4 MG/1
0.4 CAPSULE ORAL DAILY
Qty: 90 CAPSULE | Refills: 3 | Status: SHIPPED | OUTPATIENT
Start: 2024-07-25

## 2024-07-25 RX ORDER — NADOLOL 20 MG/1
20 TABLET ORAL DAILY
Qty: 30 TABLET | Refills: 0 | Status: SHIPPED | OUTPATIENT
Start: 2024-07-25

## 2024-07-25 RX ORDER — SPIRONOLACTONE 100 MG/1
100 TABLET, FILM COATED ORAL DAILY
Qty: 30 TABLET | Refills: 3 | Status: SHIPPED | OUTPATIENT
Start: 2024-07-25

## 2024-07-25 RX ORDER — BUMETANIDE 1 MG/1
1 TABLET ORAL DAILY
Qty: 90 TABLET | Refills: 1 | Status: SHIPPED | OUTPATIENT
Start: 2024-07-25

## 2024-07-25 NOTE — PROGRESS NOTES
Visit Information    Have you changed or started any medications since your last visit including any over-the-counter medicines, vitamins, or herbal medicines? no   Are you having any side effects from any of your medications? -  no  Have you stopped taking any of your medications? Is so, why? -  no    Have you seen any other physician or provider since your last visit? Yes - Records Obtained  Have you had any other diagnostic tests since your last visit? Yes - Records Obtained  Have you been seen in the emergency room and/or had an admission to a hospital since we last saw you? Yes - Records Obtained  Have you had your routine dental cleaning in the past 6 months? no    Have you activated your [employer] account? If not, what are your barriers? Yes     Patient Care Team:  Gold Boo MD as PCP - General (Internal Medicine)  Gold Boo MD as PCP - Empaneled Provider  Vincent Iraheta MD as Consulting Physician (Gastroenterology)    Medical History Review  Past Medical, Family, and Social History reviewed and does contribute to the patient presenting condition    Health Maintenance   Topic Date Due    Hepatitis B vaccine (1 of 3 - 3-dose series) Never done    HIV screen  Never done    Hepatitis A vaccine (1 of 2 - Risk 2-dose series) Never done    DTaP/Tdap/Td vaccine (1 - Tdap) Never done    Shingles vaccine (1 of 2) Never done    Diabetic retinal exam  08/15/2016    Pneumococcal 0-64 years Vaccine (2 of 2 - PCV) 09/16/2017    Diabetic foot exam  05/30/2019    COVID-19 Vaccine (3 - 2023-24 season) 09/01/2023    Lipids  12/22/2023    A1C test (Diabetic or Prediabetic)  05/11/2024    Depression Screen  05/11/2024    Diabetic Alb to Cr ratio (uACR) test  07/07/2024    Flu vaccine (1) 08/01/2024    GFR test (Diabetes, CKD 3-4, OR last GFR 15-59)  07/03/2025    Colorectal Cancer Screen  03/16/2032    Hepatitis C screen  Completed    Hib vaccine  Aged Out    Polio vaccine  Aged Out    Meningococcal (ACWY) vaccine  
    CONTINUE taking these medications      albuterol sulfate  (90 Base) MCG/ACT inhaler  Commonly known as: Ventolin HFA  Inhale 1 puff into the lungs every 4 hours as needed for Wheezing     allopurinol 100 MG tablet  Commonly known as: ZYLOPRIM  Take 1 tablet by mouth daily     blood glucose test strips  1 strip by Other route 4 times daily Test 4  times a day & as needed for symptoms of irregular blood glucose.     bumetanide 1 MG tablet  Commonly known as: BUMEX  Take 1 tablet by mouth daily     fluticasone-salmeterol 250-50 MCG/ACT Aepb diskus inhaler  Commonly known as: Advair Diskus  Inhale 1 puff into the lungs in the morning and 1 puff in the evening.     FreeStyle David 2 [place] Joaquina  1 each by Does not apply route daily     FreeStyle David 2 Sensor Misc  1 each by Does not apply route every 14 days     ketoconazole 2 % cream  Commonly known as: NIZORAL  Apply topically to rash twice daily until clear     lactulose 10 GM/15ML solution  Commonly known as: CHRONULAC  Take 15 mLs by mouth 2 times daily as needed (constipation, 2-3 BMs a day)     nadolol 20 MG tablet  Commonly known as: CORGARD  Take 1 tablet by mouth daily     ONE TOUCH ULTRA 2 w/Device Kit  1 kit by Does not apply route daily     OneTouch Delica Plus [id number] Misc  USE 1  TO CHECK GLUCOSE TWICE DAILY     spironolactone 100 MG tablet  Commonly known as: Aldactone  Take 1 tablet by mouth daily     tamsulosin 0.4 MG capsule  Commonly known as: FLOMAX  Take 1 capsule by mouth daily            STOP taking these medications      glimepiride 4 MG tablet  Commonly known as: AMARYL  Stopped by: Miguel Bautista MD     omeprazole 20 MG tablet  Commonly known as: PriLOSEC OTC  Stopped by: Miguel Bautista MD     SITagliptin 100 MG tablet  Commonly known as: Januvia  Stopped by: Miguel Bautista MD               Where to Get Your Medications        These medications were sent to [facility] [address] [place] [address]

## 2024-07-29 ENCOUNTER — HOSPITAL ENCOUNTER (OUTPATIENT)
Dept: ULTRASOUND IMAGING | Age: 59
Discharge: HOME | End: 2024-07-31
Payer: COMMERCIAL

## 2024-07-29 VITALS — DIASTOLIC BLOOD PRESSURE: 79 MMHG | OXYGEN SATURATION: 96 % | SYSTOLIC BLOOD PRESSURE: 123 MMHG | HEART RATE: 84 BPM

## 2024-07-29 DIAGNOSIS — K76.9 CHRONIC LIVER DISEASE: ICD-10-CM

## 2024-07-29 PROCEDURE — P9047 ALBUMIN (HUMAN), 25%, 50ML: HCPCS | Performed by: PHYSICIAN ASSISTANT

## 2024-07-29 PROCEDURE — 2709999900 US GUIDED PARACENTESIS

## 2024-07-29 PROCEDURE — 6360000002 HC RX W HCPCS: Performed by: PHYSICIAN ASSISTANT

## 2024-07-29 RX ORDER — ALBUMIN (HUMAN) 12.5 G/50ML
50 SOLUTION INTRAVENOUS ONCE
Status: COMPLETED | OUTPATIENT
Start: 2024-07-29 | End: 2024-07-29

## 2024-07-29 RX ADMIN — ALBUMIN (HUMAN) 50 G: 0.25 INJECTION, SOLUTION INTRAVENOUS at 10:47

## 2024-07-29 NOTE — FLOWSHEET NOTE
Paracentesis     NEWTON Snow RN  RS  RDMS    Pt. To ultrasound room 8  Identified, allergies confirmed  Supine position  Stable    Time out complete. Prep to abd.     7.4 L of clear yellow colored fluid drawn off.   2x2 with Tegaderm to puncture site.   NO samples at this time.   Patient tolerated well. No problems noted. Albumin ordered and infusing.    Patient stable, off monitor and able to ambulate self out of IR.

## 2024-07-29 NOTE — BRIEF OP NOTE
Brief Postoperative Note for Paracentesis    Jon Odom  YOB: 1965  [id number]    Pre-operative Diagnosis:  Ascites     Post-operative Diagnosis: Same    Procedure: Ultrasound guided paracentesis     Anesthesia: 1% Lidocaine     Surgeons/Assistants: Tirso Gloria PA-C    Complications: none    EBL: Minimal    Specimens: Were obtained    Ultrasound guided paracentesis performed. 7400 ml clear yellow fluid obtained.  Dressing applied.     Electronically signed by FRANKIE Kimball on 7/29/2024 at 10:47 AM

## 2024-08-14 RX ORDER — LACTULOSE 10 G/15ML
10 SOLUTION ORAL 2 TIMES DAILY PRN
Qty: 473 ML | Refills: 3 | Status: SHIPPED | OUTPATIENT
Start: 2024-08-14

## 2024-08-23 ENCOUNTER — HOSPITAL ENCOUNTER (OUTPATIENT)
Dept: ULTRASOUND IMAGING | Age: 59
End: 2024-08-23
Payer: COMMERCIAL

## 2024-08-23 ENCOUNTER — HOSPITAL ENCOUNTER (OUTPATIENT)
Age: 59
Discharge: HOME | End: 2024-08-23
Payer: COMMERCIAL

## 2024-08-23 VITALS
TEMPERATURE: 97.6 F | DIASTOLIC BLOOD PRESSURE: 78 MMHG | RESPIRATION RATE: 18 BRPM | HEART RATE: 88 BPM | SYSTOLIC BLOOD PRESSURE: 123 MMHG | OXYGEN SATURATION: 98 %

## 2024-08-23 DIAGNOSIS — R18.8 OTHER ASCITES: ICD-10-CM

## 2024-08-23 DIAGNOSIS — K70.31 ASCITES DUE TO ALCOHOLIC CIRRHOSIS (HCC): ICD-10-CM

## 2024-08-23 DIAGNOSIS — K70.31 ASCITES DUE TO ALCOHOLIC CIRRHOSIS (HCC): Primary | ICD-10-CM

## 2024-08-23 LAB
INR PPP: 1.3
PARTIAL THROMBOPLASTIN TIME: 31.9 SEC (ref 23–36.5)
PLATELET # BLD AUTO: 56 K/UL (ref 138–453)
PROTHROMBIN TIME: 16.2 SEC (ref 11.7–14.9)

## 2024-08-23 PROCEDURE — 36415 COLL VENOUS BLD VENIPUNCTURE: CPT

## 2024-08-23 PROCEDURE — P9047 ALBUMIN (HUMAN), 25%, 50ML: HCPCS | Performed by: PHYSICIAN ASSISTANT

## 2024-08-23 PROCEDURE — 6360000002 HC RX W HCPCS: Performed by: PHYSICIAN ASSISTANT

## 2024-08-23 PROCEDURE — 2709999900 US GUIDED PARACENTESIS

## 2024-08-23 PROCEDURE — 85610 PROTHROMBIN TIME: CPT

## 2024-08-23 PROCEDURE — 7100000011 HC PHASE II RECOVERY - ADDTL 15 MIN

## 2024-08-23 PROCEDURE — 7100000010 HC PHASE II RECOVERY - FIRST 15 MIN

## 2024-08-23 PROCEDURE — 85730 THROMBOPLASTIN TIME PARTIAL: CPT

## 2024-08-23 PROCEDURE — 85049 AUTOMATED PLATELET COUNT: CPT

## 2024-08-23 RX ORDER — ALBUMIN (HUMAN) 12.5 G/50ML
50 SOLUTION INTRAVENOUS ONCE
Status: COMPLETED | OUTPATIENT
Start: 2024-08-23 | End: 2024-08-23

## 2024-08-23 RX ADMIN — ALBUMIN (HUMAN) 50 G: 0.25 INJECTION, SOLUTION INTRAVENOUS at 13:41

## 2024-08-23 ASSESSMENT — PAIN - FUNCTIONAL ASSESSMENT
PAIN_FUNCTIONAL_ASSESSMENT: 0-10
PAIN_FUNCTIONAL_ASSESSMENT: 0-10

## 2024-08-23 NOTE — BRIEF OP NOTE
Brief Postoperative Note for Paracentesis    Jon Odom  YOB: 1965  [id number]    Pre-operative Diagnosis:  Ascites     Post-operative Diagnosis: Same    Procedure: Ultrasound guided paracentesis     Anesthesia: 1% Lidocaine     Surgeons/Assistants: Tirso Gloria PA-C    Complications: none    EBL: Minimal    Specimens: Were obtained    Ultrasound guided paracentesis performed. 7600 ml clear yellow fluid obtained.  Dressing applied.     Electronically signed by FRANKIE Kimball on 8/23/2024 at 1:33 PM

## 2024-08-23 NOTE — PROGRESS NOTES
Patient had Paracentesis. 7600 ml of yellow ascitic fluid collected. Catheter discontinued with tip intact.  2x2 gauze and tegaderm to catheter site. No bleeding or hematoma noted. BP stable throughout procedure stable. Discharged stable with instructions. SPAR for albumin.

## 2024-08-26 ENCOUNTER — OFFICE VISIT (OUTPATIENT)
Dept: GASTROENTEROLOGY | Age: 59
End: 2024-08-26
Payer: COMMERCIAL

## 2024-08-26 VITALS
SYSTOLIC BLOOD PRESSURE: 120 MMHG | DIASTOLIC BLOOD PRESSURE: 68 MMHG | TEMPERATURE: 97.7 F | BODY MASS INDEX: 29.43 KG/M2 | WEIGHT: 217 LBS

## 2024-08-26 DIAGNOSIS — F12.90 MARIJUANA USER: ICD-10-CM

## 2024-08-26 DIAGNOSIS — K21.9 GASTROESOPHAGEAL REFLUX DISEASE, UNSPECIFIED WHETHER ESOPHAGITIS PRESENT: ICD-10-CM

## 2024-08-26 DIAGNOSIS — K70.30 ALCOHOLIC CIRRHOSIS OF LIVER WITHOUT ASCITES (HCC): ICD-10-CM

## 2024-08-26 DIAGNOSIS — K76.6 PORTAL HYPERTENSIVE GASTROPATHY (HCC): ICD-10-CM

## 2024-08-26 DIAGNOSIS — Z98.890 S/P ABDOMINAL PARACENTESIS: Primary | ICD-10-CM

## 2024-08-26 DIAGNOSIS — Z86.19 HISTORY OF HELICOBACTER PYLORI INFECTION: ICD-10-CM

## 2024-08-26 DIAGNOSIS — K31.89 PORTAL HYPERTENSIVE GASTROPATHY (HCC): ICD-10-CM

## 2024-08-26 DIAGNOSIS — Z87.19 HISTORY OF CIRRHOSIS: ICD-10-CM

## 2024-08-26 DIAGNOSIS — D69.6 THROMBOCYTOPENIA (HCC): ICD-10-CM

## 2024-08-26 PROCEDURE — 99214 OFFICE O/P EST MOD 30 MIN: CPT | Performed by: INTERNAL MEDICINE

## 2024-08-26 PROCEDURE — 3074F SYST BP LT 130 MM HG: CPT | Performed by: INTERNAL MEDICINE

## 2024-08-26 PROCEDURE — 3078F DIAST BP <80 MM HG: CPT | Performed by: INTERNAL MEDICINE

## 2024-08-26 ASSESSMENT — ENCOUNTER SYMPTOMS
VOMITING: 0
RECTAL PAIN: 0
ABDOMINAL PAIN: 1
SHORTNESS OF BREATH: 0
COUGH: 0
TROUBLE SWALLOWING: 0
SORE THROAT: 0
CHOKING: 0
ABDOMINAL DISTENTION: 1
ANAL BLEEDING: 0
CONSTIPATION: 0
DIARRHEA: 0
WHEEZING: 0
NAUSEA: 0
COLOR CHANGE: 0
BLOOD IN STOOL: 0

## 2024-08-26 NOTE — PROGRESS NOTES
GI CLINIC FOLLOW UP    NTERVAL HISTORY:   No referring provider defined for this encounter.    Chief Complaint   Patient presents with    Follow-up     Patient is here today for a hospital f/u, seen IP for cirrhosis of the liver with ascites, pt last seen on 3/14/24 for chronic liver disease & GERD.       1. S/P abdominal paracentesis    2. Alcoholic cirrhosis of liver without ascites (HCC)    3. Gastroesophageal reflux disease, unspecified whether esophagitis present    4. Marijuana user    5. Thrombocytopenia (HCC)    6. History of cirrhosis    7. History of Helicobacter pylori infection    8. Portal hypertensive gastropathy (HCC)      This patient is seen in my office as a f/u with his wife    She has history significant for cirrhosis of the liver    Has history for ascites    Unfortunately he stopped taking his diuretics for about 3 months because of some insurance issues?  Subsequently got hospitalized in July with large volume ascites and had paracentesis done    Since then he had another large-volume paracentesis    Denies any overt bleeding melanotic stool    He has history for esophageal varices in the past    Patient has history for drinking unfortunately still drinking some alcohol 2-3 drinks per day he claims?    Has history for smoking    No nausea vomiting hematemesis     has history of encephalopathy currently taking lactulose    No overt signs of encephalopathy at this time    Previous records and his hospital records were reviewed with him and his accompanying wife          HISTORY OF PRESENT ILLNESS: Mr.Rodney COLLINS Odom is a 59 y.o. male with a past history remarkable for , referred for evaluation of   Chief Complaint   Patient presents with    Follow-up     Patient is here today for a hospital f/u, seen IP for cirrhosis of the liver with ascites, pt last seen on 3/14/24 for chronic liver disease & GERD.   .    Past Medical,Family, and Social History reviewed and does contribute to the  prepped and draped in standard sterile fashion using sterile barrier technique and local anesthesia was achieved with 1% lidocaine. Pre-procedure ultrasound shows a dominant fluid pocket in the right lowerquadrant.  Using ultrasound guidance (image attached to the medical record), the fluid pocket was accessed with a 5 [place] Yueh needle with aspiration of clear yellow fluid. Lessno vacuum machine was connected and paracentesis was performed and approximately 7600 mL were removed. Post procedural ultrasound demonstrated no significant residual fluid. A sample was not requested to be sent for laboratory analysis. Estimated blood loss was minimal.  Albumin replacement ordered. The patient tolerated the procedure well and left the department in good condition. FINDINGS: Limited ultrasound of the abdomen demonstrates ascites. A total of 7600 mL of clear yellow fluid was removed.     Successful ultrasound-guided therapeutic paracentesis.     US GUIDED PARACENTESIS    Result Date: 7/29/2024  PROCEDURE: ULTRASOUND-GUIDED PARACENTESIS 7/29/2024 HISTORY: ORDERING SYSTEM PROVIDED HISTORY: Chronic liver disease TECHNOLOGIST PROVIDED HISTORY: Does fluid need testing?  If yes, please place LAB Orders.->No Is the procedure for Diagnostic or Therapeutic reasons?->Therapeutic recurrent ascites cirhosis liver with portal hypertension TECHNIQUE: This procedure was performed by Tirso Gloria PA-C under indirect supervision of . Informed consent was obtained after a detailed explanation of the procedure including the risks, benefits, and alternatives. Universal protocol was followed. Sterile barrier technique was used, including cap, mask, sterile gloves, sterile sheet, hand hygiene and 2% chlorhexidine for cutaneous antisepsis were followed. The area was prepped and draped in standard sterile fashion using sterile barrier technique and local anesthesia was achieved with 1% lidocaine. Pre-procedure ultrasound shows a

## 2024-08-27 ENCOUNTER — OFFICE VISIT (OUTPATIENT)
Dept: INTERNAL MEDICINE CLINIC | Age: 59
End: 2024-08-27
Payer: COMMERCIAL

## 2024-08-27 ENCOUNTER — TELEPHONE (OUTPATIENT)
Dept: GASTROENTEROLOGY | Age: 59
End: 2024-08-27

## 2024-08-27 VITALS
DIASTOLIC BLOOD PRESSURE: 74 MMHG | OXYGEN SATURATION: 98 % | HEART RATE: 82 BPM | SYSTOLIC BLOOD PRESSURE: 120 MMHG | WEIGHT: 220 LBS | BODY MASS INDEX: 29.84 KG/M2

## 2024-08-27 DIAGNOSIS — Z13.220 SCREENING FOR HYPERLIPIDEMIA: ICD-10-CM

## 2024-08-27 DIAGNOSIS — E11.69 HYPERLIPIDEMIA ASSOCIATED WITH TYPE 2 DIABETES MELLITUS (HCC): Primary | ICD-10-CM

## 2024-08-27 DIAGNOSIS — K76.9 CHRONIC LIVER DISEASE: ICD-10-CM

## 2024-08-27 DIAGNOSIS — J44.9 CHRONIC OBSTRUCTIVE PULMONARY DISEASE, UNSPECIFIED COPD TYPE (HCC): ICD-10-CM

## 2024-08-27 DIAGNOSIS — E11.65 TYPE 2 DIABETES MELLITUS WITH HYPERGLYCEMIA, WITHOUT LONG-TERM CURRENT USE OF INSULIN (HCC): ICD-10-CM

## 2024-08-27 DIAGNOSIS — E78.5 HYPERLIPIDEMIA ASSOCIATED WITH TYPE 2 DIABETES MELLITUS (HCC): Primary | ICD-10-CM

## 2024-08-27 LAB — HBA1C MFR BLD: 6.4 %

## 2024-08-27 PROCEDURE — 3078F DIAST BP <80 MM HG: CPT | Performed by: INTERNAL MEDICINE

## 2024-08-27 PROCEDURE — 99214 OFFICE O/P EST MOD 30 MIN: CPT | Performed by: INTERNAL MEDICINE

## 2024-08-27 PROCEDURE — 83036 HEMOGLOBIN GLYCOSYLATED A1C: CPT | Performed by: INTERNAL MEDICINE

## 2024-08-27 PROCEDURE — 3044F HG A1C LEVEL LT 7.0%: CPT | Performed by: INTERNAL MEDICINE

## 2024-08-27 PROCEDURE — 3074F SYST BP LT 130 MM HG: CPT | Performed by: INTERNAL MEDICINE

## 2024-08-27 RX ORDER — SPIRONOLACTONE 100 MG/1
100 TABLET, FILM COATED ORAL DAILY
Qty: 90 TABLET | Refills: 3 | Status: SHIPPED | OUTPATIENT
Start: 2024-08-27

## 2024-08-27 NOTE — TELEPHONE ENCOUNTER
Received notification as follows for upcoming procedure:    Good morning Evelia. I have patient Jon Odom 1965 that is schedule to come in 08/29/2024. I have patient has [employer] insurance that is a very limited plan. Under this plan no surgery are covered. Do you know if the patient has any other medical insurance? Or do I make it as self pay?     Thank You,  Ary Nesbitt, CRCR  Authorization  O: [phone]  F: [phone]

## 2024-08-27 NOTE — PROGRESS NOTES
[facility]  [address] [address]  Dept: [phone]  Dept Fax: [phone]    Office Progress/Follow Up Note  Date of patient's visit: 8/27/2024  Patient's Name:  Jon Odom YOB: 1965            Patient Care Team:  Gold Boo MD as PCP - General (Internal Medicine)  Gold Boo MD as PCP - Empaneled Provider  Vincent Iraheta MD as Consulting Physician (Gastroenterology)    REASON FOR VISIT: Routine outpatient follow up/Same day visit/Post hospital/ED visit    HISTORY OF PRESENT ILLNESS:      Chief Complaint   Patient presents with    Diabetes        History was obtained from the patient. Jon Odom is a 59 y.o. is here for a   Patient is here for eval of multimedical problem.  He has diabetes, alcoholic cirrhosis of liver, history of esophageal varices, ex alcoholic, patient told me he is compliant with diet and medication  He use continuous glucose monitoring system,  Patient is using over-the-counter product for diabetes control, watch his diet very carefully, as per patient most of the blood sugar readings are okay  Quit drinking already  Follows with GI, working to get liver transplant done  Patient compliant with medication, has at least 2-3 bowel movement a day        Health Maintenance Due   Topic Date Due    HIV screen  Never done    Hepatitis A vaccine (1 of 2 - Risk 2-dose series) Never done    Hepatitis B vaccine (1 of 3 - 19+ 3-dose series) Never done    DTaP/Tdap/Td vaccine (1 - Tdap) Never done    Shingles vaccine (1 of 2) Never done    Diabetic retinal exam  08/15/2016    Pneumococcal 0-64 years Vaccine (2 of 2 - PCV) 09/16/2017    Diabetic foot exam  05/30/2019    COVID-19 Vaccine (3 - 2023-24 season) 09/01/2023    Lipids  12/22/2023    Diabetic Alb to Cr ratio (uACR) test  07/07/2024    Flu vaccine (1) 08/01/2024       Allergies   Allergen Reactions    Codeine Nausea Only and Other (See Comments)      Lipid Panel; Future    4. Chronic liver disease  On Bumex  - spironolactone (ALDACTONE) 100 MG tablet; Take 1 tablet by mouth daily  Dispense: 90 tablet; Refill: 3  Seen GI physician recently  Already scheduled to see [facility] for potential liver transplant  5. Type 2 diabetes mellitus with hyperglycemia, without long-term current use of insulin (HCC)  - Microalbumin, Ur; Future      Return in about 2 months (around 10/27/2024).    Reviewed prior labs and health maintenance.      Discussed use, benefit, and side effects of prescribed medications.  Barriers to medication compliance addressed.  All patient questions answered.  Pt voiced understanding.         Gold Boo MD  [facility]  8/29/2024, 1:52 PM    Please note that this chart was generated using voice recognition Dragon dictation software.  Although every effort was made to ensure the accuracy of this automated transcription, some errors in transcription may have occurred.     FOLLOW UP AND INSTRUCTIONS:   No follow-ups on file.    Jon received counseling on the following healthy behaviors: nutrition, exercise, and medication adherence    Discussed use, benefit, and side effects of prescribed medications.  Barriers to medication compliance addressed.  All patient questions answered.  Pt voiced understanding.     Patient given educational materials - see patient instructions    Gold Boo MD  [facility]  8/27/2024, 4:21 PM    Please note that this chart was generated using voice recognition Dragon dictation software.  Although every effort was made to ensure the accuracy of this automatedtranscription, some errors in transcription may have occurred.Visit Information    Have you changed or started any medications since your last visit including any over-the-counter medicines, vitamins, or herbal medicines? YES- NUBEST FOR BLOOD SUGARS  Are you having any side effects from any of your medications? -  no  Have you

## 2024-08-27 NOTE — TELEPHONE ENCOUNTER
Procedure scheduled/Dr ZACKARY Iraheta  Procedure: EGD  Dx: GERD  Date: 8/29/24   Time: 11:15 a.m.   Hospital: [facility]   Bowel Prep instructions given:  In office/via phone: office   Clearance needed: no

## 2024-08-28 ENCOUNTER — HOSPITAL ENCOUNTER (OUTPATIENT)
Age: 59
Setting detail: SPECIMEN
Discharge: HOME | End: 2024-08-28

## 2024-08-28 DIAGNOSIS — Z86.19 HISTORY OF HELICOBACTER PYLORI INFECTION: ICD-10-CM

## 2024-08-28 DIAGNOSIS — Z13.220 SCREENING FOR HYPERLIPIDEMIA: ICD-10-CM

## 2024-08-28 DIAGNOSIS — Z87.19 HISTORY OF CIRRHOSIS: ICD-10-CM

## 2024-08-28 DIAGNOSIS — D69.6 THROMBOCYTOPENIA (HCC): ICD-10-CM

## 2024-08-28 DIAGNOSIS — K76.6 PORTAL HYPERTENSIVE GASTROPATHY (HCC): ICD-10-CM

## 2024-08-28 DIAGNOSIS — Z98.890 S/P ABDOMINAL PARACENTESIS: ICD-10-CM

## 2024-08-28 DIAGNOSIS — K31.89 PORTAL HYPERTENSIVE GASTROPATHY (HCC): ICD-10-CM

## 2024-08-28 DIAGNOSIS — E11.65 TYPE 2 DIABETES MELLITUS WITH HYPERGLYCEMIA, WITHOUT LONG-TERM CURRENT USE OF INSULIN (HCC): ICD-10-CM

## 2024-08-28 DIAGNOSIS — F12.90 MARIJUANA USER: ICD-10-CM

## 2024-08-28 DIAGNOSIS — K70.30 ALCOHOLIC CIRRHOSIS OF LIVER WITHOUT ASCITES (HCC): ICD-10-CM

## 2024-08-28 DIAGNOSIS — K21.9 GASTROESOPHAGEAL REFLUX DISEASE, UNSPECIFIED WHETHER ESOPHAGITIS PRESENT: ICD-10-CM

## 2024-08-28 LAB
CHOLEST SERPL-MCNC: 95 MG/DL (ref 0–199)
CHOLESTEROL/HDL RATIO: 2
CREAT UR-MCNC: 192 MG/DL (ref 39–259)
HDLC SERPL-MCNC: 44 MG/DL
LDLC SERPL CALC-MCNC: 40 MG/DL (ref 0–100)
MICROALBUMIN UR-MCNC: 13 MG/L (ref 0–20)
MICROALBUMIN/CREAT UR-RTO: 7 MCG/MG CREAT (ref 0–17)
TRIGL SERPL-MCNC: 57 MG/DL
VLDLC SERPL CALC-MCNC: 11 MG/DL

## 2024-08-28 RX ORDER — ACYCLOVIR 400 MG/1
1 TABLET ORAL
Qty: 2 EACH | Refills: 0 | COMMUNITY
Start: 2024-08-28

## 2024-08-28 NOTE — TELEPHONE ENCOUNTER
LVM for the patient to call the office regarding procedure.  Advised that I need to hear back from him by noon today.  Sending [employer] message as well.

## 2024-08-28 NOTE — TELEPHONE ENCOUNTER
Spoke to Patti (on HIPAA) she stated to keep the procedure and requested self pay for the procedure.  Message sent to Ary Nesbitt, CRCR  Authorization advising.

## 2024-08-29 ENCOUNTER — HOSPITAL ENCOUNTER (OUTPATIENT)
Age: 59
Setting detail: OUTPATIENT SURGERY
Discharge: HOME | End: 2024-08-29
Attending: INTERNAL MEDICINE | Admitting: INTERNAL MEDICINE

## 2024-08-29 ENCOUNTER — ANESTHESIA (OUTPATIENT)
Dept: OPERATING ROOM | Age: 59
End: 2024-08-29

## 2024-08-29 ENCOUNTER — ANESTHESIA EVENT (OUTPATIENT)
Dept: OPERATING ROOM | Age: 59
End: 2024-08-29

## 2024-08-29 VITALS
BODY MASS INDEX: 29.8 KG/M2 | DIASTOLIC BLOOD PRESSURE: 81 MMHG | HEIGHT: 72 IN | SYSTOLIC BLOOD PRESSURE: 126 MMHG | OXYGEN SATURATION: 97 % | WEIGHT: 220 LBS | TEMPERATURE: 97.3 F | HEART RATE: 88 BPM | RESPIRATION RATE: 20 BRPM

## 2024-08-29 DIAGNOSIS — K21.9 GASTROESOPHAGEAL REFLUX DISEASE, UNSPECIFIED WHETHER ESOPHAGITIS PRESENT: ICD-10-CM

## 2024-08-29 LAB — GLUCOSE BLD-MCNC: 198 MG/DL (ref 75–110)

## 2024-08-29 PROCEDURE — 82947 ASSAY GLUCOSE BLOOD QUANT: CPT

## 2024-08-29 PROCEDURE — 2500000003 HC RX 250 WO HCPCS: Performed by: SPECIALIST

## 2024-08-29 PROCEDURE — 7100000011 HC PHASE II RECOVERY - ADDTL 15 MIN: Performed by: INTERNAL MEDICINE

## 2024-08-29 PROCEDURE — 3700000001 HC ADD 15 MINUTES (ANESTHESIA): Performed by: INTERNAL MEDICINE

## 2024-08-29 PROCEDURE — 3609012400 HC EGD TRANSORAL BIOPSY SINGLE/MULTIPLE: Performed by: INTERNAL MEDICINE

## 2024-08-29 PROCEDURE — 88305 TISSUE EXAM BY PATHOLOGIST: CPT

## 2024-08-29 PROCEDURE — 88342 IMHCHEM/IMCYTCHM 1ST ANTB: CPT

## 2024-08-29 PROCEDURE — 43239 EGD BIOPSY SINGLE/MULTIPLE: CPT | Performed by: INTERNAL MEDICINE

## 2024-08-29 PROCEDURE — 7100000010 HC PHASE II RECOVERY - FIRST 15 MIN: Performed by: INTERNAL MEDICINE

## 2024-08-29 PROCEDURE — 2709999900 HC NON-CHARGEABLE SUPPLY: Performed by: INTERNAL MEDICINE

## 2024-08-29 PROCEDURE — 3609012300 HC EGD BAND LIGATION ESOPHGEAL/GASTRIC VARICES: Performed by: INTERNAL MEDICINE

## 2024-08-29 PROCEDURE — 6360000002 HC RX W HCPCS: Performed by: SPECIALIST

## 2024-08-29 PROCEDURE — C1713 ANCHOR/SCREW BN/BN,TIS/BN: HCPCS | Performed by: INTERNAL MEDICINE

## 2024-08-29 PROCEDURE — 43244 EGD VARICES LIGATION: CPT | Performed by: INTERNAL MEDICINE

## 2024-08-29 PROCEDURE — 3700000000 HC ANESTHESIA ATTENDED CARE: Performed by: INTERNAL MEDICINE

## 2024-08-29 PROCEDURE — 2580000003 HC RX 258

## 2024-08-29 RX ORDER — LIDOCAINE HYDROCHLORIDE 20 MG/ML
INJECTION, SOLUTION EPIDURAL; INFILTRATION; INTRACAUDAL; PERINEURAL PRN
Status: DISCONTINUED | OUTPATIENT
Start: 2024-08-29 | End: 2024-08-29 | Stop reason: SDUPTHER

## 2024-08-29 RX ORDER — SODIUM CHLORIDE 0.9 % (FLUSH) 0.9 %
5-40 SYRINGE (ML) INJECTION EVERY 12 HOURS SCHEDULED
Status: DISCONTINUED | OUTPATIENT
Start: 2024-08-29 | End: 2024-08-29 | Stop reason: HOSPADM

## 2024-08-29 RX ORDER — SODIUM CHLORIDE 0.9 % (FLUSH) 0.9 %
5-40 SYRINGE (ML) INJECTION PRN
Status: DISCONTINUED | OUTPATIENT
Start: 2024-08-29 | End: 2024-08-29 | Stop reason: HOSPADM

## 2024-08-29 RX ORDER — LIDOCAINE HYDROCHLORIDE 10 MG/ML
1 INJECTION, SOLUTION EPIDURAL; INFILTRATION; INTRACAUDAL; PERINEURAL
Status: DISCONTINUED | OUTPATIENT
Start: 2024-08-30 | End: 2024-08-29 | Stop reason: HOSPADM

## 2024-08-29 RX ORDER — SODIUM CHLORIDE, SODIUM LACTATE, POTASSIUM CHLORIDE, CALCIUM CHLORIDE 600; 310; 30; 20 MG/100ML; MG/100ML; MG/100ML; MG/100ML
INJECTION, SOLUTION INTRAVENOUS CONTINUOUS
Status: DISCONTINUED | OUTPATIENT
Start: 2024-08-29 | End: 2024-08-29 | Stop reason: HOSPADM

## 2024-08-29 RX ORDER — PROPOFOL 10 MG/ML
INJECTION, EMULSION INTRAVENOUS PRN
Status: DISCONTINUED | OUTPATIENT
Start: 2024-08-29 | End: 2024-08-29 | Stop reason: SDUPTHER

## 2024-08-29 RX ORDER — SODIUM CHLORIDE 9 MG/ML
INJECTION, SOLUTION INTRAVENOUS CONTINUOUS
Status: DISCONTINUED | OUTPATIENT
Start: 2024-08-29 | End: 2024-08-29

## 2024-08-29 RX ORDER — SODIUM CHLORIDE 9 MG/ML
INJECTION, SOLUTION INTRAVENOUS PRN
Status: DISCONTINUED | OUTPATIENT
Start: 2024-08-29 | End: 2024-08-29 | Stop reason: HOSPADM

## 2024-08-29 RX ADMIN — PROPOFOL 50 MG: 10 INJECTION, EMULSION INTRAVENOUS at 11:31

## 2024-08-29 RX ADMIN — SODIUM CHLORIDE, POTASSIUM CHLORIDE, SODIUM LACTATE AND CALCIUM CHLORIDE: 600; 310; 30; 20 INJECTION, SOLUTION INTRAVENOUS at 11:14

## 2024-08-29 RX ADMIN — LIDOCAINE HYDROCHLORIDE 100 MG: 20 INJECTION, SOLUTION EPIDURAL; INFILTRATION; INTRACAUDAL; PERINEURAL at 11:24

## 2024-08-29 RX ADMIN — PROPOFOL 200 MG: 10 INJECTION, EMULSION INTRAVENOUS at 11:24

## 2024-08-29 ASSESSMENT — ENCOUNTER SYMPTOMS
FACIAL SWELLING: 0
SHORTNESS OF BREATH: 0
WHEEZING: 0
APNEA: 0
COUGH: 0
SHORTNESS OF BREATH: 0
ABDOMINAL DISTENTION: 1
CHEST TIGHTNESS: 0
BACK PAIN: 0
CONSTIPATION: 0
ABDOMINAL PAIN: 0
COLOR CHANGE: 0
DIARRHEA: 0

## 2024-08-29 ASSESSMENT — PAIN - FUNCTIONAL ASSESSMENT: PAIN_FUNCTIONAL_ASSESSMENT: FACE, LEGS, ACTIVITY, CRY, AND CONSOLABILITY (FLACC)

## 2024-08-29 ASSESSMENT — PAIN SCALES - GENERAL: PAINLEVEL_OUTOF10: 0

## 2024-08-29 NOTE — OP NOTE
PROCEDURE NOTE    DATE OF PROCEDURE: 8/29/2024     SURGEON: Vincent Iraheta MD    ASSISTANT: None    PREOPERATIVE DIAGNOSIS: CIRRHOSIS  HX OF ESOPHAGEAL VARICEAL BANDING    POSTOPERATIVE DIAGNOSIS: As described below    OPERATION: Upper GI endoscopy with Biopsy  VARICEAL BANDING X 2    ANESTHESIA: MAC PER ANESTHESIA     ESTIMATED BLOOD LOSS: Less than 50 ml    COMPLICATIONS: None.     SPECIMENS:  Was Obtained:     HISTORY: The patient is a 59 y.o. year old male with history of above preop diagnosis.  I recommended esophagogastroduodenoscopy with possible biopsy and I explained the risk, benefits, expected outcome, and alternatives to the procedure.  Risks included but are not limited to bleeding, infection, respiratory distress, hypotension, and perforation of the esophagus, stomach, or duodenum.  Patient understands and is in agreement.    PROCEDURE: The patient was given IV conscious sedation.  The patient's SPO2 remained above 90% throughout the procedure. The gastroscope was inserted orally and advanced under direct vision through the esophagus, through the stomach, through the pylorus, and into the descending duodenum.      Findings:    Retropharyngeal area was grossly normal appearing    Esophagus: abnormal: EVIDENCE OF GRADE ON MID ESOPHAGEAL VARICES  EVIDENCE OF GRADE 3 DISTAL ESOPHAGEAL VARICES  NO RED WHALE OF STIGMATA OF BLEEDING  SCOPE WAS LOADED WITH 6 SHOOTER AND TWO BANDS WERE DEPLOYED OVER TWO LARGER DISTAL ESOPHAGEAL VARICES    Stomach:    Fundus: abnormal: PORTAL HYPERTENSIVE GASTROPATHY     Body: abnormal: AS ABOVE    Antrum: abnormal: AS ABOVE AND MOD GASTRITIS BIOPSIES WERE TAKEN     Duodenum:     Descending: normal    Bulb: normal    The scope was removed and the patient tolerated the procedure well.     Recommendations/Plan:   F/U Biopsies  F/U In Office in 3-4 weeks  Discussed with the family  Post sedation patient was stable with stable vital signs and stable O2 saturations    Electronically  signed by Vincent Iraheta MD  on 8/29/2024 at 11:36 AM

## 2024-08-29 NOTE — ANESTHESIA POSTPROCEDURE EVALUATION
Department of Anesthesiology  Postprocedure Note    Patient: Jon Odom  MRN: [id number]  YOB: 1965  Date of evaluation: 8/29/2024    Procedure Summary       Date: 08/29/24 Room / Location: [address]    Anesthesia Start: 1121 Anesthesia Stop: 1143    Procedure: ESOPHAGOGASTRODUODENOSCOPY BIOPSIES & BANDING X2 Diagnosis:       Gastroesophageal reflux disease, unspecified whether esophagitis present      (Gastroesophageal reflux disease, unspecified whether esophagitis present [K21.9])    Surgeons: Vincent Iraheta MD Responsible Provider: Tess Joseph MD    Anesthesia Type: MAC ASA Status: 4            Anesthesia Type: No value filed.    Joanna Phase I: Joanna Score: 10    Joanna Phase II: Joanna Score: 8    Anesthesia Post Evaluation    Airway patency: patent  Cardiovascular status: hemodynamically stable  Respiratory status: acceptable    No notable events documented.  
,

## 2024-08-29 NOTE — H&P
Interval H&P Note    Pt Name: Jon Odom  MRN: [id number]  YOB: 1965  Date of evaluation: 8/29/2024      [x] I have reviewed in Epic the GI progress note by Dr. Iraheta dated 08/26/2024, attached below for an Interval History and Physical note.     [x] I have examined  Jon Odom, a 59 y.o. male.There are no changes to the patient who is scheduled for ESOPHAGOGASTRODUODENOSCOPY by Vincent Iraheta MD for Gastroesophageal reflux disease, unspecified whether esophagitis present. Patient has history of cirrhosis and ascites. Stopped taking diuretics and was hospitalized in July for large-volume ascites requiring paracentesis History of encephalopathy takes Lactulose. Patient denies daily drinking states he hasn't drank in years. He denies bloody tarry stools, constipation, nausea, vomiting, or unintentional weight loss. Patient has been NPO since midnight. Has had previous colonoscopy and EGD. No FH colon cancer or polyps.The patient denies new health changes, fever, chills, wheezing, cough, increased SOB, chest pain, open sores or wounds. Known diabetes, POC . Denies any current blood thinning medications.     Vital signs: /81   Pulse (!) 102   Temp 97.3 °F (36.3 °C)   Resp 16   SpO2 96%     Allergies:  Codeine and Simvastatin    Medications:    Prior to Admission medications    Medication Sig Start Date End Date Taking? Authorizing Provider   spironolactone (ALDACTONE) 100 MG tablet Take 1 tablet by mouth daily 8/27/24  Yes Gold Boo MD   lactulose (CHRONULAC) 10 GM/15ML solution Take 15 mLs by mouth 2 times daily as needed (constipation, 2-3 BMs a day) 8/14/24  Yes Gold Boo MD   albuterol sulfate HFA (VENTOLIN HFA) 108 (90 Base) MCG/ACT inhaler Inhale 1 puff into the lungs every 4 hours as needed for Wheezing 7/25/24  Yes Miguel Bautista MD   allopurinol (ZYLOPRIM) 100 MG tablet Take 1 tablet by mouth daily 7/25/24  Yes Miguel Bautista MD   bumetanide (BUMEX) 1 MG

## 2024-08-29 NOTE — ANESTHESIA PRE PROCEDURE
Medical History:        Diagnosis Date   • Calculus of kidney    • Cerebral artery occlusion with cerebral infarction (HCC) 2020    slight memory problem (can't remember name of neurologist)   • Cirrhosis (HCC)     sees PCP for this   • COPD (chronic obstructive pulmonary disease) (HCC)     does not see pulmonology   • COVID-19 12/31/2021    DETECTED   • Essential hypertension, benign    • Gout, unspecified    • H/O colonoscopy 04/11/2016    2016   • Mitral valve disorders(424.0)    • СЕРГЕЙ on CPAP     not currently using due to malfunctioning   • Other and unspecified hyperlipidemia    • Prolonged emergence from general anesthesia    • Type II or unspecified type diabetes mellitus without mention of complication, not stated as uncontrolled    • Unspecified chronic liver disease without mention of alcohol    • Wellness examination     DR MULLINS (last visit approx Feb 2021)       Past Surgical History:        Procedure Laterality Date   • COLONOSCOPY N/A 03/16/2022    COLONOSCOPY WITH BIOPSY AND RESOLUTION CLIPPING X1 performed by Linda Melvin MD at [facility]   • LITHOTRIPSY N/A 04/02/2021    ESWL EXTRACORPOREAL SHOCK WAVE LITHOTRIPSY  ([employer] CONF# [id number] - AUGUSTO) performed by Pietro Baron MD at [facility] OR   • UPPER GASTROINTESTINAL ENDOSCOPY N/A 05/25/2018    The esophagus was inspected to the Z-line. The endoscopic exam showed impression of varices (F1) in distal esophagus.    • UPPER GASTROINTESTINAL ENDOSCOPY N/A 12/10/2021    EGD ESOPHAGOGASTRODUODENOSCOPY performed by Linda Melvin MD at [facility]   • UPPER GASTROINTESTINAL ENDOSCOPY N/A 05/24/2023    EGD BAND LIGATION AND BIOPSY performed by Vincent Iraheta MD at [facility] OR   • UPPER GASTROINTESTINAL ENDOSCOPY N/A 02/13/2024    EGD W/ BIOPSY AND BANDING performed by Vincent Iraheta MD at [facility] OR       Social History:    Social History     Tobacco Use   • Smoking status: Former     Current packs/day: 0.00     Average packs/day: 0.5 packs/day for 30.0 years  input(s): \"POCGLU\", \"POCNA\", \"POCK\", \"POCCL\", \"POCBUN\", \"POCHEMO\", \"POCHCT\" in the last 72 hours.    Coags:   Lab Results   Component Value Date/Time    PROTIME 16.2 08/23/2024 12:17 PM    INR 1.3 08/23/2024 12:17 PM    APTT 31.9 08/23/2024 12:17 PM       HCG (If Applicable): No results found for: \"PREGTESTUR\", \"PREGSERUM\", \"HCG\", \"HCGQUANT\"     ABGs: No results found for: \"PHART\", \"PO2ART\", \"OYS8TMO\", \"PUN9BVR\", \"BEART\", \"S0WCSVTU\"     Type & Screen (If Applicable):  No results found for: \"LABABO\"    Drug/Infectious Status (If Applicable):  Lab Results   Component Value Date/Time    HEPCAB NONREACTIVE 12/10/2021 05:05 PM       COVID-19 Screening (If Applicable):   Lab Results   Component Value Date/Time    COVID19 Not Detected 12/21/2022 01:08 AM           Anesthesia Evaluation    Airway: Mallampati: I  TM distance: >3 FB   Neck ROM: full  Mouth opening: > = 3 FB   Dental:          Pulmonary:   (+)  COPD:    sleep apnea:           (-) shortness of breath                           Cardiovascular:        (-)  angina          Echocardiogram reviewed  Stress test reviewed                Neuro/Psych:   (+) CVA: no interval change            GI/Hepatic/Renal:   (+) liver disease:          Endo/Other:    (+) Diabetes.                 Abdominal:             Vascular:          Other Findings:       Anesthesia Plan      MAC     ASA 4                               Tess Joseph MD   8/29/2024

## 2024-09-03 LAB — SURGICAL PATHOLOGY REPORT: NORMAL

## 2024-09-05 ENCOUNTER — OFFICE VISIT (OUTPATIENT)
Dept: DERMATOLOGY | Age: 59
End: 2024-09-05

## 2024-09-05 VITALS
SYSTOLIC BLOOD PRESSURE: 114 MMHG | WEIGHT: 226 LBS | BODY MASS INDEX: 30.61 KG/M2 | HEART RATE: 110 BPM | HEIGHT: 72 IN | TEMPERATURE: 98.4 F | DIASTOLIC BLOOD PRESSURE: 73 MMHG | OXYGEN SATURATION: 96 %

## 2024-09-05 DIAGNOSIS — L82.1 SEBORRHEIC KERATOSES: ICD-10-CM

## 2024-09-05 DIAGNOSIS — L81.4 LENTIGINES: ICD-10-CM

## 2024-09-05 DIAGNOSIS — Z85.828 HISTORY OF BASAL CELL CARCINOMA (BCC): Primary | ICD-10-CM

## 2024-09-05 NOTE — PROGRESS NOTES
Medications   Medication Sig Dispense Refill    Continuous Glucose Sensor (DEXCOM G7 SENSOR) MISC 1 Application by Does not apply route every 14 days 2 each 0    spironolactone (ALDACTONE) 100 MG tablet Take 1 tablet by mouth daily 90 tablet 3    lactulose (CHRONULAC) 10 GM/15ML solution Take 15 mLs by mouth 2 times daily as needed (constipation, 2-3 BMs a day) 473 mL 3    albuterol sulfate HFA (VENTOLIN HFA) 108 (90 Base) MCG/ACT inhaler Inhale 1 puff into the lungs every 4 hours as needed for Wheezing 18 g 6    allopurinol (ZYLOPRIM) 100 MG tablet Take 1 tablet by mouth daily 90 tablet 1    bumetanide (BUMEX) 1 MG tablet Take 1 tablet by mouth daily 90 tablet 1    fluticasone-salmeterol (ADVAIR DISKUS) 250-50 MCG/ACT AEPB diskus inhaler Inhale 1 puff into the lungs in the morning and 1 puff in the evening. 60 each 3    nadolol (CORGARD) 20 MG tablet Take 1 tablet by mouth daily 30 tablet 0    tamsulosin (FLOMAX) 0.4 MG capsule Take 1 capsule by mouth daily 90 capsule 3    Continuous Blood Gluc  (FREESTYLE DENTON 2 READER) JOAQUIN 1 each by Does not apply route daily 1 each 0    Continuous Blood Gluc Sensor (FREESTYLE DENTON 2 SENSOR) [facility] 1 each by Does not apply route every 14 days 2 each 3    Blood Glucose Monitoring Suppl (ONE TOUCH ULTRA 2) w/Device KIT 1 kit by Does not apply route daily 1 kit 0    Lancets (ONETOUCH DELICA PLUS [id number]) MISC USE 1  TO CHECK GLUCOSE TWICE DAILY 120 each 0    blood glucose monitor strips 1 strip by Other route 4 times daily Test 4  times a day & as needed for symptoms of irregular blood glucose. 100 strip 11    ketoconazole (NIZORAL) 2 % cream Apply topically to rash twice daily until clear (Patient not taking: Reported on 9/5/2024) 60 g 0     No current facility-administered medications for this visit.       ALLERGIES:   Allergies   Allergen Reactions    Codeine Nausea Only and Other (See Comments)     hallucinations    Simvastatin Other (See Comments)     Leg cramping

## 2024-09-06 ENCOUNTER — HOSPITAL ENCOUNTER (OUTPATIENT)
Dept: ULTRASOUND IMAGING | Age: 59
Discharge: HOME | End: 2024-09-08
Payer: COMMERCIAL

## 2024-09-06 VITALS
HEART RATE: 88 BPM | RESPIRATION RATE: 20 BRPM | OXYGEN SATURATION: 99 % | DIASTOLIC BLOOD PRESSURE: 64 MMHG | TEMPERATURE: 97.2 F | SYSTOLIC BLOOD PRESSURE: 115 MMHG

## 2024-09-06 DIAGNOSIS — K74.60 CHRONIC LIVER DISEASE AND CIRRHOSIS (HCC): ICD-10-CM

## 2024-09-06 DIAGNOSIS — K70.31 ASCITES DUE TO ALCOHOLIC CIRRHOSIS (HCC): ICD-10-CM

## 2024-09-06 DIAGNOSIS — K76.9 CHRONIC LIVER DISEASE AND CIRRHOSIS (HCC): ICD-10-CM

## 2024-09-06 PROCEDURE — 6360000002 HC RX W HCPCS: Performed by: PHYSICIAN ASSISTANT

## 2024-09-06 PROCEDURE — 49083 ABD PARACENTESIS W/IMAGING: CPT

## 2024-09-06 PROCEDURE — 7100000011 HC PHASE II RECOVERY - ADDTL 15 MIN

## 2024-09-06 PROCEDURE — 7100000010 HC PHASE II RECOVERY - FIRST 15 MIN

## 2024-09-06 PROCEDURE — P9047 ALBUMIN (HUMAN), 25%, 50ML: HCPCS | Performed by: PHYSICIAN ASSISTANT

## 2024-09-06 RX ORDER — ALBUMIN (HUMAN) 12.5 G/50ML
75 SOLUTION INTRAVENOUS ONCE
Status: COMPLETED | OUTPATIENT
Start: 2024-09-06 | End: 2024-09-06

## 2024-09-06 RX ADMIN — ALBUMIN (HUMAN) 75 G: 0.25 INJECTION, SOLUTION INTRAVENOUS at 14:04

## 2024-09-06 NOTE — PROGRESS NOTES
Patient in room for paracentesis   RODRIGO  and JR DAVID in room  Site prepped and draped  Access obtained by JR DAVID  Drained 8 Li of yellow clear fluid  Site covered with DSD  Patient tolerated procedure well.  Report called to Pre-op  Albumin ordered.

## 2024-09-06 NOTE — BRIEF OP NOTE
Brief Postoperative Note for Paracentesis    Jon Odom  YOB: 1965  [id number]    Pre-operative Diagnosis:  Ascites     Post-operative Diagnosis: Same    Procedure: Ultrasound guided paracentesis     Anesthesia: 1% Lidocaine     Surgeons/Assistants: Tirso Gloria PA-C    Complications: none    EBL: Minimal    Specimens: Were obtained    Ultrasound guided paracentesis performed. 8000 ml clear yellow fluid obtained.  Dressing applied.     Electronically signed by FRANKIE Kimball on 9/6/2024 at 2:03 PM

## 2024-09-09 DIAGNOSIS — Z86.19 HISTORY OF HELICOBACTER PYLORI INFECTION: ICD-10-CM

## 2024-09-09 DIAGNOSIS — D69.6 THROMBOCYTOPENIA (HCC): ICD-10-CM

## 2024-09-09 DIAGNOSIS — F12.90 MARIJUANA USER: ICD-10-CM

## 2024-09-09 DIAGNOSIS — K31.89 PORTAL HYPERTENSIVE GASTROPATHY (HCC): ICD-10-CM

## 2024-09-09 DIAGNOSIS — K70.30 ALCOHOLIC CIRRHOSIS OF LIVER WITHOUT ASCITES (HCC): ICD-10-CM

## 2024-09-09 DIAGNOSIS — Z87.19 HISTORY OF CIRRHOSIS: ICD-10-CM

## 2024-09-09 DIAGNOSIS — Z98.890 S/P ABDOMINAL PARACENTESIS: ICD-10-CM

## 2024-09-09 DIAGNOSIS — K21.9 GASTROESOPHAGEAL REFLUX DISEASE, UNSPECIFIED WHETHER ESOPHAGITIS PRESENT: ICD-10-CM

## 2024-09-09 DIAGNOSIS — K76.6 PORTAL HYPERTENSIVE GASTROPATHY (HCC): ICD-10-CM

## 2024-09-18 ENCOUNTER — HOSPITAL ENCOUNTER (OUTPATIENT)
Dept: ULTRASOUND IMAGING | Age: 59
Discharge: HOME | End: 2024-09-20
Payer: COMMERCIAL

## 2024-09-18 VITALS — SYSTOLIC BLOOD PRESSURE: 120 MMHG | DIASTOLIC BLOOD PRESSURE: 81 MMHG | OXYGEN SATURATION: 96 %

## 2024-09-18 DIAGNOSIS — K76.9 CHRONIC LIVER DISEASE AND CIRRHOSIS (HCC): ICD-10-CM

## 2024-09-18 DIAGNOSIS — K74.60 CHRONIC LIVER DISEASE AND CIRRHOSIS (HCC): ICD-10-CM

## 2024-09-18 PROCEDURE — P9047 ALBUMIN (HUMAN), 25%, 50ML: HCPCS | Performed by: RADIOLOGY

## 2024-09-18 PROCEDURE — 49083 ABD PARACENTESIS W/IMAGING: CPT

## 2024-09-18 PROCEDURE — 6360000002 HC RX W HCPCS: Performed by: RADIOLOGY

## 2024-09-18 RX ORDER — ALBUMIN (HUMAN) 12.5 G/50ML
75 SOLUTION INTRAVENOUS ONCE
Status: COMPLETED | OUTPATIENT
Start: 2024-09-18 | End: 2024-09-18

## 2024-09-18 RX ORDER — ALBUMIN (HUMAN) 12.5 G/50ML
50 SOLUTION INTRAVENOUS ONCE
Status: DISCONTINUED | OUTPATIENT
Start: 2024-09-18 | End: 2024-09-18

## 2024-09-18 RX ADMIN — ALBUMIN (HUMAN) 75 G: 0.25 INJECTION, SOLUTION INTRAVENOUS at 10:03

## 2024-10-02 ENCOUNTER — HOSPITAL ENCOUNTER (OUTPATIENT)
Dept: ULTRASOUND IMAGING | Age: 59
Discharge: HOME | End: 2024-10-04
Payer: COMMERCIAL

## 2024-10-02 VITALS
HEART RATE: 95 BPM | DIASTOLIC BLOOD PRESSURE: 79 MMHG | SYSTOLIC BLOOD PRESSURE: 130 MMHG | RESPIRATION RATE: 15 BRPM | OXYGEN SATURATION: 95 %

## 2024-10-02 DIAGNOSIS — K76.9 CHRONIC LIVER DISEASE AND CIRRHOSIS (HCC): ICD-10-CM

## 2024-10-02 DIAGNOSIS — K74.60 CHRONIC LIVER DISEASE AND CIRRHOSIS (HCC): ICD-10-CM

## 2024-10-02 PROCEDURE — 2709999900 US GUIDED PARACENTESIS

## 2024-10-02 RX ORDER — ALBUMIN (HUMAN) 12.5 G/50ML
SOLUTION INTRAVENOUS CONTINUOUS PRN
Status: DISCONTINUED | OUTPATIENT
Start: 2024-10-02 | End: 2024-10-05 | Stop reason: HOSPADM

## 2024-10-02 RX ADMIN — ALBUMIN (HUMAN) 50 G: 12.5 SOLUTION INTRAVENOUS at 09:28

## 2024-10-02 NOTE — PROGRESS NOTES
Patient had  Paracentesis. 7200 ml of light yellow ascitic fluid collected. Catheter discontinued with tip intact.  2x2 gauze and tegaderm to catheter site. No bleeding or hematoma noted. BP stable throughout procedure stable.Tolerated albumin well. Discharged stable with instructions.

## 2024-10-02 NOTE — BRIEF OP NOTE
Brief Postoperative Note for Paracentesis    Jon Odom  YOB: 1965  [id number]    Pre-operative Diagnosis:  Ascites     Post-operative Diagnosis: Same    Procedure: Ultrasound guided paracentesis     Anesthesia: 1% Lidocaine     Surgeons/Assistants: Radhika Edward PA-C; MD Sandy    Complications: none    EBL: Minimal    Specimens: Were obtained    Ultrasound guided paracentesis performed. 7200 ml light yellow fluid obtained.  Dressing applied.     Electronically signed by FRANKIE Kimball on 10/2/2024 at 10:32 AM

## 2024-10-08 DIAGNOSIS — K76.9 CHRONIC LIVER DISEASE: ICD-10-CM

## 2024-10-08 RX ORDER — NADOLOL 20 MG/1
20 TABLET ORAL DAILY
Qty: 30 TABLET | Refills: 0 | Status: SHIPPED | OUTPATIENT
Start: 2024-10-08

## 2024-10-11 ENCOUNTER — HOSPITAL ENCOUNTER (OUTPATIENT)
Dept: ULTRASOUND IMAGING | Age: 59
Discharge: HOME | End: 2024-10-13
Payer: COMMERCIAL

## 2024-10-11 ENCOUNTER — HOSPITAL ENCOUNTER (EMERGENCY)
Age: 59
Discharge: HOME | End: 2024-10-11
Attending: EMERGENCY MEDICINE
Payer: COMMERCIAL

## 2024-10-11 VITALS
BODY MASS INDEX: 29.39 KG/M2 | SYSTOLIC BLOOD PRESSURE: 111 MMHG | TEMPERATURE: 98.1 F | OXYGEN SATURATION: 97 % | WEIGHT: 217 LBS | HEIGHT: 72 IN | DIASTOLIC BLOOD PRESSURE: 62 MMHG | HEART RATE: 93 BPM | RESPIRATION RATE: 18 BRPM

## 2024-10-11 VITALS
SYSTOLIC BLOOD PRESSURE: 112 MMHG | DIASTOLIC BLOOD PRESSURE: 58 MMHG | HEART RATE: 95 BPM | RESPIRATION RATE: 16 BRPM | OXYGEN SATURATION: 99 %

## 2024-10-11 DIAGNOSIS — L03.115 CELLULITIS OF RIGHT LEG: Primary | ICD-10-CM

## 2024-10-11 DIAGNOSIS — K76.9 CHRONIC LIVER DISEASE: ICD-10-CM

## 2024-10-11 LAB
ANION GAP SERPL CALCULATED.3IONS-SCNC: 9 MMOL/L (ref 9–16)
BASOPHILS # BLD: 0 K/UL (ref 0–0.2)
BASOPHILS NFR BLD: 0 % (ref 0–2)
BUN SERPL-MCNC: 11 MG/DL (ref 6–20)
CALCIUM SERPL-MCNC: 8.8 MG/DL (ref 8.6–10.4)
CHLORIDE SERPL-SCNC: 109 MMOL/L (ref 98–107)
CO2 SERPL-SCNC: 23 MMOL/L (ref 20–31)
CREAT SERPL-MCNC: 0.8 MG/DL (ref 0.7–1.2)
CRP SERPL HS-MCNC: 16.4 MG/L (ref 0–5)
EOSINOPHIL # BLD: 0.1 K/UL (ref 0–0.4)
EOSINOPHILS RELATIVE PERCENT: 2 % (ref 0–4)
ERYTHROCYTE [DISTWIDTH] IN BLOOD BY AUTOMATED COUNT: 14.8 % (ref 11.5–14.9)
ERYTHROCYTE [SEDIMENTATION RATE] IN BLOOD BY PHOTOMETRIC METHOD: 4 MM/HR (ref 0–20)
GFR, ESTIMATED: >90 ML/MIN/1.73M2
GLUCOSE SERPL-MCNC: 167 MG/DL (ref 74–99)
HCT VFR BLD AUTO: 33.7 % (ref 41–53)
HGB BLD-MCNC: 11.7 G/DL (ref 13.5–17.5)
LYMPHOCYTES NFR BLD: 0.7 K/UL (ref 1–4.8)
LYMPHOCYTES RELATIVE PERCENT: 21 % (ref 24–44)
MCH RBC QN AUTO: 33.4 PG (ref 26–34)
MCHC RBC AUTO-ENTMCNC: 34.9 G/DL (ref 31–37)
MCV RBC AUTO: 95.8 FL (ref 80–100)
MONOCYTES NFR BLD: 0.4 K/UL (ref 0.1–1.3)
MONOCYTES NFR BLD: 12 % (ref 1–7)
NEUTROPHILS NFR BLD: 65 % (ref 36–66)
NEUTS SEG NFR BLD: 2.2 K/UL (ref 1.3–9.1)
PLATELET # BLD AUTO: 58 K/UL (ref 150–450)
PMV BLD AUTO: 7.7 FL (ref 6–12)
POTASSIUM SERPL-SCNC: 3.7 MMOL/L (ref 3.7–5.3)
RBC # BLD AUTO: 3.51 M/UL (ref 4.5–5.9)
SODIUM SERPL-SCNC: 141 MMOL/L (ref 136–145)
WBC OTHER # BLD: 3.3 K/UL (ref 3.5–11)

## 2024-10-11 PROCEDURE — 85652 RBC SED RATE AUTOMATED: CPT

## 2024-10-11 PROCEDURE — P9047 ALBUMIN (HUMAN), 25%, 50ML: HCPCS | Performed by: RADIOLOGY

## 2024-10-11 PROCEDURE — 80048 BASIC METABOLIC PNL TOTAL CA: CPT

## 2024-10-11 PROCEDURE — 36415 COLL VENOUS BLD VENIPUNCTURE: CPT

## 2024-10-11 PROCEDURE — 85025 COMPLETE CBC W/AUTO DIFF WBC: CPT

## 2024-10-11 PROCEDURE — 6370000000 HC RX 637 (ALT 250 FOR IP)

## 2024-10-11 PROCEDURE — 86140 C-REACTIVE PROTEIN: CPT

## 2024-10-11 PROCEDURE — 6360000002 HC RX W HCPCS: Performed by: RADIOLOGY

## 2024-10-11 PROCEDURE — 2709999900 US GUIDED PARACENTESIS

## 2024-10-11 PROCEDURE — 96374 THER/PROPH/DIAG INJ IV PUSH: CPT

## 2024-10-11 PROCEDURE — 99284 EMERGENCY DEPT VISIT MOD MDM: CPT

## 2024-10-11 PROCEDURE — 6360000002 HC RX W HCPCS

## 2024-10-11 RX ORDER — KETOROLAC TROMETHAMINE 30 MG/ML
15 INJECTION, SOLUTION INTRAMUSCULAR; INTRAVENOUS ONCE
Status: COMPLETED | OUTPATIENT
Start: 2024-10-11 | End: 2024-10-11

## 2024-10-11 RX ORDER — ALBUMIN (HUMAN) 12.5 G/50ML
50 SOLUTION INTRAVENOUS ONCE
Status: COMPLETED | OUTPATIENT
Start: 2024-10-11 | End: 2024-10-11

## 2024-10-11 RX ORDER — CEPHALEXIN 500 MG/1
500 CAPSULE ORAL 2 TIMES DAILY
Qty: 14 CAPSULE | Refills: 0 | Status: SHIPPED | OUTPATIENT
Start: 2024-10-11 | End: 2024-10-18

## 2024-10-11 RX ADMIN — KETOROLAC TROMETHAMINE 15 MG: 30 INJECTION, SOLUTION INTRAMUSCULAR at 22:12

## 2024-10-11 RX ADMIN — ALBUMIN (HUMAN) 50 G: 0.25 INJECTION, SOLUTION INTRAVENOUS at 10:17

## 2024-10-11 RX ADMIN — CEPHALEXIN 500 MG: 250 CAPSULE ORAL at 23:25

## 2024-10-11 ASSESSMENT — PAIN DESCRIPTION - ORIENTATION
ORIENTATION: RIGHT
ORIENTATION: RIGHT

## 2024-10-11 ASSESSMENT — PAIN SCALES - GENERAL
PAINLEVEL_OUTOF10: 8
PAINLEVEL_OUTOF10: 8
PAINLEVEL_OUTOF10: 3

## 2024-10-11 ASSESSMENT — PAIN DESCRIPTION - LOCATION
LOCATION: LEG
LOCATION: LEG

## 2024-10-11 ASSESSMENT — PAIN DESCRIPTION - DESCRIPTORS: DESCRIPTORS: ACHING

## 2024-10-11 ASSESSMENT — PAIN - FUNCTIONAL ASSESSMENT: PAIN_FUNCTIONAL_ASSESSMENT: 0-10

## 2024-10-11 ASSESSMENT — LIFESTYLE VARIABLES
HOW MANY STANDARD DRINKS CONTAINING ALCOHOL DO YOU HAVE ON A TYPICAL DAY: PATIENT DOES NOT DRINK
HOW OFTEN DO YOU HAVE A DRINK CONTAINING ALCOHOL: NEVER

## 2024-10-11 NOTE — BRIEF OP NOTE
Brief Postoperative Note for Paracentesis    Jon Odom  YOB: 1965  [id number]    Pre-operative Diagnosis:  Ascites     Post-operative Diagnosis: Same    Procedure: Ultrasound guided paracentesis     Anesthesia: 1% Lidocaine     Surgeons/Assistants: Dr. Delgado & Radhika Edward PA-C    Complications: none    EBL: Minimal    Specimens: Were obtained    Ultrasound guided paracentesis performed along the RLQ. 5.1 L clear yellow fluid obtained.  Dressing applied. Patient tolerated procedure well.    Electronically signed by Radhika Edward PA-C on 10/11/2024 at 10:38 AM

## 2024-10-11 NOTE — PROGRESS NOTES
Pt arrives to room for therapeutic para  EH RDMS to bedside  EA PA to bedside  Dr Delgado to bedside  Iv inserted and albumin infusing  Site prepped and draped  Access obtained and draining clear yellow fluid  Tolerated well  Access removed and site covered with dsd  Dc home      5.1L removed

## 2024-10-12 NOTE — ED PROVIDER NOTES
[facility]     Emergency Department     Faculty Attestation        I performed a history and physical examination of the patient and discussed management with the resident. I reviewed the resident’s note and agree with the documented findings and plan of care. Any areas of disagreement are noted on the chart. I was personally present for the key portions of any procedures. I have documented in the chart those procedures where I was not present during the key portions. I have reviewed the emergency nurses triage note. I agree with the chief complaint, past medical history, past surgical history, allergies, medications, social and family history as documented unless otherwise noted below. Documentation of the HPI, Physical Exam and Medical Decision Making performed by medical students or scribes is based on my personal performance of the HPI, PE and MDM. For Physician Assistant/ Nurse Practitioner cases/documentation I have have had a face to face evaluation with this patient and have completed at least one if not all key elements of the E/M (history, physical exam, and MDM). Additional findings are as noted.    Vital Signs: /62   Pulse 93   Temp 98.1 °F (36.7 °C) (Oral)   Resp 18   Ht 1.829 m (6')   Wt 98.4 kg (217 lb)   SpO2 97%   BMI 29.43 kg/m²   PCP:  Gold Boo MD    Pertinent Comments:     History: This is a 59-year-old male who presents today for right-sided redness and swelling to his leg which she noticed last night.  He relates it is warm to the touch and he says it is very painful.  He had think of any injury that he has had.  But he does relate that he is diabetic.  He has had no drainage.  He does tell me he had a episode a few days ago of flulike illness that lasted for 1 day.  He relates he just covered up and went to bed and that seem to go away.    Exam: Patient has some tachycardia here.  The rest of his vitals are normal.  He does

## 2024-10-12 NOTE — DISCHARGE INSTRUCTIONS
You are here for pain and redness to the right leg.    We evaluated you, and found that it is most likely cellulitis of the right leg    We have given you antibiotics for this please take them as prescribed.  Please take the entire course    Please follow with your primary care provider.    Please return to the emergency department for any new or worsening symptoms.  You may return if your symptoms not improved.

## 2024-10-15 NOTE — ED PROVIDER NOTES
[facility] ED  Emergency Department Encounter  Emergency Medicine Resident     Pt Name:Jon Odom  MRN: [id number]  Birthdate 1965  Date of evaluation: 10/15/24  PCP:  Gold Boo MD  Note Started: 4:47 PM EDT      CHIEF COMPLAINT       Chief Complaint   Patient presents with    Leg Swelling     Pt noticed redness and swelling to right lower leg last night. Pt states warm to touch. Pt describes it as very painful. Pt denies any injury/trauma. States he does not know what it is from. Pt is a diabetic. Denies drainage       HISTORY OF PRESENT ILLNESS  (Location/Symptom, Timing/Onset, Context/Setting, Quality, Duration, Modifying Factors, Severity.)      Jon Odom is a 59 y.o. male who presents with redness and swelling to right lower leg.  Patient states that he has had this for couple days, stating that it is tender to the touch.  He does not have any injury or trauma.  There is no drainage.  Patient does not have any significant shortness of breath, chest pain, lightheadedness or dizziness, trouble walking, exit for the pain.    Patient has no nausea or vomiting.  Review of systems otherwise negative.  Pain is minimal.  Will that the patient at this time for any significant infectious process.    PAST MEDICAL / SURGICAL / SOCIAL / FAMILY HISTORY      has a past medical history of Calculus of kidney, Cerebral artery occlusion with cerebral infarction (HCC), Cirrhosis (HCC), COPD (chronic obstructive pulmonary disease) (HCC), COVID-19, Essential hypertension, benign, Gout, unspecified, H/O colonoscopy, Mitral valve disorders(424.0), СЕРГЕЙ on CPAP, Other and unspecified hyperlipidemia, Prolonged emergence from general anesthesia, Type II or unspecified type diabetes mellitus without mention of complication, not stated as uncontrolled, Unspecified chronic liver disease without mention of alcohol, and Wellness examination.       has a past surgical history that includes Upper gastrointestinal

## 2024-10-25 ENCOUNTER — HOSPITAL ENCOUNTER (OUTPATIENT)
Dept: ULTRASOUND IMAGING | Age: 59
Discharge: HOME | End: 2024-10-27
Payer: COMMERCIAL

## 2024-10-25 VITALS
DIASTOLIC BLOOD PRESSURE: 98 MMHG | OXYGEN SATURATION: 97 % | RESPIRATION RATE: 16 BRPM | SYSTOLIC BLOOD PRESSURE: 152 MMHG | HEART RATE: 100 BPM

## 2024-10-25 DIAGNOSIS — R18.8 OTHER ASCITES: ICD-10-CM

## 2024-10-25 PROCEDURE — 49083 ABD PARACENTESIS W/IMAGING: CPT

## 2024-10-25 NOTE — PROGRESS NOTES
Pt arrives to room for routine para  JR Pa and AE RDMS to bedside  Site prepped and draped  Access obtained and draining clear yellow fluid    4.9L removed  Accecss removed and site covered with dsd  Dc home with family

## 2024-10-25 NOTE — BRIEF OP NOTE
Brief Postoperative Note for Paracentesis    Jon Odom  YOB: 1965  [id number]    Pre-operative Diagnosis:  Ascites     Post-operative Diagnosis: Same    Procedure: Ultrasound guided paracentesis     Anesthesia: 1% Lidocaine     Surgeons/Assistants: Tirso Gloria PA-C    Complications: none    EBL: Minimal    Specimens: Were obtained    Ultrasound guided paracentesis performed. 4900 ml clear yellow fluid obtained.  Dressing applied.     Electronically signed by FRANKIE Kimball on 10/25/2024 at 10:48 AM

## 2024-11-01 ENCOUNTER — OFFICE VISIT (OUTPATIENT)
Dept: INTERNAL MEDICINE CLINIC | Age: 59
End: 2024-11-01

## 2024-11-01 VITALS
SYSTOLIC BLOOD PRESSURE: 136 MMHG | HEART RATE: 58 BPM | DIASTOLIC BLOOD PRESSURE: 74 MMHG | WEIGHT: 241 LBS | BODY MASS INDEX: 32.69 KG/M2 | OXYGEN SATURATION: 95 %

## 2024-11-01 DIAGNOSIS — E11.69 HYPERLIPIDEMIA ASSOCIATED WITH TYPE 2 DIABETES MELLITUS (HCC): ICD-10-CM

## 2024-11-01 DIAGNOSIS — J44.9 CHRONIC OBSTRUCTIVE PULMONARY DISEASE, UNSPECIFIED COPD TYPE (HCC): ICD-10-CM

## 2024-11-01 DIAGNOSIS — K76.9 CHRONIC LIVER DISEASE: Primary | ICD-10-CM

## 2024-11-01 DIAGNOSIS — E11.65 TYPE 2 DIABETES MELLITUS WITH HYPERGLYCEMIA, WITHOUT LONG-TERM CURRENT USE OF INSULIN (HCC): ICD-10-CM

## 2024-11-01 DIAGNOSIS — E78.5 HYPERLIPIDEMIA ASSOCIATED WITH TYPE 2 DIABETES MELLITUS (HCC): ICD-10-CM

## 2024-11-01 DIAGNOSIS — K76.6 PORTAL HYPERTENSION (HCC): ICD-10-CM

## 2024-11-01 RX ORDER — GLIMEPIRIDE 4 MG/1
4 TABLET ORAL 2 TIMES DAILY
COMMUNITY
Start: 2024-09-01

## 2024-11-01 RX ORDER — OMEPRAZOLE 40 MG/1
40 CAPSULE, DELAYED RELEASE ORAL
Qty: 90 CAPSULE | Refills: 1 | Status: SHIPPED | OUTPATIENT
Start: 2024-11-01

## 2024-11-01 NOTE — PROGRESS NOTES
Subjective   Patient ID: Jon Odom is a 59 y.o. male.    HPI  History was obtained from the patient. Jon Odom is a 59 y.o. is here for a   Patient is here for eval of multimedical problem.  He has diabetes, alcoholic cirrhosis of liver, history of esophageal varices, ex alcoholic, patient told me he is compliant with diet and medication  He use continuous glucose monitoring system,  He has standing order to get paracentesis with Radiology , every 2 week  Follows with GI   Blood sugar is oK   Working to get [facility] for liver transplant     Review of Systems   Constitutional:  Negative for activity change, appetite change, chills and diaphoresis.   HENT:  Negative for congestion, dental problem, ear discharge, facial swelling and hearing loss.    Respiratory:  Negative for apnea, cough, chest tightness, shortness of breath and wheezing.    Cardiovascular:  Positive for leg swelling. Negative for chest pain.   Gastrointestinal:  Positive for abdominal distention. Negative for abdominal pain, constipation and diarrhea.   Genitourinary:  Negative for difficulty urinating, dysuria, enuresis, flank pain and frequency.   Musculoskeletal:  Negative for arthralgias, back pain, gait problem and joint swelling.   Skin:  Negative for color change, pallor and rash.   Neurological:  Negative for dizziness, seizures, facial asymmetry, light-headedness, numbness and headaches.   Psychiatric/Behavioral:  Negative for agitation, behavioral problems, confusion, decreased concentration and dysphoric mood.           Objective   Physical Exam  Vitals and nursing note reviewed.   Constitutional:       General: He is not in acute distress.     Appearance: He is well-developed. He is not diaphoretic.   HENT:      Head: Normocephalic and atraumatic.      Mouth/Throat:      Pharynx: No oropharyngeal exudate.   Eyes:      General: No scleral icterus.        Right eye: No discharge.         Left eye: No discharge.

## 2024-11-04 ENCOUNTER — HOSPITAL ENCOUNTER (OUTPATIENT)
Dept: ULTRASOUND IMAGING | Age: 59
Discharge: HOME | End: 2024-11-06
Attending: INTERNAL MEDICINE

## 2024-11-04 PROCEDURE — 76705 ECHO EXAM OF ABDOMEN: CPT

## 2024-11-08 ENCOUNTER — HOSPITAL ENCOUNTER (OUTPATIENT)
Dept: INTERVENTIONAL RADIOLOGY/VASCULAR | Age: 59
Discharge: HOME | End: 2024-11-10

## 2024-11-08 VITALS
OXYGEN SATURATION: 98 % | DIASTOLIC BLOOD PRESSURE: 53 MMHG | RESPIRATION RATE: 18 BRPM | HEART RATE: 94 BPM | SYSTOLIC BLOOD PRESSURE: 106 MMHG

## 2024-11-08 DIAGNOSIS — J91.8 PLEURAL EFFUSION ASSOCIATED WITH HEPATIC DISORDER: ICD-10-CM

## 2024-11-08 DIAGNOSIS — K76.9 PLEURAL EFFUSION ASSOCIATED WITH HEPATIC DISORDER: ICD-10-CM

## 2024-11-08 PROCEDURE — 6360000002 HC RX W HCPCS: Performed by: PHYSICIAN ASSISTANT

## 2024-11-08 PROCEDURE — P9047 ALBUMIN (HUMAN), 25%, 50ML: HCPCS | Performed by: PHYSICIAN ASSISTANT

## 2024-11-08 PROCEDURE — 49083 ABD PARACENTESIS W/IMAGING: CPT

## 2024-11-08 RX ORDER — ALBUMIN (HUMAN) 12.5 G/50ML
75 SOLUTION INTRAVENOUS ONCE
Status: COMPLETED | OUTPATIENT
Start: 2024-11-08 | End: 2024-11-08

## 2024-11-08 RX ADMIN — ALBUMIN (HUMAN) 75 G: 0.25 INJECTION, SOLUTION INTRAVENOUS at 11:06

## 2024-11-08 NOTE — BRIEF OP NOTE
Brief Postoperative Note for Paracentesis    Jon Odom  YOB: 1965  [id number]    Pre-operative Diagnosis:  Ascites     Post-operative Diagnosis: Same    Procedure: Ultrasound guided paracentesis     Anesthesia: 1% Lidocaine     Surgeons/Assistants: Tirso Gloria PA-C    Complications: none    EBL: Minimal    Specimens: Were obtained    Ultrasound guided paracentesis performed. 8300 ml clear yellow fluid obtained.  Dressing applied.     Electronically signed by FRANKIE Kimball on 11/8/2024 at 11:24 AM     No

## 2024-11-22 ENCOUNTER — HOSPITAL ENCOUNTER (OUTPATIENT)
Dept: ULTRASOUND IMAGING | Age: 59
End: 2024-11-22

## 2024-11-22 ENCOUNTER — HOSPITAL ENCOUNTER (OUTPATIENT)
Age: 59
Discharge: HOME | End: 2024-11-22

## 2024-11-22 VITALS — SYSTOLIC BLOOD PRESSURE: 113 MMHG | OXYGEN SATURATION: 96 % | DIASTOLIC BLOOD PRESSURE: 55 MMHG | HEART RATE: 81 BPM

## 2024-11-22 DIAGNOSIS — K76.9 CHRONIC LIVER DISEASE: ICD-10-CM

## 2024-11-22 DIAGNOSIS — K70.31 ASCITES DUE TO ALCOHOLIC CIRRHOSIS (HCC): Primary | ICD-10-CM

## 2024-11-22 DIAGNOSIS — K70.30 ALCOHOLIC CIRRHOSIS OF LIVER WITHOUT ASCITES (HCC): ICD-10-CM

## 2024-11-22 LAB
INR PPP: 1.3
PARTIAL THROMBOPLASTIN TIME: 31.8 SEC (ref 23–36.5)
PLATELET # BLD AUTO: NORMAL K/UL (ref 138–453)
PROTHROMBIN TIME: 15.9 SEC (ref 11.7–14.9)

## 2024-11-22 PROCEDURE — 49083 ABD PARACENTESIS W/IMAGING: CPT

## 2024-11-22 PROCEDURE — 6360000002 HC RX W HCPCS: Performed by: PHYSICIAN ASSISTANT

## 2024-11-22 PROCEDURE — 36415 COLL VENOUS BLD VENIPUNCTURE: CPT

## 2024-11-22 PROCEDURE — 85049 AUTOMATED PLATELET COUNT: CPT

## 2024-11-22 PROCEDURE — 85610 PROTHROMBIN TIME: CPT

## 2024-11-22 PROCEDURE — 85730 THROMBOPLASTIN TIME PARTIAL: CPT

## 2024-11-22 PROCEDURE — P9047 ALBUMIN (HUMAN), 25%, 50ML: HCPCS | Performed by: PHYSICIAN ASSISTANT

## 2024-11-22 RX ORDER — ALBUMIN (HUMAN) 12.5 G/50ML
50 SOLUTION INTRAVENOUS ONCE
Status: COMPLETED | OUTPATIENT
Start: 2024-11-22 | End: 2024-11-22

## 2024-11-22 RX ADMIN — ALBUMIN (HUMAN) 50 G: 0.25 INJECTION, SOLUTION INTRAVENOUS at 11:49

## 2024-11-22 NOTE — BRIEF OP NOTE
Brief Postoperative Note for Paracentesis    Jon Odom  YOB: 1965  [id number]    Pre-operative Diagnosis:  Ascites     Post-operative Diagnosis: Same    Procedure: Ultrasound guided paracentesis     Anesthesia: 1% Lidocaine     Surgeons/Assistants: Tirso Gloria PA-C    Complications: none    EBL: Minimal    Specimens: Were obtained    Ultrasound guided paracentesis performed. 6100 ml clear yellow fluid obtained.  Dressing applied.     Electronically signed by FRANKIE Kimball on 11/22/2024 at 12:33 PM

## 2024-11-22 NOTE — PROGRESS NOTES
Consent verified, in room, monitor on.  LA/RN, VERONIQUE/RN, AE/RDMS  JR/PA in, RLQ site prepped/draped/accessed  Obtained 6.1 li of light yellow clear fluid.  Access removed, DSD applied.   Albumin administered, see MAR.  Pt tolerated well.  Discharged.

## 2024-12-06 ENCOUNTER — HOSPITAL ENCOUNTER (OUTPATIENT)
Dept: INTERVENTIONAL RADIOLOGY/VASCULAR | Age: 59
Discharge: HOME | End: 2024-12-08

## 2024-12-06 VITALS
SYSTOLIC BLOOD PRESSURE: 133 MMHG | RESPIRATION RATE: 16 BRPM | HEART RATE: 78 BPM | DIASTOLIC BLOOD PRESSURE: 71 MMHG | OXYGEN SATURATION: 100 %

## 2024-12-06 DIAGNOSIS — K76.9 CHRONIC LIVER DISEASE: ICD-10-CM

## 2024-12-06 PROCEDURE — 49083 ABD PARACENTESIS W/IMAGING: CPT

## 2024-12-06 NOTE — PROGRESS NOTES
Patient had Paracentesis. 4000 ml of yellow ascitic fluid collected. Catheter discontinued with tip intact.  2x2 gauze and tegaderm to catheter site. No bleeding or hematoma noted. BP stable throughout procedure stable. Discharged stable with instructions.

## 2024-12-06 NOTE — PROGRESS NOTES
PERIPHERAL IV START ATTEMPTED TO LEFT ACF WITHOUT SUCCESS. 2X2 GAUZE AND TAPE TO SITE. NO BLEEDING OR HEMATOMA NOTED.

## 2024-12-20 ENCOUNTER — HOSPITAL ENCOUNTER (OUTPATIENT)
Dept: ULTRASOUND IMAGING | Age: 59
Discharge: HOME | End: 2024-12-22

## 2024-12-20 VITALS — SYSTOLIC BLOOD PRESSURE: 132 MMHG | OXYGEN SATURATION: 99 % | HEART RATE: 85 BPM | DIASTOLIC BLOOD PRESSURE: 70 MMHG

## 2024-12-20 DIAGNOSIS — R18.8 ASCITES OF LIVER: ICD-10-CM

## 2024-12-20 PROCEDURE — 49083 ABD PARACENTESIS W/IMAGING: CPT

## 2024-12-20 NOTE — FLOWSHEET NOTE
Paracentesis     NEWTON Snow RN    RDMS    Pt. To ultrasound room 8  Identified, allergies confirmed  Supine position  Stable    Time out complete. Prep to abd.     4300 mls of yellow colored fluid drawn off.   2x2 with Tegaderm to puncture site.   No samples at this time-  Patient tolerated well. No problems noted.     Patient stable, off monitor and able to ambulate self out of IR.

## 2024-12-20 NOTE — BRIEF OP NOTE
Brief Postoperative Note for Paracentesis    Jon Odom  YOB: 1965  [id number]    Pre-operative Diagnosis:  Ascites     Post-operative Diagnosis: Same    Procedure: Ultrasound guided paracentesis     Anesthesia: 1% Lidocaine     Surgeons/Assistants: Tirso Gloria PA-C    Complications: none    EBL: Minimal    Specimens: Were obtained    Ultrasound guided paracentesis performed. 4300 ml clear yellow fluid obtained.  Dressing applied.     Electronically signed by FRANKIE Kimball on 12/20/2024 at 10:12 AM

## 2024-12-23 DIAGNOSIS — K76.9 CHRONIC LIVER DISEASE: ICD-10-CM

## 2024-12-23 RX ORDER — NADOLOL 20 MG/1
20 TABLET ORAL DAILY
Qty: 30 TABLET | Refills: 0 | Status: SHIPPED | OUTPATIENT
Start: 2024-12-23

## 2025-01-03 ENCOUNTER — HOSPITAL ENCOUNTER (OUTPATIENT)
Dept: ULTRASOUND IMAGING | Age: 60
End: 2025-01-03
Payer: COMMERCIAL

## 2025-01-03 VITALS
OXYGEN SATURATION: 98 % | DIASTOLIC BLOOD PRESSURE: 66 MMHG | RESPIRATION RATE: 16 BRPM | SYSTOLIC BLOOD PRESSURE: 107 MMHG | HEART RATE: 86 BPM

## 2025-01-03 DIAGNOSIS — K76.9 CHRONIC LIVER DISEASE: ICD-10-CM

## 2025-01-03 PROCEDURE — P9047 ALBUMIN (HUMAN), 25%, 50ML: HCPCS | Performed by: PHYSICIAN ASSISTANT

## 2025-01-03 PROCEDURE — 7100000010 HC PHASE II RECOVERY - FIRST 15 MIN

## 2025-01-03 PROCEDURE — 7100000011 HC PHASE II RECOVERY - ADDTL 15 MIN

## 2025-01-03 PROCEDURE — 6360000002 HC RX W HCPCS: Performed by: PHYSICIAN ASSISTANT

## 2025-01-03 PROCEDURE — 49083 ABD PARACENTESIS W/IMAGING: CPT

## 2025-01-03 RX ORDER — GLIMEPIRIDE 4 MG/1
4 TABLET ORAL 2 TIMES DAILY
Qty: 60 TABLET | Refills: 0 | Status: SHIPPED | OUTPATIENT
Start: 2025-01-03

## 2025-01-03 RX ORDER — ALBUMIN (HUMAN) 12.5 G/50ML
50 SOLUTION INTRAVENOUS ONCE
Status: COMPLETED | OUTPATIENT
Start: 2025-01-03 | End: 2025-01-03

## 2025-01-03 RX ADMIN — ALBUMIN (HUMAN) 50 G: 0.25 INJECTION, SOLUTION INTRAVENOUS at 10:40

## 2025-01-03 ASSESSMENT — PAIN - FUNCTIONAL ASSESSMENT: PAIN_FUNCTIONAL_ASSESSMENT: NONE - DENIES PAIN

## 2025-01-03 NOTE — BRIEF OP NOTE
Brief Postoperative Note for Paracentesis    Jon Odom  YOB: 1965  [id number]    Pre-operative Diagnosis:  Ascites     Post-operative Diagnosis: Same    Procedure: Ultrasound guided paracentesis     Anesthesia: 1% Lidocaine     Surgeons/Assistants: Tirso Gloria PA-C    Complications: none    EBL: Minimal    Specimens: Were obtained    Ultrasound guided paracentesis performed. 7000 ml clear yellow fluid obtained.  Dressing applied.     Electronically signed by FRANKIE Kimball on 1/3/2025 at 10:27 AM

## 2025-01-03 NOTE — PROGRESS NOTES
Patient had Paracentesis. 7000 ml of yellow ascitic fluid collected. Catheter discontinued with tip intact.  2x2 gauze and tegaderm to catheter site. No bleeding or hematoma noted. BP stable throughout procedure stable. Discharged stable with instructions. SPAR for albumin.

## 2025-01-17 ENCOUNTER — HOSPITAL ENCOUNTER (OUTPATIENT)
Dept: ULTRASOUND IMAGING | Age: 60
Discharge: HOME | End: 2025-01-19
Payer: COMMERCIAL

## 2025-01-17 VITALS
RESPIRATION RATE: 16 BRPM | DIASTOLIC BLOOD PRESSURE: 65 MMHG | HEART RATE: 81 BPM | SYSTOLIC BLOOD PRESSURE: 122 MMHG | OXYGEN SATURATION: 96 %

## 2025-01-17 DIAGNOSIS — R18.8 OTHER ASCITES: ICD-10-CM

## 2025-01-17 PROCEDURE — 49083 ABD PARACENTESIS W/IMAGING: CPT

## 2025-01-17 NOTE — BRIEF OP NOTE
Brief Postoperative Note for Paracentesis    Jon Odom  YOB: 1965  [id number]    Pre-operative Diagnosis:  Ascites     Post-operative Diagnosis: Same    Procedure: Ultrasound guided paracentesis     Anesthesia: 1% Lidocaine     Surgeons/Assistants: Tirso Gloria PA-C    Complications: none    EBL: Minimal    Specimens: Were obtained    Ultrasound guided paracentesis performed. 4600 ml clear yellow fluid obtained.  Dressing applied.     Electronically signed by FRANKIE Kimball on 1/17/2025 at 10:04 AM

## 2025-01-17 NOTE — PROGRESS NOTES
Patient to US for therapeutic paracentesis.  JR DAVID and ELINOR RDMS at bedside.  Site prepped and draped, area numbed with lidocaine.  4.6L of clear yellow fluid drained.  Dry sterile drsg with tegaderm placed.  Patient tolerated well.

## 2025-01-31 ENCOUNTER — HOSPITAL ENCOUNTER (OUTPATIENT)
Dept: INTERVENTIONAL RADIOLOGY/VASCULAR | Age: 60
End: 2025-01-31
Payer: COMMERCIAL

## 2025-01-31 DIAGNOSIS — K76.9 PLEURAL EFFUSION ASSOCIATED WITH HEPATIC DISORDER: ICD-10-CM

## 2025-01-31 DIAGNOSIS — J91.8 PLEURAL EFFUSION ASSOCIATED WITH HEPATIC DISORDER: ICD-10-CM

## 2025-01-31 PROCEDURE — 49083 ABD PARACENTESIS W/IMAGING: CPT

## 2025-01-31 NOTE — BRIEF OP NOTE
Brief Postoperative Note for Paracentesis    Jon Odom  YOB: 1965  [id number]    Pre-operative Diagnosis:  Ascites     Post-operative Diagnosis: Same    Procedure: Ultrasound guided paracentesis     Anesthesia: 1% Lidocaine     Surgeons/Assistants: Tirso Gloria PA-C    Complications: none    EBL: Minimal    Specimens: Were obtained    Ultrasound guided paracentesis performed. 3200 ml clear yellow fluid obtained.  Dressing applied.     Electronically signed by FRANKIE Kimball on 1/31/2025 at 11:08 AM

## 2025-01-31 NOTE — FLOWSHEET NOTE
Paracentesis     NEWTON DAVID  MS RN  CM AND NATALIO RT(R)    Pt. To ultrasound room 8  Identified, allergies confirmed  Supine position  Stable    Time out complete. Prep to abd.     3.2L of clear yellow colored fluid drawn off.   2x2 with Tegaderm to puncture site.   NO samples at this time.   Patient tolerated well. No problems noted.     Patient stable, off monitor and able to ambulate self out of IR.

## 2025-02-05 DIAGNOSIS — N40.1 BENIGN PROSTATIC HYPERPLASIA WITH LOWER URINARY TRACT SYMPTOMS, SYMPTOM DETAILS UNSPECIFIED: ICD-10-CM

## 2025-02-05 DIAGNOSIS — K76.9 CHRONIC LIVER DISEASE: ICD-10-CM

## 2025-02-05 DIAGNOSIS — J45.40 MODERATE PERSISTENT ASTHMA, UNSPECIFIED WHETHER COMPLICATED: ICD-10-CM

## 2025-02-05 DIAGNOSIS — M10.9 GOUT, UNSPECIFIED CAUSE, UNSPECIFIED CHRONICITY, UNSPECIFIED SITE: ICD-10-CM

## 2025-02-05 DIAGNOSIS — J44.9 CHRONIC OBSTRUCTIVE PULMONARY DISEASE, UNSPECIFIED COPD TYPE (HCC): ICD-10-CM

## 2025-02-06 RX ORDER — LACTULOSE 10 G/15ML
10 SOLUTION ORAL 2 TIMES DAILY PRN
Qty: 473 ML | Refills: 1 | Status: SHIPPED | OUTPATIENT
Start: 2025-02-06

## 2025-02-06 RX ORDER — SPIRONOLACTONE 100 MG/1
100 TABLET, FILM COATED ORAL DAILY
Qty: 30 TABLET | Refills: 1 | Status: SHIPPED | OUTPATIENT
Start: 2025-02-06

## 2025-02-06 RX ORDER — ALBUTEROL SULFATE 90 UG/1
1 INHALANT RESPIRATORY (INHALATION) EVERY 4 HOURS PRN
Qty: 18 G | Refills: 1 | Status: SHIPPED | OUTPATIENT
Start: 2025-02-06

## 2025-02-06 RX ORDER — GLIMEPIRIDE 4 MG/1
4 TABLET ORAL 2 TIMES DAILY
Qty: 60 TABLET | Refills: 1 | Status: SHIPPED | OUTPATIENT
Start: 2025-02-06

## 2025-02-06 RX ORDER — ALLOPURINOL 100 MG/1
100 TABLET ORAL DAILY
Qty: 30 TABLET | Refills: 1 | Status: SHIPPED | OUTPATIENT
Start: 2025-02-06

## 2025-02-06 RX ORDER — OMEPRAZOLE 40 MG/1
40 CAPSULE, DELAYED RELEASE ORAL
Qty: 30 CAPSULE | Refills: 1 | Status: SHIPPED | OUTPATIENT
Start: 2025-02-06

## 2025-02-06 RX ORDER — FLUTICASONE PROPIONATE AND SALMETEROL 250; 50 UG/1; UG/1
1 POWDER RESPIRATORY (INHALATION) EVERY 12 HOURS
Qty: 30 EACH | Refills: 1 | Status: SHIPPED | OUTPATIENT
Start: 2025-02-06

## 2025-02-06 RX ORDER — TAMSULOSIN HYDROCHLORIDE 0.4 MG/1
0.4 CAPSULE ORAL DAILY
Qty: 30 CAPSULE | Refills: 1 | Status: SHIPPED | OUTPATIENT
Start: 2025-02-06

## 2025-02-06 RX ORDER — BUMETANIDE 1 MG/1
1 TABLET ORAL DAILY
Qty: 30 TABLET | Refills: 1 | Status: SHIPPED | OUTPATIENT
Start: 2025-02-06

## 2025-02-06 RX ORDER — NADOLOL 20 MG/1
20 TABLET ORAL DAILY
Qty: 30 TABLET | Refills: 0 | Status: SHIPPED | OUTPATIENT
Start: 2025-02-06

## 2025-02-07 DIAGNOSIS — J44.9 CHRONIC OBSTRUCTIVE PULMONARY DISEASE, UNSPECIFIED COPD TYPE (HCC): ICD-10-CM

## 2025-02-07 DIAGNOSIS — K76.9 CHRONIC LIVER DISEASE: ICD-10-CM

## 2025-02-07 DIAGNOSIS — M10.9 GOUT, UNSPECIFIED CAUSE, UNSPECIFIED CHRONICITY, UNSPECIFIED SITE: ICD-10-CM

## 2025-02-07 RX ORDER — BUMETANIDE 1 MG/1
1 TABLET ORAL DAILY
Qty: 30 TABLET | Refills: 1 | OUTPATIENT
Start: 2025-02-07

## 2025-02-07 RX ORDER — OMEPRAZOLE 40 MG/1
40 CAPSULE, DELAYED RELEASE ORAL
Qty: 30 CAPSULE | Refills: 1 | OUTPATIENT
Start: 2025-02-07

## 2025-02-07 RX ORDER — ALLOPURINOL 100 MG/1
100 TABLET ORAL DAILY
Qty: 30 TABLET | Refills: 1 | OUTPATIENT
Start: 2025-02-07

## 2025-02-14 ENCOUNTER — HOSPITAL ENCOUNTER (OUTPATIENT)
Age: 60
Discharge: HOME | End: 2025-02-14
Payer: COMMERCIAL

## 2025-02-14 ENCOUNTER — HOSPITAL ENCOUNTER (OUTPATIENT)
Dept: ULTRASOUND IMAGING | Age: 60
Discharge: HOME | End: 2025-02-16
Payer: COMMERCIAL

## 2025-02-14 VITALS — DIASTOLIC BLOOD PRESSURE: 76 MMHG | SYSTOLIC BLOOD PRESSURE: 128 MMHG | HEART RATE: 83 BPM | OXYGEN SATURATION: 97 %

## 2025-02-14 DIAGNOSIS — K70.31 ASCITES DUE TO ALCOHOLIC CIRRHOSIS (HCC): Primary | ICD-10-CM

## 2025-02-14 DIAGNOSIS — R18.8 OTHER ASCITES: ICD-10-CM

## 2025-02-14 LAB
INR PPP: 1.2
PARTIAL THROMBOPLASTIN TIME: 30.4 SEC (ref 23–36.5)
PLATELET # BLD AUTO: 76 K/UL (ref 138–453)
PROTHROMBIN TIME: 15.4 SEC (ref 11.7–14.9)

## 2025-02-14 PROCEDURE — 85610 PROTHROMBIN TIME: CPT

## 2025-02-14 PROCEDURE — P9047 ALBUMIN (HUMAN), 25%, 50ML: HCPCS | Performed by: RADIOLOGY

## 2025-02-14 PROCEDURE — 85730 THROMBOPLASTIN TIME PARTIAL: CPT

## 2025-02-14 PROCEDURE — 6360000002 HC RX W HCPCS: Performed by: RADIOLOGY

## 2025-02-14 PROCEDURE — 85049 AUTOMATED PLATELET COUNT: CPT

## 2025-02-14 PROCEDURE — 49083 ABD PARACENTESIS W/IMAGING: CPT

## 2025-02-14 RX ORDER — ALBUMIN (HUMAN) 12.5 G/50ML
75 SOLUTION INTRAVENOUS ONCE
Status: COMPLETED | OUTPATIENT
Start: 2025-02-14 | End: 2025-02-14

## 2025-02-14 RX ADMIN — ALBUMIN (HUMAN) 75 G: 0.25 INJECTION, SOLUTION INTRAVENOUS at 11:31

## 2025-02-14 NOTE — FLOWSHEET NOTE
Paracentesis     DR. ZARI Snow RN  KV RDMS    Pt. To ultrasound room 8  Identified, allergies confirmed  Supine position  Stable    Time out complete. Prep to abd.  7.9 L of clear yellow colored fluid drawn off.   2x2 with Tegaderm to puncture site.   NO samples at this time. Albumin ordered,adm per writer in IR dept  Patient tolerated well. No problems noted.     Patient stable, off monitor and able to ambulate self out of IR.

## 2025-02-14 NOTE — BRIEF OP NOTE
Brief Postoperative Note for Paracentesis    Jon Odom  YOB: 1965  [id number]    Pre-operative Diagnosis: Ascites      Post-operative Diagnosis: Same    Procedure: Ultrasound guided Paracentesis     Anesthesia: 1% Lidocaine     Surgeons/Assistants: MARION HAMEED MD    Complications: none    Specimens: were not obtained    Ultrasound guided paracentesis performed. 7900 ml clear yellow fluid obtained from RLQ.  Albumin 75 gms IV was given post procedure. Dressing applied.  Vital signs were reviewed and were stable after the procedure.  Discharged to home.      Electronically signed by MARION HAMEED MD on 2/14/2025 at 12:27 PM

## 2025-02-20 ENCOUNTER — TELEPHONE (OUTPATIENT)
Dept: GASTROENTEROLOGY | Age: 60
End: 2025-02-20

## 2025-02-20 NOTE — TELEPHONE ENCOUNTER
Patient's daughter called requesting a new referral can be faxed to [facility]. Previous referral was faxed a year ago. Patient was unable to be seen then due to insurance. If approved please fax a new referral to [facility].    abdominal pain

## 2025-02-20 NOTE — TELEPHONE ENCOUNTER
Message was left to call patient. No reason was given, jut requested a call back. Patient contact [phone].

## 2025-02-21 NOTE — TELEPHONE ENCOUNTER
Writer faxed new referral to [facility] for liver transplant, writer called pt number on file, no answer, lvm advising this has been faxed and [facility] should be reaching out to consult.

## 2025-03-03 ENCOUNTER — HOSPITAL ENCOUNTER (OUTPATIENT)
Dept: ULTRASOUND IMAGING | Age: 60
Discharge: HOME | End: 2025-03-05
Payer: COMMERCIAL

## 2025-03-03 VITALS — OXYGEN SATURATION: 96 % | SYSTOLIC BLOOD PRESSURE: 160 MMHG | DIASTOLIC BLOOD PRESSURE: 98 MMHG

## 2025-03-03 DIAGNOSIS — K76.9 CHRONIC LIVER DISEASE: ICD-10-CM

## 2025-03-03 PROCEDURE — 49083 ABD PARACENTESIS W/IMAGING: CPT

## 2025-03-03 NOTE — PROGRESS NOTES
Patient here for ultrasound paracentesis. Consent signed. Tirso Bryant PA/ KV  in room. Ultrasound done to abdomen. Right side of abdomen prepped, draped and numbed with lidocaine. Needle in without difficulty.  3L of yellow fluid drained. Yueh out, post scan done and dressing on. Patient discharged to . Patient tolerated well.

## 2025-03-03 NOTE — BRIEF OP NOTE
Brief Postoperative Note for Paracentesis    Jon Odom  YOB: 1965  [id number]    Pre-operative Diagnosis:  Ascites     Post-operative Diagnosis: Same    Procedure: Ultrasound guided paracentesis     Anesthesia: 1% Lidocaine     Surgeons/Assistants: Tirso Gloria PA-C    Complications: none    EBL: Minimal    Specimens: Were obtained    Ultrasound guided paracentesis performed. 3000 ml clear yellow fluid obtained.  Dressing applied.     Electronically signed by FRANKIE Kimball on 3/3/2025 at 10:11 AM

## 2025-03-05 ENCOUNTER — OFFICE VISIT (OUTPATIENT)
Age: 60
End: 2025-03-05
Payer: COMMERCIAL

## 2025-03-05 VITALS
DIASTOLIC BLOOD PRESSURE: 82 MMHG | SYSTOLIC BLOOD PRESSURE: 130 MMHG | TEMPERATURE: 97.3 F | WEIGHT: 219 LBS | BODY MASS INDEX: 32.44 KG/M2 | HEIGHT: 69 IN | OXYGEN SATURATION: 100 % | HEART RATE: 90 BPM

## 2025-03-05 DIAGNOSIS — L82.1 SEBORRHEIC KERATOSES: ICD-10-CM

## 2025-03-05 DIAGNOSIS — Z85.828 HISTORY OF BASAL CELL CARCINOMA (BCC): Primary | ICD-10-CM

## 2025-03-05 DIAGNOSIS — F42.4 EXCORIATION (SKIN-PICKING) DISORDER: ICD-10-CM

## 2025-03-05 DIAGNOSIS — L81.4 LENTIGINES: ICD-10-CM

## 2025-03-05 PROCEDURE — 99212 OFFICE O/P EST SF 10 MIN: CPT | Performed by: PHYSICIAN ASSISTANT

## 2025-03-05 PROCEDURE — 3079F DIAST BP 80-89 MM HG: CPT | Performed by: PHYSICIAN ASSISTANT

## 2025-03-05 PROCEDURE — 3075F SYST BP GE 130 - 139MM HG: CPT | Performed by: PHYSICIAN ASSISTANT

## 2025-03-05 PROCEDURE — 99213 OFFICE O/P EST LOW 20 MIN: CPT | Performed by: PHYSICIAN ASSISTANT

## 2025-03-05 NOTE — PROGRESS NOTES
skin increases lifetime risk for skin cancer  - I recommended the patient apply broad spectrum spf 30+ sunscreen daily, reapplying every 2 hours.  - In additional to regular use of sunscreen, I recommended broad-rimmed hats, long sleeves, and seeking the shade.     2. Lentigines  - reassurance and education     3. Seborrheic keratoses  - reassurance and education     4. Excoriation (skin-picking) disorder  - reassurance and education   - Behavioral modification      RTC 1Y    Future Appointments   Date Time Provider Department Center   3/14/2025 10:00 AM ST[facility] US IR/OR S8 STVZ US [facility]   3/28/2025 10:00 AM STV US IR/OR S8 ST[facility] US [facility]   4/11/2025 10:00 AM STV US IR/OR S8 ST[facility] US [facility]   4/25/2025 10:00 AM STV US IR/OR S8 [facility] US [facility]   5/9/2025 10:00 AM STV US IR/OR S8 ST[facility] US [facility]   5/23/2025 10:00 AM STV US IR/OR S8 ST[facility]Z US [facility]   6/6/2025 10:00 AM STV US IR/OR S8 [facility] US [facility]   6/20/2025 10:00 AM STV US IR/OR S8 STVZ US [facility]         There are no Patient Instructions on file for this visit.      Electronically signed by Miles Alcantara PA-C on 3/5/25 at 3:46 PM EST

## 2025-03-14 ENCOUNTER — HOSPITAL ENCOUNTER (OUTPATIENT)
Dept: ULTRASOUND IMAGING | Age: 60
Discharge: HOME | End: 2025-03-16
Payer: COMMERCIAL

## 2025-03-14 VITALS — SYSTOLIC BLOOD PRESSURE: 132 MMHG | OXYGEN SATURATION: 96 % | HEART RATE: 100 BPM | DIASTOLIC BLOOD PRESSURE: 78 MMHG

## 2025-03-14 DIAGNOSIS — R18.8 OTHER ASCITES: ICD-10-CM

## 2025-03-14 PROCEDURE — 2709999900 US GUIDED PARACENTESIS

## 2025-03-14 NOTE — BRIEF OP NOTE
Brief Postoperative Note for Paracentesis    Jon Odom  YOB: 1965  [id number]    Pre-operative Diagnosis:  Ascites     Post-operative Diagnosis: Same    Procedure: Ultrasound guided paracentesis     Anesthesia: 1% Lidocaine     Surgeons/Assistants: MD Ras    Complications: none    EBL: Minimal    Specimens: Were obtained    Ultrasound guided paracentesis performed. 4100 ml clear yellow fluid obtained.  Dressing applied.     Electronically signed by FRANKIE Kimball on 3/14/2025 at 11:36 AM

## 2025-03-14 NOTE — PROGRESS NOTES
Consent verified for therapeutic paracentesis, signed at 10:47 am.  LA/RN; KV/RDMS  10:57am-Dr Mcginnis in, time out done; ultrasound in use.  10:58am-RLQ prepped/draped/accessed by Dr Mcginnis  Obtained 4.1 li clear yellow fluid  11:23am-Access removed, DSD applied.  Pt tolerated procedure  Discharged accompanied by Brenda.

## 2025-03-17 DIAGNOSIS — K76.9 CHRONIC LIVER DISEASE: ICD-10-CM

## 2025-03-17 DIAGNOSIS — E11.65 TYPE 2 DIABETES MELLITUS WITH HYPERGLYCEMIA, WITHOUT LONG-TERM CURRENT USE OF INSULIN (HCC): ICD-10-CM

## 2025-03-18 RX ORDER — NADOLOL 20 MG/1
20 TABLET ORAL DAILY
Qty: 30 TABLET | Refills: 0 | Status: SHIPPED | OUTPATIENT
Start: 2025-03-18

## 2025-03-28 ENCOUNTER — HOSPITAL ENCOUNTER (OUTPATIENT)
Dept: ULTRASOUND IMAGING | Age: 60
Discharge: HOME | End: 2025-03-30
Payer: COMMERCIAL

## 2025-03-28 VITALS
RESPIRATION RATE: 16 BRPM | DIASTOLIC BLOOD PRESSURE: 55 MMHG | HEART RATE: 88 BPM | SYSTOLIC BLOOD PRESSURE: 111 MMHG | OXYGEN SATURATION: 98 %

## 2025-03-28 DIAGNOSIS — R18.8 ASCITES OF LIVER: ICD-10-CM

## 2025-03-28 PROCEDURE — P9047 ALBUMIN (HUMAN), 25%, 50ML: HCPCS | Performed by: RADIOLOGY

## 2025-03-28 PROCEDURE — 49083 ABD PARACENTESIS W/IMAGING: CPT

## 2025-03-28 PROCEDURE — 6360000002 HC RX W HCPCS: Performed by: RADIOLOGY

## 2025-03-28 RX ORDER — ALBUMIN (HUMAN) 12.5 G/50ML
50 SOLUTION INTRAVENOUS ONCE
Status: COMPLETED | OUTPATIENT
Start: 2025-03-28 | End: 2025-03-28

## 2025-03-28 RX ADMIN — ALBUMIN (HUMAN) 50 G: 0.25 INJECTION, SOLUTION INTRAVENOUS at 11:02

## 2025-03-28 NOTE — PROGRESS NOTES
Patient to US for therapeutic paracentesis.  CHINA DAVID and HECTRO RDMS at bedside.  Site prepped and draped, area numbed with lidocaine.  6.4L of clear yellow fluid drained.  Dry sterile drg with tegaderm placed to site.  Albumin ordered and infused.  Patient tolerated well.

## 2025-03-28 NOTE — BRIEF OP NOTE
Brief Postoperative Note for Paracentesis    Jon Odom  YOB: 1965  [id number]    Pre-operative Diagnosis:  Ascites     Post-operative Diagnosis: Same    Procedure: Ultrasound guided paracentesis     Anesthesia: 1% Lidocaine     Surgeons/Assistants: Radhika Edward PA-C    Complications: none    EBL: Minimal    Specimens: Were obtained    Ultrasound guided paracentesis performed. 6.4 liters clear yellow fluid obtained.  Dressing applied. IV albumin ordered. Patient tolerated procedure well.    Electronically signed by Radhika Edward PA-C on 3/28/2025 at 11:02 AM

## 2025-04-11 ENCOUNTER — HOSPITAL ENCOUNTER (OUTPATIENT)
Dept: ULTRASOUND IMAGING | Age: 60
Discharge: HOME | End: 2025-04-13
Payer: COMMERCIAL

## 2025-04-11 VITALS
RESPIRATION RATE: 16 BRPM | OXYGEN SATURATION: 95 % | HEART RATE: 82 BPM | DIASTOLIC BLOOD PRESSURE: 46 MMHG | SYSTOLIC BLOOD PRESSURE: 111 MMHG

## 2025-04-11 DIAGNOSIS — K76.9 CHRONIC LIVER DISEASE: ICD-10-CM

## 2025-04-11 PROCEDURE — 49083 ABD PARACENTESIS W/IMAGING: CPT

## 2025-04-11 NOTE — BRIEF OP NOTE
Brief Postoperative Note for Paracentesis    Jon Odom  YOB: 1965  [id number]    Pre-operative Diagnosis:  Ascites     Post-operative Diagnosis: Same    Procedure: Ultrasound guided paracentesis     Anesthesia: 1% Lidocaine     Surgeons/Assistants: Tirso Gloria PA-C    Complications: none    EBL: Minimal    Specimens: Were obtained    Ultrasound guided paracentesis performed. 4700 ml clear yellow fluid obtained.  Dressing applied.     Electronically signed by FRANKIE Kimball on 4/11/2025 at 10:50 AM

## 2025-04-11 NOTE — PROGRESS NOTES
Patient to US for therapeutic paracentesis.  JR DAVID and RS RDMS at bedside.  Site prepped and draped, area numbed with lidocaine.  4.7L of clear yellow fluid drained.  Dry sterile drsg with tegaderm placed to site.  Patient tolerated well.

## 2025-04-21 ENCOUNTER — TELEPHONE (OUTPATIENT)
Dept: INTERNAL MEDICINE CLINIC | Age: 60
End: 2025-04-21

## 2025-04-21 DIAGNOSIS — J06.9 UPPER RESPIRATORY TRACT INFECTION, UNSPECIFIED TYPE: Primary | ICD-10-CM

## 2025-04-21 NOTE — TELEPHONE ENCOUNTER
Patient woke up yesterday with a bad head and chest cold.  Sx  Congestion  Hoarseness    Patient is going to the [facility] for PAT/evaluation for a liver transplant on Sunday .    The are looking fro a Z-pack to be sent in for him.      They did reach out to the  [facility] Liver Transplant dept and are waiting on a call back just to see if the patient can still go and have his testing done. They did not ask for any medication.     [facility]

## 2025-04-22 RX ORDER — AZITHROMYCIN 250 MG/1
TABLET, FILM COATED ORAL
Qty: 6 TABLET | Refills: 0 | Status: SHIPPED | OUTPATIENT
Start: 2025-04-22 | End: 2025-05-02

## 2025-04-25 ENCOUNTER — HOSPITAL ENCOUNTER (OUTPATIENT)
Dept: ULTRASOUND IMAGING | Age: 60
Discharge: HOME | End: 2025-04-27
Payer: COMMERCIAL

## 2025-04-25 VITALS — OXYGEN SATURATION: 96 % | DIASTOLIC BLOOD PRESSURE: 61 MMHG | SYSTOLIC BLOOD PRESSURE: 100 MMHG

## 2025-04-25 DIAGNOSIS — R18.8 ASCITES OF LIVER: ICD-10-CM

## 2025-04-25 PROCEDURE — 49083 ABD PARACENTESIS W/IMAGING: CPT

## 2025-04-25 NOTE — BRIEF OP NOTE
Brief Postoperative Note for Paracentesis    Jon Odom  YOB: 1965  [id number]    Pre-operative Diagnosis:  Ascites     Post-operative Diagnosis: Same    Procedure: Ultrasound guided paracentesis     Anesthesia: 1% Lidocaine     Surgeons/Assistants: Tirso Gloria PA-C    Complications: none    EBL: Minimal    Specimens: Were obtained    Ultrasound guided paracentesis performed. 4250 ml clear yellow fluid obtained.  Dressing applied.     Electronically signed by FRANKIE Kimball on 4/25/2025 at 9:41 AM

## 2025-04-25 NOTE — PROGRESS NOTES
Here for paracentesis. Consent done. Tirso Bryant/ AE present. Right side of abdomen is prepped, draped and numbed. Access obtained and patient drained for 4250 ml of yellow fluid. Post scan done. Access out and dressing on. Patient home with family member.

## 2025-05-04 DIAGNOSIS — K76.9 CHRONIC LIVER DISEASE: ICD-10-CM

## 2025-05-04 DIAGNOSIS — M10.9 GOUT, UNSPECIFIED CAUSE, UNSPECIFIED CHRONICITY, UNSPECIFIED SITE: ICD-10-CM

## 2025-05-04 DIAGNOSIS — J44.9 CHRONIC OBSTRUCTIVE PULMONARY DISEASE, UNSPECIFIED COPD TYPE (HCC): ICD-10-CM

## 2025-05-05 RX ORDER — ALLOPURINOL 100 MG/1
100 TABLET ORAL DAILY
Qty: 30 TABLET | Refills: 1 | Status: SHIPPED | OUTPATIENT
Start: 2025-05-05

## 2025-05-05 RX ORDER — BUMETANIDE 1 MG/1
1 TABLET ORAL DAILY
Qty: 30 TABLET | Refills: 1 | Status: SHIPPED | OUTPATIENT
Start: 2025-05-05

## 2025-05-09 ENCOUNTER — HOSPITAL ENCOUNTER (OUTPATIENT)
Dept: ULTRASOUND IMAGING | Age: 60
Discharge: HOME | End: 2025-05-11
Payer: COMMERCIAL

## 2025-05-09 VITALS — SYSTOLIC BLOOD PRESSURE: 97 MMHG | OXYGEN SATURATION: 98 % | DIASTOLIC BLOOD PRESSURE: 55 MMHG | HEART RATE: 65 BPM

## 2025-05-09 DIAGNOSIS — K76.9 CHRONIC LIVER DISEASE: ICD-10-CM

## 2025-05-09 PROCEDURE — 2709999900 US GUIDED PARACENTESIS

## 2025-05-09 PROCEDURE — P9047 ALBUMIN (HUMAN), 25%, 50ML: HCPCS | Performed by: PHYSICIAN ASSISTANT

## 2025-05-09 PROCEDURE — 6360000002 HC RX W HCPCS: Performed by: PHYSICIAN ASSISTANT

## 2025-05-09 RX ORDER — ALBUMIN (HUMAN) 12.5 G/50ML
50 SOLUTION INTRAVENOUS ONCE
Status: COMPLETED | OUTPATIENT
Start: 2025-05-09 | End: 2025-05-09

## 2025-05-09 RX ADMIN — ALBUMIN (HUMAN) 50 G: 0.25 INJECTION, SOLUTION INTRAVENOUS at 11:20

## 2025-05-09 NOTE — BRIEF OP NOTE
Brief Postoperative Note for Paracentesis    Jon Odom  YOB: 1965  [id number]    Pre-operative Diagnosis:  Ascites     Post-operative Diagnosis: Same    Procedure: Ultrasound guided paracentesis     Anesthesia: 1% Lidocaine     Surgeons/Assistants: Tirso Gloria PA-C    Complications: none    EBL: Minimal    Specimens: Were obtained    Ultrasound guided paracentesis performed. 5000 ml clear yellow fluid obtained.  Dressing applied.     Electronically signed by FRANKIE Kimball on 5/9/2025 at 11:25 AM

## 2025-05-09 NOTE — PROGRESS NOTES
Consent verified for therapeutic paracentesis  LA/RN; KV/RDMS  JR/PA  RLQ prepped/draped; UTS in use  RLQ Lidocaine injection and accessed by JR/PA  Monitoring pt BP; informed JR/PA of BP at lower side, asymptomatic, alert and oriented throughout; verbal order to stop at 5 liters of fluid   Access removed, no undue bleeding, DSD applied  Obtained total of 5 liters yellow clear fluid..  IV access placed for albumin, see LDA and MAR.  Pt tolerated procedure.  11:44am=Discharged accompanied by wife.

## 2025-05-23 ENCOUNTER — HOSPITAL ENCOUNTER (OUTPATIENT)
Age: 60
Discharge: HOME | End: 2025-05-23
Payer: COMMERCIAL

## 2025-05-23 ENCOUNTER — HOSPITAL ENCOUNTER (OUTPATIENT)
Dept: ULTRASOUND IMAGING | Age: 60
Discharge: HOME | End: 2025-05-25
Payer: COMMERCIAL

## 2025-05-23 VITALS — SYSTOLIC BLOOD PRESSURE: 130 MMHG | OXYGEN SATURATION: 97 % | HEART RATE: 73 BPM | DIASTOLIC BLOOD PRESSURE: 63 MMHG

## 2025-05-23 DIAGNOSIS — R18.8 ASCITES OF LIVER: ICD-10-CM

## 2025-05-23 LAB
INR PPP: 1.3
PLATELET # BLD AUTO: NORMAL K/UL (ref 138–453)
PLATELET, FLUORESCENCE: 54 K/UL (ref 138–453)
PLATELETS.RETICULATED NFR BLD AUTO: 2.1 % (ref 1.1–10.3)
PROTHROMBIN TIME: 16.8 SEC (ref 11.7–14.9)

## 2025-05-23 PROCEDURE — 6360000002 HC RX W HCPCS: Performed by: PHYSICIAN ASSISTANT

## 2025-05-23 PROCEDURE — 85610 PROTHROMBIN TIME: CPT

## 2025-05-23 PROCEDURE — 85055 RETICULATED PLATELET ASSAY: CPT

## 2025-05-23 PROCEDURE — 85049 AUTOMATED PLATELET COUNT: CPT

## 2025-05-23 PROCEDURE — 49083 ABD PARACENTESIS W/IMAGING: CPT

## 2025-05-23 PROCEDURE — P9047 ALBUMIN (HUMAN), 25%, 50ML: HCPCS | Performed by: PHYSICIAN ASSISTANT

## 2025-05-23 PROCEDURE — 36415 COLL VENOUS BLD VENIPUNCTURE: CPT

## 2025-05-23 RX ORDER — ALBUMIN (HUMAN) 12.5 G/50ML
50 SOLUTION INTRAVENOUS ONCE
Status: COMPLETED | OUTPATIENT
Start: 2025-05-23 | End: 2025-05-23

## 2025-05-23 RX ADMIN — ALBUMIN (HUMAN) 50 G: 0.25 INJECTION, SOLUTION INTRAVENOUS at 11:34

## 2025-05-23 NOTE — PROGRESS NOTES
Consent verified for paracentesis.  LA/RN; CHINA/NIESHA; AE/RDMS  UTS in use , RLQ prepped/draped; lidocaine injected and accessed by CHINA/NIESHA  Obtained 5.6 liters clear yellow fluid  Access removed, no bleeding in site, DSD applied  Pt tolerated procedure well.  LDA placed for albumin administration; see LDA and MAR.  Discharged accompanied by family.

## 2025-05-23 NOTE — BRIEF OP NOTE
Brief Postoperative Note for Paracentesis    Jon Odom  YOB: 1965  [id number]    Pre-operative Diagnosis:  Ascites     Post-operative Diagnosis: Same    Procedure: Ultrasound guided paracentesis     Anesthesia: 1% Lidocaine     Surgeons/Assistants: Radhika Edward PA-C    Complications: none    EBL: Minimal    Specimens: Were obtained    Ultrasound guided paracentesis performed along right abdomen. 5600 ml clear yellow fluid obtained.  Dressing applied. 50g IV albumin ordered. Patient tolerated procedure well.    Electronically signed by Radhika Edward PA-C on 5/23/2025 at 11:30 AM

## 2025-06-06 ENCOUNTER — HOSPITAL ENCOUNTER (OUTPATIENT)
Dept: ULTRASOUND IMAGING | Age: 60
Discharge: HOME | End: 2025-06-08
Payer: COMMERCIAL

## 2025-06-06 VITALS
SYSTOLIC BLOOD PRESSURE: 111 MMHG | DIASTOLIC BLOOD PRESSURE: 69 MMHG | RESPIRATION RATE: 16 BRPM | OXYGEN SATURATION: 97 % | HEART RATE: 82 BPM

## 2025-06-06 DIAGNOSIS — R18.8 ASCITES OF LIVER: ICD-10-CM

## 2025-06-06 PROCEDURE — 49083 ABD PARACENTESIS W/IMAGING: CPT

## 2025-06-06 NOTE — PROGRESS NOTES
Patient to US for therapeutic paracentesis.  JR DAVID and BERTRAND RDMS at bedside.  Site prepped and draped, area numbed with lidocaine.  3L of clear yellow fluid drained.  Dry sterile drsg with tegaderm placed to site.  Patient tolerated well.

## 2025-06-06 NOTE — BRIEF OP NOTE
Brief Postoperative Note for Paracentesis    Jon Odom  YOB: 1965  [id number]    Pre-operative Diagnosis:  Ascites     Post-operative Diagnosis: Same    Procedure: Ultrasound guided paracentesis     Anesthesia: 1% Lidocaine     Surgeons/Assistants: Tirso Gloria PA-C    Complications: none    EBL: Minimal    Specimens: Were obtained    Ultrasound guided paracentesis performed. 3000 ml clear yellow fluid obtained.  Dressing applied.     Electronically signed by FRANKIE Kimball on 6/6/2025 at 10:51 AM

## 2025-06-11 DIAGNOSIS — K76.9 CHRONIC LIVER DISEASE: ICD-10-CM

## 2025-06-11 DIAGNOSIS — J45.40 MODERATE PERSISTENT ASTHMA, UNSPECIFIED WHETHER COMPLICATED: ICD-10-CM

## 2025-06-12 RX ORDER — FLUTICASONE PROPIONATE AND SALMETEROL 250; 50 UG/1; UG/1
1 POWDER RESPIRATORY (INHALATION) EVERY 12 HOURS
Qty: 30 EACH | Refills: 1 | OUTPATIENT
Start: 2025-06-12

## 2025-06-12 RX ORDER — GLIMEPIRIDE 4 MG/1
4 TABLET ORAL 2 TIMES DAILY
Qty: 60 TABLET | Refills: 1 | Status: SHIPPED | OUTPATIENT
Start: 2025-06-12

## 2025-06-12 RX ORDER — OMEPRAZOLE 40 MG/1
40 CAPSULE, DELAYED RELEASE ORAL
Qty: 30 CAPSULE | Refills: 1 | Status: SHIPPED | OUTPATIENT
Start: 2025-06-12

## 2025-06-12 RX ORDER — FLUTICASONE PROPIONATE AND SALMETEROL 50; 250 UG/1; UG/1
POWDER RESPIRATORY (INHALATION)
Qty: 60 EACH | Refills: 0 | Status: SHIPPED | OUTPATIENT
Start: 2025-06-12

## 2025-06-12 RX ORDER — NADOLOL 20 MG/1
20 TABLET ORAL DAILY
Qty: 30 TABLET | Refills: 0 | Status: SHIPPED | OUTPATIENT
Start: 2025-06-12

## 2025-06-20 ENCOUNTER — HOSPITAL ENCOUNTER (OUTPATIENT)
Dept: ULTRASOUND IMAGING | Age: 60
Discharge: HOME | End: 2025-06-22
Payer: COMMERCIAL

## 2025-06-20 VITALS — SYSTOLIC BLOOD PRESSURE: 91 MMHG | OXYGEN SATURATION: 95 % | DIASTOLIC BLOOD PRESSURE: 41 MMHG

## 2025-06-20 DIAGNOSIS — K76.9 CHRONIC LIVER DISEASE: ICD-10-CM

## 2025-06-20 PROCEDURE — 49083 ABD PARACENTESIS W/IMAGING: CPT

## 2025-06-20 NOTE — BRIEF OP NOTE
Brief Postoperative Note for Paracentesis    Jon Odom  YOB: 1965  [id number]    Pre-operative Diagnosis:  Ascites     Post-operative Diagnosis: Same    Procedure: Ultrasound guided paracentesis     Anesthesia: 1% Lidocaine     Surgeons/Assistants: Tirso Gloria PA-C    Complications: none    EBL: Minimal    Specimens: Were obtained    Ultrasound guided paracentesis performed. 2350 ml clear yellow fluid obtained.  Dressing applied.     Electronically signed by FRANKIE Kimball on 6/20/2025 at 11:12 AM

## 2025-06-20 NOTE — PROGRESS NOTES
Patient here for ultrasound paracentesis. Consent signed. Tirso DAVID in room. Ultrasound done to abdomen. Right side of abdomen prepped, draped and numbed with lidocaine. Needle in without difficulty.  2350 ml of yellow fluid drained. Yueh out, post scan done and dressing on. Patient discharged to home. Patient tolerated well.

## 2025-06-21 DIAGNOSIS — J45.40 MODERATE PERSISTENT ASTHMA, UNSPECIFIED WHETHER COMPLICATED: ICD-10-CM

## 2025-06-21 DIAGNOSIS — J44.9 CHRONIC OBSTRUCTIVE PULMONARY DISEASE, UNSPECIFIED COPD TYPE (HCC): ICD-10-CM

## 2025-06-23 ENCOUNTER — TELEPHONE (OUTPATIENT)
Dept: INTERNAL MEDICINE CLINIC | Age: 60
End: 2025-06-23

## 2025-06-23 DIAGNOSIS — K76.9 CHRONIC LIVER DISEASE: Primary | ICD-10-CM

## 2025-06-23 RX ORDER — GLIMEPIRIDE 4 MG/1
4 TABLET ORAL 2 TIMES DAILY
Qty: 60 TABLET | Refills: 1 | OUTPATIENT
Start: 2025-06-23

## 2025-06-23 RX ORDER — ALBUTEROL SULFATE 90 UG/1
INHALANT RESPIRATORY (INHALATION)
Qty: 18 G | Refills: 0 | Status: SHIPPED | OUTPATIENT
Start: 2025-06-23

## 2025-06-23 NOTE — TELEPHONE ENCOUNTER
Patients girlfriend Patti calling in request for renewal order for  Paracentesis to be faxed to [facility], please advise

## 2025-06-23 NOTE — TELEPHONE ENCOUNTER
Requesting renewal for disability  handicap placard due to not being able to walk 200 ft without resting.

## 2025-07-03 ENCOUNTER — HOSPITAL ENCOUNTER (OUTPATIENT)
Dept: ULTRASOUND IMAGING | Age: 60
Discharge: HOME | End: 2025-07-05
Payer: COMMERCIAL

## 2025-07-03 VITALS
DIASTOLIC BLOOD PRESSURE: 77 MMHG | RESPIRATION RATE: 16 BRPM | SYSTOLIC BLOOD PRESSURE: 111 MMHG | OXYGEN SATURATION: 100 % | HEART RATE: 76 BPM | TEMPERATURE: 98.1 F

## 2025-07-03 DIAGNOSIS — K76.9 CHRONIC LIVER DISEASE: ICD-10-CM

## 2025-07-03 PROCEDURE — 49083 ABD PARACENTESIS W/IMAGING: CPT

## 2025-07-03 PROCEDURE — P9047 ALBUMIN (HUMAN), 25%, 50ML: HCPCS | Performed by: PHYSICIAN ASSISTANT

## 2025-07-03 PROCEDURE — 6360000002 HC RX W HCPCS: Performed by: PHYSICIAN ASSISTANT

## 2025-07-03 RX ORDER — ALBUMIN (HUMAN) 12.5 G/50ML
50 SOLUTION INTRAVENOUS ONCE
Status: COMPLETED | OUTPATIENT
Start: 2025-07-03 | End: 2025-07-03

## 2025-07-03 RX ADMIN — ALBUMIN (HUMAN): 0.25 INJECTION, SOLUTION INTRAVENOUS at 15:00

## 2025-07-03 NOTE — PROGRESS NOTES
Patient here for ultrasound paracentesis. Consent signed.  Tirso DAVID in room. Ultrasound done to abdomen. Left side of abdomen prepped, draped and numbed with lidocaine. Needle in without difficulty.  5.1L of yellow fluid drained. Yueh out, post scan done and dressing on. Albumin ordered.  Patient tolerated well.

## 2025-07-03 NOTE — BRIEF OP NOTE
Brief Postoperative Note for Paracentesis    Jon Odom  YOB: 1965  [id number]    Pre-operative Diagnosis:  Ascites     Post-operative Diagnosis: Same    Procedure: Ultrasound guided paracentesis     Anesthesia: 1% Lidocaine     Surgeons/Assistants: Tirso Gloria PA-C    Complications: none    EBL: Minimal    Specimens: Were obtained    Ultrasound guided paracentesis performed. 5100 ml clear yellow fluid obtained.  Dressing applied.     Electronically signed by FRANKIE Kimball on 7/3/2025 at 3:00 PM

## 2025-07-13 DIAGNOSIS — E11.65 TYPE 2 DIABETES MELLITUS WITH HYPERGLYCEMIA, WITHOUT LONG-TERM CURRENT USE OF INSULIN (HCC): ICD-10-CM

## 2025-07-15 RX ORDER — GLIMEPIRIDE 4 MG/1
4 TABLET ORAL 2 TIMES DAILY
Qty: 60 TABLET | Refills: 0 | Status: SHIPPED | OUTPATIENT
Start: 2025-07-15

## 2025-07-18 ENCOUNTER — HOSPITAL ENCOUNTER (OUTPATIENT)
Dept: ULTRASOUND IMAGING | Age: 60
Discharge: HOME | End: 2025-07-20
Payer: COMMERCIAL

## 2025-07-18 VITALS — OXYGEN SATURATION: 98 % | HEART RATE: 71 BPM | SYSTOLIC BLOOD PRESSURE: 97 MMHG | DIASTOLIC BLOOD PRESSURE: 50 MMHG

## 2025-07-18 DIAGNOSIS — K76.9 CHRONIC LIVER DISEASE: ICD-10-CM

## 2025-07-18 PROCEDURE — 49083 ABD PARACENTESIS W/IMAGING: CPT

## 2025-07-18 NOTE — PROGRESS NOTES
Consent verified  LA/RN; EH/RDMS  10:19am=/NIESHA  UTS in use, RLQ prepped/draped  Lidocaine injection and RLQ site accessed by ADÁN  Obtained 3.4 liters yellow fluid   10:37am=Access removed , pressure applied, no bleeding. DSD applied  Pt tolerated procedure  Discharged ambulatory.

## 2025-07-18 NOTE — BRIEF OP NOTE
Brief Postoperative Note for Paracentesis    Jon Odom  YOB: 1965  [id number]    Pre-operative Diagnosis:  Ascites     Post-operative Diagnosis: Same    Procedure: Ultrasound guided paracentesis     Anesthesia: 1% Lidocaine     Surgeons/Assistants: Tirso Gloria PA-C    Complications: none    EBL: Minimal    Specimens: Were obtained    Ultrasound guided paracentesis performed. 3400 ml clear yellow fluid obtained.  Dressing applied.     Electronically signed by FRANKIE Kimball on 7/18/2025 at 10:52 AM

## 2025-07-25 ENCOUNTER — HOSPITAL ENCOUNTER (OUTPATIENT)
Dept: ULTRASOUND IMAGING | Age: 60
Discharge: HOME | End: 2025-07-27
Payer: COMMERCIAL

## 2025-07-25 VITALS — DIASTOLIC BLOOD PRESSURE: 60 MMHG | SYSTOLIC BLOOD PRESSURE: 103 MMHG

## 2025-07-25 DIAGNOSIS — K76.9 CHRONIC LIVER DISEASE: ICD-10-CM

## 2025-07-25 PROCEDURE — 49083 ABD PARACENTESIS W/IMAGING: CPT

## 2025-07-25 NOTE — PROGRESS NOTES
Patient here for ultrasound paracentesis. Consent signed. Tirso DAVID in room. Ultrasound done to abdomen.Right side of abdomen prepped, draped and numbed with lidocaine. Needle in without difficulty. 3.8L of yellow fluid drained. Yueh out, post scan done and dressing on. Patient discharged to home. Patient tolerated well.

## 2025-07-25 NOTE — BRIEF OP NOTE
Brief Postoperative Note for Paracentesis    Jon Odom  YOB: 1965  [id number]    Pre-operative Diagnosis:  Ascites     Post-operative Diagnosis: Same    Procedure: Ultrasound guided paracentesis     Anesthesia: 1% Lidocaine     Surgeons/Assistants: Tirso Gloria PA-C    Complications: none    EBL: Minimal    Specimens: Were obtained    Ultrasound guided paracentesis performed. 3800 ml clear yellow fluid obtained.  Dressing applied.     Electronically signed by FRANKIE Kimball on 7/25/2025 at 10:46 AM

## 2025-07-27 DIAGNOSIS — K76.9 CHRONIC LIVER DISEASE: ICD-10-CM

## 2025-07-27 DIAGNOSIS — J44.9 CHRONIC OBSTRUCTIVE PULMONARY DISEASE, UNSPECIFIED COPD TYPE (HCC): ICD-10-CM

## 2025-07-27 DIAGNOSIS — J45.40 MODERATE PERSISTENT ASTHMA, UNSPECIFIED WHETHER COMPLICATED: ICD-10-CM

## 2025-07-27 DIAGNOSIS — M10.9 GOUT, UNSPECIFIED CAUSE, UNSPECIFIED CHRONICITY, UNSPECIFIED SITE: ICD-10-CM

## 2025-07-28 RX ORDER — NADOLOL 20 MG/1
20 TABLET ORAL DAILY
Qty: 30 TABLET | Refills: 0 | Status: SHIPPED | OUTPATIENT
Start: 2025-07-28

## 2025-07-28 RX ORDER — BUMETANIDE 1 MG/1
1 TABLET ORAL DAILY
Qty: 30 TABLET | Refills: 0 | Status: SHIPPED | OUTPATIENT
Start: 2025-07-28

## 2025-07-28 RX ORDER — FLUTICASONE PROPIONATE AND SALMETEROL 50; 250 UG/1; UG/1
POWDER RESPIRATORY (INHALATION)
Qty: 60 EACH | Refills: 0 | Status: SHIPPED | OUTPATIENT
Start: 2025-07-28

## 2025-07-28 RX ORDER — ALLOPURINOL 100 MG/1
100 TABLET ORAL DAILY
Qty: 30 TABLET | Refills: 0 | Status: SHIPPED | OUTPATIENT
Start: 2025-07-28

## 2025-08-01 ENCOUNTER — HOSPITAL ENCOUNTER (OUTPATIENT)
Dept: ULTRASOUND IMAGING | Age: 60
End: 2025-08-01
Payer: COMMERCIAL

## 2025-08-01 VITALS
OXYGEN SATURATION: 100 % | DIASTOLIC BLOOD PRESSURE: 68 MMHG | SYSTOLIC BLOOD PRESSURE: 116 MMHG | RESPIRATION RATE: 18 BRPM | HEART RATE: 84 BPM | TEMPERATURE: 98.4 F

## 2025-08-01 DIAGNOSIS — K76.9 CHRONIC LIVER DISEASE: ICD-10-CM

## 2025-08-01 PROCEDURE — 49083 ABD PARACENTESIS W/IMAGING: CPT

## 2025-08-01 PROCEDURE — 7100000040 HC SPAR PHASE II RECOVERY - FIRST 15 MIN

## 2025-08-01 PROCEDURE — 7100000041 HC SPAR PHASE II RECOVERY - ADDTL 15 MIN

## 2025-08-01 PROCEDURE — 6360000002 HC RX W HCPCS: Performed by: RADIOLOGY

## 2025-08-01 PROCEDURE — P9047 ALBUMIN (HUMAN), 25%, 50ML: HCPCS | Performed by: RADIOLOGY

## 2025-08-01 RX ORDER — ALBUMIN (HUMAN) 12.5 G/50ML
25 SOLUTION INTRAVENOUS ONCE
Status: COMPLETED | OUTPATIENT
Start: 2025-08-01 | End: 2025-08-01

## 2025-08-01 RX ADMIN — ALBUMIN (HUMAN) 25 G: 0.25 INJECTION, SOLUTION INTRAVENOUS at 11:26

## 2025-08-01 ASSESSMENT — PAIN - FUNCTIONAL ASSESSMENT: PAIN_FUNCTIONAL_ASSESSMENT: 0-10

## 2025-08-01 NOTE — BRIEF OP NOTE
Brief Postoperative Note for Paracentesis    Jon Odom  YOB: 1965  [id number]    Pre-operative Diagnosis:  Ascites     Post-operative Diagnosis: Same    Procedure: Ultrasound guided paracentesis     Anesthesia: 1% Lidocaine     Surgeons/Assistants: Tirso Gloria PA-C    Complications: none    EBL: Minimal    Specimens: Were obtained    Ultrasound guided paracentesis performed. 5000 ml clear yellow fluid obtained.  Dressing applied.     Electronically signed by FRANKIE Kimball on 8/1/2025 at 11:01 AM

## 2025-08-01 NOTE — PROGRESS NOTES
Consent verified  LA/RN; MF/RDMS  /PA-C  UTS in use, RLQ prepped/draped, Lidocaine injected and site accessed by /PA  Obtained 5 liters yellow fluid  Access removed, no bleeding, DSD applied. Pt tolerated procedure.  Dr Steiner ordered albumin infusion, see MAR.  Brought pt to pre-op 54 per Pre-op charge nurse Hailey/BASIA.

## 2025-08-08 ENCOUNTER — HOSPITAL ENCOUNTER (OUTPATIENT)
Dept: ULTRASOUND IMAGING | Age: 60
Discharge: HOME | End: 2025-08-10
Payer: COMMERCIAL

## 2025-08-08 VITALS — DIASTOLIC BLOOD PRESSURE: 43 MMHG | SYSTOLIC BLOOD PRESSURE: 87 MMHG

## 2025-08-08 DIAGNOSIS — K76.9 CHRONIC LIVER DISEASE: ICD-10-CM

## 2025-08-08 PROCEDURE — 2709999900 US GUIDED PARACENTESIS

## 2025-08-08 RX ORDER — OMEPRAZOLE 40 MG/1
CAPSULE, DELAYED RELEASE ORAL
Qty: 30 CAPSULE | Refills: 0 | Status: SHIPPED | OUTPATIENT
Start: 2025-08-08

## 2025-08-11 DIAGNOSIS — E11.65 TYPE 2 DIABETES MELLITUS WITH HYPERGLYCEMIA, WITHOUT LONG-TERM CURRENT USE OF INSULIN (HCC): ICD-10-CM

## 2025-08-13 RX ORDER — GLIMEPIRIDE 4 MG/1
4 TABLET ORAL 2 TIMES DAILY
Qty: 60 TABLET | Refills: 0 | Status: SHIPPED | OUTPATIENT
Start: 2025-08-13

## 2025-08-15 ENCOUNTER — HOSPITAL ENCOUNTER (OUTPATIENT)
Dept: ULTRASOUND IMAGING | Age: 60
Discharge: HOME | End: 2025-08-17
Payer: COMMERCIAL

## 2025-08-15 VITALS
RESPIRATION RATE: 18 BRPM | OXYGEN SATURATION: 97 % | SYSTOLIC BLOOD PRESSURE: 120 MMHG | HEART RATE: 89 BPM | DIASTOLIC BLOOD PRESSURE: 79 MMHG

## 2025-08-15 DIAGNOSIS — K76.9 CHRONIC LIVER DISEASE: ICD-10-CM

## 2025-08-15 PROCEDURE — 49083 ABD PARACENTESIS W/IMAGING: CPT

## 2025-08-22 ENCOUNTER — HOSPITAL ENCOUNTER (OUTPATIENT)
Dept: INTERVENTIONAL RADIOLOGY/VASCULAR | Age: 60
Discharge: HOME | End: 2025-08-24
Payer: COMMERCIAL

## 2025-08-22 ENCOUNTER — HOSPITAL ENCOUNTER (OUTPATIENT)
Dept: LAB | Age: 60
Discharge: HOME | End: 2025-08-22
Payer: COMMERCIAL

## 2025-08-22 VITALS — SYSTOLIC BLOOD PRESSURE: 85 MMHG | HEART RATE: 88 BPM | DIASTOLIC BLOOD PRESSURE: 55 MMHG | OXYGEN SATURATION: 97 %

## 2025-08-22 DIAGNOSIS — K70.31 ASCITES DUE TO ALCOHOLIC CIRRHOSIS (HCC): Primary | ICD-10-CM

## 2025-08-22 DIAGNOSIS — K76.9 CHRONIC LIVER DISEASE: ICD-10-CM

## 2025-08-22 LAB
INR PPP: 1.3
PLATELET # BLD AUTO: 56 K/UL (ref 138–453)
PROTHROMBIN TIME: 16.2 SEC (ref 11.7–14.9)

## 2025-08-22 PROCEDURE — 6360000002 HC RX W HCPCS: Performed by: PHYSICIAN ASSISTANT

## 2025-08-22 PROCEDURE — 85049 AUTOMATED PLATELET COUNT: CPT

## 2025-08-22 PROCEDURE — 36415 COLL VENOUS BLD VENIPUNCTURE: CPT

## 2025-08-22 PROCEDURE — P9047 ALBUMIN (HUMAN), 25%, 50ML: HCPCS | Performed by: PHYSICIAN ASSISTANT

## 2025-08-22 PROCEDURE — 85610 PROTHROMBIN TIME: CPT

## 2025-08-22 PROCEDURE — 49083 ABD PARACENTESIS W/IMAGING: CPT

## 2025-08-22 RX ORDER — ALBUMIN (HUMAN) 12.5 G/50ML
50 SOLUTION INTRAVENOUS ONCE
Status: COMPLETED | OUTPATIENT
Start: 2025-08-22 | End: 2025-08-22

## 2025-08-22 RX ADMIN — ALBUMIN (HUMAN) 50 G: 0.25 INJECTION, SOLUTION INTRAVENOUS at 11:29

## (undated) DEVICE — CANNULA NSL AD TBNG L7FT PVC STR NONFLARED PRNG O2 DEL W STD

## (undated) DEVICE — STAZ ENDO KIT: Brand: MEDLINE INDUSTRIES, INC.

## (undated) DEVICE — BITEBLOCK ENDOSCP 60FR MAXI WHT POLYETH STURDY W/ VELC WVN

## (undated) DEVICE — BITEBLOCK 54FR W/ DENT RIM BLOX

## (undated) DEVICE — FORCEPS BX L240CM JAW DIA2.4MM ORNG L CAP W/ NDL DISP RAD

## (undated) DEVICE — SERVICE TREATMENT ESWL UNILAT LITHO

## (undated) DEVICE — MEDICINE CUP, GRADUATED, STER: Brand: MEDLINE

## (undated) DEVICE — CO2 CANNULA,SUPERSOFT, ADLT,7'O2,7'CO2: Brand: MEDLINE

## (undated) DEVICE — ENDO KIT W/SYRINGE: Brand: MEDLINE INDUSTRIES, INC.

## (undated) DEVICE — DEFENDO AIR WATER SUCTION AND BIOPSY VALVE KIT FOR  OLYMPUS: Brand: DEFENDO AIR/WATER/SUCTION AND BIOPSY VALVE

## (undated) DEVICE — ADAPTER TBNG LUER STUB 15 GA INTMED

## (undated) DEVICE — SIX SHOOTER SAEED MULTI-BAND LIGATOR: Brand: SAEED

## (undated) DEVICE — GAUZE,SPONGE,4"X4",16PLY,STRL,LF,10/TRAY: Brand: MEDLINE

## (undated) DEVICE — BASIN EMSIS 700ML GRAPHITE PLAS KID SHP GRAD

## (undated) DEVICE — SINGLE-USE BIOPSY FORCEPS: Brand: RADIAL JAW 4

## (undated) DEVICE — GAUZE,SPONGE,4"X4",16PLY,XRAY,STRL,LF: Brand: MEDLINE

## (undated) DEVICE — FORCEPS BX L240CM WRK CHN 2.8MM STD CAP W/ NDL MIC MESH

## (undated) DEVICE — PROTECTOR ULN NRV PUR FOAM HK LOOP STRP ANATOMICALLY

## (undated) DEVICE — GLOVE SURG 8 11.7IN BEAD CUF LIGHT BRN SENSICARE LTX FREE

## (undated) DEVICE — GARMENT,MEDLINE,DVT,INT,CALF,MED, GEN2: Brand: MEDLINE

## (undated) DEVICE — CANNULA NSL AD L2IN ETCO2 SAMP SFT CRUSH RESIST FEM AIRLFE

## (undated) DEVICE — JELLY,LUBE,STERILE,FLIP TOP,TUBE,2-OZ: Brand: MEDLINE

## (undated) DEVICE — CLIP LIG L235CM RESOL 360 BX/20